# Patient Record
Sex: MALE | Race: WHITE | NOT HISPANIC OR LATINO
[De-identification: names, ages, dates, MRNs, and addresses within clinical notes are randomized per-mention and may not be internally consistent; named-entity substitution may affect disease eponyms.]

---

## 2017-02-02 ENCOUNTER — NON-APPOINTMENT (OUTPATIENT)
Age: 72
End: 2017-02-02

## 2017-02-02 ENCOUNTER — RECORD ABSTRACTING (OUTPATIENT)
Age: 72
End: 2017-02-02

## 2017-02-02 ENCOUNTER — APPOINTMENT (OUTPATIENT)
Dept: ELECTROPHYSIOLOGY | Facility: CLINIC | Age: 72
End: 2017-02-02

## 2017-02-02 VITALS
BODY MASS INDEX: 25.18 KG/M2 | DIASTOLIC BLOOD PRESSURE: 85 MMHG | WEIGHT: 170 LBS | SYSTOLIC BLOOD PRESSURE: 183 MMHG | HEIGHT: 69 IN

## 2017-02-02 DIAGNOSIS — I47.2 VENTRICULAR TACHYCARDIA: ICD-10-CM

## 2017-02-02 RX ORDER — CARVEDILOL 12.5 MG/1
12.5 TABLET, FILM COATED ORAL TWICE DAILY
Refills: 0 | Status: ACTIVE | COMMUNITY

## 2017-02-02 RX ORDER — CALCITRIOL 0.25 UG/1
0.25 CAPSULE, LIQUID FILLED ORAL
Qty: 90 | Refills: 0 | Status: ACTIVE | COMMUNITY
Start: 2016-04-25

## 2017-02-16 ENCOUNTER — OUTPATIENT (OUTPATIENT)
Dept: INPATIENT UNIT | Facility: HOSPITAL | Age: 72
LOS: 1 days | End: 2017-02-16
Payer: MEDICARE

## 2017-02-16 VITALS
WEIGHT: 169.98 LBS | HEART RATE: 60 BPM | DIASTOLIC BLOOD PRESSURE: 79 MMHG | SYSTOLIC BLOOD PRESSURE: 170 MMHG | HEIGHT: 69 IN | RESPIRATION RATE: 18 BRPM | OXYGEN SATURATION: 96 % | TEMPERATURE: 98 F

## 2017-02-16 DIAGNOSIS — Z45.018 ENCOUNTER FOR ADJUSTMENT AND MANAGEMENT OF OTHER PART OF CARDIAC PACEMAKER: ICD-10-CM

## 2017-02-16 DIAGNOSIS — Z98.890 OTHER SPECIFIED POSTPROCEDURAL STATES: Chronic | ICD-10-CM

## 2017-02-16 LAB
ALBUMIN SERPL ELPH-MCNC: 4.1 G/DL — SIGNIFICANT CHANGE UP (ref 3.3–5)
ALP SERPL-CCNC: 88 U/L — SIGNIFICANT CHANGE UP (ref 40–120)
ALT FLD-CCNC: 25 U/L RC — SIGNIFICANT CHANGE UP (ref 10–45)
ANION GAP SERPL CALC-SCNC: 15 MMOL/L — SIGNIFICANT CHANGE UP (ref 5–17)
AST SERPL-CCNC: 25 U/L — SIGNIFICANT CHANGE UP (ref 10–40)
BILIRUB SERPL-MCNC: 0.7 MG/DL — SIGNIFICANT CHANGE UP (ref 0.2–1.2)
BUN SERPL-MCNC: 39 MG/DL — HIGH (ref 7–23)
CALCIUM SERPL-MCNC: 10.1 MG/DL — SIGNIFICANT CHANGE UP (ref 8.4–10.5)
CHLORIDE SERPL-SCNC: 104 MMOL/L — SIGNIFICANT CHANGE UP (ref 96–108)
CO2 SERPL-SCNC: 20 MMOL/L — LOW (ref 22–31)
CREAT SERPL-MCNC: 2.22 MG/DL — HIGH (ref 0.5–1.3)
GLUCOSE SERPL-MCNC: 114 MG/DL — HIGH (ref 70–99)
HCT VFR BLD CALC: 38.3 % — LOW (ref 39–50)
HGB BLD-MCNC: 13 G/DL — SIGNIFICANT CHANGE UP (ref 13–17)
MCHC RBC-ENTMCNC: 27.3 PG — SIGNIFICANT CHANGE UP (ref 27–34)
MCHC RBC-ENTMCNC: 33.9 GM/DL — SIGNIFICANT CHANGE UP (ref 32–36)
MCV RBC AUTO: 80.4 FL — SIGNIFICANT CHANGE UP (ref 80–100)
PLATELET # BLD AUTO: 141 K/UL — LOW (ref 150–400)
POTASSIUM SERPL-MCNC: 5 MMOL/L — SIGNIFICANT CHANGE UP (ref 3.5–5.3)
POTASSIUM SERPL-SCNC: 5 MMOL/L — SIGNIFICANT CHANGE UP (ref 3.5–5.3)
PROT SERPL-MCNC: 7.2 G/DL — SIGNIFICANT CHANGE UP (ref 6–8.3)
RBC # BLD: 4.76 M/UL — SIGNIFICANT CHANGE UP (ref 4.2–5.8)
RBC # FLD: 12.5 % — SIGNIFICANT CHANGE UP (ref 10.3–14.5)
SODIUM SERPL-SCNC: 139 MMOL/L — SIGNIFICANT CHANGE UP (ref 135–145)
WBC # BLD: 7.7 K/UL — SIGNIFICANT CHANGE UP (ref 3.8–10.5)
WBC # FLD AUTO: 7.7 K/UL — SIGNIFICANT CHANGE UP (ref 3.8–10.5)

## 2017-02-16 PROCEDURE — 33263 RMVL & RPLCMT DFB GEN 2 LEAD: CPT

## 2017-02-16 PROCEDURE — 93010 ELECTROCARDIOGRAM REPORT: CPT

## 2017-02-16 PROCEDURE — 99152 MOD SED SAME PHYS/QHP 5/>YRS: CPT

## 2017-02-16 RX ORDER — SODIUM CHLORIDE 9 MG/ML
3 INJECTION INTRAMUSCULAR; INTRAVENOUS; SUBCUTANEOUS EVERY 8 HOURS
Qty: 0 | Refills: 0 | Status: DISCONTINUED | OUTPATIENT
Start: 2017-02-16 | End: 2017-02-17

## 2017-02-16 RX ORDER — CARVEDILOL PHOSPHATE 80 MG/1
12.5 CAPSULE, EXTENDED RELEASE ORAL EVERY 12 HOURS
Qty: 0 | Refills: 0 | Status: DISCONTINUED | OUTPATIENT
Start: 2017-02-16 | End: 2017-02-17

## 2017-02-16 RX ORDER — DEXTROSE 50 % IN WATER 50 %
1 SYRINGE (ML) INTRAVENOUS ONCE
Qty: 0 | Refills: 0 | Status: DISCONTINUED | OUTPATIENT
Start: 2017-02-16 | End: 2017-02-17

## 2017-02-16 RX ORDER — INSULIN LISPRO 100/ML
VIAL (ML) SUBCUTANEOUS
Qty: 0 | Refills: 0 | Status: DISCONTINUED | OUTPATIENT
Start: 2017-02-16 | End: 2017-02-17

## 2017-02-16 RX ORDER — GLUCAGON INJECTION, SOLUTION 0.5 MG/.1ML
1 INJECTION, SOLUTION SUBCUTANEOUS ONCE
Qty: 0 | Refills: 0 | Status: DISCONTINUED | OUTPATIENT
Start: 2017-02-16 | End: 2017-02-17

## 2017-02-16 RX ORDER — LISINOPRIL 2.5 MG/1
20 TABLET ORAL DAILY
Qty: 0 | Refills: 0 | Status: DISCONTINUED | OUTPATIENT
Start: 2017-02-16 | End: 2017-02-17

## 2017-02-16 RX ORDER — DEXTROSE 50 % IN WATER 50 %
25 SYRINGE (ML) INTRAVENOUS ONCE
Qty: 0 | Refills: 0 | Status: DISCONTINUED | OUTPATIENT
Start: 2017-02-16 | End: 2017-02-17

## 2017-02-16 RX ORDER — CALCITRIOL 0.5 UG/1
0.25 CAPSULE ORAL DAILY
Qty: 0 | Refills: 0 | Status: DISCONTINUED | OUTPATIENT
Start: 2017-02-16 | End: 2017-02-17

## 2017-02-16 RX ORDER — SIMVASTATIN 20 MG/1
10 TABLET, FILM COATED ORAL AT BEDTIME
Qty: 0 | Refills: 0 | Status: DISCONTINUED | OUTPATIENT
Start: 2017-02-16 | End: 2017-02-17

## 2017-02-16 RX ORDER — SODIUM CHLORIDE 9 MG/ML
1000 INJECTION, SOLUTION INTRAVENOUS
Qty: 0 | Refills: 0 | Status: DISCONTINUED | OUTPATIENT
Start: 2017-02-16 | End: 2017-02-17

## 2017-02-16 RX ORDER — CEFAZOLIN SODIUM 1 G
1000 VIAL (EA) INJECTION EVERY 8 HOURS
Qty: 0 | Refills: 0 | Status: COMPLETED | OUTPATIENT
Start: 2017-02-17 | End: 2017-02-17

## 2017-02-16 RX ORDER — ASPIRIN/CALCIUM CARB/MAGNESIUM 324 MG
81 TABLET ORAL DAILY
Qty: 0 | Refills: 0 | Status: DISCONTINUED | OUTPATIENT
Start: 2017-02-16 | End: 2017-02-16

## 2017-02-16 RX ORDER — INSULIN LISPRO 100/ML
VIAL (ML) SUBCUTANEOUS AT BEDTIME
Qty: 0 | Refills: 0 | Status: DISCONTINUED | OUTPATIENT
Start: 2017-02-16 | End: 2017-02-17

## 2017-02-16 RX ORDER — ASPIRIN/CALCIUM CARB/MAGNESIUM 324 MG
81 TABLET ORAL DAILY
Qty: 0 | Refills: 0 | Status: DISCONTINUED | OUTPATIENT
Start: 2017-02-17 | End: 2017-02-17

## 2017-02-16 RX ORDER — LISINOPRIL 2.5 MG/1
20 TABLET ORAL DAILY
Qty: 0 | Refills: 0 | Status: DISCONTINUED | OUTPATIENT
Start: 2017-02-16 | End: 2017-02-16

## 2017-02-16 RX ORDER — DEXTROSE 50 % IN WATER 50 %
12.5 SYRINGE (ML) INTRAVENOUS ONCE
Qty: 0 | Refills: 0 | Status: DISCONTINUED | OUTPATIENT
Start: 2017-02-16 | End: 2017-02-17

## 2017-02-16 RX ADMIN — SODIUM CHLORIDE 3 MILLILITER(S): 9 INJECTION INTRAMUSCULAR; INTRAVENOUS; SUBCUTANEOUS at 21:38

## 2017-02-16 RX ADMIN — Medication 100 MILLIGRAM(S): at 23:21

## 2017-02-16 RX ADMIN — SIMVASTATIN 10 MILLIGRAM(S): 20 TABLET, FILM COATED ORAL at 21:43

## 2017-02-16 RX ADMIN — Medication 81 MILLIGRAM(S): at 21:43

## 2017-02-16 NOTE — H&P CARDIOLOGY - PMH
AICD at end of battery life    HLD (hyperlipidemia)    HTN (hypertension)    MI (myocardial infarction)    Throat cancer

## 2017-02-16 NOTE — H&P CARDIOLOGY - FAMILY HISTORY
Mother  Still living? Unknown  Family history of cancer, Age at diagnosis: Age Unknown     Sibling  Still living? No  Family history of cancer, Age at diagnosis: Age Unknown

## 2017-02-16 NOTE — H&P CARDIOLOGY - HISTORY OF PRESENT ILLNESS
71year old female pt with PMHx of MI, HTN, HLD, throat cancer in 2011, AICD after ventricular arrhythmia, s/p ablation presents for AICD generator change. Currently denies dizziness or light headed.

## 2017-02-17 ENCOUNTER — TRANSCRIPTION ENCOUNTER (OUTPATIENT)
Age: 72
End: 2017-02-17

## 2017-02-17 VITALS
TEMPERATURE: 98 F | SYSTOLIC BLOOD PRESSURE: 166 MMHG | RESPIRATION RATE: 17 BRPM | HEART RATE: 62 BPM | OXYGEN SATURATION: 98 % | DIASTOLIC BLOOD PRESSURE: 70 MMHG

## 2017-02-17 LAB
ANION GAP SERPL CALC-SCNC: 13 MMOL/L — SIGNIFICANT CHANGE UP (ref 5–17)
BUN SERPL-MCNC: 34 MG/DL — HIGH (ref 7–23)
CALCIUM SERPL-MCNC: 9.4 MG/DL — SIGNIFICANT CHANGE UP (ref 8.4–10.5)
CHLORIDE SERPL-SCNC: 105 MMOL/L — SIGNIFICANT CHANGE UP (ref 96–108)
CO2 SERPL-SCNC: 21 MMOL/L — LOW (ref 22–31)
CREAT SERPL-MCNC: 1.91 MG/DL — HIGH (ref 0.5–1.3)
GLUCOSE SERPL-MCNC: 152 MG/DL — HIGH (ref 70–99)
HBA1C BLD-MCNC: 7.2 % — HIGH (ref 4–5.6)
HCT VFR BLD CALC: 35.2 % — LOW (ref 39–50)
HGB BLD-MCNC: 12.1 G/DL — LOW (ref 13–17)
MCHC RBC-ENTMCNC: 27.6 PG — SIGNIFICANT CHANGE UP (ref 27–34)
MCHC RBC-ENTMCNC: 34.3 GM/DL — SIGNIFICANT CHANGE UP (ref 32–36)
MCV RBC AUTO: 80.6 FL — SIGNIFICANT CHANGE UP (ref 80–100)
PLATELET # BLD AUTO: 138 K/UL — LOW (ref 150–400)
POTASSIUM SERPL-MCNC: 4.5 MMOL/L — SIGNIFICANT CHANGE UP (ref 3.5–5.3)
POTASSIUM SERPL-SCNC: 4.5 MMOL/L — SIGNIFICANT CHANGE UP (ref 3.5–5.3)
RBC # BLD: 4.37 M/UL — SIGNIFICANT CHANGE UP (ref 4.2–5.8)
RBC # FLD: 11.7 % — SIGNIFICANT CHANGE UP (ref 10.3–14.5)
SODIUM SERPL-SCNC: 139 MMOL/L — SIGNIFICANT CHANGE UP (ref 135–145)
WBC # BLD: 8.4 K/UL — SIGNIFICANT CHANGE UP (ref 3.8–10.5)
WBC # FLD AUTO: 8.4 K/UL — SIGNIFICANT CHANGE UP (ref 3.8–10.5)

## 2017-02-17 PROCEDURE — 80048 BASIC METABOLIC PNL TOTAL CA: CPT

## 2017-02-17 PROCEDURE — 93005 ELECTROCARDIOGRAM TRACING: CPT

## 2017-02-17 PROCEDURE — 99152 MOD SED SAME PHYS/QHP 5/>YRS: CPT

## 2017-02-17 PROCEDURE — 93010 ELECTROCARDIOGRAM REPORT: CPT

## 2017-02-17 PROCEDURE — C1721: CPT

## 2017-02-17 PROCEDURE — 80053 COMPREHEN METABOLIC PANEL: CPT

## 2017-02-17 PROCEDURE — 85027 COMPLETE CBC AUTOMATED: CPT

## 2017-02-17 PROCEDURE — 83036 HEMOGLOBIN GLYCOSYLATED A1C: CPT

## 2017-02-17 PROCEDURE — 33263 RMVL & RPLCMT DFB GEN 2 LEAD: CPT

## 2017-02-17 PROCEDURE — 99153 MOD SED SAME PHYS/QHP EA: CPT

## 2017-02-17 RX ADMIN — Medication 100 MILLIGRAM(S): at 07:30

## 2017-02-17 RX ADMIN — CARVEDILOL PHOSPHATE 12.5 MILLIGRAM(S): 80 CAPSULE, EXTENDED RELEASE ORAL at 05:11

## 2017-02-17 RX ADMIN — Medication 100 MILLIGRAM(S): at 13:42

## 2017-02-17 RX ADMIN — SODIUM CHLORIDE 3 MILLILITER(S): 9 INJECTION INTRAMUSCULAR; INTRAVENOUS; SUBCUTANEOUS at 05:06

## 2017-02-17 RX ADMIN — LISINOPRIL 20 MILLIGRAM(S): 2.5 TABLET ORAL at 05:11

## 2017-02-17 NOTE — DISCHARGE NOTE ADULT - CARE PROVIDERS DIRECT ADDRESSES
[email or web address]

## 2017-02-17 NOTE — DISCHARGE NOTE ADULT - CARE PROVIDER_API CALL
Dao Lange), Cardiac Electrophysiology; Cardiology  [address] [place] [address]  Phone: [phone]  Fax: [phone]

## 2017-02-17 NOTE — DISCHARGE NOTE ADULT - PATIENT PORTAL LINK FT
“You can access the [employer] Patient Portal, offered by [facility], by registering with the following website: [email or web address]”

## 2017-02-17 NOTE — DISCHARGE NOTE ADULT - HOSPITAL COURSE
71year old female pt with PMHx of MI, HTN, HLD, throat cancer in 2011, AICD after ventricular arrhythmia, s/p ablation presents for AICD generator change. Currently denies dizziness or light headed.   s/p  AICD generator change 71year old female pt with PMHx of MI, HTN, HLD, throat cancer in 2011, AICD after ventricular arrhythmia, s/p ablation presents for AICD generator change. Currently denies dizziness or light headed. Patient's A1C: is 7.1. Discussed with the patient & patient refused to see house endocrinologist and will be follwoing up with his primary physician for DM management.  s/p  AICD generator change

## 2017-02-17 NOTE — DISCHARGE NOTE ADULT - MEDICATION SUMMARY - MEDICATIONS TO TAKE
I will START or STAY ON the medications listed below when I get home from the hospital:    Aspirin Enteric Coated 81 mg oral delayed release tablet  -- 1 tab(s) by mouth once a day  -- Indication: For CAD    ramipril 5 mg oral capsule  -- 1 cap(s) by mouth once a day  -- Indication: For HTN (hypertension)    pravastatin 20 mg oral tablet  -- 1 tab(s) by mouth once a day  -- Indication: For HLD (hyperlipidemia)    carvedilol 12.5 mg oral tablet  -- 1 tab(s) by mouth 2 times a day  -- Indication: For CHF    calcitriol 0.25 mcg oral capsule  -- 1 cap(s) by mouth once a day  -- Indication: For Supplements

## 2017-02-17 NOTE — DISCHARGE NOTE ADULT - CARE PLAN
Principal Discharge DX:	AICD at end of battery life  Goal:	To prevent arrhythmias  Instructions for follow-up, activity and diet:	Please followup  with the instruction given by Electro physiology team.  Secondary Diagnosis:	HLD (hyperlipidemia)  Goal:	LDL<70  Instructions for follow-up, activity and diet:	Goal is to keep LDL<70. Continue with your cholesterol medications as prescribed. Eat a heart healthy diet that is low in saturated fats and salt, and includes whole grains, fruits, vegetables and lean protein; exercise regularly (consult with your physician or cardiologist first); maintain a heart healthy weight; if you smoke - quit (A resource to help you stop smoking is the [facility] – phone number [phone].). Continue to follow with your primary physician or cardiologist.  Secondary Diagnosis:	HTN (hypertension)  Goal:	Your blood pressure will be controlled.  Instructions for follow-up, activity and diet:	Continue with your blood pressure medications; eat a heart healthy diet with low salt diet; exercise regularly (consult with your physician or cardiologist first); maintain a heart healthy weight; if you smoke - quit (A resource to help you stop smoking is the [facility] – phone number [phone].); include healthy ways to manage stress. Continue to follow with your primary care physician or cardiologist.  Secondary Diagnosis:	MI (myocardial infarction)  Goal:	To prevent from future MI  Instructions for follow-up, activity and diet:	Low salt, low fat diet.   Weight management.   Take medications as prescribed.    No smoking.  Follow up appointments with your doctor(s)  as instructed. Principal Discharge DX:	AICD at end of battery life  Goal:	To prevent arrhythmias  Instructions for follow-up, activity and diet:	Please followup  with the instruction given by Electro physiology team.  Secondary Diagnosis:	HLD (hyperlipidemia)  Goal:	LDL<70  Instructions for follow-up, activity and diet:	Goal is to keep LDL<70. Continue with your cholesterol medications as prescribed. Eat a heart healthy diet that is low in saturated fats and salt, and includes whole grains, fruits, vegetables and lean protein; exercise regularly (consult with your physician or cardiologist first); maintain a heart healthy weight; if you smoke - quit (A resource to help you stop smoking is the [facility] [employer] Control – phone number [phone].). Continue to follow with your primary physician or cardiologist.  Secondary Diagnosis:	HTN (hypertension)  Goal:	Your blood pressure will be controlled.  Instructions for follow-up, activity and diet:	Continue with your blood pressure medications; eat a heart healthy diet with low salt diet; exercise regularly (consult with your physician or cardiologist first); maintain a heart healthy weight; if you smoke - quit (A resource to help you stop smoking is the [facility] [employer] Control – phone number [phone].); include healthy ways to manage stress. Continue to follow with your primary care physician or cardiologist.  Secondary Diagnosis:	MI (myocardial infarction)  Goal:	To prevent from future MI  Instructions for follow-up, activity and diet:	Low salt, low fat diet.   Weight management.   Take medications as prescribed.    No smoking.  Follow up appointments with your doctor(s)  as instructed.  Secondary Diagnosis:	Diabetes  Goal:	The blood glucose will be under control  Instructions for follow-up, activity and diet:	Continue to follow with your primary care MD or your endocrinologist.  Follow a heart healthy diabetic diet. If you check your fingerstick glucose at home, call your MD if it is greater than 250mg/dL on 2 occasions or less than 100mg/dL on 2 occasions. Know signs of low blood sugar, such as: dizziness, shakiness, sweating, confusion, hunger, nervousness-drink 4 ounces apple juice if occurs and call your doctor. Know early signs of high blood sugar, such as: frequent urination, increased thirst, blurry vision, fatigue, headache - call your doctor if this occurs. Follow with other practitioners to care for your diabetes, such as ophthamologist and podiatrist. Principal Discharge DX:	AICD at end of battery life  Goal:	To prevent arrhythmias  Instructions for follow-up, activity and diet:	Please followup  with the instruction given by Electro physiology team.  Secondary Diagnosis:	HLD (hyperlipidemia)  Goal:	LDL<70  Instructions for follow-up, activity and diet:	Goal is to keep LDL<70. Continue with your cholesterol medications as prescribed. Eat a heart healthy diet that is low in saturated fats and salt, and includes whole grains, fruits, vegetables and lean protein; exercise regularly (consult with your physician or cardiologist first); maintain a heart healthy weight; if you smoke - quit (A resource to help you stop smoking is the [facility] Control – phone number [phone].). Continue to follow with your primary physician or cardiologist.  Secondary Diagnosis:	HTN (hypertension)  Goal:	Your blood pressure will be controlled.  Instructions for follow-up, activity and diet:	Continue with your blood pressure medications; eat a heart healthy diet with low salt diet; exercise regularly (consult with your physician or cardiologist first); maintain a heart healthy weight; if you smoke - quit (A resource to help you stop smoking is the [facility] Control – phone number [phone].); include healthy ways to manage stress. Continue to follow with your primary care physician or cardiologist.  Secondary Diagnosis:	MI (myocardial infarction)  Goal:	To prevent from future MI  Instructions for follow-up, activity and diet:	Low salt, low fat diet.   Weight management.   Take medications as prescribed.    No smoking.  Follow up appointments with your doctor(s)  as instructed.  Secondary Diagnosis:	Diabetes  Goal:	The blood glucose will be under control  Instructions for follow-up, activity and diet:	Continue to follow with your primary care MD or your endocrinologist.  Follow a heart healthy diabetic diet. If you check your fingerstick glucose at home, call your MD if it is greater than 250mg/dL on 2 occasions or less than 100mg/dL on 2 occasions. Know signs of low blood sugar, such as: dizziness, shakiness, sweating, confusion, hunger, nervousness-drink 4 ounces apple juice if occurs and call your doctor. Know early signs of high blood sugar, such as: frequent urination, increased thirst, blurry vision, fatigue, headache - call your doctor if this occurs. Follow with other practitioners to care for your diabetes, such as ophthamologist and podiatrist.

## 2017-02-17 NOTE — DISCHARGE NOTE ADULT - PLAN OF CARE
To prevent arrhythmias Please followup  with the instruction given by Electro physiology team. LDL<70 Goal is to keep LDL<70. Continue with your cholesterol medications as prescribed. Eat a heart healthy diet that is low in saturated fats and salt, and includes whole grains, fruits, vegetables and lean protein; exercise regularly (consult with your physician or cardiologist first); maintain a heart healthy weight; if you smoke - quit (A resource to help you stop smoking is the [facility] – phone number [phone].). Continue to follow with your primary physician or cardiologist. Your blood pressure will be controlled. Continue with your blood pressure medications; eat a heart healthy diet with low salt diet; exercise regularly (consult with your physician or cardiologist first); maintain a heart healthy weight; if you smoke - quit (A resource to help you stop smoking is the [facility] – phone number [phone].); include healthy ways to manage stress. Continue to follow with your primary care physician or cardiologist. To prevent from future MI Low salt, low fat diet.   Weight management.   Take medications as prescribed.    No smoking.  Follow up appointments with your doctor(s)  as instructed. The blood glucose will be under control Continue to follow with your primary care MD or your endocrinologist.  Follow a heart healthy diabetic diet. If you check your fingerstick glucose at home, call your MD if it is greater than 250mg/dL on 2 occasions or less than 100mg/dL on 2 occasions. Know signs of low blood sugar, such as: dizziness, shakiness, sweating, confusion, hunger, nervousness-drink 4 ounces apple juice if occurs and call your doctor. Know early signs of high blood sugar, such as: frequent urination, increased thirst, blurry vision, fatigue, headache - call your doctor if this occurs. Follow with other practitioners to care for your diabetes, such as ophthamologist and podiatrist.

## 2017-03-01 ENCOUNTER — APPOINTMENT (OUTPATIENT)
Dept: ELECTROPHYSIOLOGY | Facility: CLINIC | Age: 72
End: 2017-03-01

## 2017-03-01 ENCOUNTER — NON-APPOINTMENT (OUTPATIENT)
Age: 72
End: 2017-03-01

## 2017-03-01 VITALS
BODY MASS INDEX: 25.18 KG/M2 | OXYGEN SATURATION: 95 % | DIASTOLIC BLOOD PRESSURE: 76 MMHG | HEIGHT: 69 IN | SYSTOLIC BLOOD PRESSURE: 167 MMHG | WEIGHT: 170 LBS | HEART RATE: 67 BPM

## 2018-07-27 PROBLEM — I10 ESSENTIAL (PRIMARY) HYPERTENSION: Chronic | Status: ACTIVE | Noted: 2017-02-16

## 2018-07-27 PROBLEM — E78.5 HYPERLIPIDEMIA, UNSPECIFIED: Chronic | Status: ACTIVE | Noted: 2017-02-16

## 2018-08-01 ENCOUNTER — OUTPATIENT (OUTPATIENT)
Dept: OUTPATIENT SERVICES | Facility: HOSPITAL | Age: 73
LOS: 1 days | End: 2018-08-01
Payer: MEDICARE

## 2018-08-01 VITALS
SYSTOLIC BLOOD PRESSURE: 149 MMHG | HEIGHT: 69 IN | WEIGHT: 167.99 LBS | HEART RATE: 60 BPM | RESPIRATION RATE: 16 BRPM | DIASTOLIC BLOOD PRESSURE: 86 MMHG | TEMPERATURE: 98 F

## 2018-08-01 DIAGNOSIS — Z98.890 OTHER SPECIFIED POSTPROCEDURAL STATES: Chronic | ICD-10-CM

## 2018-08-01 DIAGNOSIS — Z95.810 PRESENCE OF AUTOMATIC (IMPLANTABLE) CARDIAC DEFIBRILLATOR: Chronic | ICD-10-CM

## 2018-08-01 DIAGNOSIS — Z01.818 ENCOUNTER FOR OTHER PREPROCEDURAL EXAMINATION: ICD-10-CM

## 2018-08-01 DIAGNOSIS — K40.90 UNILATERAL INGUINAL HERNIA, WITHOUT OBSTRUCTION OR GANGRENE, NOT SPECIFIED AS RECURRENT: ICD-10-CM

## 2018-08-01 DIAGNOSIS — C14.0 MALIGNANT NEOPLASM OF PHARYNX, UNSPECIFIED: ICD-10-CM

## 2018-08-01 DIAGNOSIS — K40.30 UNILATERAL INGUINAL HERNIA, WITH OBSTRUCTION, WITHOUT GANGRENE, NOT SPECIFIED AS RECURRENT: ICD-10-CM

## 2018-08-01 DIAGNOSIS — Z95.810 PRESENCE OF AUTOMATIC (IMPLANTABLE) CARDIAC DEFIBRILLATOR: ICD-10-CM

## 2018-08-01 LAB
ANION GAP SERPL CALC-SCNC: 6 MMOL/L — SIGNIFICANT CHANGE UP (ref 5–17)
BUN SERPL-MCNC: 54 MG/DL — HIGH (ref 7–23)
CALCIUM SERPL-MCNC: 9.5 MG/DL — SIGNIFICANT CHANGE UP (ref 8.5–10.1)
CHLORIDE SERPL-SCNC: 109 MMOL/L — HIGH (ref 96–108)
CO2 SERPL-SCNC: 25 MMOL/L — SIGNIFICANT CHANGE UP (ref 22–31)
CREAT SERPL-MCNC: 2.4 MG/DL — HIGH (ref 0.5–1.3)
GLUCOSE SERPL-MCNC: 141 MG/DL — HIGH (ref 70–99)
HBA1C BLD-MCNC: 7 % — HIGH (ref 4–5.6)
HCT VFR BLD CALC: 38.7 % — LOW (ref 39–50)
HGB BLD-MCNC: 13.2 G/DL — SIGNIFICANT CHANGE UP (ref 13–17)
MCHC RBC-ENTMCNC: 28.9 PG — SIGNIFICANT CHANGE UP (ref 27–34)
MCHC RBC-ENTMCNC: 34.1 GM/DL — SIGNIFICANT CHANGE UP (ref 32–36)
MCV RBC AUTO: 84.7 FL — SIGNIFICANT CHANGE UP (ref 80–100)
NRBC # BLD: 0 /100 WBCS — SIGNIFICANT CHANGE UP (ref 0–0)
PLATELET # BLD AUTO: 174 K/UL — SIGNIFICANT CHANGE UP (ref 150–400)
POTASSIUM SERPL-MCNC: 5.4 MMOL/L — HIGH (ref 3.5–5.3)
POTASSIUM SERPL-SCNC: 5.4 MMOL/L — HIGH (ref 3.5–5.3)
RBC # BLD: 4.57 M/UL — SIGNIFICANT CHANGE UP (ref 4.2–5.8)
RBC # FLD: 13.3 % — SIGNIFICANT CHANGE UP (ref 10.3–14.5)
SODIUM SERPL-SCNC: 140 MMOL/L — SIGNIFICANT CHANGE UP (ref 135–145)
WBC # BLD: 7.8 K/UL — SIGNIFICANT CHANGE UP (ref 3.8–10.5)
WBC # FLD AUTO: 7.8 K/UL — SIGNIFICANT CHANGE UP (ref 3.8–10.5)

## 2018-08-01 PROCEDURE — 83036 HEMOGLOBIN GLYCOSYLATED A1C: CPT

## 2018-08-01 PROCEDURE — 80048 BASIC METABOLIC PNL TOTAL CA: CPT

## 2018-08-01 PROCEDURE — 93010 ELECTROCARDIOGRAM REPORT: CPT | Mod: NC

## 2018-08-01 PROCEDURE — 93005 ELECTROCARDIOGRAM TRACING: CPT

## 2018-08-01 PROCEDURE — 85027 COMPLETE CBC AUTOMATED: CPT

## 2018-08-01 RX ORDER — CARVEDILOL PHOSPHATE 80 MG/1
1 CAPSULE, EXTENDED RELEASE ORAL
Qty: 0 | Refills: 0 | COMMUNITY

## 2018-08-01 RX ORDER — RAMIPRIL 5 MG
1 CAPSULE ORAL
Qty: 0 | Refills: 0 | COMMUNITY

## 2018-08-01 NOTE — H&P PST ADULT - MALLAMPATI CLASS
Class II - visualization of the soft palate, fauces, and uvula can't open mouth wide, neck - full ROM. Anesthesiology consulted, no major issues anticipated, but patient needs ENT note/Class IV (difficult) - the soft palate is not visible at all

## 2018-08-01 NOTE — H&P PST ADULT - PROBLEM SELECTOR PLAN 2
Cardiac clearance with Dr. Garcia on 08/03/18. Last AICD battery check over 3 months ago. Needs new interrogation. Patient aware. Last dose of ASA 08/02/18, will confirm with cardiology.

## 2018-08-01 NOTE — H&P PST ADULT - HISTORY OF PRESENT ILLNESS
73 year old male pt with PMHx of MI, HTN, HLD, throat cancer in 2011 s/p chemo and RT, DM2 (not on meds since weight loss with Ca), AICD after ventricular arrhythmia (2009, generator change 2017) c/o Left groin pain and bulge for over one year. Pain is worse with lifting. 73 year old male with PMHx of MI, HTN, HLD, throat cancer in 2011 s/p chemo and RT, DM2 (not on meds since weight loss with Ca), AICD after ventricular arrhythmia (2009, generator change 2017) c/o Left groin pain and bulge for over one year. Pain is worse with lifting. He is scheduled for Repair Left Inguinal Hernia with Mesh.

## 2018-08-01 NOTE — H&P PST ADULT - PROBLEM SELECTOR PLAN 3
Seen by Dr. Lindsey (anesthesia). No issues with intubation anticipated, but patient needs note from Dr. NEREIDA iSu (ENT) with clarification re past h/o throat Ca and related procedures. (Patient states that at some point tonsillectomy was attempted but no tonsils were found).

## 2018-08-01 NOTE — H&P PST ADULT - PMH
CAD (coronary artery disease)    Diabetes  Type2, not on any meds since weight loss after throat Ca  HLD (hyperlipidemia)    HTN (hypertension)    Inguinal hernia, left    MI (myocardial infarction)  1992  Renal insufficiency  s/p chemo  Throat cancer  2011, treated with chemo, RT  Ventricular arrhythmia  s/p AICD

## 2018-08-01 NOTE — H&P PST ADULT - PROBLEM SELECTOR PLAN 1
Repair Left Inguinal Hernia with Mesh.    Labs, MC with Dr. Robertson. Pre-op and Hibiclens instructions reviewed and given. Take routine am meds DOS with sip of water. Avoid NSAIDs and OTC supplements. Verbalized understanding.

## 2018-08-01 NOTE — H&P PST ADULT - NSANTHOSAYNRD_GEN_A_CORE
No. NADYA screening performed.  STOP BANG Legend: 0-2 = LOW Risk; 3-4 = INTERMEDIATE Risk; 5-8 = HIGH Risk

## 2018-08-02 PROBLEM — I21.3 ST ELEVATION (STEMI) MYOCARDIAL INFARCTION OF UNSPECIFIED SITE: Chronic | Status: ACTIVE | Noted: 2017-02-16

## 2018-08-02 PROBLEM — Z45.02 ENCOUNTER FOR ADJUSTMENT AND MANAGEMENT OF AUTOMATIC IMPLANTABLE CARDIAC DEFIBRILLATOR: Chronic | Status: INACTIVE | Noted: 2017-02-16 | Resolved: 2018-08-01

## 2018-08-02 PROBLEM — I49.9 CARDIAC ARRHYTHMIA, UNSPECIFIED: Chronic | Status: ACTIVE | Noted: 2018-08-01

## 2018-08-02 PROBLEM — C14.0 MALIGNANT NEOPLASM OF PHARYNX, UNSPECIFIED: Chronic | Status: ACTIVE | Noted: 2017-02-16

## 2018-08-07 PROBLEM — N28.9 DISORDER OF KIDNEY AND URETER, UNSPECIFIED: Chronic | Status: ACTIVE | Noted: 2018-08-01

## 2018-08-07 PROBLEM — E11.9 TYPE 2 DIABETES MELLITUS WITHOUT COMPLICATIONS: Chronic | Status: ACTIVE | Noted: 2018-08-01

## 2018-08-07 PROBLEM — I25.10 ATHEROSCLEROTIC HEART DISEASE OF NATIVE CORONARY ARTERY WITHOUT ANGINA PECTORIS: Chronic | Status: ACTIVE | Noted: 2018-08-01

## 2018-08-07 PROBLEM — K40.90 UNILATERAL INGUINAL HERNIA, WITHOUT OBSTRUCTION OR GANGRENE, NOT SPECIFIED AS RECURRENT: Chronic | Status: ACTIVE | Noted: 2018-08-01

## 2018-08-09 ENCOUNTER — TRANSCRIPTION ENCOUNTER (OUTPATIENT)
Age: 73
End: 2018-08-09

## 2018-08-10 ENCOUNTER — INPATIENT (INPATIENT)
Facility: HOSPITAL | Age: 73
LOS: 0 days | Discharge: HOME | DRG: 988 | End: 2018-08-11
Attending: FAMILY MEDICINE | Admitting: FAMILY MEDICINE
Payer: COMMERCIAL

## 2018-08-10 ENCOUNTER — RESULT REVIEW (OUTPATIENT)
Age: 73
End: 2018-08-10

## 2018-08-10 VITALS
SYSTOLIC BLOOD PRESSURE: 145 MMHG | HEIGHT: 69 IN | HEART RATE: 60 BPM | DIASTOLIC BLOOD PRESSURE: 71 MMHG | WEIGHT: 169.98 LBS | TEMPERATURE: 98 F | RESPIRATION RATE: 16 BRPM | OXYGEN SATURATION: 98 %

## 2018-08-10 DIAGNOSIS — Z95.810 PRESENCE OF AUTOMATIC (IMPLANTABLE) CARDIAC DEFIBRILLATOR: Chronic | ICD-10-CM

## 2018-08-10 DIAGNOSIS — N28.9 DISORDER OF KIDNEY AND URETER, UNSPECIFIED: ICD-10-CM

## 2018-08-10 DIAGNOSIS — Z98.890 OTHER SPECIFIED POSTPROCEDURAL STATES: Chronic | ICD-10-CM

## 2018-08-10 DIAGNOSIS — R29.898 OTHER SYMPTOMS AND SIGNS INVOLVING THE MUSCULOSKELETAL SYSTEM: ICD-10-CM

## 2018-08-10 DIAGNOSIS — E78.1 PURE HYPERGLYCERIDEMIA: ICD-10-CM

## 2018-08-10 DIAGNOSIS — E11.29 TYPE 2 DIABETES MELLITUS WITH OTHER DIABETIC KIDNEY COMPLICATION: ICD-10-CM

## 2018-08-10 DIAGNOSIS — I21.A9 OTHER MYOCARDIAL INFARCTION TYPE: ICD-10-CM

## 2018-08-10 DIAGNOSIS — Z01.818 ENCOUNTER FOR OTHER PREPROCEDURAL EXAMINATION: ICD-10-CM

## 2018-08-10 DIAGNOSIS — K40.30 UNILATERAL INGUINAL HERNIA, WITH OBSTRUCTION, WITHOUT GANGRENE, NOT SPECIFIED AS RECURRENT: ICD-10-CM

## 2018-08-10 DIAGNOSIS — I10 ESSENTIAL (PRIMARY) HYPERTENSION: ICD-10-CM

## 2018-08-10 PROCEDURE — 99223 1ST HOSP IP/OBS HIGH 75: CPT | Mod: AI

## 2018-08-10 PROCEDURE — 88304 TISSUE EXAM BY PATHOLOGIST: CPT | Mod: 26

## 2018-08-10 RX ORDER — ONDANSETRON 8 MG/1
4 TABLET, FILM COATED ORAL ONCE
Qty: 0 | Refills: 0 | Status: DISCONTINUED | OUTPATIENT
Start: 2018-08-10 | End: 2018-08-10

## 2018-08-10 RX ORDER — CEFAZOLIN SODIUM 1 G
1000 VIAL (EA) INJECTION ONCE
Qty: 0 | Refills: 0 | Status: COMPLETED | OUTPATIENT
Start: 2018-08-10 | End: 2018-08-10

## 2018-08-10 RX ORDER — HYDROMORPHONE HYDROCHLORIDE 2 MG/ML
0.5 INJECTION INTRAMUSCULAR; INTRAVENOUS; SUBCUTANEOUS
Qty: 0 | Refills: 0 | Status: DISCONTINUED | OUTPATIENT
Start: 2018-08-10 | End: 2018-08-10

## 2018-08-10 RX ORDER — SODIUM CHLORIDE 9 MG/ML
1000 INJECTION, SOLUTION INTRAVENOUS
Qty: 0 | Refills: 0 | Status: DISCONTINUED | OUTPATIENT
Start: 2018-08-10 | End: 2018-08-11

## 2018-08-10 RX ORDER — DEXTROSE 50 % IN WATER 50 %
12.5 SYRINGE (ML) INTRAVENOUS ONCE
Qty: 0 | Refills: 0 | Status: DISCONTINUED | OUTPATIENT
Start: 2018-08-10 | End: 2018-08-11

## 2018-08-10 RX ORDER — CARVEDILOL PHOSPHATE 80 MG/1
25 CAPSULE, EXTENDED RELEASE ORAL
Qty: 0 | Refills: 0 | Status: DISCONTINUED | OUTPATIENT
Start: 2018-08-10 | End: 2018-08-10

## 2018-08-10 RX ORDER — CALCITRIOL 0.5 UG/1
0.25 CAPSULE ORAL DAILY
Qty: 0 | Refills: 0 | Status: DISCONTINUED | OUTPATIENT
Start: 2018-08-10 | End: 2018-08-11

## 2018-08-10 RX ORDER — DEXTROSE 50 % IN WATER 50 %
25 SYRINGE (ML) INTRAVENOUS ONCE
Qty: 0 | Refills: 0 | Status: DISCONTINUED | OUTPATIENT
Start: 2018-08-10 | End: 2018-08-11

## 2018-08-10 RX ORDER — INSULIN LISPRO 100/ML
VIAL (ML) SUBCUTANEOUS
Qty: 0 | Refills: 0 | Status: DISCONTINUED | OUTPATIENT
Start: 2018-08-10 | End: 2018-08-11

## 2018-08-10 RX ORDER — OXYCODONE HYDROCHLORIDE 5 MG/1
5 TABLET ORAL ONCE
Qty: 0 | Refills: 0 | Status: DISCONTINUED | OUTPATIENT
Start: 2018-08-10 | End: 2018-08-10

## 2018-08-10 RX ORDER — CARVEDILOL PHOSPHATE 80 MG/1
25 CAPSULE, EXTENDED RELEASE ORAL EVERY 12 HOURS
Qty: 0 | Refills: 0 | Status: DISCONTINUED | OUTPATIENT
Start: 2018-08-10 | End: 2018-08-11

## 2018-08-10 RX ORDER — DEXTROSE 50 % IN WATER 50 %
15 SYRINGE (ML) INTRAVENOUS ONCE
Qty: 0 | Refills: 0 | Status: DISCONTINUED | OUTPATIENT
Start: 2018-08-10 | End: 2018-08-11

## 2018-08-10 RX ORDER — LISINOPRIL 2.5 MG/1
10 TABLET ORAL DAILY
Qty: 0 | Refills: 0 | Status: DISCONTINUED | OUTPATIENT
Start: 2018-08-10 | End: 2018-08-11

## 2018-08-10 RX ORDER — ACETAMINOPHEN 500 MG
1000 TABLET ORAL ONCE
Qty: 0 | Refills: 0 | Status: COMPLETED | OUTPATIENT
Start: 2018-08-10 | End: 2018-08-10

## 2018-08-10 RX ORDER — SODIUM CHLORIDE 9 MG/ML
1000 INJECTION, SOLUTION INTRAVENOUS
Qty: 0 | Refills: 0 | Status: DISCONTINUED | OUTPATIENT
Start: 2018-08-10 | End: 2018-08-10

## 2018-08-10 RX ORDER — ATORVASTATIN CALCIUM 80 MG/1
10 TABLET, FILM COATED ORAL AT BEDTIME
Qty: 0 | Refills: 0 | Status: DISCONTINUED | OUTPATIENT
Start: 2018-08-10 | End: 2018-08-11

## 2018-08-10 RX ORDER — GLUCAGON INJECTION, SOLUTION 0.5 MG/.1ML
1 INJECTION, SOLUTION SUBCUTANEOUS ONCE
Qty: 0 | Refills: 0 | Status: DISCONTINUED | OUTPATIENT
Start: 2018-08-10 | End: 2018-08-11

## 2018-08-10 RX ADMIN — Medication 1000 MILLIGRAM(S): at 14:54

## 2018-08-10 RX ADMIN — SODIUM CHLORIDE 100 MILLILITER(S): 9 INJECTION, SOLUTION INTRAVENOUS at 08:31

## 2018-08-10 RX ADMIN — Medication 400 MILLIGRAM(S): at 14:24

## 2018-08-10 RX ADMIN — OXYCODONE HYDROCHLORIDE 5 MILLIGRAM(S): 5 TABLET ORAL at 20:53

## 2018-08-10 RX ADMIN — OXYCODONE HYDROCHLORIDE 5 MILLIGRAM(S): 5 TABLET ORAL at 20:23

## 2018-08-10 NOTE — H&P ADULT - HISTORY OF PRESENT ILLNESS
Pt had L inguinal hernia repair done today under local anesthesia. Post-operatively he was unable to ambulate due to decreased sensation and weakness of his L leg. Pt has no other complaints.

## 2018-08-10 NOTE — BRIEF OPERATIVE NOTE - POST-OP DX
Inguinal hernia  08/10/2018  Incarcerated Left Inguinal Hernia with Lipoma of the Spermatic Cord  Active  Everardo James

## 2018-08-10 NOTE — H&P ADULT - PROBLEM SELECTOR PLAN 1
Likely secondary to prolonged anesthestethic effect from marcaine/lidocaine. Will observe. As soon as pt is able to ambulate can d/c home.

## 2018-08-10 NOTE — BRIEF OPERATIVE NOTE - COMMENTS
Repair Incarcerated Left Inguinal Hernia with Mesh, Excision Lipoma Spermatic Cord, Ilioinguinal Nerve Block.

## 2018-08-10 NOTE — ASU DISCHARGE PLAN (ADULT/PEDIATRIC). - MEDICATION SUMMARY - MEDICATIONS TO TAKE
I will START or STAY ON the medications listed below when I get home from the hospital:    Aspirin Enteric Coated 81 mg oral delayed release tablet  -- 1 tab(s) by mouth once a day  -- Indication: For prior    Norco 5 mg-325 mg oral tablet  -- 2 tab(s) by mouth every 4 to 6 hours, As Needed -for severe pain MDD:10   -- Caution federal law prohibits the transfer of this drug to any person other  than the person for whom it was prescribed.  May cause drowsiness.  Alcohol may intensify this effect.  Use care when operating dangerous machinery.  This product contains acetaminophen.  Do not use  with any other product containing acetaminophen to prevent possible liver damage.  Using more of this medication than prescribed may cause serious breathing problems.    -- Indication: For pain    benazepril 10 mg oral tablet  -- 1 tab(s) by mouth once a day  -- Indication: For prior    pravastatin 40 mg oral tablet  -- 1 tab(s) by mouth once a day  -- Indication: For prior    carvedilol 25 mg oral tablet  -- 1 tab(s) by mouth 2 times a day  -- Indication: For prior    calcitriol 0.25 mcg oral capsule  -- 1 cap(s) by mouth once a day  -- Indication: For prior

## 2018-08-10 NOTE — H&P ADULT - COMMENTS
Constitutional: denies fever, chills, diaphoresis   HEENT: denies blurry vision, difficulty hearing  Respiratory: denies SOB, REZA, cough, sputum production, wheezing, hemoptysis  Cardiovascular: denies CP, palpitations, edema  Gastrointestinal: denies nausea, vomiting, diarrhea, constipation, abdominal pain, melena, hematochezia   Genitourinary: denies dysuria, frequency, urgency, hematuria   Skin/Breast: denies rash, itching  Musculoskeletal: denies myalgias, joint swelling,   Neurologic: denies headache, weakness, dizziness, paresthesias, numbness/tingling  Psychiatric: denies feeling anxious, depressed, suicidal, homicidal thoughts  Hematology/Oncology: denies bruising, tender or enlarged lymph nodes   ROS negative except as noted above

## 2018-08-10 NOTE — H&P ADULT - NSHPSOCIALHISTORY_GEN_ALL_CORE
Social History/Preventive Medicine:    Substance Use: denies  Tobacco Usage:  denies  Alcohol Usage: denies  Illicit Drug Usage: denies    Health Management

## 2018-08-10 NOTE — H&P ADULT - NSHPPHYSICALEXAM_GEN_ALL_CORE
Physical Exam:  General: Well developed, well nourished, NAD  HEENT: NCAT, PERRLA, EOMI bl, moist mucous membranes   Neck: Supple, nontender, no mass  Neurology: A&Ox3, nonfocal, CN II-XII grossly intact, decreased sensorium LLE, 1/5 muscle strength LLE,    Respiratory: CTA B/L, No W/R/R  CV: RRR, +S1/S2, no murmurs, rubs or gallops, defibrillator in place  Abdominal: Soft, NT, ND +BSx4  Extremities: No C/C/E, + peripheral pulses  MSK: Normal ROM, no joint erythema or warmth, no joint swelling   Skin: warm, dry, normal color, no rash or abnormal lesions

## 2018-08-11 ENCOUNTER — TRANSCRIPTION ENCOUNTER (OUTPATIENT)
Age: 73
End: 2018-08-11

## 2018-08-11 VITALS — DIASTOLIC BLOOD PRESSURE: 60 MMHG | SYSTOLIC BLOOD PRESSURE: 100 MMHG

## 2018-08-11 PROCEDURE — 88304 TISSUE EXAM BY PATHOLOGIST: CPT

## 2018-08-11 PROCEDURE — 49507 PRP I/HERN INIT BLOCK >5 YR: CPT

## 2018-08-11 PROCEDURE — 99239 HOSP IP/OBS DSCHRG MGMT >30: CPT

## 2018-08-11 PROCEDURE — 82962 GLUCOSE BLOOD TEST: CPT

## 2018-08-11 PROCEDURE — C1781: CPT

## 2018-08-11 RX ORDER — MORPHINE SULFATE 50 MG/1
1 CAPSULE, EXTENDED RELEASE ORAL EVERY 4 HOURS
Qty: 0 | Refills: 0 | Status: DISCONTINUED | OUTPATIENT
Start: 2018-08-11 | End: 2018-08-11

## 2018-08-11 RX ORDER — MORPHINE SULFATE 50 MG/1
0.5 CAPSULE, EXTENDED RELEASE ORAL EVERY 4 HOURS
Qty: 0 | Refills: 0 | Status: DISCONTINUED | OUTPATIENT
Start: 2018-08-11 | End: 2018-08-11

## 2018-08-11 RX ORDER — MORPHINE SULFATE 50 MG/1
2 CAPSULE, EXTENDED RELEASE ORAL EVERY 4 HOURS
Qty: 0 | Refills: 0 | Status: DISCONTINUED | OUTPATIENT
Start: 2018-08-11 | End: 2018-08-11

## 2018-08-11 RX ADMIN — MORPHINE SULFATE 1 MILLIGRAM(S): 50 CAPSULE, EXTENDED RELEASE ORAL at 04:16

## 2018-08-11 RX ADMIN — LISINOPRIL 10 MILLIGRAM(S): 2.5 TABLET ORAL at 05:17

## 2018-08-11 RX ADMIN — CARVEDILOL PHOSPHATE 25 MILLIGRAM(S): 80 CAPSULE, EXTENDED RELEASE ORAL at 05:17

## 2018-08-11 RX ADMIN — MORPHINE SULFATE 1 MILLIGRAM(S): 50 CAPSULE, EXTENDED RELEASE ORAL at 03:31

## 2018-08-11 NOTE — DISCHARGE NOTE ADULT - PLAN OF CARE
Resolved Likely secondary to meds, anesthetics for the surgery.  Resolved, able to ambulate Post Op#1  F/U with Dr. James as advised by him Continue home meds  f/u with PCP You have decided to f/u with your PCP and not to start any Meds  Carbohydrate control diet  HbA1C is 7. You need to f/u with PCP and monitor your blood sugar closely. Continue home meds  F/u with PCP Continue home meds and f/u with your cardiologist Stable Please f/u with doctor. You are on benzapril ( BP med that can also affect kidneys).  Kidney function needs to be monitored closely.

## 2018-08-11 NOTE — DISCHARGE NOTE ADULT - PATIENT PORTAL LINK FT
You can access the [facility] Patient Portal, offered by [facility], by registering with the following website: [email or web address]

## 2018-08-11 NOTE — DISCHARGE NOTE ADULT - SECONDARY DIAGNOSIS.
Inguinal hernia, left Essential hypertension Type 2 diabetes mellitus without complication, without long-term current use of insulin HLD (hyperlipidemia) CAD (coronary artery disease) CKD (chronic kidney disease), stage IV

## 2018-08-11 NOTE — DISCHARGE NOTE ADULT - CARE PROVIDER_API CALL
Everardo James (), Surgery  Everardo [place] [address]  Phone: [phone]  Fax: [phone]

## 2018-08-11 NOTE — DISCHARGE NOTE ADULT - ADDITIONAL INSTRUCTIONS
Please f/u with PCP within 3 to 5 days to discuss about diabetes and need to start meds  F/u with all your doctors.   F/u with Dr. James as advised by him.

## 2018-08-11 NOTE — DISCHARGE NOTE ADULT - HOSPITAL COURSE
Patient was admitted for observation overnight and symptoms improved. Patient now wants to go home. Able to ambulate without any difficulty. Informed patient about elevated blood sugar and HbA1C of 7, patient states that he will f/u with his PCP for that and does not want any prescription for diabetic meds and will watch his diet. Patient mentating good and has capacity to make medical decision for him, AAOx3. Patient hemodynamically stable for discharge with instructions to f/u with PCP within 3 days and surgery as advise by surgeon.

## 2018-08-11 NOTE — DISCHARGE NOTE ADULT - MEDICATION SUMMARY - MEDICATIONS TO TAKE
I will START or STAY ON the medications listed below when I get home from the hospital:    Aspirin Enteric Coated 81 mg oral delayed release tablet  -- 1 tab(s) by mouth once a day  -- Indication: For Home Med    Norco 5 mg-325 mg oral tablet  -- 2 tab(s) by mouth every 4 to 6 hours, As Needed -for severe pain MDD:10   -- Caution federal law prohibits the transfer of this drug to any person other  than the person for whom it was prescribed.  May cause drowsiness.  Alcohol may intensify this effect.  Use care when operating dangerous machinery.  This product contains acetaminophen.  Do not use  with any other product containing acetaminophen to prevent possible liver damage.  Using more of this medication than prescribed may cause serious breathing problems.    -- Indication: For Home Med    benazepril 10 mg oral tablet  -- 1 tab(s) by mouth once a day  -- Indication: For Home Med    pravastatin 40 mg oral tablet  -- 1 tab(s) by mouth once a day  -- Indication: For Home Med    carvedilol 25 mg oral tablet  -- 1 tab(s) by mouth 2 times a day  -- Indication: For Home Med    calcitriol 0.25 mcg oral capsule  -- 1 cap(s) by mouth once a day  -- Indication: For Home Med

## 2018-08-11 NOTE — DISCHARGE NOTE ADULT - CARE PLAN
Principal Discharge DX:	Weakness of left lower extremity  Goal:	Resolved  Assessment and plan of treatment:	Likely secondary to meds, anesthetics for the surgery.  Resolved, able to ambulate  Secondary Diagnosis:	Inguinal hernia, left  Assessment and plan of treatment:	Post Op#1  F/U with Dr. James as advised by him  Secondary Diagnosis:	Essential hypertension  Assessment and plan of treatment:	Continue home meds  f/u with PCP  Secondary Diagnosis:	Type 2 diabetes mellitus without complication, without long-term current use of insulin  Assessment and plan of treatment:	You have decided to f/u with your PCP and not to start any Meds  Carbohydrate control diet  HbA1C is 7. You need to f/u with PCP and monitor your blood sugar closely.  Secondary Diagnosis:	HLD (hyperlipidemia)  Assessment and plan of treatment:	Continue home meds  F/u with PCP  Secondary Diagnosis:	CAD (coronary artery disease)  Assessment and plan of treatment:	Continue home meds and f/u with your cardiologist Principal Discharge DX:	Weakness of left lower extremity  Goal:	Resolved  Assessment and plan of treatment:	Likely secondary to meds, anesthetics for the surgery.  Resolved, able to ambulate  Secondary Diagnosis:	Inguinal hernia, left  Assessment and plan of treatment:	Post Op#1  F/U with Dr. James as advised by him  Secondary Diagnosis:	Essential hypertension  Assessment and plan of treatment:	Continue home meds  f/u with PCP  Secondary Diagnosis:	Type 2 diabetes mellitus without complication, without long-term current use of insulin  Assessment and plan of treatment:	You have decided to f/u with your PCP and not to start any Meds  Carbohydrate control diet  HbA1C is 7. You need to f/u with PCP and monitor your blood sugar closely.  Secondary Diagnosis:	HLD (hyperlipidemia)  Assessment and plan of treatment:	Continue home meds  F/u with PCP  Secondary Diagnosis:	CAD (coronary artery disease)  Assessment and plan of treatment:	Continue home meds and f/u with your cardiologist  Secondary Diagnosis:	CKD (chronic kidney disease), stage IV  Goal:	Stable  Assessment and plan of treatment:	Please f/u with doctor. You are on benzapril ( BP med that can also affect kidneys).  Kidney function needs to be monitored closely.

## 2018-08-13 LAB — SURGICAL PATHOLOGY FINAL REPORT - CH: SIGNIFICANT CHANGE UP

## 2018-08-22 ENCOUNTER — APPOINTMENT (OUTPATIENT)
Dept: MRI IMAGING | Facility: HOSPITAL | Age: 73
End: 2018-08-22

## 2018-08-22 ENCOUNTER — OUTPATIENT (OUTPATIENT)
Dept: OUTPATIENT SERVICES | Facility: HOSPITAL | Age: 73
LOS: 1 days | End: 2018-08-22
Payer: MEDICARE

## 2018-08-22 ENCOUNTER — APPOINTMENT (OUTPATIENT)
Dept: ELECTROPHYSIOLOGY | Facility: CLINIC | Age: 73
End: 2018-08-22
Payer: MEDICARE

## 2018-08-22 DIAGNOSIS — Z95.810 PRESENCE OF AUTOMATIC (IMPLANTABLE) CARDIAC DEFIBRILLATOR: Chronic | ICD-10-CM

## 2018-08-22 DIAGNOSIS — L02.12 FURUNCLE OF NECK: ICD-10-CM

## 2018-08-22 DIAGNOSIS — D00.07 CARCINOMA IN SITU OF TONGUE: ICD-10-CM

## 2018-08-22 DIAGNOSIS — Z98.890 OTHER SPECIFIED POSTPROCEDURAL STATES: Chronic | ICD-10-CM

## 2018-08-22 PROCEDURE — 93280 PM DEVICE PROGR EVAL DUAL: CPT | Mod: 76

## 2018-08-22 PROCEDURE — 71046 X-RAY EXAM CHEST 2 VIEWS: CPT

## 2018-08-22 PROCEDURE — 71046 X-RAY EXAM CHEST 2 VIEWS: CPT | Mod: 26

## 2018-08-22 PROCEDURE — 70540 MRI ORBIT/FACE/NECK W/O DYE: CPT

## 2018-08-22 PROCEDURE — 93283 PRGRMG EVAL IMPLANTABLE DFB: CPT

## 2018-08-22 PROCEDURE — 70540 MRI ORBIT/FACE/NECK W/O DYE: CPT | Mod: 26

## 2019-01-17 NOTE — H&P PST ADULT - TOBACCO USE
Chief Complaint:          Kidney stone    HPI:   55 y.o. male.  Pt has 5 day history of right flank pain.  It radiated to the groin.  Pt has never had a stone before to his knowledge.  The pain was 9 on scale of 10.  It lasted 3 to 4 hours..  He went to ER Monday night.  Pt's ct scan showed a 6 mm distal ureteral ston on the right.  He has not had much pain since Monday.  Pt has hx of gout.  Pt is on allopurinol.  HPI      Past Medical History:        Past Medical History:   Diagnosis Date   • Gout    • Gout    • Kidney stone          Current Meds:     Current Outpatient Medications   Medication Sig Dispense Refill   • allopurinol (ZYLOPRIM) 300 MG tablet Take 300 mg by mouth Daily.     • ciprofloxacin (CIPRO) 500 MG tablet Take 1 tablet by mouth 2 (Two) Times a Day for 7 days. 14 tablet 0   • HYDROcodone-acetaminophen (NORCO) 5-325 MG per tablet Take 1 tablet by mouth Every 6 (Six) Hours As Needed for Mild Pain . 12 tablet 0   • ketorolac (TORADOL) 10 MG tablet Take 1 tablet by mouth Every 6 (Six) Hours As Needed for Moderate Pain . 10 tablet 0   • ondansetron ODT (ZOFRAN-ODT) 4 MG disintegrating tablet Take 1 tablet by mouth Every 6 (Six) Hours As Needed for Nausea or Vomiting. 10 tablet 0   • oxyCODONE-acetaminophen (PERCOCET) 5-325 MG per tablet Take 1 tablet by mouth Every 4 (Four) Hours As Needed (pain). 12 tablet 0   • promethazine (PHENERGAN) 25 MG tablet Take 1 tablet by mouth Every 6 (Six) Hours As Needed for Nausea or Vomiting. 15 tablet 0   • tamsulosin (FLOMAX) 0.4 MG capsule 24 hr capsule Take 1 capsule by mouth Daily. 10 capsule 0   • tamsulosin (FLOMAX) 0.4 MG capsule 24 hr capsule Take 1 capsule by mouth Every Night. 30 capsule 11     No current facility-administered medications for this visit.         Allergies:      Allergies   Allergen Reactions   • Hydrocodone Nausea And Vomiting        Past Surgical History:     History reviewed. No pertinent surgical history.      Social History:     Social  History     Socioeconomic History   • Marital status:      Spouse name: Not on file   • Number of children: Not on file   • Years of education: Not on file   • Highest education level: Not on file   Social Needs   • Financial resource strain: Not on file   • Food insecurity - worry: Not on file   • Food insecurity - inability: Not on file   • Transportation needs - medical: Not on file   • Transportation needs - non-medical: Not on file   Occupational History   • Not on file   Tobacco Use   • Smoking status: Never Smoker   • Smokeless tobacco: Never Used   Substance and Sexual Activity   • Alcohol use: No   • Drug use: No   • Sexual activity: Defer   Other Topics Concern   • Not on file   Social History Narrative   • Not on file       Family History:     Family History   Problem Relation Age of Onset   • Prostate cancer Father    • Cancer Mother        Review of Systems:     Review of Systems   Constitutional: Negative for fatigue.   HENT: Negative for sinus pressure and sinus pain.    Eyes: Negative for pain and redness.   Respiratory: Negative for chest tightness.    Cardiovascular: Negative for chest pain.   Gastrointestinal: Positive for abdominal pain, constipation and diarrhea.   Endocrine: Negative for heat intolerance.   Genitourinary: Positive for flank pain and hematuria.   Musculoskeletal: Positive for back pain.   Allergic/Immunologic: Negative for food allergies.   Neurological: Negative for headaches.   Hematological: Does not bruise/bleed easily.   Psychiatric/Behavioral: The patient is not nervous/anxious.              I have reviewed the follow portions of the patient's history and confirmed they are accurate today:  allergies, current medications, past family history, past medical history, past social history, past surgical history, problem list and ROS  Physical Exam:     Physical Exam   Constitutional: He is oriented to person, place, and time.   HENT:   Head: Normocephalic and atraumatic.  "  Right Ear: External ear normal.   Left Ear: External ear normal.   Nose: Nose normal.   Mouth/Throat: Oropharynx is clear and moist.   Eyes: Conjunctivae and EOM are normal. Pupils are equal, round, and reactive to light.   Neck: Normal range of motion. Neck supple. No thyromegaly present.   Cardiovascular: Normal rate, regular rhythm, normal heart sounds and intact distal pulses.   No murmur heard.  Pulmonary/Chest: Effort normal and breath sounds normal. No respiratory distress. He has no wheezes. He has no rales. He exhibits no tenderness.   Abdominal: Soft. Bowel sounds are normal. He exhibits no distension and no mass. There is no tenderness. No hernia.   Genitourinary: Rectum normal, prostate normal and penis normal.   Genitourinary Comments: BXO and phimosis   Musculoskeletal: Normal range of motion. He exhibits no edema or tenderness.   Lymphadenopathy:     He has no cervical adenopathy.   Neurological: He is alert and oriented to person, place, and time. No cranial nerve deficit. He exhibits normal muscle tone. Coordination normal.   Skin: Skin is warm. No rash noted.   Psychiatric: He has a normal mood and affect. His behavior is normal. Judgment and thought content normal.   Nursing note and vitals reviewed.      Ht 170.2 cm (67\")   Wt 109 kg (240 lb)   BMI 37.59 kg/m²    Procedure:         Assessment:     Encounter Diagnoses   Name Primary?   • Ureteral stone Yes   • BXO (balanitis xerotica obliterans)    • Phimosis      Orders Placed This Encounter   Procedures   • Follow Anesthesia Guidelines / Standing Orders     Standing Status:   Future   • Obtain Informed Consent     Standing Status:   Future     Order Specific Question:   Informed Consent Given For     Answer:   cystoscopy ureteroscopy laser lithotripsy possible right stent placement   • Provide NPO Instructions to Patient     Standing Status:   Future       Plan:   Pt has a 40% chance of passing the stone without surgical intervention.  Pt is " interested in having right ureteroscopy holmium laser lithotripsy and possible stent placement.  Because of his phimosis and BXO he has requested a circumcision.  I think it is advisable given his findings.    Patient's Body mass index is 37.59 kg/m². BMI is above normal parameters. Recommendations include: educational material.      Counseling was given to patient for the following topics impressions as follows: ureteral stone. The interim medical history and current results were reviewed.  A treatment plan with follow-up was made for Ureteral stone [N20.1].  This document has been electronically signed by Archie De Souza MD January 17, 2019 1:37 PM   Former smoker

## 2019-02-25 ENCOUNTER — RESULT REVIEW (OUTPATIENT)
Age: 74
End: 2019-02-25

## 2019-06-12 NOTE — DISCHARGE NOTE ADULT - ABILITY TO HEAR (WITH HEARING AID OR HEARING APPLIANCE IF NORMALLY USED):
Mildly to Moderately Impaired: difficulty hearing in some environments or speaker may need to increase volume or speak distinctly
12-Jun-2019 16:18

## 2019-10-15 NOTE — H&P PST ADULT - MINUTES
If you have any questions regarding your visit, Please contact your care team.    [facility] CLINIC HOURS TELEPHONE NUMBER   Cephas Agbeh, M.D.    Misbah Persaud -         Monday-[facility]  8:00 am - 5 pm  Tuesday- [facility]  8:00am- 5 pm  Wednesday- Off  Thursday- [facility]  8:00 am- 5 pm  Friday-[place]  8:00 am 5 pm [facility]  [address] [place] [address]  [phone] ask for [facility]    Imaging [phone]    [facility]  [address]  [place] [id number] [phone]  Imaging [phone]     Urgent Care locations:    [facility] Saturday and Sunday   9 am - 5 pm    Monday-Friday   12 pm - 8 pm  Saturday and Sunday   9 am - 5 pm   [phone]       If you need a medication refill, please contact your pharmacy. Please allow 3 business days for your refill to be completed.  As always, Thank you for trusting us with your healthcare needs!         60

## 2019-11-29 ENCOUNTER — RESULT REVIEW (OUTPATIENT)
Age: 74
End: 2019-11-29

## 2020-07-30 ENCOUNTER — APPOINTMENT (OUTPATIENT)
Dept: VASCULAR SURGERY | Facility: CLINIC | Age: 75
End: 2020-07-30
Payer: MEDICARE

## 2020-07-30 VITALS
SYSTOLIC BLOOD PRESSURE: 152 MMHG | WEIGHT: 170 LBS | HEART RATE: 59 BPM | DIASTOLIC BLOOD PRESSURE: 84 MMHG | HEIGHT: 67 IN | BODY MASS INDEX: 26.68 KG/M2

## 2020-07-30 VITALS — TEMPERATURE: 97.7 F

## 2020-07-30 DIAGNOSIS — I65.29 OCCLUSION AND STENOSIS OF UNSPECIFIED CAROTID ARTERY: ICD-10-CM

## 2020-07-30 PROCEDURE — 93880 EXTRACRANIAL BILAT STUDY: CPT

## 2020-07-30 PROCEDURE — 99203 OFFICE O/P NEW LOW 30 MIN: CPT

## 2020-07-30 NOTE — CONSULT LETTER
[Dear  ___] : Dear ~BE, [Consult Letter:] : I had the pleasure of evaluating your patient, [unfilled]. [Please see my note below.] : Please see my note below. [Consult Closing:] : Thank you very much for allowing me to participate in the care of this patient.  If you have any questions, please do not hesitate to contact me. [Sincerely,] : Sincerely, [FreeTextEntry3] : Osvaldo Negro M.D., KRISTOPHER., R.P.V.I.\par \par  [facility]\par  of  Surgery\par Assistant Professor of Radiology\par Vascular Surgery at [place]

## 2020-07-30 NOTE — PHYSICAL EXAM
[JVD] : no jugular venous distention  [Normal Breath Sounds] : Normal breath sounds [Normal Rate and Rhythm] : normal rate and rhythm [2+] : left 2+ [Ankle Swelling (On Exam)] : not present [Varicose Veins Of Lower Extremities] : not present [] : not present [Abdomen Tenderness] : ~T ~M No abdominal tenderness [No Rash or Lesion] : No rash or lesion [Skin Ulcer] : no ulcer [Alert] : alert [Calm] : calm [de-identified] : Appears well

## 2020-07-30 NOTE — HISTORY OF PRESENT ILLNESS
[FreeTextEntry1] : 74-year-old gentleman, former smoker, history of defibrillator, presents to the office with a report of a carotid stenosis.  Patient states he has had several episodes of numbness and dysfunction of his left arm and left leg.  They have all resolved spontaneously.  In addition, the patient claims he has been having some issues with his vision.  He previously undergone a carotid duplex which shows a high-grade distal CCA stenosis on the right side with a proximal CCA stenosis on the left side.  1 of the pertinent issue in his history is that he has had oral cancer which was treated with chemo and radiation approximately 10 years ago.

## 2020-07-30 NOTE — ASSESSMENT
[FreeTextEntry1] : Patient with a repeat carotid duplex which shows a ulcerated high-grade lesion at the area of the CCA at the level of the bulb.  In addition there appears to be left-sided CCA stenosis.  Patient states that he may have some renal insufficiency so I will get a blood draw prior to sending patient for a CTA of the carotid arteries.  Patient has a history of defibrillator therefore, unable to perform MRI.

## 2020-08-19 ENCOUNTER — OUTPATIENT (OUTPATIENT)
Dept: OUTPATIENT SERVICES | Facility: HOSPITAL | Age: 75
LOS: 1 days | End: 2020-08-19
Payer: MEDICARE

## 2020-08-19 VITALS
OXYGEN SATURATION: 96 % | SYSTOLIC BLOOD PRESSURE: 100 MMHG | TEMPERATURE: 99 F | DIASTOLIC BLOOD PRESSURE: 58 MMHG | RESPIRATION RATE: 20 BRPM | WEIGHT: 171.08 LBS | HEART RATE: 75 BPM | HEIGHT: 69 IN

## 2020-08-19 DIAGNOSIS — T88.4XXA FAILED OR DIFFICULT INTUBATION, INITIAL ENCOUNTER: ICD-10-CM

## 2020-08-19 DIAGNOSIS — I65.21 OCCLUSION AND STENOSIS OF RIGHT CAROTID ARTERY: ICD-10-CM

## 2020-08-19 DIAGNOSIS — Z95.810 PRESENCE OF AUTOMATIC (IMPLANTABLE) CARDIAC DEFIBRILLATOR: Chronic | ICD-10-CM

## 2020-08-19 DIAGNOSIS — Z98.890 OTHER SPECIFIED POSTPROCEDURAL STATES: Chronic | ICD-10-CM

## 2020-08-19 DIAGNOSIS — I65.29 OCCLUSION AND STENOSIS OF UNSPECIFIED CAROTID ARTERY: ICD-10-CM

## 2020-08-19 DIAGNOSIS — Z01.818 ENCOUNTER FOR OTHER PREPROCEDURAL EXAMINATION: ICD-10-CM

## 2020-08-19 DIAGNOSIS — Z29.9 ENCOUNTER FOR PROPHYLACTIC MEASURES, UNSPECIFIED: ICD-10-CM

## 2020-08-19 LAB
A1C WITH ESTIMATED AVERAGE GLUCOSE RESULT: 7.1 % — HIGH (ref 4–5.6)
ANION GAP SERPL CALC-SCNC: 12 MMOL/L — SIGNIFICANT CHANGE UP (ref 5–17)
BLD GP AB SCN SERPL QL: NEGATIVE — SIGNIFICANT CHANGE UP
BUN SERPL-MCNC: 47 MG/DL — HIGH (ref 7–23)
CALCIUM SERPL-MCNC: 10.6 MG/DL — HIGH (ref 8.4–10.5)
CHLORIDE SERPL-SCNC: 104 MMOL/L — SIGNIFICANT CHANGE UP (ref 96–108)
CO2 SERPL-SCNC: 21 MMOL/L — LOW (ref 22–31)
CREAT SERPL-MCNC: 2.49 MG/DL — HIGH (ref 0.5–1.3)
ESTIMATED AVERAGE GLUCOSE: 157 MG/DL — HIGH (ref 68–114)
GLUCOSE SERPL-MCNC: 153 MG/DL — HIGH (ref 70–99)
HCT VFR BLD CALC: 41.6 % — SIGNIFICANT CHANGE UP (ref 39–50)
HGB BLD-MCNC: 13.6 G/DL — SIGNIFICANT CHANGE UP (ref 13–17)
MCHC RBC-ENTMCNC: 27.8 PG — SIGNIFICANT CHANGE UP (ref 27–34)
MCHC RBC-ENTMCNC: 32.7 GM/DL — SIGNIFICANT CHANGE UP (ref 32–36)
MCV RBC AUTO: 85.1 FL — SIGNIFICANT CHANGE UP (ref 80–100)
NRBC # BLD: 0 /100 WBCS — SIGNIFICANT CHANGE UP (ref 0–0)
PLATELET # BLD AUTO: 174 K/UL — SIGNIFICANT CHANGE UP (ref 150–400)
POTASSIUM SERPL-MCNC: 5.4 MMOL/L — HIGH (ref 3.5–5.3)
POTASSIUM SERPL-SCNC: 5.4 MMOL/L — HIGH (ref 3.5–5.3)
RBC # BLD: 4.89 M/UL — SIGNIFICANT CHANGE UP (ref 4.2–5.8)
RBC # FLD: 13.6 % — SIGNIFICANT CHANGE UP (ref 10.3–14.5)
RH IG SCN BLD-IMP: POSITIVE — SIGNIFICANT CHANGE UP
SODIUM SERPL-SCNC: 137 MMOL/L — SIGNIFICANT CHANGE UP (ref 135–145)
WBC # BLD: 13.98 K/UL — HIGH (ref 3.8–10.5)
WBC # FLD AUTO: 13.98 K/UL — HIGH (ref 3.8–10.5)

## 2020-08-19 PROCEDURE — 85027 COMPLETE CBC AUTOMATED: CPT

## 2020-08-19 PROCEDURE — 86901 BLOOD TYPING SEROLOGIC RH(D): CPT

## 2020-08-19 PROCEDURE — G0463: CPT

## 2020-08-19 PROCEDURE — 80048 BASIC METABOLIC PNL TOTAL CA: CPT

## 2020-08-19 PROCEDURE — 86850 RBC ANTIBODY SCREEN: CPT

## 2020-08-19 PROCEDURE — 86900 BLOOD TYPING SEROLOGIC ABO: CPT

## 2020-08-19 PROCEDURE — 83036 HEMOGLOBIN GLYCOSYLATED A1C: CPT

## 2020-08-19 RX ORDER — CEFAZOLIN SODIUM 1 G
2000 VIAL (EA) INJECTION ONCE
Refills: 0 | Status: DISCONTINUED | OUTPATIENT
Start: 2020-08-26 | End: 2020-08-27

## 2020-08-19 NOTE — H&P PST ADULT - NSICDXPASTMEDICALHX_GEN_ALL_CORE_FT
PAST MEDICAL HISTORY:  CAD (coronary artery disease)     Diabetes Type2, not on any meds since weight loss after throat Ca    HLD (hyperlipidemia)     HTN (hypertension)     Inguinal hernia, left     MI (myocardial infarction) 1992    Renal insufficiency s/p chemo    Throat cancer 2011, treated with chemo, RT    Ventricular arrhythmia s/p AICD

## 2020-08-19 NOTE — H&P PST ADULT - NSICDXPROBLEM_GEN_ALL_CORE_FT
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 Surgical patients in this group will benefit from both pharmacologic prophylaxis and intermittent compression devices.  The surgical team will determine the balance between VTE risk and bleeding risk, and other clinical considerations PROBLEM DIAGNOSES  Problem: Occlusion and stenosis of right carotid artery  Assessment and Plan: Right Carotis Endarterectomy with EEG monitoring   Pre- Op Instructions discussed   labs sent   covid test scheduled   pt will continue aspirin and plavix   cardiac eval - going for 8/20/20 - Deffbrilator reports     Problem: Difficult airway  Assessment and Plan: throat cancer s/p chemo and radiation   case discussed with anestheology will request last visit note from ENT     Problem: Need for prophylactic measure  Assessment and Plan: The Caprini score indicates that this patient is at high risk for a VTE event (score 6 or greater). Surgical patients in this group will benefit from both pharmacologic prophylaxis and intermittent compression devices.  The surgical team will determine the balance between VTE risk and bleeding risk, and other clinical considerations

## 2020-08-19 NOTE — H&P PST ADULT - MALLAMPATI CLASS
Class IV (difficult) - the soft palate is not visible at all/can't open mouth wide, neck - full ROM. Anesthesiology consulted, no major issues anticipated, but patient needs ENT note

## 2020-08-19 NOTE — H&P PST ADULT - NSICDXPASTSURGICALHX_GEN_ALL_CORE_FT
PAST SURGICAL HISTORY:  Cardiac defibrillator in place - 2009, battery change 2017    H/O prior ablation treatment VT, 2009    S/P left inguinal hernia repair 2018 at [place]

## 2020-08-19 NOTE — H&P PST ADULT - ASSESSMENT
CAPRINI SCORE [CLOT updated 18]    AGE RELATED RISK FACTORS                                                       MOBILITY RELATED FACTORS  [ ] Age 41-60 years                                            (1 Point)                    [ ] Bed rest                                                        (1 Point)  [ x] Age: 61-74 years                                           (2 Points)                  [ ] Plaster cast                                                   (2 Points)  [ ] Age= 75 years                                              (3 Points)                    [ ] Bed bound for more than 72 hours                 (2 Points)    DISEASE RELATED RISK FACTORS                                               GENDER SPECIFIC FACTORS  [ ] Edema in the lower extremities                       (1 Point)              [ ] Pregnancy                                                     (1 Point)  [ ] Varicose veins                                               (1 Point)                     [ ] Post-partum < 6 weeks                                   (1 Point)             [x ] BMI > 25 Kg/m2                                            (1 Point)                     [ ] Hormonal therapy  or oral contraception          (1 Point)                 [ ] Sepsis (in the previous month)                        (1 Point)               [ ] History of pregnancy complications                 (1 point)  [ ] Pneumonia or serious lung disease                                               [ ] Unexplained or recurrent                     (1 Point)           (in the previous month)                               (1 Point)  [ ] Abnormal pulmonary function test                     (1 Point)                 SURGERY RELATED RISK FACTORS  [ ] Acute myocardial infarction                              (1 Point)               [ ]  Section                                             (1 Point)  [ ] Congestive heart failure (in the previous month)  (1 Point)      [ ] Minor surgery                                                  (1 Point)   [ ] Inflammatory bowel disease                             (1 Point)               [ ] Arthroscopic surgery                                        (2 Points)  [ ] Central venous access                                      (2 Points)                [x ] General surgery lasting more than 45 minutes (2 points)  [x ] Present or previous malignancy                     (2 Points)                [ ] Elective arthroplasty                                         (5 points)    [ ] Stroke (in the previous month)                          (5 Points)                                                                                                                                                           HEMATOLOGY RELATED FACTORS                                                 TRAUMA RELATED RISK FACTORS  [ ] Prior episodes of VTE                                     (3 Points)                [ ] Fracture of the hip, pelvis, or leg                       (5 Points)  [ ] Positive family history for VTE                         (3 Points)             [ ] Acute spinal cord injury (in the previous month)  (5 Points)  [ ] Prothrombin 24698 A                                     (3 Points)               [ ] Paralysis  (less than 1 month)                             (5 Points)  [ ] Factor V Leiden                                             (3 Points)                  [ ] Multiple Trauma within 1 month                        (5 Points)  [ ] Lupus anticoagulants                                     (3 Points)                                                           [ ] Anticardiolipin antibodies                               (3 Points)                                                       [ ] High homocysteine in the blood                      (3 Points)                                             [ ] Other congenital or acquired thrombophilia      (3 Points)                                                [ ] Heparin induced thrombocytopenia                  (3 Points)                                     Total Score [     7     ]

## 2020-08-19 NOTE — H&P PST ADULT - HISTORY OF PRESENT ILLNESS
73 year old male with PMHx of MI, HTN, HLD, throat cancer in 2011 s/p chemo and RT, DM2 (not on meds since weight loss with Ca), AICD after ventricular arrhythmia (2009, generator change 2017), h/o hernia repair in 2018  at [facility] ) . Patient states he has had several episodes of numbness and dysfunction of his left arm and left leg. They have all resolved spontaneously. He previously undergone a carotid duplex which shows a high-grade distal CCA stenosis on the right side with a proximal CCA stenosis on the left side followed by vascular s/p carotid doppler. Presents to PST for scheduled Right Carotid Endarterectomy  with EEG monitoring on 8/26/20. 75 year old male with PMHx of MI, HTN, HLD, throat cancer in 2011 s/p chemo and RT, DM2 (not on meds since weight loss with Ca), AICD after ventricular arrhythmia (2009, generator change 2017 [employer] - [id number] ), h/o hernia repair in 2018  at [facility] ) . Patient states he has had several episodes of numbness and dysfunction of his left arm and left leg. They have all resolved spontaneously. He previously undergone a carotid duplex which shows a high-grade distal CCA stenosis on the right side with a proximal CCA stenosis on the left side followed by vascular started on aspirin and Plavix s/p . Presents to [facility] for scheduled Right Carotid Endarterectomy  with EEG monitoring on 8/26/20.    -Pt had an episode of vasovagal episode after the blood drawn while at [facility]  -pt is awake ,alert and oriented  v/s  90/57 (  Pt took BP meds this am without breakfast  ) , HR 71 , sat 100 % -pt denies any CP, SOB, Palpitations, dizziness headache or weakness - advised to go to ER for evaluation which patient refused - spoke to anesthesiologist and agree with plan     cardiology appointment on 8/20/20 -will obtain cardiac eval, defibrillator  interrogation report      *** Covid test  has to be scheduled 72 hrs prior to surgery - information given 75 year old male with PMHx of MI, HTN, HLD, throat cancer in 2011 s/p chemo and RT, DM2 (not on meds since weight loss with Ca), AICD after ventricular arrhythmia (2009, generator change 2017 [employer] - [id number] ), h/o hernia repair in 2018  at [facility] ) . Patient states he has had several episodes of numbness and dysfunction of his left arm and left leg. They have all resolved spontaneously. He previously undergone a carotid duplex which shows a high-grade distal CCA stenosis on the right side with a proximal CCA stenosis on the left side followed by vascular started on aspirin and Plavix s/p . Presents to [facility] for scheduled Right Carotid Endarterectomy  with EEG monitoring on 8/26/20.    -Pt had an episode of vasovagal episode after the blood drawn while at [facility]  -pt is awake ,alert and oriented  v/s  99/57 (  Pt took BP meds this am without breakfast  ) , HR 71 , sat 100 % -pt denies any CP, SOB, Palpitations, dizziness headache or weakness - advised to go to ER for evaluation which patient refused - spoke to anesthesiologist and agree with plan     cardiology appointment on 8/20/20 -will obtain cardiac eval, defibrillator  interrogation report      *** Covid test  has to be scheduled 72 hrs prior to surgery - information given

## 2020-08-22 DIAGNOSIS — Z01.818 ENCOUNTER FOR OTHER PREPROCEDURAL EXAMINATION: ICD-10-CM

## 2020-08-23 ENCOUNTER — APPOINTMENT (OUTPATIENT)
Dept: DISASTER EMERGENCY | Facility: CLINIC | Age: 75
End: 2020-08-23

## 2020-08-24 LAB — SARS-COV-2 N GENE NPH QL NAA+PROBE: NOT DETECTED

## 2020-08-25 ENCOUNTER — TRANSCRIPTION ENCOUNTER (OUTPATIENT)
Age: 75
End: 2020-08-25

## 2020-08-26 ENCOUNTER — RESULT REVIEW (OUTPATIENT)
Age: 75
End: 2020-08-26

## 2020-08-26 ENCOUNTER — INPATIENT (INPATIENT)
Facility: HOSPITAL | Age: 75
LOS: 0 days | Discharge: HOME | DRG: 39 | End: 2020-08-27
Attending: SURGERY | Admitting: SURGERY
Payer: MEDICARE

## 2020-08-26 ENCOUNTER — APPOINTMENT (OUTPATIENT)
Dept: VASCULAR SURGERY | Facility: HOSPITAL | Age: 75
End: 2020-08-26

## 2020-08-26 VITALS
DIASTOLIC BLOOD PRESSURE: 91 MMHG | HEIGHT: 69 IN | TEMPERATURE: 98 F | SYSTOLIC BLOOD PRESSURE: 176 MMHG | WEIGHT: 171.08 LBS | HEART RATE: 66 BPM | RESPIRATION RATE: 18 BRPM

## 2020-08-26 DIAGNOSIS — Z98.890 OTHER SPECIFIED POSTPROCEDURAL STATES: Chronic | ICD-10-CM

## 2020-08-26 DIAGNOSIS — Z95.810 PRESENCE OF AUTOMATIC (IMPLANTABLE) CARDIAC DEFIBRILLATOR: Chronic | ICD-10-CM

## 2020-08-26 DIAGNOSIS — Z01.818 ENCOUNTER FOR OTHER PREPROCEDURAL EXAMINATION: ICD-10-CM

## 2020-08-26 DIAGNOSIS — I65.29 OCCLUSION AND STENOSIS OF UNSPECIFIED CAROTID ARTERY: ICD-10-CM

## 2020-08-26 LAB — GLUCOSE BLDC GLUCOMTR-MCNC: 137 MG/DL — HIGH (ref 70–99)

## 2020-08-26 PROCEDURE — 88304 TISSUE EXAM BY PATHOLOGIST: CPT | Mod: 26

## 2020-08-26 PROCEDURE — 35301 RECHANNELING OF ARTERY: CPT | Mod: RT

## 2020-08-26 RX ORDER — CARVEDILOL PHOSPHATE 80 MG/1
25 CAPSULE, EXTENDED RELEASE ORAL EVERY 12 HOURS
Refills: 0 | Status: DISCONTINUED | OUTPATIENT
Start: 2020-08-26 | End: 2020-08-27

## 2020-08-26 RX ORDER — CALCITRIOL 0.5 UG/1
0.25 CAPSULE ORAL DAILY
Refills: 0 | Status: DISCONTINUED | OUTPATIENT
Start: 2020-08-26 | End: 2020-08-27

## 2020-08-26 RX ORDER — HEPARIN SODIUM 5000 [USP'U]/ML
5000 INJECTION INTRAVENOUS; SUBCUTANEOUS EVERY 8 HOURS
Refills: 0 | Status: DISCONTINUED | OUTPATIENT
Start: 2020-08-26 | End: 2020-08-27

## 2020-08-26 RX ORDER — ASPIRIN/CALCIUM CARB/MAGNESIUM 324 MG
81 TABLET ORAL DAILY
Refills: 0 | Status: DISCONTINUED | OUTPATIENT
Start: 2020-08-26 | End: 2020-08-27

## 2020-08-26 RX ORDER — SODIUM CHLORIDE 9 MG/ML
500 INJECTION, SOLUTION INTRAVENOUS ONCE
Refills: 0 | Status: COMPLETED | OUTPATIENT
Start: 2020-08-26 | End: 2020-08-26

## 2020-08-26 RX ORDER — LIDOCAINE HCL 20 MG/ML
0.2 VIAL (ML) INJECTION ONCE
Refills: 0 | Status: DISCONTINUED | OUTPATIENT
Start: 2020-08-26 | End: 2020-08-26

## 2020-08-26 RX ORDER — SODIUM CHLORIDE 9 MG/ML
3 INJECTION INTRAMUSCULAR; INTRAVENOUS; SUBCUTANEOUS EVERY 8 HOURS
Refills: 0 | Status: DISCONTINUED | OUTPATIENT
Start: 2020-08-26 | End: 2020-08-26

## 2020-08-26 RX ORDER — LEVOTHYROXINE SODIUM 125 MCG
75 TABLET ORAL DAILY
Refills: 0 | Status: DISCONTINUED | OUTPATIENT
Start: 2020-08-26 | End: 2020-08-27

## 2020-08-26 RX ORDER — CHLORHEXIDINE GLUCONATE 213 G/1000ML
1 SOLUTION TOPICAL ONCE
Refills: 0 | Status: DISCONTINUED | OUTPATIENT
Start: 2020-08-26 | End: 2020-08-26

## 2020-08-26 RX ORDER — HYDROMORPHONE HYDROCHLORIDE 2 MG/ML
0.25 INJECTION INTRAMUSCULAR; INTRAVENOUS; SUBCUTANEOUS
Refills: 0 | Status: DISCONTINUED | OUTPATIENT
Start: 2020-08-26 | End: 2020-08-26

## 2020-08-26 RX ORDER — ATORVASTATIN CALCIUM 80 MG/1
10 TABLET, FILM COATED ORAL AT BEDTIME
Refills: 0 | Status: DISCONTINUED | OUTPATIENT
Start: 2020-08-26 | End: 2020-08-27

## 2020-08-26 RX ORDER — CLOPIDOGREL BISULFATE 75 MG/1
75 TABLET, FILM COATED ORAL DAILY
Refills: 0 | Status: DISCONTINUED | OUTPATIENT
Start: 2020-08-27 | End: 2020-08-27

## 2020-08-26 RX ORDER — HYDRALAZINE HCL 50 MG
10 TABLET ORAL ONCE
Refills: 0 | Status: COMPLETED | OUTPATIENT
Start: 2020-08-26 | End: 2020-08-26

## 2020-08-26 RX ORDER — ONDANSETRON 8 MG/1
4 TABLET, FILM COATED ORAL ONCE
Refills: 0 | Status: DISCONTINUED | OUTPATIENT
Start: 2020-08-26 | End: 2020-08-26

## 2020-08-26 RX ORDER — DEXTROSE MONOHYDRATE, SODIUM CHLORIDE, AND POTASSIUM CHLORIDE 50; .745; 4.5 G/1000ML; G/1000ML; G/1000ML
1000 INJECTION, SOLUTION INTRAVENOUS
Refills: 0 | Status: DISCONTINUED | OUTPATIENT
Start: 2020-08-26 | End: 2020-08-27

## 2020-08-26 RX ORDER — FENTANYL CITRATE 50 UG/ML
25 INJECTION INTRAVENOUS
Refills: 0 | Status: DISCONTINUED | OUTPATIENT
Start: 2020-08-26 | End: 2020-08-26

## 2020-08-26 RX ORDER — LISINOPRIL 2.5 MG/1
10 TABLET ORAL DAILY
Refills: 0 | Status: DISCONTINUED | OUTPATIENT
Start: 2020-08-27 | End: 2020-08-27

## 2020-08-26 RX ADMIN — HEPARIN SODIUM 5000 UNIT(S): 5000 INJECTION INTRAVENOUS; SUBCUTANEOUS at 14:45

## 2020-08-26 RX ADMIN — ATORVASTATIN CALCIUM 10 MILLIGRAM(S): 80 TABLET, FILM COATED ORAL at 21:31

## 2020-08-26 RX ADMIN — DEXTROSE MONOHYDRATE, SODIUM CHLORIDE, AND POTASSIUM CHLORIDE 125 MILLILITER(S): 50; .745; 4.5 INJECTION, SOLUTION INTRAVENOUS at 13:16

## 2020-08-26 RX ADMIN — SODIUM CHLORIDE 1000 MILLILITER(S): 9 INJECTION, SOLUTION INTRAVENOUS at 12:35

## 2020-08-26 RX ADMIN — CARVEDILOL PHOSPHATE 25 MILLIGRAM(S): 80 CAPSULE, EXTENDED RELEASE ORAL at 17:49

## 2020-08-26 RX ADMIN — Medication 10 MILLIGRAM(S): at 15:17

## 2020-08-26 RX ADMIN — DEXTROSE MONOHYDRATE, SODIUM CHLORIDE, AND POTASSIUM CHLORIDE 75 MILLILITER(S): 50; .745; 4.5 INJECTION, SOLUTION INTRAVENOUS at 15:18

## 2020-08-26 RX ADMIN — HEPARIN SODIUM 5000 UNIT(S): 5000 INJECTION INTRAVENOUS; SUBCUTANEOUS at 21:31

## 2020-08-26 RX ADMIN — Medication 81 MILLIGRAM(S): at 17:49

## 2020-08-26 NOTE — PRE-ANESTHESIA EVALUATION ADULT - NSANTHAIRWAYFT_ENT_ALL_CORE
<6CM THYROMENTAL DISTANCE  LIMITED MOUTH OPENING  UNABLE TO VISUALIZE PILLARS, UVULA, SOFT PALATE <6CM THYROMENTAL DISTANCE  LIMITED MOUTH OPENING  UNABLE TO VISUALIZE PILLARS, UVULA, SOFT PALATE  Neck rigid

## 2020-08-26 NOTE — PRE-ANESTHESIA EVALUATION ADULT - NSANTHADDINFOFT_GEN_ALL_CORE
Discussed the need for awake fiberoptic intubation given his history of throat cancer and radiation therapy.  Patient understands and agrees.

## 2020-08-26 NOTE — PRE-ANESTHESIA EVALUATION ADULT - NSANTHPMHFT_GEN_ALL_CORE
74 y/o M with PMHx significant for MI, AICD (PLACED IN 2009 DUE TO VENTRICULAR ARRHTHYMIA), H/O VT ABLATION (2009), CAD ( NO STENTS), THROAT CANCER S/P CHEMO/RT AND TONGUE RESECTION, RENAL INSUFFICIENCY. DM, HTN PRESENTS FOR RIGHT CAROTID ENDARTECTOMY.    NO NEUROLOGICAL DEFICITS - NO PRIOR HX OF STROKES OR TIAS.  CARDIOLOGY CLEARANCE NOTE REVIEWED - OPTIMIZED FOR PROCEDURE.  AICD INTERROGATED WITH ADEQUATE BATTERY LIFE. AICD (placed in 2009 for ventricular arrhythmia)  VT Ablation (2009)  CAD (No stents)  Throat CA s/p chemoRT and tongue resection  Renal Insufficiency  DM  HTN   Several episodes of numbness and dysfuntion of his left arm and left leg that have resolved spontaneously    Denies prior history of strokes or TIAs  Vasovagal episode during blood draw at [facility]    Cardiology clearance note in chart and reviewed: "Optimized for procedure"  AICD Interrogation in chart, 8/2020 with adequate battery life

## 2020-08-26 NOTE — BRIEF OPERATIVE NOTE - OPERATION/FINDINGS
Procedure:Right Carotid Endarterectomy with patch repair; Findings: post RT changes; hemorrhagic plaque for CCA to ICA; on extubation patient intact neuro function 5/5 motor in all extremities

## 2020-08-27 ENCOUNTER — TRANSCRIPTION ENCOUNTER (OUTPATIENT)
Age: 75
End: 2020-08-27

## 2020-08-27 VITALS
HEART RATE: 71 BPM | TEMPERATURE: 98 F | DIASTOLIC BLOOD PRESSURE: 75 MMHG | RESPIRATION RATE: 18 BRPM | SYSTOLIC BLOOD PRESSURE: 137 MMHG | OXYGEN SATURATION: 96 %

## 2020-08-27 DIAGNOSIS — E87.5 HYPERKALEMIA: ICD-10-CM

## 2020-08-27 LAB
A1C WITH ESTIMATED AVERAGE GLUCOSE RESULT: 7 % — HIGH (ref 4–5.6)
ANION GAP SERPL CALC-SCNC: 10 MMOL/L — SIGNIFICANT CHANGE UP (ref 5–17)
BUN SERPL-MCNC: 35 MG/DL — HIGH (ref 7–23)
CALCIUM SERPL-MCNC: 9.1 MG/DL — SIGNIFICANT CHANGE UP (ref 8.4–10.5)
CHLORIDE SERPL-SCNC: 101 MMOL/L — SIGNIFICANT CHANGE UP (ref 96–108)
CHOLEST SERPL-MCNC: 133 MG/DL — SIGNIFICANT CHANGE UP (ref 10–199)
CO2 SERPL-SCNC: 22 MMOL/L — SIGNIFICANT CHANGE UP (ref 22–31)
CREAT SERPL-MCNC: 2.1 MG/DL — HIGH (ref 0.5–1.3)
ESTIMATED AVERAGE GLUCOSE: 154 MG/DL — HIGH (ref 68–114)
GLUCOSE SERPL-MCNC: 262 MG/DL — HIGH (ref 70–99)
HCT VFR BLD CALC: 39.3 % — SIGNIFICANT CHANGE UP (ref 39–50)
HDLC SERPL-MCNC: 33 MG/DL — LOW
HGB BLD-MCNC: 12.6 G/DL — LOW (ref 13–17)
LIPID PNL WITH DIRECT LDL SERPL: 74 MG/DL — SIGNIFICANT CHANGE UP
MCHC RBC-ENTMCNC: 27.2 PG — SIGNIFICANT CHANGE UP (ref 27–34)
MCHC RBC-ENTMCNC: 32.1 GM/DL — SIGNIFICANT CHANGE UP (ref 32–36)
MCV RBC AUTO: 84.9 FL — SIGNIFICANT CHANGE UP (ref 80–100)
NRBC # BLD: 0 /100 WBCS — SIGNIFICANT CHANGE UP (ref 0–0)
PLATELET # BLD AUTO: 205 K/UL — SIGNIFICANT CHANGE UP (ref 150–400)
POTASSIUM SERPL-MCNC: 4.6 MMOL/L — SIGNIFICANT CHANGE UP (ref 3.5–5.3)
POTASSIUM SERPL-MCNC: 5.7 MMOL/L — HIGH (ref 3.5–5.3)
POTASSIUM SERPL-SCNC: 4.6 MMOL/L — SIGNIFICANT CHANGE UP (ref 3.5–5.3)
POTASSIUM SERPL-SCNC: 5.7 MMOL/L — HIGH (ref 3.5–5.3)
RBC # BLD: 4.63 M/UL — SIGNIFICANT CHANGE UP (ref 4.2–5.8)
RBC # FLD: 13.9 % — SIGNIFICANT CHANGE UP (ref 10.3–14.5)
SODIUM SERPL-SCNC: 133 MMOL/L — LOW (ref 135–145)
TOTAL CHOLESTEROL/HDL RATIO MEASUREMENT: 4 RATIO — SIGNIFICANT CHANGE UP (ref 3.4–9.6)
TRIGL SERPL-MCNC: 126 MG/DL — SIGNIFICANT CHANGE UP (ref 10–149)
WBC # BLD: 11.5 K/UL — HIGH (ref 3.8–10.5)
WBC # FLD AUTO: 11.5 K/UL — HIGH (ref 3.8–10.5)

## 2020-08-27 PROCEDURE — 83036 HEMOGLOBIN GLYCOSYLATED A1C: CPT

## 2020-08-27 PROCEDURE — C1889: CPT

## 2020-08-27 PROCEDURE — 88304 TISSUE EXAM BY PATHOLOGIST: CPT

## 2020-08-27 PROCEDURE — 93010 ELECTROCARDIOGRAM REPORT: CPT

## 2020-08-27 PROCEDURE — 80048 BASIC METABOLIC PNL TOTAL CA: CPT

## 2020-08-27 PROCEDURE — 93005 ELECTROCARDIOGRAM TRACING: CPT

## 2020-08-27 PROCEDURE — 80061 LIPID PANEL: CPT

## 2020-08-27 PROCEDURE — C1768: CPT

## 2020-08-27 PROCEDURE — 85027 COMPLETE CBC AUTOMATED: CPT

## 2020-08-27 PROCEDURE — 84132 ASSAY OF SERUM POTASSIUM: CPT

## 2020-08-27 PROCEDURE — 82962 GLUCOSE BLOOD TEST: CPT

## 2020-08-27 RX ORDER — OXYCODONE HYDROCHLORIDE 5 MG/1
1 TABLET ORAL
Qty: 5 | Refills: 0
Start: 2020-08-27

## 2020-08-27 RX ORDER — SODIUM CHLORIDE 9 MG/ML
1000 INJECTION INTRAMUSCULAR; INTRAVENOUS; SUBCUTANEOUS
Refills: 0 | Status: DISCONTINUED | OUTPATIENT
Start: 2020-08-27 | End: 2020-08-27

## 2020-08-27 RX ORDER — SODIUM POLYSTYRENE SULFONATE 4.1 MEQ/G
30 POWDER, FOR SUSPENSION ORAL ONCE
Refills: 0 | Status: COMPLETED | OUTPATIENT
Start: 2020-08-27 | End: 2020-08-27

## 2020-08-27 RX ORDER — CALCIUM GLUCONATE 100 MG/ML
1 VIAL (ML) INTRAVENOUS ONCE
Refills: 0 | Status: COMPLETED | OUTPATIENT
Start: 2020-08-27 | End: 2020-08-27

## 2020-08-27 RX ADMIN — CARVEDILOL PHOSPHATE 25 MILLIGRAM(S): 80 CAPSULE, EXTENDED RELEASE ORAL at 05:42

## 2020-08-27 RX ADMIN — LISINOPRIL 10 MILLIGRAM(S): 2.5 TABLET ORAL at 05:42

## 2020-08-27 RX ADMIN — Medication 100 GRAM(S): at 13:01

## 2020-08-27 RX ADMIN — CALCITRIOL 0.25 MICROGRAM(S): 0.5 CAPSULE ORAL at 13:07

## 2020-08-27 RX ADMIN — HEPARIN SODIUM 5000 UNIT(S): 5000 INJECTION INTRAVENOUS; SUBCUTANEOUS at 05:42

## 2020-08-27 RX ADMIN — SODIUM CHLORIDE 100 MILLILITER(S): 9 INJECTION INTRAMUSCULAR; INTRAVENOUS; SUBCUTANEOUS at 13:07

## 2020-08-27 RX ADMIN — CLOPIDOGREL BISULFATE 75 MILLIGRAM(S): 75 TABLET, FILM COATED ORAL at 13:07

## 2020-08-27 RX ADMIN — CALCITRIOL 0.25 MICROGRAM(S): 0.5 CAPSULE ORAL at 05:42

## 2020-08-27 RX ADMIN — HEPARIN SODIUM 5000 UNIT(S): 5000 INJECTION INTRAVENOUS; SUBCUTANEOUS at 13:07

## 2020-08-27 RX ADMIN — Medication 81 MILLIGRAM(S): at 13:07

## 2020-08-27 RX ADMIN — SODIUM POLYSTYRENE SULFONATE 30 GRAM(S): 4.1 POWDER, FOR SUSPENSION ORAL at 13:02

## 2020-08-27 RX ADMIN — Medication 75 MICROGRAM(S): at 05:42

## 2020-08-27 NOTE — PROGRESS NOTE ADULT - SUBJECTIVE AND OBJECTIVE BOX
Vascular Surgery Post op Note    Procedure: R CEA     SUBJECTIVE: Pt seen and examined at bedside in pacu. After transfer to pacu, SBP 140s, pt received 500cc bolus and on started on IVF at 125/hr.  At 15:00 SBP high 160s-low 170s.  10 Hydralazine IV ordered and IVF lowered to 75cc/hr, with improvement to SBP 150s.  Patient seen ans examined at 1700, patient feels well, denies chest pain, sob, palpitations, HA, fever, chills, N/V, difficulty swallowing/breathing.  Voiding, has not ambulated yet.        OBJECTIVE:  Vital Signs Last 24 Hrs  T(C): 36 (26 Aug 2020 11:25), Max: 36.8 (26 Aug 2020 06:02)  T(F): 96.8 (26 Aug 2020 11:25), Max: 98.2 (26 Aug 2020 06:02)  HR: 82 (26 Aug 2020 16:00) (66 - 82)  BP: 150/73 (26 Aug 2020 16:00) (117/67 - 176/91)  BP(mean): 104 (26 Aug 2020 16:00) (86 - 118)  RR: 14 (26 Aug 2020 16:00) (12 - 18)  SpO2: 99% (26 Aug 2020 16:00) (96% - 100%)    I&O's Summary    26 Aug 2020 07:01  -  26 Aug 2020 17:19  --------------------------------------------------------  IN: 875 mL / OUT: 0 mL / NET: 875 mL      I&O's Detail    26 Aug 2020 07:01  -  26 Aug 2020 17:19  --------------------------------------------------------  IN:    dextrose 5% + sodium chloride 0.45% with potassium chloride 20 mEq/L: 375 mL    IV PiggyBack: 500 mL  Total IN: 875 mL    OUT:  Total OUT: 0 mL    Total NET: 875 mL          MEDICATIONS  (STANDING):  aspirin enteric coated 81 milliGRAM(s) Oral daily  atorvastatin 10 milliGRAM(s) Oral at bedtime  calcitriol   Capsule 0.25 MICROGram(s) Oral daily  carvedilol 25 milliGRAM(s) Oral every 12 hours  ceFAZolin   IVPB 2000 milliGRAM(s) IV Intermittent once  dextrose 5% + sodium chloride 0.45% with potassium chloride 20 mEq/L 1000 milliLiter(s) (75 mL/Hr) IV Continuous <Continuous>  heparin   Injectable 5000 Unit(s) SubCutaneous every 8 hours  levothyroxine 75 MICROGram(s) Oral daily    MEDICATIONS  (PRN):  fentaNYL    Injectable 25 MICROGram(s) IV Push every 10 minutes PRN Severe Pain (7 - 10)  HYDROmorphone  Injectable 0.25 milliGRAM(s) IV Push every 15 minutes PRN Moderate Pain (4 - 6)  ondansetron Injectable 4 milliGRAM(s) IV Push once PRN Nausea and/or Vomiting            Physical exam  General: NAD, resting comfortably  Neuro: CN intact, A&O x3  HEENT: symmetrical facies, no tongue deviation, no hematoma or active bleeding, dressing c/d/i, ROSENDO -sanguinous  Extremities: symmetrical motor, sensation intact b/l, SCDs in place.
SUBJECTIVE: Pt seen and examined at bedside. Yesterday SBP low 170s, given 10 hydralazine and improved to 150s. BP stable throughout the night. Patient feels well, denies chest pain, sob, palpitations, HA, fever, chills, N/V, difficulty swallowing/breathing, asking to go home today.    OBJECTIVE:  Vital Signs Last 24 Hrs  T(C): 36.7 (27 Aug 2020 05:32), Max: 37.1 (26 Aug 2020 22:41)  T(F): 98.1 (27 Aug 2020 05:32), Max: 98.7 (26 Aug 2020 22:41)  HR: 71 (27 Aug 2020 05:32) (60 - 82)  BP: 118/73 (27 Aug 2020 05:32) (114/65 - 173/83)  BP(mean): 106 (26 Aug 2020 19:00) (86 - 118)  RR: 18 (27 Aug 2020 05:32) (12 - 18)  SpO2: 97% (27 Aug 2020 05:32) (96% - 100%)      I&O's Summary    26 Aug 2020 07:01  -  27 Aug 2020 07:00  --------------------------------------------------------  IN: 1955 mL / OUT: 1225 mL / NET: 730 mL      Physical exam  General: NAD, resting comfortably  Neuro: CN intact, A&O x3  HEENT: symmetrical facies, no tongue deviation, no hematoma or active bleeding, dressing c/d/i, ROSENDO -sanguinous  Extremities: symmetrical motor, sensation intact b/l, SCDs in place.    LABS:    08-27    133<L>  |  101  |  35<H>  ----------------------------<  262<H>  5.7<H>   |  22  |  2.10<H>    Ca    9.1      27 Aug 2020 06:42            RADIOLOGY & ADDITIONAL STUDIES:

## 2020-08-27 NOTE — DISCHARGE NOTE PROVIDER - NSDCMRMEDTOKEN_GEN_ALL_CORE_FT
Aspirin Enteric Coated 81 mg oral delayed release tablet: 1 tab(s) orally once a day  benazepril 10 mg oral tablet: 1 tab(s) orally once a day  calcitriol 0.25 mcg oral capsule: 1 cap(s) orally once a day  carvedilol 25 mg oral tablet: 1 tab(s) orally 2 times a day  levothyroxine 75 mcg (0.075 mg) oral tablet: 1 tab(s) orally once a day  Plavix 75 mg oral tablet: 1 tab(s) orally once a day  pravastatin 40 mg oral tablet: 1 tab(s) orally once a day Aspirin Enteric Coated 81 mg oral delayed release tablet: 1 tab(s) orally once a day  benazepril 10 mg oral tablet: 1 tab(s) orally once a day  calcitriol 0.25 mcg oral capsule: 1 cap(s) orally once a day  carvedilol 25 mg oral tablet: 1 tab(s) orally 2 times a day  levothyroxine 75 mcg (0.075 mg) oral tablet: 1 tab(s) orally once a day  oxyCODONE 5 mg oral tablet: 1 tab(s) orally every 6 hours, As Needed -for severe pain MDD:4  Plavix 75 mg oral tablet: 1 tab(s) orally once a day  pravastatin 40 mg oral tablet: 1 tab(s) orally once a day Aspirin Enteric Coated 81 mg oral delayed release tablet: 1 tab(s) orally once a day  calcitriol 0.25 mcg oral capsule: 1 cap(s) orally once a day  carvedilol 25 mg oral tablet: 1 tab(s) orally 2 times a day  levothyroxine 75 mcg (0.075 mg) oral tablet: 1 tab(s) orally once a day  oxyCODONE 5 mg oral tablet: 1 tab(s) orally every 6 hours, As Needed -for severe pain MDD:4  Plavix 75 mg oral tablet: 1 tab(s) orally once a day  pravastatin 40 mg oral tablet: 1 tab(s) orally once a day

## 2020-08-27 NOTE — DISCHARGE NOTE NURSING/CASE MANAGEMENT/SOCIAL WORK - NSDCPEPTSTRK_GEN_ALL_CORE
Signs and symptoms of stroke/Need for follow up after discharge/Risk factors for stroke/Stroke warning signs and symptoms/Prescribed medications/Call 911 for stroke/Stroke support groups for patients, families, and friends/Stroke education booklet

## 2020-08-27 NOTE — DISCHARGE NOTE NURSING/CASE MANAGEMENT/SOCIAL WORK - PATIENT PORTAL LINK FT
You can access the FollowMyHealth Patient Portal offered by [facility] by registering at the following website: [email or web address]. By joining [facility] FollowMyHealth portal, you will also be able to view your health information using other applications (apps) compatible with our system.

## 2020-08-27 NOTE — DISCHARGE NOTE PROVIDER - NSDCACTIVITY_GEN_ALL_CORE
Do not make important decisions/Stairs allowed/Walking - Indoors allowed/Do not drive or operate machinery/Showering allowed/No heavy lifting/straining/Walking - Outdoors allowed

## 2020-08-27 NOTE — DISCHARGE NOTE PROVIDER - CARE PROVIDERS DIRECT ADDRESSES
,[email or web address]

## 2020-08-27 NOTE — DISCHARGE NOTE PROVIDER - CARE PROVIDER_API CALL
Osvaldo Negro  VASCULAR SURGERY  [address], [address]  [place] [address]  Phone: [phone]  Fax: [phone]  Follow Up Time: 2 weeks Osvaldo Negro  VASCULAR SURGERY  [address], [address]  [place] [address]  Phone: [phone]  Fax: [phone]  Follow Up Time: 2 weeks    lAberto Tomas  [address], [place] [address]  Phone: [phone]  Fax: [phone]  Follow Up Time: 1 week

## 2020-08-27 NOTE — DISCHARGE NOTE PROVIDER - PROVIDER TOKENS
PROVIDER:[TOKEN:[[id number]],FOLLOWUP:[2 weeks]] PROVIDER:[TOKEN:[[id number]],FOLLOWUP:[2 weeks]],PROVIDER:[TOKEN:[[id number]],FOLLOWUP:[1 week]]

## 2020-08-27 NOTE — DISCHARGE NOTE PROVIDER - HOSPITAL COURSE
75 year old male with PMHx of MI, HTN, HLD, throat cancer in 2011 s/p chemo and RT, DM2 (not on meds since weight loss with Ca), AICD after ventricular arrhythmia (2009, generator change 2017 [employer] - [id number] ), h/o hernia repair in 2018  at [facility] ) . Patient states he has had several episodes of numbness and dysfunction of his left arm and left leg. They have all resolved spontaneously. He previously undergone a carotid duplex which shows a high-grade distal CCA stenosis on the right side with a proximal CCA stenosis on the left side followed by vascular started on aspirin and Plavix s/p . Presents to [facility] for scheduled Right Carotid Endarterectomy  with EEG monitoring on 8/26/20.        -Pt had an episode of vasovagal episode after the blood drawn while at [facility]  -pt is awake ,alert and oriented  v/s  99/57 (  Pt took BP meds this am without breakfast  ) , HR 71 , sat 100 % -pt denies any CP, SOB, Palpitations, dizziness headache or weakness - advised to go to ER for evaluation which patient refused - spoke to anesthesiologist and agree with plan         cardiology appointment on 8/20/20 -will obtain cardiac eval, defibrillator  interrogation report     *** Covid test has to be scheduled 72 hrs prior to surgery - information given             On 8/26, pt underwent R CEA.  Pt. tolerated procedure well and was transferred to the recovery room where pt was closely managed for postoperative pain and blood pressure control, and then transferred to the floor without incidence.  Pt. was mainly managed for postoperative pain, wound care, as well as close monitoring of fluid resuscitation, neurological status and electrolyte repletion.  Pt has been tolerating a diet, voiding, ambulating, and the pain is now well controlled.   Pt. is ready for discharge home in stable condition, and will follow up in two weeks as an outpatient with Dr. Negro. 75 year old male with PMHx of MI, HTN, HLD, throat cancer in 2011 s/p chemo and RT, DM2 (not on meds since weight loss with Ca), AICD after ventricular arrhythmia (2009, generator change 2017 [employer] - [id number] ), h/o hernia repair in 2018  at [facility] ) . Patient states he has had several episodes of numbness and dysfunction of his left arm and left leg. They have all resolved spontaneously. He previously undergone a carotid duplex which shows a high-grade distal CCA stenosis on the right side with a proximal CCA stenosis on the left side followed by vascular started on aspirin and Plavix s/p . Presents to [facility] for scheduled Right Carotid Endarterectomy  with EEG monitoring on 8/26/20.        -Pt had an episode of vasovagal episode after the blood drawn while at [facility]  -pt is awake ,alert and oriented  v/s  99/57 (  Pt took BP meds this am without breakfast  ) , HR 71 , sat 100 % -pt denies any CP, SOB, Palpitations, dizziness headache or weakness - advised to go to ER for evaluation which patient refused - spoke to anesthesiologist and agree with plan         cardiology appointment on 8/20/20 -will obtain cardiac eval, defibrillator  interrogation report     *** Covid test has to be scheduled 72 hrs prior to surgery - information given             On 8/26, pt underwent R CEA.  Pt. tolerated procedure well and was transferred to the recovery room where pt was closely managed for postoperative pain and blood pressure control, and then transferred to the floor without incidence.  Pt. was mainly managed for postoperative pain, wound care, as well as close monitoring of fluid resuscitation, neurological status and electrolyte repletion. Pt was hyperkalemic to 5.7, EKG WNL, given kayexalate and improved.  Pt has been tolerating a diet, voiding, ambulating, and the pain is now well controlled.   Pt. is ready for discharge home in stable condition, and will follow up in two weeks as an outpatient with Dr. Negro. 75 year old male with PMHx of MI, HTN, HLD, throat cancer in 2011 s/p chemo and RT, DM2 (not on meds since weight loss with Ca), AICD after ventricular arrhythmia (2009, generator change 2017 [employer] - [id number] ), h/o hernia repair in 2018  at [facility] ) . Patient states he has had several episodes of numbness and dysfunction of his left arm and left leg. They have all resolved spontaneously. He previously undergone a carotid duplex which shows a high-grade distal CCA stenosis on the right side with a proximal CCA stenosis on the left side followed by vascular started on aspirin and Plavix s/p . Presents to [facility] for scheduled Right Carotid Endarterectomy  with EEG monitoring on 8/26/20.        -Pt had an episode of vasovagal episode after the blood drawn while at [facility]  -pt is awake ,alert and oriented  v/s  99/57 (  Pt took BP meds this am without breakfast  ) , HR 71 , sat 100 % -pt denies any CP, SOB, Palpitations, dizziness headache or weakness - advised to go to ER for evaluation which patient refused - spoke to anesthesiologist and agree with plan         cardiology appointment on 8/20/20 -will obtain cardiac eval, defibrillator  interrogation report     *** Covid test has to be scheduled 72 hrs prior to surgery - information given             On 8/26, pt underwent R CEA.  Pt. tolerated procedure well and was transferred to the recovery room where pt was closely managed for postoperative pain and blood pressure control, and then transferred to the floor without incidence.  Pt. was mainly managed for postoperative pain, wound care, as well as close monitoring of fluid resuscitation, neurological status and electrolyte repletion. Pt was hyperkalemic to 5.7, EKG WNL, given ca gluconate and kayexalate and improved.  Pt has been tolerating a diet, voiding, ambulating, and the pain is now well controlled.   Pt. is ready for discharge home in stable condition, and will follow up in two weeks as an outpatient with Dr. Negro and Dr. Tomas nephrologist.

## 2020-08-27 NOTE — DISCHARGE NOTE PROVIDER - NSDCCPTREATMENT_GEN_ALL_CORE_FT
PRINCIPAL PROCEDURE  Procedure: Right carotid endarterectomy  Findings and Treatment: · Operative Findings	Procedure:Right Carotid Endarterectomy with patch repair; Findings: post RT changes; hemorrhagic plaque for CCA to ICA; on extubation patient intact neuro function 5/5 motor in all extremities

## 2020-08-27 NOTE — DISCHARGE NOTE PROVIDER - NSDCCPCAREPLAN_GEN_ALL_CORE_FT
PRINCIPAL DISCHARGE DIAGNOSIS  Diagnosis: Occlusion and stenosis of right carotid artery  Assessment and Plan of Treatment: Return to ER for temperatures greater than 101, chills sweats, pain not controlled with pain medications, persistent headaches, nausea and/or vomiting, asymmetrical weakness, neurological or mental status changes.  Please keep incision sites clean and dry, shower only.  Do not bathe or immerse incision sites in water for a prolonged amount of time.  May remove gauze dressing in 24hrs.  Steri-strips may fall of on their own in the shower.   Follow up with Dr. Negro within 2 weeks upon discharge.  Follow up with your PMD within 1 week regarding your hospitalization.      SECONDARY DISCHARGE DIAGNOSES  Diagnosis: Need for prophylactic measure  Assessment and Plan of Treatment: Continue asa, plavix, and statin PRINCIPAL DISCHARGE DIAGNOSIS  Diagnosis: Occlusion and stenosis of right carotid artery  Assessment and Plan of Treatment: Take tylenol every 6 hours as needed for mild pain, you may take oxycodone 5 mg every 6 hours as needed for severe pain  Return to ER for temperatures greater than 101, chills sweats, pain not controlled with pain medications, persistent headaches, nausea and/or vomiting, asymmetrical weakness, neurological or mental status changes.  Please keep incision sites clean and dry, shower only.  Do not bathe or immerse incision sites in water for a prolonged amount of time.  May remove gauze dressing in 24hrs.  Steri-strips may fall of on their own in the shower.   Follow up with Dr. Negro within 2 weeks upon discharge.  Follow up with your PMD within 1 week regarding your hospitalization.      SECONDARY DISCHARGE DIAGNOSES  Diagnosis: Need for prophylactic measure  Assessment and Plan of Treatment: Continue asa, plavix, and statin    Diagnosis: Hyperkalemia  Assessment and Plan of Treatment: EKG performed- no changes, given kayexalate and improved.  Follow up with nephrologist, Dr. Tomas upon discharge. PRINCIPAL DISCHARGE DIAGNOSIS  Diagnosis: Occlusion and stenosis of right carotid artery  Assessment and Plan of Treatment: Take tylenol every 6 hours as needed for mild pain, you may take oxycodone 5 mg every 6 hours as needed for severe pain  Return to ER for temperatures greater than 101, chills sweats, pain not controlled with pain medications, persistent headaches, nausea and/or vomiting, asymmetrical weakness, neurological or mental status changes.  Please keep incision sites clean and dry, shower only.  Do not bathe or immerse incision sites in water for a prolonged amount of time.  May remove gauze dressing in 24hrs.  Steri-strips may fall of on their own in the shower.   Follow up with Dr. Negro within 2 weeks upon discharge.  Follow up with your PMD within 1 week regarding your hospitalization.      SECONDARY DISCHARGE DIAGNOSES  Diagnosis: Need for prophylactic measure  Assessment and Plan of Treatment: Continue asa, plavix, and statin    Diagnosis: Hyperkalemia  Assessment and Plan of Treatment: EKG performed- no changes, given kayexalate and improved.  - Hold benazepril as can elevate potassium levels.  Follow up with nephrologist, Dr. Tomas within 1 week upon discharge, will coordinate to recheck potassium and further management.

## 2020-08-27 NOTE — PROGRESS NOTE ADULT - ASSESSMENT
A/P: 75y Male POD0 s/p R CEA (8/26)   - Adv diet as tolerated  - Activity- OOB ambulate   - AM labs   - Monitor BP, goal -160  - Monitor ROSENDO output  - Pain medication  - DVT ppx  - incentive spirometry   - Ok to transfer to [facility]  - Dispo: likely dc home tomorrow       Vascular Surgery  [id number]
A/P: 75y Male POD1 s/p R CEA (8/26)   - Adv to reg diet  - Activity- OOB ambulate   - AM labs   - Monitor BP, goal -160  - Monitor ROSENDO output  - Pain medication  - DVT ppx  - incentive spirometry   - Dispo:  mandy home today, mandy ROBBINS      Vascular Surgery  [id number]

## 2020-09-01 LAB — SURGICAL PATHOLOGY STUDY: SIGNIFICANT CHANGE UP

## 2020-09-08 ENCOUNTER — APPOINTMENT (OUTPATIENT)
Dept: VASCULAR SURGERY | Facility: CLINIC | Age: 75
End: 2020-09-08
Payer: MEDICARE

## 2020-09-08 VITALS
WEIGHT: 170 LBS | HEART RATE: 76 BPM | HEIGHT: 67 IN | BODY MASS INDEX: 26.68 KG/M2 | SYSTOLIC BLOOD PRESSURE: 181 MMHG | DIASTOLIC BLOOD PRESSURE: 88 MMHG

## 2020-09-08 VITALS — TEMPERATURE: 97.3 F

## 2020-09-08 PROCEDURE — 99024 POSTOP FOLLOW-UP VISIT: CPT

## 2020-09-08 NOTE — PHYSICAL EXAM
[de-identified] : Appears well [de-identified] : Neck incision healing well [de-identified] : Intact

## 2020-09-08 NOTE — REASON FOR VISIT
[de-identified] : Right carotid endarterectomy [de-identified] : Status post right carotid endarterectomy.  Doing well.  Neurologically intact.

## 2020-10-06 ENCOUNTER — APPOINTMENT (OUTPATIENT)
Dept: VASCULAR SURGERY | Facility: CLINIC | Age: 75
End: 2020-10-06
Payer: MEDICARE

## 2020-10-06 PROCEDURE — 99024 POSTOP FOLLOW-UP VISIT: CPT

## 2020-10-06 PROCEDURE — 93880 EXTRACRANIAL BILAT STUDY: CPT

## 2020-10-06 NOTE — ADDENDUM
[FreeTextEntry1] : Doing well.\par carotid duplex with CCA disease which is unchanged \par Cea patent

## 2020-10-06 NOTE — PHYSICAL EXAM
[de-identified] : Appears well [de-identified] : Neck incision healing well [de-identified] : Intact

## 2020-10-06 NOTE — REASON FOR VISIT
[de-identified] : Right carotid endarterectomy [de-identified] : Status post right carotid endarterectomy.  Doing well.  Neurologically intact.

## 2020-11-17 ENCOUNTER — APPOINTMENT (OUTPATIENT)
Dept: VASCULAR SURGERY | Facility: CLINIC | Age: 75
End: 2020-11-17

## 2021-04-29 ENCOUNTER — APPOINTMENT (OUTPATIENT)
Dept: VASCULAR SURGERY | Facility: CLINIC | Age: 76
End: 2021-04-29
Payer: MEDICARE

## 2021-04-29 VITALS
DIASTOLIC BLOOD PRESSURE: 63 MMHG | HEIGHT: 67 IN | SYSTOLIC BLOOD PRESSURE: 102 MMHG | HEART RATE: 65 BPM | WEIGHT: 170 LBS | BODY MASS INDEX: 26.68 KG/M2

## 2021-04-29 PROCEDURE — 99213 OFFICE O/P EST LOW 20 MIN: CPT

## 2021-04-29 PROCEDURE — 99072 ADDL SUPL MATRL&STAF TM PHE: CPT

## 2021-04-29 PROCEDURE — 93880 EXTRACRANIAL BILAT STUDY: CPT

## 2021-04-29 NOTE — ASSESSMENT
[FreeTextEntry1] : 74 yo male former smoker with history of dm, cad s/p aicd, carotid stenosis s/p right cea presents for follow up \par \par carotid duplex shows patent right cea with severe stenosis of b/l cca, left ica noted to be 16-49% stenosis \par \par pt to follow up in 6 months with repeat carotid duplex and screening aaa study given history of smoking

## 2021-04-29 NOTE — PHYSICAL EXAM
[No Rash or Lesion] : No rash or lesion [Alert] : alert [Calm] : calm [JVD] : no jugular venous distention  [Normal Breath Sounds] : Normal breath sounds [Normal Heart Sounds] : normal heart sounds [Normal Rate and Rhythm] : normal rate and rhythm [2+] : left 2+ [Ankle Swelling (On Exam)] : not present [Varicose Veins Of Lower Extremities] : not present [] : not present [Abdomen Tenderness] : ~T ~M No abdominal tenderness [Skin Ulcer] : no ulcer [de-identified] : appears well  [de-identified] : eomi, no facial asymmetry  [de-identified] : incision healed

## 2021-04-29 NOTE — HISTORY OF PRESENT ILLNESS
[FreeTextEntry1] : 76 yo male with history of dm, cad s/p aicd, carotid stenosis s/p right cea presents for follow up \par pt without any complaints \par pt denies any history of stroke like symptoms \par pt denies any difficulty walking or open wounds / ulcers

## 2021-07-19 ENCOUNTER — APPOINTMENT (OUTPATIENT)
Dept: OTOLARYNGOLOGY | Facility: CLINIC | Age: 76
End: 2021-07-19
Payer: MEDICARE

## 2021-07-19 VITALS
BODY MASS INDEX: 25.9 KG/M2 | SYSTOLIC BLOOD PRESSURE: 141 MMHG | DIASTOLIC BLOOD PRESSURE: 75 MMHG | WEIGHT: 165 LBS | HEART RATE: 68 BPM | HEIGHT: 67 IN

## 2021-07-19 PROCEDURE — 99213 OFFICE O/P EST LOW 20 MIN: CPT | Mod: 25

## 2021-07-19 PROCEDURE — 31575 DIAGNOSTIC LARYNGOSCOPY: CPT

## 2021-07-19 PROCEDURE — 99072 ADDL SUPL MATRL&STAF TM PHE: CPT

## 2021-07-19 PROCEDURE — 69210 REMOVE IMPACTED EAR WAX UNI: CPT

## 2021-07-19 NOTE — PROCEDURE
[Complicated Symptoms] : complicated symptoms requiring more thorough examination than provided by mirror [de-identified] : Procedure:  Flexible Fiberoptic Laryngoscopy: Risks, benefits, and alternatives of flexible endoscopy were explained to the patient.  The patient gave oral consent to proceed.  The flexible scope was inserted into the right nasal cavity and advanced towards the nasopharynx.  Visualized mucosa over the turbinates and septum were as described above.  The nasopharynx was clear.  Oropharyngeal walls were symmetric and mobile without lesion, mass, or edema.  Hypopharynx was also without  lesion or edema. Pharynx has circumferential peeling. Larynx was mobile without lesions. Supraglottic structures were free of edema, mass, and asymmetry. Vallecula filled with food and debris. True vocal folds were white without mass or lesion.  Base of tongue was within normal limits.   [de-identified] : post op patency

## 2021-07-19 NOTE — ADDENDUM
[FreeTextEntry1] : Documented by Pina Orellana acting as scribe for Dr. Lynn on 07/19/2021.\par \par All Medical record entries made by the Scribe were at my, Dr. Lynn, direction and personally dictated by me on 07/19/2021 . I have reviewed the chart and agree that the record accurately reflects my personal performance of the history, physical exam, assessment and plan. I have also personally directed, reviewed, and agreed with the discharge instructions.

## 2021-07-19 NOTE — CONSULT LETTER
[Dear  ___] : Dear  [unfilled], [Courtesy Letter:] : I had the pleasure of seeing your patient, [unfilled], in my office today. [Please see my note below.] : Please see my note below. [Consult Closing:] : Thank you very much for allowing me to participate in the care of this patient.  If you have any questions, please do not hesitate to contact me. [Sincerely,] : Sincerely, [FreeTextEntry3] : José Miguel Lynn MD FACS

## 2021-07-19 NOTE — PHYSICAL EXAM
[Normal] : the left middle ear was normal [FreeTextEntry9] : cerumen impaction removed via curettage and alligator forceps [FreeTextEntry1] : food sitting at [employer]. wears full dentures. no signs of any tumors or growths

## 2021-07-19 NOTE — HISTORY OF PRESENT ILLNESS
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 Pt states that when he drinks, the fluid comes right out of his nose. Pt admitted to losing some weight. Pt added that he has had carotid surgery since the last visit. Pt further added that he is seeing a neurologist because his wife tells him that he zones out. Pt adds that he has had a CT head done already for the neurologist.

## 2021-07-19 NOTE — ASSESSMENT
[FreeTextEntry1] : Reviewed and reconciled medications, allergies, PMHx, PSHx, SocHx, FMHx. \par \par h/o of right tonsil cancer in 2012- was radiated, s/p partial tongue resection on left 2/25/19, and s/p excision biopsy left base of tongue lesion followed by coblation of the entire lesion on 9/5/19- no signs of any tumors or growths\par left cerumen impaction\par Pharynx has circumferential peeling\par Vallecula filled with food and debris\par \par pathology report 11/2019: all benign\par \par Plan:\par Reviewed prior pathology report. Cerumen removed. Flexible laryngoscopy. FEEST. Have neurologist send copy of CT head. FU after test.

## 2021-07-29 ENCOUNTER — APPOINTMENT (OUTPATIENT)
Dept: OTOLARYNGOLOGY | Facility: CLINIC | Age: 76
End: 2021-07-29
Payer: MEDICARE

## 2021-07-29 PROCEDURE — 92612 ENDOSCOPY SWALLOW (FEES) VID: CPT | Mod: GN

## 2021-08-02 NOTE — ASSESSMENT
[FreeTextEntry1] : REPORT OF EVALUATION OF SWALLOW FUNCTION VIA FLEXIBLE ENDOSCOPIC EXAMINATION OF SWALLOWING (FEES) \par \par Patient Name: Karlos Gonzalez\par Patient : 1945\par Medical Diagnosis: Oropharyngeal Dysphagia (R13.12)\par Treatment Diagnosis: Oropharyngeal Dysphagia (R13.12)\par Referring Physician: Dr. José Miguel Lynn\par Date of onset: ongoing\par \par History:  Information on this patient has been provided by the patient and via chart review. Karlos Gonzalez is a 75 year-old male who was seen for a Flexible Endoscopic Examination of Swallowing to:  _x__rule out aspiration; _x__assess for diet texture change as appropriate; and/or  __x_ explore positional strategies and/or compensatory techniques to eliminate aspiration.\par \par Reason for Referral: To determine patient's ability to safely tolerate an oral diet.\par The physician ordered this procedure because he wants the patient to meet his nutrition/hydration needs by mouth without compromising respiratory status. Mr. Gonzalez arrived to today's evaluation with complaints of progressive difficulty swallowing which started approx. 2 years ago. He described frequent nasal regurgitation of thin liquids, as well as need consume softer foods items (i.e. pasta, oatmeal) due to edentulous status. The patient further reported progressive loss of taste and recent weight loss. He denied recent history of pneumonia. As per patient report and ENT note, medical history is significant for right tonsil cancer s/p radiation (), s/p partial tongue resection (19), s/p excision biopsy of left base of tongue lesion followed by coblation of entire lesion (19), and s/p carotid surgery (?2020). Recent laryngoscopy performed by Dr. 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)\par Pre-op testing (V72.84) (Z01.818)\par Ventricular tachycardia (427.1) (I47.2)\par \par Past Medical History\par Ventricular tachycardia (427.1) (I47.2)\par \par Current Meds\par Aspirin 81 MG TABS\par Calcitriol 0.25 MCG Oral Capsule\par Carvedilol 12.5 MG Oral Tablet; TAKE 1 TABLET TWICE DAILY\par Pravastatin Sodium 20 MG Oral Tablet\par Ramipril 5 MG Oral Capsule\par \par Allergies\par No Known Allergies\par \par Current Respiratory Status:  _X_  Normal     ___  Tracheostomy Tube  \par \par Voice: The patient presented with a hypernasal quality with audible air escape, suggesting velopharyngeal insufficiency.\par .\par ASSESSMENT\par \par Consistencies Administered:  \par Solids:  ___ Regular   _x_Mechanical Soft   __x_Puree   \par Liquids:  _x_ Thin   _x_ Nectar Thick   _x_Honey Thick   \par                 __ teaspoon _x_ single cup sip\par \par FEES PROCEDURE\par \par The patient was explained the FEES procedure, and consent was obtained.  A nasendoscope was passed via the right nasal cavity and positioned above the supraglottic structures. The epiglottis, aryepiglottic folds, arytenoids, and true vocal folds which were clearly visible with generalized mild edema of supraglottic structures. Assessment of TVF abduction and adduction revealed bilateral motion with full closure during phonation.  For the purposes of today’s assessment, the patient was provided PO trials of puree, mechanical soft, honey-thick, nectar-thick and thin liquid textures via cup sips. In regard to swallow function, the patient demonstrated premature spillage, further exacerbated by delayed pharyngeal triggering, with the bolus head intermittently migrating to the pyriform sinus for honey-thick, nectar-thick and thin liquids, and to the valleculae / over the laryngeal surface of the epiglottis for puree. There was subsequent, intermittent laryngeal penetration before the swallow with full retrieval across all textures. There was no evidence of aspiration before or after the swallow across testing. There was reduced tongue base propulsion/retraction, reduced hyolaryngeal excursion and reduced pharyngeal contractility with moderate stasis located in/along the valleculae and lateral pharyngeal walls post primary swallow for puree and mechanical soft; spontaneous multiple re-swallows and liquid wash were both shown to facilitate partial clearance. Clinician instruction in compensatory chin tuck posture was NOT shown to reduce stasis amounts. At this time, the scope was removed with the patient tolerating the procedure well. \par \par Of note, there was a single episode of nasal regurgitation after the swallow for thin liquid consumed via larger cup sip, further suggestive of velopharyngeal insufficiency and consistent with patient primary complaints. This was not reduplicated after clinician cueing to take smaller, single cup sips.  \par                                        \par Aspiration - Penetration Scale: 1 - puree; mechanical soft; nectar-thick liquid; thin liquid\par \par Aspiration - Penetration Scale    (Shahab et al Dysphagia 11:93-98 (1996), Aspiration-Penetration Scale)\par 1.    Material does not enter the airway\par 2.    Material enters the airway, remains above the vocal folds, and is\par ejected from the airway\par 3.    Material enters the airway, remains above the vocal folds, and is not\par ejected\par 4.    Material enters the airway, contacts the vocal folds, and is ejected\par from the airway\par 5.    Material enters the airway, contacts the vocal folds, and is not ejected\par from the airway\par 6.    Material enters the airway, passes below the vocal folds and is ejected\par into the larynx or out of the airway\par 7.    Material enters the airway, passes below the vocal folds, and is not\par ejected from the trachea despite effort\par 8.    Material enters the airway, passes below the vocal folds, and no effort\par is made to eject\par \par EDUCATION: Evaluation results and recommendations were discussed with the patient. Full understanding was demonstrated.\par \par PROGNOSIS: Fair to Good with therapeutic intervention\par \par IMPRESSIONS: Pharyngeal Dysphagia\par \par RECOMMENDATIONS:\par 1)  Mechanical Soft Solids with Thin Liquids via Small, Single Cup Sips\par 2)  Dysphagia Precautions: Upright position (90 degrees) during/after oral intake for 30 minutes; Slow rate; Small bites; Small single cup sips; Masticate foods thoroughly; Alternate bites/sips; NO straws.\par 3)  Swallow Therapy is recommended to maximize the overall swallow mechanism\par 4)  Follow up with referring MD as directed\par \par EDUCATION: The patient was educated at length regarding the aforementioned findings and recommendations. Good understanding was verbalized.\par \par Should you have additional questions/concerns, please contact this department at [phone].\par \par Madeleine Herbert M.S., CCC-SLP\par Speech-Language Pathologist

## 2021-08-04 ENCOUNTER — APPOINTMENT (OUTPATIENT)
Dept: OTOLARYNGOLOGY | Facility: CLINIC | Age: 76
End: 2021-08-04
Payer: MEDICARE

## 2021-08-04 VITALS
DIASTOLIC BLOOD PRESSURE: 92 MMHG | WEIGHT: 160 LBS | BODY MASS INDEX: 23.7 KG/M2 | HEART RATE: 80 BPM | HEIGHT: 69 IN | SYSTOLIC BLOOD PRESSURE: 169 MMHG

## 2021-08-04 DIAGNOSIS — J39.2 OTHER DISEASES OF PHARYNX: ICD-10-CM

## 2021-08-04 DIAGNOSIS — K13.29 OTHER DISTURBANCES OF ORAL EPITHELIUM, INCLUDING TONGUE: ICD-10-CM

## 2021-08-04 PROCEDURE — 99214 OFFICE O/P EST MOD 30 MIN: CPT

## 2021-08-04 NOTE — PHYSICAL EXAM
[Normal] : no abnormal secretions [FreeTextEntry1] : limited opening of the mouth. No recurrence of lesions. 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

## 2021-08-04 NOTE — ASSESSMENT
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 VC normal. Stasis along side wall of throat, between BOT and epiglottis. Require swallowing and drinking while eating. Small sips and bites. Keep throat as moist as possible. \par \par CT head no contrast 7/14/21 compared to 2017 CT: chronic ischemic changes. minimal sinus disease. \par \par Plan:\par FEEST and CT head reviewed in detail with the patient. Eat in small bites, and drink while eating. FU 6 months.

## 2021-08-04 NOTE — ADDENDUM
[FreeTextEntry1] : Documented by Edgar Moore acting as scribe for Dr. Lynn on 08/04/2021.\par \par All Medical record entries made by the Scribe were at my, Dr. Lynn, direction and personally dictated by me on 08/04/2021 . I have reviewed the chart and agree that the record accurately reflects my personal performance of the history, physical exam, assessment and plan. I have also personally directed, reviewed, and agreed with the discharge instructions.

## 2021-08-04 NOTE — HISTORY OF PRESENT ILLNESS
[de-identified] : The patient presents with h/o of right tonsil cancer in 2012- was radiated, s/p left tonsillectomy 5/2011, s/p partial tongue resection on left 2/25/19, and s/p excision biopsy left base of tongue lesion followed by Coblation of the entire lesion on 9/5/19. The patient come in today to review FEEST and CT results. Pt admits to choking and coughing when eating.

## 2021-08-09 ENCOUNTER — NON-APPOINTMENT (OUTPATIENT)
Age: 76
End: 2021-08-09

## 2021-08-13 ENCOUNTER — NON-APPOINTMENT (OUTPATIENT)
Age: 76
End: 2021-08-13

## 2021-08-23 ENCOUNTER — NON-APPOINTMENT (OUTPATIENT)
Age: 76
End: 2021-08-23

## 2021-09-16 NOTE — H&P PST ADULT - RESPIRATORY
Patient decided to proceed with the Shingles vaccine & stated she did check with insurance for coverage and said \"it is 100% covered.\"    Patient waited 10 minutes after the immunization and left the clinic ambulatory and tolerated the immunization without any incident.    detailed exam

## 2021-10-11 NOTE — PATIENT PROFILE ADULT - NSPROSPIRITUALVALUESFT_GEN_A_NUR
[facility] PHYSICIAN SERVICES  [facility]  [address]  [address] [address]  Dept: [phone]  Dept Fax: [phone]  Loc: [phone]    Cydney Rose is a 27 y.o. female who presents today for her medical conditions/complaints as noted below. Cydney Rose is c/o of 1 Month Follow-Up (Pt is here for 1 month follow up on medication. Pt statesit is going well. Pt denies any side effects or adverse effects. Pt cites no new concerns. )        HPI:     HPI     27-year-old female presents today for month follow-up. She states that she is doing well. She has had no adverse reactions or rash to the Lamictal or the Vraylar. She states that she does like there is it is doing some improvement. With her mood  Chief Complaint   Patient presents with    1 Month Follow-Up     Pt is here for 1 month follow up on medication. Pt statesit is going well. Pt denies any side effects or adverse effects. Pt cites no new concerns. Past Medical History:   Diagnosis Date    Anxiety     Depression     PTSD (post-traumatic stress disorder)       History reviewed. No pertinent surgical history. Vitals 10/11/2021 9/10/2021 8/13/2021 7/30/2021 9/22/2020 8/85/3445   SYSTOLIC 938 862 [phone]   DIASTOLIC 78 80 76 80 74 78   Site Left Upper Arm - Left Upper Arm - - Left Upper Arm   Position Sitting - Sitting - - Sitting   Cuff Size Large Adult - Medium Adult - - Medium Adult   Pulse 87 86 94 105 98 95   Temp 97.9 97.1 97.6 97.8 97.9 97.2   Resp 18 - - 18 18 16   SpO2 98 98 98 98 98 98   Weight 179 lb 8 oz 183 lb 185 lb 189 lb 9.6 oz 169 lb 12.8 oz 174 lb 9.6 oz   Height 5' 3\" 5' 3\" 5' 3\" 5' 3\" 5' 3\" 5' 3\"   Body mass index 31.79 kg/m2 32.41 kg/m2 32.77 kg/m2 33.58 kg/m2 30.08 kg/m2 30.93 kg/m2       History reviewed. No pertinent family history.     Social History     Tobacco Use    Smoking status: Current Every Day Smoker     Packs/day: 0.50     Years: 3.00     Pack years: 1.50     Types: Cigarettes     Start date: 2017    Smokeless tobacco: Never Used   Substance Use Topics    Alcohol use: Yes     Alcohol/week: 2.0 standard drinks     Types: 2 Glasses of wine per week     Comment: occ      Current Outpatient Medications on File Prior to Visit   Medication Sig Dispense Refill    ZAFEMY 150-35 MCG/24HR APPLY 1 PATCH TOPICALLY ONCE A WEEK AS DIRECTED      lamoTRIgine (LAMICTAL) 25 MG tablet Take 1 tablet by mouth 2 times daily 30 tablet 3    cariprazine hcl (VRAYLAR) 3 MG CAPS capsule Take 2 capsules by mouth daily 60 capsule 0     No current facility-administered medications on file prior to visit. Allergies   Allergen Reactions    Penicillins Hives       Health Maintenance   Topic Date Due    Hepatitis C screen  Never done    Pneumococcal 0-64 years Vaccine (1 of 2 - PPSV23) Never done    COVID-19 Vaccine (1) Never done    HIV screen  Never done    Chlamydia screen  Never done    Pap smear  Never done    Varicella vaccine (1 of 2 - 2-dose childhood series) 11/01/2021 (Originally 1/18/2000)    HPV vaccine (1 - 2-dose series) 12/05/2021 (Originally 1/18/2010)    DTaP/Tdap/Td vaccine (1 - Tdap) 10/11/2022 (Originally 1/18/2018)    Flu vaccine (1) 10/11/2022 (Originally 9/1/2021)    Hepatitis A vaccine  Aged Out    Hepatitis B vaccine  Aged Out    Hib vaccine  Aged Out    Meningococcal (ACWY) vaccine  Aged Out       Subjective:      Review of Systems   Constitutional: Negative. HENT: Negative. Eyes: Negative. Respiratory: Negative. Cardiovascular: Negative. Gastrointestinal: Negative. Endocrine: Negative. Genitourinary: Negative. Musculoskeletal: Negative. Skin: Negative. Allergic/Immunologic: Negative. Neurological: Negative. Hematological: Negative. Psychiatric/Behavioral: Negative. Negative for dysphoric mood, self-injury and suicidal ideas. The patient is not nervous/anxious.         Objective:     Physical Exam  Vitals and nursing note reviewed. Constitutional:       Appearance: Normal appearance. HENT:      Head: Normocephalic and atraumatic. Nose: Nose normal.      Mouth/Throat:      Mouth: Mucous membranes are moist.   Eyes:      Pupils: Pupils are equal, round, and reactive to light. Cardiovascular:      Rate and Rhythm: Normal rate and regular rhythm. Pulses: Normal pulses. Heart sounds: Normal heart sounds. Pulmonary:      Effort: Pulmonary effort is normal.      Breath sounds: Normal breath sounds. Abdominal:      General: Bowel sounds are normal.      Palpations: Abdomen is soft. Musculoskeletal:         General: Normal range of motion. Cervical back: Normal range of motion. Skin:     General: Skin is warm and dry. Neurological:      Mental Status: She is alert and oriented to person, place, and time. Mental status is at baseline. Psychiatric:         Attention and Perception: Attention and perception normal.         Mood and Affect: Mood and affect normal.         Speech: Speech normal.         Behavior: Behavior normal. Behavior is cooperative. Thought Content: Thought content normal. Thought content does not include homicidal or suicidal ideation. Cognition and Memory: Cognition normal.         Judgment: Judgment normal.       /78 (Site: Left Upper Arm, Position: Sitting, Cuff Size: Large Adult)   Pulse 87   Temp 97.9 °F (36.6 °C) (Temporal)   Resp 18   Ht 5' 3\" (1.6 m)   Wt 179 lb 8 oz (81.4 kg)   LMP 09/22/2021 (Approximate)   SpO2 98%   BMI 31.80 kg/m²     Assessment:       Diagnosis Orders   1. Anxiety and depression     2. PTSD (post-traumatic stress disorder)           Plan:   1.-2. Improved mood and affect today. Continue Vraylar 6 mg daily. Increase Lamictal to 75 mg nightly for 2 weeks then increase to 100 mg nightly for 2 weeks. Follow-up in 3 weeks. Patient given educational materials -see patient instructions.   Discussed use, benefit, and side effects of prescribed medications. All patient questions answered. Pt voiced understanding. Reviewed health maintenance. Instructed to continue currentmedications, diet and exercise. Patient agreed with treatment plan. Follow up as directed. MEDICATIONS:  No orders of the defined types were placed in this encounter. ORDERS:  No orders of the defined types were placed in this encounter. Follow-up:  Return in about 4 weeks (around 11/8/2021), or if symptoms worsen or fail to improve. PATIENT INSTRUCTIONS:  There are no Patient Instructions on file for this visit. Electronically signed by CASSI Sanchez NP on 10/11/2021 at 9:08 AM    EMR Dragon/transcription disclaimer:  Much of thisencounter note is electronic transcription/translation of spoken language to printed texts. The electronic translation of spoken language may be erroneous, or at times, nonsensical words or phrases may be inadvertentlytranscribed.   Although I have reviewed the note for such errors, some may still exist. n/a

## 2021-11-04 ENCOUNTER — APPOINTMENT (OUTPATIENT)
Dept: VASCULAR SURGERY | Facility: CLINIC | Age: 76
End: 2021-11-04

## 2022-02-14 ENCOUNTER — APPOINTMENT (OUTPATIENT)
Dept: OTOLARYNGOLOGY | Facility: CLINIC | Age: 77
End: 2022-02-14
Payer: MEDICARE

## 2022-02-14 VITALS
SYSTOLIC BLOOD PRESSURE: 162 MMHG | HEART RATE: 77 BPM | DIASTOLIC BLOOD PRESSURE: 91 MMHG | HEIGHT: 69 IN | BODY MASS INDEX: 23.7 KG/M2 | WEIGHT: 160 LBS

## 2022-02-14 DIAGNOSIS — R13.12 DYSPHAGIA, OROPHARYNGEAL PHASE: ICD-10-CM

## 2022-02-14 DIAGNOSIS — J31.0 CHRONIC RHINITIS: ICD-10-CM

## 2022-02-14 DIAGNOSIS — J30.0 VASOMOTOR RHINITIS: ICD-10-CM

## 2022-02-14 PROCEDURE — 69210 REMOVE IMPACTED EAR WAX UNI: CPT

## 2022-02-14 PROCEDURE — 31575 DIAGNOSTIC LARYNGOSCOPY: CPT

## 2022-02-14 PROCEDURE — 99213 OFFICE O/P EST LOW 20 MIN: CPT | Mod: 25

## 2022-02-14 RX ORDER — PRAVASTATIN SODIUM 20 MG/1
20 TABLET ORAL
Qty: 90 | Refills: 0 | Status: COMPLETED | COMMUNITY
Start: 2016-11-16 | End: 2022-02-14

## 2022-02-14 RX ORDER — RAMIPRIL 5 MG/1
5 CAPSULE ORAL
Qty: 60 | Refills: 0 | Status: COMPLETED | COMMUNITY
Start: 2016-09-01 | End: 2022-02-14

## 2022-02-14 RX ORDER — PRAVASTATIN SODIUM 40 MG/1
40 TABLET ORAL
Qty: 90 | Refills: 0 | Status: ACTIVE | COMMUNITY
Start: 2021-09-29

## 2022-02-14 RX ORDER — ISOSORBIDE MONONITRATE 30 MG/1
30 TABLET, EXTENDED RELEASE ORAL
Qty: 90 | Refills: 0 | Status: ACTIVE | COMMUNITY
Start: 2021-07-11

## 2022-02-14 RX ORDER — MIDODRINE HYDROCHLORIDE 10 MG/1
10 TABLET ORAL
Qty: 270 | Refills: 0 | Status: ACTIVE | COMMUNITY
Start: 2021-07-07

## 2022-02-14 RX ORDER — LEVOTHYROXINE SODIUM 0.07 MG/1
75 TABLET ORAL
Qty: 90 | Refills: 0 | Status: ACTIVE | COMMUNITY
Start: 2021-11-21

## 2022-02-14 RX ORDER — IPRATROPIUM BROMIDE 42 UG/1
0.06 SPRAY NASAL
Qty: 1 | Refills: 5 | Status: ACTIVE | COMMUNITY
Start: 2022-02-14 | End: 1900-01-01

## 2022-02-14 RX ORDER — HYDRALAZINE HYDROCHLORIDE 10 MG/1
10 TABLET ORAL
Qty: 135 | Refills: 0 | Status: ACTIVE | COMMUNITY
Start: 2022-02-11

## 2022-02-14 RX ORDER — CLOPIDOGREL BISULFATE 75 MG/1
75 TABLET, FILM COATED ORAL
Refills: 0 | Status: ACTIVE | COMMUNITY
Start: 2022-02-14

## 2022-02-14 NOTE — PROCEDURE
[de-identified] : Procedure: Flexible Fiberoptic Laryngoscopy: Risk, benefits and alternatives of flexible endoscopy were explained to the patient. The patient gave oral consent to proceed. The flexible scope was inserted into the left nasal cavity and advanced towards the nasopharynx. Visualized mucosa over the turbinates and septum were as described above. The nasopharynx was dry. Oropharyngeal walls were with  circumferential scarring from prior radiation. Hypopharynx was also without lesion or edema. Larynx was mobile without lesions. Supraglottic structures were free of edema, mass and asymmetry. True vocal folds were white without mass or lesion. Base of tongue was with no lymphoid tissue. no recurrence of disease.\par \par

## 2022-02-14 NOTE — PHYSICAL EXAM
[Hearing Johnson Test (Tuning Fork On Forehead)] : no lateralization of tone [Normal] : no masses and lesions seen, face is symmetric [FreeTextEntry6] : Cerumen removed via curettage  [FreeTextEntry7] : Cerumen impaction removed via curettage  [FreeTextEntry1] : Full upper and lower dentures. Left lateral tongue is scarring and adhesion. erythema of left cheek.  [FreeTextEntry2] : Sinuses nontender to percussion. sensations intact.

## 2022-02-14 NOTE — HISTORY OF PRESENT ILLNESS
[de-identified] : The patient presents with h/o of right tonsil cancer in 2012- was radiated, s/p left tonsillectomy 5/2011, s/p partial tongue resection on left 2/25/19, and s/p excision biopsy left base of tongue lesion followed by Coblation of the entire lesion on 9/5/19. There is no change on his tongue since his previous visit. 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

## 2022-02-14 NOTE — ASSESSMENT
[FreeTextEntry1] : Reviewed and reconciled medications, allergies, PMHx, PSHx, SocHx, FMHx.\par \par h/o of right tonsil cancer in 2012- was radiated, s/p left tonsillectomy 5/2011, s/p partial tongue resection on left 2/25/19, and s/p excision biopsy left base of tongue lesion followed by Coblation of the entire lesion on 9/5/19.\par Left Cerumen impaction \par severely deviated septum to the right.\par Full upper and lower dentures. \par Left lateral tongue is scarring and adhesion. \par erythema of left cheek. \par Neck firm from surgery and radiation. \par \par Plan:\par Cerumen removed. Flexible Fiberoptic Laryngoscopy. Atrovent - 1 or 2 sprays bilaterally up to QID, spray laterally. FU 6 months.

## 2022-02-14 NOTE — ADDENDUM
[FreeTextEntry1] : Documented by Edgar Moore acting as a scribe for Dr. José Miguel Lynn on (02/14/2022).\par \par All medical record entries made by the Scribe were at my, Dr. José Miguel Lynn's, direction and personally dictated by me on (02/14/2022). I have reviewed the chart and agree that the record accurately reflects my personal performance of the history, physical exam, assessment and plan. I have also personally directed, reviewed, and agree with the discharge instructions.\par \par

## 2022-02-14 NOTE — CONSULT LETTER
[Dear  ___] : Dear  [unfilled], [Courtesy Letter:] : I had the pleasure of seeing your patient, [unfilled], in my office today. [Please see my note below.] : Please see my note below. [Consult Closing:] : Thank you very much for allowing me to participate in the care of this patient.  If you have any questions, please do not hesitate to contact me. [Sincerely,] : Sincerely, [FreeTextEntry3] : Dr. José Miguel Lynn MD FACS

## 2022-03-30 ENCOUNTER — INPATIENT (INPATIENT)
Facility: HOSPITAL | Age: 77
LOS: 5 days | Discharge: NURSING, REHAB OR LONG-TERM CARE | DRG: 64 | End: 2022-04-05
Attending: INTERNAL MEDICINE | Admitting: INTERNAL MEDICINE
Payer: MEDICARE

## 2022-03-30 VITALS
HEIGHT: 69 IN | HEART RATE: 109 BPM | TEMPERATURE: 98 F | OXYGEN SATURATION: 100 % | WEIGHT: 160.06 LBS | DIASTOLIC BLOOD PRESSURE: 105 MMHG | RESPIRATION RATE: 29 BRPM | SYSTOLIC BLOOD PRESSURE: 172 MMHG

## 2022-03-30 DIAGNOSIS — R74.8 ABNORMAL LEVELS OF OTHER SERUM ENZYMES: ICD-10-CM

## 2022-03-30 DIAGNOSIS — R77.8 OTHER SPECIFIED ABNORMALITIES OF PLASMA PROTEINS: ICD-10-CM

## 2022-03-30 DIAGNOSIS — J81.0 ACUTE PULMONARY EDEMA: ICD-10-CM

## 2022-03-30 DIAGNOSIS — Z98.890 OTHER SPECIFIED POSTPROCEDURAL STATES: Chronic | ICD-10-CM

## 2022-03-30 DIAGNOSIS — D64.9 ANEMIA, UNSPECIFIED: ICD-10-CM

## 2022-03-30 DIAGNOSIS — E03.9 HYPOTHYROIDISM, UNSPECIFIED: ICD-10-CM

## 2022-03-30 DIAGNOSIS — Z95.810 PRESENCE OF AUTOMATIC (IMPLANTABLE) CARDIAC DEFIBRILLATOR: Chronic | ICD-10-CM

## 2022-03-30 DIAGNOSIS — I25.10 ATHEROSCLEROTIC HEART DISEASE OF NATIVE CORONARY ARTERY WITHOUT ANGINA PECTORIS: ICD-10-CM

## 2022-03-30 DIAGNOSIS — N18.9 CHRONIC KIDNEY DISEASE, UNSPECIFIED: ICD-10-CM

## 2022-03-30 DIAGNOSIS — I50.22 CHRONIC SYSTOLIC (CONGESTIVE) HEART FAILURE: ICD-10-CM

## 2022-03-30 DIAGNOSIS — I10 ESSENTIAL (PRIMARY) HYPERTENSION: ICD-10-CM

## 2022-03-30 DIAGNOSIS — R29.898 OTHER SYMPTOMS AND SIGNS INVOLVING THE MUSCULOSKELETAL SYSTEM: ICD-10-CM

## 2022-03-30 DIAGNOSIS — E11.9 TYPE 2 DIABETES MELLITUS WITHOUT COMPLICATIONS: ICD-10-CM

## 2022-03-30 DIAGNOSIS — D72.829 ELEVATED WHITE BLOOD CELL COUNT, UNSPECIFIED: ICD-10-CM

## 2022-03-30 DIAGNOSIS — Z29.9 ENCOUNTER FOR PROPHYLACTIC MEASURES, UNSPECIFIED: ICD-10-CM

## 2022-03-30 DIAGNOSIS — R56.9 UNSPECIFIED CONVULSIONS: ICD-10-CM

## 2022-03-30 DIAGNOSIS — R09.89 OTHER SPECIFIED SYMPTOMS AND SIGNS INVOLVING THE CIRCULATORY AND RESPIRATORY SYSTEMS: ICD-10-CM

## 2022-03-30 LAB
ALBUMIN SERPL ELPH-MCNC: 3.2 G/DL — LOW (ref 3.3–5)
ALP SERPL-CCNC: 104 U/L — SIGNIFICANT CHANGE UP (ref 40–120)
ALT FLD-CCNC: 48 U/L — SIGNIFICANT CHANGE UP (ref 12–78)
ANION GAP SERPL CALC-SCNC: 7 MMOL/L — SIGNIFICANT CHANGE UP (ref 5–17)
ANISOCYTOSIS BLD QL: SLIGHT — SIGNIFICANT CHANGE UP
APPEARANCE UR: ABNORMAL
APTT BLD: 25.8 SEC — LOW (ref 27.5–35.5)
AST SERPL-CCNC: 53 U/L — HIGH (ref 15–37)
BASE EXCESS BLDA CALC-SCNC: -1.1 MMOL/L — SIGNIFICANT CHANGE UP (ref -2–3)
BASOPHILS # BLD AUTO: 0 K/UL — SIGNIFICANT CHANGE UP (ref 0–0.2)
BASOPHILS NFR BLD AUTO: 0 % — SIGNIFICANT CHANGE UP (ref 0–2)
BILIRUB SERPL-MCNC: 1.1 MG/DL — SIGNIFICANT CHANGE UP (ref 0.2–1.2)
BILIRUB UR-MCNC: NEGATIVE — SIGNIFICANT CHANGE UP
BLOOD GAS COMMENTS ARTERIAL: SIGNIFICANT CHANGE UP
BUN SERPL-MCNC: 33 MG/DL — HIGH (ref 7–23)
CALCIUM SERPL-MCNC: 8.5 MG/DL — SIGNIFICANT CHANGE UP (ref 8.5–10.1)
CHLORIDE SERPL-SCNC: 108 MMOL/L — SIGNIFICANT CHANGE UP (ref 96–108)
CK MB BLD-MCNC: 3.7 % — HIGH (ref 0–3.5)
CK MB BLD-MCNC: 4 % — HIGH (ref 0–3.5)
CK MB BLD-MCNC: 4.6 % — HIGH (ref 0–3.5)
CK MB CFR SERPL CALC: 12.8 NG/ML — HIGH (ref 0–3.6)
CK MB CFR SERPL CALC: 13.1 NG/ML — HIGH (ref 0–3.6)
CK MB CFR SERPL CALC: 18.4 NG/ML — HIGH (ref 0–3.6)
CK SERPL-CCNC: 328 U/L — HIGH (ref 26–308)
CK SERPL-CCNC: 342 U/L — HIGH (ref 26–308)
CK SERPL-CCNC: 398 U/L — HIGH (ref 26–308)
CO2 SERPL-SCNC: 24 MMOL/L — SIGNIFICANT CHANGE UP (ref 22–31)
COLOR SPEC: ABNORMAL
CREAT SERPL-MCNC: 2.3 MG/DL — HIGH (ref 0.5–1.3)
D DIMER BLD IA.RAPID-MCNC: 2924 NG/ML DDU — HIGH
DIFF PNL FLD: ABNORMAL
EGFR: 29 ML/MIN/1.73M2 — LOW
EOSINOPHIL # BLD AUTO: 0 K/UL — SIGNIFICANT CHANGE UP (ref 0–0.5)
EOSINOPHIL NFR BLD AUTO: 0 % — SIGNIFICANT CHANGE UP (ref 0–6)
FLUAV AG NPH QL: SIGNIFICANT CHANGE UP
FLUBV AG NPH QL: SIGNIFICANT CHANGE UP
GAS PNL BLDA: SIGNIFICANT CHANGE UP
GLUCOSE SERPL-MCNC: 375 MG/DL — HIGH (ref 70–99)
GLUCOSE UR QL: NEGATIVE — SIGNIFICANT CHANGE UP
HCO3 BLDA-SCNC: 23 MMOL/L — SIGNIFICANT CHANGE UP (ref 21–28)
HCT VFR BLD CALC: 31.5 % — LOW (ref 39–50)
HCT VFR BLD CALC: 34.1 % — LOW (ref 39–50)
HGB BLD-MCNC: 10.1 G/DL — LOW (ref 13–17)
HGB BLD-MCNC: 10.8 G/DL — LOW (ref 13–17)
INR BLD: 1.3 RATIO — HIGH (ref 0.88–1.16)
KETONES UR-MCNC: ABNORMAL
LACTATE SERPL-SCNC: 1.2 MMOL/L — SIGNIFICANT CHANGE UP (ref 0.7–2)
LEUKOCYTE ESTERASE UR-ACNC: ABNORMAL
LYMPHOCYTES # BLD AUTO: 0.2 K/UL — LOW (ref 1–3.3)
LYMPHOCYTES # BLD AUTO: 1 % — LOW (ref 13–44)
MANUAL SMEAR VERIFICATION: SIGNIFICANT CHANGE UP
MCHC RBC-ENTMCNC: 24.1 PG — LOW (ref 27–34)
MCHC RBC-ENTMCNC: 24.3 PG — LOW (ref 27–34)
MCHC RBC-ENTMCNC: 31.7 GM/DL — LOW (ref 32–36)
MCHC RBC-ENTMCNC: 32.1 GM/DL — SIGNIFICANT CHANGE UP (ref 32–36)
MCV RBC AUTO: 75.2 FL — LOW (ref 80–100)
MCV RBC AUTO: 76.8 FL — LOW (ref 80–100)
MICROCYTES BLD QL: SLIGHT — SIGNIFICANT CHANGE UP
MONOCYTES # BLD AUTO: 1.21 K/UL — HIGH (ref 0–0.9)
MONOCYTES NFR BLD AUTO: 6 % — SIGNIFICANT CHANGE UP (ref 2–14)
NEUTROPHILS # BLD AUTO: 18.82 K/UL — HIGH (ref 1.8–7.4)
NEUTROPHILS NFR BLD AUTO: 92 % — HIGH (ref 43–77)
NEUTS BAND # BLD: 1 % — SIGNIFICANT CHANGE UP (ref 0–8)
NITRITE UR-MCNC: POSITIVE
NRBC # BLD: 0 /100 WBCS — SIGNIFICANT CHANGE UP (ref 0–0)
NRBC # BLD: 0 — SIGNIFICANT CHANGE UP
NRBC # BLD: SIGNIFICANT CHANGE UP /100 WBCS (ref 0–0)
NT-PROBNP SERPL-SCNC: 4963 PG/ML — HIGH (ref 0–125)
OVALOCYTES BLD QL SMEAR: SLIGHT — SIGNIFICANT CHANGE UP
PCO2 BLDA: 36 MMHG — SIGNIFICANT CHANGE UP (ref 35–48)
PH BLDA: 7.42 — SIGNIFICANT CHANGE UP (ref 7.35–7.45)
PH UR: 5 — SIGNIFICANT CHANGE UP (ref 5–8)
PLAT MORPH BLD: NORMAL — SIGNIFICANT CHANGE UP
PLATELET # BLD AUTO: 185 K/UL — SIGNIFICANT CHANGE UP (ref 150–400)
PLATELET # BLD AUTO: 210 K/UL — SIGNIFICANT CHANGE UP (ref 150–400)
PO2 BLDA: 94 MMHG — SIGNIFICANT CHANGE UP (ref 83–108)
POIKILOCYTOSIS BLD QL AUTO: SLIGHT — SIGNIFICANT CHANGE UP
POLYCHROMASIA BLD QL SMEAR: SLIGHT — SIGNIFICANT CHANGE UP
POTASSIUM SERPL-MCNC: 4.8 MMOL/L — SIGNIFICANT CHANGE UP (ref 3.5–5.3)
POTASSIUM SERPL-SCNC: 4.8 MMOL/L — SIGNIFICANT CHANGE UP (ref 3.5–5.3)
PROCALCITONIN SERPL-MCNC: 3.84 NG/ML — HIGH (ref 0–0.04)
PROLACTIN SERPL-MCNC: 18.2 NG/ML — SIGNIFICANT CHANGE UP (ref 4.1–18.4)
PROT SERPL-MCNC: 7 G/DL — SIGNIFICANT CHANGE UP (ref 6–8.3)
PROT UR-MCNC: 100
PROTHROM AB SERPL-ACNC: 15.2 SEC — HIGH (ref 10.5–13.4)
RBC # BLD: 4.19 M/UL — LOW (ref 4.2–5.8)
RBC # BLD: 4.44 M/UL — SIGNIFICANT CHANGE UP (ref 4.2–5.8)
RBC # FLD: 13.9 % — SIGNIFICANT CHANGE UP (ref 10.3–14.5)
RBC # FLD: 14.1 % — SIGNIFICANT CHANGE UP (ref 10.3–14.5)
RBC BLD AUTO: ABNORMAL
RSV RNA NPH QL NAA+NON-PROBE: SIGNIFICANT CHANGE UP
SAO2 % BLDA: 98.4 % — HIGH (ref 94–98)
SARS-COV-2 RNA SPEC QL NAA+PROBE: SIGNIFICANT CHANGE UP
SODIUM SERPL-SCNC: 139 MMOL/L — SIGNIFICANT CHANGE UP (ref 135–145)
SP GR SPEC: 1.01 — SIGNIFICANT CHANGE UP (ref 1.01–1.02)
TROPONIN I, HIGH SENSITIVITY RESULT: 1011.9 NG/L — HIGH
TROPONIN I, HIGH SENSITIVITY RESULT: 5252 NG/L — HIGH
TROPONIN I, HIGH SENSITIVITY RESULT: HIGH NG/L
TROPONIN I, HIGH SENSITIVITY RESULT: HIGH NG/L
TSH SERPL-MCNC: 2.81 UIU/ML — SIGNIFICANT CHANGE UP (ref 0.36–3.74)
UROBILINOGEN FLD QL: NEGATIVE — SIGNIFICANT CHANGE UP
WBC # BLD: 16.12 K/UL — HIGH (ref 3.8–10.5)
WBC # BLD: 20.24 K/UL — HIGH (ref 3.8–10.5)
WBC # FLD AUTO: 16.12 K/UL — HIGH (ref 3.8–10.5)
WBC # FLD AUTO: 20.24 K/UL — HIGH (ref 3.8–10.5)

## 2022-03-30 PROCEDURE — 71045 X-RAY EXAM CHEST 1 VIEW: CPT | Mod: 26

## 2022-03-30 PROCEDURE — 99291 CRITICAL CARE FIRST HOUR: CPT

## 2022-03-30 PROCEDURE — 70450 CT HEAD/BRAIN W/O DYE: CPT | Mod: 26,MA

## 2022-03-30 PROCEDURE — 78582 LUNG VENTILAT&PERFUS IMAGING: CPT | Mod: 26

## 2022-03-30 PROCEDURE — 93010 ELECTROCARDIOGRAM REPORT: CPT

## 2022-03-30 RX ORDER — ATORVASTATIN CALCIUM 80 MG/1
10 TABLET, FILM COATED ORAL AT BEDTIME
Refills: 0 | Status: DISCONTINUED | OUTPATIENT
Start: 2022-03-30 | End: 2022-04-01

## 2022-03-30 RX ORDER — GLUCAGON INJECTION, SOLUTION 0.5 MG/.1ML
1 INJECTION, SOLUTION SUBCUTANEOUS ONCE
Refills: 0 | Status: DISCONTINUED | OUTPATIENT
Start: 2022-03-30 | End: 2022-04-05

## 2022-03-30 RX ORDER — CARVEDILOL PHOSPHATE 80 MG/1
1 CAPSULE, EXTENDED RELEASE ORAL
Qty: 0 | Refills: 0 | DISCHARGE

## 2022-03-30 RX ORDER — INSULIN LISPRO 100/ML
VIAL (ML) SUBCUTANEOUS
Refills: 0 | Status: DISCONTINUED | OUTPATIENT
Start: 2022-03-30 | End: 2022-04-05

## 2022-03-30 RX ORDER — ISOSORBIDE MONONITRATE 60 MG/1
30 TABLET, EXTENDED RELEASE ORAL DAILY
Refills: 0 | Status: DISCONTINUED | OUTPATIENT
Start: 2022-03-30 | End: 2022-03-31

## 2022-03-30 RX ORDER — INSULIN LISPRO 100/ML
6 VIAL (ML) SUBCUTANEOUS ONCE
Refills: 0 | Status: COMPLETED | OUTPATIENT
Start: 2022-03-30 | End: 2022-03-30

## 2022-03-30 RX ORDER — MORPHINE SULFATE 50 MG/1
2 CAPSULE, EXTENDED RELEASE ORAL
Refills: 0 | Status: DISCONTINUED | OUTPATIENT
Start: 2022-03-30 | End: 2022-03-30

## 2022-03-30 RX ORDER — SODIUM CHLORIDE 9 MG/ML
1000 INJECTION, SOLUTION INTRAVENOUS
Refills: 0 | Status: DISCONTINUED | OUTPATIENT
Start: 2022-03-30 | End: 2022-04-05

## 2022-03-30 RX ORDER — HEPARIN SODIUM 5000 [USP'U]/ML
4100 INJECTION INTRAVENOUS; SUBCUTANEOUS EVERY 6 HOURS
Refills: 0 | Status: DISCONTINUED | OUTPATIENT
Start: 2022-03-30 | End: 2022-04-01

## 2022-03-30 RX ORDER — CALCITRIOL 0.5 UG/1
0.25 CAPSULE ORAL DAILY
Refills: 0 | Status: DISCONTINUED | OUTPATIENT
Start: 2022-03-30 | End: 2022-04-05

## 2022-03-30 RX ORDER — DEXTROSE 50 % IN WATER 50 %
25 SYRINGE (ML) INTRAVENOUS ONCE
Refills: 0 | Status: DISCONTINUED | OUTPATIENT
Start: 2022-03-30 | End: 2022-04-05

## 2022-03-30 RX ORDER — FUROSEMIDE 40 MG
40 TABLET ORAL ONCE
Refills: 0 | Status: COMPLETED | OUTPATIENT
Start: 2022-03-30 | End: 2022-03-30

## 2022-03-30 RX ORDER — ASPIRIN/CALCIUM CARB/MAGNESIUM 324 MG
1 TABLET ORAL
Qty: 0 | Refills: 0 | DISCHARGE

## 2022-03-30 RX ORDER — LEVOTHYROXINE SODIUM 125 MCG
75 TABLET ORAL DAILY
Refills: 0 | Status: DISCONTINUED | OUTPATIENT
Start: 2022-03-30 | End: 2022-04-05

## 2022-03-30 RX ORDER — ALBUTEROL 90 UG/1
2 AEROSOL, METERED ORAL EVERY 6 HOURS
Refills: 0 | Status: DISCONTINUED | OUTPATIENT
Start: 2022-03-30 | End: 2022-04-05

## 2022-03-30 RX ORDER — ISOSORBIDE MONONITRATE 60 MG/1
1 TABLET, EXTENDED RELEASE ORAL
Qty: 0 | Refills: 0 | DISCHARGE

## 2022-03-30 RX ORDER — DEXTROSE 50 % IN WATER 50 %
15 SYRINGE (ML) INTRAVENOUS ONCE
Refills: 0 | Status: DISCONTINUED | OUTPATIENT
Start: 2022-03-30 | End: 2022-04-05

## 2022-03-30 RX ORDER — HEPARIN SODIUM 5000 [USP'U]/ML
INJECTION INTRAVENOUS; SUBCUTANEOUS
Qty: 25000 | Refills: 0 | Status: DISCONTINUED | OUTPATIENT
Start: 2022-03-30 | End: 2022-03-30

## 2022-03-30 RX ORDER — ASPIRIN/CALCIUM CARB/MAGNESIUM 324 MG
600 TABLET ORAL ONCE
Refills: 0 | Status: COMPLETED | OUTPATIENT
Start: 2022-03-30 | End: 2022-03-30

## 2022-03-30 RX ORDER — DEXTROSE 50 % IN WATER 50 %
12.5 SYRINGE (ML) INTRAVENOUS ONCE
Refills: 0 | Status: DISCONTINUED | OUTPATIENT
Start: 2022-03-30 | End: 2022-04-05

## 2022-03-30 RX ORDER — NITROGLYCERIN 6.5 MG
2 CAPSULE, EXTENDED RELEASE ORAL ONCE
Refills: 0 | Status: COMPLETED | OUTPATIENT
Start: 2022-03-30 | End: 2022-03-30

## 2022-03-30 RX ORDER — CARVEDILOL PHOSPHATE 80 MG/1
12.5 CAPSULE, EXTENDED RELEASE ORAL EVERY 12 HOURS
Refills: 0 | Status: DISCONTINUED | OUTPATIENT
Start: 2022-03-30 | End: 2022-03-31

## 2022-03-30 RX ORDER — HEPARIN SODIUM 5000 [USP'U]/ML
INJECTION INTRAVENOUS; SUBCUTANEOUS
Qty: 25000 | Refills: 0 | Status: DISCONTINUED | OUTPATIENT
Start: 2022-03-30 | End: 2022-04-01

## 2022-03-30 RX ORDER — INSULIN LISPRO 100/ML
VIAL (ML) SUBCUTANEOUS EVERY 6 HOURS
Refills: 0 | Status: DISCONTINUED | OUTPATIENT
Start: 2022-03-30 | End: 2022-03-30

## 2022-03-30 RX ORDER — HEPARIN SODIUM 5000 [USP'U]/ML
5500 INJECTION INTRAVENOUS; SUBCUTANEOUS EVERY 6 HOURS
Refills: 0 | Status: DISCONTINUED | OUTPATIENT
Start: 2022-03-30 | End: 2022-03-30

## 2022-03-30 RX ORDER — HEPARIN SODIUM 5000 [USP'U]/ML
2500 INJECTION INTRAVENOUS; SUBCUTANEOUS EVERY 6 HOURS
Refills: 0 | Status: DISCONTINUED | OUTPATIENT
Start: 2022-03-30 | End: 2022-03-30

## 2022-03-30 RX ORDER — VALPROIC ACID (AS SODIUM SALT) 250 MG/5ML
500 SOLUTION, ORAL ORAL EVERY 12 HOURS
Refills: 0 | Status: DISCONTINUED | OUTPATIENT
Start: 2022-03-30 | End: 2022-04-04

## 2022-03-30 RX ORDER — INFLUENZA VIRUS VACCINE 15; 15; 15; 15 UG/.5ML; UG/.5ML; UG/.5ML; UG/.5ML
0.7 SUSPENSION INTRAMUSCULAR ONCE
Refills: 0 | Status: DISCONTINUED | OUTPATIENT
Start: 2022-03-30 | End: 2022-04-05

## 2022-03-30 RX ORDER — HYDRALAZINE HCL 50 MG
10 TABLET ORAL ONCE
Refills: 0 | Status: COMPLETED | OUTPATIENT
Start: 2022-03-30 | End: 2022-03-30

## 2022-03-30 RX ORDER — HYDRALAZINE HCL 50 MG
5 TABLET ORAL THREE TIMES A DAY
Refills: 0 | Status: DISCONTINUED | OUTPATIENT
Start: 2022-03-30 | End: 2022-03-31

## 2022-03-30 RX ORDER — MIDODRINE HYDROCHLORIDE 2.5 MG/1
1 TABLET ORAL
Qty: 0 | Refills: 0 | DISCHARGE

## 2022-03-30 RX ORDER — HEPARIN SODIUM 5000 [USP'U]/ML
5000 INJECTION INTRAVENOUS; SUBCUTANEOUS EVERY 8 HOURS
Refills: 0 | Status: DISCONTINUED | OUTPATIENT
Start: 2022-03-30 | End: 2022-03-30

## 2022-03-30 RX ORDER — ASPIRIN AND DIPYRIDAMOLE 25; 200 MG/1; MG/1
1 CAPSULE, EXTENDED RELEASE ORAL
Refills: 0 | Status: DISCONTINUED | OUTPATIENT
Start: 2022-03-30 | End: 2022-04-05

## 2022-03-30 RX ORDER — HEPARIN SODIUM 5000 [USP'U]/ML
4100 INJECTION INTRAVENOUS; SUBCUTANEOUS ONCE
Refills: 0 | Status: COMPLETED | OUTPATIENT
Start: 2022-03-30 | End: 2022-03-30

## 2022-03-30 RX ORDER — HYDRALAZINE HCL 50 MG
1 TABLET ORAL
Qty: 0 | Refills: 0 | DISCHARGE

## 2022-03-30 RX ORDER — HEPARIN SODIUM 5000 [USP'U]/ML
5500 INJECTION INTRAVENOUS; SUBCUTANEOUS ONCE
Refills: 0 | Status: DISCONTINUED | OUTPATIENT
Start: 2022-03-30 | End: 2022-03-30

## 2022-03-30 RX ORDER — ASPIRIN/CALCIUM CARB/MAGNESIUM 324 MG
324 TABLET ORAL ONCE
Refills: 0 | Status: DISCONTINUED | OUTPATIENT
Start: 2022-03-30 | End: 2022-03-30

## 2022-03-30 RX ORDER — BENAZEPRIL HYDROCHLORIDE 40 MG/1
1 TABLET ORAL
Qty: 0 | Refills: 0 | DISCHARGE

## 2022-03-30 RX ORDER — CLOPIDOGREL BISULFATE 75 MG/1
75 TABLET, FILM COATED ORAL DAILY
Refills: 0 | Status: DISCONTINUED | OUTPATIENT
Start: 2022-03-30 | End: 2022-03-30

## 2022-03-30 RX ORDER — ONDANSETRON 8 MG/1
4 TABLET, FILM COATED ORAL EVERY 8 HOURS
Refills: 0 | Status: DISCONTINUED | OUTPATIENT
Start: 2022-03-30 | End: 2022-04-05

## 2022-03-30 RX ORDER — CARVEDILOL PHOSPHATE 80 MG/1
12.5 CAPSULE, EXTENDED RELEASE ORAL EVERY 12 HOURS
Refills: 0 | Status: DISCONTINUED | OUTPATIENT
Start: 2022-03-30 | End: 2022-03-30

## 2022-03-30 RX ORDER — INSULIN LISPRO 100/ML
VIAL (ML) SUBCUTANEOUS AT BEDTIME
Refills: 0 | Status: DISCONTINUED | OUTPATIENT
Start: 2022-03-30 | End: 2022-04-05

## 2022-03-30 RX ADMIN — Medication 40 MILLIGRAM(S): at 06:36

## 2022-03-30 RX ADMIN — ASPIRIN AND DIPYRIDAMOLE 1 CAPSULE(S): 25; 200 CAPSULE, EXTENDED RELEASE ORAL at 18:18

## 2022-03-30 RX ADMIN — Medication 55 MILLIGRAM(S): at 11:02

## 2022-03-30 RX ADMIN — HEPARIN SODIUM 4100 UNIT(S): 5000 INJECTION INTRAVENOUS; SUBCUTANEOUS at 18:18

## 2022-03-30 RX ADMIN — Medication 2 INCH(S): at 06:36

## 2022-03-30 RX ADMIN — HEPARIN SODIUM 800 UNIT(S)/HR: 5000 INJECTION INTRAVENOUS; SUBCUTANEOUS at 18:18

## 2022-03-30 RX ADMIN — HEPARIN SODIUM 800 UNIT(S)/HR: 5000 INJECTION INTRAVENOUS; SUBCUTANEOUS at 19:03

## 2022-03-30 RX ADMIN — Medication 55 MILLIGRAM(S): at 17:13

## 2022-03-30 RX ADMIN — Medication 600 MILLIGRAM(S): at 09:23

## 2022-03-30 RX ADMIN — Medication 6 UNIT(S): at 07:30

## 2022-03-30 RX ADMIN — ATORVASTATIN CALCIUM 10 MILLIGRAM(S): 80 TABLET, FILM COATED ORAL at 21:04

## 2022-03-30 RX ADMIN — Medication 5 MILLIGRAM(S): at 21:04

## 2022-03-30 RX ADMIN — Medication 2 INCH(S): at 17:14

## 2022-03-30 RX ADMIN — Medication 2 MILLIGRAM(S): at 07:33

## 2022-03-30 RX ADMIN — Medication 10 MILLIGRAM(S): at 06:36

## 2022-03-30 RX ADMIN — Medication 1: at 17:13

## 2022-03-30 RX ADMIN — CARVEDILOL PHOSPHATE 12.5 MILLIGRAM(S): 80 CAPSULE, EXTENDED RELEASE ORAL at 17:13

## 2022-03-30 RX ADMIN — Medication 2: at 14:05

## 2022-03-30 RX ADMIN — Medication 40 MILLIGRAM(S): at 17:13

## 2022-03-30 NOTE — SWALLOW BEDSIDE ASSESSMENT ADULT - NS SPL SWALLOW CLINIC TRIAL FT
multiple swallows noted with cup sip of moderately thick liquids, not reduplicated with tsp presentations

## 2022-03-30 NOTE — H&P ADULT - PROBLEM SELECTOR PLAN 1
Pt presents with SOB Pt presents with SOB 2/2 flash pulmonary edema likely due to acute CHF exacerbation vs. PE w/ heart strain vs. ACS  - ProBNP 4963  - s/p lasix 40mg IVP in ED   - currently on bipap, wean off supplementary O2 as tolerated   - elevated D-dimer on admission, f/u V/Q scan   - last TTE in 2009 showed LVEF 30% w/ severe systolic dysfunction, f/u TTE   - Patient does not appear volume overloaded on exam, monitor volume status   - caution with IVF Pt presents with SOB 2/2 flash pulmonary edema likely due to acute CHF exacerbation vs. PE w/ heart strain vs. ACS  - ProBNP 4963  - CXR: diffuse b/l scattered infiltrates  - s/p lasix 40mg IVP in ED   - currently on bipap, wean off supplementary O2 as tolerated   - elevated D-dimer on admission, f/u V/Q scan   - last TTE in 2009 showed LVEF 30% w/ severe systolic dysfunction, f/u TTE   - Patient does not appear volume overloaded on exam, monitor volume status   - caution with IVF Pt presents with SOB 2/2 flash pulmonary edema likely due to CVA vs. CHF vs. PE w/ heart strain   - ProBNP 4963  - CXR: diffuse b/l scattered infiltrates  - s/p lasix 40mg IVP in ED   - currently on bipap, wean off supplementary O2 as tolerated   - elevated D-dimer on admission, f/u V/Q scan   - last TTE in 2009 showed LVEF 30% w/ severe systolic dysfunction, f/u TTE   - Patient does not appear volume overloaded on exam, monitor volume status   - caution with IVF Pt presents with SOB 2/2 flash pulmonary edema likely due to uncontrolled BP. CHF w/ heart strain ? NSTEMI   - ProBNP 4963  - CXR: diffuse b/l scattered infiltrates  - s/p lasix 40mg IVP in ED   -1 more dose of IV lasix this PM   - currently on bipap, wean off supplementary O2 as tolerated   - elevated D-dimer on admission, f/u V/Q scan   - last TTE in 2009 showed LVEF 30% w/ severe systolic dysfunction, f/u TTE   - Patient does not appear volume overloaded on exam, monitor volume status   - caution with IVF

## 2022-03-30 NOTE — ED ADULT TRIAGE NOTE - CHIEF COMPLAINT QUOTE
Pt to ED via EMS, c/c found on floor by wife in respiratory distress. EMS states pt O2 sat >80%  scene, on cpap on arrival.

## 2022-03-30 NOTE — H&P ADULT - PROBLEM SELECTOR PLAN 11
Chronic, stable   - continue home synthroid 75mcg qD  - f/u AM TSH Chronic stable   - Continue plavix 75mg qD Chronic stable   - Continue Plavix 75mg qD  D/W Neurology DR Brownlee  & DR Hahn -Cardio -OK to change to Aggrenox

## 2022-03-30 NOTE — SWALLOW BEDSIDE ASSESSMENT ADULT - SWALLOW EVAL: PROGNOSIS
DIAGNOSIS CONTINUED: via teaspoon presentations only!, aspiration precautions, as tolerated. Facilitate upright position, slow pacing, single/small bites/sips, and alternate solids with liquids. Recommend formal swallow study of MBSS to assess safest diet level given hx of tonsillar CA and dysphagia. Continue to monitor and notify SLP if changes occur. PROGNOSIS: Pending MBSS

## 2022-03-30 NOTE — H&P ADULT - ASSESSMENT
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 Admitted for pulmonary edema, leukocytosis, elevated troponin, JARRED on CKD and seizure.       Vitals: HR 80, 176/82 -> 115/60, RR 20, 96% on BiPAP/CPAP  Labs significant for: WBC 20.24, H/H 10.8/24.1, D-Dimer 2924, Troponin 1011.9 > 5252.0, CKMB: 13.1, CPK%: 4%, BUN 33, Creatinine 2.3 (baseline unknown) eGFR 29, serum pro BNP 4963, creatine kinase 328, POCT glucose: 353  Imaging:   CXR: BL opacification as per personal read, official read pending   CT brain stroke: No acute hemorrhage, infarct, or mass effect   EKG:   Received Lasix 40mg IVP x1, Aspirin 600mg x1, Hydralazine 10mg x1, Ativan 2mg IVP x1, Lispro 6u in the ED      75 yo male w/ PMHx of HTN, HLD, HFrEF (EF 30% 2009), 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 Admitted for pulmonary edema, leukocytosis, elevated troponin, JARRED on CKD and seizure.          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 Admitted for pulmonary edema, leukocytosis, elevated troponin, JARRED on CKD-3  and seizure.

## 2022-03-30 NOTE — CONSULT NOTE ADULT - SUBJECTIVE AND OBJECTIVE BOX
clinical impression CVA with possible sz event   rec asa per rectum  for now   statin   Depakote   EEG   repeat head ct in 24 hours   spoke to cardiology  in regards to meds and sbp

## 2022-03-30 NOTE — H&P ADULT - PROBLEM SELECTOR PLAN 12
Heparin 5000 u subq q8H for DVT prophylaxis Chronic, stable   - continue home synthroid 75mcg qD  - f/u AM TSH

## 2022-03-30 NOTE — H&P ADULT - HISTORY OF PRESENT ILLNESS
77 yo male w/ PMHx of HTN, HLD, HFrEF (EF 30% 2009), tonsil cancer 2011 s/p chemo and RT, partial tonsillectomy and s/p partial tongue resection 2019, carotid stenosis s/p right CEA, DM2, CAD s/p AICD for ventricular arrhythmia (2009 w/ generator change in 2017) presents to ED after a being found on the floor by wife at around 4:30 AM last night. Patient had episode of severe SOB, was on the floor for ~2 hours. Patient was unable to rise from the floor by himself due to his left arm weakness. When first brought to the ED, patient was hypoxic and hypertensive. While in the ED, patient had episode of siezure-like activity in his b/l upper extremities which was controlled w/ ativan.     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 Patient had episode of severe SOB, was on the floor for ~2 hours. Patient was unable to rise from the floor by himself due to his left arm weakness. When first brought to the ED, patient was hypoxic and hypertensive. While in the ED, patient had episode of siezure-like activity in his b/l upper extremities which was controlled w/ ativan.     In ED:   Vitals: HR 80, 176/82 -> 115/60, RR 20, 96% on BiPAP/CPAP  Labs significant for: WBC 20.24, H/H 10.8/24.1, D-Dimer 2924, Troponin 1011.9 > 5252.0, CKMB: 13.1, CPK%: 4%, BUN 33, Creatinine 2.3 (baseline unknown) eGFR 29, serum pro BNP 4963, creatine kinase 328, POCT glucose: 353  Imaging:   CXR: BL opacification as per personal read, official read pending   CT brain stroke: No acute hemorrhage, infarct, or mass effect   EKG:   Received Lasix 40mg IVP x1, Aspirin 600mg x1, Hydralazine 10mg x1, Ativan 2mg IVP x1, Lispro 6u in the ED    75 yo male w/ PMHx of HTN, HLD, HFrEF (EF 30% 2009), 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 Patient had episode of severe SOB, was on the floor for ~2 hours. Patient was unable to rise from the floor by himself due to his left arm weakness. When first brought to the ED, patient was hypoxic and hypertensive o2 saturation in EMS was >80%. While in the ED, patient had episode of siezure-like activity in his b/l upper extremities which was controlled w/ ativan. Patient was seen and examined at bedside, BiPAP still in place, patient was still mildly lethargic from ativan and could not speak well with bipap mask on. Patient able to open eyes and nod/shake head to questions. Patient denied any headache, vision changes, cp, abd pain, msk pain. Patient still short of breath.      In ED:   Vitals: HR 80, 176/82 -> 115/60, RR 20, 96% on BiPAP/CPAP  Labs significant for: WBC 20.24, H/H 10.8/24.1, D-Dimer 2924, Troponin 1011.9 > 5252.0, CKMB: 13.1, CPK%: 4%, BUN 33, Creatinine 2.3 (baseline unknown) eGFR 29, serum pro BNP 4963, creatine kinase 328, POCT glucose: 353  Imaging:   CXR: BL opacification as per personal read, official read pending   CT brain stroke: No acute hemorrhage, infarct, or mass effect   EKG:   Received Lasix 40mg IVP x1, Aspirin 600mg x1, Hydralazine 10mg x1, Ativan 2mg IVP x1, Lispro 6u in the ED    75 yo male w/ PMHx of HTN, HLD, HFrEF (EF 30% 2009), 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 Patient had episode of severe SOB, was on the floor for ~2 hours. Patient was unable to rise from the floor by himself due to his left arm weakness. When first brought to the ED, patient was hypoxic and hypertensive o2 saturation in EMS was >80%. While in the ED, patient had episode of siezure-like activity in his b/l upper extremities which was controlled w/ ativan. Patient was seen and examined at bedside, BiPAP still in place, patient was still mildly lethargic from ativan and could not speak well with bipap mask on. Patient able to open eyes and nod/shake head to questions. Patient denied any headache, vision changes, cp, abd pain, msk pain. Patient still short of breath.      In ED:   Vitals: HR 80, 176/82 -> 115/60, RR 20, 96% on BiPAP/CPAP  Labs significant for: WBC 20.24, H/H 10.8/24.1, D-Dimer 2924, Troponin 1011.9 > 5252.0, CKMB: 13.1, CPK%: 4%, BUN 33, Creatinine 2.3 (baseline unknown) eGFR 29, serum pro BNP 4963, creatine kinase 328, POCT glucose: 353  Imaging:   CXR: diffuse b/l scattered infiltrates  CT brain stroke: No acute hemorrhage, infarct, or mass effect   EKG:   Received Lasix 40mg IVP x1, Aspirin 600mg x1, Hydralazine 10mg x1, Ativan 2mg IVP x1, Lispro 6u in the ED    77 yo male w/ PMHx of HTN, HLD, HFrEF (EF 30% 2009), 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 As per patient, he was watching a movie when he felt very short of breath suddenly and dropped to the floor, denies LOC and denies any trauma to his head, was on the floor for ~2 hours. Patient was unable to rise from the floor by himself due to his left arm weakness. When first brought to the ED, patient was hypoxic and hypertensive o2 saturation in EMS was >80%. While in the ED, patient had episode of siezure-like activity in his b/l upper extremities which was controlled w/ ativan. Patient was seen and examined at bedside, BiPAP still in place, patient was still mildly lethargic from ativan and could not speak well with bipap mask on. Patient able to open eyes and nod/shake head to questions. Patient denied any headache, vision changes, cp, abd pain, msk pain. Patient still short of breath.      In ED:   Vitals: HR 80, 176/82 -> 115/60, RR 20, 96% on BiPAP/CPAP  Labs significant for: WBC 20.24, H/H 10.8/24.1, D-Dimer 2924, Troponin 1011.9 > 5252.0, CKMB: 13.1, CPK%: 4%, BUN 33, Creatinine 2.3 (baseline unknown) eGFR 29, serum pro BNP 4963, creatine kinase 328, POCT glucose: 353  Imaging:   CXR: diffuse b/l scattered infiltrates  CT brain stroke: No acute hemorrhage, infarct, or mass effect   EKG: sinus tachy at ST- depressions in V5 V6   Received Lasix 40mg IVP x1, Aspirin 600mg x1, Hydralazine 10mg x1, Ativan 2mg IVP x1, Lispro 6u in the ED

## 2022-03-30 NOTE — ED ADULT NURSE REASSESSMENT NOTE - NS ED NURSE REASSESS COMMENT FT1
Patient received from [facility] in stable condition. Patient experiencing uncontrollable jerking movements of b/l arms and upper body. Ativan 2MG adminstered per MD Arzate orders. Pendiong ICU, Cards, and neuro. Will cont. to monitor. VSS.

## 2022-03-30 NOTE — CONSULT NOTE ADULT - SUBJECTIVE AND OBJECTIVE BOX
[facility]         Rianna Horn, Nidhi, Selma, Syd, Vlad, Rebeca        [phone] (office)    Reason for Consult:    Interval HPI: Patient seen and examined at bedside. No acute events overnight.     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 Patient had episode of severe SOB, was on the floor for ~2 hours. Patient was unable to rise from the floor by himself due to his left arm weakness. When first brought to the ED, patient was hypoxic and hypertensive o2 saturation in EMS was >80%. While in the ED, patient had episode of siezure-like activity in his b/l upper extremities which was controlled w/ ativan. Patient was seen and examined at bedside, BiPAP still in place, patient was still mildly lethargic from ativan and could not speak well with bipap mask on. Patient able to open eyes and nod/shake head to questions. Patient denied any headache, vision changes, cp, abd pain, msk pain. Patient still short of breath.      In ED:   Vitals: HR 80, 176/82 -> 115/60, RR 20, 96% on BiPAP/CPAP  Labs significant for: WBC 20.24, H/H 10.8/24.1, D-Dimer 2924, Troponin 1011.9 > 5252.0, CKMB: 13.1, CPK%: 4%, BUN 33, Creatinine 2.3 (baseline unknown) eGFR 29, serum pro BNP 4963, creatine kinase 328, POCT glucose: 353  Imaging:   CXR: BL opacification as per personal read, official read pending   CT brain stroke: No acute hemorrhage, infarct, or mass effect   EKG:   Received Lasix 40mg IVP x1, Aspirin 600mg x1, Hydralazine 10mg x1, Ativan 2mg IVP x1, Lispro 6u in the ED    (30 Mar 2022 08:48)      PAST MEDICAL & SURGICAL HISTORY:  MI (myocardial infarction)  1992    HTN (hypertension)    HLD (hyperlipidemia)    Throat cancer  2011, treated with chemo, RT    Ventricular arrhythmia  s/p AICD    Diabetes  Type2, not on any meds since weight loss after throat Ca    Renal insufficiency  s/p chemo    CAD (coronary artery disease)    Inguinal hernia, left    H/O prior ablation treatment  VT, 2009    Cardiac defibrillator in place  - 2009, battery change 2017    S/P left inguinal hernia repair  2018 at [place]        SOCIAL HISTORY: No active tobacco, alcohol or illicit drug use    FAMILY HISTORY:  Family history of cancer (Sibling)        Home Medications:  benazepril 10 mg oral tablet: 1 tab(s) orally once a day (30 Mar 2022 10:22)  calcitriol 0.25 mcg oral capsule: 1 cap(s) orally once a day (30 Mar 2022 10:22)  carvedilol 12.5 mg oral tablet: 1 tab(s) orally 2 times a day      *Last filled 5/21, spoke to MATTHEW Nguyen at Dr. Garcia&#x27;s office, she states patient should still be taking med as per MD notes from 2/22*  (30 Mar 2022 10:22)  hydrALAZINE 10 mg oral tablet: 0.5 tab(s) orally 3 times a day (30 Mar 2022 10:22)  isosorbide mononitrate 30 mg oral tablet, extended release: 1 tab(s) orally once a day (in the morning) (30 Mar 2022 10:22)  levothyroxine 75 mcg (0.075 mg) oral tablet: 1 tab(s) orally once a day (30 Mar 2022 10:22)  midodrine 10 mg oral tablet: 1 tab(s) orally 3 times a day (30 Mar 2022 10:22)  Plavix 75 mg oral tablet: 1 tab(s) orally once a day (30 Mar 2022 10:22)  pravastatin 40 mg oral tablet: 1 tab(s) orally once a day (30 Mar 2022 10:22)      MEDICATIONS  (STANDING):  atorvastatin 10 milliGRAM(s) Oral at bedtime  calcitriol   Capsule 0.25 MICROGram(s) Oral daily  carvedilol 12.5 milliGRAM(s) Oral every 12 hours  clopidogrel Tablet 75 milliGRAM(s) Oral daily  dextrose 5%. 1000 milliLiter(s) (50 mL/Hr) IV Continuous <Continuous>  dextrose 5%. 1000 milliLiter(s) (100 mL/Hr) IV Continuous <Continuous>  dextrose 50% Injectable 25 Gram(s) IV Push once  dextrose 50% Injectable 12.5 Gram(s) IV Push once  dextrose 50% Injectable 25 Gram(s) IV Push once  glucagon  Injectable 1 milliGRAM(s) IntraMuscular once  hydrALAZINE 5 milliGRAM(s) Oral three times a day  insulin lispro (ADMELOG) corrective regimen sliding scale   SubCutaneous every 6 hours  isosorbide   mononitrate ER Tablet (IMDUR) 30 milliGRAM(s) Oral daily  levothyroxine 75 MICROGram(s) Oral daily  valproate sodium IVPB 500 milliGRAM(s) IV Intermittent every 12 hours    MEDICATIONS  (PRN):  ALBUTerol    90 MICROgram(s) HFA Inhaler 2 Puff(s) Inhalation every 6 hours PRN Shortness of Breath and/or Wheezing  dextrose Oral Gel 15 Gram(s) Oral once PRN Blood Glucose LESS THAN 70 milliGRAM(s)/deciliter  ondansetron Injectable 4 milliGRAM(s) IV Push every 8 hours PRN Nausea and/or Vomiting      Allergies    No Known Allergies    Intolerances        REVIEW OF SYSTEMS: Negative except as per HPI.    VITAL SIGNS:   Vital Signs Last 24 Hrs  T(C): 37.8 (30 Mar 2022 09:23), Max: 37.8 (30 Mar 2022 09:23)  T(F): 100 (30 Mar 2022 09:23), Max: 100 (30 Mar 2022 09:23)  HR: 78 (30 Mar 2022 08:19) (78 - 109)  BP: 115/60 (30 Mar 2022 07:36) (115/60 - 176/82)  BP(mean): --  RR: 20 (30 Mar 2022 07:36) (20 - 29)  SpO2: 100% (30 Mar 2022 08:19) (94% - 100%)    I&O's Summary      PHYSICAL EXAM:  Constitutional: NAD, well-developed  HEENT NC/AT, moist mucous membranes  Pulmonary: Non-labored, breath sounds are clear bilaterally, no wheezing, rales or rhonchi  Cardiovascular: +S1, S2, RRR, no murmur  Gastrointestinal: Soft, nontender, nondistended, normoactive bowel sounds  Extremities: No peripheral edema   Neurological: Alert, strength and sensitivity are grossly intact  Skin: No obvious lesions/rashes  Psych: Mood & affect appropriate    LABS: All Labs Reviewed:                        10.8   20.24 )-----------( 210      ( 30 Mar 2022 06:12 )             34.1     30 Mar 2022 06:12    139    |  108    |  33     ----------------------------<  375    4.8     |  24     |  2.30     Ca    8.5        30 Mar 2022 06:12    TPro  7.0    /  Alb  3.2    /  TBili  1.1    /  DBili  x      /  AST  53     /  ALT  48     /  AlkPhos  104    30 Mar 2022 06:12    PT/INR - ( 30 Mar 2022 06:43 )   PT: 15.2 sec;   INR: 1.30 ratio         PTT - ( 30 Mar 2022 06:43 )  PTT:25.8 sec  CARDIAC MARKERS ( 30 Mar 2022 08:13 )  x     / x     / x     / x     / 13.1 ng/mL  CARDIAC MARKERS ( 30 Mar 2022 07:53 )  x     / x     / 328 U/L / x     / x          Blood Culture:   03-30 @ 06:12  Pro Bnp [id number]        EKG:    RADIOLOGY:    CXR:   [facility]         Rianna Horn, Nidhi, Selma, Syd, Vlad, Rebeca        [phone] (office)    Reason for Consult: shortness of breath ,HTN emergency      HPI:  75 yo male w/ PMHx of HTN, HLD, HFrEF (EF 30% 2009), 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 Patient had episode of severe SOB, was on the floor for ~2 hours. Patient was unable to rise from the floor by himself due to his left arm weakness. When first brought to the ED, patient was hypoxic and hypertensive o2 saturation in EMS was >80%. While in the ED, patient had episode of siezure-like activity in his b/l upper extremities which was controlled w/ ativan. Patient was seen and examined at bedside, BiPAP still in place, patient was still mildly lethargic from ativan and could not speak well with bipap mask on. Patient able to open eyes and nod/shake head to questions. Patient denied any headache, vision changes, cp, abd pain, msk pain. Patient still short of breath.        In ED:   Vitals: HR 80, 176/82 -> 115/60, RR 20, 96% on BiPAP/CPAP  Labs significant for: WBC 20.24, H/H 10.8/24.1, D-Dimer 2924, Troponin 1011.9 > 5252.0, CKMB: 13.1, CPK%: 4%, BUN 33, Creatinine 2.3 (baseline unknown) eGFR 29, serum pro BNP 4963, creatine kinase 328, POCT glucose: 353  Imaging:   CXR: BL opacification as per personal read, official read pending   CT brain stroke: No acute hemorrhage, infarct, or mass effect   EKG:   Received Lasix 40mg IVP x1, Aspirin 600mg x1, Hydralazine 10mg x1, Ativan 2mg IVP x1, Lispro 6u in the ED    (30 Mar 2022 08:48)      PAST MEDICAL & SURGICAL HISTORY:  MI (myocardial infarction)  1992    HTN (hypertension)    HLD (hyperlipidemia)    Throat cancer  2011, treated with chemo, RT    Ventricular arrhythmia  s/p AICD    Diabetes  Type2, not on any meds since weight loss after throat Ca    Renal insufficiency  s/p chemo    CAD (coronary artery disease)    Inguinal hernia, left    H/O prior ablation treatment  VT, 2009    Cardiac defibrillator in place  - 2009, battery change 2017    S/P left inguinal hernia repair  2018 at [place]        SOCIAL HISTORY: No active tobacco, alcohol or illicit drug use    FAMILY HISTORY:  Family history of cancer (Sibling)        Home Medications:  benazepril 10 mg oral tablet: 1 tab(s) orally once a day (30 Mar 2022 10:22)  calcitriol 0.25 mcg oral capsule: 1 cap(s) orally once a day (30 Mar 2022 10:22)  carvedilol 12.5 mg oral tablet: 1 tab(s) orally 2 times a day      *Last filled 5/21, spoke to MATTHEW Nguyen at Dr. Garcia&#x27;s office, she states patient should still be taking med as per MD notes from 2/22*  (30 Mar 2022 10:22)  hydrALAZINE 10 mg oral tablet: 0.5 tab(s) orally 3 times a day (30 Mar 2022 10:22)  isosorbide mononitrate 30 mg oral tablet, extended release: 1 tab(s) orally once a day (in the morning) (30 Mar 2022 10:22)  levothyroxine 75 mcg (0.075 mg) oral tablet: 1 tab(s) orally once a day (30 Mar 2022 10:22)  midodrine 10 mg oral tablet: 1 tab(s) orally 3 times a day (30 Mar 2022 10:22)  Plavix 75 mg oral tablet: 1 tab(s) orally once a day (30 Mar 2022 10:22)  pravastatin 40 mg oral tablet: 1 tab(s) orally once a day (30 Mar 2022 10:22)      MEDICATIONS  (STANDING):  atorvastatin 10 milliGRAM(s) Oral at bedtime  calcitriol   Capsule 0.25 MICROGram(s) Oral daily  carvedilol 12.5 milliGRAM(s) Oral every 12 hours  clopidogrel Tablet 75 milliGRAM(s) Oral daily  dextrose 5%. 1000 milliLiter(s) (50 mL/Hr) IV Continuous <Continuous>  dextrose 5%. 1000 milliLiter(s) (100 mL/Hr) IV Continuous <Continuous>  dextrose 50% Injectable 25 Gram(s) IV Push once  dextrose 50% Injectable 12.5 Gram(s) IV Push once  dextrose 50% Injectable 25 Gram(s) IV Push once  glucagon  Injectable 1 milliGRAM(s) IntraMuscular once  hydrALAZINE 5 milliGRAM(s) Oral three times a day  insulin lispro (ADMELOG) corrective regimen sliding scale   SubCutaneous every 6 hours  isosorbide   mononitrate ER Tablet (IMDUR) 30 milliGRAM(s) Oral daily  levothyroxine 75 MICROGram(s) Oral daily  valproate sodium IVPB 500 milliGRAM(s) IV Intermittent every 12 hours    MEDICATIONS  (PRN):  ALBUTerol    90 MICROgram(s) HFA Inhaler 2 Puff(s) Inhalation every 6 hours PRN Shortness of Breath and/or Wheezing  dextrose Oral Gel 15 Gram(s) Oral once PRN Blood Glucose LESS THAN 70 milliGRAM(s)/deciliter  ondansetron Injectable 4 milliGRAM(s) IV Push every 8 hours PRN Nausea and/or Vomiting      Allergies    No Known Allergies    Intolerances        REVIEW OF SYSTEMS: Negative except as per HPI.    VITAL SIGNS:   Vital Signs Last 24 Hrs  T(C): 37.8 (30 Mar 2022 09:23), Max: 37.8 (30 Mar 2022 09:23)  T(F): 100 (30 Mar 2022 09:23), Max: 100 (30 Mar 2022 09:23)  HR: 78 (30 Mar 2022 08:19) (78 - 109)  BP: 115/60 (30 Mar 2022 07:36) (115/60 - 176/82)  BP(mean): --  RR: 20 (30 Mar 2022 07:36) (20 - 29)  SpO2: 100% (30 Mar 2022 08:19) (94% - 100%)    I&O's Summary      PHYSICAL EXAM:  Constitutional: no distress, on BIPAP now   HEENT NC/AT, moist mucous membranes  Pulmonary: Breath sounds bilaterally with few scattered rhonchi, diminished in lower lung fields, few fine bibasilar crackles, without wheezing, or rubs.  Cardiovascular: +S1, S2, +systolic murmur  Gastrointestinal: Soft, nontender, nondistended, normoactive bowel sounds  Extremities: No peripheral edema   Neurological: lethargic (s/p ativan) Responsive to verbal  stimuli.  Skin: No obvious lesions/rashes      LABS: All Labs Reviewed:                        10.8   20.24 )-----------( 210      ( 30 Mar 2022 06:12 )             34.1     30 Mar 2022 06:12    139    |  108    |  33     ----------------------------<  375    4.8     |  24     |  2.30     Ca    8.5        30 Mar 2022 06:12    TPro  7.0    /  Alb  3.2    /  TBili  1.1    /  DBili  x      /  AST  53     /  ALT  48     /  AlkPhos  104    30 Mar 2022 06:12    PT/INR - ( 30 Mar 2022 06:43 )   PT: 15.2 sec;   INR: 1.30 ratio         PTT - ( 30 Mar 2022 06:43 )  PTT:25.8 sec  CARDIAC MARKERS ( 30 Mar 2022 08:13 )  x     / x     / x     / x     / 13.1 ng/mL  CARDIAC MARKERS ( 30 Mar 2022 07:53 )  x     / x     / 328 U/L / x     / x          Blood Culture:   03-30 @ 06:12  Pro Bnp [id number]        EKG: sinus tachy 103pbm QT/QTc 382/500    RADIOLOGY:  < from: CT Brain Stroke Protocol (03.30.22 @ 07:07) >  IMPRESSION: No intracranial bleed, mass effect or acute territorial   infarct demonstrated.    Dr. Francis notified Dr. Pittman on 3/30/2022 at 7:14 AM Eastern   standard time with read back.    --- End of Report ---      MARZENA FRANCIS MD; Attending Radiologist  This document has been electronically signed. Mar 30 2022  7:15AM    < end of copied text >  < from: Xray Chest 1 View- PORTABLE-Urgent (03.30.22 @ 06:03) >    IMPRESSION: Fairly advanced diffuse bilateral scattered infiltrates.    --- End of Report ---      HAYLEY ALBERTO MD; Attending Radiologist  This document has been electronically signed. Mar 30 2022 10:08AM    < end of copied text >     [facility]         Rianna Horn, Nidhi, Selma, Syd, Vlad, Rebeca        [phone] (office)    Reason for Consult: shortness of breath ,HTN emergency    PAST Cardiac HISTORY:   Patient was seen by Dr Garcia on 02/10/22. Multiple caths, last in 2008, showing non obstructive disease and LVEF 43%. Patient had AICD placed in 2005 after having syncope and inducible on EPS, which was replaced in 2017 for GRISEL. Patient also had B/L carotid artery disease with  stenosis bilaterally. This progressed and he received R carotid endarterectomy in 2020. He developed orthostatic hypotension and was started on midodrine. In jan 2022, he had a rectal bleed and received 2 units of blood.   EKG at the office: NSR with LBBB. Slater LAFB. ST segment changes secondary to bundle branch block , no evidence of arrhythmias. 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 Patient had episode of severe SOB, was on the floor for ~2 hours. Patient was unable to rise from the floor by himself due to his left arm weakness. When first brought to the ED, patient was hypoxic and hypertensive o2 saturation in EMS was >80%. While in the ED, patient had episode of siezure-like activity in his b/l upper extremities which was controlled w/ ativan. Patient was seen and examined at bedside, BiPAP still in place, patient was still mildly lethargic from ativan and could not speak well with bipap mask on. Patient able to open eyes and nod/shake head to questions. Patient denied any headache, vision changes, cp, abd pain, msk pain. Patient still short of breath.        In ED:   Vitals: HR 80, 176/82 -> 115/60, RR 20, 96% on BiPAP/CPAP  Labs significant for: WBC 20.24, H/H 10.8/24.1, D-Dimer 2924, Troponin 1011.9 > 5252.0, CKMB: 13.1, CPK%: 4%, BUN 33, Creatinine 2.3 (baseline unknown) eGFR 29, serum pro BNP 4963, creatine kinase 328, POCT glucose: 353  Imaging:   CXR: BL opacification as per personal read, official read pending   CT brain stroke: No acute hemorrhage, infarct, or mass effect   EKG:   Received Lasix 40mg IVP x1, Aspirin 600mg x1, Hydralazine 10mg x1, Ativan 2mg IVP x1, Lispro 6u in the ED    (30 Mar 2022 08:48)      PAST MEDICAL & SURGICAL HISTORY:  MI (myocardial infarction)  1992    HTN (hypertension)    HLD (hyperlipidemia)    Throat cancer  2011, treated with chemo, RT    Ventricular arrhythmia  s/p AICD    Diabetes  Type2, not on any meds since weight loss after throat Ca    Renal insufficiency  s/p chemo    CAD (coronary artery disease)    Inguinal hernia, left    H/O prior ablation treatment  VT, 2009    Cardiac defibrillator in place  - 2009, battery change 2017    S/P left inguinal hernia repair  2018 at [place]        SOCIAL HISTORY: No active tobacco, alcohol or illicit drug use    FAMILY HISTORY:  Family history of cancer (Sibling)        Home Medications:  benazepril 10 mg oral tablet: 1 tab(s) orally once a day (30 Mar 2022 10:22)  calcitriol 0.25 mcg oral capsule: 1 cap(s) orally once a day (30 Mar 2022 10:22)  carvedilol 12.5 mg oral tablet: 1 tab(s) orally 2 times a day      *Last filled 5/21, spoke to MATTHEW Nguyen at Dr. Garcia&#x27;s office, she states patient should still be taking med as per MD notes from 2/22*  (30 Mar 2022 10:22)  hydrALAZINE 10 mg oral tablet: 0.5 tab(s) orally 3 times a day (30 Mar 2022 10:22)  isosorbide mononitrate 30 mg oral tablet, extended release: 1 tab(s) orally once a day (in the morning) (30 Mar 2022 10:22)  levothyroxine 75 mcg (0.075 mg) oral tablet: 1 tab(s) orally once a day (30 Mar 2022 10:22)  midodrine 10 mg oral tablet: 1 tab(s) orally 3 times a day (30 Mar 2022 10:22)  Plavix 75 mg oral tablet: 1 tab(s) orally once a day (30 Mar 2022 10:22)  pravastatin 40 mg oral tablet: 1 tab(s) orally once a day (30 Mar 2022 10:22)      MEDICATIONS  (STANDING):  atorvastatin 10 milliGRAM(s) Oral at bedtime  calcitriol   Capsule 0.25 MICROGram(s) Oral daily  carvedilol 12.5 milliGRAM(s) Oral every 12 hours  clopidogrel Tablet 75 milliGRAM(s) Oral daily  dextrose 5%. 1000 milliLiter(s) (50 mL/Hr) IV Continuous <Continuous>  dextrose 5%. 1000 milliLiter(s) (100 mL/Hr) IV Continuous <Continuous>  dextrose 50% Injectable 25 Gram(s) IV Push once  dextrose 50% Injectable 12.5 Gram(s) IV Push once  dextrose 50% Injectable 25 Gram(s) IV Push once  glucagon  Injectable 1 milliGRAM(s) IntraMuscular once  hydrALAZINE 5 milliGRAM(s) Oral three times a day  insulin lispro (ADMELOG) corrective regimen sliding scale   SubCutaneous every 6 hours  isosorbide   mononitrate ER Tablet (IMDUR) 30 milliGRAM(s) Oral daily  levothyroxine 75 MICROGram(s) Oral daily  valproate sodium IVPB 500 milliGRAM(s) IV Intermittent every 12 hours    MEDICATIONS  (PRN):  ALBUTerol    90 MICROgram(s) HFA Inhaler 2 Puff(s) Inhalation every 6 hours PRN Shortness of Breath and/or Wheezing  dextrose Oral Gel 15 Gram(s) Oral once PRN Blood Glucose LESS THAN 70 milliGRAM(s)/deciliter  ondansetron Injectable 4 milliGRAM(s) IV Push every 8 hours PRN Nausea and/or Vomiting      Allergies    No Known Allergies    Intolerances        REVIEW OF SYSTEMS: Negative except as per HPI.    VITAL SIGNS:   Vital Signs Last 24 Hrs  T(C): 37.8 (30 Mar 2022 09:23), Max: 37.8 (30 Mar 2022 09:23)  T(F): 100 (30 Mar 2022 09:23), Max: 100 (30 Mar 2022 09:23)  HR: 78 (30 Mar 2022 08:19) (78 - 109)  BP: 115/60 (30 Mar 2022 07:36) (115/60 - 176/82)  BP(mean): --  RR: 20 (30 Mar 2022 07:36) (20 - 29)  SpO2: 100% (30 Mar 2022 08:19) (94% - 100%)    I&O's Summary      PHYSICAL EXAM:  Constitutional: no distress, on BIPAP now   HEENT NC/AT, moist mucous membranes  Pulmonary: Breath sounds bilaterally with few scattered rhonchi, diminished in lower lung fields, few fine bibasilar crackles, without wheezing, or rubs.  Cardiovascular: +S1, S2, +systolic murmur  Gastrointestinal: Soft, nontender, nondistended, normoactive bowel sounds  Extremities: No peripheral edema   Neurological: lethargic (s/p ativan) Responsive to verbal  stimuli.  Skin: No obvious lesions/rashes      LABS: All Labs Reviewed:                        10.8   20.24 )-----------( 210      ( 30 Mar 2022 06:12 )             34.1     30 Mar 2022 06:12    139    |  108    |  33     ----------------------------<  375    4.8     |  24     |  2.30     Ca    8.5        30 Mar 2022 06:12    TPro  7.0    /  Alb  3.2    /  TBili  1.1    /  DBili  x      /  AST  53     /  ALT  48     /  AlkPhos  104    30 Mar 2022 06:12    PT/INR - ( 30 Mar 2022 06:43 )   PT: 15.2 sec;   INR: 1.30 ratio         PTT - ( 30 Mar 2022 06:43 )  PTT:25.8 sec  CARDIAC MARKERS ( 30 Mar 2022 08:13 )  x     / x     / x     / x     / 13.1 ng/mL  CARDIAC MARKERS ( 30 Mar 2022 07:53 )  x     / x     / 328 U/L / x     / x          Blood Culture:   03-30 @ 06:12  Pro Bnp [id number]        EKG: sinus tachy 103pbm QT/QTc 382/500    RADIOLOGY:  < from: CT Brain Stroke Protocol (03.30.22 @ 07:07) >  IMPRESSION: No intracranial bleed, mass effect or acute territorial   infarct demonstrated.    Dr. Francis notified Dr. Pittman on 3/30/2022 at 7:14 AM Eastern   standard time with read back.    --- End of Report ---      MARZENA FRANCIS MD; Attending Radiologist  This document has been electronically signed. Mar 30 2022  7:15AM    < end of copied text >  < from: Xray Chest 1 View- PORTABLE-Urgent (03.30.22 @ 06:03) >    IMPRESSION: Fairly advanced diffuse bilateral scattered infiltrates.    --- End of Report ---      HAYLEY ALBERTO MD; Attending Radiologist  This document has been electronically signed. Mar 30 2022 10:08AM    < end of copied text >

## 2022-03-30 NOTE — SWALLOW BEDSIDE ASSESSMENT ADULT - ASR SWALLOW RECOMMEND DIAG
to assess safest diet level given hx of tonsillar CA/VFSS/MBS to assess safest diet level given hx of tonsillar CA and dysphagia/VFSS/MBS

## 2022-03-30 NOTE — SWALLOW BEDSIDE ASSESSMENT ADULT - ADDITIONAL RECOMMENDATIONS
Post-Op Assessment Note    CV Status:  Stable  Pain Score: 0    Pain management: adequate     Mental Status:  Alert and awake   Hydration Status:  Euvolemic   PONV Controlled:  Controlled   Airway Patency:  Patent      Post Op Vitals Reviewed: Yes      Staff: CRNA         No complications documented      BP   150/71   Temp   96 9   Pulse  85   Resp   20   SpO2   96%
This department will continue to follow the patient for diet tolerance/advancement during this admission, as schedule permits.

## 2022-03-30 NOTE — H&P ADULT - CRANIAL NERVE
Unable to fully assess 2/2 to patients clinical condition   CN II, III. IV, V, VI, IX, X, XI in tact CN II, III. IV, V, VI, VII, VIII, IX, X, XI in tact, patient w/ left sided tongue deviation

## 2022-03-30 NOTE — H&P ADULT - PROBLEM SELECTOR PLAN 6
Patient w/ left upper extremity weakness of unknown duration  - patient w/ siezure like activity in ED s/p ativan  - CT brain stroke negative for acute hemorrhage or infarct    - f/u AM TSH   - Neuro Bhachawat consulted, f/u recs Patient w/ left upper extremity weakness of unknown duration  - patient w/ siezure like activity in ED s/p ativan  - started on valproate by neuro  - CT brain stroke negative for acute hemorrhage or infarct, repeat CT in the AM  - f/u AM TSH   - Neuro Axelt consulted, f/u recs Patient w/ left upper extremity weakness of unknown duration, 2/2 to possible CVA   - patient w/ siezure like activity in ED s/p ativan  - started on valproate by neuro  - CT brain stroke negative for acute hemorrhage or infarct, repeat CT in the AM  - f/u AM TSH   - Neuro Axelt consulted, f/u recs Chronic, stable -CKD 3   - patient w/ admission Cr 2.3, baseline based off of outpatient records 2.0-2.1   - GFR 29   - avoid nephrotoxic medications, hold patients home benazepril   - f/u urine lytes ,BMP in AM , continue IV lasix   - Nephro consulted, f/u recs Dr Porter

## 2022-03-30 NOTE — CONSULT NOTE ADULT - ASSESSMENT
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    Acute hypoxic resp failure, acute decompensated heart failure, leukocytosis  likely 2/2 flash pulmonary edema, acute decompensated heart failure  s/p CXR- diffuse opacifications b/l  s/p VQ scan- very low probability of PE  patient w/o fever, chills, productive cough, pleurisy  leukocytosis may be reactive  procal elevated however, in the setting of real insufficiency  trend wbc  would obtain CXR or CT chest following diuresis to assess for improvement (should improved quickly if pulmonary edema)  monitor off antibiotics at this time    Rich Patten M.D.  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    Acute hypoxic resp failure, acute decompensated heart failure, leukocytosis  likely 2/2 flash pulmonary edema, acute decompensated heart failure  s/p CXR- diffuse opacifications b/l  s/p VQ scan- very low probability of PE  patient w/o fever, chills, productive cough, pleurisy  leukocytosis may be reactive; ?cardiac  procal elevated however, in the setting of real insufficiency  trend wbc  f/u cardiology, neurology recs  would obtain CXR or CT chest following diuresis to assess for improvement (should improved quickly if pulmonary edema)  monitor off antibiotics at this time    Rich Patten M.D.  [facility], Division of Infectious Diseases  [phone]  After 5pm on weekdays and all day on weekends - please call [phone]

## 2022-03-30 NOTE — CONSULT NOTE ADULT - SUBJECTIVE AND OBJECTIVE BOX
CHIEF COMPLAINT:    HPI:  76 yr old male with PMHx HFrEF (30% 1/2009), hx of Vtach s/p ablation, AICD placement (2009), HTN, HLD, throat Ca 2011 s/p RTx and chemo, DM2, s/p Rt CEA (2020)            PAST MEDICAL & SURGICAL HISTORY:  MI (myocardial infarction)  1992    HTN (hypertension)    HLD (hyperlipidemia)    Throat cancer  2011, treated with chemo, RT    Ventricular arrhythmia  s/p AICD    Diabetes  Type2, not on any meds since weight loss after throat Ca    Renal insufficiency  s/p chemo    CAD (coronary artery disease)    Inguinal hernia, left    H/O prior ablation treatment  VT, 2009    Cardiac defibrillator in place  - 2009, battery change 2017    S/P left inguinal hernia repair  2018 at [place]        FAMILY HISTORY:  Family history of cancer (Sibling)        SOCIAL HISTORY:  Smoking: [ ] Never Smoked [ ] Former Smoker (__ packs x ___ years) [ ] Current Smoker  (__ packs x ___ years)  Substance Use: [ ] Never Used [ ] Used ____  EtOH Use:  Marital Status: [ ] Single [ ]  [ ]  [ ]   Sexual History:   Occupation:  Recent Travel:  Country of Birth:  Advance Directives:    Allergies    No Known Allergies    Intolerances        HOME MEDICATIONS:    REVIEW OF SYSTEMS:            OBJECTIVE:  ICU Vital Signs Last 24 Hrs  T(C): 36.7 (30 Mar 2022 05:44), Max: 36.7 (30 Mar 2022 05:44)  T(F): 98.1 (30 Mar 2022 05:44), Max: 98.1 (30 Mar 2022 05:44)  HR: 92 (30 Mar 2022 07:08) (91 - 109)  BP: 176/82 (30 Mar 2022 06:20) (172/105 - 176/82)  BP(mean): --  ABP: --  ABP(mean): --  RR: 24 (30 Mar 2022 06:20) (24 - 29)  SpO2: 99% (30 Mar 2022 07:08) (94% - 100%)        CAPILLARY BLOOD GLUCOSE          PHYSICAL EXAM:        HOSPITAL MEDICATIONS:  MEDICATIONS  (STANDING):  aspirin  chewable 324 milliGRAM(s) Oral once    MEDICATIONS  (PRN):  morphine  - Injectable 2 milliGRAM(s) IV Push every 5 minutes PRN Chest Pain      LABS:                        10.8   20.24 )-----------( 210      ( 30 Mar 2022 06:12 )             34.1     Hgb Trend: 10.8<--  03-30    139  |  108  |  33<H>  ----------------------------<  375<H>  4.8   |  24  |  2.30<H>    Ca    8.5      30 Mar 2022 06:12    TPro  7.0  /  Alb  3.2<L>  /  TBili  1.1  /  DBili  x   /  AST  53<H>  /  ALT  48  /  AlkPhos  104  03-30    Creatinine Trend: 2.30<--  PT/INR - ( 30 Mar 2022 06:43 )   PT: 15.2 sec;   INR: 1.30 ratio         PTT - ( 30 Mar 2022 06:43 )  PTT:25.8 sec          MICROBIOLOGY:     RADIOLOGY:  [ ] Reviewed and interpreted by me    EKG:        [facility] ANP-BC (ext. [id number])           CHIEF COMPLAINT:    HPI:  76 yr old male with PMHx HFrEF (30% 1/2009), hx of Vtach s/p ablation, AICD placement (2009), HTN, HLD, Rt tonsillar Ca s/p RTx/chemo  s/p Left tonsillectomy 2011, s/p left partial tongue resection 2/2019 DM2, s/p Rt CEA (2020), Hypothyroidism who presented to E.D. via EMS this a.m. (3/30/22) after being found on floor in Den at 0430 a.m. by wife who last saw pt resting on couch at 2100 evening of 3/29/22 in usual state of health. EMS found pt hypertensive SBP of 170's, hypoxic with SPO2 80's, with weakness of left upper extremity.      In E.D. pt found to be hypertensive with EFA605/105, tachypneic with SPO2 100% on bipap therapy. Pt also found to have leukocytosis of20.2, bands of 1%, hyperglycemic with POC glucose of 375, hsTrop 1012, D-dimer 2924, BNP 4963. CXR demonstrating bilat opacification, Pt received CT head that did not demonstrate small chronic infarction of Rt cerebellum however without acute changes. Pt received lasix 40mg, hydralazine 10 mg, NTP 1" to chest wall,  mg, lispro 10 units subq with improvement fo /82. This short E.D. course c/b development of left then Rt arm clonic type motions without LOC, pt received ativan 2 mg IVP for suspected sz activity.        Consult called for hypoxic resp failure, hypertensive emergency secondary to r/o cva            PAST MEDICAL & SURGICAL HISTORY:  MI (myocardial infarction)  1992    HTN (hypertension)    HLD (hyperlipidemia)    Throat cancer  2011, treated with chemo, RT    Ventricular arrhythmia  s/p AICD    Diabetes  Type2, not on any meds since weight loss after throat Ca    Renal insufficiency  s/p chemo    CAD (coronary artery disease)    Inguinal hernia, left    H/O prior ablation treatment  VT, 2009    Cardiac defibrillator in place  - 2009, battery change 2017    S/P left inguinal hernia repair  2018 at [place]        FAMILY HISTORY:  Family history of cancer (Sibling)        SOCIAL HISTORY:  Smoking: [ ] Never Smoked [ ] Former Smoker (__ packs x ___ years) [ ] Current Smoker  (__ packs x ___ years)  Substance Use: [ ] Never Used [ ] Used ____  EtOH Use:  Marital Status: [ ] Single [ ]  [ ]  [ ]   Sexual History:   Occupation:  Recent Travel:  Country of Birth:  Advance Directives:    Allergies    No Known Allergies    Intolerances        HOME MEDICATIONS:    REVIEW OF SYSTEMS:            OBJECTIVE:  ICU Vital Signs Last 24 Hrs  T(C): 36.7 (30 Mar 2022 05:44), Max: 36.7 (30 Mar 2022 05:44)  T(F): 98.1 (30 Mar 2022 05:44), Max: 98.1 (30 Mar 2022 05:44)  HR: 92 (30 Mar 2022 07:08) (91 - 109)  BP: 176/82 (30 Mar 2022 06:20) (172/105 - 176/82)  BP(mean): --  ABP: --  ABP(mean): --  RR: 24 (30 Mar 2022 06:20) (24 - 29)  SpO2: 99% (30 Mar 2022 07:08) (94% - 100%)        CAPILLARY BLOOD GLUCOSE          PHYSICAL EXAM:        HOSPITAL MEDICATIONS:  MEDICATIONS  (STANDING):  aspirin  chewable 324 milliGRAM(s) Oral once    MEDICATIONS  (PRN):  morphine  - Injectable 2 milliGRAM(s) IV Push every 5 minutes PRN Chest Pain      LABS:                        10.8   20.24 )-----------( 210      ( 30 Mar 2022 06:12 )             34.1     Hgb Trend: 10.8<--  03-30    139  |  108  |  33<H>  ----------------------------<  375<H>  4.8   |  24  |  2.30<H>    Ca    8.5      30 Mar 2022 06:12    TPro  7.0  /  Alb  3.2<L>  /  TBili  1.1  /  DBili  x   /  AST  53<H>  /  ALT  48  /  AlkPhos  104  03-30    Creatinine Trend: 2.30<--  PT/INR - ( 30 Mar 2022 06:43 )   PT: 15.2 sec;   INR: 1.30 ratio         PTT - ( 30 Mar 2022 06:43 )  PTT:25.8 sec          MICROBIOLOGY:     RADIOLOGY:  [ ] Reviewed and interpreted by me    EKG:        [facility] ANP-BC (ext. [id number])           CHIEF COMPLAINT:    HPI:  76 yr old male with PMHx HFrEF (30% 1/2009), hx of Vtach s/p ablation, AICD placement (2009), HTN, HLD, Rt tonsillar Ca s/p RTx/chemo  s/p Left tonsillectomy 2011, s/p left partial tongue resection 2/2019 DM2, s/p Rt CEA (2020), Hypothyroidism who presented to E.D. via EMS this a.m. (3/30/22) after being found on floor in Den at 0430 a.m. by wife who last saw pt resting on couch at 2100 evening of 3/29/22 in usual state of health. EMS found pt hypertensive SBP of 170's, hypoxic with SPO2 80's, with weakness of left upper extremity.      In E.D. pt found to be hypertensive with MNC982/105, tachypneic with SPO2 100% on bipap therapy. Pt also found to have leukocytosis of20.2, bands of 1%, hyperglycemic with POC glucose of 375, hsTrop 1012, D-dimer 2924, BNP 4963. CXR demonstrating bilat opacification, Pt received CT head that did not demonstrate small chronic infarction of Rt cerebellum however without acute changes. Pt received lasix 40mg, hydralazine 10 mg, NTP 1" to chest wall,  mg, lispro 10 units subq with improvement fo /82. This short E.D. course c/b development of left then Rt arm clonic type motions without LOC, pt received ativan 2 mg IVP for suspected sz activity.        Consult called for hypoxic resp failure, hypertensive emergency     As pt with improved vital signs, oxygenation on Bipap therapy, Ox3 currently without need for ICU admission. Please re consult if we can be of further assistance with this pt        PAST MEDICAL & SURGICAL HISTORY:  MI (myocardial infarction)  1992    HTN (hypertension)    HLD (hyperlipidemia)    Throat cancer  2011, treated with chemo, RT    Ventricular arrhythmia  s/p AICD    Diabetes  Type2, not on any meds since weight loss after throat Ca    Renal insufficiency  s/p chemo    CAD (coronary artery disease)    Inguinal hernia, left    H/O prior ablation treatment  VT, 2009    Cardiac defibrillator in place  - 2009, battery change 2017    S/P left inguinal hernia repair  2018 at [place]        FAMILY HISTORY:  Family history of cancer (Sibling)        SOCIAL HISTORY:  Smoking: [ ] Never Smoked [ ] Former Smoker (__ packs x ___ years) [ ] Current Smoker  (__ packs x ___ years)  Substance Use: [ ] Never Used [ ] Used ____  EtOH Use:  Marital Status: [ ] Single [ ]  [ ]  [ ]   Sexual History:   Occupation:  Recent Travel:  Country of Birth:  Advance Directives:    Allergies    No Known Allergies    Intolerances        HOME MEDICATIONS:    REVIEW OF SYSTEMS:            OBJECTIVE:  ICU Vital Signs Last 24 Hrs  T(C): 36.7 (30 Mar 2022 05:44), Max: 36.7 (30 Mar 2022 05:44)  T(F): 98.1 (30 Mar 2022 05:44), Max: 98.1 (30 Mar 2022 05:44)  HR: 92 (30 Mar 2022 07:08) (91 - 109)  BP: 176/82 (30 Mar 2022 06:20) (172/105 - 176/82)  BP(mean): --  ABP: --  ABP(mean): --  RR: 24 (30 Mar 2022 06:20) (24 - 29)  SpO2: 99% (30 Mar 2022 07:08) (94% - 100%)        CAPILLARY BLOOD GLUCOSE          PHYSICAL EXAM:        HOSPITAL MEDICATIONS:  MEDICATIONS  (STANDING):  aspirin  chewable 324 milliGRAM(s) Oral once    MEDICATIONS  (PRN):  morphine  - Injectable 2 milliGRAM(s) IV Push every 5 minutes PRN Chest Pain      LABS:                        10.8   20.24 )-----------( 210      ( 30 Mar 2022 06:12 )             34.1     Hgb Trend: 10.8<--  03-30    139  |  108  |  33<H>  ----------------------------<  375<H>  4.8   |  24  |  2.30<H>    Ca    8.5      30 Mar 2022 06:12    TPro  7.0  /  Alb  3.2<L>  /  TBili  1.1  /  DBili  x   /  AST  53<H>  /  ALT  48  /  AlkPhos  104  03-30    Creatinine Trend: 2.30<--  PT/INR - ( 30 Mar 2022 06:43 )   PT: 15.2 sec;   INR: 1.30 ratio         PTT - ( 30 Mar 2022 06:43 )  PTT:25.8 sec          MICROBIOLOGY:     RADIOLOGY:  [ ] Reviewed and interpreted by me    EKG:        [facility] ANP-BC (ext. [id number])           CHIEF COMPLAINT:    HPI:  76 yr old male with PMHx HFrEF (30% 1/2009), hx of Vtach s/p ablation, AICD placement (2009), HTN, HLD, Rt tonsillar Ca s/p RTx/chemo  s/p Left tonsillectomy 2011, s/p left partial tongue resection 2/2019 DM2, s/p Rt CEA (2020), Hypothyroidism who presented to E.D. via EMS this a.m. (3/30/22) after being found on floor in Den at 0430 a.m. by wife who last saw pt resting on couch at 2100 evening of 3/29/22 in usual state of health. EMS found pt hypertensive SBP of 170's, hypoxic with SPO2 80's, with weakness of left upper extremity.      In E.D. pt found to be hypertensive with DOM942/105, tachypneic with SPO2 100% on bipap therapy. Pt also found to have leukocytosis of20.2, bands of 1%, hyperglycemic with POC glucose of 375, hsTrop 1012, D-dimer 2924, BNP 4963. CXR demonstrating bilat opacification, Pt received CT head that did not demonstrate small chronic infarction of Rt cerebellum however without acute changes. Pt received lasix 40mg, hydralazine 10 mg, NTP 1" to chest wall,  mg, lispro 10 units subq with improvement fo /82. This short E.D. course c/b development of left then Rt arm clonic type motions without LOC, pt received ativan 2 mg IVP for suspected sz activity.        Consult called for hypoxic resp failure, hypertensive emergency     As pt with improved vital signs, oxygenation on Bipap therapy, Ox3 currently without need for ICU admission. Please re consult if we can be of further assistance with this pt        PAST MEDICAL & SURGICAL HISTORY:  MI (myocardial infarction)  1992    HTN (hypertension)    HLD (hyperlipidemia)    Throat cancer  2011, treated with chemo, RT    Ventricular arrhythmia  s/p AICD    Diabetes  Type2, not on any meds since weight loss after throat Ca    Renal insufficiency  s/p chemo    CAD (coronary artery disease)    Inguinal hernia, left    H/O prior ablation treatment  VT, 2009    Cardiac defibrillator in place  - 2009, battery change 2017    S/P left inguinal hernia repair  2018 at [place]        FAMILY HISTORY:  Family history of cancer (Sibling)        SOCIAL HISTORY:  Smoking: [ ] Never Smoked [ ] Former Smoker (__ packs x ___ years) [ ] Current Smoker  (__ packs x ___ years)  Substance Use: [ ] Never Used [ ] Used ____  EtOH Use:  Marital Status: [ ] Single [ ]  [ ]  [ ]   Sexual History:   Occupation:  Recent Travel:  Country of Birth:  Advance Directives:    Allergies    No Known Allergies    Intolerances        HOME MEDICATIONS:    REVIEW OF SYSTEMS:  CONSTITUTIONAL:           + LUE weakness, without fevers or chills  EYES/ENT:           No visual changes;  No vertigo or throat pain   NECK:            No pain or stiffness  RESPIRATORY:            No cough, wheezing, hemoptysis; + shortness of breath  CARDIOVASCULAR:            No chest pain or palpitations  GASTROINTESTINAL:           No abdominal or epigastric pain. No nausea, vomiting, or hematemesis; No diarrhea or constipation. No melena or hematochezia.  GENITOURINARY:            No dysuria, frequency or hematuria  NEUROLOGICAL:            No numbness or weakness  SKIN:            No pruritis , rashes            OBJECTIVE:  ICU Vital Signs Last 24 Hrs  T(C): 36.7 (30 Mar 2022 05:44), Max: 36.7 (30 Mar 2022 05:44)  T(F): 98.1 (30 Mar 2022 05:44), Max: 98.1 (30 Mar 2022 05:44)  HR: 92 (30 Mar 2022 07:08) (91 - 109)  BP: 176/82 (30 Mar 2022 06:20) (172/105 - 176/82)  BP(mean): --  ABP: --  ABP(mean): --  RR: 24 (30 Mar 2022 06:20) (24 - 29)  SpO2: 99% (30 Mar 2022 07:08) (94% - 100%)        CAPILLARY BLOOD GLUCOSE    VITAL SIGNS AT TIME OF CONSULT  BP: 117/67   ; HR: 80 (NSR)  ; RR: 24  ; SPO2: 98% (Bipap 12/6 FIO2 40% - SpVt 450-470cc's)   ; Temp: 36.7         PHYSICAL EXAM:  GENERAL:   NAD, well-groomed, well-developed    HEAD:    Atraumatic, Normocephalic    EYES:   EOMI, PERRLA 3 mm, conjunctiva and sclera clear    ENMT:   No oropharyngeal exudates, erythema or lesions,  Moist mucous membranes    NECK:   Supple, no cervical lymphadenopathy, no JVD    NERVOUS SYSTEM:  lethargic (s/p ativan) Responsive to verbal  stimuli, focuses upon examiner, answers simple questions yes and no  ,follows simple commands to demonstrate extremities and digits, Oriented X3, CN II-XII intact, has spontaneous purposeful movement of all extremities; Rt Upper extremity  4/5, LUE 2-3/5, RLE 4/5, LLE 4/5, + facial symmetry full sensation to light touch     CHEST/LUNG:   Utilizing Bipap 12/6 40%, SpVt 450-470, RR 24  .Breath sounds bilaterally with few scattered rhonchi, diminished in lower lung fields, few fine bibasilar crackles, without wheezing, or rubs. POCUS A line anterior with focal B lines Lt >Rt, bilateral mod pleural effusiions Rt > Lt    HEART:   cardiac monitor NSR without ectopy   ; S1/S2 I/VI sys murmurs, without rubs, or gallops, POCUS diminished LVFx, RV < LV, VTI 13, VTI 1.9    ABDOMEN:   Soft, Nontender, Nondistended; Bowel sounds present, Bladder non distended, non palpable    EXTREMITIES:   Pulses palpable radial pulses 2+ bilat, DP/PT 2+/1+ bilat, without clubbing, cyanosis. Digits warm to touch with good cap refill < 3 secs    SKIN:   warm, dry, intact, normal color, no rash or abnormal lesions, without palpable nodes      HOSPITAL MEDICATIONS:  MEDICATIONS  (STANDING):  aspirin  chewable 324 milliGRAM(s) Oral once    MEDICATIONS  (PRN):  morphine  - Injectable 2 milliGRAM(s) IV Push every 5 minutes PRN Chest Pain      LABS:                        10.8   20.24 )-----------( 210      ( 30 Mar 2022 06:12 )             34.1     Hgb Trend: 10.8<--  03-30    139  |  108  |  33<H>  ----------------------------<  375<H>  4.8   |  24  |  2.30<H>    Ca    8.5      30 Mar 2022 06:12    TPro  7.0  /  Alb  3.2<L>  /  TBili  1.1  /  DBili  x   /  AST  53<H>  /  ALT  48  /  AlkPhos  104  03-30    Creatinine Trend: 2.30<--  PT/INR - ( 30 Mar 2022 06:43 )   PT: 15.2 sec;   INR: 1.30 ratio         PTT - ( 30 Mar 2022 06:43 )  PTT:25.8 sec          MICROBIOLOGY:     RADIOLOGY:  [ ] Reviewed and interpreted by me  ACC: [id number] EXAM:  CT BRAIN STROKE PROTOCOL                          PROCEDURE DATE:  03/30/2022          INTERPRETATION:  CLINICAL INFORMATION:  Stroke Code    TECHNIQUE:  Axial CT images were acquired through the head.  Intravenous contrast: None  Two-dimensional reformats were generated.  Rapid AI was utilized for preliminary assessment of intracranial   hemorrhage.    COMPARISON STUDY: None    FINDINGS:    The ventricles and cortical sulci are prominent, consistent with mild   diffuse cerebral volume loss. Very mild periventricular white matter   small vessel ischemic disease seen. 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 No intracranial bleed, extra-axial fluid   collection, mass effect, midline shift, hydrocephalus, acute territorial   infarct or depressed skull fracture evident. Imaged orbits demonstrate   thin bilateral ocular lenses. Imaged paranasal sinuses demonstrate mild   mucosal thickening of the right inferior maxillary sinus. Imaged mastoid   air cells are clear. There is calcific atherosclerosis of bilateral   cavernous ICAs.    IMPRESSION: No intracranial bleed, mass effect or acute territorial   infarct demonstrated.    Dr. Haddad notified Dr. Pittman on 3/30/2022 at 7:14 AM Eastern   standard time with read back.    --- End of Report ---  EKG:        [facility] (ext. 1152)           CHIEF COMPLAINT:    HPI:  76 yr old male with PMHx HFrEF (30% 1/2009), hx of Vtach s/p ablation, AICD placement (2009), HTN, HLD, Rt tonsillar Ca s/p RTx/chemo  s/p Left tonsillectomy 2011, s/p left partial tongue resection 2/2019 DM2, s/p Rt CEA (2020), Hypothyroidism, recent hx of rectal bleeding, no source found 1/2022 who presented to E.D. via EMS this a.m. (3/30/22) after being found on floor in Den at 0430 a.m. by wife who last saw pt resting on couch at 2100 evening of 3/29/22 in usual state of health. EMS found pt hypertensive SBP of 170's, hypoxic with SPO2 80's, with weakness of left upper extremity.      In E.D. pt found to be hypertensive with FVD003/105, tachypneic with SPO2 100% on bipap therapy. Pt also found to have leukocytosis of20.2, bands of 1%, hyperglycemic with POC glucose of 375, hsTrop 1012, D-dimer 2924, BNP 4963. CXR demonstrating bilat opacification, Pt received CT head that did not demonstrate small chronic infarction of Rt cerebellum however without acute changes. Pt received lasix 40mg, hydralazine 10 mg, NTP 1" to chest wall,  mg, lispro 10 units subq with improvement fo /82. This short E.D. course c/b development of left then Rt arm clonic type motions without LOC, pt received ativan 2 mg IVP for suspected sz activity.        Consult called for hypoxic resp failure, hypertensive emergency     As pt with improved vital signs, oxygenation on Bipap therapy, Ox3 currently without need for ICU admission. Please re consult if we can be of further assistance with this pt        PAST MEDICAL & SURGICAL HISTORY:  MI (myocardial infarction)  1992    HTN (hypertension)    HLD (hyperlipidemia)    Throat cancer  2011, treated with chemo, RT    Ventricular arrhythmia  s/p AICD    Diabetes  Type2, not on any meds since weight loss after throat Ca    Renal insufficiency  s/p chemo    CAD (coronary artery disease)    Inguinal hernia, left    H/O prior ablation treatment  VT, 2009    Cardiac defibrillator in place  - 2009, battery change 2017    S/P left inguinal hernia repair  2018 at [place]        FAMILY HISTORY:  Family history of cancer (Sibling)        SOCIAL HISTORY:  Smoking: [ ] Never Smoked [ ] Former Smoker (__ packs x ___ years) [ ] Current Smoker  (__ packs x ___ years)  Substance Use: [ ] Never Used [ ] Used ____  EtOH Use:  Marital Status: [ ] Single [ ]  [ ]  [ ]   Sexual History:   Occupation:  Recent Travel:  Country of Birth:  Advance Directives:    Allergies    No Known Allergies    Intolerances        HOME MEDICATIONS:    REVIEW OF SYSTEMS:  CONSTITUTIONAL:           + LUE weakness, without fevers or chills  EYES/ENT:           No visual changes;  No vertigo or throat pain   NECK:            No pain or stiffness  RESPIRATORY:            No cough, wheezing, hemoptysis; + shortness of breath  CARDIOVASCULAR:            No chest pain or palpitations  GASTROINTESTINAL:           No abdominal or epigastric pain. No nausea, vomiting, or hematemesis; No diarrhea or constipation. No melena or hematochezia.  GENITOURINARY:            No dysuria, frequency or hematuria  NEUROLOGICAL:            No numbness or weakness  SKIN:            No pruritis , rashes            OBJECTIVE:  ICU Vital Signs Last 24 Hrs  T(C): 36.7 (30 Mar 2022 05:44), Max: 36.7 (30 Mar 2022 05:44)  T(F): 98.1 (30 Mar 2022 05:44), Max: 98.1 (30 Mar 2022 05:44)  HR: 92 (30 Mar 2022 07:08) (91 - 109)  BP: 176/82 (30 Mar 2022 06:20) (172/105 - 176/82)  BP(mean): --  ABP: --  ABP(mean): --  RR: 24 (30 Mar 2022 06:20) (24 - 29)  SpO2: 99% (30 Mar 2022 07:08) (94% - 100%)        CAPILLARY BLOOD GLUCOSE    VITAL SIGNS AT TIME OF CONSULT  BP: 117/67   ; HR: 80 (NSR)  ; RR: 24  ; SPO2: 98% (Bipap 12/6 FIO2 40% - SpVt 450-470cc's)   ; Temp: 36.7         PHYSICAL EXAM:  GENERAL:   NAD, well-groomed, well-developed    HEAD:    Atraumatic, Normocephalic    EYES:   EOMI, PERRLA 3 mm, conjunctiva and sclera clear    ENMT:   No oropharyngeal exudates, erythema or lesions,  Moist mucous membranes    NECK:   Supple, no cervical lymphadenopathy, no JVD    NERVOUS SYSTEM:  lethargic (s/p ativan) Responsive to verbal  stimuli, focuses upon examiner, answers simple questions yes and no  ,follows simple commands to demonstrate extremities and digits, Oriented X3, CN II-XII intact, has spontaneous purposeful movement of all extremities; Rt Upper extremity  4/5, LUE 2-3/5, RLE 4/5, LLE 4/5, + facial symmetry full sensation to light touch     CHEST/LUNG:   Utilizing Bipap 12/6 40%, SpVt 450-470, RR 24  .Breath sounds bilaterally with few scattered rhonchi, diminished in lower lung fields, few fine bibasilar crackles, without wheezing, or rubs. POCUS A line anterior with focal B lines Lt >Rt, bilateral mod pleural effusiions Rt > Lt    HEART:   cardiac monitor NSR without ectopy   ; S1/S2 I/VI sys murmurs, without rubs, or gallops, POCUS diminished LVFx, RV < LV, VTI 13, VTI 1.9    ABDOMEN:   Soft, Nontender, Nondistended; Bowel sounds present, Bladder non distended, non palpable    EXTREMITIES:   Pulses palpable radial pulses 2+ bilat, DP/PT 2+/1+ bilat, without clubbing, cyanosis. Digits warm to touch with good cap refill < 3 secs    SKIN:   warm, dry, intact, normal color, no rash or abnormal lesions, without palpable nodes      HOSPITAL MEDICATIONS:  MEDICATIONS  (STANDING):  aspirin  chewable 324 milliGRAM(s) Oral once    MEDICATIONS  (PRN):  morphine  - Injectable 2 milliGRAM(s) IV Push every 5 minutes PRN Chest Pain      LABS:                        10.8   20.24 )-----------( 210      ( 30 Mar 2022 06:12 )             34.1     Hgb Trend: 10.8<--  03-30    139  |  108  |  33<H>  ----------------------------<  375<H>  4.8   |  24  |  2.30<H>    Ca    8.5      30 Mar 2022 06:12    TPro  7.0  /  Alb  3.2<L>  /  TBili  1.1  /  DBili  x   /  AST  53<H>  /  ALT  48  /  AlkPhos  104  03-30    Creatinine Trend: 2.30<--  PT/INR - ( 30 Mar 2022 06:43 )   PT: 15.2 sec;   INR: 1.30 ratio         PTT - ( 30 Mar 2022 06:43 )  PTT:25.8 sec          MICROBIOLOGY:     RADIOLOGY:  [ ] Reviewed and interpreted by me  ACC: [id number] EXAM:  CT BRAIN STROKE PROTOCOL                          PROCEDURE DATE:  03/30/2022          INTERPRETATION:  CLINICAL INFORMATION:  Stroke Code    TECHNIQUE:  Axial CT images were acquired through the head.  Intravenous contrast: None  Two-dimensional reformats were generated.  Rapid AI was utilized for preliminary assessment of intracranial   hemorrhage.    COMPARISON STUDY: None    FINDINGS:    The ventricles and cortical sulci are prominent, consistent with mild   diffuse cerebral volume loss. Very mild periventricular white matter   small vessel ischemic disease seen. 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 No intracranial bleed, extra-axial fluid   collection, mass effect, midline shift, hydrocephalus, acute territorial   infarct or depressed skull fracture evident. Imaged orbits demonstrate   thin bilateral ocular lenses. Imaged paranasal sinuses demonstrate mild   mucosal thickening of the right inferior maxillary sinus. Imaged mastoid   air cells are clear. There is calcific atherosclerosis of bilateral   cavernous ICAs.    IMPRESSION: No intracranial bleed, mass effect or acute territorial   infarct demonstrated.    Dr. Haddad notified Dr. Pittman on 3/30/2022 at 7:14 AM Eastern   standard time with read back.    --- End of Report ---  EKG:        [facility] (ext. [id number])           CHIEF COMPLAINT:    HPI:  76 yr old male with PMHx HFrEF (30% 1/2009), hx of Vtach s/p ablation, AICD placement (2009), HTN, HLD, Rt tonsillar Ca s/p RTx/chemo  s/p Left tonsillectomy 2011, s/p left partial tongue resection 2/2019 DM2, s/p Rt CEA (2020), Hypothyroidism, recent hx of rectal bleeding, no source found 1/2022 who presented to E.D. via EMS this a.m. (3/30/22) after being found on floor in Den at 0430 a.m. by wife who last saw pt resting on couch at 2100 evening of 3/29/22 in usual state of health. EMS found pt hypertensive SBP of 170's, hypoxic with SPO2 80's, with weakness of left upper extremity.      In E.D. pt found to be hypertensive with NSG293/105, tachypneic with SPO2 100% on bipap therapy. Pt also found to have leukocytosis of20.2, bands of 1%, hyperglycemic with POC glucose of 375, hsTrop 1012, D-dimer 2924, BNP 4963. CXR demonstrating bilat opacification, Pt received CT head that did not demonstrate small chronic infarction of Rt cerebellum however without acute changes. Pt received lasix 40mg, hydralazine 10 mg, NTP 1" to chest wall,  mg, lispro 10 units subq with improvement fo /82. This short E.D. course c/b development of left then Rt arm clonic type motions without LOC, pt received ativan 2 mg IVP for suspected sz activity.        Consult called for hypoxic resp failure, hypertensive emergency, r/o CVA     As pt with improved vital signs, oxygenation on Bipap therapy, Ox3 currently without need for ICU admission. Please re consult if we can be of further assistance with this pt        PAST MEDICAL & SURGICAL HISTORY:  MI (myocardial infarction)  1992    HTN (hypertension)    HLD (hyperlipidemia)    Throat cancer  2011, treated with chemo, RT    Ventricular arrhythmia  s/p AICD    Diabetes  Type2, not on any meds since weight loss after throat Ca    Renal insufficiency  s/p chemo    CAD (coronary artery disease)    Inguinal hernia, left    H/O prior ablation treatment  VT, 2009    Cardiac defibrillator in place  - 2009, battery change 2017    S/P left inguinal hernia repair  2018 at [place]        FAMILY HISTORY:  Family history of cancer (Sibling)        SOCIAL HISTORY:  Smoking: [ ] Never Smoked [ ] Former Smoker (__ packs x ___ years) [ ] Current Smoker  (__ packs x ___ years)  Substance Use: [ ] Never Used [ ] Used ____  EtOH Use:  Marital Status: [ ] Single [ ]  [ ]  [ ]   Sexual History:   Occupation:  Recent Travel:  Country of Birth:  Advance Directives:    Allergies    No Known Allergies    Intolerances        HOME MEDICATIONS:    REVIEW OF SYSTEMS:  CONSTITUTIONAL:           + LUE weakness, without fevers or chills  EYES/ENT:           No visual changes;  No vertigo or throat pain   NECK:            No pain or stiffness  RESPIRATORY:            No cough, wheezing, hemoptysis; + shortness of breath  CARDIOVASCULAR:            No chest pain or palpitations  GASTROINTESTINAL:           No abdominal or epigastric pain. No nausea, vomiting, or hematemesis; No diarrhea or constipation. No melena or hematochezia.  GENITOURINARY:            No dysuria, frequency or hematuria  NEUROLOGICAL:            No numbness or weakness  SKIN:            No pruritis , rashes            OBJECTIVE:  ICU Vital Signs Last 24 Hrs  T(C): 36.7 (30 Mar 2022 05:44), Max: 36.7 (30 Mar 2022 05:44)  T(F): 98.1 (30 Mar 2022 05:44), Max: 98.1 (30 Mar 2022 05:44)  HR: 92 (30 Mar 2022 07:08) (91 - 109)  BP: 176/82 (30 Mar 2022 06:20) (172/105 - 176/82)  BP(mean): --  ABP: --  ABP(mean): --  RR: 24 (30 Mar 2022 06:20) (24 - 29)  SpO2: 99% (30 Mar 2022 07:08) (94% - 100%)        CAPILLARY BLOOD GLUCOSE    VITAL SIGNS AT TIME OF CONSULT  BP: 117/67   ; HR: 80 (NSR)  ; RR: 24  ; SPO2: 98% (Bipap 12/6 FIO2 40% - SpVt 450-470cc's)   ; Temp: 36.7         PHYSICAL EXAM:  GENERAL:   NAD, well-groomed, well-developed    HEAD:    Atraumatic, Normocephalic    EYES:   EOMI, PERRLA 3 mm, conjunctiva and sclera clear    ENMT:   No oropharyngeal exudates, erythema or lesions,  Moist mucous membranes    NECK:   Supple, no cervical lymphadenopathy, no JVD    NERVOUS SYSTEM:  lethargic (s/p ativan) Responsive to verbal  stimuli, focuses upon examiner, answers simple questions yes and no  ,follows simple commands to demonstrate extremities and digits, Oriented X3, CN II-XII intact, has spontaneous purposeful movement of all extremities; Rt Upper extremity  4/5, LUE 2-3/5, RLE 4/5, LLE 4/5, + facial symmetry full sensation to light touch     CHEST/LUNG:   Utilizing Bipap 12/6 40%, SpVt 450-470, RR 24  .Breath sounds bilaterally with few scattered rhonchi, diminished in lower lung fields, few fine bibasilar crackles, without wheezing, or rubs. POCUS A line anterior with focal B lines Lt >Rt, bilateral mod pleural effusiions Rt > Lt    HEART:   cardiac monitor NSR without ectopy   ; S1/S2 I/VI sys murmurs, without rubs, or gallops, POCUS diminished LVFx, RV < LV, VTI 13, VTI 1.9    ABDOMEN:   Soft, Nontender, Nondistended; Bowel sounds present, Bladder non distended, non palpable    EXTREMITIES:   Pulses palpable radial pulses 2+ bilat, DP/PT 2+/1+ bilat, without clubbing, cyanosis. Digits warm to touch with good cap refill < 3 secs    SKIN:   warm, dry, intact, normal color, no rash or abnormal lesions, without palpable nodes      HOSPITAL MEDICATIONS:  MEDICATIONS  (STANDING):  aspirin  chewable 324 milliGRAM(s) Oral once    MEDICATIONS  (PRN):  morphine  - Injectable 2 milliGRAM(s) IV Push every 5 minutes PRN Chest Pain      LABS:                        10.8   20.24 )-----------( 210      ( 30 Mar 2022 06:12 )             34.1     Hgb Trend: 10.8<--  03-30    139  |  108  |  33<H>  ----------------------------<  375<H>  4.8   |  24  |  2.30<H>    Ca    8.5      30 Mar 2022 06:12    TPro  7.0  /  Alb  3.2<L>  /  TBili  1.1  /  DBili  x   /  AST  53<H>  /  ALT  48  /  AlkPhos  104  03-30    Creatinine Trend: 2.30<--  PT/INR - ( 30 Mar 2022 06:43 )   PT: 15.2 sec;   INR: 1.30 ratio         PTT - ( 30 Mar 2022 06:43 )  PTT:25.8 sec          MICROBIOLOGY:     RADIOLOGY:  [ ] Reviewed and interpreted by me  ACC: [id number] EXAM:  CT BRAIN STROKE PROTOCOL                          PROCEDURE DATE:  03/30/2022          INTERPRETATION:  CLINICAL INFORMATION:  Stroke Code    TECHNIQUE:  Axial CT images were acquired through the head.  Intravenous contrast: None  Two-dimensional reformats were generated.  Rapid AI was utilized for preliminary assessment of intracranial   hemorrhage.    COMPARISON STUDY: None    FINDINGS:    The ventricles and cortical sulci are prominent, consistent with mild   diffuse cerebral volume loss. Very mild periventricular white matter   small vessel ischemic disease seen. 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 No intracranial bleed, extra-axial fluid   collection, mass effect, midline shift, hydrocephalus, acute territorial   infarct or depressed skull fracture evident. Imaged orbits demonstrate   thin bilateral ocular lenses. Imaged paranasal sinuses demonstrate mild   mucosal thickening of the right inferior maxillary sinus. Imaged mastoid   air cells are clear. There is calcific atherosclerosis of bilateral   cavernous ICAs.    IMPRESSION: No intracranial bleed, mass effect or acute territorial   infarct demonstrated.    Dr. Haddad notified Dr. Pittman on 3/30/2022 at 7:14 AM Eastern   standard time with read back.    --- End of Report ---  EKG:        [facility] (ext. [id number])           CHIEF COMPLAINT:    HPI:  76 yr old male with PMHx HFrEF (30% 1/2009), hx of Vtach s/p ablation, AICD placement (2009), HTN, HLD, Rt tonsillar Ca s/p RTx/chemo  s/p Left tonsillectomy 2011, s/p left partial tongue resection 2/2019 DM2, CKD3 (baseline sCr 2.10-2.49), s/p Rt CEA (2020), Hypothyroidism, recent hx of rectal bleeding, no source found 1/2022 who presented to E.D. via EMS this a.m. (3/30/22) after being found on floor in Den at 0430 a.m. by wife who last saw pt resting on couch at 2100 evening of 3/29/22 in usual state of health. EMS found pt hypertensive SBP of 170's, hypoxic with SPO2 80's, with weakness of left upper extremity.      In E.D. pt found to be hypertensive with KNH574/105, tachypneic with SPO2 100% on bipap therapy. Pt also found to have leukocytosis of20.2, bands of 1%, hyperglycemic with POC glucose of 375, hsTrop 1012, D-dimer 2924, BNP 4963. CXR demonstrating bilat opacification, Pt received CT head that did not demonstrate small chronic infarction of Rt cerebellum however without acute changes. Pt received lasix 40mg, hydralazine 10 mg, NTP 1" to chest wall,  mg, lispro 10 units subq with improvement fo /82. This short E.D. course c/b development of left then Rt arm clonic type motions without LOC, pt received ativan 2 mg IVP for suspected sz activity.        Consult called for hypoxic resp failure, hypertensive emergency, r/o CVA     As pt with improved vital signs, oxygenation on Bipap therapy, Ox3 currently without need for ICU admission. Please re consult if we can be of further assistance with this pt        PAST MEDICAL & SURGICAL HISTORY:  MI (myocardial infarction)  1992    HTN (hypertension)    HLD (hyperlipidemia)    Throat cancer  2011, treated with chemo, RT    Ventricular arrhythmia  s/p AICD    Diabetes  Type2, not on any meds since weight loss after throat Ca    Renal insufficiency  s/p chemo    CAD (coronary artery disease)    Inguinal hernia, left    H/O prior ablation treatment  VT, 2009    Cardiac defibrillator in place  - 2009, battery change 2017    S/P left inguinal hernia repair  2018 at [place]        FAMILY HISTORY:  Family history of cancer (Sibling)        SOCIAL HISTORY:  Smoking: [ ] Never Smoked [ ] Former Smoker (__ packs x ___ years) [ ] Current Smoker  (__ packs x ___ years)  Substance Use: [ ] Never Used [ ] Used ____  EtOH Use:  Marital Status: [ ] Single [ ]  [ ]  [ ]   Sexual History:   Occupation:  Recent Travel:  Country of Birth:  Advance Directives:    Allergies    No Known Allergies    Intolerances        HOME MEDICATIONS:    REVIEW OF SYSTEMS:  CONSTITUTIONAL:           + LUE weakness, without fevers or chills  EYES/ENT:           No visual changes;  No vertigo or throat pain   NECK:            No pain or stiffness  RESPIRATORY:            No cough, wheezing, hemoptysis; + shortness of breath  CARDIOVASCULAR:            No chest pain or palpitations  GASTROINTESTINAL:           No abdominal or epigastric pain. No nausea, vomiting, or hematemesis; No diarrhea or constipation. No melena or hematochezia.  GENITOURINARY:            No dysuria, frequency or hematuria  NEUROLOGICAL:            No numbness or weakness  SKIN:            No pruritis , rashes            OBJECTIVE:  ICU Vital Signs Last 24 Hrs  T(C): 36.7 (30 Mar 2022 05:44), Max: 36.7 (30 Mar 2022 05:44)  T(F): 98.1 (30 Mar 2022 05:44), Max: 98.1 (30 Mar 2022 05:44)  HR: 92 (30 Mar 2022 07:08) (91 - 109)  BP: 176/82 (30 Mar 2022 06:20) (172/105 - 176/82)  BP(mean): --  ABP: --  ABP(mean): --  RR: 24 (30 Mar 2022 06:20) (24 - 29)  SpO2: 99% (30 Mar 2022 07:08) (94% - 100%)        CAPILLARY BLOOD GLUCOSE    VITAL SIGNS AT TIME OF CONSULT  BP: 117/67   ; HR: 80 (NSR)  ; RR: 24  ; SPO2: 98% (Bipap 12/6 FIO2 40% - SpVt 450-470cc's)   ; Temp: 36.7         PHYSICAL EXAM:  GENERAL:   NAD, well-groomed, well-developed    HEAD:    Atraumatic, Normocephalic    EYES:   EOMI, PERRLA 3 mm, conjunctiva and sclera clear    ENMT:   No oropharyngeal exudates, erythema or lesions,  Moist mucous membranes    NECK:   Supple, no cervical lymphadenopathy, no JVD    NERVOUS SYSTEM:  lethargic (s/p ativan) Responsive to verbal  stimuli, focuses upon examiner, answers simple questions yes and no  ,follows simple commands to demonstrate extremities and digits, Oriented X3, CN II-XII intact, has spontaneous purposeful movement of all extremities; Rt Upper extremity  4/5, LUE 2-3/5, RLE 4/5, LLE 4/5, + facial symmetry full sensation to light touch     CHEST/LUNG:   Utilizing Bipap 12/6 40%, SpVt 450-470, RR 24  .Breath sounds bilaterally with few scattered rhonchi, diminished in lower lung fields, few fine bibasilar crackles, without wheezing, or rubs. POCUS A line anterior with focal B lines Lt >Rt, bilateral mod pleural effusiions Rt > Lt    HEART:   cardiac monitor NSR without ectopy   ; S1/S2 I/VI sys murmurs, without rubs, or gallops, POCUS diminished LVFx, RV < LV, VTI 13, VTI 1.9    ABDOMEN:   Soft, Nontender, Nondistended; Bowel sounds present, Bladder non distended, non palpable    EXTREMITIES:   Pulses palpable radial pulses 2+ bilat, DP/PT 2+/1+ bilat, without clubbing, cyanosis. Digits warm to touch with good cap refill < 3 secs    SKIN:   warm, dry, intact, normal color, no rash or abnormal lesions, without palpable nodes      HOSPITAL MEDICATIONS:  MEDICATIONS  (STANDING):  aspirin  chewable 324 milliGRAM(s) Oral once    MEDICATIONS  (PRN):  morphine  - Injectable 2 milliGRAM(s) IV Push every 5 minutes PRN Chest Pain      LABS:                        10.8   20.24 )-----------( 210      ( 30 Mar 2022 06:12 )             34.1     Hgb Trend: 10.8<--  03-30    139  |  108  |  33<H>  ----------------------------<  375<H>  4.8   |  24  |  2.30<H>    Ca    8.5      30 Mar 2022 06:12    TPro  7.0  /  Alb  3.2<L>  /  TBili  1.1  /  DBili  x   /  AST  53<H>  /  ALT  48  /  AlkPhos  104  03-30    Creatinine Trend: 2.30<--  PT/INR - ( 30 Mar 2022 06:43 )   PT: 15.2 sec;   INR: 1.30 ratio         PTT - ( 30 Mar 2022 06:43 )  PTT:25.8 sec          MICROBIOLOGY:     RADIOLOGY:  [ ] Reviewed and interpreted by me  ACC: [id number] EXAM:  CT BRAIN STROKE PROTOCOL                          PROCEDURE DATE:  03/30/2022          INTERPRETATION:  CLINICAL INFORMATION:  Stroke Code    TECHNIQUE:  Axial CT images were acquired through the head.  Intravenous contrast: None  Two-dimensional reformats were generated.  Rapid AI was utilized for preliminary assessment of intracranial   hemorrhage.    COMPARISON STUDY: None    FINDINGS:    The ventricles and cortical sulci are prominent, consistent with mild   diffuse cerebral volume loss. Very mild periventricular white matter   small vessel ischemic disease seen. 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 No intracranial bleed, extra-axial fluid   collection, mass effect, midline shift, hydrocephalus, acute territorial   infarct or depressed skull fracture evident. Imaged orbits demonstrate   thin bilateral ocular lenses. Imaged paranasal sinuses demonstrate mild   mucosal thickening of the right inferior maxillary sinus. Imaged mastoid   air cells are clear. There is calcific atherosclerosis of bilateral   cavernous ICAs.    IMPRESSION: No intracranial bleed, mass effect or acute territorial   infarct demonstrated.    Dr. Haddad notified Dr. Pittman on 3/30/2022 at 7:14 AM Eastern   standard time with read back.    --- End of Report ---  EKG:        [facility] (ext. [id number])           CHIEF COMPLAINT:    HPI:  76 yr old male with PMHx HFrEF (30% 1/2009), hx of Vtach s/p ablation, AICD placement (2009), HTN, HLD, Rt tonsillar Ca s/p RTx/chemo  s/p Left tonsillectomy 2011, s/p left partial tongue resection 2/2019 DM2, CKD3 (baseline sCr 2.10-2.49), s/p Rt CEA (2020), Hypothyroidism, recent hx of rectal bleeding, no source found 1/2022 who presented to E.D. via EMS this a.m. (3/30/22) after being found on floor in Den at 0430 a.m. by wife who last saw pt resting on couch at 2100 evening of 3/29/22 in usual state of health. EMS found pt hypertensive SBP of 170's, hypoxic with SPO2 80's, with weakness of left upper extremity.      In E.D. pt found to be hypertensive with ZRT493/105, tachypneic with SPO2 100% on bipap therapy. Pt also found to have leukocytosis of20.2, bands of 1%, hyperglycemic with POC glucose of 375, hsTrop 1012, D-dimer 2924, BNP 4963. CXR demonstrating bilat opacification, Pt received CT head that demonstrated small chronic infarction of Rt cerebellum however without acute changes. Pt received lasix 40mg, hydralazine 10 mg, NTP 1" to chest wall,  mg, lispro 10 units subq with improvement fo /82. This short E.D. course c/b development of left then Rt arm clonic type motions without LOC, pt received ativan 2 mg IVP for suspected sz activity.        Consult called for hypoxic resp failure, hypertensive emergency, r/o CVA     As pt with improved vital signs, oxygenation on Bipap therapy, Ox3 currently without need for ICU admission. Please re consult if we can be of further assistance with this pt        PAST MEDICAL & SURGICAL HISTORY:  MI (myocardial infarction)  1992    HTN (hypertension)    HLD (hyperlipidemia)    Throat cancer  2011, treated with chemo, RT    Ventricular arrhythmia  s/p AICD    Diabetes  Type2, not on any meds since weight loss after throat Ca    Renal insufficiency  s/p chemo    CAD (coronary artery disease)    Inguinal hernia, left    H/O prior ablation treatment  VT, 2009    Cardiac defibrillator in place  - 2009, battery change 2017    S/P left inguinal hernia repair  2018 at [place]        FAMILY HISTORY:  Family history of cancer (Sibling)        SOCIAL HISTORY:  Smoking: [ ] Never Smoked [ ] Former Smoker (__ packs x ___ years) [ ] Current Smoker  (__ packs x ___ years)  Substance Use: [ ] Never Used [ ] Used ____  EtOH Use:  Marital Status: [ ] Single [ ]  [ ]  [ ]   Sexual History:   Occupation:  Recent Travel:  Country of Birth:  Advance Directives:    Allergies    No Known Allergies    Intolerances        HOME MEDICATIONS:    REVIEW OF SYSTEMS:  CONSTITUTIONAL:           + LUE weakness, without fevers or chills  EYES/ENT:           No visual changes;  No vertigo or throat pain   NECK:            No pain or stiffness  RESPIRATORY:            No cough, wheezing, hemoptysis; + shortness of breath  CARDIOVASCULAR:            No chest pain or palpitations  GASTROINTESTINAL:           No abdominal or epigastric pain. No nausea, vomiting, or hematemesis; No diarrhea or constipation. No melena or hematochezia.  GENITOURINARY:            No dysuria, frequency or hematuria  NEUROLOGICAL:            No numbness or weakness  SKIN:            No pruritis , rashes            OBJECTIVE:  ICU Vital Signs Last 24 Hrs  T(C): 36.7 (30 Mar 2022 05:44), Max: 36.7 (30 Mar 2022 05:44)  T(F): 98.1 (30 Mar 2022 05:44), Max: 98.1 (30 Mar 2022 05:44)  HR: 92 (30 Mar 2022 07:08) (91 - 109)  BP: 176/82 (30 Mar 2022 06:20) (172/105 - 176/82)  BP(mean): --  ABP: --  ABP(mean): --  RR: 24 (30 Mar 2022 06:20) (24 - 29)  SpO2: 99% (30 Mar 2022 07:08) (94% - 100%)        CAPILLARY BLOOD GLUCOSE    VITAL SIGNS AT TIME OF CONSULT  BP: 117/67   ; HR: 80 (NSR)  ; RR: 24  ; SPO2: 98% (Bipap 12/6 FIO2 40% - SpVt 450-470cc's)   ; Temp: 36.7         PHYSICAL EXAM:  GENERAL:   NAD, well-groomed, well-developed    HEAD:    Atraumatic, Normocephalic    EYES:   EOMI, PERRLA 3 mm, conjunctiva and sclera clear    ENMT:   No oropharyngeal exudates, erythema or lesions,  Moist mucous membranes    NECK:   Supple, no cervical lymphadenopathy, no JVD    NERVOUS SYSTEM:  lethargic (s/p ativan) Responsive to verbal  stimuli, focuses upon examiner, answers simple questions yes and no  ,follows simple commands to demonstrate extremities and digits, Oriented X3, CN II-XII intact, has spontaneous purposeful movement of all extremities; Rt Upper extremity  4/5, LUE 2-3/5, RLE 4/5, LLE 4/5, + facial symmetry full sensation to light touch     CHEST/LUNG:   Utilizing Bipap 12/6 40%, SpVt 450-470, RR 24  .Breath sounds bilaterally with few scattered rhonchi, diminished in lower lung fields, few fine bibasilar crackles, without wheezing, or rubs. POCUS A line anterior with focal B lines Lt >Rt, bilateral mod pleural effusiions Rt > Lt    HEART:   cardiac monitor NSR without ectopy   ; S1/S2 I/VI sys murmurs, without rubs, or gallops, POCUS diminished LVFx, RV < LV, VTI 13, VTI 1.9    ABDOMEN:   Soft, Nontender, Nondistended; Bowel sounds present, Bladder non distended, non palpable    EXTREMITIES:   Pulses palpable radial pulses 2+ bilat, DP/PT 2+/1+ bilat, without clubbing, cyanosis. Digits warm to touch with good cap refill < 3 secs    SKIN:   warm, dry, intact, normal color, no rash or abnormal lesions, without palpable nodes      HOSPITAL MEDICATIONS:  MEDICATIONS  (STANDING):  aspirin  chewable 324 milliGRAM(s) Oral once    MEDICATIONS  (PRN):  morphine  - Injectable 2 milliGRAM(s) IV Push every 5 minutes PRN Chest Pain      LABS:                        10.8   20.24 )-----------( 210      ( 30 Mar 2022 06:12 )             34.1     Hgb Trend: 10.8<--  03-30    139  |  108  |  33<H>  ----------------------------<  375<H>  4.8   |  24  |  2.30<H>    Ca    8.5      30 Mar 2022 06:12    TPro  7.0  /  Alb  3.2<L>  /  TBili  1.1  /  DBili  x   /  AST  53<H>  /  ALT  48  /  AlkPhos  104  03-30    Creatinine Trend: 2.30<--  PT/INR - ( 30 Mar 2022 06:43 )   PT: 15.2 sec;   INR: 1.30 ratio         PTT - ( 30 Mar 2022 06:43 )  PTT:25.8 sec          MICROBIOLOGY:     RADIOLOGY:  [ ] Reviewed and interpreted by me  ACC: [id number] EXAM:  CT BRAIN STROKE PROTOCOL                          PROCEDURE DATE:  03/30/2022          INTERPRETATION:  CLINICAL INFORMATION:  Stroke Code    TECHNIQUE:  Axial CT images were acquired through the head.  Intravenous contrast: None  Two-dimensional reformats were generated.  Rapid AI was utilized for preliminary assessment of intracranial   hemorrhage.    COMPARISON STUDY: None    FINDINGS:    The ventricles and cortical sulci are prominent, consistent with mild   diffuse cerebral volume loss. Very mild periventricular white matter   small vessel ischemic disease seen. 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 No intracranial bleed, extra-axial fluid   collection, mass effect, midline shift, hydrocephalus, acute territorial   infarct or depressed skull fracture evident. Imaged orbits demonstrate   thin bilateral ocular lenses. Imaged paranasal sinuses demonstrate mild   mucosal thickening of the right inferior maxillary sinus. Imaged mastoid   air cells are clear. There is calcific atherosclerosis of bilateral   cavernous ICAs.    IMPRESSION: No intracranial bleed, mass effect or acute territorial   infarct demonstrated.    Dr. Haddad notified Dr. Pittman on 3/30/2022 at 7:14 AM Eastern   standard time with read back.    --- End of Report ---  EKG:        [facility] (ext. [id number])           CHIEF COMPLAINT:    HPI:  76 yr old male with PMHx HFrEF (30% 1/2009), hx of Vtach s/p ablation, AICD placement (2009), HTN, HLD, Rt tonsillar Ca s/p RTx/chemo  s/p Left tonsillectomy 2011, s/p left partial tongue resection 2/2019 DM2, CKD3 (baseline sCr 2.10-2.49), s/p Rt CEA (2020), Hypothyroidism, recent hx of rectal bleeding, no source found 1/2022 who presented to E.D. via EMS this a.m. (3/30/22) after being found on floor in [place] at 0430 a.m. by wife who last saw pt resting on couch at 2100 evening of 3/29/22 in usual state of health. EMS found pt hypertensive SBP of 170's, hypoxic with SPO2 80's, with weakness of left upper extremity.      In E.D. pt found to be hypertensive with YGR796/105, tachypneic with SPO2 100% on bipap therapy. Pt also found to have leukocytosis of20.2, bands of 1%, hyperglycemic with POC glucose of 375, hsTrop 1012, D-dimer 2924, BNP 4963. CXR demonstrating bilat opacification, Pt received CT head that demonstrated small chronic infarction of Rt cerebellum however without acute changes. Pt received lasix 40mg, hydralazine 10 mg, NTP 1" to chest wall,  mg, lispro 10 units subq with improvement fo /82. This short E.D. course c/b development of left then Rt arm clonic type motions without LOC, pt received ativan 2 mg IVP for suspected sz activity.        Consult called for hypoxic resp failure due to possible ADHF, hypertensive emergency, r/o CVA     As pt with improved vital signs, oxygenation on Bipap therapy, Ox3 currently without need for ICU admission. Please re consult if we can be of further assistance with this pt        PAST MEDICAL & SURGICAL HISTORY:  MI (myocardial infarction)  1992    HTN (hypertension)    HLD (hyperlipidemia)    Throat cancer  2011, treated with chemo, RT    Ventricular arrhythmia  s/p AICD    Diabetes  Type2, not on any meds since weight loss after throat Ca    Renal insufficiency  s/p chemo    CAD (coronary artery disease)    Inguinal hernia, left    H/O prior ablation treatment  VT, 2009    Cardiac defibrillator in place  - 2009, battery change 2017    S/P left inguinal hernia repair  2018 at [facility]        FAMILY HISTORY:  Family history of cancer (Sibling)        SOCIAL HISTORY:  Smoking: [ ] Never Smoked [ ] Former Smoker (__ packs x ___ years) [ ] Current Smoker  (__ packs x ___ years)  Substance Use: [ ] Never Used [ ] Used ____  EtOH Use:  Marital Status: [ ] Single [ ]  [ ]  [ ]   Sexual History:   Occupation:  Recent Travel:  Country of Birth:  Advance Directives:    Allergies    No Known Allergies    Intolerances        HOME MEDICATIONS:    REVIEW OF SYSTEMS:  CONSTITUTIONAL:           + LUE weakness, without fevers or chills  EYES/ENT:           No visual changes;  No vertigo or throat pain   NECK:            No pain or stiffness  RESPIRATORY:            No cough, wheezing, hemoptysis; + shortness of breath  CARDIOVASCULAR:            No chest pain or palpitations  GASTROINTESTINAL:           No abdominal or epigastric pain. No nausea, vomiting, or hematemesis; No diarrhea or constipation. No melena or hematochezia.  GENITOURINARY:            No dysuria, frequency or hematuria  NEUROLOGICAL:            No numbness or weakness  SKIN:            No pruritis , rashes            OBJECTIVE:  ICU Vital Signs Last 24 Hrs  T(C): 36.7 (30 Mar 2022 05:44), Max: 36.7 (30 Mar 2022 05:44)  T(F): 98.1 (30 Mar 2022 05:44), Max: 98.1 (30 Mar 2022 05:44)  HR: 92 (30 Mar 2022 07:08) (91 - 109)  BP: 176/82 (30 Mar 2022 06:20) (172/105 - 176/82)  BP(mean): --  ABP: --  ABP(mean): --  RR: 24 (30 Mar 2022 06:20) (24 - 29)  SpO2: 99% (30 Mar 2022 07:08) (94% - 100%)        CAPILLARY BLOOD GLUCOSE    VITAL SIGNS AT TIME OF CONSULT  BP: 117/67   ; HR: 80 (NSR)  ; RR: 24  ; SPO2: 98% (Bipap 12/6 FIO2 40% - SpVt 450-470cc's)   ; Temp: 36.7         PHYSICAL EXAM:  GENERAL:   NAD, well-groomed, well-developed    HEAD:    Atraumatic, Normocephalic    EYES:   EOMI, PERRLA 3 mm, conjunctiva and sclera clear    ENMT:   No oropharyngeal exudates, erythema or lesions,  Moist mucous membranes    NECK:   Supple, no cervical lymphadenopathy, no JVD    NERVOUS SYSTEM:  lethargic (s/p ativan) Responsive to verbal  stimuli, focuses upon examiner, answers simple questions yes and no  ,follows simple commands to demonstrate extremities and digits, Oriented X3, CN II-XII intact, has spontaneous purposeful movement of all extremities; Rt Upper extremity  4/5, LUE 2-3/5, RLE 4/5, LLE 4/5, + facial symmetry full sensation to light touch     CHEST/LUNG:   Utilizing Bipap 12/6 40%, SpVt 450-470, RR 24  .Breath sounds bilaterally with few scattered rhonchi, diminished in lower lung fields, few fine bibasilar crackles, without wheezing, or rubs. POCUS A line anterior with focal B lines Lt >Rt, bilateral mod pleural effusiions Rt > Lt    HEART:   cardiac monitor NSR without ectopy   ; S1/S2 I/VI sys murmurs, without rubs, or gallops, POCUS diminished LVFx, RV < LV, VTI 13, VTI 1.9    ABDOMEN:   Soft, Nontender, Nondistended; Bowel sounds present, Bladder non distended, non palpable    EXTREMITIES:   Pulses palpable radial pulses 2+ bilat, DP/PT 2+/1+ bilat, without clubbing, cyanosis. Digits warm to touch with good cap refill < 3 secs    SKIN:   warm, dry, intact, normal color, no rash or abnormal lesions, without palpable nodes      HOSPITAL MEDICATIONS:  MEDICATIONS  (STANDING):  aspirin  chewable 324 milliGRAM(s) Oral once    MEDICATIONS  (PRN):  morphine  - Injectable 2 milliGRAM(s) IV Push every 5 minutes PRN Chest Pain      LABS:                        10.8   20.24 )-----------( 210      ( 30 Mar 2022 06:12 )             34.1     Hgb Trend: 10.8<--  03-30    139  |  108  |  33<H>  ----------------------------<  375<H>  4.8   |  24  |  2.30<H>    Ca    8.5      30 Mar 2022 06:12    TPro  7.0  /  Alb  3.2<L>  /  TBili  1.1  /  DBili  x   /  AST  53<H>  /  ALT  48  /  AlkPhos  104  03-30    Creatinine Trend: 2.30<--  PT/INR - ( 30 Mar 2022 06:43 )   PT: 15.2 sec;   INR: 1.30 ratio         PTT - ( 30 Mar 2022 06:43 )  PTT:25.8 sec          MICROBIOLOGY:     RADIOLOGY:  [ ] Reviewed and interpreted by me  ACC: [id number] EXAM:  CT BRAIN STROKE PROTOCOL                          PROCEDURE DATE:  03/30/2022          INTERPRETATION:  CLINICAL INFORMATION:  Stroke Code    TECHNIQUE:  Axial CT images were acquired through the head.  Intravenous contrast: None  Two-dimensional reformats were generated.  Rapid AI was utilized for preliminary assessment of intracranial   hemorrhage.    COMPARISON STUDY: None    FINDINGS:    The ventricles and cortical sulci are prominent, consistent with mild   diffuse cerebral volume loss. Very mild periventricular white matter   small vessel ischemic disease seen. 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 No intracranial bleed, extra-axial fluid   collection, mass effect, midline shift, hydrocephalus, acute territorial   infarct or depressed skull fracture evident. Imaged orbits demonstrate   thin bilateral ocular lenses. Imaged paranasal sinuses demonstrate mild   mucosal thickening of the right inferior maxillary sinus. Imaged mastoid   air cells are clear. There is calcific atherosclerosis of bilateral   cavernous ICAs.    IMPRESSION: No intracranial bleed, mass effect or acute territorial   infarct demonstrated.    Dr. Haddad notified Dr. Pittman on 3/30/2022 at 7:14 AM Eastern   standard time with read back.    --- End of Report ---  EKG:        [facility] (ext. [id number])           CHIEF COMPLAINT:    HPI:  76 yr old male with PMHx HFrEF (30% 1/2009), hx of Vtach s/p ablation, AICD placement (2009), HTN, HLD, Rt tonsillar Ca s/p RTx/chemo  s/p Left tonsillectomy 2011, s/p left partial tongue resection 2/2019 DM2, CKD3 (baseline sCr 2.10-2.49), s/p Rt CEA (2020), Hypothyroidism, recent hx of rectal bleeding, no source found 1/2022 who presented to E.D. via EMS this a.m. (3/30/22) after being found on floor in [place] at 0430 a.m. by wife who last saw pt resting on couch at 2100 evening of 3/29/22 in usual state of health. EMS found pt hypertensive SBP of 170's, hypoxic with SPO2 80's, with weakness of left upper extremity.     In E.D. pt found to be hypertensive with MSO444/105, tachypneic with SPO2 100% on bipap therapy. Pt also found to have leukocytosis of20.2, bands of 1%, hyperglycemic with POC glucose of 375, hsTrop 1012, D-dimer 2924, BNP 4963. CXR demonstrating bilat opacification, Pt received CT head that demonstrated small chronic infarction of Rt cerebellum however without acute changes. Pt received lasix 40mg, hydralazine 10 mg, NTP 1" to chest wall,  mg, lispro 10 units subq with improvement fo /82. This short E.D. course c/b development of left then Rt arm clonic type motions without LOC, pt received ativan 2 mg IVP for suspected sz activity.        Consult called for hypoxic resp failure due to possible ADHF, hypertensive emergency, r/o CVA     As pt with improved vital signs, oxygenation on Bipap therapy, Ox3 currently without need for ICU admission. Please re consult if we can be of further assistance with this pt        PAST MEDICAL & SURGICAL HISTORY:  MI (myocardial infarction)  1992    HTN (hypertension)    HLD (hyperlipidemia)    Throat cancer  2011, treated with chemo, RT    Ventricular arrhythmia  s/p AICD    Diabetes  Type2, not on any meds since weight loss after throat Ca    Renal insufficiency  s/p chemo    CAD (coronary artery disease)    Inguinal hernia, left    H/O prior ablation treatment  VT, 2009    Cardiac defibrillator in place  - 2009, battery change 2017    S/P left inguinal hernia repair  2018 at [place]        FAMILY HISTORY:  Family history of cancer (Sibling)        SOCIAL HISTORY:  Smoking: [ ] Never Smoked [ ] Former Smoker (__ packs x ___ years) [ ] Current Smoker  (__ packs x ___ years)  Substance Use: [ ] Never Used [ ] Used ____  EtOH Use:  Marital Status: [ ] Single [ ]  [ ]  [ ]   Sexual History:   Occupation:  Recent Travel:  Country of Birth:  Advance Directives:    Allergies    No Known Allergies    Intolerances        HOME MEDICATIONS:    REVIEW OF SYSTEMS:  CONSTITUTIONAL:           + LUE weakness, without fevers or chills  EYES/ENT:           No visual changes;  No vertigo or throat pain   NECK:            No pain or stiffness  RESPIRATORY:            No cough, wheezing, hemoptysis; + shortness of breath  CARDIOVASCULAR:            No chest pain or palpitations  GASTROINTESTINAL:           No abdominal or epigastric pain. No nausea, vomiting, or hematemesis; No diarrhea or constipation. No melena or hematochezia.  GENITOURINARY:            No dysuria, frequency or hematuria  NEUROLOGICAL:            No numbness or weakness  SKIN:            No pruritis , rashes            OBJECTIVE:  ICU Vital Signs Last 24 Hrs  T(C): 36.7 (30 Mar 2022 05:44), Max: 36.7 (30 Mar 2022 05:44)  T(F): 98.1 (30 Mar 2022 05:44), Max: 98.1 (30 Mar 2022 05:44)  HR: 92 (30 Mar 2022 07:08) (91 - 109)  BP: 176/82 (30 Mar 2022 06:20) (172/105 - 176/82)  BP(mean): --  ABP: --  ABP(mean): --  RR: 24 (30 Mar 2022 06:20) (24 - 29)  SpO2: 99% (30 Mar 2022 07:08) (94% - 100%)        CAPILLARY BLOOD GLUCOSE    VITAL SIGNS AT TIME OF CONSULT  BP: 117/67   ; HR: 80 (NSR)  ; RR: 24  ; SPO2: 98% (Bipap 12/6 FIO2 40% - SpVt 450-470cc's)   ; Temp: 36.7         PHYSICAL EXAM:  GENERAL:   NAD, well-groomed, well-developed    HEAD:    Atraumatic, Normocephalic    EYES:   EOMI, PERRLA 3 mm, conjunctiva and sclera clear    ENMT:   No oropharyngeal exudates, erythema or lesions,  Moist mucous membranes    NECK:   Supple, no cervical lymphadenopathy, no JVD    NERVOUS SYSTEM:  lethargic (s/p ativan) Responsive to verbal  stimuli, focuses upon examiner, answers simple questions yes and no  ,follows simple commands to demonstrate extremities and digits, Oriented X3, CN II-XII intact, has spontaneous purposeful movement of all extremities; Rt Upper extremity  4/5, LUE 2-3/5, RLE 4/5, LLE 4/5, + facial symmetry full sensation to light touch     CHEST/LUNG:   Utilizing Bipap 12/6 40%, SpVt 450-470, RR 24  .Breath sounds bilaterally with few scattered rhonchi, diminished in lower lung fields, few fine bibasilar crackles, without wheezing, or rubs. POCUS A line anterior with focal B lines Lt >Rt, bilateral mod pleural effusiions Rt > Lt    HEART:   cardiac monitor NSR without ectopy   ; S1/S2 I/VI sys murmurs, without rubs, or gallops, POCUS diminished LVFx, RV < LV, VTI 13, VTI 1.9    ABDOMEN:   Soft, Nontender, Nondistended; Bowel sounds present, Bladder non distended, non palpable    EXTREMITIES:   Pulses palpable radial pulses 2+ bilat, DP/PT 2+/1+ bilat, without clubbing, cyanosis. Digits warm to touch with good cap refill < 3 secs    SKIN:   warm, dry, intact, normal color, no rash or abnormal lesions, without palpable nodes      HOSPITAL MEDICATIONS:  MEDICATIONS  (STANDING):  aspirin  chewable 324 milliGRAM(s) Oral once    MEDICATIONS  (PRN):  morphine  - Injectable 2 milliGRAM(s) IV Push every 5 minutes PRN Chest Pain      LABS:                        10.8   20.24 )-----------( 210      ( 30 Mar 2022 06:12 )             34.1     Hgb Trend: 10.8<--  03-30    139  |  108  |  33<H>  ----------------------------<  375<H>  4.8   |  24  |  2.30<H>    Ca    8.5      30 Mar 2022 06:12    TPro  7.0  /  Alb  3.2<L>  /  TBili  1.1  /  DBili  x   /  AST  53<H>  /  ALT  48  /  AlkPhos  104  03-30    Creatinine Trend: 2.30<--  PT/INR - ( 30 Mar 2022 06:43 )   PT: 15.2 sec;   INR: 1.30 ratio         PTT - ( 30 Mar 2022 06:43 )  PTT:25.8 sec          MICROBIOLOGY:     RADIOLOGY:  [ ] Reviewed and interpreted by me  ACC: [id number] EXAM:  CT BRAIN STROKE PROTOCOL                          PROCEDURE DATE:  03/30/2022          INTERPRETATION:  CLINICAL INFORMATION:  Stroke Code    TECHNIQUE:  Axial CT images were acquired through the head.  Intravenous contrast: None  Two-dimensional reformats were generated.  Rapid AI was utilized for preliminary assessment of intracranial   hemorrhage.    COMPARISON STUDY: None    FINDINGS:    The ventricles and cortical sulci are prominent, consistent with mild   diffuse cerebral volume loss. Very mild periventricular white matter   small vessel ischemic disease seen. 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 No intracranial bleed, extra-axial fluid   collection, mass effect, midline shift, hydrocephalus, acute territorial   infarct or depressed skull fracture evident. Imaged orbits demonstrate   thin bilateral ocular lenses. Imaged paranasal sinuses demonstrate mild   mucosal thickening of the right inferior maxillary sinus. Imaged mastoid   air cells are clear. There is calcific atherosclerosis of bilateral   cavernous ICAs.    IMPRESSION: No intracranial bleed, mass effect or acute territorial   infarct demonstrated.    Dr. Haddad notified Dr. Pittman on 3/30/2022 at 7:14 AM Eastern   standard time with read back.    --- End of Report ---  EKG:        [facility] (ext. [id number])

## 2022-03-30 NOTE — ED PROVIDER NOTE - NEUROLOGICAL, MLM
Alert and oriented, no focal deficits, no motor or sensory deficits. left arm weakness, left facial weakness

## 2022-03-30 NOTE — H&P ADULT - PROBLEM SELECTOR PLAN 3
2/2 to demand ischemia vs. ACS vs. PE w/ heart strain   - 1011.9 > 5252.0, f/u repeat set    - EKG: ST-depressions in V5 V6, new from prior EKG   - patient received aspirin 324mg in ED  - Cardiology [facility] consulted, f/u reccs Patient w/ left upper extremity weakness of unknown duration, 2/2 to possible CVA   - patient w/ seizure like activity in ED s/p ativan, EEG   - started on valproate by neuro  - CT brain stroke negative for acute hemorrhage or infarct, repeat CT in the AM  - f/u AM TSH   - Neuro Bhachawat consulted, f/u recs D/W at detail, plan to change to Aggrenox daily.

## 2022-03-30 NOTE — H&P ADULT - PROBLEM SELECTOR PLAN 7
TTE 2009 showed LVEF 30% w/ severely reduced ejection fraction   - f/u repeat TTE   - continue home Imdur and carvidelol w/ holding parameters   - hold patients home benazepril in setting of CKD   - patient s/p lasix in ED, does not appear volume overloaded on physical exam  - Cardio consult [facility], f/u reccs Chronic, stable   - LDSS   - Carb consistent soft and bite sized diet   - hypoglycemia protocol   - A1c 8.3 Patient w/ admission Hgb of 10.8, baseline unknown   - likely anemia of chronic disease   - patient w/ no signs or symptoms of bleeding   - transfusion goal >8   - f/u iron panel, b12, folate   - trend daily CBCs

## 2022-03-30 NOTE — PROGRESS NOTE ADULT - SUBJECTIVE AND OBJECTIVE BOX
Consulted for Acute on CKD    Chart reviewed    Recommend permissive HTN with suspected ischemic stroke  IV Lasix as needed; monitor renal tolerance  With leukocytosis and infiltrates on CXR, PNA more than fluid overload possibly  Abx, renal dosing  Cardio eval noted  Neuro eval noted  Monitor chemistries for now    Full consult note to follow, thank you

## 2022-03-30 NOTE — H&P ADULT - PROBLEM SELECTOR PROBLEM 9
Need for prophylactic measure HTN (hypertension) Acute kidney injury superimposed on CKD Diabetes mellitus

## 2022-03-30 NOTE — H&P ADULT - PROBLEM SELECTOR PROBLEM 7
CAD (coronary artery disease) Chronic HFrEF (heart failure with reduced ejection fraction) Diabetes mellitus Anemia

## 2022-03-30 NOTE — SWALLOW BEDSIDE ASSESSMENT ADULT - PHARYNGEAL PHASE
Delayed pharyngeal swallow/Decreased laryngeal elevation Delayed pharyngeal swallow/Decreased laryngeal elevation/Wet vocal quality post oral intake/Delayed cough post oral intake/Multiple swallows

## 2022-03-30 NOTE — H&P ADULT - PROBLEM SELECTOR PLAN 9
Chronic, stable on admission  - Continue home medications hydralazine 5mg TID w/ holding parameters   - Monitor routine hemodynamics Chronic, stable   - patient w/ admission Cr 2.3, baseline based off of outpatient records 2.0-2.1   - GFR 29   - avoid nephrotoxic medications, hold patients home benazepril   - f/u urine lytes   - Nephro consulted, f/u recs Chronic, stable   - LDSS   - Carb consistent soft and bite sized diet   - hypoglycemia protocol   - A1c 8.3

## 2022-03-30 NOTE — ED PROVIDER NOTE - CARE PLAN
1 Principal Discharge DX:	Acute pulmonary edema  Secondary Diagnosis:	HTN (hypertension), malignant  Secondary Diagnosis:	ACS (acute coronary syndrome)   Principal Discharge DX:	Acute pulmonary edema  Secondary Diagnosis:	HTN (hypertension), malignant  Secondary Diagnosis:	ACS (acute coronary syndrome)  Secondary Diagnosis:	Left arm weakness  Secondary Diagnosis:	Seizures

## 2022-03-30 NOTE — H&P ADULT - PROBLEM SELECTOR PLAN 8
Chronic, stable   - patient w/ admission Cr 2.3, baseline based off of outpatient records 2.0-2.1   - GFR 29   - avoid nephrotoxic medications, hold patients home benazepril   - f/u urine lytes   - Nephro consulted, f/u recs TTE 2009 showed LVEF 30% w/ severely reduced ejection fraction   - f/u repeat TTE   - continue home Imdur and carvidelol w/ holding parameters   - hold patients home benazepril in setting of CKD   - patient s/p lasix in ED, does not appear volume overloaded on physical exam  - Cardio consult [facility], f/u reccs Patient w/ admission CK of 328 ?NSTEMI  - less likely rhabdo, likely elevated in the setting of chronic kidney disease   - hold off fluids given patient's CHF hx for now   - f/u repeat CK serial Exam, ECHO  - nephro consulted, f/u recs

## 2022-03-30 NOTE — SWALLOW BEDSIDE ASSESSMENT ADULT - SWALLOW EVAL: RECOMMENDED DIET
pureed and moderately thick liquids, ALL SOLIDS AND LIQUIDS VIA TSP ONLY,  aspiration precautions, as tolerated

## 2022-03-30 NOTE — H&P ADULT - PROBLEM SELECTOR PLAN 2
Acute, 2/2 reactive to physical stress   - patient currently w/o fever or signs of infection   - f/u lactate, blood cultures   - will monitor off abx   - trend daily CBCs 2/2 to demand ischemia vs. ACS vs. CVA,  w/ heart strain   - 1011.9 > 5252.0, f/u repeat set  , follow serial sets of CK/MB/Troponin  - EKG: ST-depressions in V5 V6, new from prior EKG   - patient received aspirin 324mg in ED  - Cardiology [facility] consulted, f/u reccs  -As d/w Dr Hahn -pt does NOT have any cardiac stents/Intervention in past  -ECHO

## 2022-03-30 NOTE — ED PROVIDER NOTE - CLINICAL SUMMARY MEDICAL DECISION MAKING FREE TEXT BOX
acute pulmonary edema due to accelerated HTN and ACS with left arm weakness and seizure ....  treated with nitrates,  Morphine sulfate, IV hydralazine, and IV Lasix, improving saturation with  BiPap,  CT negative for acute bleed or infarct,  rx ASA,  rx ativan for the acute seizure MRI pending, ICU, cardiology and neurology  consults and admission acute pulmonary edema due to accelerated HTN and ACS with left arm weakness and seizure ....  treated with nitrates,  Morphine sulfate, IV hydralazine, and IV Lasix, improving saturation with  BiPap,  CT negative for acute bleed or infarct,  rx ASA,  rx ativan for the acute seizure, elevated d-dimer, VQ scan pending, ICU, cardiology and neurology  consults and admission

## 2022-03-30 NOTE — CONSULT NOTE ADULT - ASSESSMENT
76 yr old male with stated hx significant for HFrEF, s/p AICD, tonsillar Ca s/p Lt tonsillectomy, Lt partial glossectomy, s/p Rt CEA, DM2, hypothyroidism, CKD3 who  presented from home via EMS after being found of floor with LUE weakness, sob. Consult called for hypoxic resp failure secondary to stroke,  hypertensive emergency, r/o CVA.      # ischemia  - h/o CAD with MI in early 1990s, s/p remote thrombolytics   - left heart catheterization (2008)  - Home meds: clopidogrel 75 mg QD, aspirin 81 QD  - Previously on DAPT, held aspirin d/t recent rectal bleed   - No DAPT required for revascularization, modify appropriately as per neuro recs.  - Continue atorvastatin 10mg PO QD  - Continue carvedilol 12.5 mg Q12h with following hold parameters: hold for SBP <180 d/t permissive requirements for stroke  - Enzyme elevation likely d/t demand ischemia 2/2 stroke - trend trops and CKMB until peak  - TTE ordered, follow up    # arrhythmia   - NSR with sinus tachycardia ()  - EKG unchanged from previous EKG, no new signs of ischemia   - No h/o afib; Interrogate AICD to check for contribution to stroke   - h/o Vtach with ablation   - Continue carvedilol 12.5 mg Q12h with following hold parameters: hold for SBP <180 d/t permissive requirements for stroke    # volume   - SOB improved with BIPAP  - No evidence of volume overload on physical examination  - CHF unclear   - CXR suspicious for pulmonary edema   - POCUS by ICU team showed moderate pulmonary effusion vs CHF  - S/p lasix 40mg x1, will give lasix 40mg x1 at 4pm today, reassess in the AM for need for additional doses    # BP  - Home medications: carvedilol 12.5 mg BID, hydralazine 10mg TID, isosorbide 10mg QD, midodrine 10 mg QD   - Arrived with /105 with tachypnea   - S/p lasix 40 mg x1, hydralazine 10mg x1   - BP now stable 115/60  - Continue carvedilol 12.5 mg Q12h with following hold parameters: hold for SBP <180 d/t permissive requirements for stroke   76 yr old male with stated hx significant for HFrEF, s/p AICD, tonsillar Ca s/p Lt tonsillectomy, Lt partial glossectomy, s/p Rt CEA, DM2, hypothyroidism, CKD3 who  presented from home via EMS after being found of floor with LUE weakness, sob. Consult called for hypoxic resp failure secondary to stroke,  hypertensive emergency, r/o CVA.      # ischemia  - h/o CAD with MI in early 1990s, s/p remote thrombolytics   - left heart catheterization (2008)  - Home meds: clopidogrel 75 mg QD, aspirin 81 QD  - Previously on DAPT, held aspirin d/t recent rectal bleed   - No DAPT required for revascularization, modify appropriately as per neuro recs.  - Continue atorvastatin 10mg PO QD  - Continue carvedilol 12.5 mg Q12h with following hold parameters: hold for SBP <180 d/t permissive requirements for stroke  - Enzyme elevation likely d/t demand ischemia 2/2 stroke - trend trops and CKMB until peak  - TTE ordered, follow up    # arrhythmia   - NSR with sinus tachycardia ()  - EKG unchanged from previous EKG, no new signs of ischemia   - No h/o afib; Interrogate AICD to check for contribution to stroke   - h/o Vtach with ablation   - Continue carvedilol 12.5 mg Q12h with following hold parameters: hold for SBP <180 d/t permissive requirements for stroke    # volume   - SOB improved with BIPAP  - No evidence of volume overload on physical examination  - CHF unclear   - CXR suspicious for pulmonary edema   - POCUS by ICU team showed moderate pulmonary effusion vs CHF  - S/p lasix 40mg x1, will give lasix 40mg x1 at 4pm today, reassess in the AM for need for additional doses    # BP  - Home medications: carvedilol 12.5 mg BID, hydralazine 10mg TID, isosorbide 30 mg QD, midodrine 10 mg QD   - Arrived with /105 with tachypnea   - S/p lasix 40 mg x1, hydralazine 10mg x1   - BP now stable 115/60  - Continue carvedilol 12.5 mg Q12h with following hold parameters: hold for SBP <180 d/t permissive requirements for stroke   76 yr old male with stated hx significant for HFrEF, s/p AICD, tonsillar Ca s/p Lt tonsillectomy, Lt partial glossectomy, s/p Rt CEA, DM2, hypothyroidism, CKD3 who  presented from home via EMS after being found of floor with LUE weakness, sob. Consult called for hypoxic resp failure secondary to stroke,  hypertensive emergency, r/o CVA.      # ischemia  - h/o CAD with MI in early 1990s, s/p remote thrombolytics   - left heart catheterization (2008)  - Home meds: clopidogrel 75 mg QD, aspirin 81 QD  - Previously on DAPT, held aspirin d/t recent rectal bleed   - No DAPT required for revascularization, modify appropriately as per neuro recs.  - Continue atorvastatin 10mg PO QD  - Continue carvedilol 12.5 mg Q12h with following hold parameters: hold for SBP <180 d/t permissive requirements for stroke  - Enzyme elevation likely d/t demand ischemia 2/2 stroke - trend trops and CKMB until peak  - TTE ordered, follow up    # arrhythmia   - NSR with sinus tachycardia ()  - EKG unchanged from previous EKG, no new signs of ischemia   - No h/o afib; Interrogate AICD to check for contribution to stroke (mdt)  - h/o Vtach with ablation   - Continue carvedilol 12.5 mg Q12h with following hold parameters: hold for SBP <180 d/t permissive requirements for stroke    # volume   - SOB improved with BIPAP  - No evidence of volume overload on physical examination  - CHF unclear   - CXR suspicious for pulmonary edema   - POCUS by ICU team showed moderate pulmonary effusion and few b lines, less consistent w/ CHF  - S/p lasix 40mg x1, will give lasix 40mg x1 at 4pm today, reassess in the AM for need for additional doses    # BP  - Home medications: carvedilol 12.5 mg BID, hydralazine 10mg TID, isosorbide 30 mg QD, midodrine 10 mg QD   - Arrived with /105 with tachypnea   - S/p lasix 40 mg x1, hydralazine 10mg x1   - BP now stable 115/60  - Continue carvedilol 12.5 mg Q12h with following hold parameters: hold for SBP <180 d/t permissive requirements for stroke

## 2022-03-30 NOTE — ED ADULT NURSE NOTE - NSIMPLEMENTINTERV_GEN_ALL_ED
Implemented All Fall with Harm Risk Interventions:  [place] to call system. Call bell, personal items and telephone within reach. Instruct patient to call for assistance. Room bathroom lighting operational. Non-slip footwear when patient is off stretcher. Physically safe environment: no spills, clutter or unnecessary equipment. Stretcher in lowest position, wheels locked, appropriate side rails in place. Provide visual cue, wrist band, yellow gown, etc. Monitor gait and stability. Monitor for mental status changes and reorient to person, place, and time. Review medications for side effects contributing to fall risk. Reinforce activity limits and safety measures with patient and family. Provide visual clues: red socks.

## 2022-03-30 NOTE — SWALLOW BEDSIDE ASSESSMENT ADULT - COMMENTS
Consult received and chart reviewed. Patient seen at bedside this afternoon for initial assessment of swallow function, at which time he was alert, cooperative, and denied pain. Patient on supplemental O2 via NC, 3L. Patient demonstrates ability to follow simple commands independently. Vocal quality judged as hoarse/breathy. Patient and patient's daughter at bedside reported the patient tolerates "softer"  "foods like macaroni" and thin liquids at baseline. Patient with upper dentures donned; lower dentures doffed and are not here.     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 As per patient, he was watching a movie when he felt very short of breath suddenly and dropped to the floor, denies LOC and denies any trauma to his head, was on the floor for ~2 hours. Patient was unable to rise from the floor by himself due to his left arm weakness. When first brought to the ED, patient was hypoxic and hypertensive o2 saturation in EMS was >80%. While in the ED, patient had episode of siezure-like activity in his b/l upper extremities which was controlled w/ ativan."    WBC elevated. CXR 3/30/22 revealed "Fairly advanced diffuse bilateral scattered infiltrates." CT Head revealed "No intracranial bleed, mass effect or acute territorial infarct demonstrated."    Upon clinician questioning, the patient/patient's daughter reported the patient has previously had a swallow study done within the last year and believe no specific swallowing recommendations were made; however, were unsure of exact results.     Discussed results and recommendations with the patient/family, RN, and Dr. Larson.

## 2022-03-30 NOTE — H&P ADULT - PROBLEM SELECTOR PLAN 5
Patient w/ admission CK of 328   - less likely rhabdo, likely elevated in the setting of chronic kidney disease   - hold off fluids given patient's CHF hx for now   - f/u repeat CK   - nephro consulted, f/u recs TTE 2009 showed LVEF 30% w/ severely reduced ejection fraction   - f/u repeat TTE   - continue home Imdur and carvedilol w/ holding parameters   - hold patients home benazepril in setting of CKD   - patient s/p Lasix in ED, Lasix 40 mg x 1 dose this PM. reassess in AM need for IV Lasix as d/w cardio  - Cardio consult [facility], f/u reccs  - does not appear volume overloaded on physical exam

## 2022-03-30 NOTE — CONSULT NOTE ADULT - ATTENDING COMMENTS
prob cva  may have had hf causing resp failure which is now largely cleared, having been on bipap  permissive htn  lasix again later and then wait and reassess later  check icd for af episodes  remote mi s/p lytics, no ischemic issues since  echo prob cva  may have had hf causing resp failure which is now largely cleared, having been on bipap  permissive htn  lasix again later and then wait and reassess later  check icd for af episodes  remote mi s/p lytics, no ischemic issues since  echo    Upon my evaluation, this patient is at high risk for imminent or life threatening deterioration due to resp failure, cva, vol ol and other active medical issues which require my direct attention, intervention, and personal management.  I have personally spent >35 minutes  of critical care time exclusive of time spent on separate billing procedures. This includes review of laboratory data, radiology results, discussion with primary team\patient, and monitoring for potential decompensation Interventions were performed as documented above.

## 2022-03-30 NOTE — ED PROVIDER NOTE - OBJECTIVE STATEMENT
77 y/o male acute SOB found on the floor by wife, labored breathing BIBEMS found hypoxic and hypertensive, no CP, rx NTG sl and oxygen BiPap 100 percent and sat at 98 percent  BP in ed 170/105 and the patient improving   HTN PPM  throat CA 75 y/o male acute SOB found on the floor by wife, labored breathing BIBEMS found hypoxic and hypertensive, no CP, rx NTG sl and oxygen BiPap 100 percent and sat at 98 percent  BP in ed 170/105 and the patient improving   HTN PPM/ AICD  throat CA  matt strauss 75 y/o male acute SOB found on the floor by wife, labored breathing BIBEMS found hypoxic and hypertensive, no CP, rx NTG sl and oxygen BiPap 100 percent and sat at 98 percent  BP in ed 170/105 and the patient improving   HTN PPM/ AICD  throat CA  matt Garcia 77 y/o male acute SOB found on the floor by wife 4:30, down for a couple of hours  labored breathing BIBEMS found hypoxic and hypertensive, no CP, rx NTG sl and oxygen BiPap 100 percent and sat at 98 percent  BP in ed 170/105 and the patient improving   HTN PPM/ AICD  throat CA  matt Garcia  He couldn't get up from the floor  due to arm weakness, around 2:30 also labored breathing. arm strength is back to normal in the ED 75 y/o male acute SOB found on the floor by wife 4:30, down for a couple of hours  labored breathing BIBEMS found hypoxic and hypertensive, no CP, rx NTG sl and oxygen BiPap 100 percent and sat at 98 percent  BP in ed 170/105 and the patient improving   JARRED, HTN,  PPM/ AICD  throat CA, chemo Rx 7 years ago   PPO Westley Garcia  Cardiology   He couldn't get up from the floor due to arm weakness, left arm weakness unknown onset, wife estimated that he may have been  on the floor since  2:30am.   around 7:15am while the patient was in the ED it was noted that the patient had seizure like activity of the upper extremities, the patient was conscious at the time, the seizure activity was controled with IV ativan 2mg 75 y/o male acute SOB found on the floor by wife 4:30, down for a couple of hours  labored breathing BIBEMS found hypoxic and hypertensive, no CP, rx NTG sl and oxygen BiPap 100 percent and sat at 98 percent  BP in ed 170/105 and the patient improving   JARRED, HTN,  PPM/ AICD  throat CA, chemo Rx 7 years ago   PPO Westley Garcia  Cardiology   He couldn't get up from the floor due to arm weakness, left arm weakness unknown onset, wife estimated that he may have been  on the floor since  2:30am.   around 7:15am while the patient was in the ED it was noted that the patient had seizure like activity of the upper extremities, the patient was conscious at the time, the seizure activity was controlled with IV ativan 2mg

## 2022-03-30 NOTE — ED PROVIDER NOTE - CRITICAL CARE ATTENDING CONTRIBUTION TO CARE
acute pulmonary edema due to accelerated HTN and ACS with left arm weakness and seizure ....  treated with nitrates,  Morphine sulfate, IV hydralazine, and IV Lasix, improving saturation with  BiPap,  CT negative for acute bleed or infarct,  rx ASA,  rx ativan for the acute seizure, elevated d-dimer, VQ scan pending, ICU, cardiology and neurology  consults and admission

## 2022-03-30 NOTE — CONSULT NOTE ADULT - ASSESSMENT
Assessment:  76 yr old male with stated hx significant for HFrEF, s/p AICD, tonsillar Ca s/p Lt tonsillectomy, Lt partial glossectomy    Plan:    #Neuro:    #Pulm:    #CV:    #GI/:    #ID:    #FEN/ENDO/HEME:          Critical Care Time: 40minutes   Reviewing data, imaging, discussing with multidisciplinary team, not inclusive of procedures, discussing goals of care with family Assessment:  76 yr old male with stated hx significant for HFrEF, s/p AICD, tonsillar Ca s/p Lt tonsillectomy, Lt partial glossectomy, s/p Rt CEA, DM2, hypothyroidism who  presented from home via EMS after being found of floor with LUE weakness, sob. EMS found pt with hypertension, hypoxia, placed on Bipap. In E.D. found to be hypertensive, leukocytosis, hyperglycemic, elevated hsTrops, d-dimer,BNP. course c/b upper extremity clonic motions without LOC tx with ativan. Pt with eventual improvement with SBP, Oriented x3          consult called for hypoxic resp failure, hypertensive emergency, r/o CVA   Plan:    #Neuro:    #Pulm:    #CV:    #GI/:    #ID:    #FEN/ENDO/HEME:          Critical Care Time: 40minutes   Reviewing data, imaging, discussing with multidisciplinary team, not inclusive of procedures, discussing goals of care with family Assessment:  76 yr old male with stated hx significant for HFrEF, s/p AICD, tonsillar Ca s/p Lt tonsillectomy, Lt partial glossectomy, s/p Rt CEA, DM2, hypothyroidism who  presented from home via EMS after being found of floor with LUE weakness, sob. EMS found pt with hypertension, hypoxia, placed on Bipap. In E.D. found to be hypertensive, leukocytosis, hyperglycemic, elevated hsTrops, d-dimer,BNP. course c/b upper extremity clonic motions without LOC tx with ativan. Pt with eventual improvement with SBP, Oriented x3          consult called for hypoxic resp failure, hypertensive emergency, r/o CVA   Plan:    #Neuro:  *presents with LUE weakness, HTN emergency, r/o cva, possible sz activity in E.D.  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 if able due to AICD)  - mg po q d    #Pulm:  *hypoxic resp failure (improved)  -Bipap Therapy - titrate to maintain ph 7.35-7.45; PCO2 35-45; SPO2 > 92%  -Bronchodilator therapy q 6 hrs prn sob/wheezes  -Lung protective therapy  -POCUS A line anterior with focal B lines Lt >Rt, bilateral mod pleural effusiions Rt > Lt    #CV:  *HFrEF, s/p AICD s/p ventricular arrhythmias, elevated hsTrop  -ECG now and q am x3  -Cardiac Enzymes now and q am x 3  -obtain TTE to eval RVFx/LVFx and to eval disease progression  -home med of plavix - continue  -re add ASA therapy (hx of rectal bleed)  -cards consult    #GI/:  *recent hx of rectal bleed (without source found), CKD3  -strict I & O's - keep even to neg  -diet as tolerated    #ID:    #FEN/ENDO/HEME:          Critical Care Time: 40minutes   Reviewing data, imaging, discussing with multidisciplinary team, not inclusive of procedures, discussing goals of care with family Assessment:  76 yr old male with stated hx significant for HFrEF, s/p AICD, tonsillar Ca s/p Lt tonsillectomy, Lt partial glossectomy, s/p Rt CEA, DM2, hypothyroidism, CKD3 who  presented from home via EMS after being found of floor with LUE weakness, sob. EMS found pt with hypertension, hypoxia, placed on Bipap. In E.D. found to be hypertensive, leukocytosis, hyperglycemic, elevated hsTrops, d-dimer,BNP. course c/b upper extremity clonic motions without LOC tx with ativan. Pt with eventual improvement with SBP, Oriented x3          consult called for hypoxic resp failure, hypertensive emergency, r/o CVA   Plan:    #Neuro:  *presents with LUE weakness, HTN emergency, r/o cva, possible sz activity in E.D.  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 if able due to AICD)  - mg po q d  -obtain prolactin level  -consider EEG    #Pulm:  *hypoxic resp failure (improved)  -Bipap Therapy - titrate to maintain ph 7.35-7.45; PCO2 35-45; SPO2 > 92%  -Bronchodilator therapy q 6 hrs prn sob/wheezes  -Lung protective therapy  -POCUS A line anterior with focal B lines Lt >Rt, bilateral mod pleural effusiions Rt > Lt    #CV:  *HFrEF, s/p AICD s/p ventricular arrhythmias, elevated hsTrop  -ECG now and q am x3  -Cardiac Enzymes now and q am x 3  -obtain TTE to eval RVFx/LVFx and to eval disease progression  -home med of plavix - continue  -re add ASA therapy (hx of rectal bleed)  -home med coreg 12.5 mg q 12 hr - continue  -home med hydralazine - hold at present  -cards consult  -POCUS diminished LVFx, RV < LV, VTI 13, VTI 1.9      #GI/:  *recent hx of rectal bleed (without source found), CKD3  -strict I & O's - keep even to neg  -diet as tolerated    #ID:  *leukocytosis, bands 1%  -Pan culture  -obtain COVID-19 PCR  -obtain RVP panel  -obtain UA  -obtain urine for legionella  -obtain procalcitonin level (in setting of CKD)    #FEN/ENDO/HEME:  *Hx hypothyroidism, DM2   -obtain CMP/Mg++/PO--4/CBC w diff/PT/PTT/INR now and q. a.m.  -obtain TSH/Thyroxine/T3  -obtain HgA1c  -POC glucose q 6 hrs with ISS - maintain glucose 140-180  -home med levothyroxine 75 mcg qhs - continue        Critical Care Time: 40minutes   Reviewing data, imaging, discussing with multidisciplinary team, not inclusive of procedures, discussing goals of care with family Assessment:  76 yr old male with stated hx significant for HFrEF, s/p AICD, tonsillar Ca s/p Lt tonsillectomy, Lt partial glossectomy, s/p Rt CEA, DM2, hypothyroidism, CKD3 who  presented from home via EMS after being found of floor with LUE weakness, sob. EMS found pt with hypertension, hypoxia, placed on Bipap. In E.D. found to be hypertensive, leukocytosis, hyperglycemic, elevated hsTrops, d-dimer,BNP. course c/b upper extremity clonic motions without LOC tx with ativan. Pt with eventual improvement with SBP, Oriented x3          consult called for hypoxic resp failure, hypertensive emergency, r/o CVA   Plan:    #Neuro:  *presents with LUE weakness, HTN emergency, r/o cva, possible sz activity in E.D.  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 if able due to AICD)  - mg po q d  -obtain prolactin level  -consider EEG    #Pulm:  *hypoxic resp failure (improved), Pulm edema  -Bipap Therapy - titrate to maintain ph 7.35-7.45; PCO2 35-45; SPO2 > 92%  -Bronchodilator therapy q 6 hrs prn sob/wheezes  -Lung protective therapy  -lasix therapy - to keep even to negative  -POCUS A line anterior with focal B lines Lt >Rt, bilateral mod pleural effusiions Rt > Lt    #CV:  *HFrEF, s/p AICD s/p ventricular arrhythmias, elevated hsTrop  -ECG now and q am x3  -Cardiac Enzymes now and q am x 3  -obtain TTE to eval RVFx/LVFx and to eval disease progression  -home med of plavix - continue  -re add ASA therapy (hx of rectal bleed)  -home med coreg 12.5 mg q 12 hr - continue  -home med hydralazine - hold at present  -cards consult  -POCUS diminished LVFx, RV < LV, VTI 13, VTI 1.9      #GI/:  *recent hx of rectal bleed (without source found), CKD3  -lasix therapy   -strict I & O's - keep even to neg  -diet as tolerated    #ID:  *leukocytosis, bands 1%  -Pan culture  -obtain COVID-19 PCR  -obtain RVP panel  -obtain UA  -obtain urine for legionella  -obtain procalcitonin level (in setting of CKD)    #FEN/ENDO/HEME:  *Hx hypothyroidism, DM2   -obtain CMP/Mg++/PO--4/CBC w diff/PT/PTT/INR now and q. a.m.  -obtain TSH/Thyroxine/T3  -obtain HgA1c  -POC glucose q 6 hrs with ISS - maintain glucose 140-180  -home med levothyroxine 75 mcg qhs - continue        Critical Care Time: 40minutes   Reviewing data, imaging, discussing with multidisciplinary team, not inclusive of procedures, discussing goals of care with family Assessment:  76 yr old male with stated hx significant for HFrEF, s/p AICD, tonsillar Ca s/p Lt tonsillectomy, Lt partial glossectomy, s/p Rt CEA, DM2, hypothyroidism, CKD3 who  presented from home via EMS after being found of floor with LUE weakness, sob. EMS found pt with hypertension, hypoxia, placed on Bipap. In E.D. found to be hypertensive, leukocytosis, hyperglycemic, elevated hsTrops, d-dimer,BNP. course c/b upper extremity clonic motions without LOC tx with ativan. Pt with eventual improvement with SBP, Oriented x3          consult called for hypoxic resp failure secondary to possible ADHF, hypertensive emergency, r/o CVA   Plan:    #Neuro:  *presents with LUE weakness, HTN emergency, r/o cva, possible sz activity in E.D.  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 if able due to AICD)  - mg po q d  -obtain prolactin level  -consider EEG    #Pulm:  *hypoxic resp failure (improved), Pulm edema  -Bipap Therapy - titrate to maintain ph 7.35-7.45; PCO2 35-45; SPO2 > 92%  -Bronchodilator therapy q 6 hrs prn sob/wheezes  -Lung protective therapy  -lasix therapy - to keep even to negative  -POCUS A line anterior with focal B lines Lt >Rt, bilateral mod pleural effusiions Rt > Lt    #CV:  *HFrEF, s/p AICD s/p ventricular arrhythmias, elevated hsTrop  -ECG now and q am x3  -Cardiac Enzymes now and q am x 3  -obtain TTE to eval RVFx/LVFx and to eval disease progression  -home med of plavix - continue  -re add ASA therapy (hx of rectal bleed)  -home med coreg 12.5 mg q 12 hr - continue  -home med hydralazine - hold at present  -cards consult  -POCUS diminished LVFx, RV < LV, VTI 13, VTI 1.9      #GI/:  *recent hx of rectal bleed (without source found), CKD3  -lasix therapy   -strict I & O's - keep even to neg  -diet as tolerated    #ID:  *leukocytosis, bands 1%  -Pan culture  -obtain COVID-19 PCR  -obtain RVP panel  -obtain UA  -obtain urine for legionella  -obtain procalcitonin level (in setting of CKD)    #FEN/ENDO/HEME:  *Hx hypothyroidism, DM2   -obtain CMP/Mg++/PO--4/CBC w diff/PT/PTT/INR now and q. a.m.  -obtain TSH/Thyroxine/T3  -obtain HgA1c  -POC glucose q 6 hrs with ISS - maintain glucose 140-180  -home med levothyroxine 75 mcg qhs - continue        Critical Care Time: 40minutes   Reviewing data, imaging, discussing with multidisciplinary team, not inclusive of procedures, discussing goals of care with family Assessment:  76 yr old male with stated hx significant for HFrEF, s/p AICD, tonsillar Ca s/p Lt tonsillectomy, Lt partial glossectomy, s/p Rt CEA, DM2, hypothyroidism, CKD3 who  presented from home via EMS after being found of floor with LUE weakness, sob. EMS found pt with hypertension, hypoxia, placed on Bipap. In E.D. found to be hypertensive, leukocytosis, hyperglycemic, elevated hsTrops, d-dimer,BNP. course c/b upper extremity clonic motions without LOC tx with ativan. Pt with eventual improvement with SBP, Oriented x3          consult called for hypoxic resp failure secondary to possible ADHF, hypertensive emergency, r/o CVA   Plan:    #Neuro:  *presents with LUE weakness, HTN emergency, r/o cva, possible sz activity in E.D.  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 if able due to AICD)  - mg po q d  -obtain prolactin level  -consider EEG    #Pulm:  *hypoxic resp failure (improved), Pulm edema due to possible ADHF  -Bipap Therapy - titrate to maintain ph 7.35-7.45; PCO2 35-45; SPO2 > 92%  -Bronchodilator therapy q 6 hrs prn sob/wheezes  -Lung protective therapy  -lasix therapy - to keep even to negative  -POCUS A line anterior with focal B lines Lt >Rt, bilateral mod pleural effusiions Rt > Lt    #CV:  *HFrEF, s/p AICD s/p ventricular arrhythmias, elevated hsTrop, ADHF  -ECG now and q am x3  -Cardiac Enzymes now and q am x 3  -obtain TTE to eval RVFx/LVFx and to eval disease progression  -home med of plavix - continue  -re add ASA therapy (hx of rectal bleed)  -home med coreg 12.5 mg q 12 hr - continue  -home med hydralazine - hold at present  -cards consult  -POCUS diminished LVFx, RV < LV, VTI 13, VTI 1.9      #GI/:  *recent hx of rectal bleed (without source found), CKD3  -lasix therapy   -strict I & O's - keep even to neg  -diet as tolerated    #ID:  *leukocytosis, bands 1%  -Pan culture  -obtain COVID-19 PCR  -obtain RVP panel  -obtain UA  -obtain urine for legionella  -obtain procalcitonin level (in setting of CKD)    #FEN/ENDO/HEME:  *Hx hypothyroidism, DM2   -obtain CMP/Mg++/PO--4/CBC w diff/PT/PTT/INR now and q. a.m.  -obtain TSH/Thyroxine/T3  -obtain HgA1c  -POC glucose q 6 hrs with ISS - maintain glucose 140-180  -home med levothyroxine 75 mcg qhs - continue        Critical Care Time: 40minutes   Reviewing data, imaging, discussing with multidisciplinary team, not inclusive of procedures, discussing goals of care with family

## 2022-03-30 NOTE — CONSULT NOTE ADULT - SUBJECTIVE AND OBJECTIVE BOX
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 As per patient, he was watching a movie when he felt very short of breath suddenly and dropped to the floor, denies LOC and denies any trauma to his head, was on the floor for ~2 hours. Patient was unable to rise from the floor by himself due to his left arm weakness. When first brought to the ED, patient was hypoxic and hypertensive o2 saturation in EMS was >80%. While in the ED, patient had episode of siezure-like activity in his b/l upper extremities which was controlled w/ ativan. Patient was seen and examined at bedside, BiPAP still in place, patient was still mildly lethargic from ativan and could not speak well with bipap mask on. Patient able to open eyes and nod/shake head to questions. Patient denied any headache, vision changes, cp, abd pain, msk pain. Patient still short of breath.      In ED:   Vitals: HR 80, 176/82 -> 115/60, RR 20, 96% on BiPAP/CPAP  Labs significant for: WBC 20.24, H/H 10.8/24.1, D-Dimer 2924, Troponin 1011.9 > 5252.0, CKMB: 13.1, CPK%: 4%, BUN 33, Creatinine 2.3 (baseline unknown) eGFR 29, serum pro BNP 4963, creatine kinase 328, POCT glucose: 353  Imaging:   CXR: diffuse b/l scattered infiltrates  CT brain stroke: No acute hemorrhage, infarct, or mass effect   EKG: sinus tachy at ST- depressions in V5 V6   Received Lasix 40mg IVP x1, Aspirin 600mg x1, Hydralazine 10mg x1, Ativan 2mg IVP x1, Lispro 6u in the ED    (30 Mar 2022 08:48)    Renal consulted for elevated Cr. Patient follows with Dr. Tomas whom we cover here at [facility]. Patient with baseline creatinine around 2-2.5. No urinary complaints.        PAST MEDICAL & SURGICAL HISTORY:  MI (myocardial infarction)  1992    HTN (hypertension)    HLD (hyperlipidemia)    Throat cancer  2011, treated with chemo, RT    Ventricular arrhythmia  s/p AICD    Diabetes  Type2, not on any meds since weight loss after throat Ca    Renal insufficiency  s/p chemo    CAD (coronary artery disease)    Inguinal hernia, left    H/O prior ablation treatment  VT, 2009    Cardiac defibrillator in place  - 2009, battery change 2017    S/P left inguinal hernia repair  2018 at [place]         FAMILY HISTORY:  Family history of cancer (Sibling)    NC    Social History:Non smoker    MEDICATIONS  (STANDING):  atorvastatin 10 milliGRAM(s) Oral at bedtime  calcitriol   Capsule 0.25 MICROGram(s) Oral daily  carvedilol 12.5 milliGRAM(s) Oral every 12 hours  clopidogrel Tablet 75 milliGRAM(s) Oral daily  dextrose 5%. 1000 milliLiter(s) (50 mL/Hr) IV Continuous <Continuous>  dextrose 5%. 1000 milliLiter(s) (100 mL/Hr) IV Continuous <Continuous>  dextrose 50% Injectable 25 Gram(s) IV Push once  dextrose 50% Injectable 12.5 Gram(s) IV Push once  dextrose 50% Injectable 25 Gram(s) IV Push once  glucagon  Injectable 1 milliGRAM(s) IntraMuscular once  heparin   Injectable 5000 Unit(s) SubCutaneous every 8 hours  hydrALAZINE 5 milliGRAM(s) Oral three times a day  insulin lispro (ADMELOG) corrective regimen sliding scale   SubCutaneous three times a day before meals  insulin lispro (ADMELOG) corrective regimen sliding scale   SubCutaneous at bedtime  isosorbide   mononitrate ER Tablet (IMDUR) 30 milliGRAM(s) Oral daily  levothyroxine 75 MICROGram(s) Oral daily  valproate sodium IVPB 500 milliGRAM(s) IV Intermittent every 12 hours    MEDICATIONS  (PRN):  ALBUTerol    90 MICROgram(s) HFA Inhaler 2 Puff(s) Inhalation every 6 hours PRN Shortness of Breath and/or Wheezing  dextrose Oral Gel 15 Gram(s) Oral once PRN Blood Glucose LESS THAN 70 milliGRAM(s)/deciliter  ondansetron Injectable 4 milliGRAM(s) IV Push every 8 hours PRN Nausea and/or Vomiting   Meds reviewed    Allergies    No Known Allergies    Intolerances         REVIEW OF SYSTEMS:    Review of Systems:   Constitutional: weakness  HEENT: Denies headaches and dizziness  Respiratory: denies SOB, cough, or wheezing  Cardiovascular: denies CP, palpitations  Gastrointestinal: Denies nausea, denies vomiting, diarrhea, constipation, abdominal pain, or bloody stools  Genitourinary: denies painful urination, increased frequency, urgency, or bloody urine  Skin: denies rashes or itching  Musculoskeletal: denies muscle aches, joint swelling  Neurologic: denies loss of sensation, numbness, or tingling      Vital Signs Last 24 Hrs  T(C): 36.7 (30 Mar 2022 15:28), Max: 37.8 (30 Mar 2022 09:23)  T(F): 98 (30 Mar 2022 15:28), Max: 100 (30 Mar 2022 09:23)  HR: 70 (30 Mar 2022 15:28) (70 - 109)  BP: 140/76 (30 Mar 2022 15:28) (115/60 - 176/82)  BP(mean): --  RR: 18 (30 Mar 2022 15:28) (18 - 29)  SpO2: 98% (30 Mar 2022 15:28) (94% - 100%)  Daily Height in cm: 175.26 (30 Mar 2022 05:44)    Daily     PHYSICAL EXAM:    GENERAL: No distress  HEAD:  Atraumatic, Normocephalic  EYES: Conjunctiva and sclera clear  ENMT: No Drainage from nares, No drainage from ears  NECK: Supple  NERVOUS SYSTEM:  Awake and Alert  CHEST/LUNG: Few rhonchi noted  HEART: Regular rate and rhythm; No murmurs, rubs, or gallops  ABDOMEN: Soft, Nontender, Nondistended; Bowel sounds present  EXTREMITIES:  No Edema  SKIN: No rashes No obvious ecchymosis      LABS:                        10.1   16.12 )-----------( 185      ( 30 Mar 2022 14:34 )             31.5     03-30    139  |  108  |  33<H>  ----------------------------<  375<H>  4.8   |  24  |  2.30<H>    Ca    8.5      30 Mar 2022 06:12    TPro  7.0  /  Alb  3.2<L>  /  TBili  1.1  /  DBili  x   /  AST  53<H>  /  ALT  48  /  AlkPhos  104  03-30    PT/INR - ( 30 Mar 2022 06:43 )   PT: 15.2 sec;   INR: 1.30 ratio         PTT - ( 30 Mar 2022 06:43 )  PTT:25.8 sec      ABG - ( 30 Mar 2022 08:15 )  pH, Arterial: 7.42  pH, Blood: x     /  pCO2: 36    /  pO2: 94    / HCO3: 23    / Base Excess: -1.1  /  SaO2: 98.4                  RADIOLOGY & ADDITIONAL TESTS:

## 2022-03-30 NOTE — H&P ADULT - NSICDXPASTSURGICALHX_GEN_ALL_CORE_FT
PAST SURGICAL HISTORY:  Cardiac defibrillator in place - 2009, battery change 2017    H/O prior ablation treatment VT, 2009    S/P left inguinal hernia repair 2018 at [place]

## 2022-03-30 NOTE — CONSULT NOTE ADULT - ASSESSMENT
CKD Stage 3  Possible CVA  HTN with CKD  Anemia  Possible PNA  CHF  NSTEMI      -Renal indices are stable at baseline; stable lytes; baseline creatinine 2-2.5; Had chemo in the past which led to nephrotoxicty which caused advancing CKD  -No additional renal work up for now unless JARRED develops  -Lasix PRN; Cardio eval noted  -Stroke work up per Neuro  -Permissive HTN  -Abx? Leukocytosis and infiltrates noted  -Check iron studies; can not give CATHERINE if truly has a stroke  -Monitor chemistries  -Avoid Nephrotoxins    Thank you    D/W Spouse at bedside and Dr. Tomas

## 2022-03-30 NOTE — H&P ADULT - MOTOR
decreased muscle strength and tone in b/l UE and b/l LE, 3/5 strength testing in RUE, 2/5 in LUE, 2/5 in b/l RLE and LLE decreased muscle strength and tone in b/l UE and b/l LE, 4/5 strength testing in RUE, 2/5 in LUE, 4/5 in b/l RLE and LLE

## 2022-03-30 NOTE — ED PROVIDER NOTE - NSICDXPASTSURGICALHX_GEN_ALL_CORE_FT
PAST SURGICAL HISTORY:  Cardiac defibrillator in place - 2009, battery change 2017    H/O prior ablation treatment VT, 2009    S/P left inguinal hernia repair 2018 at [place]

## 2022-03-30 NOTE — ED PROVIDER NOTE - SIGNIFICANT NEGATIVE FINDINGS
no headache, no chest pain, no Syncope, no palpitations, no n/v/d, no urinary symptoms, no bleeding.

## 2022-03-30 NOTE — CHART NOTE - NSCHARTNOTEFT_GEN_A_CORE
Called by RN as patient having "clots" in mcclellan and patient ordered to receive heparin gtt soon. Patient seen and examined at bedside. Pt admits to hx of hematochezia but no hematuria. Pt unable to further quantify hematochezia/last BM upon questioning. Pt without any other acute concerns at this time. Per RN, patient had mcclellan placed in ED and was transferred to floor not too long ago.    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 Admitted for pulmonary edema, leukocytosis, elevated troponin, JARRED on CKD and seizure.  Called by RN as patient with "clots" in mcclellan and heparin gtt ordered.  - pink tinged urine likely 2/2 traumatic mcclellan, without clots or bright red blood at this time  - patient with elevated trops 1011 -> 5252 -> 30409 and requiring heparin gtt, would continue  - will continue to monitor patient mcclellan for blood/clots  - will obtain H&H at midnight   - RN to call with any changes Called by RN as patient having "clots" in mcclellan and patient ordered to receive heparin gtt soon. Patient seen and examined at bedside. Pt admits to hx of hematochezia but no hematuria. Pt unable to further quantify hematochezia/last BM upon questioning. Pt without any other acute concerns at this time. Per RN, patient had mcclellan placed in ED and was transferred to floor not too long ago.    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 Admitted for pulmonary edema, leukocytosis, elevated troponin, JARRED on CKD and seizure.  Called by RN as patient with "clots" in mcclellan and heparin gtt ordered.  - pink tinged urine likely 2/2 traumatic mcclellan, without clots or gross blood at this time  - patient with elevated trops 1011 -> 5252 -> 98816 and requiring heparin gtt, would continue  - will continue to monitor patient mcclellan for gross blood/clots  - RN to call with any changes Called by RN as patient having "clots" in mcclellan and patient ordered to receive heparin gtt soon. Patient seen and examined at bedside. Pt admits to hx of hematochezia but no hematuria. Pt unable to further quantify hematochezia/last BM upon questioning. Pt without any other acute concerns at this time. Per RN, patient had mcclellan placed in ED and was transferred to floor not too long ago.    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 Admitted for pulmonary edema, leukocytosis, elevated troponin, JARRED on CKD and seizure.  Called by RN as patient with "clots" in mcclellan and heparin gtt ordered.  - pink tinged urine likely 2/2 traumatic mcclellan, without clots or gross blood at this time  - patient with elevated trops 1011 -> 5252 -> 07334 and requiring heparin gtt, would continue  - will continue to monitor patient mcclellan for gross blood/clots  - RN to call with any changes    ADDENDUM 3/30 @ 21:33:  Troponin downtrended from 85294.5 to 75734.6, CKMB, CK also downtrended. Per cardio recommendations, enzymes trended to peak. Will hold off on sending further cardiac enzymes at this time.

## 2022-03-30 NOTE — CONSULT NOTE ADULT - SUBJECTIVE AND OBJECTIVE BOX
[facility], Division of Infectious Diseases  MEGAN Marinelli S. Shah, Y. Patel, G. Casimir  [phone]  ([phone] - weekdays after 5pm and weekends)    ANTONIO PONCE  76y, Male  [id number]    HPI--  HPI:  75 yo male w/ PMHx of HTN, HLD, HFrEF (EF 30% 2009), right tonsillar cancer 2011 s/p chemo and radiation, partial left tonsillectomy and s/p left partial tongue resection 2019, carotid stenosis s/p right CEA 2020, DM2, CAD s/p AICD for ventricular arrhythmia (2009 w/ generator change in 2017) and hypothyroidism presents to ED after a being found on the floor by wife yesterday in the early morning. As per patient, he was watching a movie when he felt very short of breath and dropped to the floor, denies LOC and denies any trauma to his head. Patient was unable to rise from the floor by himself due to his left arm weakness. Patient and wife state he had been short of breath for about 2 days which progressively worsened.   In ED, patient was hypoxic and hypertensive. While in the ED, patient had episode of siezure-like activity in his b/l upper extremities which was controlled w/ ativan. S/p IV lasix in ED patient and wife note significant improvement in SOB.  Labs significant for: WBC 20.24, D-Dimer 2924, Troponin 1011.9 > 5252.0, CKMB: 13.1, serum pro BNP 4963  CXR: diffuse b/l scattered infiltrates  Received Lasix 40mg IVP x1, Aspirin 600mg x1, Hydralazine 10mg x1, Ativan 2mg IVP x1    ROS: 10 point review of systems completed, pertinent positives and negatives as per HPI.    Allergies: No Known Allergies    PMH -- AICD at end of battery life    MI (myocardial infarction)    HTN (hypertension)    HLD (hyperlipidemia)    Throat cancer    Ventricular arrhythmia    Diabetes    Renal insufficiency    CAD (coronary artery disease)    Inguinal hernia, left      PSH -- H/O prior ablation treatment    Cardiac defibrillator in place    S/P left inguinal hernia repair      FH -- Family history of cancer (Sibling)      Social History -- denies tobacco, alcohol or illicit drug use  Travel/Environmental/Occupational History: ***    Physical Exam--  Vital Signs Last 24 Hrs  T(F): 97.5 (30 Mar 2022 15:59), Max: 100 (30 Mar 2022 09:23)  HR: 76 (30 Mar 2022 15:59) (70 - 109)  BP: 167/83 (30 Mar 2022 15:59) (115/60 - 176/82)  RR: 17 (30 Mar 2022 15:59) (17 - 29)  SpO2: 98% (30 Mar 2022 15:59) (94% - 100%)  General: frail appearing, no acute distress  HEENT: anicteric, oropharynx clear, upper dentures, missing lower teeth  Neck: Not rigid. No LAD  Lungs: Clear bilaterally without rales, wheezing or rhonchi  Heart: S1, S2. No murmur  Abdomen: Soft. Nondistended. Nontender.   : mcclellan  Back: No costovertebral angle tenderness.  Extremities: No LE edema.   Skin: Warm. Dry. No rash  Psychiatric: flat affect  Lines:    Laboratory & Imaging Data--  CBC:                       10.1   16.12 )-----------( 185      ( 30 Mar 2022 14:34 )             31.5     WBC Count: 16.12 K/uL (03-30-22 @ 14:34)  WBC Count: 20.24 K/uL (03-30-22 @ 06:12)    CMP: 03-30    139  |  108  |  33<H>  ----------------------------<  375<H>  4.8   |  24  |  2.30<H>    Ca    8.5      30 Mar 2022 06:12    TPro  7.0  /  Alb  3.2<L>  /  TBili  1.1  /  DBili  x   /  AST  53<H>  /  ALT  48  /  AlkPhos  104  03-30    LIVER FUNCTIONS - ( 30 Mar 2022 06:12 )  Alb: 3.2 g/dL / Pro: 7.0 g/dL / ALK PHOS: 104 U/L / ALT: 48 U/L / AST: 53 U/L / GGT: x             Microbiology:     Radiology--  ***  Active Medications--  ALBUTerol    90 MICROgram(s) HFA Inhaler 2 Puff(s) Inhalation every 6 hours PRN  atorvastatin 10 milliGRAM(s) Oral at bedtime  calcitriol   Capsule 0.25 MICROGram(s) Oral daily  carvedilol 12.5 milliGRAM(s) Oral every 12 hours  clopidogrel Tablet 75 milliGRAM(s) Oral daily  dextrose 5%. 1000 milliLiter(s) IV Continuous <Continuous>  dextrose 5%. 1000 milliLiter(s) IV Continuous <Continuous>  dextrose 50% Injectable 25 Gram(s) IV Push once  dextrose 50% Injectable 12.5 Gram(s) IV Push once  dextrose 50% Injectable 25 Gram(s) IV Push once  dextrose Oral Gel 15 Gram(s) Oral once PRN  dipyridamole 200 mG/aspirin 25 mG 1 Capsule(s) Oral two times a day  glucagon  Injectable 1 milliGRAM(s) IntraMuscular once  heparin   Injectable 5500 Unit(s) IV Push once  heparin   Injectable 5500 Unit(s) IV Push every 6 hours PRN  heparin   Injectable 2500 Unit(s) IV Push every 6 hours PRN  heparin   Injectable 5000 Unit(s) SubCutaneous every 8 hours  heparin  Infusion.  Unit(s)/Hr IV Continuous <Continuous>  hydrALAZINE 5 milliGRAM(s) Oral three times a day  influenza  Vaccine (HIGH DOSE) 0.7 milliLiter(s) IntraMuscular once  insulin lispro (ADMELOG) corrective regimen sliding scale   SubCutaneous three times a day before meals  insulin lispro (ADMELOG) corrective regimen sliding scale   SubCutaneous at bedtime  isosorbide   mononitrate ER Tablet (IMDUR) 30 milliGRAM(s) Oral daily  levothyroxine 75 MICROGram(s) Oral daily  ondansetron Injectable 4 milliGRAM(s) IV Push every 8 hours PRN  valproate sodium IVPB 500 milliGRAM(s) IV Intermittent every 12 hours    Antimicrobials:     Immunologic: influenza  Vaccine (HIGH DOSE) 0.7 milliLiter(s) IntraMuscular once     [facility], Division of Infectious Diseases  MEGAN Marinelli S. Shah, Y. Patel, G. Casimir  [phone]  ([phone] - weekdays after 5pm and weekends)    ANTONIO PONCE  76y, Male  [id number]    HPI--  HPI:  75 yo male w/ PMHx of HTN, HLD, HFrEF (EF 30% 2009), right tonsillar cancer 2011 s/p chemo and radiation, partial left tonsillectomy and s/p left partial tongue resection 2019, carotid stenosis s/p right CEA 2020, DM2, CAD s/p AICD for ventricular arrhythmia (2009 w/ generator change in 2017) and hypothyroidism presents to ED after a being found on the floor by wife yesterday in the early morning. As per patient, he was watching a movie when he felt very short of breath and dropped to the floor, denies LOC and denies any trauma to his head. Patient was unable to rise from the floor by himself due to his left arm weakness. Patient and wife state he had been short of breath for about 2 days which progressively worsened.   In ED, patient was hypoxic and hypertensive. While in the ED, patient had episode of siezure-like activity in his b/l upper extremities which was controlled w/ ativan. S/p IV lasix in ED patient and wife note significant improvement in SOB.  Labs significant for: WBC 20.24, D-Dimer 2924, Troponin 1011.9 > 5252.0, CKMB: 13.1, serum pro BNP 4963  CXR: diffuse b/l scattered infiltrates  Received Lasix in ED  s/p CT head- No intracranial bleed, mass effect or acute territorial infarct demonstrated.    ROS: 10 point review of systems completed, pertinent positives and negatives as per HPI.    Allergies: No Known Allergies    PMH -- AICD at end of battery life    MI (myocardial infarction)    HTN (hypertension)    HLD (hyperlipidemia)    Throat cancer    Ventricular arrhythmia    Diabetes    Renal insufficiency    CAD (coronary artery disease)    Inguinal hernia, left      PSH -- H/O prior ablation treatment    Cardiac defibrillator in place    S/P left inguinal hernia repair      FH -- Family history of cancer (Sibling)      Social History -- denies tobacco, alcohol or illicit drug use  Travel/Environmental/Occupational History: ***    Physical Exam--  Vital Signs Last 24 Hrs  T(F): 97.5 (30 Mar 2022 15:59), Max: 100 (30 Mar 2022 09:23)  HR: 76 (30 Mar 2022 15:59) (70 - 109)  BP: 167/83 (30 Mar 2022 15:59) (115/60 - 176/82)  RR: 17 (30 Mar 2022 15:59) (17 - 29)  SpO2: 98% (30 Mar 2022 15:59) (94% - 100%)  General: frail appearing, no acute distress  HEENT: anicteric, oropharynx clear, upper dentures, missing lower teeth  Neck: Not rigid. No LAD  Lungs: Clear bilaterally without rales, wheezing or rhonchi  Heart: S1, S2. No murmur  Abdomen: Soft. Nondistended. Nontender.   : mcclellan  Back: No costovertebral angle tenderness.  Extremities: No LE edema.   Skin: Warm. Dry. No rash  Psychiatric: flat affect  Lines:    Laboratory & Imaging Data--  CBC:                       10.1   16.12 )-----------( 185      ( 30 Mar 2022 14:34 )             31.5     WBC Count: 16.12 K/uL (03-30-22 @ 14:34)  WBC Count: 20.24 K/uL (03-30-22 @ 06:12)    CMP: 03-30    139  |  108  |  33<H>  ----------------------------<  375<H>  4.8   |  24  |  2.30<H>    Ca    8.5      30 Mar 2022 06:12    TPro  7.0  /  Alb  3.2<L>  /  TBili  1.1  /  DBili  x   /  AST  53<H>  /  ALT  48  /  AlkPhos  104  03-30    LIVER FUNCTIONS - ( 30 Mar 2022 06:12 )  Alb: 3.2 g/dL / Pro: 7.0 g/dL / ALK PHOS: 104 U/L / ALT: 48 U/L / AST: 53 U/L / GGT: x             Microbiology:     Radiology--  ***  Active Medications--  ALBUTerol    90 MICROgram(s) HFA Inhaler 2 Puff(s) Inhalation every 6 hours PRN  atorvastatin 10 milliGRAM(s) Oral at bedtime  calcitriol   Capsule 0.25 MICROGram(s) Oral daily  carvedilol 12.5 milliGRAM(s) Oral every 12 hours  clopidogrel Tablet 75 milliGRAM(s) Oral daily  dextrose 5%. 1000 milliLiter(s) IV Continuous <Continuous>  dextrose 5%. 1000 milliLiter(s) IV Continuous <Continuous>  dextrose 50% Injectable 25 Gram(s) IV Push once  dextrose 50% Injectable 12.5 Gram(s) IV Push once  dextrose 50% Injectable 25 Gram(s) IV Push once  dextrose Oral Gel 15 Gram(s) Oral once PRN  dipyridamole 200 mG/aspirin 25 mG 1 Capsule(s) Oral two times a day  glucagon  Injectable 1 milliGRAM(s) IntraMuscular once  heparin   Injectable 5500 Unit(s) IV Push once  heparin   Injectable 5500 Unit(s) IV Push every 6 hours PRN  heparin   Injectable 2500 Unit(s) IV Push every 6 hours PRN  heparin   Injectable 5000 Unit(s) SubCutaneous every 8 hours  heparin  Infusion.  Unit(s)/Hr IV Continuous <Continuous>  hydrALAZINE 5 milliGRAM(s) Oral three times a day  influenza  Vaccine (HIGH DOSE) 0.7 milliLiter(s) IntraMuscular once  insulin lispro (ADMELOG) corrective regimen sliding scale   SubCutaneous three times a day before meals  insulin lispro (ADMELOG) corrective regimen sliding scale   SubCutaneous at bedtime  isosorbide   mononitrate ER Tablet (IMDUR) 30 milliGRAM(s) Oral daily  levothyroxine 75 MICROGram(s) Oral daily  ondansetron Injectable 4 milliGRAM(s) IV Push every 8 hours PRN  valproate sodium IVPB 500 milliGRAM(s) IV Intermittent every 12 hours    Antimicrobials:     Immunologic: influenza  Vaccine (HIGH DOSE) 0.7 milliLiter(s) IntraMuscular once

## 2022-03-30 NOTE — ED ADULT NURSE NOTE - OBJECTIVE STATEMENT
oral
76 yr old male BIBA from home. A+O x 4. Unable to speak due to PMH of throat Ca. Wife reports that she found him on the floor. Time on the floor unknown. Pt reports that he was unable to move his left arm and it was very weak. Hypoxic at the scene when EMS arrived. On CPAP upon arrival to ED. 95% O2 on 50% when transferred to BIPAP in ED. Afib noted on the monitor with PVC's. Course Crackles noted bilaterally. Pt denies abdominal pain, CP, neck pain. Left arm weakness noted. Skin warm, dry, and intact. will continue to monitor.

## 2022-03-30 NOTE — H&P ADULT - NSHPSOCIALHISTORY_GEN_ALL_CORE
Lives at home w/   ADL   Tobacco   Alcohol   Drugs   COVID [employer] x3   Occupation Lives at home w/ wife   ADL   Tobacco   Alcohol   Drugs   COVID Pf[employer] x3   Occupation Lives at home w/ wife   ADL independent   Tobacco former smoker, quit 40 years ago, 20 pack year history  Alcohol denies  Drugs denies  COVID Pf[employer] x3   Occupation retired- former

## 2022-03-30 NOTE — H&P ADULT - PROBLEM SELECTOR PLAN 10
Chronic stable   - Continue plavix 75mg qD Chronic, stable on admission  - Continue home medications hydralazine 5mg TID w/ holding parameters   - Monitor routine hemodynamics

## 2022-03-30 NOTE — SWALLOW BEDSIDE ASSESSMENT ADULT - SWALLOW EVAL: RECOMMENDED FEEDING/EATING TECHNIQUES
* ALL SOLIDS AND LIQUIDS VIA TSP PRESENTATIONS/allow for swallow between intakes/alternate food with liquid/check mouth frequently for oral residue/pocketing/crush medication (when feasible)/maintain upright posture during/after eating for 30 mins/no straws/oral hygiene/position upright (90 degrees)/small sips/bites

## 2022-03-30 NOTE — SWALLOW BEDSIDE ASSESSMENT ADULT - ASR SWALLOW REFERRAL
Consider ENT consult to further assess laryngeal function if concern for abovementioned dysphonia/ENT

## 2022-03-30 NOTE — PATIENT PROFILE ADULT - FALL HARM RISK - HARM RISK INTERVENTIONS
Assistance with ambulation/Assistance OOB with selected safe patient handling equipment/Communicate Risk of Fall with Harm to all staff/Discuss with provider need for PT consult/Monitor gait and stability/Reinforce activity limits and safety measures with patient and family/Tailored Fall Risk Interventions/Visual Cue: Yellow wristband and red socks/Bed in lowest position, wheels locked, appropriate side rails in place/Call bell, personal items and telephone in reach/Instruct patient to call for assistance before getting out of bed or chair/Non-slip footwear when patient is out of bed/[place]phi[place] to call system/Physically safe environment - no spills, clutter or unnecessary equipment/Purposeful Proactive Rounding/Room/bathroom lighting operational, light cord in reach

## 2022-03-30 NOTE — H&P ADULT - PROBLEM SELECTOR PROBLEM 8
Hypothyroidism Acute kidney injury superimposed on CKD Chronic HFrEF (heart failure with reduced ejection fraction) Elevated creatine kinase

## 2022-03-30 NOTE — SWALLOW BEDSIDE ASSESSMENT ADULT - SWALLOW EVAL: DIAGNOSIS
1. Patient demonstrates a mild oral dysphagia for pureed, moderately thick, and mildly thick liquids marked by prolonged manipulation resulting in delayed collection, transfer, and transport. Deferred trials of solids d/t patient not agreeable, despite encouragement. 2. Patient demonstrates a mild oral dysphagia for pureed and moderately thick liquids marked by suspected delayed pharyngeal swallow trigger and hyolaryngeal elevation noted by digital palpation without evidence of airway penetration/aspiration. Multiple swallows noted with cup sip of moderately thick liquids, not reduplicated with tsp presentations. 3. Patient demonstrates a moderate-severe pharyngeal dysphagia for mildly thick liquids marked by suspected delayed pharyngeal swallow trigger, multiple swallows, a change on vocal quality to a "wet" tone and delayed cough x1, suggestive of airway penetration/aspiration, despite modification to tsp presentations. 4. Recommend pureed and moderately thick liquids,

## 2022-03-30 NOTE — H&P ADULT - PROBLEM SELECTOR PLAN 4
Patient w/ admission Hgb of 10.8, baseline unknown   - likely anemia of chronic disease   - patient w/ no signs or symptoms of bleeding   - transfusion goal >8   - f/u iron panel, b12, folate   - trend daily CBCs Acute, 2/2 reactive to physical stress   - patient currently w/o fever or signs of infection   - f/u lactate, blood cultures x2  -ID DR Sandor perez.  - will monitor off abx   - trend daily CBCs

## 2022-03-30 NOTE — H&P ADULT - ATTENDING COMMENTS
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 Admitted for pulmonary edema, leukocytosis, elevated troponin, JARRED on CKD-3  and seizure.   Pt seen, examined, case & care plan d/w pt, residents at detail.  -D/W Neuro-Dr Brownlee - Concern for CVA, Seizure,EEG, RX with Depakote,  plan for Aggrenox if ok with cardio,    -Cardiology-Dr Hahn d/w at detail. ECHO, OK with Aggrenox,  Elevated Troponin-start IV Heparin drip   -Renal eval DR Porter   AM labs , PT eval  -PO diet  Palliative care Eval, Prognosis is Guarded.  Total care time is 75 minutes.

## 2022-03-30 NOTE — ED PROVIDER NOTE - PROGRESS NOTE DETAILS
patient arrived from getting ct head, noted by wife to have left arm weakness, and now patient having seizure like activity of bilateral arms, to get ativan 2mg ivp, called ICU, will come to see patient

## 2022-03-30 NOTE — CONSULT NOTE ADULT - NS PANP COMMENT GEN_ALL_CORE FT
75 yo man with Hx HFrEF, AICD, PVD, DM2, CKD3 presenting with htn emergency, acute respiratory failure from pulmonary edema and ADHF.  He received lasix and BiPAP with marked improvement in respiratory status and is now comfortable on NC, mildly hypertensive.  No need for ICU at this time.  Continue current management of ADHF and demand ischemia.  Call if condition changes.

## 2022-03-30 NOTE — ED ADULT NURSE NOTE - NSICDXPASTSURGICALHX_GEN_ALL_CORE_FT
PAST SURGICAL HISTORY:  Cardiac defibrillator in place - 2009, battery change 2017    H/O prior ablation treatment [place], 2009    S/P left inguinal hernia repair 2018 at [place]

## 2022-03-31 ENCOUNTER — TRANSCRIPTION ENCOUNTER (OUTPATIENT)
Age: 77
End: 2022-03-31

## 2022-03-31 DIAGNOSIS — C09.9 MALIGNANT NEOPLASM OF TONSIL, UNSPECIFIED: ICD-10-CM

## 2022-03-31 DIAGNOSIS — I63.9 CEREBRAL INFARCTION, UNSPECIFIED: ICD-10-CM

## 2022-03-31 LAB
A1C WITH ESTIMATED AVERAGE GLUCOSE RESULT: 8.3 % — HIGH (ref 4–5.6)
ALBUMIN SERPL ELPH-MCNC: 2.9 G/DL — LOW (ref 3.3–5)
ALP SERPL-CCNC: 83 U/L — SIGNIFICANT CHANGE UP (ref 40–120)
ALT FLD-CCNC: 38 U/L — SIGNIFICANT CHANGE UP (ref 12–78)
ANION GAP SERPL CALC-SCNC: 10 MMOL/L — SIGNIFICANT CHANGE UP (ref 5–17)
APTT BLD: 41 SEC — HIGH (ref 27.5–35.5)
APTT BLD: 42.3 SEC — HIGH (ref 27.5–35.5)
APTT BLD: 46.3 SEC — HIGH (ref 27.5–35.5)
APTT BLD: 51 SEC — HIGH (ref 27.5–35.5)
AST SERPL-CCNC: 51 U/L — HIGH (ref 15–37)
BASOPHILS # BLD AUTO: 0.06 K/UL — SIGNIFICANT CHANGE UP (ref 0–0.2)
BASOPHILS NFR BLD AUTO: 0.5 % — SIGNIFICANT CHANGE UP (ref 0–2)
BILIRUB SERPL-MCNC: 1.3 MG/DL — HIGH (ref 0.2–1.2)
BUN SERPL-MCNC: 35 MG/DL — HIGH (ref 7–23)
CALCIUM SERPL-MCNC: 8.9 MG/DL — SIGNIFICANT CHANGE UP (ref 8.5–10.1)
CHLORIDE SERPL-SCNC: 107 MMOL/L — SIGNIFICANT CHANGE UP (ref 96–108)
CHOLEST SERPL-MCNC: 103 MG/DL — SIGNIFICANT CHANGE UP
CK MB BLD-MCNC: 1.5 % — SIGNIFICANT CHANGE UP (ref 0–3.5)
CK MB CFR SERPL CALC: 3.1 NG/ML — SIGNIFICANT CHANGE UP (ref 0–3.6)
CK SERPL-CCNC: 202 U/L — SIGNIFICANT CHANGE UP (ref 26–308)
CO2 SERPL-SCNC: 25 MMOL/L — SIGNIFICANT CHANGE UP (ref 22–31)
CREAT SERPL-MCNC: 2.3 MG/DL — HIGH (ref 0.5–1.3)
CULTURE RESULTS: NO GROWTH — SIGNIFICANT CHANGE UP
EGFR: 29 ML/MIN/1.73M2 — LOW
EOSINOPHIL # BLD AUTO: 0.04 K/UL — SIGNIFICANT CHANGE UP (ref 0–0.5)
EOSINOPHIL NFR BLD AUTO: 0.3 % — SIGNIFICANT CHANGE UP (ref 0–6)
ESTIMATED AVERAGE GLUCOSE: 192 MG/DL — HIGH (ref 68–114)
FERRITIN SERPL-MCNC: 53 NG/ML — SIGNIFICANT CHANGE UP (ref 30–400)
FOLATE SERPL-MCNC: 10.7 NG/ML — SIGNIFICANT CHANGE UP
GLUCOSE SERPL-MCNC: 150 MG/DL — HIGH (ref 70–99)
HCT VFR BLD CALC: 28.3 % — LOW (ref 39–50)
HCT VFR BLD CALC: 30.4 % — LOW (ref 39–50)
HCV AB S/CO SERPL IA: 0.09 S/CO — SIGNIFICANT CHANGE UP (ref 0–0.99)
HCV AB SERPL-IMP: SIGNIFICANT CHANGE UP
HDLC SERPL-MCNC: 30 MG/DL — LOW
HGB BLD-MCNC: 9.2 G/DL — LOW (ref 13–17)
HGB BLD-MCNC: 9.6 G/DL — LOW (ref 13–17)
IMM GRANULOCYTES NFR BLD AUTO: 0.6 % — SIGNIFICANT CHANGE UP (ref 0–1.5)
IRON SATN MFR SERPL: 15 UG/DL — LOW (ref 45–165)
IRON SATN MFR SERPL: 5 % — LOW (ref 16–55)
LACTATE SERPL-SCNC: 1.8 MMOL/L — SIGNIFICANT CHANGE UP (ref 0.7–2)
LEGIONELLA AG UR QL: NEGATIVE — SIGNIFICANT CHANGE UP
LIPID PNL WITH DIRECT LDL SERPL: 58 MG/DL — SIGNIFICANT CHANGE UP
LYMPHOCYTES # BLD AUTO: 0.93 K/UL — LOW (ref 1–3.3)
LYMPHOCYTES # BLD AUTO: 8 % — LOW (ref 13–44)
MAGNESIUM SERPL-MCNC: 2.2 MG/DL — SIGNIFICANT CHANGE UP (ref 1.6–2.6)
MCHC RBC-ENTMCNC: 23.9 PG — LOW (ref 27–34)
MCHC RBC-ENTMCNC: 24.4 PG — LOW (ref 27–34)
MCHC RBC-ENTMCNC: 31.6 GM/DL — LOW (ref 32–36)
MCHC RBC-ENTMCNC: 32.5 GM/DL — SIGNIFICANT CHANGE UP (ref 32–36)
MCV RBC AUTO: 75.1 FL — LOW (ref 80–100)
MCV RBC AUTO: 75.6 FL — LOW (ref 80–100)
MONOCYTES # BLD AUTO: 0.8 K/UL — SIGNIFICANT CHANGE UP (ref 0–0.9)
MONOCYTES NFR BLD AUTO: 6.8 % — SIGNIFICANT CHANGE UP (ref 2–14)
NEUTROPHILS # BLD AUTO: 9.78 K/UL — HIGH (ref 1.8–7.4)
NEUTROPHILS NFR BLD AUTO: 83.8 % — HIGH (ref 43–77)
NON HDL CHOLESTEROL: 73 MG/DL — SIGNIFICANT CHANGE UP
NRBC # BLD: 0 /100 WBCS — SIGNIFICANT CHANGE UP (ref 0–0)
NRBC # BLD: 0 /100 WBCS — SIGNIFICANT CHANGE UP (ref 0–0)
NT-PROBNP SERPL-SCNC: 9256 PG/ML — HIGH (ref 0–125)
PHOSPHATE SERPL-MCNC: 2.8 MG/DL — SIGNIFICANT CHANGE UP (ref 2.5–4.5)
PLATELET # BLD AUTO: 175 K/UL — SIGNIFICANT CHANGE UP (ref 150–400)
PLATELET # BLD AUTO: 188 K/UL — SIGNIFICANT CHANGE UP (ref 150–400)
POTASSIUM SERPL-MCNC: 3.5 MMOL/L — SIGNIFICANT CHANGE UP (ref 3.5–5.3)
POTASSIUM SERPL-SCNC: 3.5 MMOL/L — SIGNIFICANT CHANGE UP (ref 3.5–5.3)
PROCALCITONIN SERPL-MCNC: 18.46 NG/ML — HIGH (ref 0–0.04)
PROT SERPL-MCNC: 6.4 G/DL — SIGNIFICANT CHANGE UP (ref 6–8.3)
RBC # BLD: 3.77 M/UL — LOW (ref 4.2–5.8)
RBC # BLD: 4.02 M/UL — LOW (ref 4.2–5.8)
RBC # FLD: 14.1 % — SIGNIFICANT CHANGE UP (ref 10.3–14.5)
RBC # FLD: 14.2 % — SIGNIFICANT CHANGE UP (ref 10.3–14.5)
S PNEUM AG UR QL: NEGATIVE — SIGNIFICANT CHANGE UP
SODIUM SERPL-SCNC: 142 MMOL/L — SIGNIFICANT CHANGE UP (ref 135–145)
SODIUM UR-SCNC: 125 MMOL/L — SIGNIFICANT CHANGE UP
SPECIMEN SOURCE: SIGNIFICANT CHANGE UP
T3 SERPL-MCNC: 54 NG/DL — LOW (ref 80–200)
T4 AB SER-ACNC: 5.9 UG/DL — SIGNIFICANT CHANGE UP (ref 4.6–12)
T4 AB SER-ACNC: 6.2 UG/DL — SIGNIFICANT CHANGE UP (ref 4.6–12)
T4/T3 UPTAKE INDEX SERPL: 0.9 TBI — SIGNIFICANT CHANGE UP (ref 0.8–1.3)
TIBC SERPL-MCNC: 318 UG/DL — SIGNIFICANT CHANGE UP (ref 220–430)
TRANSFERRIN SERPL-MCNC: 250 MG/DL — SIGNIFICANT CHANGE UP (ref 200–360)
TRIGL SERPL-MCNC: 76 MG/DL — SIGNIFICANT CHANGE UP
TROPONIN I, HIGH SENSITIVITY RESULT: 9246 NG/L — HIGH
TSH SERPL-MCNC: 2.99 UIU/ML — SIGNIFICANT CHANGE UP (ref 0.36–3.74)
UIBC SERPL-MCNC: 303 UG/DL — SIGNIFICANT CHANGE UP (ref 110–370)
UUN UR-MCNC: 181 MG/DL — SIGNIFICANT CHANGE UP
VIT B12 SERPL-MCNC: 476 PG/ML — SIGNIFICANT CHANGE UP (ref 232–1245)
WBC # BLD: 11.68 K/UL — HIGH (ref 3.8–10.5)
WBC # BLD: 12.49 K/UL — HIGH (ref 3.8–10.5)
WBC # FLD AUTO: 11.68 K/UL — HIGH (ref 3.8–10.5)
WBC # FLD AUTO: 12.49 K/UL — HIGH (ref 3.8–10.5)

## 2022-03-31 PROCEDURE — 99232 SBSQ HOSP IP/OBS MODERATE 35: CPT

## 2022-03-31 PROCEDURE — 71250 CT THORAX DX C-: CPT | Mod: 26

## 2022-03-31 PROCEDURE — 99497 ADVNCD CARE PLAN 30 MIN: CPT

## 2022-03-31 PROCEDURE — 74230 X-RAY XM SWLNG FUNCJ C+: CPT | Mod: 26

## 2022-03-31 PROCEDURE — 70450 CT HEAD/BRAIN W/O DYE: CPT | Mod: 26

## 2022-03-31 PROCEDURE — 93306 TTE W/DOPPLER COMPLETE: CPT | Mod: 26

## 2022-03-31 RX ORDER — IRON SUCROSE 20 MG/ML
100 INJECTION, SOLUTION INTRAVENOUS EVERY 24 HOURS
Refills: 0 | Status: COMPLETED | OUTPATIENT
Start: 2022-03-31 | End: 2022-04-03

## 2022-03-31 RX ORDER — SENNA PLUS 8.6 MG/1
2 TABLET ORAL AT BEDTIME
Refills: 0 | Status: DISCONTINUED | OUTPATIENT
Start: 2022-03-31 | End: 2022-04-05

## 2022-03-31 RX ORDER — POTASSIUM CHLORIDE 20 MEQ
40 PACKET (EA) ORAL ONCE
Refills: 0 | Status: COMPLETED | OUTPATIENT
Start: 2022-03-31 | End: 2022-03-31

## 2022-03-31 RX ORDER — FERROUS SULFATE 325(65) MG
325 TABLET ORAL DAILY
Refills: 0 | Status: DISCONTINUED | OUTPATIENT
Start: 2022-03-31 | End: 2022-04-05

## 2022-03-31 RX ORDER — POLYETHYLENE GLYCOL 3350 17 G/17G
17 POWDER, FOR SOLUTION ORAL DAILY
Refills: 0 | Status: DISCONTINUED | OUTPATIENT
Start: 2022-03-31 | End: 2022-04-01

## 2022-03-31 RX ADMIN — HEPARIN SODIUM 1250 UNIT(S)/HR: 5000 INJECTION INTRAVENOUS; SUBCUTANEOUS at 17:52

## 2022-03-31 RX ADMIN — HEPARIN SODIUM 950 UNIT(S)/HR: 5000 INJECTION INTRAVENOUS; SUBCUTANEOUS at 00:25

## 2022-03-31 RX ADMIN — CALCITRIOL 0.25 MICROGRAM(S): 0.5 CAPSULE ORAL at 12:41

## 2022-03-31 RX ADMIN — Medication 5 MILLIGRAM(S): at 05:23

## 2022-03-31 RX ADMIN — ISOSORBIDE MONONITRATE 30 MILLIGRAM(S): 60 TABLET, EXTENDED RELEASE ORAL at 12:41

## 2022-03-31 RX ADMIN — Medication 325 MILLIGRAM(S): at 12:41

## 2022-03-31 RX ADMIN — Medication 75 MICROGRAM(S): at 05:23

## 2022-03-31 RX ADMIN — Medication 1: at 17:54

## 2022-03-31 RX ADMIN — Medication 1: at 12:42

## 2022-03-31 RX ADMIN — ATORVASTATIN CALCIUM 10 MILLIGRAM(S): 80 TABLET, FILM COATED ORAL at 21:25

## 2022-03-31 RX ADMIN — Medication 55 MILLIGRAM(S): at 05:23

## 2022-03-31 RX ADMIN — ASPIRIN AND DIPYRIDAMOLE 1 CAPSULE(S): 25; 200 CAPSULE, EXTENDED RELEASE ORAL at 05:23

## 2022-03-31 RX ADMIN — Medication 2: at 08:14

## 2022-03-31 RX ADMIN — ASPIRIN AND DIPYRIDAMOLE 1 CAPSULE(S): 25; 200 CAPSULE, EXTENDED RELEASE ORAL at 17:54

## 2022-03-31 RX ADMIN — HEPARIN SODIUM 1100 UNIT(S)/HR: 5000 INJECTION INTRAVENOUS; SUBCUTANEOUS at 07:40

## 2022-03-31 RX ADMIN — Medication 55 MILLIGRAM(S): at 17:54

## 2022-03-31 RX ADMIN — HEPARIN SODIUM 1250 UNIT(S)/HR: 5000 INJECTION INTRAVENOUS; SUBCUTANEOUS at 19:24

## 2022-03-31 RX ADMIN — HEPARIN SODIUM 1250 UNIT(S)/HR: 5000 INJECTION INTRAVENOUS; SUBCUTANEOUS at 14:15

## 2022-03-31 RX ADMIN — Medication 40 MILLIEQUIVALENT(S): at 11:17

## 2022-03-31 RX ADMIN — HEPARIN SODIUM 950 UNIT(S)/HR: 5000 INJECTION INTRAVENOUS; SUBCUTANEOUS at 07:25

## 2022-03-31 NOTE — DISCHARGE NOTE PROVIDER - NSDCFUADDAPPT_GEN_ALL_CORE_FT
- Please make an appointment for follow up with your primary doctor in 1 week of discharge  - Please make an appointment for follow up with Neuro, Urology, and Cardio(information above)  - Patient or caretaker is responsible for making all appointments  - Please return to the ED if you develop worsening symptoms or fever

## 2022-03-31 NOTE — SWALLOW VFSS/MBS ASSESSMENT ADULT - RESIDUE IN VALLECULAE
Minimally reduced with subsequent swallow with head neutral; Reduced to mild with use of chin tuck/Moderate Subsequent swallow with head neutral and Chin tuck during swallow minimally reduced stasis/Moderate Mild-moderate, minimally reduced with head neutral; Reduced to mild with chin tuck Mild-moderate Trace-mild

## 2022-03-31 NOTE — PROGRESS NOTE ADULT - ASSESSMENT
76 yr old male with stated hx significant for HFrEF, s/p AICD, tonsillar Ca s/p Lt tonsillectomy, Lt partial glossectomy, s/p Rt CEA, DM2, hypothyroidism, CKD3 who  presented from home via EMS after being found of floor with LUE weakness, sob. Consult called for hypoxic resp failure secondary to stroke,  hypertensive emergency, r/o CVA.    HTN emergency/ r/o CVA/ SOB   - p/w  /105 and tachypnea  - s/p lasix 40 mg x1, hydralazine 10mg x1   - now hypotensive SBP: 100's, hold all anti HTN meds to allow for permissive HTN per neuro, keep 's   - continue aggrenox, statin and hep gtt   - EKG unchanged from previous EKG, no new signs of ischemia   - + troponemia now downtrending likely in setting of demand ischemia 2/2 to neuro event, continue to trend   - f/u TTE ordered  - telemetry: SR 70's with 4 bts NSVT, no other events noted, continue tele for now  - +AICD medtronic, will interrogate  - respiratory status improved, s/p lasix iv, will hold off further diuresing for now, no clinical signs of volume overload on exam, continue to monitor volume status  - CXR suspicious for pulmonary edema   - POCUS by ICU team showed moderate pulmonary effusion and few b lines, less consistent w/ CHF   - Monitor and replete Lytes. Keep K > 4 and Mg > 2    - All other medical needs as per primary team.  - Other cardiovascular workup will depend on clinical course.  - Will continue to follow.    Kelsey Kelly, [facility]  Nurse Practitioner - Cardiology   Spectra #[phone]

## 2022-03-31 NOTE — PROGRESS NOTE ADULT - SUBJECTIVE AND OBJECTIVE BOX
[facility] -- Rianna Horn Grossman, [place], Vlad Velarde, Nazanin Jose: Office # [phone]    Follow Up:  SOB, HTN emergency,     Subjective/Observations: Patient seen and examined, awake, alert, resting comfortably in bed, denies chest pain, dyspnea, palpitations or dizziness, orthopnea and PND. Tolerating room air.     REVIEW OF SYSTEMS: All review of systems is negative for eye, ENT, GI, , allergic, dermatologic, musculoskeletal and neurologic except as described above    PAST MEDICAL & SURGICAL HISTORY:  MI (myocardial infarction)      HTN (hypertension)    HLD (hyperlipidemia)    Throat cancer  , treated with chemo, RT    Ventricular arrhythmia  s/p AICD    Diabetes  Type2, not on any meds since weight loss after throat Ca    Renal insufficiency  s/p chemo    CAD (coronary artery disease)    Inguinal hernia, left    H/O prior ablation treatment  VT,     Cardiac defibrillator in place  - , battery change     S/P left inguinal hernia repair  2018 at [place]        MEDICATIONS  (STANDING):  atorvastatin 10 milliGRAM(s) Oral at bedtime  calcitriol   Capsule 0.25 MICROGram(s) Oral daily  carvedilol 12.5 milliGRAM(s) Oral every 12 hours  dextrose 5%. 1000 milliLiter(s) (50 mL/Hr) IV Continuous <Continuous>  dextrose 5%. 1000 milliLiter(s) (100 mL/Hr) IV Continuous <Continuous>  dextrose 50% Injectable 25 Gram(s) IV Push once  dextrose 50% Injectable 12.5 Gram(s) IV Push once  dextrose 50% Injectable 25 Gram(s) IV Push once  dipyridamole 200 mG/aspirin 25 mG 1 Capsule(s) Oral two times a day  ferrous    sulfate 325 milliGRAM(s) Oral daily  glucagon  Injectable 1 milliGRAM(s) IntraMuscular once  heparin  Infusion.  Unit(s)/Hr (8 mL/Hr) IV Continuous <Continuous>  hydrALAZINE 5 milliGRAM(s) Oral three times a day  influenza  Vaccine (HIGH DOSE) 0.7 milliLiter(s) IntraMuscular once  insulin lispro (ADMELOG) corrective regimen sliding scale   SubCutaneous three times a day before meals  insulin lispro (ADMELOG) corrective regimen sliding scale   SubCutaneous at bedtime  isosorbide   mononitrate ER Tablet (IMDUR) 30 milliGRAM(s) Oral daily  levothyroxine 75 MICROGram(s) Oral daily  polyethylene glycol 3350 17 Gram(s) Oral daily  senna 2 Tablet(s) Oral at bedtime  valproate sodium IVPB 500 milliGRAM(s) IV Intermittent every 12 hours    MEDICATIONS  (PRN):  ALBUTerol    90 MICROgram(s) HFA Inhaler 2 Puff(s) Inhalation every 6 hours PRN Shortness of Breath and/or Wheezing  dextrose Oral Gel 15 Gram(s) Oral once PRN Blood Glucose LESS THAN 70 milliGRAM(s)/deciliter  heparin   Injectable 4100 Unit(s) IV Push every 6 hours PRN For aPTT less than 40  ondansetron Injectable 4 milliGRAM(s) IV Push every 8 hours PRN Nausea and/or Vomiting    Allergies    No Known Allergies    Intolerances      Vital Signs Last 24 Hrs  T(C): 36.6 (31 Mar 2022 11:31), Max: 36.8 (30 Mar 2022 21:00)  T(F): 97.8 (31 Mar 2022 11:31), Max: 98.2 (30 Mar 2022 21:00)  HR: 86 (31 Mar 2022 14:07) (68 - 86)  BP: 106/- (31 Mar 2022 14:07) (106/- - 167/83)  BP(mean): --  RR: 18 (31 Mar 2022 11:31) (16 - 18)  SpO2: 91% (31 Mar 2022 11:31) (91% - 98%)  I&O's Summary    30 Mar 2022 07:  -  31 Mar 2022 07:00  --------------------------------------------------------  IN: 148.5 mL / OUT: 1400 mL / NET: -1251.5 mL    31 Mar 2022 07:01  -  31 Mar 2022 14:14  --------------------------------------------------------  IN: 330 mL / OUT: 0 mL / NET: 330 mL      Weight (kg): 67.7 (03-30 @ 15:59)    TELE: SR 70's   PHYSICAL EXAM:  Appearance: NAD, no distress, alert,  HEENT: Moist Mucous Membranes, Anicteric  Cardiovascular: Regular rate and rhythm, Normal S1 S2, No JVD, + sys murmurs, No rubs, gallops or clicks  Respiratory: Non-labored, Clear, dim at bases on auscultation, No rales, No rhonchi, No wheezing.   Gastrointestinal:  Soft, Non-tender, + BS  Neurologic: Non-focal  Skin: Warm and dry, No visible rashes or ulcers, No ecchymosis, No cyanosis  Musculoskeletal: No clubbing, No cyanosis, No joint swelling/tenderness  Psychiatry: Mood & affect appropriate  Lymph: No peripheral edema.     LABS: All Labs Reviewed:                        9.6    11.68 )-----------( 188      ( 31 Mar 2022 07:24 )             30.4                         9.2    12.49 )-----------( 175      ( 31 Mar 2022 00:01 )             28.3                         10.1   16.12 )-----------( 185      ( 30 Mar 2022 14:34 )             31.5     31 Mar 2022 07:24    142    |  107    |  35     ----------------------------<  150    3.5     |  25     |  2.30   30 Mar 2022 06:12    139    |  108    |  33     ----------------------------<  375    4.8     |  24     |  2.30     Ca    8.9        31 Mar 2022 07:24  Ca    8.5        30 Mar 2022 06:12  Phos  2.8       31 Mar 2022 07:24  Mg     2.2       31 Mar 2022 07:24    TPro  6.4    /  Alb  2.9    /  TBili  1.3    /  DBili  x      /  AST  51     /  ALT  38     /  AlkPhos  83     31 Mar 2022 07:24  TPro  7.0    /  Alb  3.2    /  TBili  1.1    /  DBili  x      /  AST  53     /  ALT  48     /  AlkPhos  104    30 Mar 2022 06:12    PT/INR - ( 30 Mar 2022 06:43 )   PT: 15.2 sec;   INR: 1.30 ratio         PTT - ( 31 Mar 2022 13:36 )  PTT:42.3 sec  Creatine Kinase, Serum: 342 U/L (22 @ 21:07)  Troponin I, High Sensitivity Result: 37976.6 ng/L (22 @ 21:07)  Creatine Kinase, Serum: 398 U/L (22 @ 14:34)  Troponin I, High Sensitivity Result: 93138.5 ng/L (22 @ 14:34)  Troponin I, High Sensitivity Result: 5252.0 ng/L (22 @ 07:53)      D-Dimer Assay, Quantitative: 2924 ng/mL DDU (22 @ 06:12)      Triiodothyronine, Total (T3 Total): 54 ng/dL (22 @ 05:21)    Cholesterol, Serum: 103 mg/dL (22 @ 10:51)  HDL Cholesterol, Serum: 30 mg/dL (22 @ 10:51)  Triglycerides, Serum: 76 mg/dL (22 @ 10:51)    Lactate, Blood: 1.8 mmol/L (22 @ 07:24)  Lactate, Blood: 1.2 mmol/L (22 @ 14:34)    12 Lead ECG:   Ventricular Rate 103 BPM    Atrial Rate 103 BPM    P-R Interval 154 ms    QRS Duration 124 ms    Q-T Interval 382 ms    QTC Calculation(Bazett) 500 ms    P Axis 56 degrees    R Axis -22 degrees    T Axis 105 degrees    Diagnosis Line Sinus tachycardia with premature atrial complexes with aberrant conduction  Possible Left atrial enlargement  Left ventricular hypertrophy with QRS widening ( [place] product )  Anteroseptal infarct (cited on or before 01-AUG-2018)  ST & T wave abnormality, consider lateral ischemia  Abnormal ECG  When compared with ECG of 01-AUG-2018 11:46,  rhythm no longer ap, hr faster, stt abns prob unchanged  Confirmed by Isaac Hahn MD ([id number]) on 3/30/2022 1:17:19 PM (22 @ 05:46)    < from: Xray Chest 1 View- PORTABLE-Urgent (22 @ 06:03) >    ACC: [id number] EXAM:  XR CHEST PORTABLE URGENT 1V                          PROCEDURE DATE:  2022          INTERPRETATION:  AP chest on 2022 at 5:50 AM. Patient has chest   pain.    Heart magnified by technique. Left-sided defibrillator again noted.    There are diffuse scattered bilateral infiltrates which are new since   2018. Covid not excluded.    IMPRESSION: Fairly advanced diffuse bilateral scattered infiltrates.    --- End of Report ---            HAYLEY ALBERTO MD; Attending Radiologist  This document has been electronically signed. Mar 30 2022 10:08AM    < end of copied text >  < from: Transthoracic Echocardiogram w/ Doppler (09 @ 01:42) >    PATIENT: ANTONIO PONCE  : 1945   Age: 63 (M)   MR#: [id number]  STUDY DATE: 2009  LOCATION: CATH-CATH     Tape: Digital  SONOGRAPHER: Anai Smith  STUDY QUALITY: Fair  REF. PHYSICIAN(S): JOSEPH Lange MD  ------------------------------------------------------------------------  Procedure: Transthoracic echocardiogram with complete 2-D,  M-Mode and Doppler examination. Verbal consent was obtained  for injection of echo contrast following a discussion of  risks and benefits.  Following intravenous injection of  contrast, harmonic imaging was performed.  Diagnosis: Coronary Artery Disease  ------------------------------------------------------------------------  Dimensions:    Normal Values:  LA:     4.8    2.0 - 4.0 cm  Ao:     3.2    2.0 - 3.8 cm  SEPTUM: 1.1    0.6 - 1.2 cm  PWT:    0.9    0.6 - 1.1 cm  LVIDd:  5.4    3.0 - 5.6 cm  LVIDs:  4.6    1.8 - 4.0 cm  Derived variables:  LVMI: 94 g/m2  RWT: 0.33  Fractional short: 15 %  Ejection Fraction: 30 %  ------------------------------------------------------------------------  Observations:  Mitral Valve: Mitral annular calcification. Tethered mitral  valve leaflets with normal opening.  Aortic Valve/Aorta: Calcified trileaflet aortic valve with  normal opening.  Normal aortic root.  Left Atrium: Moderate left atrial enlargement.  Left Ventricle: Normal left ventricular internal dimensions  and wall thicknesses.  Probably severe left ventricular systolic dysfunction.  Apical septal, apical and anteroseptal akinesis.  Endocardium not well visualized.  Repeat study with echo  contrast could  be obtained for better visualization of  segmental wall motion, if clinically indicated.  Right Heart: Mild right atrial enlargement.  Normal right ventricular size and systolic function.  Normal tricuspid and pulmonic valves.  Pericardium/Pleura: Normal pericardium with no pericardial  effusion.  Doppler:Mild-moderate mitral regurgitation.  Reversal of  the EA waves of the mitral inflow pattern consistent with  reduced compliance of the left ventricle.  Minimal aortic regurgitation.  Minimal tricuspid regurgitation. Estimated pulmonary artery  systolic pressure equals 39 mm Hg, assuming right atrial  pressure equals 10  mm Hg, consistent with borderline  pulmonary hypertension.  ------------------------------------------------------------------------  Conclusions:  1. Mitral annular calcification. Tethered mitral valve  leaflets with normal opening. Mild-moderate mitral  regurgitation.  Reversal of the EA waves of the mitral  inflow pattern consistent with  reduced compliance of the  left ventricle.  2. Calcified trileaflet aortic valve with normal opening.  Minimal aortic regurgitation.  3. Moderate left atrial enlargement.  4. Normal left ventricular internal dimensions and wall  thicknesses.  5. Probably severe left ventricular systolic dysfunction.  Apical septal, apical and anteroseptal akinesis.  Endocardium not well visualized.  Repeat study with echo  contrast could  be obtained for better visualization of  segmental wall motion, if clinically indicated.  6. Normal right ventricular size and systolic function.  7. Minimal tricuspid regurgitation. Estimated pulmonary  artery systolic pressure equals 39 mm Hg, assuming right  atrial pressure equals 10  mm Hg, consistent with  borderline pulmonary hypertension.  *** Compared with echocardiogram of 2008, no  significant changes noted.  ------------------------------------------------------------------------  Confirmed on  2009 -08:40:15 by Zachary Odonnell M.D.  ------------------------------------------------------------------------    < end of copied text >

## 2022-03-31 NOTE — SWALLOW VFSS/MBS ASSESSMENT ADULT - RECOMMENDED CONSISTENCY
Puree with Moderately thick liquids, all food and liquids via teaspoon presentation, utilizing chin tuck and 2 swallows after each bolus

## 2022-03-31 NOTE — PROGRESS NOTE ADULT - PROBLEM SELECTOR PLAN 12
Chronic, stable   - continue home synthroid 75mcg qD  - f/u AM TSH Chronic, stable   - continue home synthroid 75mcg qD  - TSH - 2.99     12) DVT prophylaxis - heparin drip Chronic stable   -D/W Neurology DR Brownlee  & DR Hahn -Cardio -OK to change plavix to Aggrenox

## 2022-03-31 NOTE — PHYSICAL THERAPY INITIAL EVALUATION ADULT - PERTINENT HX OF CURRENT PROBLEM, REHAB EVAL
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

## 2022-03-31 NOTE — DISCHARGE NOTE PROVIDER - HOSPITAL COURSE
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 As per patient, he was watching a movie when he felt very short of breath suddenly and dropped to the floor, denies LOC and denies any trauma to his head, was on the floor for ~2 hours. Patient was unable to rise from the floor by himself due to his left arm weakness. When first brought to the ED, patient was hypoxic and hypertensive o2 saturation in EMS was >80%. While in the ED, patient had episode of siezure-like activity in his b/l upper extremities which was controlled w/ ativan. Patient was seen and examined at bedside, BiPAP still in place, patient was still mildly lethargic from ativan and could not speak well with bipap mask on. Patient able to open eyes and nod/shake head to questions. Patient denied any headache, vision changes, cp, abd pain, msk pain. Patient still short of breath.      In ED:   Vitals: HR 80, 176/82 -> 115/60, RR 20, 96% on BiPAP/CPAP  Labs significant for: WBC 20.24, H/H 10.8/24.1, D-Dimer 2924, Troponin 1011.9 > 5252.0, CKMB: 13.1, CPK%: 4%, BUN 33, Creatinine 2.3 (baseline unknown) eGFR 29, serum pro BNP 4963, creatine kinase 328, POCT glucose: 353  Imaging:   CXR: diffuse b/l scattered infiltrates  CT brain stroke: No acute hemorrhage, infarct, or mass effect   EKG: sinus tachy at ST- depressions in V5 V6   Received Lasix 40mg IVP x1, Aspirin 600mg x1, Hydralazine 10mg x1, Ativan 2mg IVP x1, Lispro 6u in the ED    (30 Mar 2022 08:48)      ---  HOSPITAL COURSE:   Patient admitted for LUE weakness and hypoxia 2/2 to acute pulmonary edema. Neuro Kem was consulted to siezure like activity and LUE, started patient on depakote and ordered EEG/repeat CT head. Repeat CT head positive for infart in right sulcus. [facility] consulted for elevated troponins. Patient's troponins also elevated as high as ~03570, was started on heparin drip and aggrenox for CVA and NSTEMI. ID Dr. Patten was consulted for patient leukocytosis, although leukocytosis would resolve on its own, patient was found to have RUL PNA, recommended _______ for abx. Nephro Dr. Porter consulted for patients JARRED on CKD, recommended _____. Patient clinically improved over the course of his hospitalization, is medically optimized for discharge.   ---  CONSULTANTS:   Cardio - [facility]   Nephro - Dr. Porter   ID - Dr. Patten   Neuro- Dr. 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 As per patient, he was watching a movie when he felt very short of breath suddenly and dropped to the floor, denies LOC and denies any trauma to his head, was on the floor for ~2 hours. Patient was unable to rise from the floor by himself due to his left arm weakness. When first brought to the ED, patient was hypoxic and hypertensive o2 saturation in EMS was >80%. While in the ED, patient had episode of siezure-like activity in his b/l upper extremities which was controlled w/ ativan. Patient was seen and examined at bedside, BiPAP still in place, patient was still mildly lethargic from ativan and could not speak well with bipap mask on. Patient able to open eyes and nod/shake head to questions. Patient denied any headache, vision changes, cp, abd pain, msk pain. Patient still short of breath.      In ED:   Vitals: HR 80, 176/82 -> 115/60, RR 20, 96% on BiPAP/CPAP  Labs significant for: WBC 20.24, H/H 10.8/24.1, D-Dimer 2924, Troponin 1011.9 > 5252.0, CKMB: 13.1, CPK%: 4%, BUN 33, Creatinine 2.3 (baseline unknown) eGFR 29, serum pro BNP 4963, creatine kinase 328, POCT glucose: 353  Imaging:   CXR: diffuse b/l scattered infiltrates  CT brain stroke: No acute hemorrhage, infarct, or mass effect   EKG: sinus tachy at ST- depressions in V5 V6   Received Lasix 40mg IVP x1, Aspirin 600mg x1, Hydralazine 10mg x1, Ativan 2mg IVP x1, Lispro 6u in the ED    (30 Mar 2022 08:48)      ---  HOSPITAL COURSE:   Patient admitted for LUE weakness and hypoxia 2/2 to acute pulmonary edema. Neuro Kem was consulted to siezure like activity and LUE, started patient on depakote and ordered EEG/repeat CT head. Repeat CT head positive for infart in right sulcus. [facility] consulted for elevated troponins. Patient's troponins also elevated as high as ~64670, was started on heparin drip and aggrenox for CVA and NSTEMI. ID Dr. Patten was consulted for patient leukocytosis, although leukocytosis would resolve on its own, patient was found to have RUL PNA on CT chest, however Dr. Patten believed it to not be a true PNA and thus recommended continue monitoring off antibiotics. Nephro Dr. Porter consulted for patients CKD, recommended further avoidance of nephrotoxic agents but no other workup as patients renal indices were at baseline. Hematology Dr. Pandya consulted for patients anemia, recommended  starting venofer injections as patient's anemia likely a combination of REANNA based off iron studies and anemia of chronic disease. Patient clinically improved over the course of his hospitalization, is medically optimized for discharge.   ---  CONSULTANTS:   Cardio - [facility]   Nephro - Dr. German [facility] - Dr. Patten   Neuro- Dr. Brownlee   Heme-onc - Dr. 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 As per patient, he was watching a movie when he felt very short of breath suddenly and dropped to the floor, denies LOC and denies any trauma to his head, was on the floor for ~2 hours. Patient was unable to rise from the floor by himself due to his left arm weakness. When first brought to the ED, patient was hypoxic and hypertensive o2 saturation in EMS was >80%. While in the ED, patient had episode of siezure-like activity in his b/l upper extremities which was controlled w/ ativan. Patient was seen and examined at bedside, BiPAP still in place, patient was still mildly lethargic from ativan and could not speak well with bipap mask on. Patient able to open eyes and nod/shake head to questions. Patient denied any headache, vision changes, cp, abd pain, msk pain. Patient still short of breath.      In ED:   Vitals: HR 80, 176/82 -> 115/60, RR 20, 96% on BiPAP/CPAP  Labs significant for: WBC 20.24, H/H 10.8/24.1, D-Dimer 2924, Troponin 1011.9 > 5252.0, CKMB: 13.1, CPK%: 4%, BUN 33, Creatinine 2.3 (baseline unknown) eGFR 29, serum pro BNP 4963, creatine kinase 328, POCT glucose: 353  Imaging:   CXR: diffuse b/l scattered infiltrates  CT brain stroke: No acute hemorrhage, infarct, or mass effect   EKG: sinus tachy at ST- depressions in V5 V6   Received Lasix 40mg IVP x1, Aspirin 600mg x1, Hydralazine 10mg x1, Ativan 2mg IVP x1, Lispro 6u in the ED    (30 Mar 2022 08:48)      ---  HOSPITAL COURSE:   Patient admitted for LUE weakness and hypoxia 2/2 to acute pulmonary edema. Neuro Kem was consulted to siezure like activity and LUE, started patient on depakote and ordered EEG/repeat CT head. Repeat CT head positive for infart in right sulcus. [facility] consulted for elevated troponins. Patient's troponins also elevated as high as ~95087, was started on heparin drip and aggrenox for CVA and NSTEMI. ID Dr. Patten was consulted for patient leukocytosis, although leukocytosis would resolve on its own, patient was found to have RUL PNA on CT chest, however Dr. Patten believed it to not be a true PNA and thus recommended continue monitoring off antibiotics. Nephro Dr. Porter consulted for patients CKD, recommended further avoidance of nephrotoxic agents but no other workup as patients renal indices were at baseline. Hematology Dr. Pandya consulted for patients anemia, recommended  starting venofer injections as patient's anemia likely a combination of REANNA based off iron studies and anemia of chronic disease. Patient initially had mcclellan placed in ED as a part of stroke workup, was removed but patient would fail a trial of void and had mcclellan replaced by urology Dr. Harrison. Patient clinically improved over the course of his hospitalization, is medically optimized for discharge.   ---  CONSULTANTS:   Cardio - [facility]   Nephro - Dr. German [facility] - Dr. Patten   Neuro- Dr. Brownlee   Heme-onc - Dr. Pandya   Urology - Dr. 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 As per patient, he was watching a movie when he felt very short of breath suddenly and dropped to the floor, denies LOC and denies any trauma to his head, was on the floor for ~2 hours. Patient was unable to rise from the floor by himself due to his left arm weakness. When first brought to the ED, patient was hypoxic and hypertensive o2 saturation in EMS was >80%. While in the ED, patient had episode of siezure-like activity in his b/l upper extremities which was controlled w/ ativan. Patient was seen and examined at bedside, BiPAP still in place, patient was still mildly lethargic from ativan and could not speak well with bipap mask on. Patient able to open eyes and nod/shake head to questions. Patient denied any headache, vision changes, cp, abd pain, msk pain. Patient still short of breath.      In ED:   Vitals: HR 80, 176/82 -> 115/60, RR 20, 96% on BiPAP/CPAP  Labs significant for: WBC 20.24, H/H 10.8/24.1, D-Dimer 2924, Troponin 1011.9 > 5252.0, CKMB: 13.1, CPK%: 4%, BUN 33, Creatinine 2.3 (baseline unknown) eGFR 29, serum pro BNP 4963, creatine kinase 328, POCT glucose: 353  Imaging:   CXR: diffuse b/l scattered infiltrates  CT brain stroke: No acute hemorrhage, infarct, or mass effect   EKG: sinus tachy at ST- depressions in V5 V6   Received Lasix 40mg IVP x1, Aspirin 600mg x1, Hydralazine 10mg x1, Ativan 2mg IVP x1, Lispro 6u in the ED    (30 Mar 2022 08:48)      ---  HOSPITAL COURSE:   Patient admitted for LUE weakness and hypoxia 2/2 to acute pulmonary edema. Neuro Kem was consulted to siezure like activity and LUE, started patient on depakote and ordered EEG/repeat CT head. Repeat CT head positive for infart in right sulcus. Pt was started on Depakote, BB and high dose statin. [facility] consulted for elevated troponins. Patient's troponins also elevated as high as ~12198, was started on heparin drip and aggrenox for CVA and NSTEMI. Heparin drip was discontinued and started on PPX Heparin. ID Dr. Patten was consulted for patient leukocytosis, although leukocytosis would resolve on its own, patient was found to have RUL PNA on CT chest, however Dr. Patten believed it to not be a true PNA and thus recommended continue monitoring off antibiotics. Nephro Dr. Porter consulted for patients CKD, recommended further avoidance of nephrotoxic agents but no other workup as patients renal indices were at baseline. Hematology Dr. Pandya consulted for patients anemia, recommended  starting venofer injections as patient's anemia likely a combination of REANNA based off iron studies and anemia of chronic disease. Patient initially had mcclellan placed in ED as a part of stroke workup, was removed but patient would fail a trial of void and had mcclellan replaced by urology Dr. Harrison. Patient clinically improved over the course of his hospitalization, is medically optimized for discharge.   PT was consulted and recommended [facility]  Pt is stable for discharge to [facility].    ---  CONSULTANTS:   Cardio - [facility]   Nephro - Dr. Porter   ID - Dr. Patten   Neuro- Dr. Brownlee   Heme-onc - Dr. Pandya   Urology - Dr. Harrison     ---  TIME SPENT:  The total amount of time spent reviewing the hospital notes, laboratory values, imaging findings, assessing/counseling the patient, discussing with consultant physicians, social work, nursing staff was -- minutes    ---  Primary care provider was made aware of plan for discharge:      [  ] NO     [  ] YES   FROM ADMISSION H+P:   HPI:  75 yo male w/ PMHx of HTN, HLD, HFrEF (EF 30% 2009), 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 As per patient, he was watching a movie when he felt very short of breath suddenly and dropped to the floor, denies LOC and denies any trauma to his head, was on the floor for ~2 hours. Patient was unable to rise from the floor by himself due to his left arm weakness. When first brought to the ED, patient was hypoxic and hypertensive o2 saturation in EMS was >80%. While in the ED, patient had episode of siezure-like activity in his b/l upper extremities which was controlled w/ ativan. Patient was seen and examined at bedside, BiPAP still in place, patient was still mildly lethargic from ativan and could not speak well with bipap mask on. Patient able to open eyes and nod/shake head to questions. Patient denied any headache, vision changes, cp, abd pain, msk pain. Patient still short of breath.      In ED:   Vitals: HR 80, 176/82 -> 115/60, RR 20, 96% on BiPAP/CPAP  Labs significant for: WBC 20.24, H/H 10.8/24.1, D-Dimer 2924, Troponin 1011.9 > 5252.0, CKMB: 13.1, CPK%: 4%, BUN 33, Creatinine 2.3 (baseline unknown) eGFR 29, serum pro BNP 4963, creatine kinase 328, POCT glucose: 353  Imaging:   CXR: diffuse b/l scattered infiltrates  CT brain stroke: No acute hemorrhage, infarct, or mass effect   EKG: sinus tachy at ST- depressions in V5 V6   Received Lasix 40mg IVP x1, Aspirin 600mg x1, Hydralazine 10mg x1, Ativan 2mg IVP x1, Lispro 6u in the ED    (30 Mar 2022 08:48)      ---  HOSPITAL COURSE:   Patient admitted for LUE weakness and hypoxia 2/2 to acute pulmonary edema. Neuro Kem was consulted to siezure like activity and LUE, started patient on depakote and ordered EEG/repeat CT head. Repeat CT head positive for infart in right sulcus. Pt was started on Depakote, BB and high dose statin. [facility] consulted for elevated troponins. Patient's troponins also elevated as high as ~64783, was started on heparin drip and aggrenox for CVA and NSTEMI. Heparin drip was discontinued and started on PPX Heparin. ID Dr. Patten was consulted for patient leukocytosis, although leukocytosis would resolve on its own, patient was found to have RUL PNA on CT chest, however Dr. Patten believed it to not be a true PNA and thus recommended continue monitoring off antibiotics. Nephro Dr. Porter consulted for patients CKD, recommended further avoidance of nephrotoxic agents but no other workup as patients renal indices were at baseline. Hematology Dr. Pandya consulted for patients anemia, recommended  starting venofer injections as patient's anemia likely a combination of REANNA based off iron studies and anemia of chronic disease. Patient initially had mcclellan placed in ED as a part of stroke workup, was removed but patient would fail a trial of void and had mcclellan replaced by urology Dr. Harrison. Patient clinically improved over the course of his hospitalization, is medically optimized for discharge.   PT was consulted and recommended [facility]  Pt is stable for discharge to [facility].    UPDATED 4/4    ---  CONSULTANTS:   Cardio - [facility]   Nephro - Dr. Porter   ID - Dr. Patten   Neuro- Dr. Brownlee   Heme-onc - Dr. Pandya   Urology - Dr. Harrison     ---  TIME SPENT:  The total amount of time spent reviewing the hospital notes, laboratory values, imaging findings, assessing/counseling the patient, discussing with consultant physicians, social work, nursing staff was -- minutes    ---  Primary care provider was made aware of plan for discharge:      [  ] NO     [  ] YES   FROM ADMISSION H+P:   HPI:  75 yo male w/ PMHx of HTN, HLD, HFrEF (EF 30% 2009), 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 As per patient, he was watching a movie when he felt very short of breath suddenly and dropped to the floor, denies LOC and denies any trauma to his head, was on the floor for ~2 hours. Patient was unable to rise from the floor by himself due to his left arm weakness. When first brought to the ED, patient was hypoxic and hypertensive o2 saturation in EMS was >80%. While in the ED, patient had episode of siezure-like activity in his b/l upper extremities which was controlled w/ ativan. Patient was seen and examined at bedside, BiPAP still in place, patient was still mildly lethargic from ativan and could not speak well with bipap mask on. Patient able to open eyes and nod/shake head to questions. Patient denied any headache, vision changes, cp, abd pain, msk pain. Patient still short of breath.      In ED:   Vitals: HR 80, 176/82 -> 115/60, RR 20, 96% on BiPAP/CPAP  Labs significant for: WBC 20.24, H/H 10.8/24.1, D-Dimer 2924, Troponin 1011.9 > 5252.0, CKMB: 13.1, CPK%: 4%, BUN 33, Creatinine 2.3 (baseline unknown) eGFR 29, serum pro BNP 4963, creatine kinase 328, POCT glucose: 353  Imaging:   CXR: diffuse b/l scattered infiltrates  CT brain stroke: No acute hemorrhage, infarct, or mass effect   EKG: sinus tachy at ST- depressions in V5 V6   Received Lasix 40mg IVP x1, Aspirin 600mg x1, Hydralazine 10mg x1, Ativan 2mg IVP x1, Lispro 6u in the ED    (30 Mar 2022 08:48)      ---  HOSPITAL COURSE:   Patient admitted for LUE weakness and hypoxia 2/2 to acute pulmonary edema. Neuro Kem was consulted to siezure like activity and LUE, started patient on depakote and ordered EEG/repeat CT head. Repeat CT head positive for infart in right sulcus. Pt was started on Depakote, BB and high dose statin. [facility] consulted for elevated troponins. Patient's troponins also elevated as high as ~12020, was started on heparin drip and aggrenox for CVA and NSTEMI. Heparin drip was discontinued after 48 hours and pt was started on PPX Heparin. ID Dr. Patten was consulted for patient leukocytosis, although leukocytosis resolved on its own, patient was found to have RUL PNA on CT chest, however Dr. Patten believed it to not be a true PNA and thus recommended continue monitoring off antibiotics. Nephro Dr. Porter consulted for patients CKD, recommended further avoidance of nephrotoxic agents but no other workup as patients renal indices were at baseline. Hematology Dr. Pandya consulted for patients anemia, recommended  starting venofer injections as patient's anemia likely a combination of REANNA based off iron studies and anemia of chronic disease. Patient initially had mcclellan placed in ED as a part of stroke workup, was removed but patient failed trial of void and had mcclellan replaced and was started on Flomax. Urology Dr. Harrison was consulted who recommended continuing flomax and mcclellan. Pt was evaluated by Palliative for GOC discussion and pt was made DNR with trial of intubation. Patient clinically improved over the course of his hospitalization, is medically optimized for discharge.   PT was consulted and recommended [facility]  Pt is stable for discharge to [facility].    UPDATED 4/5    ---  CONSULTANTS:   Cardio - [facility]   Nephro - Dr. Porter   ID - Dr. Patten   Neuro- Dr. Brownlee   Heme-onc - Dr. Pandya   Urology - Dr. 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 As per patient, he was watching a movie when he felt very short of breath suddenly and dropped to the floor, denies LOC and denies any trauma to his head, was on the floor for ~2 hours. Patient was unable to rise from the floor by himself due to his left arm weakness. When first brought to the ED, patient was hypoxic and hypertensive o2 saturation in EMS was >80%. While in the ED, patient had episode of siezure-like activity in his b/l upper extremities which was controlled w/ ativan. Patient was seen and examined at bedside, BiPAP still in place, patient was still mildly lethargic from ativan and could not speak well with bipap mask on. Patient able to open eyes and nod/shake head to questions. Patient denied any headache, vision changes, cp, abd pain, msk pain. Patient still short of breath.      In ED:   Vitals: HR 80, 176/82 -> 115/60, RR 20, 96% on BiPAP/CPAP  Labs significant for: WBC 20.24, H/H 10.8/24.1, D-Dimer 2924, Troponin 1011.9 > 5252.0, CKMB: 13.1, CPK%: 4%, BUN 33, Creatinine 2.3 (baseline unknown) eGFR 29, serum pro BNP 4963, creatine kinase 328, POCT glucose: 353  Imaging:   CXR: diffuse b/l scattered infiltrates  CT brain stroke: No acute hemorrhage, infarct, or mass effect   EKG: sinus tachy at ST- depressions in V5 V6   Received Lasix 40mg IVP x1, Aspirin 600mg x1, Hydralazine 10mg x1, Ativan 2mg IVP x1, Lispro 6u in the ED    (30 Mar 2022 08:48)      ---  HOSPITAL COURSE:   Patient admitted for LUE weakness and hypoxia 2/2 to acute pulmonary edema. Neuro Kem was consulted to siezure like activity and LUE, started patient on depakote and ordered EEG/repeat CT head. Repeat CT head positive for infart in right sulcus. Pt was started on Depakote, BB and high dose statin. Cardiology [facility] consulted for elevated troponins. Patient's troponins also elevated as high as ~38450, was started on heparin drip and aggrenox for CVA and NSTEMI. Heparin drip was discontinued after 48 hours and pt was started on PPX Heparin. ID Dr. Patten was consulted for patient leukocytosis, although leukocytosis resolved on its own, patient was found to have RUL PNA on CT chest, however Dr. Patten believed it to not be a true PNA and thus recommended continue monitoring off antibiotics. Nephro Dr. Porter consulted for patients CKD, recommended further avoidance of nephrotoxic agents but no other workup as patients renal indices were at baseline. Hematology Dr. Pandya consulted for patients anemia, recommended  starting venofer injections as patient's anemia likely a combination of REANNA based off iron studies and anemia of chronic disease. Patient initially had mcclellan placed in ED as a part of stroke workup, was removed but patient failed trial of void and had mcclellan replaced and was started on Flomax. Urology Dr. Harrison was consulted who recommended continuing flomax and mcclellan. Pt was evaluated by Palliative for GOC discussion and pt was made DNR with trial of intubation. Patient clinically improved over the course of his hospitalization, is medically optimized for discharge.   PT was consulted and recommended [facility]  Pt is stable for discharge to [facility].    ---  CONSULTANTS:   Cardio - [facility]   Nephro - Dr. Porter   ID - Dr. Patten   Neuro- Dr. Brownlee   Heme-onc - Dr. Pandya   Urology - Dr. 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 As per patient, he was watching a movie when he felt very short of breath suddenly and dropped to the floor, denies LOC and denies any trauma to his head, was on the floor for ~2 hours. Patient was unable to rise from the floor by himself due to his left arm weakness. When first brought to the ED, patient was hypoxic and hypertensive o2 saturation in EMS was >80%. While in the ED, patient had episode of siezure-like activity in his b/l upper extremities which was controlled w/ ativan. Patient was seen and examined at bedside, BiPAP still in place, patient was still mildly lethargic from ativan and could not speak well with bipap mask on. Patient able to open eyes and nod/shake head to questions. Patient denied any headache, vision changes, cp, abd pain, msk pain. Patient still short of breath.      In ED:   Vitals: HR 80, 176/82 -> 115/60, RR 20, 96% on BiPAP/CPAP  Labs significant for: WBC 20.24, H/H 10.8/24.1, D-Dimer 2924, Troponin 1011.9 > 5252.0, CKMB: 13.1, CPK%: 4%, BUN 33, Creatinine 2.3 (baseline unknown) eGFR 29, serum pro BNP 4963, creatine kinase 328, POCT glucose: 353  Imaging:   CXR: diffuse b/l scattered infiltrates  CT brain stroke: No acute hemorrhage, infarct, or mass effect   EKG: sinus tachy at ST- depressions in V5 V6   Received Lasix 40mg IVP x1, Aspirin 600mg x1, Hydralazine 10mg x1, Ativan 2mg IVP x1, Lispro 6u in the ED    (30 Mar 2022 08:48)      ---  HOSPITAL COURSE:   Patient admitted for LUE weakness and hypoxia, ac hypoxic respiratory failure  2/2 to acute pulmonary edema. Neuro Kem was consulted to siezure like activity and LUE, started patient on depakote and ordered EEG/repeat CT head. Repeat CT head positive for  AC infart in right sulcus. Pt was started on Depakote, BB and high dose statin. [facility] consulted for elevated troponins. Patient's troponins also elevated as high as ~54970, NSTEMI was started on heparin drip and aggrenox for CVA and NSTEMI. Heparin drip was discontinued after 48 hours and pt was started on PPX Heparin. ID Dr. Patten was consulted for patient leukocytosis, although leukocytosis resolved on its own, patient was found to have RUL PNA on CT chest, however Dr. Patten believed it to not be a true PNA and thus recommended continue monitoring off antibiotics.  WBC resolved, Neg blood cultures, Nephro Dr. Porter consulted for patients CKD, recommended further avoidance of nephrotoxic agents but no other workup as patients renal indices were at baseline. Hematology Dr. Pandya consulted for patients anemia, recommended  starting venofer injections as patient's anemia likely a combination of REANNA based off iron studies and anemia of chronic disease. Patient initially had mcclellan placed in ED as a part of stroke workup, was removed but patient failed trial of void and had mcclellan replaced and was started on Flomax. Urology Dr. Harrison was consulted who recommended continuing flomax and mcclellan. Pt was evaluated by Palliative for GOC discussion and pt was made DNR with trial of intubation. Patient clinically improved over the course of his hospitalization, is medically optimized for discharge.   PT was consulted and recommended Acute Rehab  Pt is stable for discharge to acute Rehab   ---  CONSULTANTS:   Cardio - [facility]   Nephro - Dr. Porter   ID - Dr. Patten   Neuro- Dr. Brownlee   Heme-onc - Dr. Pandya   Urology - Dr. Harrison   Palliative- DNR/DNI    ADVANCED DIRECTIVES  - DNR with intubation trial  - MOST Form filled out   ---  TIME SPENT:  The total amount of time spent reviewing the hospital notes, laboratory values, imaging findings, assessing/counseling the patient, discussing with consultant physicians, social work, nursing staff was 60 minutes

## 2022-03-31 NOTE — PROGRESS NOTE ADULT - ASSESSMENT
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    Acute hypoxic resp failure, acute decompensated heart failure, leukocytosis  likely 2/2 flash pulmonary edema, acute decompensated heart failure, ?NSTEMI  s/p CXR- diffuse opacifications b/l  s/p VQ scan- very low probability of PE  patient w/o fever, chills, productive cough, pleurisy  leukocytosis likely reactive  procal elevated however, in the setting of real insufficiency  trend wbc  on heparin gtt  f/u cardiology, neurology recs  monitor off antibiotics    Rich Patten M.D.  [facility], Division of Infectious Diseases  [phone]  After 5pm on weekdays and all day on weekends - please call [phone]  75 yo male w/ PMHx of HTN, HLD, HFrEF (EF 30% 2009), right tonsillar cancer 2011 s/p chemo and radiation, partial left tonsillectomy and s/p left partial tongue resection 2019, carotid stenosis s/p right CEA 2020, DM2, CAD s/p AICD for ventricular arrhythmia (2009 w/ generator change in 2017) and hypothyroidism presents to ED w/ acute hypoxic resp failure and L arm weakness.    Acute hypoxic resp failure, acute decompensated heart failure  likely 2/2 flash pulmonary edema, acute decompensated heart failure  s/p CXR- diffuse opacifications b/l  s/p VQ scan- very low probability of PE  patient w/o fever, chills, productive cough, pleurisy  procal elevated however, in the setting of real insufficiency  trend wbc  on heparin gtt, f/u cardiology recs  pt on RA today and reports SOB resolved; endorses chronic cough  s/p CT chest read as Right upper lobe pneumonia superimposed on mild pulmonary edema. Bilateral pleural effusions.  however, low clinical suspicion for PNA  have reached out to radiology  continue to monitor off antibiotics    leukocytosis  likely reactive  possibly 2/2 NSTEMI, CVA  has continued to improve without antibiotics    L arm weakness  s/p CT head- Interval demarcation of the right perirolandic cortex infarct.  f/u neurology recs    Rich Patten M.D.  [facility], Division of Infectious Diseases  [phone]  After 5pm on weekdays and all day on weekends - please call [phone]  77 yo male w/ PMHx of HTN, HLD, HFrEF (EF 30% 2009), right tonsillar cancer 2011 s/p chemo and radiation, partial left tonsillectomy and s/p left partial tongue resection 2019, carotid stenosis s/p right CEA 2020, DM2, CAD s/p AICD for ventricular arrhythmia (2009 w/ generator change in 2017) and hypothyroidism presents to ED w/ acute hypoxic resp failure and L arm weakness.    Acute hypoxic resp failure, acute decompensated heart failure  likely 2/2 flash pulmonary edema, acute decompensated heart failure  s/p CXR- diffuse opacifications b/l  s/p VQ scan- very low probability of PE  patient w/o fever, chills, productive cough, pleurisy  procal elevated however, in the setting of real insufficiency  trend wbc  on heparin gtt, f/u cardiology recs  s/p CT chest read as Right upper lobe pneumonia superimposed on mild pulmonary edema. Bilateral pleural effusions.  however, low clinical suspicion for PNA  pt on RA today and reports SOB resolved; leukocytosis also resolving without antibiotics; pt endorses cough but chronic non-productive  have reached out to radiology  continue to monitor off antibiotics    leukocytosis  likely reactive  possibly 2/2 NSTEMI, CVA  has continued to improve without antibiotics    L arm weakness  s/p CT head- Interval demarcation of the right perirolandic cortex infarct.  f/u neurology recs    Rich Patten M.D.  [facility], Division of Infectious Diseases  [phone]  After 5pm on weekdays and all day on weekends - please call [phone]  77 yo male w/ PMHx of HTN, HLD, HFrEF (EF 30% 2009), right tonsillar cancer 2011 s/p chemo and radiation, partial left tonsillectomy and s/p left partial tongue resection 2019, carotid stenosis s/p right CEA 2020, DM2, CAD s/p AICD for ventricular arrhythmia (2009 w/ generator change in 2017) and hypothyroidism presents to ED w/ acute hypoxic resp failure and L arm weakness.    Acute hypoxic resp failure, acute decompensated heart failure  likely 2/2 flash pulmonary edema, acute decompensated heart failure  s/p CXR- diffuse opacifications b/l  s/p VQ scan- very low probability of PE  patient w/o fever, chills, productive cough, pleurisy  procal elevated however, in the setting of real insufficiency  trend wbc  on heparin gtt, f/u cardiology recs  s/p CT chest read as Right upper lobe pneumonia superimposed on mild pulmonary edema. Bilateral pleural effusions.  however, low clinical suspicion for PNA  pt on RA today and reports SOB resolved; leukocytosis also resolving without antibiotics; pt endorses cough but chronic non-productive  have reached out to radiology  he is at risk for aspiration PNA and pneumonitis; aspiration precautions  continue to monitor off antibiotics    leukocytosis  likely reactive  possibly 2/2 NSTEMI, CVA  has continued to improve without antibiotics    L arm weakness  s/p CT head- Interval demarcation of the right perirolandic cortex infarct.  f/u neurology recs    Rich Patten M.D.  [facility], Division of Infectious Diseases  [phone]  After 5pm on weekdays and all day on weekends - please call [phone]  75 yo male w/ PMHx of HTN, HLD, HFrEF (EF 30% 2009), right tonsillar cancer 2011 s/p chemo and radiation, partial left tonsillectomy and s/p left partial tongue resection 2019, carotid stenosis s/p right CEA 2020, DM2, CAD s/p AICD for ventricular arrhythmia (2009 w/ generator change in 2017) and hypothyroidism presents to ED w/ acute hypoxic resp failure and L arm weakness.    Acute hypoxic resp failure, acute decompensated heart failure  likely 2/2 flash pulmonary edema, acute decompensated heart failure  s/p CXR- diffuse opacifications b/l  s/p VQ scan- very low probability of PE  patient w/o fever, chills, productive cough, pleurisy  procal elevated however, in the setting of real insufficiency  trend wbc  on heparin gtt, f/u cardiology & neuro recs  s/p CT chest read as Right upper lobe pneumonia superimposed on mild pulmonary edema. Bilateral pleural effusions.  however, low clinical suspicion for PNA  pt on RA today and reports SOB resolved; leukocytosis also resolving without antibiotics; pt endorses cough but chronic non-productive  have reached out to radiology  he is at risk for aspiration PNA and pneumonitis; aspiration precautions  continue to monitor off antibiotics    leukocytosis  likely reactive  possibly 2/2 CVA, ?NSTEMI  has continued to improve without antibiotics    CVA  L arm weakness  s/p CT head- Interval demarcation of the right perirolandic cortex infarct.  f/u neurology recs    Rich Patten M.D.  [facility], Division of Infectious Diseases  [phone]  After 5pm on weekdays and all day on weekends - please call [phone]

## 2022-03-31 NOTE — DISCHARGE NOTE PROVIDER - NSDCFUADDINST_GEN_ALL_CORE_FT
D/C [facility] cath -TOV on 4/8/22-Friday AM   Follow up with Cardiologist in 1 week after d/c from [facility]  Follow up with Neurologist in 1 week after d/c from [facility]  you must follow up with Urologists after d/c from [facility]

## 2022-03-31 NOTE — PROGRESS NOTE ADULT - PROBLEM SELECTOR PLAN 3
Patient w/ left upper extremity weakness of unknown duration, 2/2 to possible CVA   - patient w/ seizure like activity in ED s/p ativan, EEG   - started on valproate by neuro  - CT brain stroke negative for acute hemorrhage or infarct, repeat CT in the AM  - f/u AM TSH   - Neuro Bhachawat consulted, f/u recs D/W at detail, plan to change to Aggrenox daily. Patient w/ left upper extremity weakness of unknown duration, 2/2 to possible CVA   - patient w/ seizure like activity in ED s/p ativan, EEG   - started on valproate by neuro  - CT brain stroke negative for acute hemorrhage or infarct, repeat CT: acute stroke in right central sulcus   - on aggrenox, statin, and heparin drip   - f/u AM TSH   - Neuro Bhachawat consulted, f/u recs D/W at detail, plan to change to Aggrenox daily. 2/2 to demand ischemia vs. ACS vs. CVA,  w/ heart strain   - Troponin 1011.9 > 5252.0 > 37428 > 58701  - CK and CKMB also downtrending   - EKG: ST-depressions in V5 V6, new from prior EKG   - patient currently on heparin drip   - Cardiology [facility] consulted, f/u reccs  -As d/w Dr Hahn -pt does NOT have any cardiac stents/Intervention in past  -ECHO 2/2 to NSTEMI -On IV Heparin drip for ~48 hrs   - Troponin 1011.9 > 5252.0 > 15181 > 61047  - CK and CKMB also downtrending   - EKG: ST-depressions in V5 V6, new from prior EKG   - patient currently on IV  heparin drip   - Cardiology [facility] consulted, f/u reccs  -As d/w Dr Terrell /NP -pt does NOT have any cardiac stents/Intervention in past  -ECHO to follow, statin

## 2022-03-31 NOTE — PROGRESS NOTE ADULT - PROBLEM SELECTOR PLAN 10
Chronic, stable on admission  - Continue home medications hydralazine 5mg TID w/ holding parameters   - Monitor routine hemodynamics

## 2022-03-31 NOTE — PROGRESS NOTE ADULT - ASSESSMENT
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 Admitted for pulmonary edema, leukocytosis, elevated troponin, JARRED on CKD-3, suspect possible CVA and NSTEMI

## 2022-03-31 NOTE — PROGRESS NOTE ADULT - PROBLEM SELECTOR PLAN 11
Chronic stable   - Continue Plavix 75mg qD  D/W Neurology DR Brownlee  & DR Hahn -Cardio -OK to change to Aggrenox Chronic stable   -D/W Neurology DR Brownlee  & DR Hahn -Cardio -OK to change plavix to Aggrenox S/P SX & RT in past  Pt seen by DR Lynn -ENT as out pt- saw him 3 weeks ago  S & S Jennyfer dubon -MBS done  D/W pt & wife both about diet change.

## 2022-03-31 NOTE — SWALLOW VFSS/MBS ASSESSMENT ADULT - RECOMMENDED FEEDING/EATING TECHNIQUES
ALL PUREE AND MODERATELY THICK LIQUIDS VIA TEASPOON UTILIZING CHIN TUCK AND 2 SWALLOWS AFTER EACH BOLUS/allow for swallow between intakes/check mouth frequently for oral residue/pocketing/crush medication (when feasible)/maintain upright posture during/after eating for 30 mins/no straws/oral hygiene/position upright (90 degrees)/small sips/bites/tuck chin

## 2022-03-31 NOTE — PHYSICAL THERAPY INITIAL EVALUATION ADULT - GAIT TRAINING, PT EVAL
Patient will ambulate x 100 feet with appropriate device independently in 2 weeks in order to increase mobility at home

## 2022-03-31 NOTE — PROGRESS NOTE ADULT - PROBLEM SELECTOR PLAN 7
Patient w/ admission Hgb of 10.8, baseline unknown   - likely anemia of chronic disease   - patient w/ no signs or symptoms of bleeding   - transfusion goal >8   - f/u iron panel, b12, folate   - trend daily CBCs Patient w/ admission Hgb of 10.8, baseline 12   - likely anemia of chronic disease   - patient w/ hematuria last night, no hematuria today, Hgb dropped to 9.2 but recovered to 9.6 by AM  - transfusion goal >8   - f/u iron panel, b12, folate   - trend daily CBCs Patient w/ admission Hgb of 10.8, baseline 12   - patient w/ hematuria last night, no hematuria today, Hgb dropped to 9.2 but recovered to 9.6 by AM  - transfusion goal >8   - iron panel consistent w/ iron deficiency anemia, will start ferrous sulfate 325mg qD   - trend daily CBCs Patient w/ admission Hgb of 10.8, baseline 12   - patient w/ hematuria last night, no hematuria today, Hgb dropped to 9.2 but recovered to 9.6 by AM  - transfusion goal >8   - iron panel consistent w/ iron deficiency anemia, will start ferrous sulfate 325mg qD   - trend daily CBCs  Hematology-Dr Ya perez

## 2022-03-31 NOTE — DISCHARGE NOTE PROVIDER - NSDCMRMEDTOKEN_GEN_ALL_CORE_FT
benazepril 10 mg oral tablet: 1 tab(s) orally once a day  calcitriol 0.25 mcg oral capsule: 1 cap(s) orally once a day  carvedilol 12.5 mg oral tablet: 1 tab(s) orally 2 times a day      *Last filled 5/21, spoke to MATTHEW Nguyen at Dr. Garcia&#x27;s office, she states patient should still be taking med as per MD notes from 2/22*   hydrALAZINE 10 mg oral tablet: 0.5 tab(s) orally 3 times a day  isosorbide mononitrate 30 mg oral tablet, extended release: 1 tab(s) orally once a day (in the morning)  levothyroxine 75 mcg (0.075 mg) oral tablet: 1 tab(s) orally once a day  midodrine 10 mg oral tablet: 1 tab(s) orally 3 times a day  Plavix 75 mg oral tablet: 1 tab(s) orally once a day  pravastatin 40 mg oral tablet: 1 tab(s) orally once a day   Aggrenox 25 mg-200 mg oral capsule, extended release: 1 cap(s) orally 2 times a day  atorvastatin 80 mg oral tablet: 1 tab(s) orally once a day (at bedtime)  benazepril 10 mg oral tablet: 1 tab(s) orally once a day  calcitriol 0.25 mcg oral capsule: 1 cap(s) orally once a day  carvedilol 12.5 mg oral tablet: 1 tab(s) orally 2 times a day      *Last filled 5/21, spoke to MATTHEW Nguyen at Dr. Garcia&#x27;s office, she states patient should still be taking med as per MD notes from 2/22*   hydrALAZINE 10 mg oral tablet: 0.5 tab(s) orally 3 times a day  isosorbide mononitrate 30 mg oral tablet, extended release: 1 tab(s) orally once a day (in the morning)  levothyroxine 75 mcg (0.075 mg) oral tablet: 1 tab(s) orally once a day  Metoprolol Succinate ER 50 mg oral tablet, extended release: 1 tab(s) orally once a day  midodrine 10 mg oral tablet: 1 tab(s) orally 3 times a day  Plavix 75 mg oral tablet: 1 tab(s) orally once a day  pravastatin 40 mg oral tablet: 1 tab(s) orally once a day  tamsulosin 0.4 mg oral capsule: 1 cap(s) orally once a day (at bedtime)   Aggrenox 25 mg-200 mg oral capsule, extended release: 1 cap(s) orally 2 times a day  atorvastatin 80 mg oral tablet: 1 tab(s) orally once a day (at bedtime)  benazepril 10 mg oral tablet: 1 tab(s) orally once a day  bisacodyl 10 mg rectal suppository: 1 suppository(ies) rectal once a day  calcitriol 0.25 mcg oral capsule: 1 cap(s) orally once a day  carvedilol 12.5 mg oral tablet: 1 tab(s) orally 2 times a day      *Last filled 5/21, spoke to MATTHEW Nguyen at Dr. Garcia&#x27;s office, she states patient should still be taking med as per MD notes from 2/22*   cyanocobalamin 1000 mcg oral tablet: 1 tab(s) orally once a day  divalproex sodium 500 mg oral delayed release tablet: 1 tab(s) orally 2 times a day  hydrALAZINE 10 mg oral tablet: 0.5 tab(s) orally 3 times a day  isosorbide mononitrate 30 mg oral tablet, extended release: 1 tab(s) orally once a day (in the morning)  levothyroxine 75 mcg (0.075 mg) oral tablet: 1 tab(s) orally once a day  Metoprolol Succinate ER 50 mg oral tablet, extended release: 1 tab(s) orally once a day  midodrine 10 mg oral tablet: 1 tab(s) orally 3 times a day  Plavix 75 mg oral tablet: 1 tab(s) orally once a day  pravastatin 40 mg oral tablet: 1 tab(s) orally once a day  senna oral tablet: 2 tab(s) orally once a day (at bedtime)  tamsulosin 0.4 mg oral capsule: 1 cap(s) orally once a day (at bedtime)   Aggrenox 25 mg-200 mg oral capsule, extended release: 1 cap(s) orally 2 times a day  atorvastatin 80 mg oral tablet: 1 tab(s) orally once a day (at bedtime)  benazepril 10 mg oral tablet: 1 tab(s) orally once a day  bisacodyl 10 mg rectal suppository: 1 suppository(ies) rectal once a day  calcitriol 0.25 mcg oral capsule: 1 cap(s) orally once a day  cyanocobalamin 1000 mcg oral tablet: 1 tab(s) orally once a day  divalproex sodium 500 mg oral delayed release tablet: 1 tab(s) orally 2 times a day  ferrous sulfate 325 mg (65 mg elemental iron) oral tablet: 1 tab(s) orally once a day  levothyroxine 75 mcg (0.075 mg) oral tablet: 1 tab(s) orally once a day  Metoprolol Succinate ER 50 mg oral tablet, extended release: 1 tab(s) orally once a day  senna oral tablet: 2 tab(s) orally once a day (at bedtime)  tamsulosin 0.4 mg oral capsule: 1 cap(s) orally once a day (at bedtime)   Aggrenox 25 mg-200 mg oral capsule, extended release: 1 cap(s) orally 2 times a day  atorvastatin 80 mg oral tablet: 1 tab(s) orally once a day (at bedtime)  bisacodyl 10 mg rectal suppository: 1 suppository(ies) rectal once a day  calcitriol 0.25 mcg oral capsule: 1 cap(s) orally once a day  cyanocobalamin 1000 mcg oral tablet: 1 tab(s) orally once a day  divalproex sodium 500 mg oral delayed release tablet: 1 tab(s) orally 2 times a day  ferrous sulfate 325 mg (65 mg elemental iron) oral tablet: 1 tab(s) orally once a day  hydrALAZINE 10 mg oral tablet: 0.5 tab(s) orally 3 times a day  levothyroxine 75 mcg (0.075 mg) oral tablet: 1 tab(s) orally once a day  Metoprolol Succinate ER 50 mg oral tablet, extended release: 1 tab(s) orally once a day  senna oral tablet: 2 tab(s) orally once a day (at bedtime)  tamsulosin 0.4 mg oral capsule: 1 cap(s) orally once a day (at bedtime)

## 2022-03-31 NOTE — CHART NOTE - NSCHARTNOTEFT_GEN_A_CORE
Called by RN as patient had 6 beats of vtach. Pt asymptomatic. Sleeping comfortably in bed. AM Mag/phos ordered. RN to call with any changes.

## 2022-03-31 NOTE — PROGRESS NOTE ADULT - SUBJECTIVE AND OBJECTIVE BOX
[facility], Division of Infectious Diseases  MEGAN Marinelli Y. Patel, S. Shah, G. Casimir  [phone]  ([phone] - weekdays after 5pm and weekends)    Name: ANTONIO PONCE  Age/Gender: 76y Male  MRN: [id number]    Interval History:  Patient seen this morning.   Notes reviewed.   No concerning overnight events.  Afebrile.   states no SOB this AM    Allergies: No Known Allergies      Objective:  Vitals:   T(F): 97.7 (22 @ 05:15), Max: 98.2 (22 @ 21:00)  HR: 69 (22 @ 05:15) (68 - 76)  BP: 145/78 (22 @ 05:15) (140/76 - 167/83)  RR: 17 (22 @ 05:15) (16 - 18)  SpO2: 96% (22 @ 05:15) (94% - 98%)  Physical Examination:  General: no acute distress  HEENT: anicteric  Cardio: S1, S2 present, normal rate  Resp: frail appearing, breathing comfortably  Abd: soft, ND, NT  Ext: no LE edema  Skin: warm, dry, no visible rash   Lines:    Laboratory Studies:  CBC:                       9.6    11.68 )-----------( 188      ( 31 Mar 2022 07:24 )             30.4     WBC Trend:  11.68 22 @ 07:24  12.49 22 @ 00:01  16.12 22 @ 14:34  20.24 22 @ 06:12    CMP:     142  |  107  |  35<H>  ----------------------------<  150<H>  3.5   |  25  |  2.30<H>    Ca    8.9      31 Mar 2022 07:24  Phos  2.8       Mg     2.2         TPro  6.4  /  Alb  2.9<L>  /  TBili  1.3<H>  /  DBili  x   /  AST  51<H>  /  ALT  38  /  AlkPhos  83  03-31      LIVER FUNCTIONS - ( 31 Mar 2022 07:24 )  Alb: 2.9 g/dL / Pro: 6.4 g/dL / ALK PHOS: 83 U/L / ALT: 38 U/L / AST: 51 U/L / GGT: x             Urinalysis Basic - ( 30 Mar 2022 20:41 )    Color: Brown / Appearance: Slightly Turbid / S.010 / pH: x  Gluc: x / Ketone: Trace  / Bili: Negative / Urobili: Negative   Blood: x / Protein: 100 / Nitrite: Positive   Leuk Esterase: Moderate / RBC: >50 /HPF / WBC 3-5   Sq Epi: x / Non Sq Epi: Occasional / Bacteria: Few      Microbiology: reviewed     Radiology: reviewed     Medications:  ALBUTerol    90 MICROgram(s) HFA Inhaler 2 Puff(s) Inhalation every 6 hours PRN  atorvastatin 10 milliGRAM(s) Oral at bedtime  calcitriol   Capsule 0.25 MICROGram(s) Oral daily  carvedilol 12.5 milliGRAM(s) Oral every 12 hours  dextrose 5%. 1000 milliLiter(s) IV Continuous <Continuous>  dextrose 5%. 1000 milliLiter(s) IV Continuous <Continuous>  dextrose 50% Injectable 25 Gram(s) IV Push once  dextrose 50% Injectable 12.5 Gram(s) IV Push once  dextrose 50% Injectable 25 Gram(s) IV Push once  dextrose Oral Gel 15 Gram(s) Oral once PRN  dipyridamole 200 mG/aspirin 25 mG 1 Capsule(s) Oral two times a day  glucagon  Injectable 1 milliGRAM(s) IntraMuscular once  heparin   Injectable 4100 Unit(s) IV Push every 6 hours PRN  heparin  Infusion.  Unit(s)/Hr IV Continuous <Continuous>  hydrALAZINE 5 milliGRAM(s) Oral three times a day  influenza  Vaccine (HIGH DOSE) 0.7 milliLiter(s) IntraMuscular once  insulin lispro (ADMELOG) corrective regimen sliding scale   SubCutaneous three times a day before meals  insulin lispro (ADMELOG) corrective regimen sliding scale   SubCutaneous at bedtime  isosorbide   mononitrate ER Tablet (IMDUR) 30 milliGRAM(s) Oral daily  levothyroxine 75 MICROGram(s) Oral daily  ondansetron Injectable 4 milliGRAM(s) IV Push every 8 hours PRN  potassium chloride    Tablet ER 40 milliEquivalent(s) Oral once  valproate sodium IVPB 500 milliGRAM(s) IV Intermittent every 12 hours    Antimicrobials:

## 2022-03-31 NOTE — DISCHARGE NOTE PROVIDER - NSDCACTIVITY_GEN_ALL_CORE
No heavy lifting/straining Bathing allowed/Do not drive or operate machinery/Stairs allowed/Walking - Indoors allowed/No heavy lifting/straining

## 2022-03-31 NOTE — PROGRESS NOTE ADULT - PROBLEM SELECTOR PLAN 5
TTE 2009 showed LVEF 30% w/ severely reduced ejection fraction   - f/u repeat TTE   - continue home Imdur and carvedilol w/ holding parameters   - hold patients home benazepril in setting of CKD   - patient s/p Lasix in ED, Lasix 40 mg x 1 dose this PM. reassess in AM need for IV Lasix as d/w cardio  - Cardio consult [facility], f/u reccs  - does not appear volume overloaded on physical exam TTE 2009 showed LVEF 30% w/ severely reduced ejection fraction   - f/u repeat TTE   - continue home Imdur and carvedilol w/ holding parameters   - hold patients home benazepril in setting of  JARRED/CKD 3  - patient s/p Lasix x 2 doses on 3/30   - Cardio consult [facility], f/u reccs  - does not appear volume overloaded on physical exam

## 2022-03-31 NOTE — SWALLOW VFSS/MBS ASSESSMENT ADULT - ORAL PHASE
+Nasal regurgitation during and after the swallow, +trace nasopharyngeal stasis, +independent regurgitation to oropharynx with subsequent swallow intermittently/Incomplete tongue to palate contact/Uncontrolled bolus / spillover in karyn-pharynx/Uncontrolled bolus / spillover in hypopharynx +Nasal regurgitation during and after the swallow, +mild nasopharyngeal stasis, +independent regurgitation to oropharynx with subsequent swallow intermittently/Incomplete tongue to palate contact/Uncontrolled bolus / spillover in karyn-pharynx +Nasal regurgitation during and after the swallow, +trace nasopharyngeal stasis, +independent regurgitation to oropharynx with subsequent swallow intermittently/Delayed oral transit time/Reduced anterior - posterior transport/Incomplete tongue to palate contact/Uncontrolled bolus / spillover in karyn-pharynx +Nasal regurgitation during and after the swallow, +mild nasopharyngeal stasis, +independent regurgitation to oropharynx with subsequent swallow intermittently/Incomplete tongue to palate contact/Uncontrolled bolus / spillover in karyn-pharynx/Uncontrolled bolus / spillover in hypopharynx

## 2022-03-31 NOTE — DISCHARGE NOTE PROVIDER - CARE PROVIDER_API CALL
CHANCE FRANCO  Internal Medicine  [address] [place] [address], [place] [place] [address]  Phone: [phone]  Fax: [phone]  Follow Up Time: 1-3 days    Chet Garcia)  Cardiovascular Disease; Internal Medicine  [facility], [address] [place] [address], [address] [place] [address]  Phone: [phone]  Fax: [phone]  Follow Up Time: 1-3 days   CHANCE FRANCO  Internal Medicine  [address] [place] [address], [place] [address]  Phone: ([phone]  Fax: [phone]  Follow Up Time: 1-3 days    Chet Garcia)  Cardiovascular Disease; Internal Medicine  [facility], [address], [address]  [place] [address]  Phone: [phone]  Fax: [phone]  Follow Up Time: 1-3 days    Carlos A Brownlee)  Neurology  [address]  [place] [address]  Phone: [phone]  Fax: [phone]  Follow Up Time: 1-3 days    Brandon Harrison)  Urology  [address] [place] [address], [address]  [place] [address]  Phone: [phone]  Fax: [phone]  Follow Up Time: 1-3 days   CHANCE FRANCO  Internal Medicine  [address] [place] [address], [address]  [place] [address]  Phone: ([phone]  Fax: [phone]  Follow Up Time: 1-3 days    Chet Garcia)  Cardiovascular Disease; Internal Medicine  [facility], [address], [address]  [place] [address]  Phone: [phone]  Fax: [phone]  Follow Up Time: 1-3 days    Brandon Harrison)  Urology  [address] [place] [address], [address]  [place] [address]  Phone: [phone]  Fax: [phone]  Follow Up Time: 1-3 days    Yenifer Faulkner)  Neurology  [address] [place] [address]  Phone: [phone]  Fax: [phone]  Follow Up Time:

## 2022-03-31 NOTE — PROGRESS NOTE ADULT - PROBLEM SELECTOR PLAN 8
Patient w/ admission CK of 328 ?NSTEMI  - less likely rhabdo, likely elevated in the setting of chronic kidney disease   - hold off fluids given patient's CHF hx for now   - f/u repeat CK serial Exam, ECHO  - nephro consulted, f/u recs Patient w/ admission CK of 328 ?NSTEMI  - less likely rhabdo, likely elevated in the setting of chronic kidney disease   - hold off fluids given patient's CHF hx for now   - f/u repeat CK, trending down, ECHO  - nephro consulted, f/u recs Patient w/ admission CK of 328  2/2 likely NSTEMI  - less likely rhabdo, likely elevated in the setting of chronic kidney disease   - hold off fluids given patient's CHF hx for now   - f/u repeat CK, trending down, ECHO

## 2022-03-31 NOTE — CONSULT NOTE ADULT - ASSESSMENT
Multifactorial anemia related to acute illness. renal insufficiency and functional fe deficiency  course complicated by stroke, CHF and accelerated hypertension    Recommendations"  1.  follow CBC and transfuse as indicated.   2.  cardiac, neuro and renal treatment  3.  to treat fe deficiency with venofer 100mg IV x 3 days

## 2022-03-31 NOTE — PROGRESS NOTE ADULT - PROBLEM SELECTOR PLAN 2
2/2 to demand ischemia vs. ACS vs. CVA,  w/ heart strain   - Troponin 1011.9 > 5252.0 > 17833 > 92525     - EKG: ST-depressions in V5 V6, new from prior EKG   - patient received aspirin 324mg in ED  - Cardiology [facility] consulted, f/u reccs  -As d/w Dr Hahn -pt does NOT have any cardiac stents/Intervention in past  -ECHO 2/2 to demand ischemia vs. ACS vs. CVA,  w/ heart strain   - Troponin 1011.9 > 5252.0 > 02526 > 33441  - CK and CKMB also downtrending   - EKG: ST-depressions in V5 V6, new from prior EKG   - patient currently on heparin drip   - Cardiology [facility] consulted, f/u reccs  -As d/w Dr Hahn -pt does NOT have any cardiac stents/Intervention in past  -ECHO Patient w/ left upper extremity weakness of unknown duration, 2/2 to CVA   - patient w/ seizure like activity in ED s/p ativan, EEG   - started on valproate by neuro  - CT brain stroke negative for acute hemorrhage or infarct, repeat CT: acute stroke in right central sulcus   - on aggrenox, statin, and heparin drip   - f/u AM TSH   - Neuro Bhachawat consulted, f/u recs   - PT/OT   - f/u MBS results from speech and swallow Patient w/ left upper extremity weakness of unknown duration, 2/2 to CVA   - patient w/ seizure like activity in ED s/p ativan, EEG   - started on valproate by neuro  - CT brain stroke negative for acute hemorrhage or infarct,   -repeat CT: acute stroke in right central sulcus   - on Aggrenox, statin, and heparin drip IV   - Neuro Bhachawat -f/u recs   - PT/OT ---> [facility]   - f/u MBS done

## 2022-03-31 NOTE — DISCHARGE NOTE PROVIDER - NSDCCPCAREPLAN_GEN_ALL_CORE_FT
PRINCIPAL DISCHARGE DIAGNOSIS  Diagnosis: Acute pulmonary edema  Assessment and Plan of Treatment: You presented with difficulty breathing. This was most likely due to fluid building up in your lungs, but could have had some contribution from an infection you were also found to have. You recieved medication that caused you to excrete more urine which helped shift the fluid balance in your body to essentially drain the fluid from your lungs. You were seen by our infectious disease doctor who recommended starting you on __________ for your pneumonia.      SECONDARY DISCHARGE DIAGNOSES  Diagnosis: CVA (cerebrovascular accident)  Assessment and Plan of Treatment: You were noted to have a stroke, also known as a cerebrovascular accident (CVA). This was found based on your symptom profile, and findings noted on computed tomographical imaging (CT imaging) of your brain. Please continue to take aspirin and clopidogrel ___ as prescribed. Please follow-up with your primary care physician, and with your neurologist,  ____. Please continue to work with physical therapy if needed. Symptom improvement varies greatly, varying from minimal improvement to even possibly returning back to baseline with rehabilitation therapy. Should you start to experience symptoms such as but not limited to: changes in vision, changes in hearing, severe weakness/dizziness, fainting spells, or difficulties moving your eyelids or mouth, please return to the emergency department immediately for interval evaluation.    Diagnosis: Non-ST elevation MI (NSTEMI)  Assessment and Plan of Treatment: You were found to have very elevated cardiac enzymes but without any changes on your EKG that hinted at a possible ischemic process occuring to your heart. You were seen by our cardiologists and were given the appropriate treatments (anticoagulation) to prevent further damage to your heart muscle tissue.     PRINCIPAL DISCHARGE DIAGNOSIS  Diagnosis: Acute pulmonary edema  Assessment and Plan of Treatment: You presented with difficulty breathing. This was most likely due to fluid building up in your lungs, but could have had some contribution from an infection you were also found to have. You recieved medication that caused you to excrete more urine which helped shift the fluid balance in your body to essentially drain the fluid from your lungs. Please follow up with your PCP.      SECONDARY DISCHARGE DIAGNOSES  Diagnosis: CVA (cerebrovascular accident)  Assessment and Plan of Treatment: You were noted to have a stroke, also known as a cerebrovascular accident (CVA). This was found based on your symptom profile, and findings noted on computed tomographical imaging (CT imaging) of your brain. Please continue to take your . Please follow-up with your primary care physician, and with your neurologist,  ____. Please continue to work with physical therapy if needed. Symptom improvement varies greatly, varying from minimal improvement to even possibly returning back to baseline with rehabilitation therapy. Should you start to experience symptoms such as but not limited to: changes in vision, changes in hearing, severe weakness/dizziness, fainting spells, or difficulties moving your eyelids or mouth, please return to the emergency department immediately for interval evaluation.    Diagnosis: Non-ST elevation MI (NSTEMI)  Assessment and Plan of Treatment: You were found to have very elevated cardiac enzymes but without any changes on your EKG that hinted at a possible ischemic process occuring to your heart. You were seen by our cardiologists and were given the appropriate treatments (anticoagulation) to prevent further damage to your heart muscle tissue.    Diagnosis: Chronic HFrEF (heart failure with reduced ejection fraction)  Assessment and Plan of Treatment:     Diagnosis: Hypothyroidism  Assessment and Plan of Treatment:     Diagnosis: HTN (hypertension)  Assessment and Plan of Treatment:      PRINCIPAL DISCHARGE DIAGNOSIS  Diagnosis: Acute pulmonary edema  Assessment and Plan of Treatment: You presented with difficulty breathing. This was most likely due to fluid building up in your lungs, but could have had some contribution from an infection you were also found to have. You recieved medication that caused you to excrete more urine which helped shift the fluid balance in your body to essentially drain the fluid from your lungs. Please follow up with your PCP.      SECONDARY DISCHARGE DIAGNOSES  Diagnosis: CVA (cerebrovascular accident)  Assessment and Plan of Treatment: You were noted to have a stroke, also known as a cerebrovascular accident (CVA). This was found based on your symptom profile, and findings noted on computed tomographical imaging (CT imaging) of your brain. Please continue to take your aggrenox and your atorvastatin 80mg once a day . Please follow-up with your primary care physician, and with your neurologist, Dr. Brownlee. Please continue to work with physical therapy if needed. Symptom improvement varies greatly, varying from minimal improvement to even possibly returning back to baseline with rehabilitation therapy. Should you start to experience symptoms such as but not limited to: changes in vision, changes in hearing, severe weakness/dizziness, fainting spells, or difficulties moving your eyelids or mouth, please return to the emergency department immediately for interval evaluation.    Diagnosis: Non-ST elevation MI (NSTEMI)  Assessment and Plan of Treatment: You were found to have very elevated cardiac enzymes but without any changes on your EKG that hinted at a possible ischemic process occuring to your heart. You were seen by our cardiologists and were given the appropriate treatments (anticoagulation) to prevent further damage to your heart muscle tissue. Please continue to take your aggrenox and follow up with your cardiologist Dr. Garcia for further management.    Diagnosis: CAD (coronary artery disease)  Assessment and Plan of Treatment: You were previously diagnosed with coronary artery disease. Please STOP taking your plavix 75mg once a day and CONTINUE to take your aggrenox twice a day and follow up with your PCP for further management.    Diagnosis: Chronic HFrEF (heart failure with reduced ejection fraction)  Assessment and Plan of Treatment: You were previously diagnosed with heart failure. Please continue to take your metoprolol 50mg once a day. DO NOT take your Imdur or your benazepril until you follow up with your PCP and your cardiologist Dr. Garcia for further management.    Diagnosis: Hypothyroidism  Assessment and Plan of Treatment: You were previously diagnosed with hypothyroidism, please continue to take your synthroid 75mcg once a day and follow up with your PCP for further management.    Diagnosis: HTN (hypertension)  Assessment and Plan of Treatment: You were previously diagnosed with high blood pressure, please CONTINUE to take your metoprolol 50mg once a day. DO NOT restart your benazpril 10mg, Imdur, and hydralazine 10mg. Please follow up with your PCP for further management.    Diagnosis: Urinary retention  Assessment and Plan of Treatment: You had difficulty passing urine during your hospitalization. You had a mcclellan placed when you first came to the ED as apart of the management of your stroke, but after that mcclellan was removed you were found to be retaining significant amounts of urine. You were seen by a urologist and had a mcclellan catheter replaced. Please follow up with your urologist Dr. Harrison to have your mcclellan removed/managed.     PRINCIPAL DISCHARGE DIAGNOSIS  Diagnosis: Acute pulmonary edema  Assessment and Plan of Treatment: You presented with difficulty breathing. This was most likely due to fluid building up in your lungs, but could have had some contribution from an infection you were also found to have. You recieved medication that caused you to excrete more urine which helped shift the fluid balance in your body to essentially drain the fluid from your lungs. Please follow up with your PCP.      SECONDARY DISCHARGE DIAGNOSES  Diagnosis: CVA (cerebrovascular accident)  Assessment and Plan of Treatment: You were noted to have a stroke, also known as a cerebrovascular accident (CVA). This was found based on your symptom profile, and findings noted on computed tomographical imaging (CT imaging) of your brain. Please continue to take your aggrenox and your atorvastatin 80mg once a day . Please follow-up with your primary care physician, and with your neurologist, Dr. Brownlee. Please continue to work with physical therapy if needed. Symptom improvement varies greatly, varying from minimal improvement to even possibly returning back to baseline with rehabilitation therapy. Should you start to experience symptoms such as but not limited to: changes in vision, changes in hearing, severe weakness/dizziness, fainting spells, or difficulties moving your eyelids or mouth, please return to the emergency department immediately for interval evaluation.    Diagnosis: Seizure-like activity  Assessment and Plan of Treatment: You presented with siezure like activity while in the ED. You were seen by a neurologist and started on a medication to help control siezure symptoms. Please CONTINUE your depakote 500mg twice a day and follow up with your PCP and neurologist Dr. Brownlee for further management.    Diagnosis: Non-ST elevation MI (NSTEMI)  Assessment and Plan of Treatment: You were found to have very elevated cardiac enzymes but without any changes on your EKG that hinted at a possible ischemic process occuring to your heart. You were seen by our cardiologists and were given the appropriate treatments (anticoagulation) to prevent further damage to your heart muscle tissue. Please continue to take your aggrenox and follow up with your cardiologist Dr. Garcia for further management.    Diagnosis: CAD (coronary artery disease)  Assessment and Plan of Treatment: You were previously diagnosed with coronary artery disease. Please STOP taking your plavix 75mg once a day and CONTINUE to take your aggrenox twice a day and follow up with your PCP for further management.    Diagnosis: Chronic HFrEF (heart failure with reduced ejection fraction)  Assessment and Plan of Treatment: You were previously diagnosed with heart failure. Please continue to take your metoprolol 50mg once a day. DO NOT take your Imdur or your benazepril until you follow up with your PCP and your cardiologist Dr. Garcia for further management.    Diagnosis: Hypothyroidism  Assessment and Plan of Treatment: You were previously diagnosed with hypothyroidism, please continue to take your synthroid 75mcg once a day and follow up with your PCP for further management.    Diagnosis: HTN (hypertension)  Assessment and Plan of Treatment: You were previously diagnosed with high blood pressure, please CONTINUE to take your metoprolol 50mg once a day. DO NOT restart your benazpril 10mg, Imdur, and hydralazine 10mg. Please follow up with your PCP for further management.    Diagnosis: Urinary retention  Assessment and Plan of Treatment: You had difficulty passing urine during your hospitalization. You had a mcclellan placed when you first came to the ED as apart of the management of your stroke, but after that mcclellan was removed you were found to be retaining significant amounts of urine. You were seen by a urologist and had a mcclellan catheter replaced. Please follow up with your urologist Dr. Harrison to have your mcclellan removed/managed.     PRINCIPAL DISCHARGE DIAGNOSIS  Diagnosis: Acute pulmonary edema  Assessment and Plan of Treatment: You presented with difficulty breathing. This was most likely due to fluid building up in your lungs due to acute on chronic heart failuyre. There was also contribution from an infection that you were found to have. You recieved medication that caused you to excrete more urine which helped shift the fluid balance in your body to essentially drain the fluid from your lungs. You did not need any more water pils.   - Please follow up with your PCP within one week of discharge      SECONDARY DISCHARGE DIAGNOSES  Diagnosis: CVA (cerebrovascular accident)  Assessment and Plan of Treatment: You were noted to have a stroke, also known as a cerebrovascular accident (CVA). This was found based on your symptom profile, and findings noted on computed tomographical imaging (CT imaging) of your brain.   - Please continue to take your aggrenox and your atorvastatin 80mg once a day.  - Please follow-up with your primary care physician and your neurologist, Dr. Brownlee within one week of discharge  - Please continue to work with physical therapy to get stronger  - Should you start to experience symptoms such as but not limited to: changes in vision, changes in hearing, severe weakness/dizziness, fainting spells, or difficulties moving your eyelids or mouth, please return to the emergency department immediately for interval evaluation.    Diagnosis: Non-ST elevation MI (NSTEMI)  Assessment and Plan of Treatment: You were found to have very elevated cardiac enzymes but without any changes on your EKG that hinted at a possible ischemic process occuring to your heart. You were seen by our cardiologists and were given the appropriate treatments to prevent further damage to your heart muscle tissue.   - Please continue to take your aggrenox and atorvastatin 80 mg daily and follow up with your cardiologist Dr. Garcia for further management.    Diagnosis: HTN (hypertension)  Assessment and Plan of Treatment: You were previously diagnosed with high blood pressure  - Please CONTINUE to take your metoprolol 50mg once a day.   - DO NOT restart your benazpril 10mg, Imdur, and hydralazine 10mg.   - Please follow up with your PCP for further management.    Diagnosis: Hypothyroidism  Assessment and Plan of Treatment: You were previously diagnosed with hypothyroidism  - Please continue to take your synthroid 75mcg once a day    Diagnosis: Chronic HFrEF (heart failure with reduced ejection fraction)  Assessment and Plan of Treatment: You were previously diagnosed with heart failure.   - Please continue to take your metoprolol 50mg once a day.   - DO NOT take your Imdur or your benazepril until you follow up with your PCP and your cardiologist Dr. Garcia for further management.    Diagnosis: Urinary retention  Assessment and Plan of Treatment: You had difficulty passing urine during your hospitalization. You had a mcclellan placed when you first came to the ED as apart of the management of your stroke, but after that mcclellan was removed you were found to be retaining significant amounts of urine. You were seen by a urologist and had a mcclellan catheter replaced.   - Continue your Flomax .4 mg daily   - Please follow up with your urologist Dr. Harrison after discahrge to have your mcclellan removed/managed.  - Please continue to take senna daily and docusate three times a day for possible constipation    Diagnosis: CAD (coronary artery disease)  Assessment and Plan of Treatment: You were previously diagnosed with coronary artery disease.   - Please STOP taking your plavix 75mg once a day and aspirin and CONTINUE to take your aggrenox twice a day and atorvastatin 80 mg daily    Diagnosis: Seizure-like activity  Assessment and Plan of Treatment: You presented with siezure like activity while in the ED. You were seen by a neurologist and started on a medication to help control siezure symptoms.   - Please CONTINUE your depakote 500mg twice a day and follow up with your PCP and neurologist Dr. Brownlee within one week of discharge for further management.     PRINCIPAL DISCHARGE DIAGNOSIS  Diagnosis: Acute pulmonary edema  Assessment and Plan of Treatment: You presented with difficulty breathing. This was most likely due to fluid building up in your lungs due to your history of congestive heart failure.  Cardiology evaluated you during your hospital stay and you recieved medication (water pill) that caused you to excrete more urine which helped shift the fluid balance in your body to essentially drain the fluid from your lungs. You did not need any more water pills and remained stable. You also had a V/Q scan that revealed that you had a low probability of a clot in your lungs.   - You had an echocardiogram which revealed that you had severe systolic dysfunction with an ejection fraction of 30-35%.   - Please follow up with your PCP and Cardiologist Dr. Garcia within one week of discharge      SECONDARY DISCHARGE DIAGNOSES  Diagnosis: CVA (cerebrovascular accident)  Assessment and Plan of Treatment: You were noted to have a stroke, also known as a cerebrovascular accident (CVA). This was found based on your symptom profile, and findings noted on computed tomographical imaging (CT imaging) of your brain. Cardiology and Neurology evaluated you during your hospital stay. Your home aspirin and plavix was discontinued and you were started on Aggrenox which is also an antiplatelet.   - Please continue to take your Aggrenox twice a day and your Atorvastatin 80 mg once a day (your home atorvastatin was increased)Please do not take your home atorvastatin at a lower dose.   - You had a swallowing study and you were placed on pureed diet with thick liquids with a chin tuck after each bolus of food. PLEASE continue this diet because if you eat normal food you are at a HIGH RISK of aspirating on your food which will result in aspiration pneumonia.   - In rehab, please continue swallowing and speech therapy due to your stroke.   - Please follow-up with your primary care physician and your neurologist, Dr. Brownlee within one week of discharge  - Please continue to work with physical therapy to get stronger  - Should you start to experience symptoms such as but not limited to: changes in vision, changes in hearing, severe weakness/dizziness, fainting spells, or difficulties moving your eyelids or mouth, please return to the emergency department immediately for interval evaluation.    Diagnosis: Non-ST elevation MI (NSTEMI)  Assessment and Plan of Treatment: You were found to have very elevated cardiac enzymes but without any changes on your EKG that hinted at a possible ischemic process occuring to your heart. You were seen by our cardiologists and were given the appropriate treatments to prevent further damage to your heart muscle tissue. Your home aspirin and plavix was discontinued and you were started on Aggrenox which is also an antiplatelet.   - Please continue to take your Aggrenox twice a day and your Atorvastatin 80 mg once a day (your home atorvastatin was increased)Please do not take your home atorvastatin at a lower dose.   - Please follow up with your cardiologist Dr. Garcia within a week for further management.    Diagnosis: Seizure-like activity  Assessment and Plan of Treatment: You presented with siezure like activity while in the ED. You were seen by a neurologist and started on a medication to help control siezure symptoms.   - Please CONTINUE your Depakote 500mg twice a day and follow up with your PCP and neurologist Dr. Brownlee within one week of discharge for further management.    Diagnosis: Urinary retention  Assessment and Plan of Treatment: You had difficulty passing urine during your hospitalization. You had a mcclellan placed when you first came to the ED as apart of the management of your stroke, but after that mcclellan was removed you were found to be retaining significant amounts of urine. You were seen by a urologist and had a mcclellan catheter replaced.   - You were started on Flomax, please CONTINUE your Flomax .4 mg daily   - Please follow up with your Urologist Dr. Harrison after discharge to have your mcclellan removed/managed.  - Please continue to take senna daily and docusate three times a day for possible constipation that may be worsening your urinary retention    Diagnosis: Leukocytosis  Assessment and Plan of Treatment: You were noted to have elevaterd white blood cell count on admission. Infectious disease specialist evaluated you during this hospital stay. You had no fevers and did not require any antibiotics because there was a low suspicion for an infection. Your white blood cell count was monitored and resolved.   - Please follow up with your PCP within one week for repeat blood work in order to monitor your wbc.    Diagnosis: Chronic HFrEF (heart failure with reduced ejection fraction)  Assessment and Plan of Treatment: You were previously diagnosed with heart failure. You did not require many doses of a water pill.   - You had an echocardiogram which revealed that you had severe systolic dysfunction with an ejection fraction of 30-35%.   - Please continue to take your Metoprolol 50mg once a day.   - DO NOT take your Imdur or your benazepril until you follow up with your PCP and your cardiologist Dr. Garcia for further management.    Diagnosis: Stage 3 chronic kidney disease  Assessment and Plan of Treatment: You have a history of chronic kidney disease. Our nephrologist evaluated you and your  kidney function was monitored and remained stable.   - Please follow up with your PCP within one week of discharge to get repeat blood work in order to monitor your kidney function.    Diagnosis: CAD (coronary artery disease)  Assessment and Plan of Treatment: You were previously diagnosed with coronary artery disease.   - Please STOP taking your home Plavix 75mg once a day and Aspirin  - Please CONTINUE to take your Aggrenox twice a day and your Atorvastatin 80 mg once a day (your home atorvastatin was increased)Please do not take your home atorvastatin at a lower dose.    Diagnosis: Anemia  Assessment and Plan of Treatment: You were found to have anemia during this hospital stay likely due to iron deficiency anemia. Hematologist evaluated you and you were given iron injections. Your hemoglobin was monitored and remained stable.  - Continue Vitamin B12 daily    Diagnosis: HTN (hypertension)  Assessment and Plan of Treatment: You were previously diagnosed with high blood pressure  - Please CONTINUE to take your home Metoprolol 50mg once a day.   - Please DO NOT restart your benazapril 10mg, Imdur, and hydralazine 10mg until you see your Cardiologist Dr. Garcia.    Diagnosis: Hypothyroidism  Assessment and Plan of Treatment: You were previously diagnosed with hypothyroidism  - Please continue to take your synthroid 75mcg once a day    Diagnosis: Tonsillar cancer  Assessment and Plan of Treatment: You have a history of tonsillar cancer.   - Please follow up with your ENT Dr. Lynn   - You had a swallowing study and you were placed on pureed diet with thick liquids with a chin tuck after each bolus of food. PLEASE continue this diet because if you eat normal food you are at a HIGH RISK of aspirating on your food which will result in aspiration pneumonia.   - In rehab, please continue swallowing and speech therapy due to your stroke.     PRINCIPAL DISCHARGE DIAGNOSIS  Diagnosis: Acute pulmonary edema  Assessment and Plan of Treatment: You presented with difficulty breathing. This was most likely due to fluid building up in your lungs due to your history of congestive heart failure.  Cardiology evaluated you during your hospital stay and you recieved medication (water pill) that caused you to excrete more urine which helped shift the fluid balance in your body to essentially drain the fluid from your lungs. You did not need any more water pills and remained stable. You also had a V/Q scan that revealed that you had a low probability of a clot in your lungs.   - You had an echocardiogram which revealed that you had severe systolic dysfunction with an ejection fraction of 30-35%.   - Please follow up with your PCP and Cardiologist Dr. Garcia within one week of discharge      SECONDARY DISCHARGE DIAGNOSES  Diagnosis: CVA (cerebrovascular accident)  Assessment and Plan of Treatment: You were noted to have a stroke, also known as a cerebrovascular accident (CVA). This was found based on your symptom profile, and findings noted on computed tomographical imaging (CT imaging) of your brain. Cardiology and Neurology evaluated you during your hospital stay. Your home aspirin and plavix was discontinued and you were started on Aggrenox which is also an antiplatelet.   - Please continue to take your Aggrenox twice a day and your Atorvastatin 80 mg once a day (your home atorvastatin was increased)Please do not take your home atorvastatin at a lower dose.   - You had a swallowing study and you were placed on pureed diet with thick liquids with a chin tuck after each bolus of food. PLEASE continue this diet because if you eat normal food you are at a HIGH RISK of aspirating on your food which will result in aspiration pneumonia.   - In rehab, please continue swallowing and speech therapy due to your stroke.   - Please follow-up with your primary care physician and your neurologist, Dr. Brownlee within one week of discharge  - Please continue to work with physical therapy to get stronger  - Should you start to experience symptoms such as but not limited to: changes in vision, changes in hearing, severe weakness/dizziness, fainting spells, or difficulties moving your eyelids or mouth, please return to the emergency department immediately for interval evaluation.    Diagnosis: Seizure-like activity  Assessment and Plan of Treatment: You presented with siezure like activity while in the ED. You were seen by a neurologist and started on a medication to help control siezure symptoms.   - Please CONTINUE your Depakote 500mg twice a day and follow up with your PCP and neurologist Dr. Brownlee within one week of discharge for further management.    Diagnosis: Non-ST elevation MI (NSTEMI)  Assessment and Plan of Treatment: You were found to have very elevated cardiac enzymes but without any changes on your EKG that hinted at a possible ischemic process occuring to your heart. You were seen by our cardiologists and were given the appropriate treatments to prevent further damage to your heart muscle tissue. Your home aspirin and plavix was discontinued and you were started on Aggrenox which is also an antiplatelet.   - Please continue to take your Aggrenox twice a day and your Atorvastatin 80 mg once a day (your home atorvastatin was increased)Please do not take your home atorvastatin at a lower dose.   - Please follow up with your cardiologist Dr. Garcia within a week for further management.    Diagnosis: Dysphagia  Assessment and Plan of Treatment: Given your prior history of tonsillar cancer and your recent stroke, you were deemed to be a high risk for aspiration when feeding and had difficulty swallowing your meals  - You had a swallowing study and you were placed on pureed diet with thick liquids with a chin tuck after each bolus of food. PLEASE continue this diet because if you eat normal food you are at a HIGH RISK of aspirating on your food which will result in aspiration pneumonia.   - Please have another speech and swallow evaluation at your Rehab facility    Diagnosis: CAD (coronary artery disease)  Assessment and Plan of Treatment: You were previously diagnosed with coronary artery disease.   - Please STOP taking your home Plavix 75mg once a day and Aspirin  - Please CONTINUE to take your Aggrenox twice a day and your Atorvastatin 80 mg once a day (your home atorvastatin was increased)Please do not take your home atorvastatin at a lower dose.    Diagnosis: Chronic HFrEF (heart failure with reduced ejection fraction)  Assessment and Plan of Treatment: You were previously diagnosed with heart failure. You did not require many doses of a water pill.   - You had an echocardiogram which revealed that you had severe systolic dysfunction with an ejection fraction of 30-35%.   - Please continue to take your Metoprolol 50mg once a day.   - DO NOT take your Imdur or your benazepril until you follow up with your PCP and your cardiologist Dr. Garcia for further management.    Diagnosis: Hypothyroidism  Assessment and Plan of Treatment: You were previously diagnosed with hypothyroidism  - Please continue to take your synthroid 75mcg once a day    Diagnosis: HTN (hypertension)  Assessment and Plan of Treatment: You were previously diagnosed with high blood pressure  - Please CONTINUE to take your home Metoprolol 50mg once a day.   - Please DO NOT restart your benazapril 10mg, Imdur, and hydralazine 10mg until you see your Cardiologist Dr. Garcia.    Diagnosis: Urinary retention  Assessment and Plan of Treatment: You had difficulty passing urine during your hospitalization. You had a mcclellan placed when you first came to the ED as apart of the management of your stroke, but after that mcclellan was removed you were found to be retaining significant amounts of urine. You were seen by a urologist and had a mcclellan catheter replaced.   - You were started on Flomax, please CONTINUE your Flomax .4 mg daily   - Please have another trial of void at rehab on FRIDAY 4/8/2022  - Please follow up with your Urologist Dr. Harrison after discharge to have your mcclellan removed/managed.  - Please continue to take senna daily and docusate three times a day for possible constipation that may be worsening your urinary retention    Diagnosis: Leukocytosis  Assessment and Plan of Treatment: You were noted to have elevaterd white blood cell count on admission. Infectious disease specialist evaluated you during this hospital stay. You had no fevers and did not require any antibiotics because there was a low suspicion for an infection. Your white blood cell count was monitored and resolved.   - Please follow up with your PCP within one week for repeat blood work in order to monitor your wbc.    Diagnosis: Stage 3 chronic kidney disease  Assessment and Plan of Treatment: You have a history of chronic kidney disease. Our nephrologist evaluated you and your  kidney function was monitored and remained stable.   - Please follow up with your PCP within one week of discharge to get repeat blood work in order to monitor your kidney function.    Diagnosis: Anemia  Assessment and Plan of Treatment: You were found to have anemia during this hospital stay likely due to iron deficiency anemia. Hematologist evaluated you and you were given iron injections. Your hemoglobin was monitored and remained stable.  - Continue Vitamin B12 daily    Diagnosis: Tonsillar cancer  Assessment and Plan of Treatment: You have a history of tonsillar cancer.   - Please follow up with your ENT Dr. Lynn   - You had a swallowing study and you were placed on pureed diet with thick liquids with a chin tuck after each bolus of food. PLEASE continue this diet because if you eat normal food you are at a HIGH RISK of aspirating on your food which will result in aspiration pneumonia.   - In rehab, please continue swallowing and speech therapy due to your stroke.     PRINCIPAL DISCHARGE DIAGNOSIS  Diagnosis: Acute pulmonary edema  Assessment and Plan of Treatment: You presented with difficulty breathing. This was most likely due to fluid building up in your lungs due to your history of congestive heart failure.  Cardiology evaluated you during your hospital stay and you recieved medication (water pill) that caused you to excrete more urine which helped shift the fluid balance in your body to essentially drain the fluid from your lungs. You did not need any more water pills and remained stable. You also had a V/Q scan that revealed that you had a low probability of a clot in your lungs.   - You had an echocardiogram which revealed that you had severe systolic dysfunction with an ejection fraction of 30-35%.   - Please follow up with your PCP and Cardiologist Dr. Garcia within one week of discharge      SECONDARY DISCHARGE DIAGNOSES  Diagnosis: CVA (cerebrovascular accident)  Assessment and Plan of Treatment: You were noted to have a stroke, also known as a cerebrovascular accident (CVA). This was found based on your symptom profile, and findings noted on computed tomographical imaging (CT imaging) of your brain. Cardiology and Neurology evaluated you during your hospital stay. Your home aspirin and plavix was discontinued and you were started on Aggrenox which is also an antiplatelet.   - Please continue to take your Aggrenox twice a day and your Atorvastatin 80 mg once a day (your home atorvastatin was increased)Please do not take your home atorvastatin at a lower dose.   - You had a swallowing study and you were placed on pureed diet with thick liquids with a chin tuck after each bolus of food. PLEASE continue this diet because if you eat normal food you are at a HIGH RISK of aspirating on your food which will result in aspiration pneumonia.   - In rehab, please continue swallowing and speech therapy due to your stroke.   - Please follow-up with your primary care physician and your neurologist, Dr. Brownlee within one week of discharge  - Please continue to work with physical therapy to get stronger  - Should you start to experience symptoms such as but not limited to: changes in vision, changes in hearing, severe weakness/dizziness, fainting spells, or difficulties moving your eyelids or mouth, please return to the emergency department immediately for interval evaluation.    Diagnosis: Non-ST elevation MI (NSTEMI)  Assessment and Plan of Treatment: You were found to have very elevated cardiac enzymes but without any changes on your EKG that hinted at a possible ischemic process occuring to your heart. You were seen by our cardiologists and were given the appropriate treatments to prevent further damage to your heart muscle tissue. Your home aspirin and plavix was discontinued and you were started on Aggrenox which is also an antiplatelet.   - Please continue to take your Aggrenox twice a day and your Atorvastatin 80 mg once a day (your home atorvastatin was increased)Please do not take your home atorvastatin at a lower dose.   - Please follow up with your cardiologist Dr. Garcia within a week for further management.    Diagnosis: Seizure-like activity  Assessment and Plan of Treatment: You presented with siezure like activity while in the ED. You were seen by a neurologist and started on a medication to help control siezure symptoms.   - Please CONTINUE your Depakote 500mg twice a day and follow up with your PCP and neurologist Dr. Brownlee within one week of discharge for further management.    Diagnosis: Urinary retention  Assessment and Plan of Treatment: You had difficulty passing urine during your hospitalization. You had a mcclellan placed when you first came to the ED as apart of the management of your stroke, but after that mcclellan was removed you were found to be retaining significant amounts of urine. You were seen by a urologist and had a mcclellan catheter replaced.   - You were started on Flomax, please CONTINUE your Flomax .4 mg daily   - Please have another trial of void at rehab on FRIDAY 4/8/2022  - Please follow up with your Urologist Dr. Harrison after discharge to have your mcclellan removed/managed.  - Please continue to take senna daily and docusate three times a day for possible constipation that may be worsening your urinary retention    Diagnosis: Leukocytosis  Assessment and Plan of Treatment: You were noted to have elevaterd white blood cell count on admission. Infectious disease specialist evaluated you during this hospital stay. You had no fevers and did not require any antibiotics because there was a low suspicion for an infection. Your white blood cell count was monitored and resolved.   - Please follow up with your PCP within one week for repeat blood work in order to monitor your wbc.    Diagnosis: Chronic HFrEF (heart failure with reduced ejection fraction)  Assessment and Plan of Treatment: You were previously diagnosed with heart failure. You did not require many doses of a water pill.   - You had an echocardiogram which revealed that you had severe systolic dysfunction with an ejection fraction of 30-35%.   - Please continue to take your Metoprolol 50mg once a day and Benazapril 10 mg once a day .   - DO NOT take your Imdur, Midodrine or your Hydralazine until you follow up with your PCP and your cardiologist Dr. Garcia for further management.    Diagnosis: Stage 3 chronic kidney disease  Assessment and Plan of Treatment: You have a history of chronic kidney disease. Our nephrologist evaluated you and your  kidney function was monitored and remained stable.   - Please follow up with your PCP within one week of discharge to get repeat blood work in order to monitor your kidney function.    Diagnosis: CAD (coronary artery disease)  Assessment and Plan of Treatment: You were previously diagnosed with coronary artery disease.   - Please STOP taking your home Plavix 75mg once a day and Aspirin  - Please CONTINUE to take your Aggrenox twice a day and your Atorvastatin 80 mg once a day (your home atorvastatin was increased)Please do not take your home atorvastatin at a lower dose.    Diagnosis: Anemia  Assessment and Plan of Treatment: You were found to have anemia during this hospital stay likely due to iron deficiency anemia. Hematologist evaluated you and you were given iron injections. Your hemoglobin was monitored and remained stable.  - Continue Vitamin B12 daily and Ferrous sulfate 325 mg daily    Diagnosis: HTN (hypertension)  Assessment and Plan of Treatment: You were previously diagnosed with high blood pressure  - Please CONTINUE to take your home Metoprolol 50mg once a day.   - Please DO NOT restart your benazapril 10mg, Imdur, and hydralazine 10mg until you see your Cardiologist Dr. Garcia.    Diagnosis: Hypothyroidism  Assessment and Plan of Treatment: You were previously diagnosed with hypothyroidism  - Please continue to take your synthroid 75mcg once a day    Diagnosis: Tonsillar cancer  Assessment and Plan of Treatment: You have a history of tonsillar cancer.   - Please follow up with your ENT Dr. Lynn   - You had a swallowing study and you were placed on pureed diet with thick liquids with a chin tuck after each bolus of food. PLEASE continue this diet because if you eat normal food you are at a HIGH RISK of aspirating on your food which will result in aspiration pneumonia.   - In rehab, please continue swallowing and speech therapy due to your stroke.    Diagnosis: Dysphagia  Assessment and Plan of Treatment: Given your prior history of tonsillar cancer and your recent stroke, you were deemed to be a high risk for aspiration when feeding and had difficulty swallowing your meals  - You had a swallowing study and you were placed on pureed diet with thick liquids with a chin tuck after each bolus of food. PLEASE continue this diet because if you eat normal food you are at a HIGH RISK of aspirating on your food which will result in aspiration pneumonia.   - Please have another speech and swallow evaluation at your Rehab facility     PRINCIPAL DISCHARGE DIAGNOSIS  Diagnosis: Acute pulmonary edema  Assessment and Plan of Treatment: You presented with difficulty breathing. This was most likely due to fluid building up in your lungs due to your history of congestive heart failure.  Cardiology evaluated you during your hospital stay and you recieved medication (water pill) that caused you to excrete more urine which helped shift the fluid balance in your body to essentially drain the fluid from your lungs. You did not need any more water pills and remained stable. You also had a V/Q scan that revealed that you had a low probability of a clot in your lungs.   - You had an echocardiogram which revealed that you had severe systolic dysfunction with an ejection fraction of 30-35%.   - Please follow up with your PCP and Cardiologist Dr. Garcia within one week of discharge      SECONDARY DISCHARGE DIAGNOSES  Diagnosis: CVA (cerebrovascular accident)  Assessment and Plan of Treatment: You were noted to have a stroke, also known as a cerebrovascular accident (CVA). This was found based on your symptom profile, and findings noted on computed tomographical imaging (CT imaging) of your brain. Cardiology and Neurology evaluated you during your hospital stay. Your home aspirin and plavix was discontinued and you were started on Aggrenox which is also an antiplatelet.   - Please continue to take your Aggrenox twice a day and your Atorvastatin 80 mg once a day (your home atorvastatin was increased)Please do not take your home atorvastatin at a lower dose.   - You had a swallowing study and you were placed on pureed diet with thick liquids with a chin tuck after each bolus of food. PLEASE continue this diet because if you eat normal food you are at a HIGH RISK of aspirating on your food which will result in aspiration pneumonia.   - In rehab, please continue swallowing and speech therapy due to your stroke.   - Please follow-up with your primary care physician and your neurologist, Dr. Brownlee within one week of discharge  - Please continue to work with physical therapy to get stronger  - Should you start to experience symptoms such as but not limited to: changes in vision, changes in hearing, severe weakness/dizziness, fainting spells, or difficulties moving your eyelids or mouth, please return to the emergency department immediately for interval evaluation.    Diagnosis: Non-ST elevation MI (NSTEMI)  Assessment and Plan of Treatment: You were found to have very elevated cardiac enzymes but without any changes on your EKG that hinted at a possible ischemic process occuring to your heart. You were seen by our cardiologists and were given the appropriate treatments to prevent further damage to your heart muscle tissue. Your home aspirin and plavix was discontinued and you were started on Aggrenox which is also an antiplatelet.   - Please continue to take your Aggrenox twice a day and your Atorvastatin 80 mg once a day (your home atorvastatin was increased)Please do not take your home atorvastatin at a lower dose.   - Please follow up with your cardiologist Dr. Garcia within a week for further management.    Diagnosis: Seizure-like activity  Assessment and Plan of Treatment: You presented with siezure like activity while in the ED. You were seen by a neurologist and started on a medication to help control siezure symptoms.   - Please CONTINUE your Depakote 500mg twice a day and follow up with your PCP and neurologist Dr. Brownlee within one week of discharge for further management.    Diagnosis: Urinary retention  Assessment and Plan of Treatment: You had difficulty passing urine during your hospitalization. You had a mcclellan placed when you first came to the ED as apart of the management of your stroke, but after that mcclellan was removed you were found to be retaining significant amounts of urine. You were seen by a urologist and had a mcclellan catheter replaced.   - You were started on Flomax, please CONTINUE your Flomax .4 mg daily   - Please have another trial of void at rehab on FRIDAY 4/8/2022  - Please follow up with your Urologist Dr. Harrison after discharge to have your mcclellan removed/managed.  - Please continue to take senna daily and docusate three times a day for possible constipation that may be worsening your urinary retention    Diagnosis: Leukocytosis  Assessment and Plan of Treatment: You were noted to have elevaterd white blood cell count on admission. Infectious disease specialist evaluated you during this hospital stay. You had no fevers and did not require any antibiotics because there was a low suspicion for an infection. Your white blood cell count was monitored and resolved.   - Please follow up with your PCP within one week for repeat blood work in order to monitor your wbc.    Diagnosis: Chronic HFrEF (heart failure with reduced ejection fraction)  Assessment and Plan of Treatment: You were previously diagnosed with heart failure. You did not require many doses of a water pill.   - You had an echocardiogram which revealed that you had severe systolic dysfunction with an ejection fraction of 30-35%.   - Please continue to take your Metoprolol 50mg once a day.   - DO NOT take your Imdur, Midodrineor or Benazapril until you follow up with your PCP and your cardiologist Dr. Garcia for further management.    Diagnosis: Stage 3 chronic kidney disease  Assessment and Plan of Treatment: You have a history of chronic kidney disease. Our nephrologist evaluated you and your  kidney function was monitored and remained stable.   - DO NOT restart Benazapril due to your kidney function.   - Please follow up with your PCP within one week of discharge to get repeat blood work in order to monitor your kidney function.    Diagnosis: CAD (coronary artery disease)  Assessment and Plan of Treatment: You were previously diagnosed with coronary artery disease.   - Please STOP taking your home Plavix 75mg once a day and Aspirin  - Please CONTINUE to take your Aggrenox twice a day and your Atorvastatin 80 mg once a day (your home atorvastatin was increased)Please do not take your home atorvastatin at a lower dose.    Diagnosis: Anemia  Assessment and Plan of Treatment: You were found to have anemia during this hospital stay likely due to iron deficiency anemia. Hematologist evaluated you and you were given iron injections. Your hemoglobin was monitored and remained stable.  - Continue Vitamin B12 daily and Ferrous sulfate 325 mg daily    Diagnosis: HTN (hypertension)  Assessment and Plan of Treatment: You were previously diagnosed with high blood pressure  - Please CONTINUE to take your home Metoprolol 50mg once a day and Hydralazine daily  - Please DO NOT restart your Benazapril 10mg, Imdur, and Midodrine until you see your Cardiologist Dr. Garcia.    Diagnosis: Hypothyroidism  Assessment and Plan of Treatment: You were previously diagnosed with hypothyroidism  - Please continue to take your synthroid 75mcg once a day    Diagnosis: Tonsillar cancer  Assessment and Plan of Treatment: You have a history of tonsillar cancer.   - Please follow up with your ENT Dr. Lynn   - You had a swallowing study and you were placed on pureed diet with thick liquids with a chin tuck after each bolus of food. PLEASE continue this diet because if you eat normal food you are at a HIGH RISK of aspirating on your food which will result in aspiration pneumonia.   - In rehab, please continue swallowing and speech therapy due to your stroke.    Diagnosis: Dysphagia  Assessment and Plan of Treatment: Given your prior history of tonsillar cancer and your recent stroke, you were deemed to be a high risk for aspiration when feeding and had difficulty swallowing your meals  - You had a swallowing study and you were placed on pureed diet with thick liquids with a chin tuck after each bolus of food. PLEASE continue this diet because if you eat normal food you are at a HIGH RISK of aspirating on your food which will result in aspiration pneumonia.   - Please have another speech and swallow evaluation at your Rehab facility     PRINCIPAL DISCHARGE DIAGNOSIS  Diagnosis: Acute pulmonary edema  Assessment and Plan of Treatment: You presented with difficulty breathing. This was most likely due to fluid building up in your lungs due to your history of congestive heart failure.  Cardiology evaluated you during your hospital stay and you recieved medication (water pill) that caused you to excrete more urine which helped shift the fluid balance in your body to essentially drain the fluid from your lungs. You did not need any more water pills and remained stable. You also had a V/Q scan that revealed that you had a low probability of a clot in your lungs.   - You had an echocardiogram which revealed that you had severe systolic dysfunction with an ejection fraction of 30-35%.   - Please follow up with your PCP and Cardiologist Dr. Garcia within one week of discharge      SECONDARY DISCHARGE DIAGNOSES  Diagnosis: CVA (cerebrovascular accident)  Assessment and Plan of Treatment: You were noted to have a stroke, also known as a cerebrovascular accident (CVA). This was found based on your symptom profile, and findings noted on computed tomographical imaging (CT imaging) of your brain. Cardiology and Neurology evaluated you during your hospital stay. Your home aspirin and plavix was discontinued and you were started on Aggrenox which is also an antiplatelet.   - Please continue to take your Aggrenox twice a day and your Atorvastatin 80 mg once a day (your home atorvastatin was increased)Please do not take your home atorvastatin at a lower dose.   - You had a swallowing study and you were placed on pureed diet with thick liquids with a chin tuck after each bolus of food. PLEASE continue this diet because if you eat normal food you are at a HIGH RISK of aspirating on your food which will result in aspiration pneumonia.   - In rehab, please continue swallowing and speech therapy due to your stroke.   - Please follow-up with your primary care physician and your neurologist, Dr. Brownlee within one week of discharge  - Please continue to work with physical therapy to get stronger  - Should you start to experience symptoms such as but not limited to: changes in vision, changes in hearing, severe weakness/dizziness, fainting spells, or difficulties moving your eyelids or mouth, please return to the emergency department immediately for interval evaluation.    Diagnosis: Seizure-like activity  Assessment and Plan of Treatment: You presented with siezure like activity while in the ED. You were seen by a neurologist and started on a medication to help control siezure symptoms.   - Please CONTINUE your Depakote 500mg twice a day and follow up with your PCP and neurologist Dr. Brownlee within one week of discharge for further management.    Diagnosis: Non-ST elevation MI (NSTEMI)  Assessment and Plan of Treatment: You were found to have very elevated cardiac enzymes but without any changes on your EKG that hinted at a possible ischemic process occuring to your heart. You were seen by our cardiologists and were given the appropriate treatments to prevent further damage to your heart muscle tissue. Your home aspirin and plavix was discontinued and you were started on Aggrenox which is also an antiplatelet.   - Please continue to take your Aggrenox twice a day and your Atorvastatin 80 mg once a day (your home atorvastatin was increased)Please do not take your home atorvastatin at a lower dose.   - Please follow up with your cardiologist Dr. Garcia within a week for further management.    Diagnosis: Dysphagia  Assessment and Plan of Treatment: Given your prior history of tonsillar cancer and your recent stroke, you were deemed to be a high risk for aspiration when feeding and had difficulty swallowing your meals  - You had a swallowing study and you were placed on pureed diet with thick liquids with a chin tuck after each bolus of food. PLEASE continue this diet because if you eat normal food you are at a HIGH RISK of aspirating on your food which will result in aspiration pneumonia.   - Please have another speech and swallow evaluation at your Rehab facility    Diagnosis: CAD (coronary artery disease)  Assessment and Plan of Treatment: You were previously diagnosed with coronary artery disease.   - Please STOP taking your home Plavix 75mg once a day and Aspirin  - Please CONTINUE to take your Aggrenox twice a day and your Atorvastatin 80 mg once a day (your home atorvastatin was increased)Please do not take your home atorvastatin at a lower dose.    Diagnosis: Chronic HFrEF (heart failure with reduced ejection fraction)  Assessment and Plan of Treatment: You were previously diagnosed with heart failure. You did not require many doses of a water pill.   - You had an echocardiogram which revealed that you had severe systolic dysfunction with an ejection fraction of 30-35%.   - Please continue to take your Metoprolol 50mg once a day.   - DO NOT take your Imdur, Midodrineor or Benazapril until you follow up with your PCP and your cardiologist Dr. Garcia for further management.    Diagnosis: Hypothyroidism  Assessment and Plan of Treatment: You were previously diagnosed with hypothyroidism  - Please continue to take your synthroid 75mcg once a day    Diagnosis: HTN (hypertension)  Assessment and Plan of Treatment: You were previously diagnosed with high blood pressure  - Please CONTINUE to take your home Metoprolol 50mg once a day and Hydralazine 10mg 1/2 tab three times a day  - Please DO NOT restart your Benazapril 10mg, Imdur, and Midodrine until you see your Cardiologist Dr. Garcia.    Diagnosis: Urinary retention  Assessment and Plan of Treatment: You had difficulty passing urine during your hospitalization. You had a mcclellan placed when you first came to the ED as apart of the management of your stroke, but after that mcclellan was removed you were found to be retaining significant amounts of urine. You were seen by a urologist and had a mcclellan catheter replaced.   - You were started on Flomax, please CONTINUE your Flomax .4 mg daily   - Please have another trial of void at rehab on FRIDAY 4/8/2022  - Please follow up with your Urologist Dr. Harrison after discharge to have your mcclellan removed/managed.  - Please continue to take senna daily and docusate three times a day for possible constipation that may be worsening your urinary retention    Diagnosis: Leukocytosis  Assessment and Plan of Treatment: You were noted to have elevaterd white blood cell count on admission. Infectious disease specialist evaluated you during this hospital stay. You had no fevers and did not require any antibiotics because there was a low suspicion for an infection. Your white blood cell count was monitored and resolved.   - Please follow up with your PCP within one week for repeat blood work in order to monitor your wbc.    Diagnosis: Stage 3 chronic kidney disease  Assessment and Plan of Treatment: You have a history of chronic kidney disease. Our nephrologist evaluated you and your  kidney function was monitored and remained stable.   - DO NOT restart Benazapril due to your kidney function.   - Please follow up with your PCP within one week of discharge to get repeat blood work in order to monitor your kidney function.    Diagnosis: Anemia  Assessment and Plan of Treatment: You were found to have anemia during this hospital stay likely due to iron deficiency anemia. Hematologist evaluated you and you were given iron injections. Your hemoglobin was monitored and remained stable.  - Continue Vitamin B12 daily and Ferrous sulfate 325 mg daily    Diagnosis: Tonsillar cancer  Assessment and Plan of Treatment: You have a history of tonsillar cancer.   - Please follow up with your ENT Dr. Lynn   - You had a swallowing study and you were placed on pureed diet with thick liquids with a chin tuck after each bolus of food. PLEASE continue this diet because if you eat normal food you are at a HIGH RISK of aspirating on your food which will result in aspiration pneumonia.   - In rehab, please continue swallowing and speech therapy due to your stroke.     PRINCIPAL DISCHARGE DIAGNOSIS  Diagnosis: Acute pulmonary edema  Assessment and Plan of Treatment: You presented with difficulty breathing. AC hypoxic Respiratory failure -RESOLVED   -This was  likely due to fluid building up in your lungs due to NSTEMI with H/o  of chronic systolic  congestive heart failure.   - Cardiology evaluated you during your hospital stay and you recieved IV Lasix .  - You did not need any more water pills and remained stable.  - You also had a V/Q scan that revealed that you had a low probability of a clot in your lungs.   - You had an echocardiogram which revealed that you had severe systolic dysfunction with an ejection fraction of 30-35%.   - Please follow up with your PCP and Cardiologist Dr. Garcia within one week of discharge      SECONDARY DISCHARGE DIAGNOSES  Diagnosis: CVA (cerebrovascular accident)  Assessment and Plan of Treatment: You were noted to have a stroke, also known as a cerebrovascular accident (CVA). JOHN Ext weakness   -This was found based on your symptom profile, and findings noted on computed tomographical imaging (CT imaging) of your brain.   -Cardiology and Neurology evaluated you during your hospital stay. Your home aspirin and plavix was discontinued and you were started on Aggrenox 1 tab 2x day  which is also an antiplatelet.   - Please continue to take your Aggrenox twice a day     -Atorvastatin 80 mg once a day (your home atorvastatin was increased)  - You had a swallowing study and you were placed on pureed diet with thick liquids with a chin tuck after each bolus of food. PLEASE continue this diet because if you eat normal food you are at a HIGH RISK of aspirating on your food which will result in aspiration pneumonia.   - In rehab, please continue swallowing and speech therapy due to your stroke.   - Please follow-up with your primary care physician and your neurologist, Dr. Faulkner  within one week of discharge from Rehab   - Please continue to work with physical therapy  at [facility] to get stronger  .    Diagnosis: Non-ST elevation MI (NSTEMI)  Assessment and Plan of Treatment: You were found to have very elevated cardiac enzymes   -. You were seen by our cardiologists and were given IV Heparin drin x 48 hrs along with Anti platelet medication .  - Your home aspirin and plavix was discontinued and you were started on Aggrenox which is also an antiplatelet.   - Please continue to take your Aggrenox twice a day   -   Atorvastatin 80 mg once a day (your home atorvastatin was increased)  - Please follow up with your cardiologist Dr. Garcia within a week for further management.    Diagnosis: Chronic HFrEF (heart failure with reduced ejection fraction)  Assessment and Plan of Treatment: You were previously diagnosed with Chronic systolic CHF - heart failure. You got IV Lasix for few doses in hospital.  - You had an echocardiogram which revealed that you had severe systolic dysfunction with an ejection fraction of 30-35%.   - Please continue to take your Metoprolol  XL 50mg once a day.   - DO NOT take your Imdur, Midodrineor or Benazapril until you follow up with your PCP and your cardiologist Dr. Garcia for further management.  Consider restarting ACEi after a week & check BMP to check Cr level d/w Cardiologist    Diagnosis: HTN (hypertension)  Assessment and Plan of Treatment: You were previously diagnosed with high blood pressure  - Please CONTINUE to take your home Metoprolol  XL 50mg once a day and Hydralazine 10mg 1/2 tab three times a day  - Please DO NOT restart your Benazapril 10mg, Imdur, and Midodrine until you see your Cardiologist Dr. Garcia.    Diagnosis: Urinary retention  Assessment and Plan of Treatment: You had difficulty passing urine in ER    You had a mcclellan placed when you first came to the ED as apart of the management of your stroke, but after that mcclellan was removed you were found to be retaining significant amounts of urine. You were seen by a urologist and had a mcclellan catheter replaced.   - You were started on Flomax, please CONTINUE your Flomax .4 mg daily , Treat Constipation   - Please have another trial of void at rehab on FRIDAY 4/8/2022  - Please follow up with your Urologist Dr. Harrison after discharge to have your mcclellan removed/managed.  - Please continue to take senna daily and docusate three times a day for possible constipation that may be worsening your urinary retention    Diagnosis: Hypothyroidism  Assessment and Plan of Treatment: You were previously diagnosed with hypothyroidism  - Please continue to take your synthroid 75mcg once a day    Diagnosis: CAD (coronary artery disease)  Assessment and Plan of Treatment: You were previously diagnosed with coronary artery disease.   - Please STOP taking your home Plavix 75mg once a day and Aspirin  - Please CONTINUE to take your Aggrenox twice a day and your Atorvastatin 80 mg once a day (your home atorvastatin was increased)-On Toprol XL 50 mg daily   Follow up with Cardiologist    Diagnosis: Seizure-like activity  Assessment and Plan of Treatment: You presented with siezure like activity while in the ED. You were seen by a neurologist and started on a medication to help control siezure symptoms. EEG- Neg   - Please CONTINUE your Depakote 500mg twice a day and follow up with your PCP and neurologist Dr. Faulkner  within one week of discharge for further management.    Diagnosis: Leukocytosis  Assessment and Plan of Treatment: -Likely reactive   -You were noted to have elevaterd white blood cell count on admission. Infectious disease specialist evaluated you during this hospital stay. You had no fevers and did not require any antibiotics because there was a low suspicion for an infection. Your white blood cell count was monitored and resolved with out antibiotics as per ID.   - Please follow up with your PCP within one week for repeat blood work in order to monitor your wbc.    Diagnosis: Stage 3 chronic kidney disease  Assessment and Plan of Treatment: You have a history of chronic kidney disease. Our nephrologist evaluated you and your  kidney function was monitored and remained stable.   - DO NOT restart Benazapril due to your kidney function. BMP after 1 week   - Please follow up with your PCP within one week of discharge to get repeat blood work in order to monitor your kidney function.    Diagnosis: Anemia  Assessment and Plan of Treatment: You were found to have anemia during this hospital stay likely due to iron deficiency anemia. Acute on chronic Anemia with CKD/JARRED   Hematologist evaluated you and you were given iron injections. Your hemoglobin was monitored and remained stable.  - Continue Vitamin B12 daily and Ferrous sulfate 325 mg daily  -CBC after 1 week at rehab    Diagnosis: Tonsillar cancer  Assessment and Plan of Treatment: You have a history of tonsillar cancer. S/P Sx & RT   - Please follow up with your ENT Dr. Lynn   - You had a swallowing study and you were placed on pureed diet with thick liquids with a chin tuck after each bolus of food. PLEASE continue this diet because if you eat normal food you are at a HIGH RISK of aspirating on your food which will result in aspiration pneumonia.   - In rehab, please continue swallowing and speech therapy due to your stroke.    Diagnosis: Dysphagia  Assessment and Plan of Treatment: Given your prior history of tonsillar cancer and your recent stroke, you were deemed to be a high risk for aspiration when feeding and had difficulty swallowing your meals  - You had a swallowing study and you were placed on pureed diet with thick liquids with a chin tuck after each bolus of food. PLEASE continue this diet because if you eat normal food you are at a HIGH RISK of aspirating on your food which will result in aspiration pneumonia.   - Please have another speech and swallow evaluation at your Rehab facility

## 2022-03-31 NOTE — SWALLOW VFSS/MBS ASSESSMENT ADULT - COMMENTS
Chart reviewed order received for MBS.  Case discussed with Resident, Dr. Steinberg, code stroke note from 0839 this morning written from yesterday's code stroke, per MD.  MD reported pt cleared to proceed with MBS.  Pt received as above.  MBS completed see below for details.  Pt educated regarding results & rx's, verbalized understanding and agreement, pt left with Radiology staff awaiting transport to unit [facility] & Resident [phone] notified.  Will follow, as schedule permits.     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 Admitted for pulmonary edema, leukocytosis, elevated troponin, JARRED on CKD-3, suspect possible CVA and NSTEMI"    CT Chest 3/31/22: "Right upper lobe pneumonia superimposed on mild pulmonary edema.   Bilateral pleural effusions.    Pacemaker/defibrillator.    Other chronic findings as above."    CT Head 3/31/22: "Interval demarcation of the right perirolandic cortex infarct.  No CT evidence for hemorrhagic transformation."

## 2022-03-31 NOTE — PROGRESS NOTE ADULT - SUBJECTIVE AND OBJECTIVE BOX
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 As per patient, he was watching a movie when he felt very short of breath suddenly and dropped to the floor, denies LOC and denies any trauma to his head, was on the floor for ~2 hours. Patient was unable to rise from the floor by himself due to his left arm weakness. When first brought to the ED, patient was hypoxic and hypertensive o2 saturation in EMS was >80%. While in the ED, patient had episode of siezure-like activity in his b/l upper extremities which was controlled w/ ativan. Patient was seen and examined at bedside, BiPAP still in place, patient was still mildly lethargic from ativan and could not speak well with bipap mask on. Patient able to open eyes and nod/shake head to questions. Patient denied any headache, vision changes, cp, abd pain, msk pain. Patient still short of breath.      In ED:   Vitals: HR 80, 176/82 -> 115/60, RR 20, 96% on BiPAP/CPAP  Labs significant for: WBC 20.24, H/H 10.8/24.1, D-Dimer 2924, Troponin 1011.9 > 5252.0, CKMB: 13.1, CPK%: 4%, BUN 33, Creatinine 2.3 (baseline unknown) eGFR 29, serum pro BNP 4963, creatine kinase 328, POCT glucose: 353  Imaging:   CXR: diffuse b/l scattered infiltrates  CT brain stroke: No acute hemorrhage, infarct, or mass effect   EKG: sinus tachy at ST- depressions in V5 V6   Received Lasix 40mg IVP x1, Aspirin 600mg x1, Hydralazine 10mg x1, Ativan 2mg IVP x1, Lispro 6u in the ED    (30 Mar 2022 08:48)      INTERVAL HPI:  3/31/2022: Patient overnight had clots in urine with slight pinkish appearance of urine, stat h/h showed that patients hgb dropped to 9.2 from 10.1. Heparin drip was continued as benefits outweighed risks. AM hgb showed recovery to 9.6. Patient also had 6 beats of vtach overnight, was asymptomatic. Patient seen and examined at bedside, denied any lightheadedness/dizziness, CP/palpitations, abd pain, dysuria, MSK pain, any sensory deficits. Patient continues to have LUE weakness.     Home Medications:  benazepril 10 mg oral tablet: 1 tab(s) orally once a day (30 Mar 2022 10:22)  calcitriol 0.25 mcg oral capsule: 1 cap(s) orally once a day (30 Mar 2022 10:22)  carvedilol 12.5 mg oral tablet: 1 tab(s) orally 2 times a day      *Last filled , spoke to MATTHEW Nguyen at Dr. Garcia&#x27;s office, she states patient should still be taking med as per MD notes from *  (30 Mar 2022 10:22)  hydrALAZINE 10 mg oral tablet: 0.5 tab(s) orally 3 times a day (30 Mar 2022 10:22)  isosorbide mononitrate 30 mg oral tablet, extended release: 1 tab(s) orally once a day (in the morning) (30 Mar 2022 10:22)  levothyroxine 75 mcg (0.075 mg) oral tablet: 1 tab(s) orally once a day (30 Mar 2022 10:22)  midodrine 10 mg oral tablet: 1 tab(s) orally 3 times a day (30 Mar 2022 10:22)  Plavix 75 mg oral tablet: 1 tab(s) orally once a day (30 Mar 2022 10:22)  pravastatin 40 mg oral tablet: 1 tab(s) orally once a day (30 Mar 2022 10:22)      MEDICATIONS  (STANDING):  atorvastatin 10 milliGRAM(s) Oral at bedtime  calcitriol   Capsule 0.25 MICROGram(s) Oral daily  carvedilol 12.5 milliGRAM(s) Oral every 12 hours  dextrose 5%. 1000 milliLiter(s) (50 mL/Hr) IV Continuous <Continuous>  dextrose 5%. 1000 milliLiter(s) (100 mL/Hr) IV Continuous <Continuous>  dextrose 50% Injectable 25 Gram(s) IV Push once  dextrose 50% Injectable 12.5 Gram(s) IV Push once  dextrose 50% Injectable 25 Gram(s) IV Push once  dipyridamole 200 mG/aspirin 25 mG 1 Capsule(s) Oral two times a day  glucagon  Injectable 1 milliGRAM(s) IntraMuscular once  heparin  Infusion.  Unit(s)/Hr (8 mL/Hr) IV Continuous <Continuous>  hydrALAZINE 5 milliGRAM(s) Oral three times a day  influenza  Vaccine (HIGH DOSE) 0.7 milliLiter(s) IntraMuscular once  insulin lispro (ADMELOG) corrective regimen sliding scale   SubCutaneous three times a day before meals  insulin lispro (ADMELOG) corrective regimen sliding scale   SubCutaneous at bedtime  isosorbide   mononitrate ER Tablet (IMDUR) 30 milliGRAM(s) Oral daily  levothyroxine 75 MICROGram(s) Oral daily  valproate sodium IVPB 500 milliGRAM(s) IV Intermittent every 12 hours    MEDICATIONS  (PRN):  ALBUTerol    90 MICROgram(s) HFA Inhaler 2 Puff(s) Inhalation every 6 hours PRN Shortness of Breath and/or Wheezing  dextrose Oral Gel 15 Gram(s) Oral once PRN Blood Glucose LESS THAN 70 milliGRAM(s)/deciliter  heparin   Injectable 4100 Unit(s) IV Push every 6 hours PRN For aPTT less than 40  ondansetron Injectable 4 milliGRAM(s) IV Push every 8 hours PRN Nausea and/or Vomiting      No Known Allergies      Social History:  Lives at home w/ wife   ADL independent   Tobacco former smoker, quit 40 years ago, 20 pack year history  Alcohol denies  Drugs denies  COVID [employer] x3   Occupation retired- former  (30 Mar 2022 08:48)      REVIEW OF SYSTEMS:  CONSTITUTIONAL: No fever, No chills, No fatigue, No myalgia, No Body ache, No Weakness  EYES: No eye pain,  No visual disturbances, No discharge, No Redness  ENMT: No ear pain, No nose bleed, No vertigo; No sinus pain, No throat pain, No Congestion  NECK: No pain, No stiffness  RESPIRATORY: No cough, No wheezing, No hemoptysis, No shortness of breath  CARDIOVASCULAR: No chest pain, No palpitations  GASTROINTESTINAL: No abdominal pain, No epigastric pain. No nausea, No vomiting, No diarrhea, No constipation; [  ] BM  GENITOURINARY: No dysuria, No frequency, No urgency, No incontinence  NEUROLOGICAL: No headaches, No dizziness, No numbness, No tingling, No tremors, [ x ] LUE weakness   EXTREMITIES: No Swelling, No Pain, No Edema  SKIN: [  ] No itching, burning, rashes, or lesions   MUSCULOSKELETAL: No joint pain, No joint swelling; No muscle pain, No back pain, No extremity pain  PSYCHIATRIC: No depression, No anxiety, No mood swings, No difficulty sleeping at night  PAIN SCALE: [ x ] None  [  ] Other-  ROS Unable to obtain due to: [  ] Dementia  [  ] Lethargy  [  ] Sedated  [  ] Non verbal  REST OF REVIEW OF SYSTEMS: [ x ] Normal     Vital Signs Last 24 Hrs  T(C): 36.5 (31 Mar 2022 05:15), Max: 37.8 (30 Mar 2022 09:23)  T(F): 97.7 (31 Mar 2022 05:15), Max: 100 (30 Mar 2022 09:23)  HR: 69 (31 Mar 2022 05:15) (68 - 78)  BP: 145/78 (31 Mar 2022 05:15) (140/76 - 167/83)  BP(mean): --  RR: 17 (31 Mar 2022 05:15) (16 - 18)  SpO2: 96% (31 Mar 2022 05:15) (94% - 100%)    CAPILLARY BLOOD GLUCOSE      POCT Blood Glucose.: 149 mg/dL (30 Mar 2022 21:42)  POCT Blood Glucose.: 181 mg/dL (30 Mar 2022 16:35)  POCT Blood Glucose.: 229 mg/dL (30 Mar 2022 13:56)      I&O's Summary    30 Mar 2022 07:01  -  31 Mar 2022 07:00  --------------------------------------------------------  IN: 148.5 mL / OUT: 1400 mL / NET: -1251.5 mL      PHYSICAL EXAM:  GENERAL:  [ x ] NAD, [ x ] Well appearing, [  ] Agitated, [  ] Drowsy, [  ] Lethargy, [  ] Confused   HEAD:  [ x ] Normal, [  ] Other  EYES:  [ x ] EOMI, [ x ] PERRLA, [ x ] Conjunctiva and sclera clear normal, [  ] Other, [  ] Pallor, [  ] Discharge  ENMT:  [ x ] Normal, [ x ] Moist mucous membranes, [  ] Good dentition, [  ] No thrush  NECK:  [ x ] Supple, [ x ] No JVD, [  ] Normal thyroid, [  ] Lymphadenopathy, [  ] Other  CHEST/LUNG:  [ x ] Clear to auscultation bilaterally, [ x ] Breath Sounds equal B/L, [  ] Poor effort, [ x ] No rales, [ x ] No rhonchi, [ x ] No wheezing  HEART:  [ x ] Regular rate and rhythm, [  ] Tachycardia, [  ] Bradycardia, [  ] Irregular, [ x ] No murmurs, No rubs, No gallops, [  ] PPM in place (Mfr:  )  ABDOMEN:  [ x ] Soft, [ x ] Nontender, [ x ] Nondistended, [ x ] No mass, [  ] Bowel sounds present, [  ] Obese  NERVOUS SYSTEM:  [ x ] Alert & Oriented x3, [  ] Nonfocal, [  ] Confusion, [  ] Encephalopathic, [  ] Sedated, [  ] Unable to assess, [  ] Dementia, [  ] Other-  EXTREMITIES:  [ x ] 2+ Peripheral Pulses, No clubbing, No cyanosis,  [  ] Edema B/L lower EXT, [  ] PVD stasis skin changes B/L lower EXT, [  ] Wound  LYMPH:  No lymphadenopathy noted  SKIN:  [ x ] No rashes or lesions, [  ] Pressure ulcers, [  ] Ecchymosis, [  ] Skin tears, [  ] Other    DIET:     LABS:                        9.6    11.68 )-----------( 188      ( 31 Mar 2022 07:24 )             30.4     31 Mar 2022 07:24    142    |  107    |  35     ----------------------------<  150    3.5     |  25     |  2.30     Ca    8.9        31 Mar 2022 07:24  Phos  2.8       31 Mar 2022 07:24  Mg     2.2       31 Mar 2022 07:24    TPro  6.4    /  Alb  2.9    /  TBili  1.3    /  DBili  x      /  AST  51     /  ALT  38     /  AlkPhos  83     31 Mar 2022 07:24    PT/INR - ( 30 Mar 2022 06:43 )   PT: 15.2 sec;   INR: 1.30 ratio         PTT - ( 31 Mar 2022 07:24 )  PTT:41.0 sec  Urinalysis Basic - ( 30 Mar 2022 20:41 )    Color: Brown / Appearance: Slightly Turbid / S.010 / pH: x  Gluc: x / Ketone: Trace  / Bili: Negative / Urobili: Negative   Blood: x / Protein: 100 / Nitrite: Positive   Leuk Esterase: Moderate / RBC: >50 /HPF / WBC 3-5   Sq Epi: x / Non Sq Epi: Occasional / Bacteria: Few          CARDIAC MARKERS ( 30 Mar 2022 21:07 )  x     / x     / 342 U/L / x     / 12.8 ng/mL  CARDIAC MARKERS ( 30 Mar 2022 14:34 )  x     / x     / 398 U/L / x     / 18.4 ng/mL  CARDIAC MARKERS ( 30 Mar 2022 08:13 )  x     / x     / x     / x     / 13.1 ng/mL  CARDIAC MARKERS ( 30 Mar 2022 07:53 )  x     / x     / 328 U/L / x     / x             Anemia Panel:  Ferritin, Serum: 44 ng/mL (22 @ 05:21)  Iron - Total Binding Capacity.: 329 ug/dL (22 @ 05:21)  Iron Total, Serum: 11 ug/dL (22 @ 05:21)      Thyroid Panel:  Thyroid Stimulating Hormone, Serum: 2.99 uIU/mL (22 @ 07:24)  T4, Serum: 5.9 ug/dL (22 @ 05:21)  Thyroid Stimulating Hormone, Serum: 2.81 uIU/mL (22 @ 14:34)            Serum Pro-Brain Natriuretic Peptide: 9256 pg/mL (22 @ 07:24)  Serum Pro-Brain Natriuretic Peptide: 4963 pg/mL (22 @ 06:12)      RADIOLOGY & ADDITIONAL TESTS:  < from: NM Pulmonary Ventilation/Perfusion Scan (22 @ 13:35) >    ACC: [id number] EXAM:  NM PULM VENTILATION PERFUS IMG                          PROCEDURE DATE:  2022          INTERPRETATION:  RADIOPHARMACEUTICAL: 1.0 mCi Tc-99m-DTPA via inhalation;   6.2 mCi Tc-99m-MAA, I.V.    CLINICAL INFORMATION: 76 year old male with elevated d-dimer and   shortness of breath; referred to evaluate for pulmonary embolism.    TECHNIQUE:  Ventilation and perfusion images of the lungs were obtained   following administration of Tc-99m-DTPA and Tc-99m-MAA. Images were   obtained in the anterior, posterior, both lateral, and all 4 oblique   projections. The study was interpreted in conjunction with a chest   radiograph of 3/20/2022.    COMPARISON: No previous lung scans were available for comparison.    FINDINGS: Thereis heterogeneous distribution of radiopharmaceutical in   both lungs on the ventilation and perfusion images. There are no   segmental perfusion defects in either lung.    IMPRESSION: Very low probability of pulmonary embolus.    --- End of Report ---    ZENA HARDING MD; Attending Nuclear Medicine  This document has been electronically signed. Mar 30 2022  1:59PM        HEALTH ISSUES - PROBLEM Dx:  Acute pulmonary edema    Leukocytosis    Elevated troponin    Anemia    Elevated creatine kinase    Weakness of left upper extremity    Diabetes mellitus    Chronic HFrEF (heart failure with reduced ejection fraction)    Acute kidney injury superimposed on CKD    HTN (hypertension)    CAD (coronary artery disease)    Hypothyroidism    Need for prophylactic measure          Consultant(s) Notes Reviewed:  [ x ] YES     Care Discussed with [ x ] Consultants, [ x ] Patient, [  ] Family, [  ] HCP, [  ] , [  ] Social Service, [ x ] RN, [  ] Physical Therapy, [  ] Palliative Care Team  DVT PPX: [  ] Lovenox, [  ] SC Heparin, [  ] Coumadin, [  ] Xarelto, [  ] Eliquis, [  ] Pradaxa, [ x ] IV Heparin drip, [  ] SCD, [  ] Ambulation, [  ] Contraindicated 2/2 GI Bleed, [  ] Contraindicated 2/2  Bleed, [  ] Contraindicated 2/2 Brain Bleed  Advanced Directive: [ x ] None, [  ] DNR/DNI Patient is a 76y old  Male who presents with a chief complaint of SOB (30 Mar 2022 16:15)      HPI:  77 yo male w/ PMHx of HTN, HLD, HFrEF (EF 30% ), 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 As per patient, he was watching a movie when he felt very short of breath suddenly and dropped to the floor, denies LOC and denies any trauma to his head, was on the floor for ~2 hours. Patient was unable to rise from the floor by himself due to his left arm weakness. When first brought to the ED, patient was hypoxic and hypertensive o2 saturation in EMS was >80%. While in the ED, patient had episode of siezure-like activity in his b/l upper extremities which was controlled w/ ativan. Patient was seen and examined at bedside, BiPAP still in place, patient was still mildly lethargic from ativan and could not speak well with bipap mask on. Patient able to open eyes and nod/shake head to questions. Patient denied any headache, vision changes, cp, abd pain, msk pain. Patient still short of breath.      In ED:   Vitals: HR 80, 176/82 -> 115/60, RR 20, 96% on BiPAP/CPAP  Labs significant for: WBC 20.24, H/H 10.8/24.1, D-Dimer 2924, Troponin 1011.9 > 5252.0, CKMB: 13.1, CPK%: 4%, BUN 33, Creatinine 2.3 (baseline unknown) eGFR 29, serum pro BNP 4963, creatine kinase 328, POCT glucose: 353  Imaging:   CXR: diffuse b/l scattered infiltrates  CT brain stroke: No acute hemorrhage, infarct, or mass effect   EKG: sinus tachy at ST- depressions in V5 V6   Received Lasix 40mg IVP x1, Aspirin 600mg x1, Hydralazine 10mg x1, Ativan 2mg IVP x1, Lispro 6u in the ED    (30 Mar 2022 08:48)      INTERVAL HPI:  3/31/2022: Patient overnight had clots in urine with slight pinkish appearance of urine, stat h/h showed that patients hgb dropped to 9.2 from 10.1. Heparin drip was continued as benefits outweighed risks. AM hgb showed recovery to 9.6. Patient also had 6 beats of vtach overnight, was asymptomatic. Patient seen and examined at bedside, denied any lightheadedness/dizziness, CP/palpitations, abd pain, dysuria, MSK pain, any sensory deficits. Patient continues to have LUE weakness.     Home Medications:  benazepril 10 mg oral tablet: 1 tab(s) orally once a day (30 Mar 2022 10:22)  calcitriol 0.25 mcg oral capsule: 1 cap(s) orally once a day (30 Mar 2022 10:22)  carvedilol 12.5 mg oral tablet: 1 tab(s) orally 2 times a day      *Last filled , spoke to MATTHEW Nguyen at Dr. Garcia&#x27;s office, she states patient should still be taking med as per MD notes from *  (30 Mar 2022 10:22)  hydrALAZINE 10 mg oral tablet: 0.5 tab(s) orally 3 times a day (30 Mar 2022 10:22)  isosorbide mononitrate 30 mg oral tablet, extended release: 1 tab(s) orally once a day (in the morning) (30 Mar 2022 10:22)  levothyroxine 75 mcg (0.075 mg) oral tablet: 1 tab(s) orally once a day (30 Mar 2022 10:22)  midodrine 10 mg oral tablet: 1 tab(s) orally 3 times a day (30 Mar 2022 10:22)  Plavix 75 mg oral tablet: 1 tab(s) orally once a day (30 Mar 2022 10:22)  pravastatin 40 mg oral tablet: 1 tab(s) orally once a day (30 Mar 2022 10:22)      MEDICATIONS  (STANDING):  atorvastatin 10 milliGRAM(s) Oral at bedtime  calcitriol   Capsule 0.25 MICROGram(s) Oral daily  carvedilol 12.5 milliGRAM(s) Oral every 12 hours  dextrose 5%. 1000 milliLiter(s) (50 mL/Hr) IV Continuous <Continuous>  dextrose 5%. 1000 milliLiter(s) (100 mL/Hr) IV Continuous <Continuous>  dextrose 50% Injectable 25 Gram(s) IV Push once  dextrose 50% Injectable 12.5 Gram(s) IV Push once  dextrose 50% Injectable 25 Gram(s) IV Push once  dipyridamole 200 mG/aspirin 25 mG 1 Capsule(s) Oral two times a day  glucagon  Injectable 1 milliGRAM(s) IntraMuscular once  heparin  Infusion.  Unit(s)/Hr (8 mL/Hr) IV Continuous <Continuous>  hydrALAZINE 5 milliGRAM(s) Oral three times a day  influenza  Vaccine (HIGH DOSE) 0.7 milliLiter(s) IntraMuscular once  insulin lispro (ADMELOG) corrective regimen sliding scale   SubCutaneous three times a day before meals  insulin lispro (ADMELOG) corrective regimen sliding scale   SubCutaneous at bedtime  isosorbide   mononitrate ER Tablet (IMDUR) 30 milliGRAM(s) Oral daily  levothyroxine 75 MICROGram(s) Oral daily  valproate sodium IVPB 500 milliGRAM(s) IV Intermittent every 12 hours    MEDICATIONS  (PRN):  ALBUTerol    90 MICROgram(s) HFA Inhaler 2 Puff(s) Inhalation every 6 hours PRN Shortness of Breath and/or Wheezing  dextrose Oral Gel 15 Gram(s) Oral once PRN Blood Glucose LESS THAN 70 milliGRAM(s)/deciliter  heparin   Injectable 4100 Unit(s) IV Push every 6 hours PRN For aPTT less than 40  ondansetron Injectable 4 milliGRAM(s) IV Push every 8 hours PRN Nausea and/or Vomiting      No Known Allergies      Social History:  Lives at home w/ wife   ADL independent   Tobacco former smoker, quit 40 years ago, 20 pack year history  Alcohol denies  Drugs denies  COVID Pf[employer] x3   Occupation retired- former  (30 Mar 2022 08:48)      REVIEW OF SYSTEMS:  CONSTITUTIONAL: No fever, No chills, No fatigue, No myalgia, No Body ache, No Weakness  EYES: No eye pain,  No visual disturbances, No discharge, No Redness  ENMT: No ear pain, No nose bleed, No vertigo; No sinus pain, No throat pain, No Congestion  NECK: No pain, No stiffness  RESPIRATORY: No cough, No wheezing, No hemoptysis, No shortness of breath  CARDIOVASCULAR: No chest pain, No palpitations  GASTROINTESTINAL: No abdominal pain, No epigastric pain. No nausea, No vomiting, No diarrhea, No constipation; [  ] BM  GENITOURINARY: No dysuria, No frequency, No urgency, No incontinence  NEUROLOGICAL: No headaches, No dizziness, No numbness, No tingling, No tremors, [ x ] LUE weakness   EXTREMITIES: No Swelling, No Pain, No Edema  SKIN: [  ] No itching, burning, rashes, or lesions   MUSCULOSKELETAL: No joint pain, No joint swelling; No muscle pain, No back pain, No extremity pain  PSYCHIATRIC: No depression, No anxiety, No mood swings, No difficulty sleeping at night  PAIN SCALE: [ x ] None  [  ] Other-  ROS Unable to obtain due to: [  ] Dementia  [  ] Lethargy  [  ] Sedated  [  ] Non verbal  REST OF REVIEW OF SYSTEMS: [ x ] Normal     Vital Signs Last 24 Hrs  T(C): 36.5 (31 Mar 2022 05:15), Max: 37.8 (30 Mar 2022 09:23)  T(F): 97.7 (31 Mar 2022 05:15), Max: 100 (30 Mar 2022 09:23)  HR: 69 (31 Mar 2022 05:15) (68 - 78)  BP: 145/78 (31 Mar 2022 05:15) (140/76 - 167/83)  BP(mean): --  RR: 17 (31 Mar 2022 05:15) (16 - 18)  SpO2: 96% (31 Mar 2022 05:15) (94% - 100%)    CAPILLARY BLOOD GLUCOSE      POCT Blood Glucose.: 149 mg/dL (30 Mar 2022 21:42)  POCT Blood Glucose.: 181 mg/dL (30 Mar 2022 16:35)  POCT Blood Glucose.: 229 mg/dL (30 Mar 2022 13:56)      I&O's Summary    30 Mar 2022 07:01  -  31 Mar 2022 07:00  --------------------------------------------------------  IN: 148.5 mL / OUT: 1400 mL / NET: -1251.5 mL      PHYSICAL EXAM:  GENERAL:  [ x ] NAD, [ x ] Well appearing, [  ] Agitated, [  ] Drowsy, [  ] Lethargy, [  ] Confused   HEAD:  [ x ] Normal, [  ] Other  EYES:  [ x ] EOMI, [ x ] PERRLA, [ x ] Conjunctiva and sclera clear normal, [  ] Other, [  ] Pallor, [  ] Discharge  ENMT:  [ x ] Normal, [ x ] Moist mucous membranes, [  ] Good dentition, [  ] No thrush  NECK:  [ x ] Supple, [ x ] No JVD, [  ] Normal thyroid, [  ] Lymphadenopathy, [  ] Other  CHEST/LUNG:  [ x ] Clear to auscultation bilaterally, [ x ] Breath Sounds equal B/L, [  ] Poor effort, [ x ] No rales, [ x ] No rhonchi, [ x ] No wheezing  HEART:  [ x ] Regular rate and rhythm, [  ] Tachycardia, [  ] Bradycardia, [  ] Irregular, [ x ] No murmurs, No rubs, No gallops, [  ] PPM in place (Mfr:  )  ABDOMEN:  [ x ] Soft, [ x ] Nontender, [ x ] Nondistended, [ x ] No mass, [  ] Bowel sounds present, [  ] Obese  NERVOUS SYSTEM:  [ x ] Alert & Oriented x3, [  ] Nonfocal, [  ] Confusion, [  ] Encephalopathic, [  ] Sedated, [  ] Unable to assess, [  ] Dementia, [ x ] Other- LUE weakness compared to RUE, left tongue deviation   EXTREMITIES:  [ x ] 2+ Peripheral Pulses, No clubbing, No cyanosis,  [  ] Edema B/L lower EXT, [  ] PVD stasis skin changes B/L lower EXT, [  ] Wound  LYMPH:  No lymphadenopathy noted  SKIN:  [ x ] No rashes or lesions, [  ] Pressure ulcers, [  ] Ecchymosis, [  ] Skin tears, [  ] Other    DIET:     LABS:                        9.6    11.68 )-----------( 188      ( 31 Mar 2022 07:24 )             30.4     31 Mar 2022 07:24    142    |  107    |  35     ----------------------------<  150    3.5     |  25     |  2.30     Ca    8.9        31 Mar 2022 07:24  Phos  2.8       31 Mar 2022 07:24  Mg     2.2       31 Mar 2022 07:24    TPro  6.4    /  Alb  2.9    /  TBili  1.3    /  DBili  x      /  AST  51     /  ALT  38     /  AlkPhos  83     31 Mar 2022 07:24    PT/INR - ( 30 Mar 2022 06:43 )   PT: 15.2 sec;   INR: 1.30 ratio         PTT - ( 31 Mar 2022 07:24 )  PTT:41.0 sec  Urinalysis Basic - ( 30 Mar 2022 20:41 )    Color: Brown / Appearance: Slightly Turbid / S.010 / pH: x  Gluc: x / Ketone: Trace  / Bili: Negative / Urobili: Negative   Blood: x / Protein: 100 / Nitrite: Positive   Leuk Esterase: Moderate / RBC: >50 /HPF / WBC 3-5   Sq Epi: x / Non Sq Epi: Occasional / Bacteria: Few          CARDIAC MARKERS ( 30 Mar 2022 21:07 )  x     / x     / 342 U/L / x     / 12.8 ng/mL  CARDIAC MARKERS ( 30 Mar 2022 14:34 )  x     / x     / 398 U/L / x     / 18.4 ng/mL  CARDIAC MARKERS ( 30 Mar 2022 08:13 )  x     / x     / x     / x     / 13.1 ng/mL  CARDIAC MARKERS ( 30 Mar 2022 07:53 )  x     / x     / 328 U/L / x     / x             Anemia Panel:  Ferritin, Serum: 44 ng/mL (22 @ 05:21)  Iron - Total Binding Capacity.: 329 ug/dL (22 @ 05:21)  Iron Total, Serum: 11 ug/dL (22 @ 05:21)      Thyroid Panel:  Thyroid Stimulating Hormone, Serum: 2.99 uIU/mL (22 @ 07:24)  T4, Serum: 5.9 ug/dL (22 @ 05:21)  Thyroid Stimulating Hormone, Serum: 2.81 uIU/mL (22 @ 14:34)            Serum Pro-Brain Natriuretic Peptide: 9256 pg/mL (22 @ 07:24)  Serum Pro-Brain Natriuretic Peptide: 4963 pg/mL (22 @ 06:12)      RADIOLOGY & ADDITIONAL TESTS:  < from: NM Pulmonary Ventilation/Perfusion Scan (22 @ 13:35) >    ACC: [id number] EXAM:  NM PULM VENTILATION PERFUS IMG                          PROCEDURE DATE:  2022          INTERPRETATION:  RADIOPHARMACEUTICAL: 1.0 mCi Tc-99m-DTPA via inhalation;   6.2 mCi Tc-99m-MAA, I.V.    CLINICAL INFORMATION: 76 year old male with elevated d-dimer and   shortness of breath; referred to evaluate for pulmonary embolism.    TECHNIQUE:  Ventilation and perfusion images of the lungs were obtained   following administration of Tc-99m-DTPA and Tc-99m-MAA. Images were   obtained in the anterior, posterior, both lateral, and all 4 oblique   projections. The study was interpreted in conjunction with a chest   radiograph of 3/20/2022.    COMPARISON: No previous lung scans were available for comparison.    FINDINGS: Thereis heterogeneous distribution of radiopharmaceutical in   both lungs on the ventilation and perfusion images. There are no   segmental perfusion defects in either lung.    IMPRESSION: Very low probability of pulmonary embolus.    --- End of Report ---    ZENA HARDING MD; Attending Nuclear Medicine  This document has been electronically signed. Mar 30 2022  1:59PM        HEALTH ISSUES - PROBLEM Dx:  Acute pulmonary edema    Leukocytosis    Elevated troponin    Anemia    Elevated creatine kinase    Weakness of left upper extremity    Diabetes mellitus    Chronic HFrEF (heart failure with reduced ejection fraction)    Acute kidney injury superimposed on CKD    HTN (hypertension)    CAD (coronary artery disease)    Hypothyroidism    Need for prophylactic measure          Consultant(s) Notes Reviewed:  [ x ] YES     Care Discussed with [ x ] Consultants, [ x ] Patient, [  ] Family, [  ] HCP, [  ] , [  ] Social Service, [ x ] RN, [  ] Physical Therapy, [  ] Palliative Care Team  DVT PPX: [  ] Lovenox, [  ] SC Heparin, [  ] Coumadin, [  ] Xarelto, [  ] Eliquis, [  ] Pradaxa, [ x ] IV Heparin drip, [  ] SCD, [  ] Ambulation, [  ] Contraindicated 2/2 GI Bleed, [  ] Contraindicated 2/2  Bleed, [  ] Contraindicated 2/2 Brain Bleed  Advanced Directive: [ x ] None, [  ] DNR/DNI Patient is a 76y old  Male who presents with a chief complaint of SOB (30 Mar 2022 16:15)      HPI:  75 yo male w/ PMHx of HTN, HLD, HFrEF (EF 30% ), 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 As per patient, he was watching a movie when he felt very short of breath suddenly and dropped to the floor, denies LOC and denies any trauma to his head, was on the floor for ~2 hours. Patient was unable to rise from the floor by himself due to his left arm weakness. When first brought to the ED, patient was hypoxic and hypertensive o2 saturation in EMS was >80%. While in the ED, patient had episode of siezure-like activity in his b/l upper extremities which was controlled w/ ativan. Patient was seen and examined at bedside, BiPAP still in place, patient was still mildly lethargic from ativan and could not speak well with bipap mask on. Patient able to open eyes and nod/shake head to questions. Patient denied any headache, vision changes, cp, abd pain, msk pain. Patient still short of breath.      In ED:   Vitals: HR 80, 176/82 -> 115/60, RR 20, 96% on BiPAP/CPAP  Labs significant for: WBC 20.24, H/H 10.8/24.1, D-Dimer 2924, Troponin 1011.9 > 5252.0, CKMB: 13.1, CPK%: 4%, BUN 33, Creatinine 2.3 (baseline unknown) eGFR 29, serum pro BNP 4963, creatine kinase 328, POCT glucose: 353  Imaging:   CXR: diffuse b/l scattered infiltrates  CT brain stroke: No acute hemorrhage, infarct, or mass effect   EKG: sinus tachy at ST- depressions in V5 V6   Received Lasix 40mg IVP x1, Aspirin 600mg x1, Hydralazine 10mg x1, Ativan 2mg IVP x1, Lispro 6u in the ED    (30 Mar 2022 08:48)      INTERVAL HPI:  3/31/2022: Patient overnight had clots in urine with slight pinkish appearance of urine, stat h/h showed that patients hgb dropped to 9.2 from 10.1. Heparin drip was continued as benefits outweighed risks. AM hgb showed recovery to 9.6. Patient also had 6 beats of vtach overnight, was asymptomatic. Patient seen and examined at bedside, denied any lightheadedness/dizziness, CP/palpitations, abd pain, dysuria, MSK pain, any sensory deficits. Patient continues to have LUE weakness. OBN IV Heparin drip, + CVA on CT Head    OVERNIGHT EVENTS:-NONE    Home Medications:  benazepril 10 mg oral tablet: 1 tab(s) orally once a day (30 Mar 2022 10:22)  calcitriol 0.25 mcg oral capsule: 1 cap(s) orally once a day (30 Mar 2022 10:22)  carvedilol 12.5 mg oral tablet: 1 tab(s) orally 2 times a day      *Last filled , spoke to MATTHEW Nguyen at Dr. Garcia&#x27;s office, she states patient should still be taking med as per MD notes from *  (30 Mar 2022 10:22)  hydrALAZINE 10 mg oral tablet: 0.5 tab(s) orally 3 times a day (30 Mar 2022 10:22)  isosorbide mononitrate 30 mg oral tablet, extended release: 1 tab(s) orally once a day (in the morning) (30 Mar 2022 10:22)  levothyroxine 75 mcg (0.075 mg) oral tablet: 1 tab(s) orally once a day (30 Mar 2022 10:22)  midodrine 10 mg oral tablet: 1 tab(s) orally 3 times a day (30 Mar 2022 10:22)  Plavix 75 mg oral tablet: 1 tab(s) orally once a day (30 Mar 2022 10:22)  pravastatin 40 mg oral tablet: 1 tab(s) orally once a day (30 Mar 2022 10:22)      MEDICATIONS  (STANDING):  atorvastatin 10 milliGRAM(s) Oral at bedtime  calcitriol   Capsule 0.25 MICROGram(s) Oral daily  carvedilol 12.5 milliGRAM(s) Oral every 12 hours  dextrose 5%. 1000 milliLiter(s) (50 mL/Hr) IV Continuous <Continuous>  dextrose 5%. 1000 milliLiter(s) (100 mL/Hr) IV Continuous <Continuous>  dextrose 50% Injectable 25 Gram(s) IV Push once  dextrose 50% Injectable 12.5 Gram(s) IV Push once  dextrose 50% Injectable 25 Gram(s) IV Push once  dipyridamole 200 mG/aspirin 25 mG 1 Capsule(s) Oral two times a day  glucagon  Injectable 1 milliGRAM(s) IntraMuscular once  heparin  Infusion.  Unit(s)/Hr (8 mL/Hr) IV Continuous <Continuous>  hydrALAZINE 5 milliGRAM(s) Oral three times a day  influenza  Vaccine (HIGH DOSE) 0.7 milliLiter(s) IntraMuscular once  insulin lispro (ADMELOG) corrective regimen sliding scale   SubCutaneous three times a day before meals  insulin lispro (ADMELOG) corrective regimen sliding scale   SubCutaneous at bedtime  isosorbide   mononitrate ER Tablet (IMDUR) 30 milliGRAM(s) Oral daily  levothyroxine 75 MICROGram(s) Oral daily  valproate sodium IVPB 500 milliGRAM(s) IV Intermittent every 12 hours    MEDICATIONS  (PRN):  ALBUTerol    90 MICROgram(s) HFA Inhaler 2 Puff(s) Inhalation every 6 hours PRN Shortness of Breath and/or Wheezing  dextrose Oral Gel 15 Gram(s) Oral once PRN Blood Glucose LESS THAN 70 milliGRAM(s)/deciliter  heparin   Injectable 4100 Unit(s) IV Push every 6 hours PRN For aPTT less than 40  ondansetron Injectable 4 milliGRAM(s) IV Push every 8 hours PRN Nausea and/or Vomiting      No Known Allergies      Social History:  Lives at home w/ wife   ADL independent   Tobacco former smoker, quit 40 years ago, 20 pack year history  Alcohol denies  Drugs denies  COVID Pfizer x3   Occupation retired- former  (30 Mar 2022 08:48)      REVIEW OF SYSTEMS:i am OK , NO Complaints  CONSTITUTIONAL: No fever, No chills, No fatigue, No myalgia, No Body ache, No Weakness  EYES: No eye pain,  No visual disturbances, No discharge, No Redness  ENMT: No ear pain, No nose bleed, No vertigo; No sinus pain, No throat pain, No Congestion  NECK: No pain, No stiffness  RESPIRATORY: No cough, No wheezing, No hemoptysis, No shortness of breath  CARDIOVASCULAR: No chest pain, No palpitations  GASTROINTESTINAL: No abdominal pain, No epigastric pain. No nausea, No vomiting, No diarrhea, No constipation; [  ] BM  GENITOURINARY: No dysuria, No frequency, No urgency, No incontinence  NEUROLOGICAL: No headaches, No dizziness, No numbness, No tingling, No tremors, [ x ] LUE weakness   EXTREMITIES: No Swelling, No Pain, No Edema  SKIN: [ x ] No itching, burning, rashes, or lesions   MUSCULOSKELETAL: No joint pain, No joint swelling; No muscle pain, No back pain, No extremity pain  PSYCHIATRIC: No depression, No anxiety, No mood swings, No difficulty sleeping at night  PAIN SCALE: [ x ] None  [  ] Other-  ROS Unable to obtain due to: [  ] Dementia  [  ] Lethargy  [  ] Sedated  [  ] Non verbal  REST OF REVIEW OF SYSTEMS: [ x ] Normal     Vital Signs Last 24 Hrs  T(C): 36.5 (31 Mar 2022 05:15), Max: 37.8 (30 Mar 2022 09:23)  T(F): 97.7 (31 Mar 2022 05:15), Max: 100 (30 Mar 2022 09:23)  HR: 69 (31 Mar 2022 05:15) (68 - 78)  BP: 145/78 (31 Mar 2022 05:15) (140/76 - 167/83)  BP(mean): --  RR: 17 (31 Mar 2022 05:15) (16 - 18)  SpO2: 96% (31 Mar 2022 05:15) (94% - 100%)    CAPILLARY BLOOD GLUCOSE      POCT Blood Glucose.: 149 mg/dL (30 Mar 2022 21:42)  POCT Blood Glucose.: 181 mg/dL (30 Mar 2022 16:35)  POCT Blood Glucose.: 229 mg/dL (30 Mar 2022 13:56)      I&O's Summary    30 Mar 2022 07:01  -  31 Mar 2022 07:00  --------------------------------------------------------  IN: 148.5 mL / OUT: 1400 mL / NET: -1251.5 mL      PHYSICAL EXAM:  GENERAL:  [ x ] NAD, [ x ] Well appearing, [  ] Agitated, [  ] Drowsy, [  ] Lethargy, [  ] Confused   HEAD:  [ x ] Normal, [  ] Other  EYES:  [ x ] EOMI, [ x ] PERRLA, [ x ] Conjunctiva and sclera clear normal, [  ] Other, [ x ] Pallor, [  ] Discharge  ENMT:  [ x ] Normal, [ x ] Moist mucous membranes, [  ] Good dentition, [ x ] No thrush  NECK:  [ x ] Supple, [ x ] No JVD, [x  ] Normal thyroid, [  ] Lymphadenopathy, [  ] Other  CHEST/LUNG:  [ x ] Clear to auscultation bilaterally, [ x ] Breath Sounds equal B/L, [ x ] Poor effort, [ x ] No rales, [ x ] No rhonchi, [ x ] No wheezing  HEART:  [ x ] Regular rate and rhythm, [  ] Tachycardia, [  ] Bradycardia, [  ] Irregular, [ x ] No murmurs, No rubs, No gallops, [  ] PPM in place (Mfr:  )  ABDOMEN:  [ x ] Soft, [ x ] Nontender, [ x ] Nondistended, [ x ] No mass, [ x ] Bowel sounds present, [  ] Obese  NERVOUS SYSTEM:  [ x ] Alert & Oriented x3, [  ] Nonfocal, [  ] Confusion, [  ] Encephalopathic, [  ] Sedated, [  ] Unable to assess, [  ] Dementia, [ x ] Other- LUE weakness compared to RUE, left tongue deviation   EXTREMITIES:  [ x ] 2+ Peripheral Pulses, No clubbing, No cyanosis,  [  ] Edema B/L lower EXT, [  ] PVD stasis skin changes B/L lower EXT, [  ] Wound  LYMPH:  No lymphadenopathy noted  SKIN:  [ x ] No rashes or lesions, [  ] Pressure ulcers, [  ] Ecchymosis, [  ] Skin tears, [  ] Other    DIET: Pureed with Moderately Thickened     LABS:                        9.6    11.68 )-----------( 188      ( 31 Mar 2022 07:24 )             30.4     31 Mar 2022 07:24    142    |  107    |  35     ----------------------------<  150    3.5     |  25     |  2.30     Ca    8.9        31 Mar 2022 07:24  Phos  2.8       31 Mar 2022 07:24  Mg     2.2       31 Mar 2022 07:24    TPro  6.4    /  Alb  2.9    /  TBili  1.3    /  DBili  x      /  AST  51     /  ALT  38     /  AlkPhos  83     31 Mar 2022 07:24    PT/INR - ( 30 Mar 2022 06:43 )   PT: 15.2 sec;   INR: 1.30 ratio         PTT - ( 31 Mar 2022 07:24 )  PTT:41.0 sec  Urinalysis Basic - ( 30 Mar 2022 20:41 )    Color: Brown / Appearance: Slightly Turbid / S.010 / pH: x  Gluc: x / Ketone: Trace  / Bili: Negative / Urobili: Negative   Blood: x / Protein: 100 / Nitrite: Positive   Leuk Esterase: Moderate / RBC: >50 /HPF / WBC 3-5   Sq Epi: x / Non Sq Epi: Occasional / Bacteria: Few          CARDIAC MARKERS ( 30 Mar 2022 21:07 )  x     / x     / 342 U/L / x     / 12.8 ng/mL  CARDIAC MARKERS ( 30 Mar 2022 14:34 )  x     / x     / 398 U/L / x     / 18.4 ng/mL  CARDIAC MARKERS ( 30 Mar 2022 08:13 )  x     / x     / x     / x     / 13.1 ng/mL  CARDIAC MARKERS ( 30 Mar 2022 07:53 )  x     / x     / 328 U/L / x     / x             Anemia Panel:  Ferritin, Serum: 44 ng/mL (22 @ 05:21)  Iron - Total Binding Capacity.: 329 ug/dL (22 @ 05:21)  Iron Total, Serum: 11 ug/dL (22 @ 05:21)      Thyroid Panel:  Thyroid Stimulating Hormone, Serum: 2.99 uIU/mL (22 @ 07:24)  T4, Serum: 5.9 ug/dL (22 @ 05:21)  Thyroid Stimulating Hormone, Serum: 2.81 uIU/mL (22 @ 14:34)            Serum Pro-Brain Natriuretic Peptide: 9256 pg/mL (22 @ 07:24)  Serum Pro-Brain Natriuretic Peptide: 4963 pg/mL (22 @ 06:12)      RADIOLOGY & ADDITIONAL TESTS:  < from: CT Chest No Cont (22 @ 08:53) >    CONTRAST/COMPLICATIONS:  IV Contrast: IV contrast documented in associated exam  Oral Contrast: NONE  Complications: None reported attime of study completion    PROCEDURE:  CT of the Chest was performed.  Sagittal and coronal reformats were performed.    FINDINGS:    LUNGS AND AIRWAYS: Patent central airways.  Mild centrilobular emphysema.   Nodular posterior right upper lobe infiltrate. Smooth intralobular septal   thickening.  PLEURA: Small to moderate bilateral pleural effusions. Bilateral   calcified pleural plaques.  MEDIASTINUM AND PETE: No lymphadenopathy.  VESSELS: Aortic atherosclerosis without aneurysm. Coronary   atherosclerosis.  HEART: Heart size is normal. Pacemaker/defibrillator leads in the heart.   No pericardial effusion.  CHEST WALL AND LOWER NECK: Pacemaker pulse generator in the left chest   wall.  VISUALIZED UPPER ABDOMEN: Within normal limits.  BONES: Degenerative changes.    IMPRESSION:  Right upper lobe pneumonia superimposed on mild pulmonary edema.   Bilateral pleural effusions.    Pacemaker/defibrillator.    Other chronic findings as above.        < end of copied text >  < from: CT Head No Cont (22 @ 08:53) >    CLINICAL INDICATION:  Left-sided weakness    TECHNIQUE : Axial CT scanning of the brain was obtained from the skull   base to the vertex without the administration of intravenous contrast.   Sagittal and coronal reformats were provided.    COMPARISON: CT brain 3/30/2022    FINDINGS:    Interval demarcation of cytotoxic edema in the region of the right   perirolandic cortex. No CT evidence for hemorrhagic transformation.    No hydrocephalus, midline shift, or effacement of basal cisterns.    Mild right maxillary sinus shows thickening. Remaining visualized   paranasal sinuses and mastoid air cells are clear.    IMPRESSION:    Interval demarcation of the right perirolandic cortex infarct.  No CT evidence for hemorrhagic transformation.      < end of copied text >          < from: NM Pulmonary Ventilation/Perfusion Scan (22 @ 13:35) >    ACC: [id number] EXAM:  NM PULM VENTILATION PERFUS IMG                          PROCEDURE DATE:  2022          INTERPRETATION:  RADIOPHARMACEUTICAL: 1.0 mCi Tc-99m-DTPA via inhalation;   6.2 mCi Tc-99m-MAA, I.V.    CLINICAL INFORMATION: 76 year old male with elevated d-dimer and   shortness of breath; referred to evaluate for pulmonary embolism.    TECHNIQUE:  Ventilation and perfusion images of the lungs were obtained   following administration of Tc-99m-DTPA and Tc-99m-MAA. Images were   obtained in the anterior, posterior, both lateral, and all 4 oblique   projections. The study was interpreted in conjunction with a chest   radiograph of 3/20/2022.    COMPARISON: No previous lung scans were available for comparison.    FINDINGS: Thereis heterogeneous distribution of radiopharmaceutical in   both lungs on the ventilation and perfusion images. There are no   segmental perfusion defects in either lung.    IMPRESSION: Very low probability of pulmonary embolus.    --- End of Report ---    ZENA HARDING MD; Attending Nuclear Medicine  This document has been electronically signed. Mar 30 2022  1:59PM        HEALTH ISSUES - PROBLEM Dx:  Acute pulmonary edema    Leukocytosis    Elevated troponin    Anemia    Elevated creatine kinase    Weakness of left upper extremity    Diabetes mellitus    Chronic HFrEF (heart failure with reduced ejection fraction)    Acute kidney injury superimposed on CKD    HTN (hypertension)    CAD (coronary artery disease)    Hypothyroidism    Need for prophylactic measure          Consultant(s) Notes Reviewed:  [ x ] YES     Care Discussed with [ x ] Consultants, [ x ] Patient, [  ] Family, [  ] HCP, [  ] , [  ] Social Service, [ x ] RN, [  ] Physical Therapy, [  ] Palliative Care Team  DVT PPX: [  ] Lovenox, [  ] SC Heparin, [  ] Coumadin, [  ] Xarelto, [  ] Eliquis, [  ] Pradaxa, [ x ] IV Heparin drip, [  ] SCD, [  ] Ambulation, [  ] Contraindicated 2/2 GI Bleed, [  ] Contraindicated 2/2  Bleed, [  ] Contraindicated 2/2 Brain Bleed  Advanced Directive: [ x ] None, [  ] DNR/DNI

## 2022-03-31 NOTE — SWALLOW VFSS/MBS ASSESSMENT ADULT - SLP GENERAL OBSERVATIONS
Pt received in Radiology upright on Cine chair A&A Ox4, +O2NC, SpO2 97-98%, +hypernasal vocal quality, reduced speech intelligibility, pain scale 0/10 pre & post MBS

## 2022-03-31 NOTE — CONSULT NOTE ADULT - ASSESSMENT
CKD 4  HTN  CHF EF 30%  Anemia      -BLCR 2.3  - protein  -Urine lytes reviewed  -No renal objection to lasix  -V/Q scan low prob  -Chest CT mild pulm Edema + PNA  -Check Iron studies  -BP controlled now    d/w family at bedside

## 2022-03-31 NOTE — DISCHARGE NOTE PROVIDER - NPI NUMBER (FOR SYSADMIN USE ONLY) :
[[id number] [[id number]] [[id number]]

## 2022-03-31 NOTE — CONSULT NOTE ADULT - SUBJECTIVE AND OBJECTIVE BOX
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 As per patient, he was watching a movie when he felt very short of breath suddenly and dropped to the floor, denies LOC and denies any trauma to his head, was on the floor for ~2 hours. Patient was unable to rise from the floor by himself due to his left arm weakness. When first brought to the ED, patient was hypoxic and hypertensive o2 saturation in EMS was >80%. While in the ED, patient had episode of siezure-like activity in his b/l upper extremities which was controlled w/ ativan. Patient was seen and examined at bedside, BiPAP still in place, patient was still mildly lethargic from ativan and could not speak well with bipap mask on. Patient able to open eyes and nod/shake head to questions. Patient denied any headache, vision changes, cp, abd pain, msk pain. Patient still short of breath.    Patient states see he sees Dr. Tomas for outpatient nephrologist. States he is urinating well.  Denies any N/V/dizziness.  No SOB presently. States he is urinating.         PAST MEDICAL & SURGICAL HISTORY:  MI (myocardial infarction)      HTN (hypertension)    HLD (hyperlipidemia)    Throat cancer  , treated with chemo, RT    Ventricular arrhythmia  s/p AICD    Diabetes  Type2, not on any meds since weight loss after throat Ca    Renal insufficiency  s/p chemo    CAD (coronary artery disease)    Inguinal hernia, left    H/O prior ablation treatment  VT,     Cardiac defibrillator in place  - , battery change     S/P left inguinal hernia repair  2018 at [place]         FAMILY HISTORY:  Family history of cancer (Sibling)    NC    Social History:Non smoker    MEDICATIONS  (STANDING):  atorvastatin 10 milliGRAM(s) Oral at bedtime  calcitriol   Capsule 0.25 MICROGram(s) Oral daily  carvedilol 12.5 milliGRAM(s) Oral every 12 hours  dextrose 5%. 1000 milliLiter(s) (50 mL/Hr) IV Continuous <Continuous>  dextrose 5%. 1000 milliLiter(s) (100 mL/Hr) IV Continuous <Continuous>  dextrose 50% Injectable 25 Gram(s) IV Push once  dextrose 50% Injectable 12.5 Gram(s) IV Push once  dextrose 50% Injectable 25 Gram(s) IV Push once  dipyridamole 200 mG/aspirin 25 mG 1 Capsule(s) Oral two times a day  ferrous    sulfate 325 milliGRAM(s) Oral daily  glucagon  Injectable 1 milliGRAM(s) IntraMuscular once  heparin  Infusion.  Unit(s)/Hr (8 mL/Hr) IV Continuous <Continuous>  hydrALAZINE 5 milliGRAM(s) Oral three times a day  influenza  Vaccine (HIGH DOSE) 0.7 milliLiter(s) IntraMuscular once  insulin lispro (ADMELOG) corrective regimen sliding scale   SubCutaneous three times a day before meals  insulin lispro (ADMELOG) corrective regimen sliding scale   SubCutaneous at bedtime  isosorbide   mononitrate ER Tablet (IMDUR) 30 milliGRAM(s) Oral daily  levothyroxine 75 MICROGram(s) Oral daily  polyethylene glycol 3350 17 Gram(s) Oral daily  senna 2 Tablet(s) Oral at bedtime  valproate sodium IVPB 500 milliGRAM(s) IV Intermittent every 12 hours    MEDICATIONS  (PRN):  ALBUTerol    90 MICROgram(s) HFA Inhaler 2 Puff(s) Inhalation every 6 hours PRN Shortness of Breath and/or Wheezing  dextrose Oral Gel 15 Gram(s) Oral once PRN Blood Glucose LESS THAN 70 milliGRAM(s)/deciliter  heparin   Injectable 4100 Unit(s) IV Push every 6 hours PRN For aPTT less than 40  ondansetron Injectable 4 milliGRAM(s) IV Push every 8 hours PRN Nausea and/or Vomiting   Meds reviewed    Allergies    No Known Allergies    Intolerances         REVIEW OF SYSTEMS:    Review of Systems:   Constitutional: Denies fatigue  HEENT: Denies headaches and dizziness  Respiratory: denies SOB, cough, or wheezing  Cardiovascular: denies CP, palpitations  Gastrointestinal: Denies nausea, denies vomiting, diarrhea, constipation, abdominal pain, or bloody stools  Genitourinary: denies painful urination, increased frequency, urgency, or bloody urine  Skin: denies rashes or itching  Musculoskeletal: denies muscle aches, joint swelling  Neurologic: Denies generalized weakness, denies loss of sensation, numbness, or tingling      Vital Signs Last 24 Hrs  T(C): 36.6 (31 Mar 2022 11:31), Max: 36.8 (30 Mar 2022 21:00)  T(F): 97.8 (31 Mar 2022 11:31), Max: 98.2 (30 Mar 2022 21:00)  HR: 86 (31 Mar 2022 14:07) (68 - 86)  BP: 106/- (31 Mar 2022 14:07) (106/- - 167/83)  BP(mean): --  RR: 18 (31 Mar 2022 11:31) (16 - 18)  SpO2: 91% (31 Mar 2022 11:31) (91% - 98%)  Daily     Daily Weight in k.9 (31 Mar 2022 05:15)    PHYSICAL EXAM:    GENERAL: NAD  HEAD:  Atraumatic, Normocephalic  EYES: EOMI, conjunctiva and sclera clear  ENMT: No Drainage from nares, No drainage from ears  NECK: Supple, neck  veins full  NERVOUS SYSTEM:  Awake and Alert  CHEST/LUNG: Clear to percussion bilaterally; No rales, rhonchi, wheezing, or rubs  HEART: Regular rate and rhythm; No murmurs, rubs, or gallops  ABDOMEN: Soft, Nontender, Nondistended; Bowel sounds present  EXTREMITIES:  No Edema  SKIN: No rashes No obvious ecchymosis      LABS:                        9.6    11.68 )-----------( 188      ( 31 Mar 2022 07:24 )             30.4     03    142  |  107  |  35<H>  ----------------------------<  150<H>  3.5   |  25  |  2.30<H>    Ca    8.9      31 Mar 2022 07:24  Phos  2.8       Mg     2.2         TPro  6.4  /  Alb  2.9<L>  /  TBili  1.3<H>  /  DBili  x   /  AST  51<H>  /  ALT  38  /  AlkPhos  83      PT/INR - ( 30 Mar 2022 06:43 )   PT: 15.2 sec;   INR: 1.30 ratio         PTT - ( 31 Mar 2022 13:36 )  PTT:42.3 sec  Urinalysis Basic - ( 30 Mar 2022 20:41 )    Color: Brown / Appearance: Slightly Turbid / S.010 / pH: x  Gluc: x / Ketone: Trace  / Bili: Negative / Urobili: Negative   Blood: x / Protein: 100 / Nitrite: Positive   Leuk Esterase: Moderate / RBC: >50 /HPF / WBC 3-5   Sq Epi: x / Non Sq Epi: Occasional / Bacteria: Few      Magnesium, Serum: 2.2 mg/dL ( @ 07:24)  Phosphorus Level, Serum: 2.8 mg/dL ( @ 07:24)    ABG - ( 30 Mar 2022 08:15 )  pH, Arterial: 7.42  pH, Blood: x     /  pCO2: 36    /  pO2: 94    / HCO3: 23    / Base Excess: -1.1  /  SaO2: 98.4                  RADIOLOGY & ADDITIONAL TESTS:

## 2022-03-31 NOTE — DISCHARGE NOTE PROVIDER - PROVIDER TOKENS
PROVIDER:[TOKEN:[[id number]],FOLLOWUP:[1-3 days]],PROVIDER:[TOKEN:[[id number]],FOLLOWUP:[1-3 days]] PROVIDER:[TOKEN:[[id number]],FOLLOWUP:[1-3 days]],PROVIDER:[TOKEN:[[id number]],FOLLOWUP:[1-3 days]],PROVIDER:[TOKEN:[[id number]],FOLLOWUP:[1-3 days]],PROVIDER:[TOKEN:[[id number]],FOLLOWUP:[1-3 days]] PROVIDER:[TOKEN:[[id number]],FOLLOWUP:[1-3 days]],PROVIDER:[TOKEN:[[id number]],FOLLOWUP:[1-3 days]],PROVIDER:[TOKEN:[[id number]],FOLLOWUP:[1-3 days]],PROVIDER:[TOKEN:[[id number]]]

## 2022-03-31 NOTE — PHYSICAL THERAPY INITIAL EVALUATION ADULT - ADDITIONAL COMMENTS
Patient lives in private "raised" house.  Once inside patient on main level.  Patient was independent in ADLs and ambulated independently without device.

## 2022-03-31 NOTE — PROGRESS NOTE ADULT - PROBLEM SELECTOR PLAN 4
Acute, 2/2 reactive to physical stress   - patient currently w/o fever or signs of infection   - f/u lactate, blood cultures x2  -ID DR Sandor perez.  - will monitor off abx   - trend daily CBCs Acute, 2/2 reactive to physical stress   - patient currently w/o fever or signs of infection   - f/u lactate, blood cultures x2  -ID DR Sandor perez.  - will monitor off abx   - trend daily CBCs, WBC trending down Acute, 2/2 reactive to physical stress vs. PNA in RUL   - lactate 1.8, procal 18.46 up from 3.48   - CT chest: Right Upper Lobe PNA superimposed pulmonary edema, b/l pleural effusions   - ID DR Sandor perez.  - will start abx?   - trend daily CBCs, WBC trending down Acute, 2/2 reactive to physical stress vs. PNA in RUL   - lactate 1.8, procal 18.46 up from 3.48  - no white count and no fevers  - CT chest: Right Upper Lobe PNA superimposed pulmonary edema, b/l pleural effusions   - ID DR Sandor perez.  - low suspicion for true infection, will monitor off abx  - trend daily CBCs, WBC trending down Acute, 2/2 reactive Likely , Doubt  PNA in RUL   - lactate 1.8, procal 18.46 up from 3.48  - no white count and no fevers  - CT chest: Right Upper Lobe PNA superimposed pulmonary edema, b/l pleural effusions   - ID DR Sandor perez.-Observe off ABx  - low suspicion for true infection, will monitor off abx  - trend daily CBCs, WBC trending down

## 2022-03-31 NOTE — DISCHARGE NOTE PROVIDER - CARE PROVIDERS DIRECT ADDRESSES
,DirectAddress_Unknown,DirectAddress_Unknown ,DirectAddress_Unknown,DirectAddress_Unknown,DirectAddress_Unknown,[email or web address] ,DirectAddress_Unknown,DirectAddress_Unknown,[email or web address],DirectAddress_Unknown

## 2022-03-31 NOTE — SWALLOW VFSS/MBS ASSESSMENT ADULT - DIAGNOSTIC IMPRESSIONS
Moderate oral dysphagia marked by reduced mastication with minced and moist, overall reduced lingual ROM/strength coordination resulting in +posterior loss to oropharynx with puree and minced and moist and to hypopharynx with moderately thick, mildly thick and thin liquids.  +Independent regurgitation to oropharynx with subsequent swallow intermittently, +nasal regurgitation during and after the swallow, +trace-mild nasopharyngeal stasis across consistencies. Suspect trismus, as pt with reduced jaw excursion upon PO presentation with all trials.  Mild-moderate pharyngeal dysphagia with puree, minced & moist and moderately thick liquids marked by reduced tongue base retraction, reduced hyolaryngeal excursion, reduced pharyngeal contractility, all stasis observed to reduce use of chin tuck with puree and moderately thick liquids, minimally reduced with minced and moist.  Mild-moderate pharyngeal dysphagia with mildly thick liquids marked by reduced tongue base retraction, reduced hyolaryngeal excursion, reduced laryngeal elevation, reduced airway closure, +silent penetration above the vocal folds without clearance with head neutral.  Chin tuck assisted with reduction in stasis, however, unsuccessful in elimination of penetration.  Head turn to left did not reduce stasis or eliminate penetration.  Moderate-severe pharyngeal dysphagia with thin liquids marked by reduced tongue base retraction, reduced hyolaryngeal excursion, reduced laryngeal elevation, reduced airway closure, +silent penetration above the vocal folds during the swallow with head neutral and use of chin tuck.  +Silent aspiration after the swallow observed, pt cued to cough, however, unable to clear.  Suspect osteophytes C4-C7.  +Trace retrograde progression of bolus observed across consistencies; +trace-mild stasis in upper 1/3 of esophagus. Moderate oral dysphagia marked by reduced mastication with minced and moist, overall reduced lingual ROM/strength coordination resulting in +posterior loss to oropharynx with puree and minced and moist and to hypopharynx with moderately thick, mildly thick and thin liquids.  +Independent regurgitation to oropharynx with subsequent swallow intermittently, +nasal regurgitation during and after the swallow, suspect due to velopharyngeal insufficiency, +trace-mild nasopharyngeal stasis across consistencies. Suspect trismus, as pt with reduced jaw excursion upon PO presentation with all trials.  Mild-moderate pharyngeal dysphagia with puree, minced & moist and moderately thick liquids marked by reduced tongue base retraction, reduced hyolaryngeal excursion, reduced pharyngeal contractility, all stasis observed to reduce use of chin tuck with puree and moderately thick liquids, minimally reduced with minced and moist.  Mild-moderate pharyngeal dysphagia with mildly thick liquids marked by reduced tongue base retraction, reduced pharyngeal contractility, reduced hyolaryngeal excursion, reduced laryngeal elevation, reduced airway closure, +silent penetration above the vocal folds without clearance with head neutral.  Chin tuck assisted with reduction in stasis, however, unsuccessful in elimination of penetration.  Head turn to left did not reduce stasis or eliminate penetration.  Moderate-severe pharyngeal dysphagia with thin liquids marked by reduced tongue base retraction, reduced pharyngeal contractility, reduced hyolaryngeal excursion, reduced laryngeal elevation, reduced airway closure, +silent penetration above the vocal folds during the swallow with head neutral and use of chin tuck.  +Silent aspiration after the swallow observed, pt cued to cough, however, unable to clear.  Suspect osteophytes C4-C7.  +Trace retrograde progression of bolus observed across consistencies; +trace-mild stasis in upper 1/3 of esophagus.

## 2022-03-31 NOTE — PROGRESS NOTE ADULT - SUBJECTIVE AND OBJECTIVE BOX
Neurology follow up note    ANTONIO PONCE76yMale      Interval History:    Patient feels ok no new complaints.    Allergies    No Known Allergies    Intolerances        MEDICATIONS    ALBUTerol    90 MICROgram(s) HFA Inhaler 2 Puff(s) Inhalation every 6 hours PRN  atorvastatin 10 milliGRAM(s) Oral at bedtime  calcitriol   Capsule 0.25 MICROGram(s) Oral daily  carvedilol 12.5 milliGRAM(s) Oral every 12 hours  dextrose 5%. 1000 milliLiter(s) IV Continuous <Continuous>  dextrose 5%. 1000 milliLiter(s) IV Continuous <Continuous>  dextrose 50% Injectable 25 Gram(s) IV Push once  dextrose 50% Injectable 12.5 Gram(s) IV Push once  dextrose 50% Injectable 25 Gram(s) IV Push once  dextrose Oral Gel 15 Gram(s) Oral once PRN  dipyridamole 200 mG/aspirin 25 mG 1 Capsule(s) Oral two times a day  ferrous    sulfate 325 milliGRAM(s) Oral daily  glucagon  Injectable 1 milliGRAM(s) IntraMuscular once  heparin   Injectable 4100 Unit(s) IV Push every 6 hours PRN  heparin  Infusion.  Unit(s)/Hr IV Continuous <Continuous>  hydrALAZINE 5 milliGRAM(s) Oral three times a day  influenza  Vaccine (HIGH DOSE) 0.7 milliLiter(s) IntraMuscular once  insulin lispro (ADMELOG) corrective regimen sliding scale   SubCutaneous three times a day before meals  insulin lispro (ADMELOG) corrective regimen sliding scale   SubCutaneous at bedtime  isosorbide   mononitrate ER Tablet (IMDUR) 30 milliGRAM(s) Oral daily  levothyroxine 75 MICROGram(s) Oral daily  ondansetron Injectable 4 milliGRAM(s) IV Push every 8 hours PRN  polyethylene glycol 3350 17 Gram(s) Oral daily  potassium chloride    Tablet ER 40 milliEquivalent(s) Oral once  senna 2 Tablet(s) Oral at bedtime  valproate sodium IVPB 500 milliGRAM(s) IV Intermittent every 12 hours            Weight (kg): 67.7 ( @ 15:59)    Vital Signs Last 24 Hrs  T(C): 36.5 (31 Mar 2022 05:15), Max: 36.8 (30 Mar 2022 21:00)  T(F): 97.7 (31 Mar 2022 05:15), Max: 98.2 (30 Mar 2022 21:00)  HR: 69 (31 Mar 2022 05:15) (68 - 76)  BP: 145/78 (31 Mar 2022 05:15) (140/76 - 167/83)  BP(mean): --  RR: 17 (31 Mar 2022 10:26) (16 - 18)  SpO2: 95% (31 Mar 2022 10:26) (94% - 98%)    REVIEW OF SYSTEMS:  Review of systems are limited secondary to the patient is status post Ativan, on BiPAP, but as per my conversation with spouse, had been in his normal state of health.    PHYSICAL EXAMINATION:   HEENT:  Head:  Normocephalic, atraumatic.  Eyes:  No scleral icterus.  Ears:  Hard of hearing as per my conversation with spouse.  NECK:  Supple.  CARDIOVASCULAR:  S1 and S2 are heard.  RESPIRATORY:  Decreased breath sounds bilaterally.  ABDOMEN:  Soft, nontender.  EXTREMITIES:  No clubbing or cyanosis was noted.      NEUROLOGIC:  The patient was awake alert Extraocular movements appeared to be intact.  full visual fields   The patient appears to have intact bilateral nasolabial folds, but I could ascertain from looking through the BiPAP.  Speech was fluent Motor:  Right upper appeared to be 4/5, left upper was 3+/5.   Right lower extremity was 5/5, left lower extremities  4/5.  The patient did appear to Sensory appeared to be intact.              LABS:  CBC Full  -  ( 31 Mar 2022 07:24 )  WBC Count : 11.68 K/uL  RBC Count : 4.02 M/uL  Hemoglobin : 9.6 g/dL  Hematocrit : 30.4 %  Platelet Count - Automated : 188 K/uL  Mean Cell Volume : 75.6 fl  Mean Cell Hemoglobin : 23.9 pg  Mean Cell Hemoglobin Concentration : 31.6 gm/dL  Auto Neutrophil # : 9.78 K/uL  Auto Lymphocyte # : 0.93 K/uL  Auto Monocyte # : 0.80 K/uL  Auto Eosinophil # : 0.04 K/uL  Auto Basophil # : 0.06 K/uL  Auto Neutrophil % : 83.8 %  Auto Lymphocyte % : 8.0 %  Auto Monocyte % : 6.8 %  Auto Eosinophil % : 0.3 %  Auto Basophil % : 0.5 %    Urinalysis Basic - ( 30 Mar 2022 20:41 )    Color: Brown / Appearance: Slightly Turbid / S.010 / pH: x  Gluc: x / Ketone: Trace  / Bili: Negative / Urobili: Negative   Blood: x / Protein: 100 / Nitrite: Positive   Leuk Esterase: Moderate / RBC: >50 /HPF / WBC 3-5   Sq Epi: x / Non Sq Epi: Occasional / Bacteria: Few          142  |  107  |  35<H>  ----------------------------<  150<H>  3.5   |  25  |  2.30<H>    Ca    8.9      31 Mar 2022 07:24  Phos  2.8       Mg     2.2         TPro  6.4  /  Alb  2.9<L>  /  TBili  1.3<H>  /  DBili  x   /  AST  51<H>  /  ALT  38  /  AlkPhos  83      Hemoglobin A1C:     LIVER FUNCTIONS - ( 31 Mar 2022 07:24 )  Alb: 2.9 g/dL / Pro: 6.4 g/dL / ALK PHOS: 83 U/L / ALT: 38 U/L / AST: 51 U/L / GGT: x           Vitamin B12   PT/INR - ( 30 Mar 2022 06:43 )   PT: 15.2 sec;   INR: 1.30 ratio         PTT - ( 31 Mar 2022 07:24 )  PTT:41.0 sec      RADIOLOGY  ANALYSIS AND PLAN:  This is a 76-year-old with episode of altered mental status, left-sided weakness, and possibly seizure event.  Clinical impression could be cerebrovascular accident  right MCA territory which possibly could have lead to seizure event, questionable this was precipitated by any type of cardiac event with the patient having elevated troponin and the patient feeling short of breath.  CT imaging of the brain positive for CVA unable to do MRI secondary to the patient having defibrillator.  Aggrenox 1 tablet twice a day spoke to cardiology ok for that   For possible seizure event which could have been induced secondary to cerebrovascular accident secondary to elevated renal function, I will start the patient on Depakote 500 mg twice a day.  For history of hypertension, for now allow permissive hypertension.  Do not treat a systolic blood pressure unless it goes above 180 secondary the patient's underlying cardiac disease with a poor ejection fraction.  Spoke with Cardiology, we will try to maintain it below 180 if possible and not to treat unless it goes above that for today unless SBP needs to be at a lower parameter for cardiac pulmonary issues   For history of hyperlipidemia, continue on statin.  For hypothyroidism, continue the patient on Synthroid.  AC risks vs benefits were addressed with patient and spouse they understand   spoke to patient in detail any new complaints he is to let the nurse know immediately     Spoke with spouse 3/31  Her name is Radha, telephone number is [phone].    Greater than 40 minutes of time was spent with the patient, plan of care, reviewing data, with greater than 50% of the visit was spent counseling and/or coordinating care with multidisciplinary healthcare team

## 2022-03-31 NOTE — PROGRESS NOTE ADULT - ATTENDING COMMENTS
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 Admitted for pulmonary edema, leukocytosis, elevated troponin, JARRED on CKD-3  and seizure.   Pt seen, examined, case & care plan d/w pt, residents at detail.  -D/W Neuro-Dr Brownlee - AC CVA,   -Cardiology-Dr Terrell  d/w at detail. ECHO,  -Renal eval DR Porter follow up  AM labs , PT eval---> [facility] Rehab   -PO diet  -S & S eval-Pureed with Moderately thickened liquid  Palliative care Eval, Prognosis is Guarded.  Total care time is 45 minutes. 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 Admitted for pulmonary edema, leukocytosis, elevated troponin, JARRED on CKD-3  and seizure.   Pt seen, examined, case & care plan d/w pt, residents at detail.  -D/W Neuro-Dr Brownlee - AC CVA,   -Cardiology-Dr Terrell  d/w at detail. ECHO,  -Renal eval DR Porter follow up  AM labs , PT eval---> AC Rehab   -PO diet  -S & S eval-Pureed with Moderately thickened liquidD/W Wife Wendy at detail at bed side.  Palliative care Eval, Prognosis is Guarded.  Total care time is 45 minutes.

## 2022-03-31 NOTE — SWALLOW VFSS/MBS ASSESSMENT ADULT - ROSENBEK'S PENETRATION ASPIRATION SCALE
(1) no aspiration, contrast does not enter airway (3) contrast remains above the vocal cords, visible residue remains (penetration) with head neutral and during chin tuck; 8 = silent aspiration with head neutral after the swallow/(3) contrast remains above the vocal cords, visible residue remains (penetration)

## 2022-03-31 NOTE — CONSULT NOTE ADULT - SUBJECTIVE AND OBJECTIVE BOX
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 As per patient, he was watching a movie when he felt very short of breath suddenly and dropped to the floor, denies LOC and denies any trauma to his head, was on the floor for ~2 hours. Patient was unable to rise from the floor by himself due to his left arm weakness. When first brought to the ED, patient was hypoxic and hypertensive o2 saturation in EMS was >80%. While in the ED, patient had episode of siezure-like activity in his b/l upper extremities which was controlled w/ ativan. Patient was seen and examined at bedside, BiPAP still in place, patient was still mildly lethargic from ativan and could not speak well with bipap mask on. Patient able to open eyes and nod/shake head to questions. Patient denied any headache, vision changes, cp, abd pain, msk pain. Patient still short of breath.      In ED:   Vitals: HR 80, 176/82 -> 115/60, RR 20, 96% on BiPAP/CPAP  Labs significant for: WBC 20.24, H/H 10.8/24.1, D-Dimer 2924, Troponin 1011.9 > 5252.0, CKMB: 13.1, CPK%: 4%, BUN 33, Creatinine 2.3 (baseline unknown) eGFR 29, serum pro BNP 4963, creatine kinase 328, POCT glucose: 353  Imaging:   CXR: diffuse b/l scattered infiltrates  CT brain stroke: No acute hemorrhage, infarct, or mass effect   EKG: sinus tachy at ST- depressions in V5 V6   Received Lasix 40mg IVP x1, Aspirin 600mg x1, Hydralazine 10mg x1, Ativan 2mg IVP x1, Lispro 6u in the ED    (30 Mar 2022 08:48)       ROS:  Negative except for:    PAST MEDICAL & SURGICAL HISTORY:  MI (myocardial infarction)  1992    HTN (hypertension)    HLD (hyperlipidemia)    Throat cancer  2011, treated with chemo, RT    Ventricular arrhythmia  s/p AICD    Diabetes  Type2, not on any meds since weight loss after throat Ca    Renal insufficiency  s/p chemo    CAD (coronary artery disease)    Inguinal hernia, left    H/O prior ablation treatment  VT, 2009    Cardiac defibrillator in place  - 2009, battery change 2017    S/P left inguinal hernia repair  2018 at [facility]        SOCIAL HISTORY:    FAMILY HISTORY:  Family history of cancer (Sibling)        MEDICATIONS  (STANDING):  atorvastatin 10 milliGRAM(s) Oral at bedtime  calcitriol   Capsule 0.25 MICROGram(s) Oral daily  dextrose 5%. 1000 milliLiter(s) (50 mL/Hr) IV Continuous <Continuous>  dextrose 5%. 1000 milliLiter(s) (100 mL/Hr) IV Continuous <Continuous>  dextrose 50% Injectable 25 Gram(s) IV Push once  dextrose 50% Injectable 12.5 Gram(s) IV Push once  dextrose 50% Injectable 25 Gram(s) IV Push once  dipyridamole 200 mG/aspirin 25 mG 1 Capsule(s) Oral two times a day  ferrous    sulfate 325 milliGRAM(s) Oral daily  glucagon  Injectable 1 milliGRAM(s) IntraMuscular once  heparin  Infusion.  Unit(s)/Hr (8 mL/Hr) IV Continuous <Continuous>  influenza  Vaccine (HIGH DOSE) 0.7 milliLiter(s) IntraMuscular once  insulin lispro (ADMELOG) corrective regimen sliding scale   SubCutaneous three times a day before meals  insulin lispro (ADMELOG) corrective regimen sliding scale   SubCutaneous at bedtime  iron sucrose Injectable 100 milliGRAM(s) IV Push every 24 hours  levothyroxine 75 MICROGram(s) Oral daily  polyethylene glycol 3350 17 Gram(s) Oral daily  senna 2 Tablet(s) Oral at bedtime  valproate sodium IVPB 500 milliGRAM(s) IV Intermittent every 12 hours    MEDICATIONS  (PRN):  ALBUTerol    90 MICROgram(s) HFA Inhaler 2 Puff(s) Inhalation every 6 hours PRN Shortness of Breath and/or Wheezing  dextrose Oral Gel 15 Gram(s) Oral once PRN Blood Glucose LESS THAN 70 milliGRAM(s)/deciliter  heparin   Injectable 4100 Unit(s) IV Push every 6 hours PRN For aPTT less than 40  ondansetron Injectable 4 milliGRAM(s) IV Push every 8 hours PRN Nausea and/or Vomiting      Allergies    No Known Allergies    Intolerances        Vital Signs Last 24 Hrs  T(C): 37.1 (31 Mar 2022 19:54), Max: 37.1 (31 Mar 2022 19:54)  T(F): 98.7 (31 Mar 2022 19:54), Max: 98.7 (31 Mar 2022 19:54)  HR: 81 (31 Mar 2022 19:54) (69 - 86)  BP: 129/76 (31 Mar 2022 19:54) (106/- - 145/78)  BP(mean): --  RR: 20 (31 Mar 2022 19:54) (17 - 20)  SpO2: 95% (31 Mar 2022 19:54) (91% - 96%)    PHYSICAL EXAM  General: adult in NAD  HEENT: clear oropharynx, anicteric sclera, pink conjunctivae  Neck: supple  CV: normal S1S2 with no murmur rubs or gallops  Lungs: clear to auscultation, no wheezes, no rhales  Abdomen: soft non-tender non-distended, no hepato/splenomegaly  Ext: no clubbing cyanosis or edema  Skin: no rashes and no petichiae  Neuro: alert and oriented X3 no focal deficits      LABS:    CBC Full  -  ( 31 Mar 2022 07:24 )  WBC Count : 11.68 K/uL  RBC Count : 4.02 M/uL  Hemoglobin : 9.6 g/dL  Hematocrit : 30.4 %  Platelet Count - Automated : 188 K/uL  Mean Cell Volume : 75.6 fl  Mean Cell Hemoglobin : 23.9 pg  Mean Cell Hemoglobin Concentration : 31.6 gm/dL  Auto Neutrophil # : 9.78 K/uL  Auto Lymphocyte # : 0.93 K/uL  Auto Monocyte # : 0.80 K/uL  Auto Eosinophil # : 0.04 K/uL  Auto Basophil # : 0.06 K/uL  Auto Neutrophil % : 83.8 %  Auto Lymphocyte % : 8.0 %  Auto Monocyte % : 6.8 %  Auto Eosinophil % : 0.3 %  Auto Basophil % : 0.5 %    03-31    142  |  107  |  35<H>  ----------------------------<  150<H>  3.5   |  25  |  2.30<H>    Ca    8.9      31 Mar 2022 07:24  Phos  2.8     03-31  Mg     2.2     03-31    TPro  6.4  /  Alb  2.9<L>  /  TBili  1.3<H>  /  DBili  x   /  AST  51<H>  /  ALT  38  /  AlkPhos  83  03-31    PT/INR - ( 30 Mar 2022 06:43 )   PT: 15.2 sec;   INR: 1.30 ratio         PTT - ( 31 Mar 2022 20:29 )  PTT:51.0 sec  Iron - Total Binding Capacity.: 318 ug/dL (03-31 @ 10:51)  Ferritin, Serum: 53 ng/mL (03-31 @ 10:42)  Ferritin, Serum: 44 ng/mL (03-31 @ 05:21)  Iron - Total Binding Capacity.: 329 ug/dL (03-31 @ 05:21)          BLOOD SMEAR INTERPRETATION:    RADIOLOGY & ADDITIONAL STUDIES:    < from: CT Chest No Cont (03.31.22 @ 08:53) >    *** ADDENDUM***    Findings discussed with Dr. Rich Patten at 3/31/2022 2:35 PM with   readback.    --- End of Report ---    *** END OF ADDENDUM***      PROCEDURE DATE:  03/31/2022          INTERPRETATION:  CLINICAL INFORMATION: 76 years  Male with opacifications   on cxr r/o PNA.    COMPARISON: None.    CONTRAST/COMPLICATIONS:  IV Contrast: IV contrast documented in associated exam  Oral Contrast: NONE  Complications: None reported attime of study completion    PROCEDURE:  CT of the Chest was performed.  Sagittal and coronal reformats were performed.    FINDINGS:    LUNGS AND AIRWAYS: Patent central airways.  Mild centrilobular emphysema.   Nodular posterior right upper lobe infiltrate. Smooth intralobular septal   thickening.  PLEURA: Small to moderate bilateral pleural effusions. Bilateral   calcified pleural plaques.  MEDIASTINUM AND PETE: No lymphadenopathy.  VESSELS: Aortic atherosclerosis without aneurysm. Coronary   atherosclerosis.  HEART: Heart size is normal. Pacemaker/defibrillator leads in the heart.   No pericardial effusion.  CHEST WALL AND LOWER NECK: Pacemaker pulse generator in the left chest   wall.  VISUALIZED UPPER ABDOMEN: Within normal limits.  BONES: Degenerative changes.    IMPRESSION:  Right upper lobe pneumonia superimposed on mild pulmonary edema.   Bilateral pleural effusions.    Pacemaker/defibrillator.    Other chronic findings as above.        --- End of Report ---    ***Please see the addendum at the top of this report. It may contain   additional important information or changes.****        LASHAE HAYES MD; Attending Radiologist  This document has been electronically signed. Mar 31 2022  9:21AM  Addend:LASHAE HAYES MD; Attending Radiologist  This addendum was electronically signed on: Mar 31 2022  2:35PM.    < end of copied text >

## 2022-03-31 NOTE — PROGRESS NOTE ADULT - PROBLEM SELECTOR PLAN 6
Chronic, stable -CKD 3   - patient w/ admission Cr 2.3, baseline based off of outpatient records 2.0-2.1   - GFR 29   - avoid nephrotoxic medications, hold patients home benazepril   - f/u urine lytes ,BMP in AM , continue IV lasix   - Nephro consulted, f/u recs Dr Porter Chronic, stable -CKD 3   - patient w/ admission Cr 2.3, baseline based off of outpatient records 2.0-2.1   - GFR 29   - avoid nephrotoxic medications, hold patients home benazepril   - f/u urine lyANNABEL cruz in AM   - Nephro consulted, f/u recs Dr Porter

## 2022-03-31 NOTE — SWALLOW VFSS/MBS ASSESSMENT ADULT - SLP PERTINENT HISTORY OF CURRENT PROBLEM
Pt seen for bedside swallow evaluation 3/30/22 Rx Puree with Moderately thick liquids all PO via teaspoon and MBS, see report for details.

## 2022-03-31 NOTE — PROGRESS NOTE ADULT - PROBLEM SELECTOR PLAN 1
Pt presents with SOB 2/2 flash pulmonary edema likely due to uncontrolled BP. CHF w/ heart strain ? NSTEMI   - ProBNP 4963  - CXR: diffuse b/l scattered infiltrates  - s/p lasix 40mg IVP in ED   -1 more dose of IV lasix this PM   - currently on bipap, wean off supplementary O2 as tolerated   - elevated D-dimer on admission, f/u V/Q scan   - last TTE in 2009 showed LVEF 30% w/ severe systolic dysfunction, f/u TTE   - Patient does not appear volume overloaded on exam, monitor volume status   - caution with IVF Pt presents with SOB 2/2 flash pulmonary edema likely due to uncontrolled BP. CHF w/ heart strain ? NSTEMI   - ProBNP 4963  - CXR: diffuse b/l scattered infiltrates  - s/p lasix 40mg IVP in ED, and s/p another dose yesterday   - currently on NC, wean off supplemental O2 as tolerated  - elevated D-dimer on admission, V/Q scan low probability for PE   - last TTE in 2009 showed LVEF 30% w/ severe systolic dysfunction, f/u TTE   - Patient does not appear volume overloaded on exam, monitor volume status   - caution with IVF Pt presents with SOB 2/2 flash pulmonary edema likely due to uncontrolled BP. CHF w/ heart strain ? NSTEMI vs PNA  - ProBNP 4963  - CXR: diffuse b/l scattered infiltrates  - CT chest: RUL PNA   - procal 18.46, normal lactate, f/u blood cultures   - s/p lasix 40mg IVP in ED, and s/p another dose yesterday   - currently on NC, wean off supplemental O2 as tolerated  - elevated D-dimer on admission, V/Q scan low probability for PE   - last TTE in 2009 showed LVEF 30% w/ severe systolic dysfunction, f/u TTE   - Patient does not appear volume overloaded on exam, monitor volume status   - caution with IVF Pt presents with SOB 2/2 flash pulmonary edema likely due to uncontrolled BP. CHF w/ heart strain Likely  NSTEMI   - ProBNP 4963  - CXR: diffuse b/l scattered infiltrates  - CT chest: RUL PNA   - procal 18.46, normal lactate, f/u blood cultures   - s/p lasix 40mg IVPx 2 doses on 3/30  - currently on NC, wean off supplemental O2 as tolerated  - elevated D-dimer on admission, V/Q scan low probability for PE   - last TTE in 2009 showed LVEF 30% w/ severe systolic dysfunction, f/u TTE   - Patient does not appear volume overloaded on exam, monitor volume status

## 2022-03-31 NOTE — SWALLOW VFSS/MBS ASSESSMENT ADULT - ESOPHAGEAL STAGE
Suspect osteophytes C4-C7.  +Trace retrograde progression of bolus observed; +trace stasis in upper 1/3 of esophagus. Suspect osteophytes C4-C7.  +Trace-mild stasis in upper 1/3 of esophagus.

## 2022-04-01 LAB
ALBUMIN SERPL ELPH-MCNC: 2.7 G/DL — LOW (ref 3.3–5)
ALP SERPL-CCNC: 77 U/L — SIGNIFICANT CHANGE UP (ref 40–120)
ALT FLD-CCNC: 42 U/L — SIGNIFICANT CHANGE UP (ref 12–78)
ANION GAP SERPL CALC-SCNC: 12 MMOL/L — SIGNIFICANT CHANGE UP (ref 5–17)
APTT BLD: 53.1 SEC — HIGH (ref 27.5–35.5)
APTT BLD: 62.2 SEC — HIGH (ref 27.5–35.5)
AST SERPL-CCNC: 41 U/L — HIGH (ref 15–37)
BASOPHILS # BLD AUTO: 0.1 K/UL — SIGNIFICANT CHANGE UP (ref 0–0.2)
BASOPHILS NFR BLD AUTO: 1 % — SIGNIFICANT CHANGE UP (ref 0–2)
BILIRUB SERPL-MCNC: 0.9 MG/DL — SIGNIFICANT CHANGE UP (ref 0.2–1.2)
BUN SERPL-MCNC: 36 MG/DL — HIGH (ref 7–23)
CALCIUM SERPL-MCNC: 8.8 MG/DL — SIGNIFICANT CHANGE UP (ref 8.5–10.1)
CHLORIDE SERPL-SCNC: 106 MMOL/L — SIGNIFICANT CHANGE UP (ref 96–108)
CO2 SERPL-SCNC: 24 MMOL/L — SIGNIFICANT CHANGE UP (ref 22–31)
CREAT SERPL-MCNC: 2.1 MG/DL — HIGH (ref 0.5–1.3)
EGFR: 32 ML/MIN/1.73M2 — LOW
EOSINOPHIL # BLD AUTO: 0.1 K/UL — SIGNIFICANT CHANGE UP (ref 0–0.5)
EOSINOPHIL NFR BLD AUTO: 1 % — SIGNIFICANT CHANGE UP (ref 0–6)
GLUCOSE SERPL-MCNC: 147 MG/DL — HIGH (ref 70–99)
HCT VFR BLD CALC: 29.1 % — LOW (ref 39–50)
HGB BLD-MCNC: 9.4 G/DL — LOW (ref 13–17)
LYMPHOCYTES # BLD AUTO: 0.95 K/UL — LOW (ref 1–3.3)
LYMPHOCYTES # BLD AUTO: 10 % — LOW (ref 13–44)
MAGNESIUM SERPL-MCNC: 2.3 MG/DL — SIGNIFICANT CHANGE UP (ref 1.6–2.6)
MCHC RBC-ENTMCNC: 24 PG — LOW (ref 27–34)
MCHC RBC-ENTMCNC: 32.3 GM/DL — SIGNIFICANT CHANGE UP (ref 32–36)
MCV RBC AUTO: 74.4 FL — LOW (ref 80–100)
MONOCYTES # BLD AUTO: 0.76 K/UL — SIGNIFICANT CHANGE UP (ref 0–0.9)
MONOCYTES NFR BLD AUTO: 8 % — SIGNIFICANT CHANGE UP (ref 2–14)
MRSA PCR RESULT.: SIGNIFICANT CHANGE UP
NEUTROPHILS # BLD AUTO: 7.63 K/UL — HIGH (ref 1.8–7.4)
NEUTROPHILS NFR BLD AUTO: 80 % — HIGH (ref 43–77)
NRBC # BLD: SIGNIFICANT CHANGE UP /100 WBCS (ref 0–0)
PHOSPHATE SERPL-MCNC: 3.3 MG/DL — SIGNIFICANT CHANGE UP (ref 2.5–4.5)
PLATELET # BLD AUTO: 190 K/UL — SIGNIFICANT CHANGE UP (ref 150–400)
POTASSIUM SERPL-MCNC: 3.6 MMOL/L — SIGNIFICANT CHANGE UP (ref 3.5–5.3)
POTASSIUM SERPL-SCNC: 3.6 MMOL/L — SIGNIFICANT CHANGE UP (ref 3.5–5.3)
PROT SERPL-MCNC: 6 G/DL — SIGNIFICANT CHANGE UP (ref 6–8.3)
RBC # BLD: 3.91 M/UL — LOW (ref 4.2–5.8)
RBC # FLD: 14.3 % — SIGNIFICANT CHANGE UP (ref 10.3–14.5)
S AUREUS DNA NOSE QL NAA+PROBE: DETECTED
SODIUM SERPL-SCNC: 142 MMOL/L — SIGNIFICANT CHANGE UP (ref 135–145)
WBC # BLD: 9.54 K/UL — SIGNIFICANT CHANGE UP (ref 3.8–10.5)
WBC # FLD AUTO: 9.54 K/UL — SIGNIFICANT CHANGE UP (ref 3.8–10.5)

## 2022-04-01 PROCEDURE — 99233 SBSQ HOSP IP/OBS HIGH 50: CPT

## 2022-04-01 PROCEDURE — 93010 ELECTROCARDIOGRAM REPORT: CPT

## 2022-04-01 PROCEDURE — 99497 ADVNCD CARE PLAN 30 MIN: CPT

## 2022-04-01 RX ORDER — HEPARIN SODIUM 5000 [USP'U]/ML
5000 INJECTION INTRAVENOUS; SUBCUTANEOUS EVERY 8 HOURS
Refills: 0 | Status: DISCONTINUED | OUTPATIENT
Start: 2022-04-01 | End: 2022-04-05

## 2022-04-01 RX ORDER — POTASSIUM CHLORIDE 20 MEQ
40 PACKET (EA) ORAL ONCE
Refills: 0 | Status: COMPLETED | OUTPATIENT
Start: 2022-04-01 | End: 2022-04-01

## 2022-04-01 RX ORDER — PREGABALIN 225 MG/1
1000 CAPSULE ORAL DAILY
Refills: 0 | Status: DISCONTINUED | OUTPATIENT
Start: 2022-04-01 | End: 2022-04-05

## 2022-04-01 RX ORDER — ATORVASTATIN CALCIUM 80 MG/1
80 TABLET, FILM COATED ORAL AT BEDTIME
Refills: 0 | Status: DISCONTINUED | OUTPATIENT
Start: 2022-04-01 | End: 2022-04-05

## 2022-04-01 RX ADMIN — HEPARIN SODIUM 1250 UNIT(S)/HR: 5000 INJECTION INTRAVENOUS; SUBCUTANEOUS at 15:13

## 2022-04-01 RX ADMIN — Medication 1: at 09:02

## 2022-04-01 RX ADMIN — HEPARIN SODIUM 5000 UNIT(S): 5000 INJECTION INTRAVENOUS; SUBCUTANEOUS at 22:06

## 2022-04-01 RX ADMIN — Medication 325 MILLIGRAM(S): at 12:14

## 2022-04-01 RX ADMIN — PREGABALIN 1000 MICROGRAM(S): 225 CAPSULE ORAL at 12:14

## 2022-04-01 RX ADMIN — ASPIRIN AND DIPYRIDAMOLE 1 CAPSULE(S): 25; 200 CAPSULE, EXTENDED RELEASE ORAL at 06:32

## 2022-04-01 RX ADMIN — Medication 40 MILLIEQUIVALENT(S): at 12:14

## 2022-04-01 RX ADMIN — Medication 55 MILLIGRAM(S): at 18:24

## 2022-04-01 RX ADMIN — ATORVASTATIN CALCIUM 80 MILLIGRAM(S): 80 TABLET, FILM COATED ORAL at 22:06

## 2022-04-01 RX ADMIN — HEPARIN SODIUM 1250 UNIT(S)/HR: 5000 INJECTION INTRAVENOUS; SUBCUTANEOUS at 06:59

## 2022-04-01 RX ADMIN — Medication 1: at 12:14

## 2022-04-01 RX ADMIN — Medication 75 MICROGRAM(S): at 06:32

## 2022-04-01 RX ADMIN — HEPARIN SODIUM 1250 UNIT(S)/HR: 5000 INJECTION INTRAVENOUS; SUBCUTANEOUS at 07:15

## 2022-04-01 RX ADMIN — CALCITRIOL 0.25 MICROGRAM(S): 0.5 CAPSULE ORAL at 12:14

## 2022-04-01 RX ADMIN — Medication 55 MILLIGRAM(S): at 06:32

## 2022-04-01 RX ADMIN — ASPIRIN AND DIPYRIDAMOLE 1 CAPSULE(S): 25; 200 CAPSULE, EXTENDED RELEASE ORAL at 18:24

## 2022-04-01 RX ADMIN — IRON SUCROSE 100 MILLIGRAM(S): 20 INJECTION, SOLUTION INTRAVENOUS at 06:32

## 2022-04-01 NOTE — OCCUPATIONAL THERAPY INITIAL EVALUATION ADULT - ADL RETRAINING, OT EVAL
Pt will feed self with LUE with supervision post setup within 1 week. Pt will complete LB dressing with Kisha and cues for sequencing compensatory techniques within 1 week. Pt will complete UB dressing with supervision and setup, +cues for compensatory technique within 1 week.

## 2022-04-01 NOTE — DIETITIAN INITIAL EVALUATION ADULT. - PROBLEM SELECTOR PLAN 9
Chronic, stable   - LDSS   - Carb consistent soft and bite sized diet   - hypoglycemia protocol   - A1c 8.3

## 2022-04-01 NOTE — PROGRESS NOTE ADULT - ASSESSMENT
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    Acute hypoxic resp failure, acute decompensated heart failure  likely 2/2 flash pulmonary edema, acute decompensated heart failure  s/p CXR- diffuse opacifications b/l  s/p VQ scan- very low probability of PE  patient w/o fever, chills, productive cough, pleurisy  procal elevated however, in the setting of real insufficiency  s/p CT chest read as Right upper lobe pneumonia superimposed on mild pulmonary edema. Bilateral pleural effusions.  however, clinically no evidence of PNA; perhaps reflects pneumonitis  pt on RA, SOB resolved; leukocytosis resolved without antibiotics  he is at risk for aspiration PNA and pneumonitis; aspiration precautions  continue to monitor off antibiotics    leukocytosis  likely reactive 2/2 CVA  has resolved without antibiotics    CVA  L arm weakness  s/p CT head- Interval demarcation of the right perirolandic cortex infarct.  on heparin gtt, f/u cardiology & neuro recs  f/u neurology recs    Rich Patten M.D.  [facility], Division of Infectious Diseases  [phone]  After 5pm on weekdays and all day on weekends - please call [phone]

## 2022-04-01 NOTE — PROGRESS NOTE ADULT - ASSESSMENT
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 Admitted for pulmonary edema, leukocytosis, elevated troponin, JARRED on CKD-3, suspect possible CVA and NSTEMI

## 2022-04-01 NOTE — PROGRESS NOTE ADULT - PROBLEM SELECTOR PLAN 7
Patient w/ admission Hgb of 10.8, baseline 12   - Hb stable   - 03/30 patient w/ hematuria; No gross hematuria today   - transfusion goal >8   - iron panel consistent w/ iron deficiency anemia, will start ferrous sulfate 325mg qD   - trend daily CBCs  Hematology-Dr Ya perez

## 2022-04-01 NOTE — PROGRESS NOTE ADULT - PROBLEM SELECTOR PLAN 1
Pt presents with SOB 2/2 flash pulmonary edema likely due to uncontrolled BP. CHF w/ heart strain Likely  NSTEMI   - ProBNP 4963  - CXR: diffuse b/l scattered infiltrates  - CT chest: RUL PNA   - procal 18.46, normal lactate, f/u blood cultures   - s/p lasix 40mg IVPx 2 doses on 3/30  - currently on NC, wean off supplemental O2 as tolerated  - elevated D-dimer on admission, V/Q scan low probability for PE   - last TTE in 2009 showed LVEF 30% w/ severe systolic dysfunction, f/u TTE   - Patient does not appear volume overloaded on exam, monitor volume status

## 2022-04-01 NOTE — PROGRESS NOTE ADULT - PROBLEM SELECTOR PLAN 2
Patient w/ left upper extremity weakness of unknown duration, 2/2 to CVA   - patient w/ seizure like activity in ED s/p ativan, EEG   - started on valproate by neuro  - CT brain stroke negative for acute hemorrhage or infarct,   -repeat CT: acute stroke in right central sulcus   - on Aggrenox, statin, and heparin drip IV   - Neuro Bhachawat -f/u recs   - PT/OT ---> [facility]   - MBS: Puree with Moderately thick liquids, all food and liquids via teaspoon presentation, utilizing chin tuck and 2 swallows after each bolus

## 2022-04-01 NOTE — PROGRESS NOTE ADULT - PROBLEM SELECTOR PLAN 11
S/P SX & RT in past  Pt seen by DR Lynn -ENT as out pt- saw him 3 weeks ago  S & S Jennyfer dubon -MBS done  D/W pt & wife both about diet change. S/P SX & RT in past  Pt seen by DR Lynn -ENT as out pt- saw him 3 weeks ago  S & S Jennyfer dubon -MBS done- pureed diet with Moderately thickened liquid.   D/W pt & wife both about diet change.

## 2022-04-01 NOTE — PROGRESS NOTE ADULT - PROBLEM SELECTOR PLAN 8
Patient w/ admission CK of 328  2/2 likely NSTEMI  - less likely rhabdo, likely elevated in the setting of chronic kidney disease   - hold off fluids given patient's CHF hx for now   - f/u repeat CK, trending down, ECHO

## 2022-04-01 NOTE — PROGRESS NOTE ADULT - ASSESSMENT
Assessment/Plan: 76 yr old male with stated hx significant for HFrEF, s/p AICD, tonsillar Ca s/p Lt tonsillectomy, Lt partial glossectomy, s/p Rt CEA, DM2, hypothyroidism, CKD3 who  presented from home via EMS after being found of floor with LUE weakness, sob. Consult called for hypoxic resp failure secondary to stroke,  hypertensive emergency, r/o CVA.    HTN emergency/CVA/Pulmonary Edema  - BP now stable at systolic 110-120.  No further neuro symptoms  - Avoid hypotension.  Hold all BP meds for now  - CT head and EEG noted.  Follow Neuro recs  - Continue Aggrenox and statin   - Now euvolemic, s/p Lasix.  Hold daily Lasix.  Diurese prn only in the setting of JARRED on CKD    NSTEMI  - hsT peaked at 55602 and trended down to 9200.  EKG showed ST depression in lateral leads.  - Continue Heparin gtt (ACS nomogram).  D/C in 48 hr (6pm 4/1)  - On Aggrenox.  Would switch to ASA if okay with Neuro  - Would resume home BB once BP is optimized.  Increase statin to high intensity   - No ischemic symptoms.  Repeat EKG  - f/u TTE  - NSR on tele with no events  - +AICD Medtronic interrogation on chart>  [employer] life: 4.4yrs.  No event noted since 1/28/22.  Device set at: AAIR-DDDR. rate    - Monitor and replete Lytes. Keep K > 4 and Mg > 2  - Will eventually do ischemic eval  - Continue to monitor on tele    - All other medical needs as per primary team.  - Other cardiovascular workup will depend on clinical course.  - Will continue to follow.    Yoselyn Schwartz DNP, NP-C  Cardiology   Spectra #[phone]

## 2022-04-01 NOTE — DIETITIAN INITIAL EVALUATION ADULT. - PROBLEM SELECTOR PLAN 2
[facility]  [phone]          Assessment/Recommendations   Patient with known coronary artery disease, with percutaneous intervention which was staged in 2019.  He had stents in his LAD and right coronary artery.  He has been feeling well with no recurrence of the chest pressure discomfort that he had prior to his stent placement.  He is not exercising on a regular basis and I have encouraged him to briskly walk or do other types of aerobic exercise for a minimum of 30 minutes and at least 5 times each week for a total of 150 minutes.  He will take that under advisement.  I commended him for taking the stairs to being active by walking to work.    His LDL cholesterol is recently measured at 80 and I would prefer it be under 70.  To that end we have increase his our atorvastatin to 80 mg a day we will send in a lab to be checked in 3 months but also can be checked at his primary care office as well.    He is on an antiplatelet agent, and his blood pressure is at goal.    We will see him back in 1 year, but of course would be happy to see him sooner if questions or problems arise.       History of Present Illness/Subjective    Mr. Tunde Orozco is a 62 year old male with known coronary artery disease, status post staged percutaneous interventions in 2019.  He had what sounded like unstable angina and had percutaneous intervention of the LAD followed a few weeks later by intervention in the right coronary artery.  He has never had return of the pressure sensation in his chest.  Once in a while when he is stressed out at work he will feel slight discomfort in his chest but it is different than the discomfort he had prior to his stents and is not brought on with physical activity.  He does walk to work which is 2 blocks and climb stairs at work often.  He goes up 4 flights of stairs and is pumping at the top but does not get any chest discomfort.  He denies orthopnea, paroxysmal nocturnal  dyspnea, peripheral edema, syncope or near syncopal episodes.  He has no history of rheumatic fever, cerebrovascular accident or TIA.    He has never been treated for hypertension, has never smoked cigarettes, and is not diabetic.  His brother  of a myocardial infarction at age 56 and his father had an abdominal aneurysm.  The patient's recent LDL cholesterol on 40 mg of our atorvastatin was measured at 80.         Physical Examination Review of Systems   /72 (BP Location: Left arm, Patient Position: Sitting, Cuff Size: Adult Large)   Pulse 62   Resp 18   Wt 106.6 kg (235 lb)   SpO2 99%   There is no height or weight on file to calculate BMI.  Wt Readings from Last 3 Encounters:   22 106.6 kg (235 lb)     General Appearance:   Alert, cooperative and in no acute distress.   ENT/Mouth: Patient wearing a mask.      EYES:  no scleral icterus, normal conjunctivae   Neck: JVP normal. No Hepatojugular reflux. Thyroid not visualized.   Chest/Lungs:   Lungs are clear to auscultation, equal chest wall expansion.   Cardiovascular:   S1, S2 without murmur ,clicks or rubs. Brachial, radial and posterior tibial pulses are intact and symetric. No carotid bruits noted   Abdomen:  Nontender. BS+.    Extremities: No cyanosis, clubbing or edema   Skin: no xanthelasma, warm.    Neurologic: normal arm movement bilateral, no tremors     Psychiatric: Appropriate affect.      Enc Vitals  BP: 134/72  Pulse: 62  Resp: 18  SpO2: 99 %  Weight: 106.6 kg (235 lb)                                           Medical History  Surgical History Family History Social History   No past medical history on file. No past surgical history on file. No family history on file. Social History     Socioeconomic History     Marital status:      Spouse name: Not on file     Number of children: Not on file     Years of education: Not on file     Highest education level: Not on file   Occupational History     Not on file   Tobacco Use      Smoking status: Not on file     Smokeless tobacco: Not on file   Substance and Sexual Activity     Alcohol use: Not on file     Drug use: Not on file     Sexual activity: Not on file   Other Topics Concern     Not on file   Social History Narrative     Not on file     Social Determinants of Health     Financial Resource Strain: Not on file   Food Insecurity: Not on file   Transportation Needs: Not on file   Physical Activity: Not on file   Stress: Not on file   Social Connections: Not on file   Intimate Partner Violence: Not on file   Housing Stability: Not on file          Medications  Allergies   Current Outpatient Medications   Medication Sig Dispense Refill     aspirin (ASA) 81 MG EC tablet Take 81 mg by mouth daily       atorvastatin (LIPITOR) 80 MG tablet Take 1 tablet (80 mg) by mouth daily 90 tablet 3     nitroGLYcerin (NITROSTAT) 0.4 MG sublingual tablet Place 1 tablet (0.4 mg) under the tongue every 5 minutes as needed for chest pain 20 tablet 4    Allergies   Allergen Reactions     Penicillins Anaphylaxis and Hives     Other reaction(s): hives         Lab Results    Chemistry/lipid CBC Cardiac Enzymes/BNP/TSH/INR   Lab Results   Component Value Date    CHOL 136 12/09/2021    HDL 37 (L) 12/09/2021    TRIG 97 12/09/2021    No results found for: WBC, HGB, HCT, MCV, PLT No results found for: CKTOTAL, CKMB, TROPONINI, BNP, TSH, INR                                         2/2 to demand ischemia vs. ACS vs. CVA,  w/ heart strain   - 1011.9 > 5252.0, f/u repeat set  , follow serial sets of CK/MB/Troponin  - EKG: ST-depressions in V5 V6, new from prior EKG   - patient received aspirin 324mg in ED  - Cardiology [facility] consulted, f/u reccs  -As d/w Dr Hahn -pt does NOT have any cardiac stents/Intervention in past  -ECHO

## 2022-04-01 NOTE — DIETITIAN INITIAL EVALUATION ADULT. - PROBLEM SELECTOR PLAN 8
Patient w/ admission CK of 328 ?NSTEMI  - less likely rhabdo, likely elevated in the setting of chronic kidney disease   - hold off fluids given patient's CHF hx for now   - f/u repeat CK serial Exam, ECHO  - nephro consulted, f/u recs

## 2022-04-01 NOTE — PROGRESS NOTE ADULT - PROBLEM SELECTOR PLAN 5
TTE 2009 showed LVEF 30% w/ severely reduced ejection fraction   - f/u repeat TTE   - continue home Imdur and carvedilol w/ holding parameters   - hold patients home benazepril in setting of  JARRED/CKD 3  - patient s/p Lasix x 2 doses on 3/30   - Cardio consult [facility], f/u reccs  - does not appear volume overloaded on physical exam

## 2022-04-01 NOTE — DIETITIAN INITIAL EVALUATION ADULT. - ORAL INTAKE PTA/DIET HISTORY
soft foods ie pasta Pt reports he eats soft solids at home- macaroni and cheese, oatmeal, yogurt, bananas, canned fruit, tea with milk.  Will not drink thickened liquids. c/o dry mouth all the time.  States UBW ~160# PTA.  Hasn't had diabetes in years since wt loss. Does not take any vitamins or supplements PTA.  Pt states he refuses to eat puree foods or thickened liquids. Explained the rationale for the consistency.

## 2022-04-01 NOTE — OCCUPATIONAL THERAPY INITIAL EVALUATION ADULT - RANGE OF MOTION EXAMINATION, UPPER EXTREMITY
Pt is left hand dominant. Able to achieve mass grasp with LUE but weak pincer grasp and unable to oppose digits LUE. Sensation grossly intact to light touch. Proprioception and kinesthesia grossly intact. Gross motor control limited due to weakness./bilateral UE Active ROM was WFL  (within functional limits)

## 2022-04-01 NOTE — DIETITIAN INITIAL EVALUATION ADULT. - PERTINENT LABORATORY DATA
Department of Internal Medicine  Nephrology Attending Consult Note    Events reviewed. SUBJECTIVE: We are following Mr. Soto for GRUPO and metabolic acidosis. Reports feeling better. Has no complaints.     PHYSICAL EXAM:      Vitals:    VITALS:  /73   Pulse 128   Temp 98.6 °F (37 °C) (Temporal)   Resp 28   Ht 5' 8\" (1.727 m)   Wt 174 lb 11.2 oz (79.2 kg)   SpO2 91%   BMI 26.56 kg/m²   24HR INTAKE/OUTPUT:      Intake/Output Summary (Last 24 hours) at 10/30/2020 1256  Last data filed at 10/30/2020 1200  Gross per 24 hour   Intake 3650 ml   Output 1668 ml   Net 1982 ml       Constitutional: Awake alert in no distress  HEENT: Pupils are equal reactive, mucous membranes dry  Respiratory: Decreased breath sounds at the bases  Cardiovascular/Edema: Heart sounds are regular  Gastrointestinal: Abdomen is distended, tender on palpation  Neurologic: Nonfocal  Skin: No lesions  Other: No edema    Scheduled Meds:   metoprolol succinate  12.5 mg Oral BID    [START ON 10/31/2020] pantoprazole  40 mg Oral QAM AC    furosemide  40 mg Oral Daily    spironolactone  25 mg Oral Daily    insulin lispro  0-12 Units Subcutaneous 4x Daily WC    polyethylene glycol  17 g Oral Daily    albumin human  25 g Intravenous Q12H    cefepime  2 g Intravenous Q24H    sodium chloride  25 mL Intravenous Q24H    linezolid  600 mg Intravenous Q12H    amitriptyline  25 mg Oral Nightly    atorvastatin  40 mg Oral Daily    sodium chloride flush  10 mL Intravenous 2 times per day    heparin (porcine)  5,000 Units Subcutaneous 3 times per day    metroNIDAZOLE  500 mg Intravenous Q8H    budesonide  0.5 mg Nebulization BID    And    Arformoterol Tartrate  15 mcg Nebulization BID     Continuous Infusions:   dextrose       PRN Meds:.acetaminophen, oxyCODONE, glucose, dextrose, glucagon (rDNA), dextrose, fluticasone, melatonin ER, sodium chloride flush, [DISCONTINUED] promethazine **OR** ondansetron    DATA:    CBC:   Lab Results   Component Value Date    WBC 15.7 10/30/2020    RBC 2.95 10/30/2020    HGB 7.9 10/30/2020    HCT 24.7 10/30/2020    MCV 83.7 10/30/2020    MCH 26.8 10/30/2020    MCHC 32.0 10/30/2020    RDW 15.3 10/30/2020     10/30/2020    MPV 8.9 10/30/2020     CMP:    Lab Results   Component Value Date     10/30/2020    K 4.2 10/30/2020     10/30/2020    CO2 24 10/30/2020    BUN 27 10/30/2020    CREATININE 0.8 10/30/2020    GFRAA >60 10/30/2020    LABGLOM >60 10/30/2020    GLUCOSE 189 10/30/2020    PROT 6.1 10/30/2020    LABALBU 2.5 10/30/2020    CALCIUM 8.9 10/30/2020    BILITOT 0.6 10/30/2020    ALKPHOS 218 10/30/2020    AST 23 10/30/2020    ALT 24 10/30/2020     Magnesium:    Lab Results   Component Value Date    MG 2.1 08/27/2020     Phosphorus:    Lab Results   Component Value Date    PHOS 1.8 04/04/2019     Radiology Review:      Chest x-ray October 28, 2020   No pneumothorax is identified.  Bibasilar atelectasis.           Chest x-ray October 30, 2020   Improved aeration of the right lung when compared with the patient's prior    study of 1 day earlier.         Minimal bibasilar atelectasis.               BRIEF SUMMARY  OF INITIAL CONSULT:    Briefly Mr. Soto is a 79year old gentleman with past medical history of HFrEF (32%), hypertension, non-ischemic cardiomyopathy s/p AICD placement, Hepatitis C, moderate mitral regurgitation, who had a recent cholecystectomy on October 10 and who was readmitted on October 28, 2020 after he presented to the ER with a chief complain of abdominal pain which started one day prior to day of admission. He underwent IR drainage on October 27. Overnight he become hypotensive and he was transferred to ICU. On admission his creatinine level was 2.5 mg/dL (baseline creatinine 0.8 mg/dL), his bicarbonate level was 18 mEq/L, reasons for this consultation.   Prior to admission his medications included furosemide 40 mg daily, spironolactone 25 mg daily, and lisinopril 2.5 mg daily. Problems resolved:    · Lactic acidosis, now resolved. · GRUPO stage III, multifactorial, with main component of volume responsive prerenal GRUPO, FeNA 0.1%, FeUrea 6.1%, Urine specific gravity >1.030 (diuretics, poor intake) in the setting of ACE inhibition, and sepsis. Resolved, renal function can improve with decreasing creatinine level and excellent urine output. · Hemodynamic shock, combination of hypovolemic and septic shock. · Acidemia (pH: 7.306) with high anion gap Metabolic acidosis (lactic acidosis, uremia). Bicarbonate levels improved with bicarbonate drip. IMPRESSION/RECOMMENDATIONS:      1. Heart failure with reduced ejection fraction (EF 32%). Positive balance after fluid resuscitation >8.9 L. We will restart diuretics. 2. Intra-abdominal abscess s/p IR guided drainage, on IV Cefepime 2 gram QD and IV Metronidazole 500 mg TID. 3. S/p Recent cholecystectomy   4. Nutrition, clear liquids diet    Plan:    · Discontinue IV fluids  · Lasix 40 mg IV daily  · Continue Albumin 25 g twice daily x4 doses  · Continue to monitor kidney function. glu 147, bun 36/cre 2.1, a1c 8.3%

## 2022-04-01 NOTE — DIETITIAN INITIAL EVALUATION ADULT. - PROBLEM SELECTOR PLAN 7
Patient w/ admission Hgb of 10.8, baseline unknown   - likely anemia of chronic disease   - patient w/ no signs or symptoms of bleeding   - transfusion goal >8   - f/u iron panel, b12, folate   - trend daily CBCs

## 2022-04-01 NOTE — PROGRESS NOTE ADULT - ASSESSMENT
76 yr old male with stated hx significant for HFrEF, s/p AICD, tonsillar Ca s/p Lt tonsillectomy, Lt partial glossectomy, s/p Rt CEA, DM2, hypothyroidism, CKD3 who  presented from home via EMS after being found of floor with LUE weakness, sob. Consult called for hypoxic resp failure secondary to stroke,  hypertensive emergency, r/o CVA.    HTN emergency/CVA  - CT head noted.  Neuro following.  EEG noted  - No new neuro symptoms  - BP now stable and controlled at systolic 100-130's  - now hypotensive SBP: 100's, hold all anti HTN meds to allow for permissive HTN per neuro, keep 's   - continue aggrenox, statin and hep gtt   - EKG unchanged from previous EKG, no new signs of ischemia   - + troponemia now downtrending likely in setting of demand ischemia 2/2 to neuro event, continue to trend   - f/u TTE ordered  - telemetry: SR 70's with 4 bts NSVT, no other events noted, continue tele for now  - +AICD medtronic, will interrogate  - respiratory status improved, s/p lasix iv, will hold off further diuresing for now, no clinical signs of volume overload on exam, continue to monitor volume status  - CXR suspicious for pulmonary edema   - POCUS by ICU team showed moderate pulmonary effusion and few b lines, less consistent w/ CHF   - Monitor and replete Lytes. Keep K > 4 and Mg > 2    - All other medical needs as per primary team.  - Other cardiovascular workup will depend on clinical course.  - Will continue to follow.    Yoselyn Schwartz DNP, NP-C  Cardiology   Spectra #[phone]

## 2022-04-01 NOTE — DIETITIAN INITIAL EVALUATION ADULT. - PROBLEM SELECTOR PLAN 4
Acute, 2/2 reactive to physical stress   - patient currently w/o fever or signs of infection   - f/u lactate, blood cultures x2  -ID DR Sandor perez.  - will monitor off abx   - trend daily CBCs

## 2022-04-01 NOTE — PROGRESS NOTE ADULT - SUBJECTIVE AND OBJECTIVE BOX
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 As per patient, he was watching a movie when he felt very short of breath suddenly and dropped to the floor, denies LOC and denies any trauma to his head, was on the floor for ~2 hours. Patient was unable to rise from the floor by himself due to his left arm weakness. When first brought to the ED, patient was hypoxic and hypertensive o2 saturation in EMS was >80%. While in the ED, patient had episode of siezure-like activity in his b/l upper extremities which was controlled w/ ativan. Patient was seen and examined at bedside, BiPAP still in place, patient was still mildly lethargic from ativan and could not speak well with bipap mask on. Patient able to open eyes and nod/shake head to questions. Patient denied any headache, vision changes, cp, abd pain, msk pain. Patient still short of breath.    Patient states see he sees Dr. Tomas for outpatient nephrologist. States he is urinating well.  Denies any N/V/dizziness.  No SOB presently. States he is urinating.         No acute events noted    PAST MEDICAL & SURGICAL HISTORY:  MI (myocardial infarction)      HTN (hypertension)    HLD (hyperlipidemia)    Throat cancer  , treated with chemo, RT    Ventricular arrhythmia  s/p AICD    Diabetes  Type2, not on any meds since weight loss after throat Ca    Renal insufficiency  s/p chemo    CAD (coronary artery disease)    Inguinal hernia, left    H/O prior ablation treatment  VT,     Cardiac defibrillator in place  - , battery change     S/P left inguinal hernia repair  2018 at [place]         FAMILY HISTORY:  Family history of cancer (Sibling)    NC    Social History:Non smoker    MEDICATIONS  (STANDING):  atorvastatin 10 milliGRAM(s) Oral at bedtime  calcitriol   Capsule 0.25 MICROGram(s) Oral daily  dextrose 5%. 1000 milliLiter(s) (50 mL/Hr) IV Continuous <Continuous>  dextrose 5%. 1000 milliLiter(s) (100 mL/Hr) IV Continuous <Continuous>  dextrose 50% Injectable 25 Gram(s) IV Push once  dextrose 50% Injectable 12.5 Gram(s) IV Push once  dextrose 50% Injectable 25 Gram(s) IV Push once  dipyridamole 200 mG/aspirin 25 mG 1 Capsule(s) Oral two times a day  ferrous    sulfate 325 milliGRAM(s) Oral daily  glucagon  Injectable 1 milliGRAM(s) IntraMuscular once  heparin  Infusion.  Unit(s)/Hr (8 mL/Hr) IV Continuous <Continuous>  influenza  Vaccine (HIGH DOSE) 0.7 milliLiter(s) IntraMuscular once  insulin lispro (ADMELOG) corrective regimen sliding scale   SubCutaneous three times a day before meals  insulin lispro (ADMELOG) corrective regimen sliding scale   SubCutaneous at bedtime  iron sucrose Injectable 100 milliGRAM(s) IV Push every 24 hours  levothyroxine 75 MICROGram(s) Oral daily  polyethylene glycol 3350 17 Gram(s) Oral daily  senna 2 Tablet(s) Oral at bedtime  valproate sodium IVPB 500 milliGRAM(s) IV Intermittent every 12 hours    MEDICATIONS  (PRN):  ALBUTerol    90 MICROgram(s) HFA Inhaler 2 Puff(s) Inhalation every 6 hours PRN Shortness of Breath and/or Wheezing  dextrose Oral Gel 15 Gram(s) Oral once PRN Blood Glucose LESS THAN 70 milliGRAM(s)/deciliter  heparin   Injectable 4100 Unit(s) IV Push every 6 hours PRN For aPTT less than 40  ondansetron Injectable 4 milliGRAM(s) IV Push every 8 hours PRN Nausea and/or Vomiting      Allergies    No Known Allergies    Intolerances         REVIEW OF SYSTEMS:    Review of Systems:   Constitutional: Denies fatigue  HEENT: Denies headaches and dizziness  Respiratory: denies SOB, cough, or wheezing  Cardiovascular: denies CP, palpitations  Gastrointestinal: Denies nausea, denies vomiting, diarrhea, constipation, abdominal pain, or bloody stools  Genitourinary: denies painful urination, increased frequency, urgency, or bloody urine  Skin: denies rashes or itching  Musculoskeletal: denies muscle aches, joint swelling  Neurologic: Denies generalized weakness, denies loss of sensation, numbness, or tingling      Vital Signs Last 24 Hrs  T(C): 36.7 (2022 05:18), Max: 37.1 (31 Mar 2022 19:54)  T(F): 98 (2022 05:18), Max: 98.7 (31 Mar 2022 19:54)  HR: 78 (2022 05:18) (71 - 86)  BP: 127/71 (2022 05:18) (106/- - 129/76)  BP(mean): --  RR: 19 (2022 05:18) (17 - 20)  SpO2: 96% (2022 05:18) (91% - 96%)    PHYSICAL EXAM:    GENERAL: NAD  HEAD:  Atraumatic, Normocephalic  EYES: EOMI, conjunctiva and sclera clear  ENMT: No Drainage from nares, No drainage from ears  NECK: Supple, neck  veins full  NERVOUS SYSTEM:  Awake and Alert  CHEST/LUNG: Clear to percussion bilaterally; No rales, rhonchi, wheezing, or rubs  HEART: Regular rate and rhythm; No murmurs, rubs, or gallops  ABDOMEN: Soft, Nontender, Nondistended; Bowel sounds present  EXTREMITIES:  No Edema  SKIN: No rashes No obvious ecchymosis      LABS:                                           9.4    9.54  )-----------( 190      ( 2022 06:36 )             29.1     04-    142  |  106  |  36<H>  ----------------------------<  147<H>  3.6   |  24  |  2.10<H>    Ca    8.8      2022 06:37  Phos  2.8     03-31  Mg     2.3     04-    TPro  6.0  /  Alb  2.7<L>  /  TBili  0.9  /  DBili  x   /  AST  41<H>  /  ALT  42  /  AlkPhos  77  04-    PTT - ( 2022 06:36 )  PTT:53.1 sec  Urinalysis Basic - ( 30 Mar 2022 20:41 )    Color: Brown / Appearance: Slightly Turbid / S.010 / pH: x  Gluc: x / Ketone: Trace  / Bili: Negative / Urobili: Negative   Blood: x / Protein: 100 / Nitrite: Positive   Leuk Esterase: Moderate / RBC: >50 /HPF / WBC 3-5   Sq Epi: x / Non Sq Epi: Occasional / Bacteria: Few        ABG - ( 30 Mar 2022 08:15 )  pH, Arterial: 7.42  pH, Blood: x     /  pCO2: 36    /  pO2: 94    / HCO3: 23    / Base Excess: -1.1  /  SaO2: 98.4

## 2022-04-01 NOTE — PROGRESS NOTE ADULT - SUBJECTIVE AND OBJECTIVE BOX
===[INTERVAL HX: ]===  Patient seen and examined;  Chart reviewed and events noted;     no CP, no SOB  able to ambulate  denies overt bleeding      ===[HEALTH ISSUES]===      HEALTH ISSUES - PROBLEM Dx:  Acute pulmonary edema    Leukocytosis    Elevated troponin    Acute kidney injury superimposed on CKD    HTN (hypertension)    CAD (coronary artery disease)    Hypothyroidism    Need for prophylactic measure    Chronic HFrEF (heart failure with reduced ejection fraction)    Anemia    Elevated creatine kinase    Diabetes mellitus    CVA (cerebrovascular accident)    Tonsillar cancer                ===[MEDICATIONS]===  MEDICATIONS  (STANDING):  atorvastatin 10 milliGRAM(s) Oral at bedtime  calcitriol   Capsule 0.25 MICROGram(s) Oral daily  dextrose 5%. 1000 milliLiter(s) (50 mL/Hr) IV Continuous <Continuous>  dextrose 5%. 1000 milliLiter(s) (100 mL/Hr) IV Continuous <Continuous>  dextrose 50% Injectable 25 Gram(s) IV Push once  dextrose 50% Injectable 12.5 Gram(s) IV Push once  dextrose 50% Injectable 25 Gram(s) IV Push once  dipyridamole 200 mG/aspirin 25 mG 1 Capsule(s) Oral two times a day  ferrous    sulfate 325 milliGRAM(s) Oral daily  glucagon  Injectable 1 milliGRAM(s) IntraMuscular once  heparin  Infusion.  Unit(s)/Hr (8 mL/Hr) IV Continuous <Continuous>  influenza  Vaccine (HIGH DOSE) 0.7 milliLiter(s) IntraMuscular once  insulin lispro (ADMELOG) corrective regimen sliding scale   SubCutaneous three times a day before meals  insulin lispro (ADMELOG) corrective regimen sliding scale   SubCutaneous at bedtime  iron sucrose Injectable 100 milliGRAM(s) IV Push every 24 hours  levothyroxine 75 MICROGram(s) Oral daily  polyethylene glycol 3350 17 Gram(s) Oral daily  potassium chloride   Powder 40 milliEquivalent(s) Oral once  senna 2 Tablet(s) Oral at bedtime  valproate sodium IVPB 500 milliGRAM(s) IV Intermittent every 12 hours    MEDICATIONS  (PRN):  ALBUTerol    90 MICROgram(s) HFA Inhaler 2 Puff(s) Inhalation every 6 hours PRN Shortness of Breath and/or Wheezing  dextrose Oral Gel 15 Gram(s) Oral once PRN Blood Glucose LESS THAN 70 milliGRAM(s)/deciliter  heparin   Injectable 4100 Unit(s) IV Push every 6 hours PRN For aPTT less than 40  ondansetron Injectable 4 milliGRAM(s) IV Push every 8 hours PRN Nausea and/or Vomiting      ===[VITALS]===  Vital Signs Last 24 Hrs  T(C): 36.7 (2022 05:18), Max: 37.1 (31 Mar 2022 19:54)  T(F): 98 (2022 05:18), Max: 98.7 (31 Mar 2022 19:54)  HR: 78 (2022 05:18) (71 - 86)  BP: 127/71 (2022 05:18) (106/- - 129/76)  BP(mean): --  RR: 19 (2022 05:18) (17 - 20)  SpO2: 96% (2022 05:18) (91% - 96%)  [WT/HT]  Daily     Daily Weight in k.7 (2022 05:18)  [VENT]    ===[PHYSICAL EXAM]===  General: WN,  WD adult in NAD.  HEENT:  anicteric sclera, pink conjunctivae, xxxxx clear oropharynx  Neck: supple, no masses.  CV: normal S1S2, no murmur, no rubs, no gallops.  Lungs: clear to auscultation, no wheezes, no rales, no rhonchi.  Abdomen: soft, non-tender, non-distended, no hepatosplenomegaly, normal BS, no guarding.  Ext: no clubbing, no cyanosis, no edema.  Skin: no rashes,  no petechiae, no venous stasis changes.    Neuro: alert and oriented X xxxx, no focal motor deficits.  LN: no SC JOEY.        ===[LABS:]===                        9.4    9.54  )-----------( 190      ( 2022 06:36 )             29.1     04-    142  |  106  |  36<H>  ----------------------------<  147<H>  3.6   |  24  |  2.10<H>    Ca    8.8      2022 06:37  Phos  3.3     04-  Mg     2.3     04-01    TPro  6.0  /  Alb  2.7<L>  /  TBili  0.9  /  DBili  x   /  AST  41<H>  /  ALT  42  /  AlkPhos  77  04-    PTT - ( 2022 06:36 )  PTT:53.1 sec      Iron - Total Binding Capacity.: 318 ug/dL (22 @ 10:51)  Ferritin, Serum: 53 ng/mL (22 @ 10:42)  Vitamin B12, Serum: 476 pg/mL (22 @ 10:42)  Folate, Serum: 10.7 ng/mL (22 @ 10:42)  Ferritin, Serum: 44 ng/mL (22 @ 05:21)  Iron - Total Binding Capacity.: 329 ug/dL (22 @ 05:21)    Urinalysis Basic - ( 30 Mar 2022 20:41 )    Color: Brown / Appearance: Slightly Turbid / S.010 / pH: x  Gluc: x / Ketone: Trace  / Bili: Negative / Urobili: Negative   Blood: x / Protein: 100 / Nitrite: Positive   Leuk Esterase: Moderate / RBC: >50 /HPF / WBC 3-5   Sq Epi: x / Non Sq Epi: Occasional / Bacteria: Few        Culture - Urine (collected 31 Mar 2022 01:17)  Source: Clean Catch Clean Catch (Midstream)  Final Report (31 Mar 2022 21:27):    No growth    Culture - Blood (collected 30 Mar 2022 15:01)  Source: .Blood Blood-Peripheral  Preliminary Report (31 Mar 2022 16:01):    No growth to date.    Culture - Blood (collected 30 Mar 2022 15:01)  Source: .Blood Blood-Peripheral  Preliminary Report (31 Mar 2022 16:01):    No growth to date.            Culture - Urine (collected 31 Mar 2022 01:17)  Source: Clean Catch Clean Catch (Midstream)  Final Report (31 Mar 2022 21:27):    No growth    Culture - Blood (collected 30 Mar 2022 15:01)  Source: .Blood Blood-Peripheral  Preliminary Report (31 Mar 2022 16:01):    No growth to date.    Culture - Blood (collected 30 Mar 2022 15:01)  Source: .Blood Blood-Peripheral  Preliminary Report (31 Mar 2022 16:01):    No growth to date.            ===[RADIOLOGY STUDIES:]===

## 2022-04-01 NOTE — DIETITIAN INITIAL EVALUATION ADULT. - PROBLEM SELECTOR PLAN 6
Chronic, stable -CKD 3   - patient w/ admission Cr 2.3, baseline based off of outpatient records 2.0-2.1   - GFR 29   - avoid nephrotoxic medications, hold patients home benazepril   - f/u urine lytes ,BMP in AM , continue IV lasix   - Nephro consulted, f/u recs Dr Porter

## 2022-04-01 NOTE — PROGRESS NOTE ADULT - SUBJECTIVE AND OBJECTIVE BOX
[facility] -- Rianna Horn, Selma Terrell, Vlad Velarde Savella, [place]  Office # [phone]    Follow Up:  Hypertensive Emergency, CVA, Hypoxic respiratory Failure    Subjective/Observations: Awake and alert, no neuro symptoms noted.  Comfortable on RA.  Denies chest apin or palpitations.  Afib on tele, rate-contorlled    REVIEW OF SYSTEMS: All other review of systems is negative unless indicated above  PAST MEDICAL & SURGICAL HISTORY:  MI (myocardial infarction)  1992    HTN (hypertension)    HLD (hyperlipidemia)    Throat cancer  2011, treated with chemo, RT    Ventricular arrhythmia  s/p AICD    Diabetes  Type2, not on any meds since weight loss after throat Ca    Renal insufficiency  s/p chemo    CAD (coronary artery disease)    Inguinal hernia, left    H/O prior ablation treatment  VT, 2009    Cardiac defibrillator in place  - 2009, battery change 2017    S/P left inguinal hernia repair  2018 at [place]    MEDICATIONS  (STANDING):  atorvastatin 10 milliGRAM(s) Oral at bedtime  calcitriol   Capsule 0.25 MICROGram(s) Oral daily  cyanocobalamin 1000 MICROGram(s) Oral daily  dextrose 5%. 1000 milliLiter(s) (50 mL/Hr) IV Continuous <Continuous>  dextrose 5%. 1000 milliLiter(s) (100 mL/Hr) IV Continuous <Continuous>  dextrose 50% Injectable 25 Gram(s) IV Push once  dextrose 50% Injectable 12.5 Gram(s) IV Push once  dextrose 50% Injectable 25 Gram(s) IV Push once  dipyridamole 200 mG/aspirin 25 mG 1 Capsule(s) Oral two times a day  ferrous    sulfate 325 milliGRAM(s) Oral daily  glucagon  Injectable 1 milliGRAM(s) IntraMuscular once  heparin  Infusion.  Unit(s)/Hr (8 mL/Hr) IV Continuous <Continuous>  influenza  Vaccine (HIGH DOSE) 0.7 milliLiter(s) IntraMuscular once  insulin lispro (ADMELOG) corrective regimen sliding scale   SubCutaneous three times a day before meals  insulin lispro (ADMELOG) corrective regimen sliding scale   SubCutaneous at bedtime  iron sucrose Injectable 100 milliGRAM(s) IV Push every 24 hours  levothyroxine 75 MICROGram(s) Oral daily  polyethylene glycol 3350 17 Gram(s) Oral daily  potassium chloride   Powder 40 milliEquivalent(s) Oral once  senna 2 Tablet(s) Oral at bedtime  valproate sodium IVPB 500 milliGRAM(s) IV Intermittent every 12 hours    MEDICATIONS  (PRN):  ALBUTerol    90 MICROgram(s) HFA Inhaler 2 Puff(s) Inhalation every 6 hours PRN Shortness of Breath and/or Wheezing  dextrose Oral Gel 15 Gram(s) Oral once PRN Blood Glucose LESS THAN 70 milliGRAM(s)/deciliter  heparin   Injectable 4100 Unit(s) IV Push every 6 hours PRN For aPTT less than 40  ondansetron Injectable 4 milliGRAM(s) IV Push every 8 hours PRN Nausea and/or Vomiting    Allergies    No Known Allergies    Intolerances    Vital Signs Last 24 Hrs  T(C): 36.7 (01 Apr 2022 05:18), Max: 37.1 (31 Mar 2022 19:54)  T(F): 98 (01 Apr 2022 05:18), Max: 98.7 (31 Mar 2022 19:54)  HR: 78 (01 Apr 2022 05:18) (78 - 86)  BP: 127/71 (01 Apr 2022 05:18) (106/- - 129/76)  BP(mean): --  RR: 19 (01 Apr 2022 05:18) (19 - 20)  SpO2: 96% (01 Apr 2022 05:18) (95% - 96%)  I&O's Summary    31 Mar 2022 07:01  -  01 Apr 2022 07:00  --------------------------------------------------------  IN: 442.5 mL / OUT: 1100 mL / NET: -657.5 mL    01 Apr 2022 07:01  -  01 Apr 2022 12:02  --------------------------------------------------------  IN: 262 mL / OUT: 200 mL / NET: 62 mL      PHYSICAL EXAM:  TELE: Afib  Constitutional: NAD, awake and alert, well-developed  HEENT: Moist Mucous Membranes, Anicteric  Pulmonary: Non-labored, breath sounds are clear bilaterally, No wheezing, rales or rhonchi  Cardiovascular: Regular, S1 and S2, + murmurs, no rubs, gallops or clicks  Gastrointestinal: Bowel Sounds present, soft, nontender.   Lymph: No peripheral edema. No lymphadenopathy.  Skin: No visible rashes or ulcers.  Psych:  Mood & affect: Flat  LABS: All Labs Reviewed:                        9.4    9.54  )-----------( 190      ( 01 Apr 2022 06:36 )             29.1                         9.6    11.68 )-----------( 188      ( 31 Mar 2022 07:24 )             30.4                         9.2    12.49 )-----------( 175      ( 31 Mar 2022 00:01 )             28.3     01 Apr 2022 06:37    142    |  106    |  36     ----------------------------<  147    3.6     |  24     |  2.10   31 Mar 2022 07:24    142    |  107    |  35     ----------------------------<  150    3.5     |  25     |  2.30   30 Mar 2022 06:12    139    |  108    |  33     ----------------------------<  375    4.8     |  24     |  2.30     Ca    8.8        01 Apr 2022 06:37  Ca    8.9        31 Mar 2022 07:24  Ca    8.5        30 Mar 2022 06:12  Phos  3.3       01 Apr 2022 06:37  Phos  2.8       31 Mar 2022 07:24  Mg     2.3       01 Apr 2022 06:37  Mg     2.2       31 Mar 2022 07:24    TPro  6.0    /  Alb  2.7    /  TBili  0.9    /  DBili  x      /  AST  41     /  ALT  42     /  AlkPhos  77     01 Apr 2022 06:37  TPro  6.4    /  Alb  2.9    /  TBili  1.3    /  DBili  x      /  AST  51     /  ALT  38     /  AlkPhos  83     31 Mar 2022 07:24  TPro  7.0    /  Alb  3.2    /  TBili  1.1    /  DBili  x      /  AST  53     /  ALT  48     /  AlkPhos  104    30 Mar 2022 06:12    PTT - ( 01 Apr 2022 06:36 )  PTT:53.1 sec  CARDIAC MARKERS ( 31 Mar 2022 16:33 )  x     / x     / 202 U/L / x     / 3.1 ng/mL  CARDIAC MARKERS ( 30 Mar 2022 21:07 )  x     / x     / 342 U/L / x     / 12.8 ng/mL  CARDIAC MARKERS ( 30 Mar 2022 14:34 )  x     / x     / 398 U/L / x     / 18.4 ng/mL     TTE pending      ACC: [id number] EXAM:  CT CHEST                          *** ADDENDUM***    Findings discussed with Dr. Rich Patten at 3/31/2022 2:35 PM with   readback.    --- End of Report ---    *** END OF ADDENDUM***      PROCEDURE DATE:  03/31/2022          INTERPRETATION:  CLINICAL INFORMATION: 76 years  Male with opacifications   on cxr r/o PNA.    COMPARISON: None.    CONTRAST/COMPLICATIONS:  IV Contrast: IV contrast documented in associated exam  Oral Contrast: NONE  Complications: None reported attime of study completion    PROCEDURE:  CT of the Chest was performed.  Sagittal and coronal reformats were performed.    FINDINGS:    LUNGS AND AIRWAYS: Patent central airways.  Mild centrilobular emphysema.   Nodular posterior right upper lobe infiltrate. Smooth intralobular septal   thickening.  PLEURA: Small to moderate bilateral pleural effusions. Bilateral   calcified pleural plaques.  MEDIASTINUM AND PETE: No lymphadenopathy.  VESSELS: Aortic atherosclerosis without aneurysm. Coronary   atherosclerosis.  HEART: Heart size is normal. Pacemaker/defibrillator leads in the heart.   No pericardial effusion.  CHEST WALL AND LOWER NECK: Pacemaker pulse generator in the left chest   wall.  VISUALIZED UPPER ABDOMEN: Within normal limits.  BONES: Degenerative changes.    IMPRESSION:  Right upper lobe pneumonia superimposed on mild pulmonary edema.   Bilateral pleural effusions.    Pacemaker/defibrillator.    Other chronic findings as above.    --- End of Report ---    ***Please see the addendum at the top of this report. It may contain   additional important information or changes.****    LASHAE HAYES MD; Attending Radiologist  This document has been electronically signed. Mar 31 2022  9:21AM  Addend: LASHAE HAYES MD; Attending Radiologist  This addendum was electronically signed on: Mar 31 2022  2:35PM.    Ventricular Rate 103 BPM    Atrial Rate 103 BPM    P-R Interval 154 ms    QRS Duration 124 ms    Q-T Interval 382 ms    QTC Calculation(Bazett) 500 ms    P Axis 56 degrees    R Axis -22 degrees    T Axis 105 degrees    Diagnosis Line Sinus tachycardia with premature atrial complexes with aberrant conduction  Possible Left atrial enlargement  Left ventricular hypertrophy with QRS widening ( Downs product )  Anteroseptal infarct (cited on or before 01-AUG-2018)  ST & T wave abnormality, consider lateral ischemia  Abnormal ECG  When compared with ECG of 01-AUG-2018 11:46,  rhythm no longer ap, hr faster, stt abns prob unchanged  Confirmed by Isaac Hahn MD ([id number]) on 3/30/2022 1:17:19 PM

## 2022-04-01 NOTE — PROVIDER CONTACT NOTE (CHANGE IN STATUS NOTIFICATION) - ASSESSMENT
Pt A&Ox4, comfortable and resting in bed. Pt denies any pain/discomfort. Pt denies feeling urgency to void, no distress

## 2022-04-01 NOTE — PROGRESS NOTE ADULT - PROBLEM SELECTOR PLAN 3
2/2 to NSTEMI -On IV Heparin drip for ~48 hrs   - Troponin 1011.9 > 5252.0 > 51226 > 98403  - CK and CKMB also downtrending   - EKG: ST-depressions in V5 V6, new from prior EKG   - patient currently on IV  heparin drip   - [facility] consulted, f/u reccs  -As d/w Dr Terrell /NP -pt does NOT have any cardiac stents/Intervention in past  -ECHO to follow, statin 2/2 to NSTEMI -On IV Heparin drip for ~48 hrs   - Troponin 1011.9 > 5252.0 > 20647 > 71642  - CK and CKMB also downtrending   - EKG: ST-depressions in V5 V6, new from prior EKG   - patient currently on IV  heparin drip   - [facility] follow up -STOP IV Heparin drip 6 PM on 4/1   -As d/w Dr Terrell /NP -pt does NOT have any cardiac stents/Intervention in past  -ECHO to follow, statin

## 2022-04-01 NOTE — PROGRESS NOTE ADULT - PROBLEM SELECTOR PLAN 6
Chronic, stable -CKD 3   - patient w/ admission Cr 2.3, baseline based off of outpatient records 2.0-2.1   - GFR 29   - avoid nephrotoxic medications, hold patients home benazepril   - f/u urine lyANNABEL cruz in AM   - Nephro consulted, f/u recs Dr Porter

## 2022-04-01 NOTE — PROGRESS NOTE ADULT - PROBLEM SELECTOR PLAN 4
Acute, 2/2 reactive Likely , Doubt  PNA in RUL   - lactate 1.8, procal 18.46 up from 3.48  - no white count and no fevers  - CT chest: Right Upper Lobe PNA superimposed pulmonary edema, b/l pleural effusions   - ID DR Sandor perez.-Observe off ABx  - low suspicion for true infection, will monitor off abx  - trend daily CBCs, WBC trending down

## 2022-04-01 NOTE — DIETITIAN INITIAL EVALUATION ADULT. - OTHER INFO
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 Admitted for pulmonary edema, leukocytosis, elevated troponin, JARRED on CKD-3, suspect possible CVA and NSTEMI".    Seen by SLP this admission, MBS results recommending puree diet moderately thick liquids both via tsp only. As per charting, pt is a "77 yo male w/ PMHx of HTN, HLD, HFrEF (EF 30% 2009), 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 Admitted for pulmonary edema, leukocytosis, elevated troponin, JARRED on CKD-3, suspect possible CVA and NSTEMI".    Seen by SLP this admission, MBS results recommending puree diet moderately thick liquids both via tsp only.  Possible ~10# wt loss.  Recommend capture and trend additional wts to confirm accuracy of current wt.  Hga1c 8.3% indicating poorly controlled DM. Pt states he is not on any DM meds in years.  Diet hx reveals diet high in CHO  foods.  Pt did not seem interested in any dietary modifications.

## 2022-04-01 NOTE — DIETITIAN INITIAL EVALUATION ADULT. - DIET TYPE
consistent carbohydrate (no snacks)/DASH/TLC (sodium and cholesterol restricted diet)/pureed/moderately thick liquids

## 2022-04-01 NOTE — PROGRESS NOTE ADULT - PROBLEM SELECTOR PLAN 10
Chronic, stable on admission  - Continue home medications hydralazine 5mg TID w/ holding parameters   - Monitor routine hemodynamics Chronic, stable on admission  - Continue home medications hydralazine 5mg TID w/ holding parameters

## 2022-04-01 NOTE — PROGRESS NOTE ADULT - ATTENDING COMMENTS
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 Admitted for pulmonary edema, leukocytosis, elevated troponin, JARRED on CKD-3  and seizure.   Pt seen, examined, case & care plan d/w pt, residents at detail.  -D/W Neuro-Dr Brownlee - AC CVA,, EEG- neg    -Cardiology-Dr Velarde  d/w at detail. -continue Aggrenox 2x day , STOP IV heparin drip at 6 PM today  -Renal eval DR Porter follow up, TOV with d/c mcclellan cath today, PVR with Bladder scan   AM labs , PT eval---> [facility]   -PO diet  -S & S eval-Pureed with Moderately thickened liquid D/W dtr  at detail at bed side.  Palliative care Eval, Prognosis is Guarded.  Total care time is 45 minutes.

## 2022-04-01 NOTE — PROGRESS NOTE ADULT - SUBJECTIVE AND OBJECTIVE BOX
Neurology follow up note    ANTONIO PONCE76yMale      Interval History:    Patient feels ok no new complaints.    Allergies    No Known Allergies    Intolerances        MEDICATIONS    ALBUTerol    90 MICROgram(s) HFA Inhaler 2 Puff(s) Inhalation every 6 hours PRN  atorvastatin 10 milliGRAM(s) Oral at bedtime  calcitriol   Capsule 0.25 MICROGram(s) Oral daily  dextrose 5%. 1000 milliLiter(s) IV Continuous <Continuous>  dextrose 5%. 1000 milliLiter(s) IV Continuous <Continuous>  dextrose 50% Injectable 25 Gram(s) IV Push once  dextrose 50% Injectable 12.5 Gram(s) IV Push once  dextrose 50% Injectable 25 Gram(s) IV Push once  dextrose Oral Gel 15 Gram(s) Oral once PRN  dipyridamole 200 mG/aspirin 25 mG 1 Capsule(s) Oral two times a day  ferrous    sulfate 325 milliGRAM(s) Oral daily  glucagon  Injectable 1 milliGRAM(s) IntraMuscular once  heparin   Injectable 4100 Unit(s) IV Push every 6 hours PRN  heparin  Infusion.  Unit(s)/Hr IV Continuous <Continuous>  influenza  Vaccine (HIGH DOSE) 0.7 milliLiter(s) IntraMuscular once  insulin lispro (ADMELOG) corrective regimen sliding scale   SubCutaneous three times a day before meals  insulin lispro (ADMELOG) corrective regimen sliding scale   SubCutaneous at bedtime  iron sucrose Injectable 100 milliGRAM(s) IV Push every 24 hours  levothyroxine 75 MICROGram(s) Oral daily  ondansetron Injectable 4 milliGRAM(s) IV Push every 8 hours PRN  polyethylene glycol 3350 17 Gram(s) Oral daily  potassium chloride   Powder 40 milliEquivalent(s) Oral once  senna 2 Tablet(s) Oral at bedtime  valproate sodium IVPB 500 milliGRAM(s) IV Intermittent every 12 hours              Vital Signs Last 24 Hrs  T(C): 36.7 (2022 05:18), Max: 37.1 (31 Mar 2022 19:54)  T(F): 98 (2022 05:18), Max: 98.7 (31 Mar 2022 19:54)  HR: 78 (2022 05:18) (71 - 86)  BP: 127/71 (2022 05:18) (106/- - 129/76)  BP(mean): --  RR: 19 (2022 05:18) (17 - 20)  SpO2: 96% (2022 05:18) (91% - 96%)      REVIEW OF SYSTEMS:  Review of systems are limited secondary to the patient is status post Ativan, on BiPAP, but as per my conversation with spouse, had been in his normal state of health.    PHYSICAL EXAMINATION:   HEENT:  Head:  Normocephalic, atraumatic.  Eyes:  No scleral icterus.  Ears:  Hard of hearing as per my conversation with spouse.  NECK:  Supple.  CARDIOVASCULAR:  S1 and S2 are heard.  RESPIRATORY:  Decreased breath sounds bilaterally.  ABDOMEN:  Soft, nontender.  EXTREMITIES:  No clubbing or cyanosis was noted.      NEUROLOGIC:  The patient was awake alert Extraocular movements appeared to be intact.  full visual fields   The patient appears to have intact bilateral nasolabial folds, but I could ascertain from looking through the BiPAP.  Speech was fluent Motor:  Right upper appeared to be 4/5, left upper was 3+/5.   Right lower extremity was 5/5, left lower extremities  4/5.  The patient did appear to Sensory appeared to be intact.              LABS:  CBC Full  -  ( 2022 06:36 )  WBC Count : 9.54 K/uL  RBC Count : 3.91 M/uL  Hemoglobin : 9.4 g/dL  Hematocrit : 29.1 %  Platelet Count - Automated : 190 K/uL  Mean Cell Volume : 74.4 fl  Mean Cell Hemoglobin : 24.0 pg  Mean Cell Hemoglobin Concentration : 32.3 gm/dL  Auto Neutrophil # : 7.63 K/uL  Auto Lymphocyte # : 0.95 K/uL  Auto Monocyte # : 0.76 K/uL  Auto Eosinophil # : 0.10 K/uL  Auto Basophil # : 0.10 K/uL  Auto Neutrophil % : 80.0 %  Auto Lymphocyte % : 10.0 %  Auto Monocyte % : 8.0 %  Auto Eosinophil % : 1.0 %  Auto Basophil % : 1.0 %    Urinalysis Basic - ( 30 Mar 2022 20:41 )    Color: Brown / Appearance: Slightly Turbid / S.010 / pH: x  Gluc: x / Ketone: Trace  / Bili: Negative / Urobili: Negative   Blood: x / Protein: 100 / Nitrite: Positive   Leuk Esterase: Moderate / RBC: >50 /HPF / WBC 3-5   Sq Epi: x / Non Sq Epi: Occasional / Bacteria: Few      04-    142  |  106  |  36<H>  ----------------------------<  147<H>  3.6   |  24  |  2.10<H>    Ca    8.8      2022 06:37  Phos  3.3     04-  Mg     2.3         TPro  6.0  /  Alb  2.7<L>  /  TBili  0.9  /  DBili  x   /  AST  41<H>  /  ALT  42  /  AlkPhos  77      Hemoglobin A1C:   Lipid Panel  @ 10:51  Total Cholesterol, Serum 103  LDL --  Triglycerides 76    LIVER FUNCTIONS - ( 2022 06:37 )  Alb: 2.7 g/dL / Pro: 6.0 g/dL / ALK PHOS: 77 U/L / ALT: 42 U/L / AST: 41 U/L / GGT: x           Vitamin B12 Vitamin B12, Serum: 476 pg/mL ( @ 10:42)    PTT - ( 2022 06:36 )  PTT:53.1 sec      RADIOLOGY      ANALYSIS AND PLAN:  This is a 76-year-old with episode of altered mental status, left-sided weakness, and possibly seizure event.  Clinical impression cerebrovascular accident  right MCA territory which possibly could have lead to seizure event, questionable this was precipitated by any type of cardiac event with the patient having elevated troponin and the patient feeling short of breath.  CT imaging of the brain positive for CVA unable to do MRI secondary to the patient having defibrillator.  Aggrenox 1 tablet twice a day spoke to cardiology ok for that   For possible seizure event which could have been induced secondary to cerebrovascular accident secondary to elevated renal function, I will start the patient on Depakote 500 mg twice a day.  For history of hypertension monitor SBP PLEASE avoid hypotension if possible   For history of hyperlipidemia, continue on statin.  For hypothyroidism, continue the patient on Synthroid.  AC risks vs benefits were addressed with patient and spouse they understand   spoke to patient in detail any new complaints he is to let the nurse know immediately   EEG was normal   neurologic wise stable no new events     Spoke with spouse   Her name is Radha, telephone number is [phone].    Greater than 40 minutes of time was spent with the patient, plan of care, reviewing data, with greater than 50% of the visit was spent counseling and/or coordinating care with multidisciplinary healthcare team

## 2022-04-01 NOTE — DIETITIAN INITIAL EVALUATION ADULT. - PROBLEM SELECTOR PLAN 5
TTE 2009 showed LVEF 30% w/ severely reduced ejection fraction   - f/u repeat TTE   - continue home Imdur and carvedilol w/ holding parameters   - hold patients home benazepril in setting of CKD   - patient s/p Lasix in ED, Lasix 40 mg x 1 dose this PM. reassess in AM need for IV Lasix as d/w cardio  - Cardio consult [facility], f/u reccs  - does not appear volume overloaded on physical exam

## 2022-04-01 NOTE — PROGRESS NOTE ADULT - ASSESSMENT
CKD 4  HTN  CHF EF 30%  Anemia      -BLCR about 2.3; renal indices are currently stable at baseline range; stable lytes  - protein  -Urine lytes reviewed  -Lasix PRN  -V/Q scan low prob  -Chest CT mild pulm Edema + PNA  -Anemia per Heme  -BP controlled now    Thank you

## 2022-04-01 NOTE — GOALS OF CARE CONVERSATION - ADVANCED CARE PLANNING - CONVERSATION DETAILS
Writer met with pt at bedside. Reviewed patient's medical and social history as well as events leading to patient's hospitalization. Writer discussed patient's current diagnosis (pulmonary edema,CAD,renal insufficiency,DM,HLD,HTN,throat cancer vent. arrythmia),  medical condition and management,  prognosis, and life expectancy. Inquired about patient's wishes regarding extent of medical care to be provided including escalation of medical care into the ICU and use of vasopressor support. In addition, the writer inquired about thoughts regarding cardiopulmonary resuscitation, artificial nutrition and hydration including use of feeding tubes and IVF, antibiotics, and further investigative studies such as blood draws and radiology. Karlos showed good insight into medical condition. All questions answered. Writer recommended MOLST. Patient consented to DNR/trial intubation status. MOLST form filled out and placed in chart.  Psychosocial support provided.
Writer met with.pt Reviewed patient's medical and social history as well as events leading to patient's hospitalization. Writer discussed patient's current diagnosis HF ,HTN  pulmonary edema, CKD medical condition and management. prognosis, and life expectancy .Pt  states  he has a HCP with his wife as agent  Writer recommended he speak with his agent his  wishes regarding extent of medical care to be provided including escalation of medical care into the ICU and use of vasopressor support. In addition, his  thoughts regarding cardiopulmonary resuscitation, artificial nutrition and hydration including use of feeding tubes and IVF, antibiotics, and further investigative studies such as blood draws and radiology .pt  showed good insight into medical condition. All questions answered.

## 2022-04-01 NOTE — PROVIDER CONTACT NOTE (CHANGE IN STATUS NOTIFICATION) - ACTION/TREATMENT ORDERED:
Dr. Thibodeaux made aware, straight cath ordered  16:20, pt voided ~100mL urine, Dr. Thibodeaux made aware, ordered to hold straight cath at this time

## 2022-04-01 NOTE — PROGRESS NOTE ADULT - ASSESSMENT
Multifactorial anemia related to acute illness. 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  Admittd Acute hypoxic resp failure, acute decompensated heart failure  likely 2/2 flash pulmonary edema, acute decompensated heart failure  ·	s/p CXR- diffuse opacifications b/l  ·	s/p VQ scan- very low probability of PE  ·	patient w/o fever, chills, productive cough, pleurisy  ·	procal elevated however, in the setting of real insufficiency  ·	s/p CT chest read as Right upper lobe pneumonia superimposed on mild pulmonary edema. Bilateral pleural effusions.  however, clinically no evidence of PNA; perhaps reflects pneumonitis  leukocytosis likely reactive. Cause unclear.   has resolved without antibiotics    CVA  L arm weakness  s/p CT head- Interval demarcation of the right perirolandic cortex infarct.    Renal insufficieny    RECOMMEND  Continue IV iron  Venofer 100mg IV x 3 days  Start PO B12 1000mcg daily.     Mgmt CVA, HTN emergency per neuro, Cardio  on heparin gtt, per cardiology & neuro   BP mgmt per cardio and neuro. Permissive HTN    mgmt CKD per renal    Thank you for consulting us.   No additional recommendations at current time.   Will sign off on case for now.   Please call, or re-consult if needed.       Can follow in office post DC for anemia, or with PMD.

## 2022-04-01 NOTE — DIETITIAN INITIAL EVALUATION ADULT. - PERTINENT MEDS FT
Aggrenox, Heparin, Lispro coverage, Lipitor, B12, FeSO4, Miralax, Senna, Rocaltrol, Synthroid, Zofran

## 2022-04-01 NOTE — DIETITIAN INITIAL EVALUATION ADULT. - PROBLEM SELECTOR PLAN 1
Pt presents with SOB 2/2 flash pulmonary edema likely due to uncontrolled BP. CHF w/ heart strain ? NSTEMI   - ProBNP 4963  - CXR: diffuse b/l scattered infiltrates  - s/p lasix 40mg IVP in ED   -1 more dose of IV lasix this PM   - currently on bipap, wean off supplementary O2 as tolerated   - elevated D-dimer on admission, f/u V/Q scan   - last TTE in 2009 showed LVEF 30% w/ severe systolic dysfunction, f/u TTE   - Patient does not appear volume overloaded on exam, monitor volume status   - caution with IVF

## 2022-04-01 NOTE — OCCUPATIONAL THERAPY INITIAL EVALUATION ADULT - PERTINENT HX OF CURRENT PROBLEM, REHAB EVAL
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 Admitted for pulmonary edema, leukocytosis, elevated troponin, JARRED on CKD-3 and seizure. CT head 3/31: Interval demarcation of cytotoxic edema in the region of the right perirolandic cortex.

## 2022-04-01 NOTE — DIETITIAN INITIAL EVALUATION ADULT. - PROBLEM SELECTOR PLAN 3
Patient w/ left upper extremity weakness of unknown duration, 2/2 to possible CVA   - patient w/ seizure like activity in ED s/p ativan, EEG   - started on valproate by neuro  - CT brain stroke negative for acute hemorrhage or infarct, repeat CT in the AM  - f/u AM TSH   - Neuro Bhachawat consulted, f/u recs D/W at detail, plan to change to Aggrenox daily.

## 2022-04-01 NOTE — PROGRESS NOTE ADULT - SUBJECTIVE AND OBJECTIVE BOX
[facility], Division of Infectious Diseases  MEGAN Marinelli Y. Patel, S. Shah, G. Casimir  [phone]  ([phone] - weekdays after 5pm and weekends)    Name: ANTONIO PONCE  Age/Gender: 76y Male  MRN: [id number]    Interval History:  Patient seen this morning.   Notes reviewed.   No concerning overnight events.  Afebrile.   states he hopes to regain some strength in his L arm  denies SOB    Allergies: No Known Allergies      Objective:  Vitals:   T(F): 98 (22 @ 05:18), Max: 98.7 (22 @ 19:54)  HR: 78 (22 @ 05:18) (71 - 86)  BP: 127/71 (22 @ 05:18) (106/- - 129/76)  RR: 19 (22 @ 05:18) (17 - 20)  SpO2: 96% (22 @ 05:18) (91% - 96%)  Physical Examination:  General: no acute distress  HEENT: anicteric  Cardio: S1, S2 present, normal rate  Resp: frail appearing, breathing comfortably  Abd: soft, ND, NT  Ext: no LE edema  Skin: warm, dry, no visible rash   Lines:    Laboratory Studies:  CBC:                       9.4    9.54  )-----------( 190      ( 2022 06:36 )             29.1     WBC Trend:  9.54 22 @ 06:36  11.68 22 @ 07:24  12.49 22 @ 00:01  16.12 22 @ 14:34  20.24 22 @ 06:12    CMP: 04-01    142  |  106  |  36<H>  ----------------------------<  147<H>  3.6   |  24  |  2.10<H>    Ca    8.8      2022 06:37  Phos  3.3     04-  Mg     2.3     04-    TPro  6.0  /  Alb  2.7<L>  /  TBili  0.9  /  DBili  x   /  AST  41<H>  /  ALT  42  /  AlkPhos  77  04-01      LIVER FUNCTIONS - ( 2022 06:37 )  Alb: 2.7 g/dL / Pro: 6.0 g/dL / ALK PHOS: 77 U/L / ALT: 42 U/L / AST: 41 U/L / GGT: x             Urinalysis Basic - ( 30 Mar 2022 20:41 )    Color: Brown / Appearance: Slightly Turbid / S.010 / pH: x  Gluc: x / Ketone: Trace  / Bili: Negative / Urobili: Negative   Blood: x / Protein: 100 / Nitrite: Positive   Leuk Esterase: Moderate / RBC: >50 /HPF / WBC 3-5   Sq Epi: x / Non Sq Epi: Occasional / Bacteria: Few      Microbiology: reviewed     Culture - Urine (collected 22 @ 01:17)  Source: Clean Catch Clean Catch (Midstream)  Final Report (22 @ 21:27):    No growth    Culture - Blood (collected 22 @ 15:01)  Source: .Blood Blood-Peripheral  Preliminary Report (22 @ 16:01):    No growth to date.    Culture - Blood (collected 22 @ 15:01)  Source: .Blood Blood-Peripheral  Preliminary Report (22 @ 16:01):    No growth to date.      Radiology: reviewed     Medications:  ALBUTerol    90 MICROgram(s) HFA Inhaler 2 Puff(s) Inhalation every 6 hours PRN  atorvastatin 10 milliGRAM(s) Oral at bedtime  calcitriol   Capsule 0.25 MICROGram(s) Oral daily  dextrose 5%. 1000 milliLiter(s) IV Continuous <Continuous>  dextrose 5%. 1000 milliLiter(s) IV Continuous <Continuous>  dextrose 50% Injectable 25 Gram(s) IV Push once  dextrose 50% Injectable 12.5 Gram(s) IV Push once  dextrose 50% Injectable 25 Gram(s) IV Push once  dextrose Oral Gel 15 Gram(s) Oral once PRN  dipyridamole 200 mG/aspirin 25 mG 1 Capsule(s) Oral two times a day  ferrous    sulfate 325 milliGRAM(s) Oral daily  glucagon  Injectable 1 milliGRAM(s) IntraMuscular once  heparin   Injectable 4100 Unit(s) IV Push every 6 hours PRN  heparin  Infusion.  Unit(s)/Hr IV Continuous <Continuous>  influenza  Vaccine (HIGH DOSE) 0.7 milliLiter(s) IntraMuscular once  insulin lispro (ADMELOG) corrective regimen sliding scale   SubCutaneous three times a day before meals  insulin lispro (ADMELOG) corrective regimen sliding scale   SubCutaneous at bedtime  iron sucrose Injectable 100 milliGRAM(s) IV Push every 24 hours  levothyroxine 75 MICROGram(s) Oral daily  ondansetron Injectable 4 milliGRAM(s) IV Push every 8 hours PRN  polyethylene glycol 3350 17 Gram(s) Oral daily  potassium chloride   Powder 40 milliEquivalent(s) Oral once  senna 2 Tablet(s) Oral at bedtime  valproate sodium IVPB 500 milliGRAM(s) IV Intermittent every 12 hours    Antimicrobials:

## 2022-04-02 LAB
ANION GAP SERPL CALC-SCNC: 8 MMOL/L — SIGNIFICANT CHANGE UP (ref 5–17)
APTT BLD: 26 SEC — LOW (ref 27.5–35.5)
BASOPHILS # BLD AUTO: 0.03 K/UL — SIGNIFICANT CHANGE UP (ref 0–0.2)
BASOPHILS NFR BLD AUTO: 0.4 % — SIGNIFICANT CHANGE UP (ref 0–2)
BUN SERPL-MCNC: 28 MG/DL — HIGH (ref 7–23)
CALCIUM SERPL-MCNC: 9.7 MG/DL — SIGNIFICANT CHANGE UP (ref 8.5–10.1)
CHLORIDE SERPL-SCNC: 107 MMOL/L — SIGNIFICANT CHANGE UP (ref 96–108)
CO2 SERPL-SCNC: 27 MMOL/L — SIGNIFICANT CHANGE UP (ref 22–31)
CREAT SERPL-MCNC: 2 MG/DL — HIGH (ref 0.5–1.3)
EGFR: 34 ML/MIN/1.73M2 — LOW
EOSINOPHIL # BLD AUTO: 0.1 K/UL — SIGNIFICANT CHANGE UP (ref 0–0.5)
EOSINOPHIL NFR BLD AUTO: 1.4 % — SIGNIFICANT CHANGE UP (ref 0–6)
GLUCOSE SERPL-MCNC: 107 MG/DL — HIGH (ref 70–99)
HCT VFR BLD CALC: 33.4 % — LOW (ref 39–50)
HGB BLD-MCNC: 10.7 G/DL — LOW (ref 13–17)
IMM GRANULOCYTES NFR BLD AUTO: 0.9 % — SIGNIFICANT CHANGE UP (ref 0–1.5)
LYMPHOCYTES # BLD AUTO: 0.65 K/UL — LOW (ref 1–3.3)
LYMPHOCYTES # BLD AUTO: 9.3 % — LOW (ref 13–44)
MAGNESIUM SERPL-MCNC: 2.5 MG/DL — SIGNIFICANT CHANGE UP (ref 1.6–2.6)
MCHC RBC-ENTMCNC: 24 PG — LOW (ref 27–34)
MCHC RBC-ENTMCNC: 32 GM/DL — SIGNIFICANT CHANGE UP (ref 32–36)
MCV RBC AUTO: 74.9 FL — LOW (ref 80–100)
MONOCYTES # BLD AUTO: 0.68 K/UL — SIGNIFICANT CHANGE UP (ref 0–0.9)
MONOCYTES NFR BLD AUTO: 9.8 % — SIGNIFICANT CHANGE UP (ref 2–14)
NEUTROPHILS # BLD AUTO: 5.45 K/UL — SIGNIFICANT CHANGE UP (ref 1.8–7.4)
NEUTROPHILS NFR BLD AUTO: 78.2 % — HIGH (ref 43–77)
NRBC # BLD: 0 /100 WBCS — SIGNIFICANT CHANGE UP (ref 0–0)
PHOSPHATE SERPL-MCNC: 3.5 MG/DL — SIGNIFICANT CHANGE UP (ref 2.5–4.5)
PLATELET # BLD AUTO: 207 K/UL — SIGNIFICANT CHANGE UP (ref 150–400)
POTASSIUM SERPL-MCNC: 4.5 MMOL/L — SIGNIFICANT CHANGE UP (ref 3.5–5.3)
POTASSIUM SERPL-SCNC: 4.5 MMOL/L — SIGNIFICANT CHANGE UP (ref 3.5–5.3)
RBC # BLD: 4.46 M/UL — SIGNIFICANT CHANGE UP (ref 4.2–5.8)
RBC # FLD: 14.2 % — SIGNIFICANT CHANGE UP (ref 10.3–14.5)
SODIUM SERPL-SCNC: 142 MMOL/L — SIGNIFICANT CHANGE UP (ref 135–145)
WBC # BLD: 6.97 K/UL — SIGNIFICANT CHANGE UP (ref 3.8–10.5)
WBC # FLD AUTO: 6.97 K/UL — SIGNIFICANT CHANGE UP (ref 3.8–10.5)

## 2022-04-02 PROCEDURE — 99232 SBSQ HOSP IP/OBS MODERATE 35: CPT | Mod: 25

## 2022-04-02 PROCEDURE — 93283 PRGRMG EVAL IMPLANTABLE DFB: CPT | Mod: 26

## 2022-04-02 RX ORDER — TAMSULOSIN HYDROCHLORIDE 0.4 MG/1
0.4 CAPSULE ORAL AT BEDTIME
Refills: 0 | Status: DISCONTINUED | OUTPATIENT
Start: 2022-04-03 | End: 2022-04-05

## 2022-04-02 RX ORDER — TAMSULOSIN HYDROCHLORIDE 0.4 MG/1
0.4 CAPSULE ORAL ONCE
Refills: 0 | Status: COMPLETED | OUTPATIENT
Start: 2022-04-02 | End: 2022-04-02

## 2022-04-02 RX ORDER — LIDOCAINE 4 G/100G
1 CREAM TOPICAL ONCE
Refills: 0 | Status: COMPLETED | OUTPATIENT
Start: 2022-04-02 | End: 2022-04-02

## 2022-04-02 RX ORDER — ACETAMINOPHEN 500 MG
650 TABLET ORAL ONCE
Refills: 0 | Status: COMPLETED | OUTPATIENT
Start: 2022-04-02 | End: 2022-04-02

## 2022-04-02 RX ADMIN — HEPARIN SODIUM 5000 UNIT(S): 5000 INJECTION INTRAVENOUS; SUBCUTANEOUS at 06:12

## 2022-04-02 RX ADMIN — ASPIRIN AND DIPYRIDAMOLE 1 CAPSULE(S): 25; 200 CAPSULE, EXTENDED RELEASE ORAL at 17:17

## 2022-04-02 RX ADMIN — ASPIRIN AND DIPYRIDAMOLE 1 CAPSULE(S): 25; 200 CAPSULE, EXTENDED RELEASE ORAL at 06:12

## 2022-04-02 RX ADMIN — HEPARIN SODIUM 5000 UNIT(S): 5000 INJECTION INTRAVENOUS; SUBCUTANEOUS at 13:46

## 2022-04-02 RX ADMIN — CALCITRIOL 0.25 MICROGRAM(S): 0.5 CAPSULE ORAL at 12:34

## 2022-04-02 RX ADMIN — SENNA PLUS 2 TABLET(S): 8.6 TABLET ORAL at 21:20

## 2022-04-02 RX ADMIN — ATORVASTATIN CALCIUM 80 MILLIGRAM(S): 80 TABLET, FILM COATED ORAL at 21:20

## 2022-04-02 RX ADMIN — IRON SUCROSE 100 MILLIGRAM(S): 20 INJECTION, SOLUTION INTRAVENOUS at 06:12

## 2022-04-02 RX ADMIN — Medication 650 MILLIGRAM(S): at 01:30

## 2022-04-02 RX ADMIN — TAMSULOSIN HYDROCHLORIDE 0.4 MILLIGRAM(S): 0.4 CAPSULE ORAL at 13:46

## 2022-04-02 RX ADMIN — Medication 1: at 17:17

## 2022-04-02 RX ADMIN — HEPARIN SODIUM 5000 UNIT(S): 5000 INJECTION INTRAVENOUS; SUBCUTANEOUS at 21:20

## 2022-04-02 RX ADMIN — Medication 55 MILLIGRAM(S): at 17:17

## 2022-04-02 RX ADMIN — PREGABALIN 1000 MICROGRAM(S): 225 CAPSULE ORAL at 12:34

## 2022-04-02 RX ADMIN — Medication 650 MILLIGRAM(S): at 00:35

## 2022-04-02 RX ADMIN — Medication 55 MILLIGRAM(S): at 06:12

## 2022-04-02 RX ADMIN — Medication 75 MICROGRAM(S): at 06:12

## 2022-04-02 RX ADMIN — Medication 325 MILLIGRAM(S): at 12:34

## 2022-04-02 RX ADMIN — LIDOCAINE 1 PATCH: 4 CREAM TOPICAL at 09:09

## 2022-04-02 NOTE — PROGRESS NOTE ADULT - SUBJECTIVE AND OBJECTIVE BOX
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 As per patient, he was watching a movie when he felt very short of breath suddenly and dropped to the floor, denies LOC and denies any trauma to his head, was on the floor for ~2 hours. Patient was unable to rise from the floor by himself due to his left arm weakness. When first brought to the ED, patient was hypoxic and hypertensive o2 saturation in EMS was >80%. While in the ED, patient had episode of siezure-like activity in his b/l upper extremities which was controlled w/ ativan. Patient was seen and examined at bedside, BiPAP still in place, patient was still mildly lethargic from ativan and could not speak well with bipap mask on. Patient able to open eyes and nod/shake head to questions. Patient denied any headache, vision changes, cp, abd pain, msk pain. Patient still short of breath.      In ED:   Vitals: HR 80, 176/82 -> 115/60, RR 20, 96% on BiPAP/CPAP  Labs significant for: WBC 20.24, H/H 10.8/24.1, D-Dimer 2924, Troponin 1011.9 > 5252.0, CKMB: 13.1, CPK%: 4%, BUN 33, Creatinine 2.3 (baseline unknown) eGFR 29, serum pro BNP 4963, creatine kinase 328, POCT glucose: 353  Imaging:   CXR: diffuse b/l scattered infiltrates  CT brain stroke: No acute hemorrhage, infarct, or mass effect   EKG: sinus tachy at ST- depressions in V5 V6   Received Lasix 40mg IVP x1, Aspirin 600mg x1, Hydralazine 10mg x1, Ativan 2mg IVP x1, Lispro 6u in the ED    (30 Mar 2022 08:48)    INTERVAL HPI:  3/31/2022: Patient overnight had clots in urine with slight pinkish appearance of urine, stat h/h showed that patients hgb dropped to 9.2 from 10.1. Heparin drip was continued as benefits outweighed risks. AM hgb showed recovery to 9.6. Patient also had 6 beats of vtach overnight, was asymptomatic. Patient seen and examined at bedside, denied any lightheadedness/dizziness, CP/palpitations, abd pain, dysuria, MSK pain, any sensory deficits. Patient continues to have LUE weakness. OBN IV Heparin drip, + CVA on CT Head  04/01/2022: Pt seen and examined at bedside. Hb stable this morning. Pt w no complaints this morning. Denies fever, chills, chest pain, palpitations, shortness of breath, palpitations, lightheadedness, dizziness, headache, n/v/d/c. TOV , d/c Hillman cath. heparin drip was dc in evening   4/2/22: Pt was seen and examined at bedside. Pt states that he had a BM yesterday, normal. AM Bladder scan revealed 450 cc urine but pt refused to be straight cath because he wants to urinate on his own. States that he cannot urinate because he is not on a regular diet. Denies history of BPH. Reports mild back pain, states that Tylenol does not help. Pt denies headache, dizziness, CP, SOB, palpitations, abdominal pain, n/v. No other complaints at this time.     OVERNIGHT EVENTS: Bladder scan yesterday evening with 1L urine, pt refused Hillman. AM. Bladder scan revealed 450 cc urine but pt refused to be straight cath because he wants to urinate on his own.    Home Medications:  benazepril 10 mg oral tablet: 1 tab(s) orally once a day (30 Mar 2022 10:22)  calcitriol 0.25 mcg oral capsule: 1 cap(s) orally once a day (30 Mar 2022 10:22)  carvedilol 12.5 mg oral tablet: 1 tab(s) orally 2 times a day      *Last filled 5/21, spoke to MATTHEW Nguyen at Dr. Garcia&#x27;s office, she states patient should still be taking med as per MD notes from 2/22*  (30 Mar 2022 10:22)  hydrALAZINE 10 mg oral tablet: 0.5 tab(s) orally 3 times a day (30 Mar 2022 10:22)  isosorbide mononitrate 30 mg oral tablet, extended release: 1 tab(s) orally once a day (in the morning) (30 Mar 2022 10:22)  levothyroxine 75 mcg (0.075 mg) oral tablet: 1 tab(s) orally once a day (30 Mar 2022 10:22)  midodrine 10 mg oral tablet: 1 tab(s) orally 3 times a day (30 Mar 2022 10:22)  Plavix 75 mg oral tablet: 1 tab(s) orally once a day (30 Mar 2022 10:22)  pravastatin 40 mg oral tablet: 1 tab(s) orally once a day (30 Mar 2022 10:22)      MEDICATIONS  (STANDING):  atorvastatin 80 milliGRAM(s) Oral at bedtime  bisacodyl Suppository 10 milliGRAM(s) Rectal daily  calcitriol   Capsule 0.25 MICROGram(s) Oral daily  cyanocobalamin 1000 MICROGram(s) Oral daily  dextrose 5%. 1000 milliLiter(s) (50 mL/Hr) IV Continuous <Continuous>  dextrose 5%. 1000 milliLiter(s) (100 mL/Hr) IV Continuous <Continuous>  dextrose 50% Injectable 25 Gram(s) IV Push once  dextrose 50% Injectable 12.5 Gram(s) IV Push once  dextrose 50% Injectable 25 Gram(s) IV Push once  dipyridamole 200 mG/aspirin 25 mG 1 Capsule(s) Oral two times a day  ferrous    sulfate 325 milliGRAM(s) Oral daily  glucagon  Injectable 1 milliGRAM(s) IntraMuscular once  heparin   Injectable 5000 Unit(s) SubCutaneous every 8 hours  influenza  Vaccine (HIGH DOSE) 0.7 milliLiter(s) IntraMuscular once  insulin lispro (ADMELOG) corrective regimen sliding scale   SubCutaneous three times a day before meals  insulin lispro (ADMELOG) corrective regimen sliding scale   SubCutaneous at bedtime  iron sucrose Injectable 100 milliGRAM(s) IV Push every 24 hours  levothyroxine 75 MICROGram(s) Oral daily  senna 2 Tablet(s) Oral at bedtime  valproate sodium IVPB 500 milliGRAM(s) IV Intermittent every 12 hours    MEDICATIONS  (PRN):  ALBUTerol    90 MICROgram(s) HFA Inhaler 2 Puff(s) Inhalation every 6 hours PRN Shortness of Breath and/or Wheezing  dextrose Oral Gel 15 Gram(s) Oral once PRN Blood Glucose LESS THAN 70 milliGRAM(s)/deciliter  ondansetron Injectable 4 milliGRAM(s) IV Push every 8 hours PRN Nausea and/or Vomiting      No Known Allergies      Social History:  Lives at home w/ wife   ADL independent   Tobacco former smoker, quit 40 years ago, 20 pack year history  Alcohol denies  Drugs denies  COVID Pfizer x3   Occupation retired- former  (30 Mar 2022 08:48)      REVIEW OF SYSTEMS: I am ok  CONSTITUTIONAL: No fever, No chills, No fatigue, No myalgia, No Body ache, No Weakness  EYES: No eye pain,  No visual disturbances, No discharge, No Redness  ENMT: No ear pain, No nose bleed, No vertigo; No sinus pain, No throat pain, No Congestion  NECK: No pain, No stiffness  RESPIRATORY: No cough, No wheezing, No hemoptysis, No shortness of breath  CARDIOVASCULAR: No chest pain, No palpitations  GASTROINTESTINAL: No abdominal pain, No epigastric pain. No nausea, No vomiting, No diarrhea, No constipation; [ x ] BM - 1  GENITOURINARY: No dysuria, No frequency, No urgency, No incontinence  NEUROLOGICAL: No headaches, No dizziness, No numbness, No tingling, No tremors, [ x ] LUE weakness   EXTREMITIES: No Swelling, No Pain, No Edema  SKIN: [ x ] No itching, burning, rashes, or lesions   MUSCULOSKELETAL: No joint pain, No joint swelling; No muscle pain, +back pain, No extremity pain  PSYCHIATRIC: No depression, No anxiety, No mood swings, No difficulty sleeping at night  PAIN SCALE: [  ] None  [ x ] Other- Back pain  ROS Unable to obtain due to: [  ] Dementia  [  ] Lethargy  [  ] Sedated  [  ] Non verbal  REST OF REVIEW OF SYSTEMS: [ x ] Normal     Vital Signs Last 24 Hrs  T(C): 36.7 (02 Apr 2022 04:11), Max: 37.2 (01 Apr 2022 20:02)  T(F): 98 (02 Apr 2022 04:11), Max: 99 (01 Apr 2022 20:02)  HR: 72 (02 Apr 2022 04:11) (72 - 84)  BP: 136/62 (02 Apr 2022 04:11) (131/63 - 144/76)  BP(mean): --  RR: 18 (02 Apr 2022 04:11) (17 - 18)  SpO2: 92% (02 Apr 2022 04:11) (91% - 94%)    CAPILLARY BLOOD GLUCOSE      POCT Blood Glucose.: 119 mg/dL (02 Apr 2022 08:09)  POCT Blood Glucose.: 140 mg/dL (01 Apr 2022 21:37)  POCT Blood Glucose.: 132 mg/dL (01 Apr 2022 17:46)  POCT Blood Glucose.: 152 mg/dL (01 Apr 2022 12:05)      I&O's Summary    01 Apr 2022 07:01  -  02 Apr 2022 07:00  --------------------------------------------------------  IN: 612 mL / OUT: 1450 mL / NET: -838 mL      PHYSICAL EXAM:  GENERAL:  [ x ] NAD, [ x ] Well appearing, [  ] Agitated, [  ] Drowsy, [  ] Lethargy, [  ] Confused   HEAD:  [ x ] Normal, [  ] Other  EYES:  [ x ] EOMI, [ x ] PERRLA, [ x ] Conjunctiva and sclera clear normal, [  ] Other, [ x ] Pallor, [  ] Discharge  ENMT:  [ x ] Normal, [ x ] Moist mucous membranes, [  ] Good dentition, [ x ] No thrush  NECK:  [ x ] Supple, [ x ] No JVD, [x  ] Normal thyroid, [  ] Lymphadenopathy, [  ] Other  CHEST/LUNG:  [ x ] Clear to auscultation bilaterally, [ x ] Breath Sounds equal B/L, [ x ] Poor effort, [ x ] No rales, [ x ] No rhonchi, [ x ] No wheezing  HEART:  [ x ] Regular rate and rhythm, [  ] Tachycardia, [  ] Bradycardia, [  ] Irregular, [ x ] No murmurs, No rubs, No gallops, [  ] PPM in place (Mfr:  )  ABDOMEN:  [ x ] Soft, [ x ] Nontender, [ x ] Nondistended, [ x ] No mass, [ x ] Bowel sounds present, [  ] Obese  NERVOUS SYSTEM:  [ x ] Alert & Oriented x3, [  ] Nonfocal, [  ] Confusion, [  ] Encephalopathic, [  ] Sedated, [  ] Unable to assess, [  ] Dementia, [ x ] Other- LUE weakness compared to RUE, left tongue deviation   EXTREMITIES:  [ x ] 2+ Peripheral Pulses, No clubbing, No cyanosis,  [  ] Edema B/L lower EXT, [  ] PVD stasis skin changes B/L lower EXT, [  ] Wound  LYMPH:  No lymphadenopathy noted  SKIN:  [ x ] No rashes or lesions, [  ] Pressure ulcers, [  ] Ecchymosis, [  ] Skin tears, [  ] Other      DIET: pureed diet, Moderately thickened    LABS:                        10.7   6.97  )-----------( 207      ( 02 Apr 2022 08:34 )             33.4     02 Apr 2022 08:34    142    |  107    |  28     ----------------------------<  107    4.5     |  27     |  2.00     Ca    9.7        02 Apr 2022 08:34  Phos  3.5       02 Apr 2022 08:34  Mg     2.5       02 Apr 2022 08:34      PTT - ( 02 Apr 2022 08:34 )  PTT:26.0 sec    Culture Results:   No growth (03-31 @ 01:17)  Culture Results:   No growth to date. (03-30 @ 15:01)  Culture Results:   No growth to date. (03-30 @ 15:01)      CARDIAC MARKERS ( 31 Mar 2022 16:33 )  x     / x     / 202 U/L / x     / 3.1 ng/mL  CARDIAC MARKERS ( 30 Mar 2022 21:07 )  x     / x     / 342 U/L / x     / 12.8 ng/mL  CARDIAC MARKERS ( 30 Mar 2022 14:34 )  x     / x     / 398 U/L / x     / 18.4 ng/mL  CARDIAC MARKERS ( 30 Mar 2022 08:13 )  x     / x     / x     / x     / 13.1 ng/mL  CARDIAC MARKERS ( 30 Mar 2022 07:53 )  x     / x     / 328 U/L / x     / x            Culture - Urine (collected 31 Mar 2022 01:17)  Source: Clean Catch Clean Catch (Midstream)  Final Report (31 Mar 2022 21:27):    No growth    Culture - Blood (collected 30 Mar 2022 15:01)  Source: .Blood Blood-Peripheral  Preliminary Report (31 Mar 2022 16:01):    No growth to date.    Culture - Blood (collected 30 Mar 2022 15:01)  Source: .Blood Blood-Peripheral  Preliminary Report (31 Mar 2022 16:01):    No growth to date.       Anemia Panel:  Iron Total, Serum: 15 ug/dL (03-31-22 @ 10:51)  Iron - Total Binding Capacity.: 318 ug/dL (03-31-22 @ 10:51)  Ferritin, Serum: 53 ng/mL (03-31-22 @ 10:42)  Vitamin B12, Serum: 476 pg/mL (03-31-22 @ 10:42)  Folate, Serum: 10.7 ng/mL (03-31-22 @ 10:42)  Ferritin, Serum: 44 ng/mL (03-31-22 @ 05:21)  Iron - Total Binding Capacity.: 329 ug/dL (03-31-22 @ 05:21)  Iron Total, Serum: 11 ug/dL (03-31-22 @ 05:21)      Thyroid Panel:  T4, Serum: 6.2 ug/dL (03-31-22 @ 10:42)  Thyroid Stimulating Hormone, Serum: 2.99 uIU/mL (03-31-22 @ 07:24)  T4, Serum: 5.9 ug/dL (03-31-22 @ 05:21)  Thyroid Stimulating Hormone, Serum: 2.81 uIU/mL (03-30-22 @ 14:34)            Serum Pro-Brain Natriuretic Peptide: 9256 pg/mL (03-31-22 @ 07:24)  Serum Pro-Brain Natriuretic Peptide: 4963 pg/mL (03-30-22 @ 06:12)      RADIOLOGY & ADDITIONAL TESTS:      HEALTH ISSUES - PROBLEM Dx:  Acute pulmonary edema    Leukocytosis    Elevated troponin    Acute kidney injury superimposed on CKD    HTN (hypertension)    CAD (coronary artery disease)    Hypothyroidism    Need for prophylactic measure    Chronic HFrEF (heart failure with reduced ejection fraction)    Anemia    Elevated creatine kinase    Diabetes mellitus    CVA (cerebrovascular accident)    Tonsillar cancer          Consultant(s) Notes Reviewed:  [ x ] YES     Care Discussed with [ x ] Consultants, [ x ] Patient, [ x ] Family, [  ] HCP, [  ] , [ x ] Social Service, [ x ] RN, [  ] Physical Therapy, [  ] Palliative Care Team  DVT PPX: [  ] Lovenox, [ x ] SC Heparin, [  ] Coumadin, [  ] Xarelto, [  ] Eliquis, [  ] Pradaxa, [  ] IV Heparin drip, [  ] SCD, [  ] Ambulation, [  ] Contraindicated 2/2 GI Bleed, [  ] Contraindicated 2/2  Bleed, [  ] Contraindicated 2/2 Brain Bleed  Advanced Directive: [  ] None, [ x ] DNR Patient is a 76y old  Male who presents with a chief complaint of SOB (02 Apr 2022 09:32)    HPI:  75 yo male w/ PMHx of HTN, HLD, HFrEF (EF 30% 2009), 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 As per patient, he was watching a movie when he felt very short of breath suddenly and dropped to the floor, denies LOC and denies any trauma to his head, was on the floor for ~2 hours. Patient was unable to rise from the floor by himself due to his left arm weakness. When first brought to the ED, patient was hypoxic and hypertensive o2 saturation in EMS was >80%. While in the ED, patient had episode of siezure-like activity in his b/l upper extremities which was controlled w/ ativan. Patient was seen and examined at bedside, BiPAP still in place, patient was still mildly lethargic from ativan and could not speak well with bipap mask on. Patient able to open eyes and nod/shake head to questions. Patient denied any headache, vision changes, cp, abd pain, msk pain. Patient still short of breath.      In ED:   Vitals: HR 80, 176/82 -> 115/60, RR 20, 96% on BiPAP/CPAP  Labs significant for: WBC 20.24, H/H 10.8/24.1, D-Dimer 2924, Troponin 1011.9 > 5252.0, CKMB: 13.1, CPK%: 4%, BUN 33, Creatinine 2.3 (baseline unknown) eGFR 29, serum pro BNP 4963, creatine kinase 328, POCT glucose: 353  Imaging:   CXR: diffuse b/l scattered infiltrates  CT brain stroke: No acute hemorrhage, infarct, or mass effect   EKG: sinus tachy at ST- depressions in V5 V6   Received Lasix 40mg IVP x1, Aspirin 600mg x1, Hydralazine 10mg x1, Ativan 2mg IVP x1, Lispro 6u in the ED    (30 Mar 2022 08:48)    INTERVAL HPI:  3/31/2022: Patient overnight had clots in urine with slight pinkish appearance of urine, stat h/h showed that patients hgb dropped to 9.2 from 10.1. Heparin drip was continued as benefits outweighed risks. AM hgb showed recovery to 9.6. Patient also had 6 beats of vtach overnight, was asymptomatic. Patient seen and examined at bedside, denied any lightheadedness/dizziness, CP/palpitations, abd pain, dysuria, MSK pain, any sensory deficits. Patient continues to have LUE weakness. OBN IV Heparin drip, + CVA on CT Head  04/01/2022: Pt seen and examined at bedside. Hb stable this morning. Pt w no complaints this morning. Denies fever, chills, chest pain, palpitations, shortness of breath, palpitations, lightheadedness, dizziness, headache, n/v/d/c. TOV , d/c Hillman cath. heparin drip was dc in evening   4/2/22: Pt was seen and examined at bedside. Pt states that he had a BM yesterday, normal. AM Bladder scan revealed 450 cc urine but pt refused to be straight cath because he wants to urinate on his own. States that he cannot urinate because he is not on a regular diet. Denies history of BPH. Reports mild back pain, states that Tylenol does not help. Pt denies headache, dizziness, CP, SOB, palpitations, abdominal pain, n/v. No other complaints at this time.     OVERNIGHT EVENTS: Bladder scan yesterday evening with 1L urine, pt refused Hillman-straight cath with 1L urine. This AM bladder scan revealed 450 cc urine but pt refused to be straight cath because he wants to urinate on his own.    Home Medications:  benazepril 10 mg oral tablet: 1 tab(s) orally once a day (30 Mar 2022 10:22)  calcitriol 0.25 mcg oral capsule: 1 cap(s) orally once a day (30 Mar 2022 10:22)  carvedilol 12.5 mg oral tablet: 1 tab(s) orally 2 times a day      *Last filled 5/21, spoke to MATTHEW Nguyen at Dr. Garcia&#x27;s office, she states patient should still be taking med as per MD notes from 2/22*  (30 Mar 2022 10:22)  hydrALAZINE 10 mg oral tablet: 0.5 tab(s) orally 3 times a day (30 Mar 2022 10:22)  isosorbide mononitrate 30 mg oral tablet, extended release: 1 tab(s) orally once a day (in the morning) (30 Mar 2022 10:22)  levothyroxine 75 mcg (0.075 mg) oral tablet: 1 tab(s) orally once a day (30 Mar 2022 10:22)  midodrine 10 mg oral tablet: 1 tab(s) orally 3 times a day (30 Mar 2022 10:22)  Plavix 75 mg oral tablet: 1 tab(s) orally once a day (30 Mar 2022 10:22)  pravastatin 40 mg oral tablet: 1 tab(s) orally once a day (30 Mar 2022 10:22)      MEDICATIONS  (STANDING):  atorvastatin 80 milliGRAM(s) Oral at bedtime  bisacodyl Suppository 10 milliGRAM(s) Rectal daily  calcitriol   Capsule 0.25 MICROGram(s) Oral daily  cyanocobalamin 1000 MICROGram(s) Oral daily  dextrose 5%. 1000 milliLiter(s) (50 mL/Hr) IV Continuous <Continuous>  dextrose 5%. 1000 milliLiter(s) (100 mL/Hr) IV Continuous <Continuous>  dextrose 50% Injectable 25 Gram(s) IV Push once  dextrose 50% Injectable 12.5 Gram(s) IV Push once  dextrose 50% Injectable 25 Gram(s) IV Push once  dipyridamole 200 mG/aspirin 25 mG 1 Capsule(s) Oral two times a day  ferrous    sulfate 325 milliGRAM(s) Oral daily  glucagon  Injectable 1 milliGRAM(s) IntraMuscular once  heparin   Injectable 5000 Unit(s) SubCutaneous every 8 hours  influenza  Vaccine (HIGH DOSE) 0.7 milliLiter(s) IntraMuscular once  insulin lispro (ADMELOG) corrective regimen sliding scale   SubCutaneous three times a day before meals  insulin lispro (ADMELOG) corrective regimen sliding scale   SubCutaneous at bedtime  iron sucrose Injectable 100 milliGRAM(s) IV Push every 24 hours  levothyroxine 75 MICROGram(s) Oral daily  senna 2 Tablet(s) Oral at bedtime  valproate sodium IVPB 500 milliGRAM(s) IV Intermittent every 12 hours    MEDICATIONS  (PRN):  ALBUTerol    90 MICROgram(s) HFA Inhaler 2 Puff(s) Inhalation every 6 hours PRN Shortness of Breath and/or Wheezing  dextrose Oral Gel 15 Gram(s) Oral once PRN Blood Glucose LESS THAN 70 milliGRAM(s)/deciliter  ondansetron Injectable 4 milliGRAM(s) IV Push every 8 hours PRN Nausea and/or Vomiting      No Known Allergies      Social History:  Lives at home w/ wife   ADL independent   Tobacco former smoker, quit 40 years ago, 20 pack year history  Alcohol denies  Drugs denies  COVID Pf[employer] x3   Occupation retired- former  (30 Mar 2022 08:48)      REVIEW OF SYSTEMS: I am ok  CONSTITUTIONAL: No fever, No chills, No fatigue, No myalgia, No Body ache, No Weakness  EYES: No eye pain,  No visual disturbances, No discharge, No Redness  ENMT: No ear pain, No nose bleed, No vertigo; No sinus pain, No throat pain, No Congestion  NECK: No pain, No stiffness  RESPIRATORY: No cough, No wheezing, No hemoptysis, No shortness of breath  CARDIOVASCULAR: No chest pain, No palpitations  GASTROINTESTINAL: No abdominal pain, No epigastric pain. No nausea, No vomiting, No diarrhea, No constipation; [ x ] BM - 1  GENITOURINARY: No dysuria, No frequency, No urgency, No incontinence  NEUROLOGICAL: No headaches, No dizziness, No numbness, No tingling, No tremors, [ x ] LUE weakness   EXTREMITIES: No Swelling, No Pain, No Edema  SKIN: [ x ] No itching, burning, rashes, or lesions   MUSCULOSKELETAL: No joint pain, No joint swelling; No muscle pain, +back pain, No extremity pain  PSYCHIATRIC: No depression, No anxiety, No mood swings, No difficulty sleeping at night  PAIN SCALE: [  ] None  [ x ] Other- Back pain  ROS Unable to obtain due to: [  ] Dementia  [  ] Lethargy  [  ] Sedated  [  ] Non verbal  REST OF REVIEW OF SYSTEMS: [ x ] Normal     Vital Signs Last 24 Hrs  T(C): 36.7 (02 Apr 2022 04:11), Max: 37.2 (01 Apr 2022 20:02)  T(F): 98 (02 Apr 2022 04:11), Max: 99 (01 Apr 2022 20:02)  HR: 72 (02 Apr 2022 04:11) (72 - 84)  BP: 136/62 (02 Apr 2022 04:11) (131/63 - 144/76)  BP(mean): --  RR: 18 (02 Apr 2022 04:11) (17 - 18)  SpO2: 92% (02 Apr 2022 04:11) (91% - 94%)    CAPILLARY BLOOD GLUCOSE      POCT Blood Glucose.: 119 mg/dL (02 Apr 2022 08:09)  POCT Blood Glucose.: 140 mg/dL (01 Apr 2022 21:37)  POCT Blood Glucose.: 132 mg/dL (01 Apr 2022 17:46)  POCT Blood Glucose.: 152 mg/dL (01 Apr 2022 12:05)      I&O's Summary    01 Apr 2022 07:01  -  02 Apr 2022 07:00  --------------------------------------------------------  IN: 612 mL / OUT: 1450 mL / NET: -838 mL      PHYSICAL EXAM:  GENERAL:  [ x ] NAD, [ x ] Well appearing, [  ] Agitated, [  ] Drowsy, [  ] Lethargy, [  ] Confused   HEAD:  [ x ] Normal, [  ] Other  EYES:  [ x ] EOMI, [ x ] PERRLA, [ x ] Conjunctiva and sclera clear normal, [  ] Other, [ x ] Pallor, [  ] Discharge  ENMT:  [ x ] Normal, [ x ] Moist mucous membranes, [  ] Good dentition, [ x ] No thrush  NECK:  [ x ] Supple, [ x ] No JVD, [x  ] Normal thyroid, [  ] Lymphadenopathy, [  ] Other  CHEST/LUNG:  [ x ] Clear to auscultation bilaterally, [ x ] Breath Sounds equal B/L, [ x ] Poor effort, [ x ] No rales, [ x ] No rhonchi, [ x ] No wheezing  HEART:  [ x ] Regular rate and rhythm, [  ] Tachycardia, [  ] Bradycardia, [  ] Irregular, [ x ] No murmurs, No rubs, No gallops, [  ] PPM in place (Mfr:  )  ABDOMEN:  [ x ] Soft, [ x ] Nontender, [ x ] Nondistended, [ x ] No mass, [ x ] Bowel sounds present, [  ] Obese  NERVOUS SYSTEM:  [ x ] Alert & Oriented x3, [  ] Nonfocal, [  ] Confusion, [  ] Encephalopathic, [  ] Sedated, [  ] Unable to assess, [  ] Dementia, [ x ] Other- LUE weakness compared to RUE, left tongue deviation   EXTREMITIES:  [ x ] 2+ Peripheral Pulses, No clubbing, No cyanosis,  [  ] Edema B/L lower EXT, [  ] PVD stasis skin changes B/L lower EXT, [  ] Wound  LYMPH:  No lymphadenopathy noted  SKIN:  [ x ] No rashes or lesions, [  ] Pressure ulcers, [  ] Ecchymosis, [  ] Skin tears, [  ] Other      DIET: pureed diet, Moderately thickened    LABS:                        10.7   6.97  )-----------( 207      ( 02 Apr 2022 08:34 )             33.4     02 Apr 2022 08:34    142    |  107    |  28     ----------------------------<  107    4.5     |  27     |  2.00     Ca    9.7        02 Apr 2022 08:34  Phos  3.5       02 Apr 2022 08:34  Mg     2.5       02 Apr 2022 08:34      PTT - ( 02 Apr 2022 08:34 )  PTT:26.0 sec    Culture Results:   No growth (03-31 @ 01:17)  Culture Results:   No growth to date. (03-30 @ 15:01)  Culture Results:   No growth to date. (03-30 @ 15:01)      CARDIAC MARKERS ( 31 Mar 2022 16:33 )  x     / x     / 202 U/L / x     / 3.1 ng/mL  CARDIAC MARKERS ( 30 Mar 2022 21:07 )  x     / x     / 342 U/L / x     / 12.8 ng/mL  CARDIAC MARKERS ( 30 Mar 2022 14:34 )  x     / x     / 398 U/L / x     / 18.4 ng/mL  CARDIAC MARKERS ( 30 Mar 2022 08:13 )  x     / x     / x     / x     / 13.1 ng/mL  CARDIAC MARKERS ( 30 Mar 2022 07:53 )  x     / x     / 328 U/L / x     / x            Culture - Urine (collected 31 Mar 2022 01:17)  Source: Clean Catch Clean Catch (Midstream)  Final Report (31 Mar 2022 21:27):    No growth    Culture - Blood (collected 30 Mar 2022 15:01)  Source: .Blood Blood-Peripheral  Preliminary Report (31 Mar 2022 16:01):    No growth to date.    Culture - Blood (collected 30 Mar 2022 15:01)  Source: .Blood Blood-Peripheral  Preliminary Report (31 Mar 2022 16:01):    No growth to date.       Anemia Panel:  Iron Total, Serum: 15 ug/dL (03-31-22 @ 10:51)  Iron - Total Binding Capacity.: 318 ug/dL (03-31-22 @ 10:51)  Ferritin, Serum: 53 ng/mL (03-31-22 @ 10:42)  Vitamin B12, Serum: 476 pg/mL (03-31-22 @ 10:42)  Folate, Serum: 10.7 ng/mL (03-31-22 @ 10:42)  Ferritin, Serum: 44 ng/mL (03-31-22 @ 05:21)  Iron - Total Binding Capacity.: 329 ug/dL (03-31-22 @ 05:21)  Iron Total, Serum: 11 ug/dL (03-31-22 @ 05:21)      Thyroid Panel:  T4, Serum: 6.2 ug/dL (03-31-22 @ 10:42)  Thyroid Stimulating Hormone, Serum: 2.99 uIU/mL (03-31-22 @ 07:24)  T4, Serum: 5.9 ug/dL (03-31-22 @ 05:21)  Thyroid Stimulating Hormone, Serum: 2.81 uIU/mL (03-30-22 @ 14:34)            Serum Pro-Brain Natriuretic Peptide: 9256 pg/mL (03-31-22 @ 07:24)  Serum Pro-Brain Natriuretic Peptide: 4963 pg/mL (03-30-22 @ 06:12)      RADIOLOGY & ADDITIONAL TESTS:      HEALTH ISSUES - PROBLEM Dx:  Acute pulmonary edema    Leukocytosis    Elevated troponin    Acute kidney injury superimposed on CKD    HTN (hypertension)    CAD (coronary artery disease)    Hypothyroidism    Need for prophylactic measure    Chronic HFrEF (heart failure with reduced ejection fraction)    Anemia    Elevated creatine kinase    Diabetes mellitus    CVA (cerebrovascular accident)    Tonsillar cancer          Consultant(s) Notes Reviewed:  [ x ] YES     Care Discussed with [ x ] Consultants, [ x ] Patient, [ x ] Family, [  ] HCP, [  ] , [ x ] Social Service, [ x ] RN, [  ] Physical Therapy, [  ] Palliative Care Team  DVT PPX: [  ] Lovenox, [ x ] SC Heparin, [  ] Coumadin, [  ] Xarelto, [  ] Eliquis, [  ] Pradaxa, [  ] IV Heparin drip, [  ] SCD, [  ] Ambulation, [  ] Contraindicated 2/2 GI Bleed, [  ] Contraindicated 2/2  Bleed, [  ] Contraindicated 2/2 Brain Bleed  Advanced Directive: [  ] None, [ x ] DNR Patient is a 76y old  Male who presents with a chief complaint of SOB (02 Apr 2022 09:32)    HPI:  77 yo male w/ PMHx of HTN, HLD, HFrEF (EF 30% 2009), 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 As per patient, he was watching a movie when he felt very short of breath suddenly and dropped to the floor, denies LOC and denies any trauma to his head, was on the floor for ~2 hours. Patient was unable to rise from the floor by himself due to his left arm weakness. When first brought to the ED, patient was hypoxic and hypertensive o2 saturation in EMS was >80%. While in the ED, patient had episode of siezure-like activity in his b/l upper extremities which was controlled w/ ativan. Patient was seen and examined at bedside, BiPAP still in place, patient was still mildly lethargic from ativan and could not speak well with bipap mask on. Patient able to open eyes and nod/shake head to questions. Patient denied any headache, vision changes, cp, abd pain, msk pain. Patient still short of breath.      In ED:   Vitals: HR 80, 176/82 -> 115/60, RR 20, 96% on BiPAP/CPAP  Labs significant for: WBC 20.24, H/H 10.8/24.1, D-Dimer 2924, Troponin 1011.9 > 5252.0, CKMB: 13.1, CPK%: 4%, BUN 33, Creatinine 2.3 (baseline unknown) eGFR 29, serum pro BNP 4963, creatine kinase 328, POCT glucose: 353  Imaging:   CXR: diffuse b/l scattered infiltrates  CT brain stroke: No acute hemorrhage, infarct, or mass effect   EKG: sinus tachy at ST- depressions in V5 V6   Received Lasix 40mg IVP x1, Aspirin 600mg x1, Hydralazine 10mg x1, Ativan 2mg IVP x1, Lispro 6u in the ED    (30 Mar 2022 08:48)    INTERVAL HPI:  3/31/2022: Patient overnight had clots in urine with slight pinkish appearance of urine, stat h/h showed that patients hgb dropped to 9.2 from 10.1. Heparin drip was continued as benefits outweighed risks. AM hgb showed recovery to 9.6. Patient also had 6 beats of vtach overnight, was asymptomatic. Patient seen and examined at bedside, denied any lightheadedness/dizziness, CP/palpitations, abd pain, dysuria, MSK pain, any sensory deficits. Patient continues to have LUE weakness. OBN IV Heparin drip, + CVA on CT Head  04/01/2022: Pt seen and examined at bedside. Hb stable this morning. Pt w no complaints this morning. Denies fever, chills, chest pain, palpitations, shortness of breath, palpitations, lightheadedness, dizziness, headache, n/v/d/c. TOV , d/c [facility] cath. heparin drip was dc in evening   4/2/22: Pt was seen and examined at bedside. Pt states that he had a BM yesterday, normal. AM Bladder scan revealed 450 cc urine but pt refused to be straight cath because he wants to urinate on his own. States that he cannot urinate because he is not on a regular diet. Denies history of BPH. Reports mild back pain, states that Tylenol does not help. Pt denies headache, dizziness, CP, SOB, palpitations, abdominal pain, n/v. No other complaints at this time. Will start Flomax.     OVERNIGHT EVENTS: Bladder scan yesterday evening with 1L urine, pt refused [facility]man-straight cath with 1L urine. This AM bladder scan revealed 450 cc urine but pt refused to be straight cath because he wants to urinate on his own.    Home Medications:  benazepril 10 mg oral tablet: 1 tab(s) orally once a day (30 Mar 2022 10:22)  calcitriol 0.25 mcg oral capsule: 1 cap(s) orally once a day (30 Mar 2022 10:22)  carvedilol 12.5 mg oral tablet: 1 tab(s) orally 2 times a day      *Last filled 5/21, spoke to MATTHEW Nguyen at Dr. Garcia&#x27;s office, she states patient should still be taking med as per MD notes from 2/22*  (30 Mar 2022 10:22)  hydrALAZINE 10 mg oral tablet: 0.5 tab(s) orally 3 times a day (30 Mar 2022 10:22)  isosorbide mononitrate 30 mg oral tablet, extended release: 1 tab(s) orally once a day (in the morning) (30 Mar 2022 10:22)  levothyroxine 75 mcg (0.075 mg) oral tablet: 1 tab(s) orally once a day (30 Mar 2022 10:22)  midodrine 10 mg oral tablet: 1 tab(s) orally 3 times a day (30 Mar 2022 10:22)  Plavix 75 mg oral tablet: 1 tab(s) orally once a day (30 Mar 2022 10:22)  pravastatin 40 mg oral tablet: 1 tab(s) orally once a day (30 Mar 2022 10:22)      MEDICATIONS  (STANDING):  atorvastatin 80 milliGRAM(s) Oral at bedtime  bisacodyl Suppository 10 milliGRAM(s) Rectal daily  calcitriol   Capsule 0.25 MICROGram(s) Oral daily  cyanocobalamin 1000 MICROGram(s) Oral daily  dextrose 5%. 1000 milliLiter(s) (50 mL/Hr) IV Continuous <Continuous>  dextrose 5%. 1000 milliLiter(s) (100 mL/Hr) IV Continuous <Continuous>  dextrose 50% Injectable 25 Gram(s) IV Push once  dextrose 50% Injectable 12.5 Gram(s) IV Push once  dextrose 50% Injectable 25 Gram(s) IV Push once  dipyridamole 200 mG/aspirin 25 mG 1 Capsule(s) Oral two times a day  ferrous    sulfate 325 milliGRAM(s) Oral daily  glucagon  Injectable 1 milliGRAM(s) IntraMuscular once  heparin   Injectable 5000 Unit(s) SubCutaneous every 8 hours  influenza  Vaccine (HIGH DOSE) 0.7 milliLiter(s) IntraMuscular once  insulin lispro (ADMELOG) corrective regimen sliding scale   SubCutaneous three times a day before meals  insulin lispro (ADMELOG) corrective regimen sliding scale   SubCutaneous at bedtime  iron sucrose Injectable 100 milliGRAM(s) IV Push every 24 hours  levothyroxine 75 MICROGram(s) Oral daily  senna 2 Tablet(s) Oral at bedtime  valproate sodium IVPB 500 milliGRAM(s) IV Intermittent every 12 hours    MEDICATIONS  (PRN):  ALBUTerol    90 MICROgram(s) HFA Inhaler 2 Puff(s) Inhalation every 6 hours PRN Shortness of Breath and/or Wheezing  dextrose Oral Gel 15 Gram(s) Oral once PRN Blood Glucose LESS THAN 70 milliGRAM(s)/deciliter  ondansetron Injectable 4 milliGRAM(s) IV Push every 8 hours PRN Nausea and/or Vomiting      No Known Allergies      Social History:  Lives at home w/ wife   ADL independent   Tobacco former smoker, quit 40 years ago, 20 pack year history  Alcohol denies  Drugs denies  COVID Pfizer x3   Occupation retired- former  (30 Mar 2022 08:48)      REVIEW OF SYSTEMS: I am ok  CONSTITUTIONAL: No fever, No chills, No fatigue, No myalgia, No Body ache, No Weakness  EYES: No eye pain,  No visual disturbances, No discharge, No Redness  ENMT: No ear pain, No nose bleed, No vertigo; No sinus pain, No throat pain, No Congestion  NECK: No pain, No stiffness  RESPIRATORY: No cough, No wheezing, No hemoptysis, No shortness of breath  CARDIOVASCULAR: No chest pain, No palpitations  GASTROINTESTINAL: No abdominal pain, No epigastric pain. No nausea, No vomiting, No diarrhea, No constipation; [ x ] BM - 1  GENITOURINARY: No dysuria, No frequency, No urgency, No incontinence  NEUROLOGICAL: No headaches, No dizziness, No numbness, No tingling, No tremors, [ x ] LUE weakness   EXTREMITIES: No Swelling, No Pain, No Edema  SKIN: [ x ] No itching, burning, rashes, or lesions   MUSCULOSKELETAL: No joint pain, No joint swelling; No muscle pain, +back pain, No extremity pain  PSYCHIATRIC: No depression, No anxiety, No mood swings, No difficulty sleeping at night  PAIN SCALE: [  ] None  [ x ] Other- Back pain  ROS Unable to obtain due to: [  ] Dementia  [  ] Lethargy  [  ] Sedated  [  ] Non verbal  REST OF REVIEW OF SYSTEMS: [ x ] Normal     Vital Signs Last 24 Hrs  T(C): 36.7 (02 Apr 2022 04:11), Max: 37.2 (01 Apr 2022 20:02)  T(F): 98 (02 Apr 2022 04:11), Max: 99 (01 Apr 2022 20:02)  HR: 72 (02 Apr 2022 04:11) (72 - 84)  BP: 136/62 (02 Apr 2022 04:11) (131/63 - 144/76)  BP(mean): --  RR: 18 (02 Apr 2022 04:11) (17 - 18)  SpO2: 92% (02 Apr 2022 04:11) (91% - 94%)    CAPILLARY BLOOD GLUCOSE      POCT Blood Glucose.: 119 mg/dL (02 Apr 2022 08:09)  POCT Blood Glucose.: 140 mg/dL (01 Apr 2022 21:37)  POCT Blood Glucose.: 132 mg/dL (01 Apr 2022 17:46)  POCT Blood Glucose.: 152 mg/dL (01 Apr 2022 12:05)      I&O's Summary    01 Apr 2022 07:01  -  02 Apr 2022 07:00  --------------------------------------------------------  IN: 612 mL / OUT: 1450 mL / NET: -838 mL      PHYSICAL EXAM:  GENERAL:  [ x ] NAD, [ x ] Well appearing, [  ] Agitated, [  ] Drowsy, [  ] Lethargy, [  ] Confused   HEAD:  [ x ] Normal, [  ] Other  EYES:  [ x ] EOMI, [ x ] PERRLA, [ x ] Conjunctiva and sclera clear normal, [  ] Other, [ x ] Pallor, [  ] Discharge  ENMT:  [ x ] Normal, [ x ] Moist mucous membranes, [  ] Good dentition, [ x ] No thrush  NECK:  [ x ] Supple, [ x ] No JVD, [x  ] Normal thyroid, [  ] Lymphadenopathy, [  ] Other  CHEST/LUNG:  [ x ] Clear to auscultation bilaterally, [ x ] Breath Sounds equal B/L, [ x ] Poor effort, [ x ] No rales, [ x ] No rhonchi, [ x ] No wheezing  HEART:  [ x ] Regular rate and rhythm, [  ] Tachycardia, [  ] Bradycardia, [  ] Irregular, [ x ] No murmurs, No rubs, No gallops, [  ] PPM in place (Mfr:  )  ABDOMEN:  [ x ] Soft, [ x ] Nontender, [ x ] Nondistended, [ x ] No mass, [ x ] Bowel sounds present, [  ] Obese  NERVOUS SYSTEM:  [ x ] Alert & Oriented x3, [  ] Nonfocal, [  ] Confusion, [  ] Encephalopathic, [  ] Sedated, [  ] Unable to assess, [  ] Dementia, [ x ] Other- LUE weakness compared to RUE, left tongue deviation   EXTREMITIES:  [ x ] 2+ Peripheral Pulses, No clubbing, No cyanosis,  [  ] Edema B/L lower EXT, [  ] PVD stasis skin changes B/L lower EXT, [  ] Wound  LYMPH:  No lymphadenopathy noted  SKIN:  [ x ] No rashes or lesions, [  ] Pressure ulcers, [  ] Ecchymosis, [  ] Skin tears, [  ] Other      DIET: pureed diet, Moderately thickened    LABS:                        10.7   6.97  )-----------( 207      ( 02 Apr 2022 08:34 )             33.4     02 Apr 2022 08:34    142    |  107    |  28     ----------------------------<  107    4.5     |  27     |  2.00     Ca    9.7        02 Apr 2022 08:34  Phos  3.5       02 Apr 2022 08:34  Mg     2.5       02 Apr 2022 08:34      PTT - ( 02 Apr 2022 08:34 )  PTT:26.0 sec    Culture Results:   No growth (03-31 @ 01:17)  Culture Results:   No growth to date. (03-30 @ 15:01)  Culture Results:   No growth to date. (03-30 @ 15:01)      CARDIAC MARKERS ( 31 Mar 2022 16:33 )  x     / x     / 202 U/L / x     / 3.1 ng/mL  CARDIAC MARKERS ( 30 Mar 2022 21:07 )  x     / x     / 342 U/L / x     / 12.8 ng/mL  CARDIAC MARKERS ( 30 Mar 2022 14:34 )  x     / x     / 398 U/L / x     / 18.4 ng/mL  CARDIAC MARKERS ( 30 Mar 2022 08:13 )  x     / x     / x     / x     / 13.1 ng/mL  CARDIAC MARKERS ( 30 Mar 2022 07:53 )  x     / x     / 328 U/L / x     / x            Culture - Urine (collected 31 Mar 2022 01:17)  Source: Clean Catch Clean Catch (Midstream)  Final Report (31 Mar 2022 21:27):    No growth    Culture - Blood (collected 30 Mar 2022 15:01)  Source: .Blood Blood-Peripheral  Preliminary Report (31 Mar 2022 16:01):    No growth to date.    Culture - Blood (collected 30 Mar 2022 15:01)  Source: .Blood Blood-Peripheral  Preliminary Report (31 Mar 2022 16:01):    No growth to date.       Anemia Panel:  Iron Total, Serum: 15 ug/dL (03-31-22 @ 10:51)  Iron - Total Binding Capacity.: 318 ug/dL (03-31-22 @ 10:51)  Ferritin, Serum: 53 ng/mL (03-31-22 @ 10:42)  Vitamin B12, Serum: 476 pg/mL (03-31-22 @ 10:42)  Folate, Serum: 10.7 ng/mL (03-31-22 @ 10:42)  Ferritin, Serum: 44 ng/mL (03-31-22 @ 05:21)  Iron - Total Binding Capacity.: 329 ug/dL (03-31-22 @ 05:21)  Iron Total, Serum: 11 ug/dL (03-31-22 @ 05:21)      Thyroid Panel:  T4, Serum: 6.2 ug/dL (03-31-22 @ 10:42)  Thyroid Stimulating Hormone, Serum: 2.99 uIU/mL (03-31-22 @ 07:24)  T4, Serum: 5.9 ug/dL (03-31-22 @ 05:21)  Thyroid Stimulating Hormone, Serum: 2.81 uIU/mL (03-30-22 @ 14:34)            Serum Pro-Brain Natriuretic Peptide: 9256 pg/mL (03-31-22 @ 07:24)  Serum Pro-Brain Natriuretic Peptide: 4963 pg/mL (03-30-22 @ 06:12)      RADIOLOGY & ADDITIONAL TESTS:      HEALTH ISSUES - PROBLEM Dx:  Acute pulmonary edema    Leukocytosis    Elevated troponin    Acute kidney injury superimposed on CKD    HTN (hypertension)    CAD (coronary artery disease)    Hypothyroidism    Need for prophylactic measure    Chronic HFrEF (heart failure with reduced ejection fraction)    Anemia    Elevated creatine kinase    Diabetes mellitus    CVA (cerebrovascular accident)    Tonsillar cancer          Consultant(s) Notes Reviewed:  [ x ] YES     Care Discussed with [ x ] Consultants, [ x ] Patient, [ x ] Family, [  ] HCP, [  ] , [ x ] Social Service, [ x ] RN, [  ] Physical Therapy, [  ] Palliative Care Team  DVT PPX: [  ] Lovenox, [ x ] SC Heparin, [  ] Coumadin, [  ] Xarelto, [  ] Eliquis, [  ] Pradaxa, [  ] IV Heparin drip, [  ] SCD, [  ] Ambulation, [  ] Contraindicated 2/2 GI Bleed, [  ] Contraindicated 2/2  Bleed, [  ] Contraindicated 2/2 Brain Bleed  Advanced Directive: [  ] None, [ x ] DNR Patient is a 76y old  Male who presents with a chief complaint of SOB (02 Apr 2022 09:32)    HPI:  77 yo male w/ PMHx of HTN, HLD, HFrEF (EF 30% 2009), 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 As per patient, he was watching a movie when he felt very short of breath suddenly and dropped to the floor, denies LOC and denies any trauma to his head, was on the floor for ~2 hours. Patient was unable to rise from the floor by himself due to his left arm weakness. When first brought to the ED, patient was hypoxic and hypertensive o2 saturation in EMS was >80%. While in the ED, patient had episode of siezure-like activity in his b/l upper extremities which was controlled w/ ativan. Patient was seen and examined at bedside, BiPAP still in place, patient was still mildly lethargic from ativan and could not speak well with bipap mask on. Patient able to open eyes and nod/shake head to questions. Patient denied any headache, vision changes, cp, abd pain, msk pain. Patient still short of breath.      In ED:   Vitals: HR 80, 176/82 -> 115/60, RR 20, 96% on BiPAP/CPAP  Labs significant for: WBC 20.24, H/H 10.8/24.1, D-Dimer 2924, Troponin 1011.9 > 5252.0, CKMB: 13.1, CPK%: 4%, BUN 33, Creatinine 2.3 (baseline unknown) eGFR 29, serum pro BNP 4963, creatine kinase 328, POCT glucose: 353  Imaging:   CXR: diffuse b/l scattered infiltrates  CT brain stroke: No acute hemorrhage, infarct, or mass effect   EKG: sinus tachy at ST- depressions in V5 V6   Received Lasix 40mg IVP x1, Aspirin 600mg x1, Hydralazine 10mg x1, Ativan 2mg IVP x1, Lispro 6u in the ED    (30 Mar 2022 08:48)    INTERVAL HPI:  3/31/2022: Patient overnight had clots in urine with slight pinkish appearance of urine, stat h/h showed that patients hgb dropped to 9.2 from 10.1. Heparin drip was continued as benefits outweighed risks. AM hgb showed recovery to 9.6. Patient also had 6 beats of vtach overnight, was asymptomatic. Patient seen and examined at bedside, denied any lightheadedness/dizziness, CP/palpitations, abd pain, dysuria, MSK pain, any sensory deficits. Patient continues to have LUE weakness. OBN IV Heparin drip, + CVA on CT Head  04/01/2022: Pt seen and examined at bedside. Hb stable this morning. Pt w no complaints this morning. Denies fever, chills, chest pain, palpitations, shortness of breath, palpitations, lightheadedness, dizziness, headache, n/v/d/c. TOV , d/c [facility] cath. heparin drip was dc in evening   4/2/22: Pt was seen and examined at bedside. Pt states that he had a BM yesterday, normal. AM Bladder scan revealed 450 cc urine but pt refused to be straight cath because he wants to urinate on his own. States that he cannot urinate because he is not on a regular diet. Denies history of BPH. Reports mild back pain, states that Tylenol does not help. Pt denies headache, dizziness, CP, SOB, palpitations, abdominal pain, n/v. No other complaints at this time. Will start Flomax.     OVERNIGHT EVENTS: Bladder scan yesterday evening with 1L urine, pt refused [facility]man-straight cath with 1L urine. This AM bladder scan revealed 450 cc urine but pt refused to be straight cath because he wants to urinate on his own.    Home Medications:  benazepril 10 mg oral tablet: 1 tab(s) orally once a day (30 Mar 2022 10:22)  calcitriol 0.25 mcg oral capsule: 1 cap(s) orally once a day (30 Mar 2022 10:22)  carvedilol 12.5 mg oral tablet: 1 tab(s) orally 2 times a day      *Last filled 5/21, spoke to MATTHEW Nguyen at Dr. Garcia&#x27;s office, she states patient should still be taking med as per MD notes from 2/22*  (30 Mar 2022 10:22)  hydrALAZINE 10 mg oral tablet: 0.5 tab(s) orally 3 times a day (30 Mar 2022 10:22)  isosorbide mononitrate 30 mg oral tablet, extended release: 1 tab(s) orally once a day (in the morning) (30 Mar 2022 10:22)  levothyroxine 75 mcg (0.075 mg) oral tablet: 1 tab(s) orally once a day (30 Mar 2022 10:22)  midodrine 10 mg oral tablet: 1 tab(s) orally 3 times a day (30 Mar 2022 10:22)  Plavix 75 mg oral tablet: 1 tab(s) orally once a day (30 Mar 2022 10:22)  pravastatin 40 mg oral tablet: 1 tab(s) orally once a day (30 Mar 2022 10:22)      MEDICATIONS  (STANDING):  atorvastatin 80 milliGRAM(s) Oral at bedtime  bisacodyl Suppository 10 milliGRAM(s) Rectal daily  calcitriol   Capsule 0.25 MICROGram(s) Oral daily  cyanocobalamin 1000 MICROGram(s) Oral daily  dextrose 5%. 1000 milliLiter(s) (50 mL/Hr) IV Continuous <Continuous>  dextrose 5%. 1000 milliLiter(s) (100 mL/Hr) IV Continuous <Continuous>  dextrose 50% Injectable 25 Gram(s) IV Push once  dextrose 50% Injectable 12.5 Gram(s) IV Push once  dextrose 50% Injectable 25 Gram(s) IV Push once  dipyridamole 200 mG/aspirin 25 mG 1 Capsule(s) Oral two times a day  ferrous    sulfate 325 milliGRAM(s) Oral daily  glucagon  Injectable 1 milliGRAM(s) IntraMuscular once  heparin   Injectable 5000 Unit(s) SubCutaneous every 8 hours  influenza  Vaccine (HIGH DOSE) 0.7 milliLiter(s) IntraMuscular once  insulin lispro (ADMELOG) corrective regimen sliding scale   SubCutaneous three times a day before meals  insulin lispro (ADMELOG) corrective regimen sliding scale   SubCutaneous at bedtime  iron sucrose Injectable 100 milliGRAM(s) IV Push every 24 hours  levothyroxine 75 MICROGram(s) Oral daily  senna 2 Tablet(s) Oral at bedtime  valproate sodium IVPB 500 milliGRAM(s) IV Intermittent every 12 hours    MEDICATIONS  (PRN):  ALBUTerol    90 MICROgram(s) HFA Inhaler 2 Puff(s) Inhalation every 6 hours PRN Shortness of Breath and/or Wheezing  dextrose Oral Gel 15 Gram(s) Oral once PRN Blood Glucose LESS THAN 70 milliGRAM(s)/deciliter  ondansetron Injectable 4 milliGRAM(s) IV Push every 8 hours PRN Nausea and/or Vomiting      No Known Allergies      Social History:  Lives at home w/ wife   ADL independent   Tobacco former smoker, quit 40 years ago, 20 pack year history  Alcohol denies  Drugs denies  COVID Pf[employer] x3   Occupation retired- former  (30 Mar 2022 08:48)      REVIEW OF SYSTEMS: I am ok  CONSTITUTIONAL: No fever, No chills, No fatigue, No myalgia, No Body ache, No Weakness  EYES: No eye pain,  No visual disturbances, No discharge, No Redness  ENMT: No ear pain, No nose bleed, No vertigo; No sinus pain, No throat pain, No Congestion  NECK: No pain, No stiffness  RESPIRATORY: No cough, No wheezing, No hemoptysis, No shortness of breath  CARDIOVASCULAR: No chest pain, No palpitations  GASTROINTESTINAL: No abdominal pain, No epigastric pain. No nausea, No vomiting, No diarrhea, No constipation; [ x ] BM - 1  GENITOURINARY: No dysuria, No frequency, No urgency, No incontinence  NEUROLOGICAL: No headaches, No dizziness, No numbness, No tingling, No tremors, [ x ] LUE weakness   EXTREMITIES: No Swelling, No Pain, No Edema  SKIN: [ x ] No itching, burning, rashes, or lesions   MUSCULOSKELETAL: No joint pain, No joint swelling; No muscle pain, +back pain, No extremity pain  PSYCHIATRIC: No depression, No anxiety, No mood swings, No difficulty sleeping at night  PAIN SCALE: [  ] None  [ x ] Other- Back pain  ROS Unable to obtain due to: [  ] Dementia  [  ] Lethargy  [  ] Sedated  [  ] Non verbal  REST OF REVIEW OF SYSTEMS: [ x ] Normal     Vital Signs Last 24 Hrs  T(C): 36.7 (02 Apr 2022 04:11), Max: 37.2 (01 Apr 2022 20:02)  T(F): 98 (02 Apr 2022 04:11), Max: 99 (01 Apr 2022 20:02)  HR: 72 (02 Apr 2022 04:11) (72 - 84)  BP: 136/62 (02 Apr 2022 04:11) (131/63 - 144/76)  BP(mean): --  RR: 18 (02 Apr 2022 04:11) (17 - 18)  SpO2: 92% (02 Apr 2022 04:11) (91% - 94%)    CAPILLARY BLOOD GLUCOSE      POCT Blood Glucose.: 119 mg/dL (02 Apr 2022 08:09)  POCT Blood Glucose.: 140 mg/dL (01 Apr 2022 21:37)  POCT Blood Glucose.: 132 mg/dL (01 Apr 2022 17:46)  POCT Blood Glucose.: 152 mg/dL (01 Apr 2022 12:05)      I&O's Summary    01 Apr 2022 07:01  -  02 Apr 2022 07:00  --------------------------------------------------------  IN: 612 mL / OUT: 1450 mL / NET: -838 mL      PHYSICAL EXAM:  GENERAL:  [ x ] NAD, [ x ] Well appearing, [  ] Agitated, [  ] Drowsy, [  ] Lethargy, [  ] Confused   HEAD:  [ x ] Normal, [  ] Other  EYES:  [ x ] EOMI, [ x ] PERRLA, [ x ] Conjunctiva and sclera clear normal, [  ] Other, [ x ] Pallor, [  ] Discharge  ENMT:  [ x ] Normal, [ x ] Moist mucous membranes, [  ] Good dentition, [ x ] No thrush  NECK:  [ x ] Supple, [ x ] No JVD, [x  ] Normal thyroid, [  ] Lymphadenopathy, [  ] Other  CHEST/LUNG:  [ x ] Clear to auscultation bilaterally, [ x ] Breath Sounds equal B/L, [ x ] Poor effort, [ x ] No rales, [ x ] No rhonchi, [ x ] No wheezing  HEART:  [ x ] Regular rate and rhythm, [  ] Tachycardia, [  ] Bradycardia, [  ] Irregular, [ x ] No murmurs, No rubs, No gallops, [  ] PPM in place (Mfr:  )  ABDOMEN:  [ x ] Soft, [ x ] Nontender, [ x ] Nondistended, [ x ] No mass, [ x ] Bowel sounds present, [  ] Obese  NERVOUS SYSTEM:  [ x ] Alert & Oriented x3, [  ] Nonfocal, [  ] Confusion, [  ] Encephalopathic, [  ] Sedated, [  ] Unable to assess, [  ] Dementia, [ x ] Other- LUE weakness compared to RUE, left tongue deviation   EXTREMITIES:  [ x ] 2+ Peripheral Pulses, No clubbing, No cyanosis,  [  ] Edema B/L lower EXT, [  ] PVD stasis skin changes B/L lower EXT, [  ] Wound  LYMPH:  No lymphadenopathy noted  SKIN:  [ x ] No rashes or lesions, [  ] Pressure ulcers, [  ] Ecchymosis, [  ] Skin tears, [  ] Other      DIET: pureed diet, Moderately thickened    LABS:                        10.7   6.97  )-----------( 207      ( 02 Apr 2022 08:34 )             33.4     02 Apr 2022 08:34    142    |  107    |  28     ----------------------------<  107    4.5     |  27     |  2.00     Ca    9.7        02 Apr 2022 08:34  Phos  3.5       02 Apr 2022 08:34  Mg     2.5       02 Apr 2022 08:34      PTT - ( 02 Apr 2022 08:34 )  PTT:26.0 sec    Culture Results:   No growth (03-31 @ 01:17)  Culture Results:   No growth to date. (03-30 @ 15:01)  Culture Results:   No growth to date. (03-30 @ 15:01)      CARDIAC MARKERS ( 31 Mar 2022 16:33 )  x     / x     / 202 U/L / x     / 3.1 ng/mL  CARDIAC MARKERS ( 30 Mar 2022 21:07 )  x     / x     / 342 U/L / x     / 12.8 ng/mL  CARDIAC MARKERS ( 30 Mar 2022 14:34 )  x     / x     / 398 U/L / x     / 18.4 ng/mL  CARDIAC MARKERS ( 30 Mar 2022 08:13 )  x     / x     / x     / x     / 13.1 ng/mL  CARDIAC MARKERS ( 30 Mar 2022 07:53 )  x     / x     / 328 U/L / x     / x            Culture - Urine (collected 31 Mar 2022 01:17)  Source: Clean Catch Clean Catch (Midstream)  Final Report (31 Mar 2022 21:27):    No growth    Culture - Blood (collected 30 Mar 2022 15:01)  Source: .Blood Blood-Peripheral  Preliminary Report (31 Mar 2022 16:01):    No growth to date.    Culture - Blood (collected 30 Mar 2022 15:01)  Source: .Blood Blood-Peripheral  Preliminary Report (31 Mar 2022 16:01):    No growth to date.       Anemia Panel:  Iron Total, Serum: 15 ug/dL (03-31-22 @ 10:51)  Iron - Total Binding Capacity.: 318 ug/dL (03-31-22 @ 10:51)  Ferritin, Serum: 53 ng/mL (03-31-22 @ 10:42)  Vitamin B12, Serum: 476 pg/mL (03-31-22 @ 10:42)  Folate, Serum: 10.7 ng/mL (03-31-22 @ 10:42)  Ferritin, Serum: 44 ng/mL (03-31-22 @ 05:21)  Iron - Total Binding Capacity.: 329 ug/dL (03-31-22 @ 05:21)  Iron Total, Serum: 11 ug/dL (03-31-22 @ 05:21)      Thyroid Panel:  T4, Serum: 6.2 ug/dL (03-31-22 @ 10:42)  Thyroid Stimulating Hormone, Serum: 2.99 uIU/mL (03-31-22 @ 07:24)  T4, Serum: 5.9 ug/dL (03-31-22 @ 05:21)  Thyroid Stimulating Hormone, Serum: 2.81 uIU/mL (03-30-22 @ 14:34)            Serum Pro-Brain Natriuretic Peptide: 9256 pg/mL (03-31-22 @ 07:24)  Serum Pro-Brain Natriuretic Peptide: 4963 pg/mL (03-30-22 @ 06:12)      RADIOLOGY & ADDITIONAL TESTS:      HEALTH ISSUES - PROBLEM Dx:  Acute pulmonary edema    Leukocytosis    Elevated troponin    Acute kidney injury superimposed on CKD    HTN (hypertension)    CAD (coronary artery disease)    Hypothyroidism    Need for prophylactic measure    Chronic HFrEF (heart failure with reduced ejection fraction)    Anemia    Elevated creatine kinase    Diabetes mellitus    CVA (cerebrovascular accident)    Tonsillar cancer          Consultant(s) Notes Reviewed:  [ x ] YES     Care Discussed with [ x ] Consultants, [ x ] Patient, [ x ] Family,- wife  [  ] HCP, [  ] , [ x ] Social Service, [ x ] RN, [  ] Physical Therapy, [  ] Palliative Care Team  DVT PPX: [  ] Lovenox, [ x ] SC Heparin, [  ] Coumadin, [  ] Xarelto, [  ] Eliquis, [  ] Pradaxa, [  ] IV Heparin drip, [  ] SCD, [  ] Ambulation, [  ] Contraindicated 2/2 GI Bleed, [  ] Contraindicated 2/2  Bleed, [  ] Contraindicated 2/2 Brain Bleed  Advanced Directive: [  ] None, [ x ] DNR/DNI

## 2022-04-02 NOTE — PROGRESS NOTE ADULT - PROBLEM SELECTOR PLAN 2
Patient w/ left upper extremity weakness of unknown duration, 2/2 to CVA   - patient w/ seizure like activity in ED s/p ativan, EEG normal  - started on valproate by neuro, will continue   - CT brain stroke negative for acute hemorrhage or infarct, repeat CT: acute stroke in right central sulcus   - on Aggrenox, high dose statin - May change Aggrenox to ASA if okay with Neuro   - Neuro [facility] following   - PT/OT ---> [facility]   - MBS: Puree with Moderately thick liquids, all food and liquids via teaspoon presentation, utilizing chin tuck and 2 swallows after each bolus Patient w/ left upper extremity weakness of unknown duration, 2/2 to CVA   - patient w/ seizure like activity in ED s/p ativan, EEG normal  - started on valproate by neuro, will continue   - CT brain stroke negative for acute hemorrhage or infarct, repeat CT: acute stroke in right central sulcus   - On Aggrenox, high dose statin, continue   - Neuro [facility] following   - PT/OT ---> [facility]   - MBS: Puree with Moderately thick liquids, all food and liquids via teaspoon presentation, utilizing chin tuck and 2 swallows after each bolus

## 2022-04-02 NOTE — PROGRESS NOTE ADULT - ASSESSMENT
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 Admitted for pulmonary edema, leukocytosis, elevated troponin, JARRED on CKD-3, found to have right sulcus CVA and NSTEMI.

## 2022-04-02 NOTE — PROGRESS NOTE ADULT - PROBLEM SELECTOR PLAN 5
TTE 2009 showed LVEF 30% w/ severely reduced ejection fraction   - f/u repeat TTE   - Continue to HOLD  home Imdur and carvedilol   - Continue to HOLD home benazepril in setting of  JARRED/CKD 3  - s/p lasix 40mg IVPx 2 doses on 3/30, continue to HOLD Lasix and diurese prn   - [facility] following  - Does not appear volume overloaded on physical exam, continue to monitor volume status TTE 2009 showed LVEF 30% w/ severely reduced ejection fraction Chronic Systolic CHF  - f/u repeat TTE   - Continue to HOLD  home Imdur and carvedilol   - Continue to HOLD home benazepril in setting of  JARRED/CKD 3  - s/p lasix 40mg IVPx 2 doses on 3/30, continue to HOLD Lasix and diurese prn   - [facility] following  - Does not appear volume overloaded on physical exam, continue to monitor volume status

## 2022-04-02 NOTE — PROGRESS NOTE ADULT - SUBJECTIVE AND OBJECTIVE BOX
[facility] -- Rianna Horn, Selma Terrell, Vlad Velarde Savella, [place]  Office # [phone]    Follow Up:  Hypertensive Emergency, CVA, NSTEMI    Subjective/Observations: Awake and alert, comfortable on RA.  Denies any form of respiratory or cardiac discomfort.  No tele events.  No overnight events    REVIEW OF SYSTEMS: All other review of systems is negative unless indicated above  PAST MEDICAL & SURGICAL HISTORY:  MI (myocardial infarction)  1992    HTN (hypertension)    HLD (hyperlipidemia)    Throat cancer  2011, treated with chemo, RT    Ventricular arrhythmia  s/p AICD    Diabetes  Type2, not on any meds since weight loss after throat Ca    Renal insufficiency  s/p chemo    CAD (coronary artery disease)    Inguinal hernia, left    H/O prior ablation treatment  VT, 2009    Cardiac defibrillator in place  - 2009, battery change 2017    S/P left inguinal hernia repair  2018 at [place]    MEDICATIONS  (STANDING):  atorvastatin 80 milliGRAM(s) Oral at bedtime  bisacodyl Suppository 10 milliGRAM(s) Rectal daily  calcitriol   Capsule 0.25 MICROGram(s) Oral daily  cyanocobalamin 1000 MICROGram(s) Oral daily  dextrose 5%. 1000 milliLiter(s) (50 mL/Hr) IV Continuous <Continuous>  dextrose 5%. 1000 milliLiter(s) (100 mL/Hr) IV Continuous <Continuous>  dextrose 50% Injectable 25 Gram(s) IV Push once  dextrose 50% Injectable 12.5 Gram(s) IV Push once  dextrose 50% Injectable 25 Gram(s) IV Push once  dipyridamole 200 mG/aspirin 25 mG 1 Capsule(s) Oral two times a day  ferrous    sulfate 325 milliGRAM(s) Oral daily  glucagon  Injectable 1 milliGRAM(s) IntraMuscular once  heparin   Injectable 5000 Unit(s) SubCutaneous every 8 hours  influenza  Vaccine (HIGH DOSE) 0.7 milliLiter(s) IntraMuscular once  insulin lispro (ADMELOG) corrective regimen sliding scale   SubCutaneous three times a day before meals  insulin lispro (ADMELOG) corrective regimen sliding scale   SubCutaneous at bedtime  iron sucrose Injectable 100 milliGRAM(s) IV Push every 24 hours  levothyroxine 75 MICROGram(s) Oral daily  senna 2 Tablet(s) Oral at bedtime  valproate sodium IVPB 500 milliGRAM(s) IV Intermittent every 12 hours    MEDICATIONS  (PRN):  ALBUTerol    90 MICROgram(s) HFA Inhaler 2 Puff(s) Inhalation every 6 hours PRN Shortness of Breath and/or Wheezing  dextrose Oral Gel 15 Gram(s) Oral once PRN Blood Glucose LESS THAN 70 milliGRAM(s)/deciliter  ondansetron Injectable 4 milliGRAM(s) IV Push every 8 hours PRN Nausea and/or Vomiting    Allergies    No Known Allergies    Intolerances    Vital Signs Last 24 Hrs  T(C): 36.7 (02 Apr 2022 04:11), Max: 37.2 (01 Apr 2022 20:02)  T(F): 98 (02 Apr 2022 04:11), Max: 99 (01 Apr 2022 20:02)  HR: 72 (02 Apr 2022 04:11) (72 - 84)  BP: 136/62 (02 Apr 2022 04:11) (131/63 - 144/76)  BP(mean): --  RR: 18 (02 Apr 2022 04:11) (17 - 18)  SpO2: 92% (02 Apr 2022 04:11) (91% - 94%)  I&O's Summary    01 Apr 2022 07:01  -  02 Apr 2022 07:00  --------------------------------------------------------  IN: 612 mL / OUT: 1450 mL / NET: -838 mL    PHYSICAL EXAM:  TELE: Afib  Constitutional: NAD, awake and alert, well-developed  HEENT: Moist Mucous Membranes, Anicteric  Pulmonary: Non-labored, breath sounds are clear bilaterally, No wheezing, rales or rhonchi  Cardiovascular: IRRR, S1 and S2, +murmurs, no rubs, gallops or clicks  Gastrointestinal: Bowel Sounds present, soft, nontender.   Lymph: No peripheral edema. No lymphadenopathy.  Skin: No visible rashes or ulcers.  Psych:  Mood & affect: Flat    LABS: All Labs Reviewed:                        10.7   6.97  )-----------( 207      ( 02 Apr 2022 08:34 )             33.4                         9.4    9.54  )-----------( 190      ( 01 Apr 2022 06:36 )             29.1                         9.6    11.68 )-----------( 188      ( 31 Mar 2022 07:24 )             30.4     02 Apr 2022 08:34    142    |  107    |  28     ----------------------------<  107    4.5     |  27     |  2.00   01 Apr 2022 06:37    142    |  106    |  36     ----------------------------<  147    3.6     |  24     |  2.10   31 Mar 2022 07:24    142    |  107    |  35     ----------------------------<  150    3.5     |  25     |  2.30     Ca    9.7        02 Apr 2022 08:34  Ca    8.8        01 Apr 2022 06:37  Ca    8.9        31 Mar 2022 07:24  Phos  3.5       02 Apr 2022 08:34  Phos  3.3       01 Apr 2022 06:37  Phos  2.8       31 Mar 2022 07:24  Mg     2.5       02 Apr 2022 08:34  Mg     2.3       01 Apr 2022 06:37  Mg     2.2       31 Mar 2022 07:24    TPro  6.0    /  Alb  2.7    /  TBili  0.9    /  DBili  x      /  AST  41     /  ALT  42     /  AlkPhos  77     01 Apr 2022 06:37  TPro  6.4    /  Alb  2.9    /  TBili  1.3    /  DBili  x      /  AST  51     /  ALT  38     /  AlkPhos  83     31 Mar 2022 07:24    PTT - ( 02 Apr 2022 08:34 )  PTT:26.0 sec  CARDIAC MARKERS ( 31 Mar 2022 16:33 )  x     / x     / 202 U/L / x     / 3.1 ng/mL         Yoselyn Schwartz [facility]  Cardiology   Spectra #[phone]      [facility] -- Rianna Horn, Selma Terrell, Vlad Velarde Savella, [place]  Office # [phone]    Follow Up:  Hypertensive Emergency, CVA, NSTEMI    Subjective/Observations: Awake and alert, comfortable on RA.  Denies any form of respiratory or cardiac discomfort.  No tele events.  No overnight events    REVIEW OF SYSTEMS: All other review of systems is negative unless indicated above  PAST MEDICAL & SURGICAL HISTORY:  MI (myocardial infarction)  1992    HTN (hypertension)    HLD (hyperlipidemia)    Throat cancer  2011, treated with chemo, RT    Ventricular arrhythmia  s/p AICD    Diabetes  Type2, not on any meds since weight loss after throat Ca    Renal insufficiency  s/p chemo    CAD (coronary artery disease)    Inguinal hernia, left    H/O prior ablation treatment  VT, 2009    Cardiac defibrillator in place  - 2009, battery change 2017    S/P left inguinal hernia repair  2018 at [place]    MEDICATIONS  (STANDING):  atorvastatin 80 milliGRAM(s) Oral at bedtime  bisacodyl Suppository 10 milliGRAM(s) Rectal daily  calcitriol   Capsule 0.25 MICROGram(s) Oral daily  cyanocobalamin 1000 MICROGram(s) Oral daily  dextrose 5%. 1000 milliLiter(s) (50 mL/Hr) IV Continuous <Continuous>  dextrose 5%. 1000 milliLiter(s) (100 mL/Hr) IV Continuous <Continuous>  dextrose 50% Injectable 25 Gram(s) IV Push once  dextrose 50% Injectable 12.5 Gram(s) IV Push once  dextrose 50% Injectable 25 Gram(s) IV Push once  dipyridamole 200 mG/aspirin 25 mG 1 Capsule(s) Oral two times a day  ferrous    sulfate 325 milliGRAM(s) Oral daily  glucagon  Injectable 1 milliGRAM(s) IntraMuscular once  heparin   Injectable 5000 Unit(s) SubCutaneous every 8 hours  influenza  Vaccine (HIGH DOSE) 0.7 milliLiter(s) IntraMuscular once  insulin lispro (ADMELOG) corrective regimen sliding scale   SubCutaneous three times a day before meals  insulin lispro (ADMELOG) corrective regimen sliding scale   SubCutaneous at bedtime  iron sucrose Injectable 100 milliGRAM(s) IV Push every 24 hours  levothyroxine 75 MICROGram(s) Oral daily  senna 2 Tablet(s) Oral at bedtime  valproate sodium IVPB 500 milliGRAM(s) IV Intermittent every 12 hours    MEDICATIONS  (PRN):  ALBUTerol    90 MICROgram(s) HFA Inhaler 2 Puff(s) Inhalation every 6 hours PRN Shortness of Breath and/or Wheezing  dextrose Oral Gel 15 Gram(s) Oral once PRN Blood Glucose LESS THAN 70 milliGRAM(s)/deciliter  ondansetron Injectable 4 milliGRAM(s) IV Push every 8 hours PRN Nausea and/or Vomiting    Allergies    No Known Allergies    Intolerances    Vital Signs Last 24 Hrs  T(C): 36.7 (02 Apr 2022 04:11), Max: 37.2 (01 Apr 2022 20:02)  T(F): 98 (02 Apr 2022 04:11), Max: 99 (01 Apr 2022 20:02)  HR: 72 (02 Apr 2022 04:11) (72 - 84)  BP: 136/62 (02 Apr 2022 04:11) (131/63 - 144/76)  BP(mean): --  RR: 18 (02 Apr 2022 04:11) (17 - 18)  SpO2: 92% (02 Apr 2022 04:11) (91% - 94%)  I&O's Summary    01 Apr 2022 07:01  -  02 Apr 2022 07:00  --------------------------------------------------------  IN: 612 mL / OUT: 1450 mL / NET: -838 mL    PHYSICAL EXAM:  TELE: NSR   Constitutional: NAD, awake and alert, well-developed  HEENT: Moist Mucous Membranes, Anicteric  Pulmonary: Non-labored, breath sounds are clear bilaterally, No wheezing, rales or rhonchi  Cardiovascular: IRRR, S1 and S2, +murmurs, no rubs, gallops or clicks  Gastrointestinal: Bowel Sounds present, soft, nontender.   Lymph: No peripheral edema. No lymphadenopathy.  Skin: No visible rashes or ulcers.  Psych:  Mood & affect: appropriate    LABS: All Labs Reviewed:                        10.7   6.97  )-----------( 207      ( 02 Apr 2022 08:34 )             33.4                         9.4    9.54  )-----------( 190      ( 01 Apr 2022 06:36 )             29.1                         9.6    11.68 )-----------( 188      ( 31 Mar 2022 07:24 )             30.4     02 Apr 2022 08:34    142    |  107    |  28     ----------------------------<  107    4.5     |  27     |  2.00   01 Apr 2022 06:37    142    |  106    |  36     ----------------------------<  147    3.6     |  24     |  2.10   31 Mar 2022 07:24    142    |  107    |  35     ----------------------------<  150    3.5     |  25     |  2.30     Ca    9.7        02 Apr 2022 08:34  Ca    8.8        01 Apr 2022 06:37  Ca    8.9        31 Mar 2022 07:24  Phos  3.5       02 Apr 2022 08:34  Phos  3.3       01 Apr 2022 06:37  Phos  2.8       31 Mar 2022 07:24  Mg     2.5       02 Apr 2022 08:34  Mg     2.3       01 Apr 2022 06:37  Mg     2.2       31 Mar 2022 07:24    TPro  6.0    /  Alb  2.7    /  TBili  0.9    /  DBili  x      /  AST  41     /  ALT  42     /  AlkPhos  77     01 Apr 2022 06:37  TPro  6.4    /  Alb  2.9    /  TBili  1.3    /  DBili  x      /  AST  51     /  ALT  38     /  AlkPhos  83     31 Mar 2022 07:24    PTT - ( 02 Apr 2022 08:34 )  PTT:26.0 sec  CARDIAC MARKERS ( 31 Mar 2022 16:33 )  x     / x     / 202 U/L / x     / 3.1 ng/mL    TTE pending       ACC: [id number] EXAM:  CT CHEST                          *** ADDENDUM***    Findings discussed with Dr. Rich Patten at 3/31/2022 2:35 PM with   readback.    --- End of Report ---    *** END OF ADDENDUM***    PROCEDURE DATE:  03/31/2022      INTERPRETATION:  CLINICAL INFORMATION: 76 years  Male with opacifications   on cxr r/o PNA.    COMPARISON: None.    CONTRAST/COMPLICATIONS:  IV Contrast: IV contrast documented in associated exam  Oral Contrast: NONE  Complications: None reported attime of study completion    PROCEDURE:  CT of the Chest was performed.  Sagittal and coronal reformats were performed.    FINDINGS:    LUNGS AND AIRWAYS: Patent central airways.  Mild centrilobular emphysema.   Nodular posterior right upper lobe infiltrate. Smooth intralobular septal   thickening.  PLEURA: Small to moderate bilateral pleural effusions. Bilateral   calcified pleural plaques.  MEDIASTINUM AND PETE: No lymphadenopathy.  VESSELS: Aortic atherosclerosis without aneurysm. Coronary   atherosclerosis.  HEART: Heart size is normal. Pacemaker/defibrillator leads in the heart.   No pericardial effusion.  CHEST WALL AND LOWER NECK: Pacemaker pulse generator in the left chest   wall.  VISUALIZED UPPER ABDOMEN: Within normal limits.  BONES: Degenerative changes.    IMPRESSION:  Right upper lobe pneumonia superimposed on mild pulmonary edema.   Bilateral pleural effusions.    Pacemaker/defibrillator.    Other chronic findings as above.    --- End of Report ---    ***Please see the addendum at the top of this report. It may contain   additional important information or changes.****    LASHAE HAYES MD; Attending Radiologist  This document has been electronically signed. Mar 31 2022  9:21AM  Addend:LASHAE HAYES MD; Attending Radiologist  This addendum was electronically signed on: Mar 31 2022  2:35PM.  \    ACC: [id number] EXAM:  CT BRAIN                          PROCEDURE DATE:  03/31/2022          INTERPRETATION:  Noncontrast CT of the brain.    CLINICAL INDICATION:  Left-sided weakness    TECHNIQUE : Axial CT scanning of the brain was obtained from the skull   base to the vertex without the administration of intravenous contrast.   Sagittal and coronal reformats were provided.    COMPARISON: CT brain 3/30/2022    FINDINGS:    Interval demarcation of cytotoxic edema in the region of the right   perirolandic cortex. No CT evidence for hemorrhagic transformation.    No hydrocephalus, midline shift, or effacement of basal cisterns.    Mild right maxillary sinus shows thickening. Remaining visualized   paranasal sinuses and mastoid air cells are clear.    IMPRESSION:    Interval demarcation of the right perirolandic cortex infarct.  No CT evidence for hemorrhagic transformation.    Dr. Moreno discussed these findings with Dr. Manuel on 3/31/2022 9:19 AM   with read back.    --- End of Report---    ANUP MORENO MD; Attending Radiologist  This document has been electronically signed. Mar 31 2022  9:20AM     Ventricular Rate 103 BPM    Atrial Rate 103 BPM    P-R Interval 154 ms    QRS Duration 124 ms    Q-T Interval 382 ms    QTC Calculation(Bazett) 500 ms    P Axis 56 degrees    R Axis -22 degrees    T Axis 105 degrees    Diagnosis Line Sinus tachycardia with premature atrial complexes with aberrant conduction  Possible Left atrial enlargement  Left ventricular hypertrophy with QRS widening ( Monument product )  Anteroseptal infarct (cited on or before 01-AUG-2018)  ST & T wave abnormality, consider lateral ischemia  Abnormal ECG  When compared with ECG of 01-AUG-2018 11:46,  rhythm no longer ap, hr faster, stt abns prob unchanged  Confirmed by Isaac Hahn MD ([id number]) on 3/30/2022 1:17:19 PM

## 2022-04-02 NOTE — PROGRESS NOTE ADULT - PROBLEM SELECTOR PLAN 8
Patient w/ admission CK of 328  2/2 likely NSTEMI  - less likely rhabdo, likely elevated in the setting of chronic kidney disease   - repeat CK, trending down  - F/u repeat TTE

## 2022-04-02 NOTE — PROGRESS NOTE ADULT - PROBLEM SELECTOR PLAN 11
S/P SX & RT in past  Pt seen by DR Lynn -ENT as out pt - saw him 3 weeks ago  - S+S and MBS: Puree with Moderately thick liquids, all food and liquids via teaspoon presentation, utilizing chin tuck and 2 swallows after each bolus  - D/W pt & wife both about diet change.

## 2022-04-02 NOTE — PROGRESS NOTE ADULT - PROBLEM SELECTOR PLAN 6
Chronic, stable -CKD 3   - patient w/ admission Cr 2.3, baseline based off of outpatient records 2.0-2.1, GFR 29   - avoid nephrotoxic medications, HOLD patients home benazepril   - f/u urine lytes, BMP in AM   - Nephro Dr. Porter following

## 2022-04-02 NOTE — PROGRESS NOTE ADULT - PROBLEM SELECTOR PLAN 7
Patient w/ admission Hgb of 10.8, baseline 12   - Hb stable   - 03/30 patient w/ hematuria; No gross hematuria today, continue to monitor    - transfusion goal hgb>8   - Iron panel consistent w/ iron deficiency anemia - Venofer 100 mg x3  - trend daily CBCs  - Hematology Dr. Pandya following   - Hep drip dc on 4/1, now on Hep subq for ppx

## 2022-04-02 NOTE — PROGRESS NOTE ADULT - ASSESSMENT
Assessment/Plan: 76 yr old male with stated hx significant for HFrEF, s/p AICD, tonsillar Ca s/p Lt tonsillectomy, Lt partial glossectomy, s/p Rt CEA, DM2, hypothyroidism, CKD3 who  presented from home via EMS after being found of floor with LUE weakness, sob. Consult called for hypoxic resp failure secondary to stroke,  hypertensive emergency, r/o CVA.    HTN emergency/CVA/Pulmonary Edema  - BP now stable at systolic 130's.  No further neuro symptoms  - Avoid hypotension.  Continue to hold all BP meds for now  - CT head and EEG noted.  Follow Neuro recs  - On Aggrenox and statin   - +AICD Medtronic interrogation on chart>  [employer] life: 4.4yrs.  No event noted since 1/28/22.  Device set at: AAIR-DDDR.  Rate   - Remains euvolemic on exam, s/p Lasix.  Continue to hold daily Lasix.  Diurese prn only in the setting of JARRED on CKD    NSTEMI  - hsT peaked at 86096 and trended down to 9200.  EKG showed ST depression in lateral leads.  - s/p Heparin gtt x  48 hrs.    - On Aggrenox.  Would switch to ASA if okay with Neuro.  Discussed with Primary  - Would resume home BB once BP is optimized.  Continue high intensity statin  - No ischemic symptoms.  Repeat EKG  - f/u official TTE report  - NSR on tele with no events  - Will eventually do ischemic eval once renally stable.  Would need clearance from Renal    - Monitor and replete Lytes. Keep K > 4 and Mg > 2  - Will eventually do ischemic eval  - Continue to monitor on tele    - All other medical needs as per primary team.  - Other cardiovascular workup will depend on clinical course.  - Will continue to follow.    Yoselyn Schwartz DNP, NP-C  Cardiology   Spectra #[phone]         Assessment/Plan: 76 yr old male with stated hx significant for HFrEF, s/p AICD, tonsillar Ca s/p Lt tonsillectomy, Lt partial glossectomy, s/p Rt CEA, DM2, hypothyroidism, CKD3 who  presented from home via EMS after being found of floor with LUE weakness, sob. Consult called for hypoxic resp failure secondary to stroke,  hypertensive emergency, r/o CVA.    HTN emergency/CVA/Pulmonary Edema  - BP now stable at systolic 130's.  No further neuro symptoms  - Avoid hypotension.  Continue to hold all BP meds for now  - CT head and EEG noted.  Follow Neuro recs  - On Aggrenox and statin   - +AICD Medtronic interrogation on chart>  [employer] life: 4.4yrs.  No event noted since 1/28/22.  Device set at: AAIR-DDDR.  Rate   - Remains euvolemic on exam, s/p Lasix.  Continue to hold daily Lasix.  Diurese prn only in the setting of JARRED on CKD    NSTEMI  - hsT peaked at 95794 and trended down to 9200.  EKG showed ST depression in lateral leads.  - s/p Heparin gtt x  48 hrs.    - On Aggrenox.  If this is better strategy from neuro perspective, I am fine with this from a cardiac perspective  - Would resume home BB once BP is optimized.  Continue high intensity statin  - No ischemic symptoms.  Repeat EKG  - f/u official TTE report  - NSR on tele with no events  - Will eventually do ischemic eval once renally stable.  Would need clearance from Renal    - Monitor and replete Lytes. Keep K > 4 and Mg > 2  - Will eventually do ischemic eval  - Continue to monitor on tele    - All other medical needs as per primary team.  - Other cardiovascular workup will depend on clinical course.  - Will continue to follow.    Yoselyn Schwartz DNP, NP-C  Cardiology   Spectra #[phone]

## 2022-04-02 NOTE — PROVIDER CONTACT NOTE (OTHER) - SITUATION
Repeated bladder scan, showed an amount of greater than 450ml of urine. Pt is refusing to be straight cath. Pt stated he it done later after he tries to void.

## 2022-04-02 NOTE — PROGRESS NOTE ADULT - SUBJECTIVE AND OBJECTIVE BOX
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 As per patient, he was watching a movie when he felt very short of breath suddenly and dropped to the floor, denies LOC and denies any trauma to his head, was on the floor for ~2 hours. Patient was unable to rise from the floor by himself due to his left arm weakness. When first brought to the ED, patient was hypoxic and hypertensive o2 saturation in EMS was >80%. While in the ED, patient had episode of siezure-like activity in his b/l upper extremities which was controlled w/ ativan. Patient was seen and examined at bedside, BiPAP still in place, patient was still mildly lethargic from ativan and could not speak well with bipap mask on. Patient able to open eyes and nod/shake head to questions. Patient denied any headache, vision changes, cp, abd pain, msk pain. Patient still short of breath.      In ED:   Vitals: HR 80, 176/82 -> 115/60, RR 20, 96% on BiPAP/CPAP  Labs significant for: WBC 20.24, H/H 10.8/24.1, D-Dimer 2924, Troponin 1011.9 > 5252.0, CKMB: 13.1, CPK%: 4%, BUN 33, Creatinine 2.3 (baseline unknown) eGFR 29, serum pro BNP 4963, creatine kinase 328, POCT glucose: 353  Imaging:   CXR: diffuse b/l scattered infiltrates  CT brain stroke: No acute hemorrhage, infarct, or mass effect   EKG: sinus tachy at ST- depressions in V5 V6   Received Lasix 40mg IVP x1, Aspirin 600mg x1, Hydralazine 10mg x1, Ativan 2mg IVP x1, Lispro 6u in the ED    (30 Mar 2022 08:48)      Interval History -doing better    Patient is seen, chart was reviewed and case was discussed with the treatment team.  Pt is not in any distress.   Lying on bed comfortably.   No events reported overnight.     Vital Signs Last 24 Hrs  T(C): 36.8 (02 Apr 2022 12:04), Max: 37.2 (01 Apr 2022 20:02)  T(F): 98.2 (02 Apr 2022 12:04), Max: 99 (01 Apr 2022 20:02)  HR: 71 (02 Apr 2022 12:04) (71 - 83)  BP: 151/81 (02 Apr 2022 12:04) (136/62 - 151/81)  BP(mean): --  RR: 17 (02 Apr 2022 12:04) (17 - 18)  SpO2: 97% (02 Apr 2022 12:04) (92% - 97%)        REVIEW OF SYSTEMS:    Constitutional: No fever, weight loss or fatigue  Eyes: No eye pain, visual disturbances, or discharge  ENT:  No difficulty hearing, tinnitus, vertigo; No sinus or throat pain  Neck: No pain or stiffness  Respiratory: No , wheezing, chills or hemoptysis  Cardiovascular: No chest pain, palpitations, shortness of breath, dizziness  Gastrointestinal: No abdominal or epigastric pain. No nausea, vomiting or hematemesis;   Genitourinary: No dysuria, frequency, hematuria or incontinence  Neurological: No headaches, memory loss,   Psychiatric: No depression, anxiety, mood swings or difficulty sleeping  Musculoskeletal: No joint pain or swelling; No muscle, back or extremity pain  Skin: No itching, burning, rashes or lesions   Lymph Nodes: No enlarged glands  Endocrine: No heat or cold intolerance; No hair   Allergy and Immunologic: No hives or eczema    On Neurological Examination:    Mental Status - Pt is alert, awake, oriented X3.. Follows commands well and able to answer questions appropriately.Mood and affect  normal    Speech -  Normal.    Cranial Nerves - Pupils 3 mm equal and reactive to light, extraocular eye movements intact. Pt has no visual field deficit.  Pt has no  left facial asymmetry. Facial sensation is intact.Tongue - is in midline.    Muscle tone - is normal     Motor Exam - left hemiparesis arm 3/5; leg 4/5   left pronator drift    Sensory Exam -  Pt withdraws all extremities equally on stimulation. No asymmetry seen. No complaints of tingling, numbness.        coordination:    Finger to nose: normal      Deep tendon Reflexes - 2 plus all over.   .    Neck Supple -  Yes.     MEDICATIONS    ALBUTerol    90 MICROgram(s) HFA Inhaler 2 Puff(s) Inhalation every 6 hours PRN  atorvastatin 80 milliGRAM(s) Oral at bedtime  bisacodyl Suppository 10 milliGRAM(s) Rectal daily  calcitriol   Capsule 0.25 MICROGram(s) Oral daily  cyanocobalamin 1000 MICROGram(s) Oral daily  dextrose 5%. 1000 milliLiter(s) IV Continuous <Continuous>  dextrose 5%. 1000 milliLiter(s) IV Continuous <Continuous>  dextrose 50% Injectable 25 Gram(s) IV Push once  dextrose 50% Injectable 12.5 Gram(s) IV Push once  dextrose 50% Injectable 25 Gram(s) IV Push once  dextrose Oral Gel 15 Gram(s) Oral once PRN  dipyridamole 200 mG/aspirin 25 mG 1 Capsule(s) Oral two times a day  ferrous    sulfate 325 milliGRAM(s) Oral daily  glucagon  Injectable 1 milliGRAM(s) IntraMuscular once  heparin   Injectable 5000 Unit(s) SubCutaneous every 8 hours  influenza  Vaccine (HIGH DOSE) 0.7 milliLiter(s) IntraMuscular once  insulin lispro (ADMELOG) corrective regimen sliding scale   SubCutaneous three times a day before meals  insulin lispro (ADMELOG) corrective regimen sliding scale   SubCutaneous at bedtime  iron sucrose Injectable 100 milliGRAM(s) IV Push every 24 hours  levothyroxine 75 MICROGram(s) Oral daily  ondansetron Injectable 4 milliGRAM(s) IV Push every 8 hours PRN  senna 2 Tablet(s) Oral at bedtime  valproate sodium IVPB 500 milliGRAM(s) IV Intermittent every 12 hours      Allergies    No Known Allergies    Intolerances        LABS:  CBC Full  -  ( 02 Apr 2022 08:34 )  WBC Count : 6.97 K/uL  RBC Count : 4.46 M/uL  Hemoglobin : 10.7 g/dL  Hematocrit : 33.4 %  Platelet Count - Automated : 207 K/uL  Mean Cell Volume : 74.9 fl  Mean Cell Hemoglobin : 24.0 pg  Mean Cell Hemoglobin Concentration : 32.0 gm/dL  Auto Neutrophil # : 5.45 K/uL  %      04-02    142  |  107  |  28<H>  ----------------------------<  107<H>  4.5   |  27  |  2.00<H>    Ca    9.7      02 Apr 2022 08:34  Phos  3.5     04-02  Mg     2.5     04-02    TPro  6.0  /  Alb  2.7<L>  /  TBili  0.9  /  DBili  x   /  AST  41<H>  /  ALT  42  /  AlkPhos  77  04-01    Hemoglobin A1C:     Vitamin B12     RADIOLOGY    ASSESSMENT AND PLAN:      seen for left sided weakness-  consistent with subacute right mca infarct    continue  aggrenox   on statin  Physical therapy evaluation.  OOB to chair/ambulation with assistance only.  Pain is accessed and addressed.  Would continue to follow.

## 2022-04-02 NOTE — PROGRESS NOTE ADULT - PROBLEM SELECTOR PLAN 10
Chronic, stable on admission  - Will HOLD antihypertensives in the setting of permissive HTN due to pt's CVA  - Stable, may resume home BB once BP is optimized

## 2022-04-02 NOTE — PROGRESS NOTE ADULT - PROBLEM SELECTOR PLAN 3
2/2 to NSTEMI -On IV Heparin drip for ~48 hrs dc 4/1 evening and placed on subq Heparin for ppx   - Troponin 1011.9 > 5252.0 > 81840 > 22943, no longer need to trend   - EKG: ST-depressions in V5 V6, new from prior EKG   - [facility] following   - As d/w Cardio-pt does NOT have any cardiac stents/Intervention in past  - on Aggrenox, high dose statin - May change Aggrenox to ASA if okay with Neuro   - F/u TTE 2/2 to NSTEMI -On IV Heparin drip for ~48 hrs dc 4/1 evening and placed on subq Heparin for ppx   - Troponin 1011.9 > 5252.0 > 81777 > 64337, no longer need to trend   - EKG: ST-depressions in V5 V6, new from prior EKG   - [facility] following   - As d/w Cardio-pt does NOT have any cardiac stents/Intervention in past  - On Aggrenox, high dose statin, continue   - F/u TTE

## 2022-04-02 NOTE — PROGRESS NOTE ADULT - PROBLEM SELECTOR PLAN 1
Pt presents with SOB 2/2 flash pulmonary edema likely due to uncontrolled BP. CHF w/ heart strain Likely  NSTEMI   - ProBNP 4963  - CXR: diffuse b/l scattered infiltrates  - CT chest: RUL PNA   - procal 18.46, normal lactate, BCx x2 NGTD   - elevated D-dimer on admission, V/Q scan low probability for PE   - s/p lasix 40mg IVPx 2 doses on 3/30, continue to HOLD Lasix and diurese prn   - off of supplemental oxygen  - last TTE in 2009 showed LVEF 30% w/ severe systolic dysfunction, f/u repeat TTE   - Patient does not appear volume overloaded on exam, monitor volume status

## 2022-04-02 NOTE — PROGRESS NOTE ADULT - PROBLEM SELECTOR PLAN 12
Chronic stable   - D/w Neurology Dr. Brownlee  & Cardiology Dr. Hahn- OK to change plavix to Aggrenox on admission  - Off of Hep drip for NSTEMI on 4/1, may change aggrenox to ASA pending Neuro recs   - Continue statin     #GOC  - Palliative following   - Pt is DNR, intubation trial Chronic stable   - D/w Neurology Dr. Brownlee  & Cardiology Dr. Hahn- OK to change plavix to Aggrenox on admission  - Off of Hep drip for NSTEMI on 4/1  - On Aggrenox, high dose statin, continue     #GOC  - Palliative following   - Pt is DNR, intubation trial

## 2022-04-02 NOTE — PROGRESS NOTE ADULT - PROBLEM SELECTOR PLAN 4
RESOLVED  Acute, 2/2 reactive Likely , Doubt  PNA in RUL   - lactate 1.8, procal 18.46 < 3.48  - No white count and no fevers  - CT chest: Right Upper Lobe PNA superimposed pulmonary edema, b/l pleural effusions   - ID Dr. Patten following - Observe off ABx  - low suspicion for true infection, will monitor off abx  - Trend daily CBCs, WBC trending down

## 2022-04-02 NOTE — PROGRESS NOTE ADULT - SUBJECTIVE AND OBJECTIVE BOX
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 As per patient, he was watching a movie when he felt very short of breath suddenly and dropped to the floor, denies LOC and denies any trauma to his head, was on the floor for ~2 hours. Patient was unable to rise from the floor by himself due to his left arm weakness. When first brought to the ED, patient was hypoxic and hypertensive o2 saturation in EMS was >80%. While in the ED, patient had episode of siezure-like activity in his b/l upper extremities which was controlled w/ ativan. Patient was seen and examined at bedside, BiPAP still in place, patient was still mildly lethargic from ativan and could not speak well with bipap mask on. Patient able to open eyes and nod/shake head to questions. Patient denied any headache, vision changes, cp, abd pain, msk pain. Patient still short of breath.    Patient states see he sees Dr. Tomas for outpatient nephrologist. States he is urinating well.  Denies any N/V/dizziness.  No SOB presently. States he is urinating.         No acute events noted    PAST MEDICAL & SURGICAL HISTORY:  MI (myocardial infarction)  1992    HTN (hypertension)    HLD (hyperlipidemia)    Throat cancer  2011, treated with chemo, RT    Ventricular arrhythmia  s/p AICD    Diabetes  Type2, not on any meds since weight loss after throat Ca    Renal insufficiency  s/p chemo    CAD (coronary artery disease)    Inguinal hernia, left    H/O prior ablation treatment  VT, 2009    Cardiac defibrillator in place  - 2009, battery change 2017    S/P left inguinal hernia repair  2018 at [place]         FAMILY HISTORY:  Family history of cancer (Sibling)    NC    Social History:Non smoker    MEDICATIONS  (STANDING):  atorvastatin 10 milliGRAM(s) Oral at bedtime  calcitriol   Capsule 0.25 MICROGram(s) Oral daily  dextrose 5%. 1000 milliLiter(s) (50 mL/Hr) IV Continuous <Continuous>  dextrose 5%. 1000 milliLiter(s) (100 mL/Hr) IV Continuous <Continuous>  dextrose 50% Injectable 25 Gram(s) IV Push once  dextrose 50% Injectable 12.5 Gram(s) IV Push once  dextrose 50% Injectable 25 Gram(s) IV Push once  dipyridamole 200 mG/aspirin 25 mG 1 Capsule(s) Oral two times a day  ferrous    sulfate 325 milliGRAM(s) Oral daily  glucagon  Injectable 1 milliGRAM(s) IntraMuscular once  heparin  Infusion.  Unit(s)/Hr (8 mL/Hr) IV Continuous <Continuous>  influenza  Vaccine (HIGH DOSE) 0.7 milliLiter(s) IntraMuscular once  insulin lispro (ADMELOG) corrective regimen sliding scale   SubCutaneous three times a day before meals  insulin lispro (ADMELOG) corrective regimen sliding scale   SubCutaneous at bedtime  iron sucrose Injectable 100 milliGRAM(s) IV Push every 24 hours  levothyroxine 75 MICROGram(s) Oral daily  polyethylene glycol 3350 17 Gram(s) Oral daily  senna 2 Tablet(s) Oral at bedtime  valproate sodium IVPB 500 milliGRAM(s) IV Intermittent every 12 hours    MEDICATIONS  (PRN):  ALBUTerol    90 MICROgram(s) HFA Inhaler 2 Puff(s) Inhalation every 6 hours PRN Shortness of Breath and/or Wheezing  dextrose Oral Gel 15 Gram(s) Oral once PRN Blood Glucose LESS THAN 70 milliGRAM(s)/deciliter  heparin   Injectable 4100 Unit(s) IV Push every 6 hours PRN For aPTT less than 40  ondansetron Injectable 4 milliGRAM(s) IV Push every 8 hours PRN Nausea and/or Vomiting      Allergies    No Known Allergies    Intolerances         REVIEW OF SYSTEMS:    Review of Systems:   Constitutional: Denies fatigue  HEENT: Denies headaches and dizziness  Respiratory: denies SOB, cough, or wheezing  Cardiovascular: denies CP, palpitations  Gastrointestinal: Denies nausea, denies vomiting, diarrhea, constipation, abdominal pain, or bloody stools  Genitourinary: denies painful urination, increased frequency, urgency, or bloody urine  Skin: denies rashes or itching  Musculoskeletal: denies muscle aches, joint swelling  Neurologic: Denies generalized weakness, denies loss of sensation, numbness, or tingling    Vital Signs Last 24 Hrs  T(C): 36.7 (02 Apr 2022 04:11), Max: 37.2 (01 Apr 2022 20:02)  T(F): 98 (02 Apr 2022 04:11), Max: 99 (01 Apr 2022 20:02)  HR: 72 (02 Apr 2022 04:11) (72 - 84)  BP: 136/62 (02 Apr 2022 04:11) (131/63 - 144/76)  BP(mean): --  RR: 18 (02 Apr 2022 04:11) (17 - 18)  SpO2: 92% (02 Apr 2022 04:11) (91% - 94%)    PHYSICAL EXAM:    GENERAL: NAD  HEAD:  Atraumatic, Normocephalic  EYES: EOMI, conjunctiva and sclera clear  ENMT: No Drainage from nares, No drainage from ears  NECK: Supple, neck  veins full  NERVOUS SYSTEM:  Awake and Alert  CHEST/LUNG: Clear to percussion bilaterally; No rales, rhonchi, wheezing, or rubs  HEART: Regular rate and rhythm; No murmurs, rubs, or gallops  ABDOMEN: Soft, Nontender, Nondistended; Bowel sounds present  EXTREMITIES:  No Edema  SKIN: No rashes No obvious ecchymosis      LABS:                        10.7   6.97  )-----------( 207      ( 02 Apr 2022 08:34 )             33.4     04-02    142  |  107  |  28<H>  ----------------------------<  107<H>  4.5   |  27  |  2.00<H>    Ca    9.7      02 Apr 2022 08:34  Phos  3.5     04-02  Mg     2.5     04-02    TPro  6.0  /  Alb  2.7<L>  /  TBili  0.9  /  DBili  x   /  AST  41<H>  /  ALT  42  /  AlkPhos  77  04-01    PTT - ( 02 Apr 2022 08:34 )  PTT:26.0 sec

## 2022-04-02 NOTE — PROGRESS NOTE ADULT - ASSESSMENT
CKD 4  HTN  CHF EF 30%  Anemia  Urinary retention refusing [facility]   CHF  CVA      -BLCR about 2.3; renal indices are currently stable at baseline range; stable lytes  - protein  -Urine lytes reviewed  -Lasix PRN  -V/Q scan low prob  -Chest CT mild pulm Edema + PNA  -Anemia per Heme  -BP controlled now    Thank you

## 2022-04-02 NOTE — PROGRESS NOTE ADULT - ATTENDING COMMENTS
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 Admitted for pulmonary edema, leukocytosis, elevated troponin, JARRED on CKD-3  and seizure.   Pt seen, examined, case & care plan d/w pt, residents at detail.  -D/W Neuro-Dr Brownlee -follow up  -Cardiology-Dr Hewitt follow up  -Renal eval DR Porter follow up,  -TOV PVR with Bladder scan showed large urine volume -pt is not agreeable for repeat Straight cath, start Flomax 0.4 mg Po daily  AM labs , PT eval---> [facility]   -PO diet  D/W wife -Wendy at detail at bed side.  Palliative care Eval, Prognosis is Guarded.  Total care time is 45 minutes

## 2022-04-02 NOTE — CHART NOTE - NSCHARTNOTEFT_GEN_A_CORE
Received sign out from day team to follow up bladder scan and re-insert mcclellan with > 750 cc urine. However, called by RN as patient with bladder scan ~1L and patient refusing mcclellan catheter re-insertion at this time. States he wants to trial voiding on his own. Will straight cath patient x1. Will follow up bladder scan in 6 hours. RN to call with any changes.

## 2022-04-03 LAB
ALBUMIN SERPL ELPH-MCNC: 2.7 G/DL — LOW (ref 3.3–5)
ALP SERPL-CCNC: 72 U/L — SIGNIFICANT CHANGE UP (ref 40–120)
ALT FLD-CCNC: 30 U/L — SIGNIFICANT CHANGE UP (ref 12–78)
ANION GAP SERPL CALC-SCNC: 7 MMOL/L — SIGNIFICANT CHANGE UP (ref 5–17)
AST SERPL-CCNC: 20 U/L — SIGNIFICANT CHANGE UP (ref 15–37)
BASOPHILS # BLD AUTO: 0.03 K/UL — SIGNIFICANT CHANGE UP (ref 0–0.2)
BASOPHILS NFR BLD AUTO: 0.5 % — SIGNIFICANT CHANGE UP (ref 0–2)
BILIRUB SERPL-MCNC: 0.7 MG/DL — SIGNIFICANT CHANGE UP (ref 0.2–1.2)
BUN SERPL-MCNC: 32 MG/DL — HIGH (ref 7–23)
CALCIUM SERPL-MCNC: 9.3 MG/DL — SIGNIFICANT CHANGE UP (ref 8.5–10.1)
CHLORIDE SERPL-SCNC: 108 MMOL/L — SIGNIFICANT CHANGE UP (ref 96–108)
CO2 SERPL-SCNC: 27 MMOL/L — SIGNIFICANT CHANGE UP (ref 22–31)
CREAT SERPL-MCNC: 2.1 MG/DL — HIGH (ref 0.5–1.3)
EGFR: 32 ML/MIN/1.73M2 — LOW
EOSINOPHIL # BLD AUTO: 0.09 K/UL — SIGNIFICANT CHANGE UP (ref 0–0.5)
EOSINOPHIL NFR BLD AUTO: 1.5 % — SIGNIFICANT CHANGE UP (ref 0–6)
GLUCOSE SERPL-MCNC: 119 MG/DL — HIGH (ref 70–99)
HCT VFR BLD CALC: 32.3 % — LOW (ref 39–50)
HGB BLD-MCNC: 10.2 G/DL — LOW (ref 13–17)
IMM GRANULOCYTES NFR BLD AUTO: 1.6 % — HIGH (ref 0–1.5)
LYMPHOCYTES # BLD AUTO: 0.75 K/UL — LOW (ref 1–3.3)
LYMPHOCYTES # BLD AUTO: 12.3 % — LOW (ref 13–44)
MAGNESIUM SERPL-MCNC: 2.6 MG/DL — SIGNIFICANT CHANGE UP (ref 1.6–2.6)
MCHC RBC-ENTMCNC: 23.7 PG — LOW (ref 27–34)
MCHC RBC-ENTMCNC: 31.6 GM/DL — LOW (ref 32–36)
MCV RBC AUTO: 75.1 FL — LOW (ref 80–100)
MONOCYTES # BLD AUTO: 0.71 K/UL — SIGNIFICANT CHANGE UP (ref 0–0.9)
MONOCYTES NFR BLD AUTO: 11.6 % — SIGNIFICANT CHANGE UP (ref 2–14)
NEUTROPHILS # BLD AUTO: 4.44 K/UL — SIGNIFICANT CHANGE UP (ref 1.8–7.4)
NEUTROPHILS NFR BLD AUTO: 72.5 % — SIGNIFICANT CHANGE UP (ref 43–77)
NRBC # BLD: 0 /100 WBCS — SIGNIFICANT CHANGE UP (ref 0–0)
PHOSPHATE SERPL-MCNC: 3.1 MG/DL — SIGNIFICANT CHANGE UP (ref 2.5–4.5)
PLATELET # BLD AUTO: 211 K/UL — SIGNIFICANT CHANGE UP (ref 150–400)
POTASSIUM SERPL-MCNC: 4.1 MMOL/L — SIGNIFICANT CHANGE UP (ref 3.5–5.3)
POTASSIUM SERPL-SCNC: 4.1 MMOL/L — SIGNIFICANT CHANGE UP (ref 3.5–5.3)
PROT SERPL-MCNC: 6.3 G/DL — SIGNIFICANT CHANGE UP (ref 6–8.3)
RBC # BLD: 4.3 M/UL — SIGNIFICANT CHANGE UP (ref 4.2–5.8)
RBC # FLD: 14.2 % — SIGNIFICANT CHANGE UP (ref 10.3–14.5)
SODIUM SERPL-SCNC: 142 MMOL/L — SIGNIFICANT CHANGE UP (ref 135–145)
WBC # BLD: 6.12 K/UL — SIGNIFICANT CHANGE UP (ref 3.8–10.5)
WBC # FLD AUTO: 6.12 K/UL — SIGNIFICANT CHANGE UP (ref 3.8–10.5)

## 2022-04-03 PROCEDURE — 99232 SBSQ HOSP IP/OBS MODERATE 35: CPT

## 2022-04-03 RX ORDER — SALIVA SUBSTITUTE COMB NO.11 351 MG
5 POWDER IN PACKET (EA) MUCOUS MEMBRANE DAILY
Refills: 0 | Status: DISCONTINUED | OUTPATIENT
Start: 2022-04-03 | End: 2022-04-05

## 2022-04-03 RX ADMIN — CALCITRIOL 0.25 MICROGRAM(S): 0.5 CAPSULE ORAL at 11:17

## 2022-04-03 RX ADMIN — Medication 75 MICROGRAM(S): at 05:15

## 2022-04-03 RX ADMIN — Medication 10 MILLIGRAM(S): at 11:19

## 2022-04-03 RX ADMIN — Medication 55 MILLIGRAM(S): at 17:42

## 2022-04-03 RX ADMIN — HEPARIN SODIUM 5000 UNIT(S): 5000 INJECTION INTRAVENOUS; SUBCUTANEOUS at 21:46

## 2022-04-03 RX ADMIN — IRON SUCROSE 100 MILLIGRAM(S): 20 INJECTION, SOLUTION INTRAVENOUS at 05:16

## 2022-04-03 RX ADMIN — Medication 5 MILLILITER(S): at 13:56

## 2022-04-03 RX ADMIN — ATORVASTATIN CALCIUM 80 MILLIGRAM(S): 80 TABLET, FILM COATED ORAL at 21:46

## 2022-04-03 RX ADMIN — ASPIRIN AND DIPYRIDAMOLE 1 CAPSULE(S): 25; 200 CAPSULE, EXTENDED RELEASE ORAL at 17:42

## 2022-04-03 RX ADMIN — HEPARIN SODIUM 5000 UNIT(S): 5000 INJECTION INTRAVENOUS; SUBCUTANEOUS at 05:14

## 2022-04-03 RX ADMIN — PREGABALIN 1000 MICROGRAM(S): 225 CAPSULE ORAL at 11:17

## 2022-04-03 RX ADMIN — Medication 325 MILLIGRAM(S): at 11:17

## 2022-04-03 RX ADMIN — Medication 55 MILLIGRAM(S): at 05:14

## 2022-04-03 RX ADMIN — TAMSULOSIN HYDROCHLORIDE 0.4 MILLIGRAM(S): 0.4 CAPSULE ORAL at 21:45

## 2022-04-03 RX ADMIN — HEPARIN SODIUM 5000 UNIT(S): 5000 INJECTION INTRAVENOUS; SUBCUTANEOUS at 13:58

## 2022-04-03 RX ADMIN — Medication 1: at 12:12

## 2022-04-03 RX ADMIN — ASPIRIN AND DIPYRIDAMOLE 1 CAPSULE(S): 25; 200 CAPSULE, EXTENDED RELEASE ORAL at 05:14

## 2022-04-03 RX ADMIN — SENNA PLUS 2 TABLET(S): 8.6 TABLET ORAL at 21:46

## 2022-04-03 NOTE — CONSULT NOTE ADULT - SUBJECTIVE AND OBJECTIVE BOX
Patient is a 76y old  Male who presents with a chief complaint of SOB (03 Apr 2022 09:54)      HISTORY OF PRESENT ILLNESS:  75 y/o male with h/o tonsilar Ca, s/p chemo/RT in 2011, with renal insufficiency resulting from chemo, admitted for SOB, found to have urinary retention.  Pt denies prior urologic problems/workup.  He admits to slowing of urine, nocturia x1, but no hematuria, dysuria, incontinence.  His baseline Creatinine at his regular nephrologist Dr. Tomas is 2.1.  According to nursing, pt was straight cath'd early this morning, and pt has not voided for >8 hours.  He denies having urge to urinate.  Pt states that he has not had BM x 2 days, which is normal for him.  CT of chest (see report below) shows no hydronephrosis.  Flomax was started yesterday.    PAST MEDICAL & SURGICAL HISTORY:  MI (myocardial infarction)  1992    HTN (hypertension)    HLD (hyperlipidemia)    Throat cancer  2011, treated with chemo, RT    Ventricular arrhythmia  s/p AICD    Diabetes  Type2, not on any meds since weight loss after throat Ca    Renal insufficiency  s/p chemo    CAD (coronary artery disease)    Inguinal hernia, left    H/O prior ablation treatment  VT, 2009    Cardiac defibrillator in place  - 2009, battery change 2017    S/P left inguinal hernia repair  2018 at [place]        REVIEW OF SYSTEMS:    CONSTITUTIONAL: No weakness, fevers or chills  SKIN: No itching, burning, rashes, or lesions   EYES/ENT: No visual changes;  No vertigo or throat pain   NECK: No pain or stiffness  RESPIRATORY: No cough, wheezing, hemoptysis; No shortness of breath  CARDIOVASCULAR: No chest pain or palpitations  GASTROINTESTINAL: No abdominal or epigastric pain. No nausea, vomiting, diarrhea  NEUROLOGICAL: No numbness or weakness  GENITOURINARY:     No hematuria, dysuria, incontinence    All other review of systems is negative unless indicated above.    MEDICATIONS  (STANDING):  atorvastatin 80 milliGRAM(s) Oral at bedtime  Biotene Dry Mouth Oral Rinse 5 milliLiter(s) Swish and Spit daily  bisacodyl Suppository 10 milliGRAM(s) Rectal daily  calcitriol   Capsule 0.25 MICROGram(s) Oral daily  cyanocobalamin 1000 MICROGram(s) Oral daily  dextrose 5%. 1000 milliLiter(s) (50 mL/Hr) IV Continuous <Continuous>  dextrose 5%. 1000 milliLiter(s) (100 mL/Hr) IV Continuous <Continuous>  dextrose 50% Injectable 25 Gram(s) IV Push once  dextrose 50% Injectable 12.5 Gram(s) IV Push once  dextrose 50% Injectable 25 Gram(s) IV Push once  dipyridamole 200 mG/aspirin 25 mG 1 Capsule(s) Oral two times a day  ferrous    sulfate 325 milliGRAM(s) Oral daily  glucagon  Injectable 1 milliGRAM(s) IntraMuscular once  heparin   Injectable 5000 Unit(s) SubCutaneous every 8 hours  influenza  Vaccine (HIGH DOSE) 0.7 milliLiter(s) IntraMuscular once  insulin lispro (ADMELOG) corrective regimen sliding scale   SubCutaneous three times a day before meals  insulin lispro (ADMELOG) corrective regimen sliding scale   SubCutaneous at bedtime  levothyroxine 75 MICROGram(s) Oral daily  senna 2 Tablet(s) Oral at bedtime  tamsulosin 0.4 milliGRAM(s) Oral at bedtime  valproate sodium IVPB 500 milliGRAM(s) IV Intermittent every 12 hours    MEDICATIONS  (PRN):  ALBUTerol    90 MICROgram(s) HFA Inhaler 2 Puff(s) Inhalation every 6 hours PRN Shortness of Breath and/or Wheezing  dextrose Oral Gel 15 Gram(s) Oral once PRN Blood Glucose LESS THAN 70 milliGRAM(s)/deciliter  ondansetron Injectable 4 milliGRAM(s) IV Push every 8 hours PRN Nausea and/or Vomiting      Allergies    No Known Allergies      SOCIAL HISTORY:   Smoking:  quit smoking many years ago   Work: former ; hung ceilings      FAMILY HISTORY:  Family history of cancer (Sibling)        Vital Signs Last 24 Hrs  T(C): 36.7 (03 Apr 2022 12:49), Max: 36.7 (03 Apr 2022 12:49)  T(F): 98 (03 Apr 2022 12:49), Max: 98 (03 Apr 2022 12:49)  HR: 72 (03 Apr 2022 12:49) (72 - 828)  BP: 151/72 (03 Apr 2022 12:49) (130/74 - 151/72)  BP(mean): --  RR: 18 (03 Apr 2022 12:49) (17 - 18)  SpO2: 94% (03 Apr 2022 12:49) (91% - 94%)    PHYSICAL EXAM:    Constitutional: NAD, thin  HEENT: PERRLA, EOMI  Neck: Supple, full ROM  Back: No CVA tenderness  Respiratory: Normal repiratory effort  Cardiovascular: RRR  Abd: Soft, NT/ND, mild suprapubic distention, no SP tenderness  : Normal circumcised phallus,,bilateral descended testes, non-tender  NICOLASA: prostate 3+,smooth, non-tender, large amount of soft stool in rectum  Extremities: No peripheral edema  Neurological: A&O x 3, no focal deficits  Psychiatric: Normal mood, normal affect  Musculoskeletal: no clubbing/cyanosis/edema  Skin: No rashes    LABS:                        10.2   6.12  )-----------( 211      ( 03 Apr 2022 08:03 )             32.3     04-03    142  |  108  |  32<H>  ----------------------------<  119<H>  4.1   |  27  |  2.10<H>    Ca    9.3      03 Apr 2022 08:03  Phos  3.1     04-03  Mg     2.6     04-03    TPro  6.3  /  Alb  2.7<L>  /  TBili  0.7  /  DBili  x   /  AST  20  /  ALT  30  /  AlkPhos  72  04-03    PTT - ( 02 Apr 2022 08:34 )  PTT:26.0 sec    Urine Culture:       RADIOLOGY & ADDITIONAL STUDIES:  EXAM:  CT CHEST                            PROCEDURE DATE:  03/31/2022          INTERPRETATION:  CLINICAL INFORMATION: 76 years  Male with opacifications   on cxr r/o PNA.    COMPARISON: None.    CONTRAST/COMPLICATIONS:  IV Contrast: IV contrast documented in associated exam  Oral Contrast: NONE  Complications: None reported attime of study completion    PROCEDURE:  CT of the Chest was performed.  Sagittal and coronal reformats were performed.    FINDINGS:    LUNGS AND AIRWAYS: Patent central airways.  Mild centrilobular emphysema.   Nodular posterior right upper lobe infiltrate. Smooth intralobular septal   thickening.  PLEURA: Small to moderate bilateral pleural effusions. Bilateral   calcified pleural plaques.  MEDIASTINUM AND PETE: No lymphadenopathy.  VESSELS: Aortic atherosclerosis without aneurysm. Coronary   atherosclerosis.  HEART: Heart size is normal. Pacemaker/defibrillator leads in the heart.   No pericardial effusion.  CHEST WALL AND LOWER NECK: Pacemaker pulse generator in the left chest   wall.  VISUALIZED UPPER ABDOMEN: Within normal limits.  BONES: Degenerative changes.    IMPRESSION:  Right upper lobe pneumonia superimposed on mild pulmonary edema.   Bilateral pleural effusions.    Pacemaker/defibrillator.    Other chronic findings as above.

## 2022-04-03 NOTE — CONSULT NOTE ADULT - ASSESSMENT
I placed 16 Fr mcclelaln, drained 600cc clear, yellow urine    Continue mcclellan, Flomax.  Bowel management    Discussed with pt, daughter, nurse, Dr. Larson

## 2022-04-03 NOTE — PROGRESS NOTE ADULT - SUBJECTIVE AND OBJECTIVE BOX
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 As per patient, he was watching a movie when he felt very short of breath suddenly and dropped to the floor, denies LOC and denies any trauma to his head, was on the floor for ~2 hours. Patient was unable to rise from the floor by himself due to his left arm weakness. When first brought to the ED, patient was hypoxic and hypertensive o2 saturation in EMS was >80%. While in the ED, patient had episode of siezure-like activity in his b/l upper extremities which was controlled w/ ativan. Patient was seen and examined at bedside, BiPAP still in place, patient was still mildly lethargic from ativan and could not speak well with bipap mask on. Patient able to open eyes and nod/shake head to questions. Patient denied any headache, vision changes, cp, abd pain, msk pain. Patient still short of breath.    Patient states see he sees Dr. Tomas for outpatient nephrologist. States he is urinating well.  Denies any N/V/dizziness.  No SOB presently. States he is urinating.         No acute events noted    PAST MEDICAL & SURGICAL HISTORY:  MI (myocardial infarction)  1992    HTN (hypertension)    HLD (hyperlipidemia)    Throat cancer  2011, treated with chemo, RT    Ventricular arrhythmia  s/p AICD    Diabetes  Type2, not on any meds since weight loss after throat Ca    Renal insufficiency  s/p chemo    CAD (coronary artery disease)    Inguinal hernia, left    H/O prior ablation treatment  VT, 2009    Cardiac defibrillator in place  - 2009, battery change 2017    S/P left inguinal hernia repair  2018 at [facility]         FAMILY HISTORY:  Family history of cancer (Sibling)    NC    Social History:Non smoker    MEDICATIONS  (STANDING):  atorvastatin 10 milliGRAM(s) Oral at bedtime  calcitriol   Capsule 0.25 MICROGram(s) Oral daily  dextrose 5%. 1000 milliLiter(s) (50 mL/Hr) IV Continuous <Continuous>  dextrose 5%. 1000 milliLiter(s) (100 mL/Hr) IV Continuous <Continuous>  dextrose 50% Injectable 25 Gram(s) IV Push once  dextrose 50% Injectable 12.5 Gram(s) IV Push once  dextrose 50% Injectable 25 Gram(s) IV Push once  dipyridamole 200 mG/aspirin 25 mG 1 Capsule(s) Oral two times a day  ferrous    sulfate 325 milliGRAM(s) Oral daily  glucagon  Injectable 1 milliGRAM(s) IntraMuscular once  heparin  Infusion.  Unit(s)/Hr (8 mL/Hr) IV Continuous <Continuous>  influenza  Vaccine (HIGH DOSE) 0.7 milliLiter(s) IntraMuscular once  insulin lispro (ADMELOG) corrective regimen sliding scale   SubCutaneous three times a day before meals  insulin lispro (ADMELOG) corrective regimen sliding scale   SubCutaneous at bedtime  iron sucrose Injectable 100 milliGRAM(s) IV Push every 24 hours  levothyroxine 75 MICROGram(s) Oral daily  polyethylene glycol 3350 17 Gram(s) Oral daily  senna 2 Tablet(s) Oral at bedtime  valproate sodium IVPB 500 milliGRAM(s) IV Intermittent every 12 hours    MEDICATIONS  (PRN):  ALBUTerol    90 MICROgram(s) HFA Inhaler 2 Puff(s) Inhalation every 6 hours PRN Shortness of Breath and/or Wheezing  dextrose Oral Gel 15 Gram(s) Oral once PRN Blood Glucose LESS THAN 70 milliGRAM(s)/deciliter  heparin   Injectable 4100 Unit(s) IV Push every 6 hours PRN For aPTT less than 40  ondansetron Injectable 4 milliGRAM(s) IV Push every 8 hours PRN Nausea and/or Vomiting      Allergies    No Known Allergies    Intolerances         REVIEW OF SYSTEMS:    Review of Systems:   Constitutional: Denies fatigue  HEENT: Denies headaches and dizziness  Respiratory: denies SOB, cough, or wheezing  Cardiovascular: denies CP, palpitations  Gastrointestinal: Denies nausea, denies vomiting, diarrhea, constipation, abdominal pain, or bloody stools  Genitourinary: denies painful urination, increased frequency, urgency, or bloody urine  Skin: denies rashes or itching  Musculoskeletal: denies muscle aches, joint swelling  Neurologic: Denies generalized weakness, denies loss of sensation, numbness, or tingling    Vital Signs Last 24 Hrs  T(C): 36.4 (03 Apr 2022 04:32), Max: 36.8 (02 Apr 2022 12:04)  T(F): 97.5 (03 Apr 2022 04:32), Max: 98.2 (02 Apr 2022 12:04)  HR: 78 (03 Apr 2022 04:32) (71 - 828)  BP: 130/74 (03 Apr 2022 04:32) (130/74 - 151/81)  BP(mean): --  RR: 17 (03 Apr 2022 04:32) (17 - 17)  SpO2: 91% (03 Apr 2022 04:32) (91% - 97%)    PHYSICAL EXAM:    GENERAL: NAD  HEAD:  Atraumatic, Normocephalic  EYES: EOMI, conjunctiva and sclera clear  ENMT: No Drainage from nares, No drainage from ears  NECK: Supple, neck  veins full  NERVOUS SYSTEM:  Awake and Alert  CHEST/LUNG: Clear to percussion bilaterally; No rales, rhonchi, wheezing, or rubs  HEART: Regular rate and rhythm; No murmurs, rubs, or gallops  ABDOMEN: Soft, Nontender, Nondistended; Bowel sounds present  EXTREMITIES:  No Edema  SKIN: No rashes No obvious ecchymosis      LABS:                        10.2   6.12  )-----------( 211      ( 03 Apr 2022 08:03 )             32.3     04-03    142  |  108  |  32<H>  ----------------------------<  119<H>  4.1   |  27  |  2.10<H>    Ca    9.3      03 Apr 2022 08:03  Phos  3.1     04-03  Mg     2.6     04-03    TPro  6.3  /  Alb  2.7<L>  /  TBili  0.7  /  DBili  x   /  AST  20  /  ALT  30  /  AlkPhos  72  04-03    PTT - ( 02 Apr 2022 08:34 )  PTT:26.0 sec

## 2022-04-03 NOTE — PROGRESS NOTE ADULT - SUBJECTIVE AND OBJECTIVE BOX
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 As per patient, he was watching a movie when he felt very short of breath suddenly and dropped to the floor, denies LOC and denies any trauma to his head, was on the floor for ~2 hours. Patient was unable to rise from the floor by himself due to his left arm weakness. When first brought to the ED, patient was hypoxic and hypertensive o2 saturation in EMS was >80%. While in the ED, patient had episode of siezure-like activity in his b/l upper extremities which was controlled w/ ativan. Patient was seen and examined at bedside, BiPAP still in place, patient was still mildly lethargic from ativan and could not speak well with bipap mask on. Patient able to open eyes and nod/shake head to questions. Patient denied any headache, vision changes, cp, abd pain, msk pain. Patient still short of breath.      In ED:   Vitals: HR 80, 176/82 -> 115/60, RR 20, 96% on BiPAP/CPAP  Labs significant for: WBC 20.24, H/H 10.8/24.1, D-Dimer 2924, Troponin 1011.9 > 5252.0, CKMB: 13.1, CPK%: 4%, BUN 33, Creatinine 2.3 (baseline unknown) eGFR 29, serum pro BNP 4963, creatine kinase 328, POCT glucose: 353  Imaging:   CXR: diffuse b/l scattered infiltrates  CT brain stroke: No acute hemorrhage, infarct, or mass effect   EKG: sinus tachy at ST- depressions in V5 V6   Received Lasix 40mg IVP x1, Aspirin 600mg x1, Hydralazine 10mg x1, Ativan 2mg IVP x1, Lispro 6u in the ED    (30 Mar 2022 08:48)      INTERVAL HPI:  3/31/2022: Patient overnight had clots in urine with slight pinkish appearance of urine, stat h/h showed that patients hgb dropped to 9.2 from 10.1. Heparin drip was continued as benefits outweighed risks. AM hgb showed recovery to 9.6. Patient also had 6 beats of vtach overnight, was asymptomatic. Patient seen and examined at bedside, denied any lightheadedness/dizziness, CP/palpitations, abd pain, dysuria, MSK pain, any sensory deficits. Patient continues to have LUE weakness. OBN IV Heparin drip, + CVA on CT Head  04/01/2022: Pt seen and examined at bedside. Hb stable this morning. Pt w no complaints this morning. Denies fever, chills, chest pain, palpitations, shortness of breath, palpitations, lightheadedness, dizziness, headache, n/v/d/c. TOV , d/c [facility] cath. heparin drip was dc in evening   4/2/22: Pt was seen and examined at bedside. Pt states that he had a BM yesterday, normal. AM Bladder scan revealed 450 cc urine but pt refused to be straight cath because he wants to urinate on his own. States that he cannot urinate because he is not on a regular diet. Denies history of BPH. Reports mild back pain, states that Tylenol does not help. Pt denies headache, dizziness, CP, SOB, palpitations, abdominal pain, n/v. No other complaints at this time. Will start Flomax.   4/3/22: Pt seen and examined at bedside. Pt had no BM this AM. F/u bladder scan from this AM showed________. Patient denies any headache, dizziness, CP, SOB, palpitations, abdominal pain, n/v. Patient states back pain is still there but mild. No other complaints at this time.     OVERNIGHT EVENTS:    Home Medications:  benazepril 10 mg oral tablet: 1 tab(s) orally once a day (30 Mar 2022 10:22)  calcitriol 0.25 mcg oral capsule: 1 cap(s) orally once a day (30 Mar 2022 10:22)  carvedilol 12.5 mg oral tablet: 1 tab(s) orally 2 times a day      *Last filled 5/21, spoke to MATTHEW Nguyen at Dr. Garcia&#x27;s office, she states patient should still be taking med as per MD notes from 2/22*  (30 Mar 2022 10:22)  hydrALAZINE 10 mg oral tablet: 0.5 tab(s) orally 3 times a day (30 Mar 2022 10:22)  isosorbide mononitrate 30 mg oral tablet, extended release: 1 tab(s) orally once a day (in the morning) (30 Mar 2022 10:22)  levothyroxine 75 mcg (0.075 mg) oral tablet: 1 tab(s) orally once a day (30 Mar 2022 10:22)  midodrine 10 mg oral tablet: 1 tab(s) orally 3 times a day (30 Mar 2022 10:22)  Plavix 75 mg oral tablet: 1 tab(s) orally once a day (30 Mar 2022 10:22)  pravastatin 40 mg oral tablet: 1 tab(s) orally once a day (30 Mar 2022 10:22)      MEDICATIONS  (STANDING):  atorvastatin 80 milliGRAM(s) Oral at bedtime  bisacodyl Suppository 10 milliGRAM(s) Rectal daily  calcitriol   Capsule 0.25 MICROGram(s) Oral daily  cyanocobalamin 1000 MICROGram(s) Oral daily  dextrose 5%. 1000 milliLiter(s) (100 mL/Hr) IV Continuous <Continuous>  dextrose 5%. 1000 milliLiter(s) (50 mL/Hr) IV Continuous <Continuous>  dextrose 50% Injectable 25 Gram(s) IV Push once  dextrose 50% Injectable 12.5 Gram(s) IV Push once  dextrose 50% Injectable 25 Gram(s) IV Push once  dipyridamole 200 mG/aspirin 25 mG 1 Capsule(s) Oral two times a day  ferrous    sulfate 325 milliGRAM(s) Oral daily  glucagon  Injectable 1 milliGRAM(s) IntraMuscular once  heparin   Injectable 5000 Unit(s) SubCutaneous every 8 hours  influenza  Vaccine (HIGH DOSE) 0.7 milliLiter(s) IntraMuscular once  insulin lispro (ADMELOG) corrective regimen sliding scale   SubCutaneous three times a day before meals  insulin lispro (ADMELOG) corrective regimen sliding scale   SubCutaneous at bedtime  levothyroxine 75 MICROGram(s) Oral daily  senna 2 Tablet(s) Oral at bedtime  tamsulosin 0.4 milliGRAM(s) Oral at bedtime  valproate sodium IVPB 500 milliGRAM(s) IV Intermittent every 12 hours    MEDICATIONS  (PRN):  ALBUTerol    90 MICROgram(s) HFA Inhaler 2 Puff(s) Inhalation every 6 hours PRN Shortness of Breath and/or Wheezing  dextrose Oral Gel 15 Gram(s) Oral once PRN Blood Glucose LESS THAN 70 milliGRAM(s)/deciliter  ondansetron Injectable 4 milliGRAM(s) IV Push every 8 hours PRN Nausea and/or Vomiting      No Known Allergies      Social History:  Lives at home w/ wife   ADL independent   Tobacco former smoker, quit 40 years ago, 20 pack year history  Alcohol denies  Drugs denies  COVID Pf[employer] x3   Occupation retired- former  (30 Mar 2022 08:48)      REVIEW OF SYSTEMS:  REVIEW OF SYSTEMS: I am ok  CONSTITUTIONAL: No fever, No chills, No fatigue, No myalgia, No Body ache, No Weakness  EYES: No eye pain,  No visual disturbances, No discharge, No Redness  ENMT: No ear pain, No nose bleed, No vertigo; No sinus pain, No throat pain, No Congestion  NECK: No pain, No stiffness  RESPIRATORY: No cough, No wheezing, No hemoptysis, No shortness of breath  CARDIOVASCULAR: No chest pain, No palpitations  GASTROINTESTINAL: No abdominal pain, No epigastric pain. No nausea, No vomiting, No diarrhea, No constipation; [ x ] BM - 1  GENITOURINARY: No dysuria, No frequency, No urgency, No incontinence  NEUROLOGICAL: No headaches, No dizziness, No numbness, No tingling, No tremors, [ x ] LUE weakness   EXTREMITIES: No Swelling, No Pain, No Edema  SKIN: [ x ] No itching, burning, rashes, or lesions   MUSCULOSKELETAL: No joint pain, No joint swelling; No muscle pain, +back pain, No extremity pain  PSYCHIATRIC: No depression, No anxiety, No mood swings, No difficulty sleeping at night  PAIN SCALE: [  ] None  [ x ] Other- Back pain  ROS Unable to obtain due to: [  ] Dementia  [  ] Lethargy  [  ] Sedated  [  ] Non verbal  REST OF REVIEW OF SYSTEMS: [ x ] Normal     Vital Signs Last 24 Hrs  T(C): 36.4 (03 Apr 2022 04:32), Max: 36.8 (02 Apr 2022 12:04)  T(F): 97.5 (03 Apr 2022 04:32), Max: 98.2 (02 Apr 2022 12:04)  HR: 78 (03 Apr 2022 04:32) (71 - 828)  BP: 130/74 (03 Apr 2022 04:32) (130/74 - 151/81)  BP(mean): --  RR: 17 (03 Apr 2022 04:32) (17 - 17)  SpO2: 91% (03 Apr 2022 04:32) (91% - 97%)    CAPILLARY BLOOD GLUCOSE      POCT Blood Glucose.: 113 mg/dL (02 Apr 2022 21:28)  POCT Blood Glucose.: 164 mg/dL (02 Apr 2022 17:06)  POCT Blood Glucose.: 122 mg/dL (02 Apr 2022 12:06)  POCT Blood Glucose.: 119 mg/dL (02 Apr 2022 08:09)      I&O's Summary    02 Apr 2022 07:01  -  03 Apr 2022 07:00  --------------------------------------------------------  IN: 0 mL / OUT: 1050 mL / NET: -1050 mL      PHYSICAL EXAM:  GENERAL:  [ x ] NAD, [ x ] Well appearing, [  ] Agitated, [  ] Drowsy, [  ] Lethargy, [  ] Confused   HEAD:  [ x ] Normal, [  ] Other  EYES:  [ x ] EOMI, [ x ] PERRLA, [ x ] Conjunctiva and sclera clear normal, [  ] Other, [ x ] Pallor, [  ] Discharge  ENMT:  [ x ] Normal, [ x ] Moist mucous membranes, [  ] Good dentition, [ x ] No thrush  NECK:  [ x ] Supple, [ x ] No JVD, [ x ] Normal thyroid, [  ] Lymphadenopathy, [  ] Other  CHEST/LUNG:  [ x ] Clear to auscultation bilaterally, [ x ] Breath Sounds equal B/L, [ x ] Poor effort, [ x ] No rales, [ x ] No rhonchi, [ x ] No wheezing  HEART:  [ x ] Regular rate and rhythm, [  ] Tachycardia, [  ] Bradycardia, [  ] Irregular, [ x ] No murmurs, No rubs, No gallops, [  ] PPM in place (Mfr:  )  ABDOMEN:  [ x ] Soft, [ x ] Nontender, [ x ] Nondistended, [ x ] No mass, [ x ] Bowel sounds present, [  ] Obese  NERVOUS SYSTEM:  [ x ] Alert & Oriented x3, [  ] Nonfocal, [  ] Confusion, [  ] Encephalopathic, [  ] Sedated, [  ] Unable to assess, [  ] Dementia, [ x ] Other- LUE weakness compared to RUE, left tongue deviation   EXTREMITIES:  [ x ] 2+ Peripheral Pulses, No clubbing, No cyanosis,  [  ] Edema B/L lower EXT, [  ] PVD stasis skin changes B/L lower EXT, [  ] Wound  LYMPH:  No lymphadenopathy noted  SKIN:  [ x ] No rashes or lesions, [  ] Pressure ulcers, [  ] Ecchymosis, [  ] Skin tears, [  ] Other    DIET: pureed diet, Moderately thickened    LABS:                        10.7   6.97  )-----------( 207      ( 02 Apr 2022 08:34 )             33.4     02 Apr 2022 08:34    142    |  107    |  28     ----------------------------<  107    4.5     |  27     |  2.00     Ca    9.7        02 Apr 2022 08:34  Phos  3.5       02 Apr 2022 08:34  Mg     2.5       02 Apr 2022 08:34      PTT - ( 02 Apr 2022 08:34 )  PTT:26.0 sec    Culture Results:   No growth (03-31 @ 01:17)  Culture Results:   No growth to date. (03-30 @ 15:01)  Culture Results:   No growth to date. (03-30 @ 15:01)      CARDIAC MARKERS ( 31 Mar 2022 16:33 )  x     / x     / 202 U/L / x     / 3.1 ng/mL  CARDIAC MARKERS ( 30 Mar 2022 21:07 )  x     / x     / 342 U/L / x     / 12.8 ng/mL  CARDIAC MARKERS ( 30 Mar 2022 14:34 )  x     / x     / 398 U/L / x     / 18.4 ng/mL  CARDIAC MARKERS ( 30 Mar 2022 08:13 )  x     / x     / x     / x     / 13.1 ng/mL  CARDIAC MARKERS ( 30 Mar 2022 07:53 )  x     / x     / 328 U/L / x     / x            Culture - Urine (collected 31 Mar 2022 01:17)  Source: Clean Catch Clean Catch (Midstream)  Final Report (31 Mar 2022 21:27):    No growth    Culture - Blood (collected 30 Mar 2022 15:01)  Source: .Blood Blood-Peripheral  Preliminary Report (31 Mar 2022 16:01):    No growth to date.    Culture - Blood (collected 30 Mar 2022 15:01)  Source: .Blood Blood-Peripheral  Preliminary Report (31 Mar 2022 16:01):    No growth to date.       Anemia Panel:  Iron Total, Serum: 15 ug/dL (03-31-22 @ 10:51)  Iron - Total Binding Capacity.: 318 ug/dL (03-31-22 @ 10:51)  Ferritin, Serum: 53 ng/mL (03-31-22 @ 10:42)  Vitamin B12, Serum: 476 pg/mL (03-31-22 @ 10:42)  Folate, Serum: 10.7 ng/mL (03-31-22 @ 10:42)  Ferritin, Serum: 44 ng/mL (03-31-22 @ 05:21)  Iron - Total Binding Capacity.: 329 ug/dL (03-31-22 @ 05:21)  Iron Total, Serum: 11 ug/dL (03-31-22 @ 05:21)      Thyroid Panel:  T4, Serum: 6.2 ug/dL (03-31-22 @ 10:42)  Thyroid Stimulating Hormone, Serum: 2.99 uIU/mL (03-31-22 @ 07:24)  T4, Serum: 5.9 ug/dL (03-31-22 @ 05:21)  Thyroid Stimulating Hormone, Serum: 2.81 uIU/mL (03-30-22 @ 14:34)            Serum Pro-Brain Natriuretic Peptide: 9256 pg/mL (03-31-22 @ 07:24)  Serum Pro-Brain Natriuretic Peptide: 4963 pg/mL (03-30-22 @ 06:12)      RADIOLOGY & ADDITIONAL TESTS:  No new studies in the past 24 hours    HEALTH ISSUES - PROBLEM Dx:  Acute pulmonary edema    CVA (cerebrovascular accident)    Elevated troponin    Leukocytosis    Chronic HFrEF (heart failure with reduced ejection fraction)    Acute kidney injury superimposed on CKD    Anemia    Elevated creatine kinase    Diabetes mellitus    HTN (hypertension)    CAD (coronary artery disease)    Hypothyroidism    Need for prophylactic measure    Tonsillar cancer          Consultant(s) Notes Reviewed:  [ x ] YES     Care Discussed with [ x ] Consultants, [ x ] Patient, [ x ] Family,- wife  [  ] HCP, [  ] , [ x ] Social Service, [ x ] RN, [  ] Physical Therapy, [  ] Palliative Care Team  DVT PPX: [  ] Lovenox, [ x ] SC Heparin, [  ] Coumadin, [  ] Xarelto, [  ] Eliquis, [  ] Pradaxa, [  ] IV Heparin drip, [  ] SCD, [  ] Ambulation, [  ] Contraindicated 2/2 GI Bleed, [  ] Contraindicated 2/2  Bleed, [  ] Contraindicated 2/2 Brain Bleed  Advanced Directive: [  ] None, [ x ] DNR/DNI Patient is a 76y old  Male who presents with a chief complaint of SOB (02 Apr 2022 13:50)      HPI:  75 yo male w/ PMHx of HTN, HLD, HFrEF (EF 30% 2009), 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 As per patient, he was watching a movie when he felt very short of breath suddenly and dropped to the floor, denies LOC and denies any trauma to his head, was on the floor for ~2 hours. Patient was unable to rise from the floor by himself due to his left arm weakness. When first brought to the ED, patient was hypoxic and hypertensive o2 saturation in EMS was >80%. While in the ED, patient had episode of siezure-like activity in his b/l upper extremities which was controlled w/ ativan. Patient was seen and examined at bedside, BiPAP still in place, patient was still mildly lethargic from ativan and could not speak well with bipap mask on. Patient able to open eyes and nod/shake head to questions. Patient denied any headache, vision changes, cp, abd pain, msk pain. Patient still short of breath.      In ED:   Vitals: HR 80, 176/82 -> 115/60, RR 20, 96% on BiPAP/CPAP  Labs significant for: WBC 20.24, H/H 10.8/24.1, D-Dimer 2924, Troponin 1011.9 > 5252.0, CKMB: 13.1, CPK%: 4%, BUN 33, Creatinine 2.3 (baseline unknown) eGFR 29, serum pro BNP 4963, creatine kinase 328, POCT glucose: 353  Imaging:   CXR: diffuse b/l scattered infiltrates  CT brain stroke: No acute hemorrhage, infarct, or mass effect   EKG: sinus tachy at ST- depressions in V5 V6   Received Lasix 40mg IVP x1, Aspirin 600mg x1, Hydralazine 10mg x1, Ativan 2mg IVP x1, Lispro 6u in the ED    (30 Mar 2022 08:48)      INTERVAL HPI:  3/31/2022: Patient overnight had clots in urine with slight pinkish appearance of urine, stat h/h showed that patients hgb dropped to 9.2 from 10.1. Heparin drip was continued as benefits outweighed risks. AM hgb showed recovery to 9.6. Patient also had 6 beats of vtach overnight, was asymptomatic. Patient seen and examined at bedside, denied any lightheadedness/dizziness, CP/palpitations, abd pain, dysuria, MSK pain, any sensory deficits. Patient continues to have LUE weakness. OBN IV Heparin drip, + CVA on CT Head  04/01/2022: Pt seen and examined at bedside. Hb stable this morning. Pt w no complaints this morning. Denies fever, chills, chest pain, palpitations, shortness of breath, palpitations, lightheadedness, dizziness, headache, n/v/d/c. TOV , d/c [facility] cath. heparin drip was dc in evening   4/2/22: Pt was seen and examined at bedside. Pt states that he had a BM yesterday, normal. AM Bladder scan revealed 450 cc urine but pt refused to be straight cath because he wants to urinate on his own. States that he cannot urinate because he is not on a regular diet. Denies history of BPH. Reports mild back pain, states that Tylenol does not help. Pt denies headache, dizziness, CP, SOB, palpitations, abdominal pain, n/v. No other complaints at this time. Will start Flomax.   4/3/22: Pt seen and examined at bedside. Pt had no BM this AM. F/u bladder scan from this AM showed________. Patient denies any headache, dizziness, CP, SOB, palpitations, abdominal pain, n/v. Patient states back pain is still there but mild. No other complaints at this time.     OVERNIGHT EVENTS:  Bladder scan overnight showed ~600cc of retained urine, patient straight cathed.     Home Medications:  benazepril 10 mg oral tablet: 1 tab(s) orally once a day (30 Mar 2022 10:22)  calcitriol 0.25 mcg oral capsule: 1 cap(s) orally once a day (30 Mar 2022 10:22)  carvedilol 12.5 mg oral tablet: 1 tab(s) orally 2 times a day      *Last filled 5/21, spoke to MATTHEW Nguyen at Dr. Garcia&#x27;s office, she states patient should still be taking med as per MD notes from 2/22*  (30 Mar 2022 10:22)  hydrALAZINE 10 mg oral tablet: 0.5 tab(s) orally 3 times a day (30 Mar 2022 10:22)  isosorbide mononitrate 30 mg oral tablet, extended release: 1 tab(s) orally once a day (in the morning) (30 Mar 2022 10:22)  levothyroxine 75 mcg (0.075 mg) oral tablet: 1 tab(s) orally once a day (30 Mar 2022 10:22)  midodrine 10 mg oral tablet: 1 tab(s) orally 3 times a day (30 Mar 2022 10:22)  Plavix 75 mg oral tablet: 1 tab(s) orally once a day (30 Mar 2022 10:22)  pravastatin 40 mg oral tablet: 1 tab(s) orally once a day (30 Mar 2022 10:22)      MEDICATIONS  (STANDING):  atorvastatin 80 milliGRAM(s) Oral at bedtime  bisacodyl Suppository 10 milliGRAM(s) Rectal daily  calcitriol   Capsule 0.25 MICROGram(s) Oral daily  cyanocobalamin 1000 MICROGram(s) Oral daily  dextrose 5%. 1000 milliLiter(s) (100 mL/Hr) IV Continuous <Continuous>  dextrose 5%. 1000 milliLiter(s) (50 mL/Hr) IV Continuous <Continuous>  dextrose 50% Injectable 25 Gram(s) IV Push once  dextrose 50% Injectable 12.5 Gram(s) IV Push once  dextrose 50% Injectable 25 Gram(s) IV Push once  dipyridamole 200 mG/aspirin 25 mG 1 Capsule(s) Oral two times a day  ferrous    sulfate 325 milliGRAM(s) Oral daily  glucagon  Injectable 1 milliGRAM(s) IntraMuscular once  heparin   Injectable 5000 Unit(s) SubCutaneous every 8 hours  influenza  Vaccine (HIGH DOSE) 0.7 milliLiter(s) IntraMuscular once  insulin lispro (ADMELOG) corrective regimen sliding scale   SubCutaneous three times a day before meals  insulin lispro (ADMELOG) corrective regimen sliding scale   SubCutaneous at bedtime  levothyroxine 75 MICROGram(s) Oral daily  senna 2 Tablet(s) Oral at bedtime  tamsulosin 0.4 milliGRAM(s) Oral at bedtime  valproate sodium IVPB 500 milliGRAM(s) IV Intermittent every 12 hours    MEDICATIONS  (PRN):  ALBUTerol    90 MICROgram(s) HFA Inhaler 2 Puff(s) Inhalation every 6 hours PRN Shortness of Breath and/or Wheezing  dextrose Oral Gel 15 Gram(s) Oral once PRN Blood Glucose LESS THAN 70 milliGRAM(s)/deciliter  ondansetron Injectable 4 milliGRAM(s) IV Push every 8 hours PRN Nausea and/or Vomiting      No Known Allergies      Social History:  Lives at home w/ wife   ADL independent   Tobacco former smoker, quit 40 years ago, 20 pack year history  Alcohol denies  Drugs denies  COVID Pf[employer] x3   Occupation retired- former  (30 Mar 2022 08:48)      REVIEW OF SYSTEMS:  REVIEW OF SYSTEMS: I am ok  CONSTITUTIONAL: No fever, No chills, No fatigue, No myalgia, No Body ache, No Weakness  EYES: No eye pain,  No visual disturbances, No discharge, No Redness  ENMT: No ear pain, No nose bleed, No vertigo; No sinus pain, No throat pain, No Congestion  NECK: No pain, No stiffness  RESPIRATORY: No cough, No wheezing, No hemoptysis, No shortness of breath  CARDIOVASCULAR: No chest pain, No palpitations  GASTROINTESTINAL: No abdominal pain, No epigastric pain. No nausea, No vomiting, No diarrhea, No constipation; [ x ] BM - 1  GENITOURINARY: No dysuria, No frequency, No urgency, No incontinence  NEUROLOGICAL: No headaches, No dizziness, No numbness, No tingling, No tremors, [ x ] LUE weakness   EXTREMITIES: No Swelling, No Pain, No Edema  SKIN: [ x ] No itching, burning, rashes, or lesions   MUSCULOSKELETAL: No joint pain, No joint swelling; No muscle pain, +back pain, No extremity pain  PSYCHIATRIC: No depression, No anxiety, No mood swings, No difficulty sleeping at night  PAIN SCALE: [  ] None  [ x ] Other- Back pain  ROS Unable to obtain due to: [  ] Dementia  [  ] Lethargy  [  ] Sedated  [  ] Non verbal  REST OF REVIEW OF SYSTEMS: [ x ] Normal     Vital Signs Last 24 Hrs  T(C): 36.4 (03 Apr 2022 04:32), Max: 36.8 (02 Apr 2022 12:04)  T(F): 97.5 (03 Apr 2022 04:32), Max: 98.2 (02 Apr 2022 12:04)  HR: 78 (03 Apr 2022 04:32) (71 - 828)  BP: 130/74 (03 Apr 2022 04:32) (130/74 - 151/81)  BP(mean): --  RR: 17 (03 Apr 2022 04:32) (17 - 17)  SpO2: 91% (03 Apr 2022 04:32) (91% - 97%)    CAPILLARY BLOOD GLUCOSE      POCT Blood Glucose.: 113 mg/dL (02 Apr 2022 21:28)  POCT Blood Glucose.: 164 mg/dL (02 Apr 2022 17:06)  POCT Blood Glucose.: 122 mg/dL (02 Apr 2022 12:06)  POCT Blood Glucose.: 119 mg/dL (02 Apr 2022 08:09)      I&O's Summary    02 Apr 2022 07:01  -  03 Apr 2022 07:00  --------------------------------------------------------  IN: 0 mL / OUT: 1050 mL / NET: -1050 mL      PHYSICAL EXAM:  GENERAL:  [ x ] NAD, [ x ] Well appearing, [  ] Agitated, [  ] Drowsy, [  ] Lethargy, [  ] Confused   HEAD:  [ x ] Normal, [  ] Other  EYES:  [ x ] EOMI, [ x ] PERRLA, [ x ] Conjunctiva and sclera clear normal, [  ] Other, [ x ] Pallor, [  ] Discharge  ENMT:  [ x ] Normal, [ x ] Moist mucous membranes, [  ] Good dentition, [ x ] No thrush  NECK:  [ x ] Supple, [ x ] No JVD, [ x ] Normal thyroid, [  ] Lymphadenopathy, [  ] Other  CHEST/LUNG:  [ x ] Clear to auscultation bilaterally, [ x ] Breath Sounds equal B/L, [ x ] Poor effort, [ x ] No rales, [ x ] No rhonchi, [ x ] No wheezing  HEART:  [ x ] Regular rate and rhythm, [  ] Tachycardia, [  ] Bradycardia, [  ] Irregular, [ x ] No murmurs, No rubs, No gallops, [  ] PPM in place (Mfr:  )  ABDOMEN:  [ x ] Soft, [ x ] Nontender, [ x ] Nondistended, [ x ] No mass, [ x ] Bowel sounds present, [  ] Obese  NERVOUS SYSTEM:  [ x ] Alert & Oriented x3, [  ] Nonfocal, [  ] Confusion, [  ] Encephalopathic, [  ] Sedated, [  ] Unable to assess, [  ] Dementia, [ x ] Other- LUE weakness compared to RUE, left tongue deviation   EXTREMITIES:  [ x ] 2+ Peripheral Pulses, No clubbing, No cyanosis,  [  ] Edema B/L lower EXT, [  ] PVD stasis skin changes B/L lower EXT, [  ] Wound  LYMPH:  No lymphadenopathy noted  SKIN:  [ x ] No rashes or lesions, [  ] Pressure ulcers, [  ] Ecchymosis, [  ] Skin tears, [  ] Other    DIET: pureed diet, Moderately thickened    LABS:                        10.7   6.97  )-----------( 207      ( 02 Apr 2022 08:34 )             33.4     02 Apr 2022 08:34    142    |  107    |  28     ----------------------------<  107    4.5     |  27     |  2.00     Ca    9.7        02 Apr 2022 08:34  Phos  3.5       02 Apr 2022 08:34  Mg     2.5       02 Apr 2022 08:34      PTT - ( 02 Apr 2022 08:34 )  PTT:26.0 sec    Culture Results:   No growth (03-31 @ 01:17)  Culture Results:   No growth to date. (03-30 @ 15:01)  Culture Results:   No growth to date. (03-30 @ 15:01)      CARDIAC MARKERS ( 31 Mar 2022 16:33 )  x     / x     / 202 U/L / x     / 3.1 ng/mL  CARDIAC MARKERS ( 30 Mar 2022 21:07 )  x     / x     / 342 U/L / x     / 12.8 ng/mL  CARDIAC MARKERS ( 30 Mar 2022 14:34 )  x     / x     / 398 U/L / x     / 18.4 ng/mL  CARDIAC MARKERS ( 30 Mar 2022 08:13 )  x     / x     / x     / x     / 13.1 ng/mL  CARDIAC MARKERS ( 30 Mar 2022 07:53 )  x     / x     / 328 U/L / x     / x            Culture - Urine (collected 31 Mar 2022 01:17)  Source: Clean Catch Clean Catch (Midstream)  Final Report (31 Mar 2022 21:27):    No growth    Culture - Blood (collected 30 Mar 2022 15:01)  Source: .Blood Blood-Peripheral  Preliminary Report (31 Mar 2022 16:01):    No growth to date.    Culture - Blood (collected 30 Mar 2022 15:01)  Source: .Blood Blood-Peripheral  Preliminary Report (31 Mar 2022 16:01):    No growth to date.       Anemia Panel:  Iron Total, Serum: 15 ug/dL (03-31-22 @ 10:51)  Iron - Total Binding Capacity.: 318 ug/dL (03-31-22 @ 10:51)  Ferritin, Serum: 53 ng/mL (03-31-22 @ 10:42)  Vitamin B12, Serum: 476 pg/mL (03-31-22 @ 10:42)  Folate, Serum: 10.7 ng/mL (03-31-22 @ 10:42)  Ferritin, Serum: 44 ng/mL (03-31-22 @ 05:21)  Iron - Total Binding Capacity.: 329 ug/dL (03-31-22 @ 05:21)  Iron Total, Serum: 11 ug/dL (03-31-22 @ 05:21)      Thyroid Panel:  T4, Serum: 6.2 ug/dL (03-31-22 @ 10:42)  Thyroid Stimulating Hormone, Serum: 2.99 uIU/mL (03-31-22 @ 07:24)  T4, Serum: 5.9 ug/dL (03-31-22 @ 05:21)  Thyroid Stimulating Hormone, Serum: 2.81 uIU/mL (03-30-22 @ 14:34)            Serum Pro-Brain Natriuretic Peptide: 9256 pg/mL (03-31-22 @ 07:24)  Serum Pro-Brain Natriuretic Peptide: 4963 pg/mL (03-30-22 @ 06:12)      RADIOLOGY & ADDITIONAL TESTS:  No new studies in the past 24 hours    HEALTH ISSUES - PROBLEM Dx:  Acute pulmonary edema    CVA (cerebrovascular accident)    Elevated troponin    Leukocytosis    Chronic HFrEF (heart failure with reduced ejection fraction)    Acute kidney injury superimposed on CKD    Anemia    Elevated creatine kinase    Diabetes mellitus    HTN (hypertension)    CAD (coronary artery disease)    Hypothyroidism    Need for prophylactic measure    Tonsillar cancer          Consultant(s) Notes Reviewed:  [ x ] YES     Care Discussed with [ x ] Consultants, [ x ] Patient, [ x ] Family,- wife  [  ] HCP, [  ] , [ x ] Social Service, [ x ] RN, [  ] Physical Therapy, [  ] Palliative Care Team  DVT PPX: [  ] Lovenox, [ x ] SC Heparin, [  ] Coumadin, [  ] Xarelto, [  ] Eliquis, [  ] Pradaxa, [  ] IV Heparin drip, [  ] SCD, [  ] Ambulation, [  ] Contraindicated 2/2 GI Bleed, [  ] Contraindicated 2/2  Bleed, [  ] Contraindicated 2/2 Brain Bleed  Advanced Directive: [  ] None, [ x ] DNR/DNI Patient is a 76y old  Male who presents with a chief complaint of SOB (02 Apr 2022 13:50)      HPI:  75 yo male w/ PMHx of HTN, HLD, HFrEF (EF 30% 2009), 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 As per patient, he was watching a movie when he felt very short of breath suddenly and dropped to the floor, denies LOC and denies any trauma to his head, was on the floor for ~2 hours. Patient was unable to rise from the floor by himself due to his left arm weakness. When first brought to the ED, patient was hypoxic and hypertensive o2 saturation in EMS was >80%. While in the ED, patient had episode of siezure-like activity in his b/l upper extremities which was controlled w/ ativan. Patient was seen and examined at bedside, BiPAP still in place, patient was still mildly lethargic from ativan and could not speak well with bipap mask on. Patient able to open eyes and nod/shake head to questions. Patient denied any headache, vision changes, cp, abd pain, msk pain. Patient still short of breath.      In ED:   Vitals: HR 80, 176/82 -> 115/60, RR 20, 96% on BiPAP/CPAP  Labs significant for: WBC 20.24, H/H 10.8/24.1, D-Dimer 2924, Troponin 1011.9 > 5252.0, CKMB: 13.1, CPK%: 4%, BUN 33, Creatinine 2.3 (baseline unknown) eGFR 29, serum pro BNP 4963, creatine kinase 328, POCT glucose: 353  Imaging:   CXR: diffuse b/l scattered infiltrates  CT brain stroke: No acute hemorrhage, infarct, or mass effect   EKG: sinus tachy at ST- depressions in V5 V6   Received Lasix 40mg IVP x1, Aspirin 600mg x1, Hydralazine 10mg x1, Ativan 2mg IVP x1, Lispro 6u in the ED    (30 Mar 2022 08:48)      INTERVAL HPI:  3/31/2022: Patient overnight had clots in urine with slight pinkish appearance of urine, stat h/h showed that patients hgb dropped to 9.2 from 10.1. Heparin drip was continued as benefits outweighed risks. AM hgb showed recovery to 9.6. Patient also had 6 beats of vtach overnight, was asymptomatic. Patient seen and examined at bedside, denied any lightheadedness/dizziness, CP/palpitations, abd pain, dysuria, MSK pain, any sensory deficits. Patient continues to have LUE weakness. OBN IV Heparin drip, + CVA on CT Head  04/01/2022: Pt seen and examined at bedside. Hb stable this morning. Pt w no complaints this morning. Denies fever, chills, chest pain, palpitations, shortness of breath, palpitations, lightheadedness, dizziness, headache, n/v/d/c. TOV , d/c Mcclellan cath. heparin drip was dc in evening   4/2/22: Pt was seen and examined at bedside. Pt states that he had a BM yesterday, normal. AM Bladder scan revealed 450 cc urine but pt refused to be straight cath because he wants to urinate on his own. States that he cannot urinate because he is not on a regular diet. Denies history of BPH. Reports mild back pain, states that Tylenol does not help. Pt denies headache, dizziness, CP, SOB, palpitations, abdominal pain, n/v. No other complaints at this time. Will start Flomax.   4/3/22: Pt seen and examined at bedside. Pt had no BM this AM. F/u bladder scan from this AM showed urinary retention of ~470cc, patient refusing straight cath, will replace mcclellan catheter and consult uro. Patient denies any headache, dizziness, CP, SOB, palpitations, abdominal pain, n/v. Patient states back pain is still there but mild. No other complaints at this time.     OVERNIGHT EVENTS:  Bladder scan overnight showed ~600cc of retained urine, patient straight cathed.     Home Medications:  benazepril 10 mg oral tablet: 1 tab(s) orally once a day (30 Mar 2022 10:22)  calcitriol 0.25 mcg oral capsule: 1 cap(s) orally once a day (30 Mar 2022 10:22)  carvedilol 12.5 mg oral tablet: 1 tab(s) orally 2 times a day      *Last filled 5/21, spoke to MATTHEW Nguyen at Dr. Garcia&#x27;s office, she states patient should still be taking med as per MD notes from 2/22*  (30 Mar 2022 10:22)  hydrALAZINE 10 mg oral tablet: 0.5 tab(s) orally 3 times a day (30 Mar 2022 10:22)  isosorbide mononitrate 30 mg oral tablet, extended release: 1 tab(s) orally once a day (in the morning) (30 Mar 2022 10:22)  levothyroxine 75 mcg (0.075 mg) oral tablet: 1 tab(s) orally once a day (30 Mar 2022 10:22)  midodrine 10 mg oral tablet: 1 tab(s) orally 3 times a day (30 Mar 2022 10:22)  Plavix 75 mg oral tablet: 1 tab(s) orally once a day (30 Mar 2022 10:22)  pravastatin 40 mg oral tablet: 1 tab(s) orally once a day (30 Mar 2022 10:22)      MEDICATIONS  (STANDING):  atorvastatin 80 milliGRAM(s) Oral at bedtime  bisacodyl Suppository 10 milliGRAM(s) Rectal daily  calcitriol   Capsule 0.25 MICROGram(s) Oral daily  cyanocobalamin 1000 MICROGram(s) Oral daily  dextrose 5%. 1000 milliLiter(s) (100 mL/Hr) IV Continuous <Continuous>  dextrose 5%. 1000 milliLiter(s) (50 mL/Hr) IV Continuous <Continuous>  dextrose 50% Injectable 25 Gram(s) IV Push once  dextrose 50% Injectable 12.5 Gram(s) IV Push once  dextrose 50% Injectable 25 Gram(s) IV Push once  dipyridamole 200 mG/aspirin 25 mG 1 Capsule(s) Oral two times a day  ferrous    sulfate 325 milliGRAM(s) Oral daily  glucagon  Injectable 1 milliGRAM(s) IntraMuscular once  heparin   Injectable 5000 Unit(s) SubCutaneous every 8 hours  influenza  Vaccine (HIGH DOSE) 0.7 milliLiter(s) IntraMuscular once  insulin lispro (ADMELOG) corrective regimen sliding scale   SubCutaneous three times a day before meals  insulin lispro (ADMELOG) corrective regimen sliding scale   SubCutaneous at bedtime  levothyroxine 75 MICROGram(s) Oral daily  senna 2 Tablet(s) Oral at bedtime  tamsulosin 0.4 milliGRAM(s) Oral at bedtime  valproate sodium IVPB 500 milliGRAM(s) IV Intermittent every 12 hours    MEDICATIONS  (PRN):  ALBUTerol    90 MICROgram(s) HFA Inhaler 2 Puff(s) Inhalation every 6 hours PRN Shortness of Breath and/or Wheezing  dextrose Oral Gel 15 Gram(s) Oral once PRN Blood Glucose LESS THAN 70 milliGRAM(s)/deciliter  ondansetron Injectable 4 milliGRAM(s) IV Push every 8 hours PRN Nausea and/or Vomiting      No Known Allergies      Social History:  Lives at home w/ wife   ADL independent   Tobacco former smoker, quit 40 years ago, 20 pack year history  Alcohol denies  Drugs denies  COVID Pf[employer] x3   Occupation retired- former  (30 Mar 2022 08:48)      REVIEW OF SYSTEMS:  REVIEW OF SYSTEMS: I am ok  CONSTITUTIONAL: No fever, No chills, No fatigue, No myalgia, No Body ache, No Weakness  EYES: No eye pain,  No visual disturbances, No discharge, No Redness  ENMT: No ear pain, No nose bleed, No vertigo; No sinus pain, No throat pain, No Congestion  NECK: No pain, No stiffness  RESPIRATORY: No cough, No wheezing, No hemoptysis, No shortness of breath  CARDIOVASCULAR: No chest pain, No palpitations  GASTROINTESTINAL: No abdominal pain, No epigastric pain. No nausea, No vomiting, No diarrhea, No constipation; [ x ] BM - 1  GENITOURINARY: No dysuria, No frequency, No urgency, No incontinence  NEUROLOGICAL: No headaches, No dizziness, No numbness, No tingling, No tremors, [ x ] LUE weakness   EXTREMITIES: No Swelling, No Pain, No Edema  SKIN: [ x ] No itching, burning, rashes, or lesions   MUSCULOSKELETAL: No joint pain, No joint swelling; No muscle pain, +back pain, No extremity pain  PSYCHIATRIC: No depression, No anxiety, No mood swings, No difficulty sleeping at night  PAIN SCALE: [  ] None  [ x ] Other- Back pain  ROS Unable to obtain due to: [  ] Dementia  [  ] Lethargy  [  ] Sedated  [  ] Non verbal  REST OF REVIEW OF SYSTEMS: [ x ] Normal     Vital Signs Last 24 Hrs  T(C): 36.4 (03 Apr 2022 04:32), Max: 36.8 (02 Apr 2022 12:04)  T(F): 97.5 (03 Apr 2022 04:32), Max: 98.2 (02 Apr 2022 12:04)  HR: 78 (03 Apr 2022 04:32) (71 - 828)  BP: 130/74 (03 Apr 2022 04:32) (130/74 - 151/81)  BP(mean): --  RR: 17 (03 Apr 2022 04:32) (17 - 17)  SpO2: 91% (03 Apr 2022 04:32) (91% - 97%)    CAPILLARY BLOOD GLUCOSE      POCT Blood Glucose.: 113 mg/dL (02 Apr 2022 21:28)  POCT Blood Glucose.: 164 mg/dL (02 Apr 2022 17:06)  POCT Blood Glucose.: 122 mg/dL (02 Apr 2022 12:06)  POCT Blood Glucose.: 119 mg/dL (02 Apr 2022 08:09)      I&O's Summary    02 Apr 2022 07:01  -  03 Apr 2022 07:00  --------------------------------------------------------  IN: 0 mL / OUT: 1050 mL / NET: -1050 mL      PHYSICAL EXAM:  GENERAL:  [ x ] NAD, [ x ] Well appearing, [  ] Agitated, [  ] Drowsy, [  ] Lethargy, [  ] Confused   HEAD:  [ x ] Normal, [  ] Other  EYES:  [ x ] EOMI, [ x ] PERRLA, [ x ] Conjunctiva and sclera clear normal, [  ] Other, [ x ] Pallor, [  ] Discharge  ENMT:  [ x ] Normal, [ x ] Moist mucous membranes, [  ] Good dentition, [ x ] No thrush  NECK:  [ x ] Supple, [ x ] No JVD, [ x ] Normal thyroid, [  ] Lymphadenopathy, [  ] Other  CHEST/LUNG:  [ x ] Clear to auscultation bilaterally, [ x ] Breath Sounds equal B/L, [ x ] Poor effort, [ x ] No rales, [ x ] No rhonchi, [ x ] No wheezing  HEART:  [ x ] Regular rate and rhythm, [  ] Tachycardia, [  ] Bradycardia, [  ] Irregular, [ x ] No murmurs, No rubs, No gallops, [  ] PPM in place (Mfr:  )  ABDOMEN:  [ x ] Soft, [ x ] Nontender, [ x ] Nondistended, [ x ] No mass, [ x ] Bowel sounds present, [  ] Obese  NERVOUS SYSTEM:  [ x ] Alert & Oriented x3, [  ] Nonfocal, [  ] Confusion, [  ] Encephalopathic, [  ] Sedated, [  ] Unable to assess, [  ] Dementia, [ x ] Other- LUE weakness compared to RUE, left tongue deviation   EXTREMITIES:  [ x ] 2+ Peripheral Pulses, No clubbing, No cyanosis,  [  ] Edema B/L lower EXT, [  ] PVD stasis skin changes B/L lower EXT, [  ] Wound  LYMPH:  No lymphadenopathy noted  SKIN:  [ x ] No rashes or lesions, [  ] Pressure ulcers, [  ] Ecchymosis, [  ] Skin tears, [  ] Other    DIET: pureed diet, Moderately thickened    LABS:                        10.7   6.97  )-----------( 207      ( 02 Apr 2022 08:34 )             33.4     02 Apr 2022 08:34    142    |  107    |  28     ----------------------------<  107    4.5     |  27     |  2.00     Ca    9.7        02 Apr 2022 08:34  Phos  3.5       02 Apr 2022 08:34  Mg     2.5       02 Apr 2022 08:34      PTT - ( 02 Apr 2022 08:34 )  PTT:26.0 sec    Culture Results:   No growth (03-31 @ 01:17)  Culture Results:   No growth to date. (03-30 @ 15:01)  Culture Results:   No growth to date. (03-30 @ 15:01)      CARDIAC MARKERS ( 31 Mar 2022 16:33 )  x     / x     / 202 U/L / x     / 3.1 ng/mL  CARDIAC MARKERS ( 30 Mar 2022 21:07 )  x     / x     / 342 U/L / x     / 12.8 ng/mL  CARDIAC MARKERS ( 30 Mar 2022 14:34 )  x     / x     / 398 U/L / x     / 18.4 ng/mL  CARDIAC MARKERS ( 30 Mar 2022 08:13 )  x     / x     / x     / x     / 13.1 ng/mL  CARDIAC MARKERS ( 30 Mar 2022 07:53 )  x     / x     / 328 U/L / x     / x            Culture - Urine (collected 31 Mar 2022 01:17)  Source: Clean Catch Clean Catch (Midstream)  Final Report (31 Mar 2022 21:27):    No growth    Culture - Blood (collected 30 Mar 2022 15:01)  Source: .Blood Blood-Peripheral  Preliminary Report (31 Mar 2022 16:01):    No growth to date.    Culture - Blood (collected 30 Mar 2022 15:01)  Source: .Blood Blood-Peripheral  Preliminary Report (31 Mar 2022 16:01):    No growth to date.       Anemia Panel:  Iron Total, Serum: 15 ug/dL (03-31-22 @ 10:51)  Iron - Total Binding Capacity.: 318 ug/dL (03-31-22 @ 10:51)  Ferritin, Serum: 53 ng/mL (03-31-22 @ 10:42)  Vitamin B12, Serum: 476 pg/mL (03-31-22 @ 10:42)  Folate, Serum: 10.7 ng/mL (03-31-22 @ 10:42)  Ferritin, Serum: 44 ng/mL (03-31-22 @ 05:21)  Iron - Total Binding Capacity.: 329 ug/dL (03-31-22 @ 05:21)  Iron Total, Serum: 11 ug/dL (03-31-22 @ 05:21)      Thyroid Panel:  T4, Serum: 6.2 ug/dL (03-31-22 @ 10:42)  Thyroid Stimulating Hormone, Serum: 2.99 uIU/mL (03-31-22 @ 07:24)  T4, Serum: 5.9 ug/dL (03-31-22 @ 05:21)  Thyroid Stimulating Hormone, Serum: 2.81 uIU/mL (03-30-22 @ 14:34)            Serum Pro-Brain Natriuretic Peptide: 9256 pg/mL (03-31-22 @ 07:24)  Serum Pro-Brain Natriuretic Peptide: 4963 pg/mL (03-30-22 @ 06:12)      RADIOLOGY & ADDITIONAL TESTS:  No new studies in the past 24 hours    HEALTH ISSUES - PROBLEM Dx:  Acute pulmonary edema    CVA (cerebrovascular accident)    Elevated troponin    Leukocytosis    Chronic HFrEF (heart failure with reduced ejection fraction)    Acute kidney injury superimposed on CKD    Anemia    Elevated creatine kinase    Diabetes mellitus    HTN (hypertension)    CAD (coronary artery disease)    Hypothyroidism    Need for prophylactic measure    Tonsillar cancer          Consultant(s) Notes Reviewed:  [ x ] YES     Care Discussed with [ x ] Consultants, [ x ] Patient, [ x ] Family,- wife  [  ] HCP, [  ] , [ x ] Social Service, [ x ] RN, [  ] Physical Therapy, [  ] Palliative Care Team  DVT PPX: [  ] Lovenox, [ x ] SC Heparin, [  ] Coumadin, [  ] Xarelto, [  ] Eliquis, [  ] Pradaxa, [  ] IV Heparin drip, [  ] SCD, [  ] Ambulation, [  ] Contraindicated 2/2 GI Bleed, [  ] Contraindicated 2/2  Bleed, [  ] Contraindicated 2/2 Brain Bleed  Advanced Directive: [  ] None, [ x ] DNR/DNI Patient is a 76y old  Male who presents with a chief complaint of SOB (02 Apr 2022 13:50)      HPI:  77 yo male w/ PMHx of HTN, HLD, HFrEF (EF 30% 2009), 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 As per patient, he was watching a movie when he felt very short of breath suddenly and dropped to the floor, denies LOC and denies any trauma to his head, was on the floor for ~2 hours. Patient was unable to rise from the floor by himself due to his left arm weakness. When first brought to the ED, patient was hypoxic and hypertensive o2 saturation in EMS was >80%. While in the ED, patient had episode of siezure-like activity in his b/l upper extremities which was controlled w/ ativan. Patient was seen and examined at bedside, BiPAP still in place, patient was still mildly lethargic from ativan and could not speak well with bipap mask on. Patient able to open eyes and nod/shake head to questions. Patient denied any headache, vision changes, cp, abd pain, msk pain. Patient still short of breath.      In ED:   Vitals: HR 80, 176/82 -> 115/60, RR 20, 96% on BiPAP/CPAP  Labs significant for: WBC 20.24, H/H 10.8/24.1, D-Dimer 2924, Troponin 1011.9 > 5252.0, CKMB: 13.1, CPK%: 4%, BUN 33, Creatinine 2.3 (baseline unknown) eGFR 29, serum pro BNP 4963, creatine kinase 328, POCT glucose: 353  Imaging:   CXR: diffuse b/l scattered infiltrates  CT brain stroke: No acute hemorrhage, infarct, or mass effect   EKG: sinus tachy at ST- depressions in V5 V6   Received Lasix 40mg IVP x1, Aspirin 600mg x1, Hydralazine 10mg x1, Ativan 2mg IVP x1, Lispro 6u in the ED    (30 Mar 2022 08:48)      INTERVAL HPI:  3/31/2022: Patient overnight had clots in urine with slight pinkish appearance of urine, stat h/h showed that patients hgb dropped to 9.2 from 10.1. Heparin drip was continued as benefits outweighed risks. AM hgb showed recovery to 9.6. Patient also had 6 beats of vtach overnight, was asymptomatic. Patient seen and examined at bedside, denied any lightheadedness/dizziness, CP/palpitations, abd pain, dysuria, MSK pain, any sensory deficits. Patient continues to have LUE weakness. OBN IV Heparin drip, + CVA on CT Head  04/01/2022: Pt seen and examined at bedside. Hb stable this morning. Pt w no complaints this morning. Denies fever, chills, chest pain, palpitations, shortness of breath, palpitations, lightheadedness, dizziness, headache, n/v/d/c. TOV , d/c Mcclellan cath. heparin drip was dc in evening   4/2/22: Pt was seen and examined at bedside. Pt states that he had a BM yesterday, normal. AM Bladder scan revealed 450 cc urine but pt refused to be straight cath because he wants to urinate on his own. States that he cannot urinate because he is not on a regular diet. Denies history of BPH. Reports mild back pain, states that Tylenol does not help. Pt denies headache, dizziness, CP, SOB, palpitations, abdominal pain, n/v. No other complaints at this time. Will start Flomax.   4/3/22: Pt seen and examined at bedside. Pt had no BM this AM. F/u bladder scan from this AM showed urinary retention of ~470cc, patient refusing straight cath, will replace mcclellan catheter and consult uro. Patient denies any headache, dizziness, CP, SOB, palpitations, abdominal pain, n/v. Patient states back pain is still there but mild. No other complaints at this time. Failed TOV, Mcclellan cath placed     OVERNIGHT EVENTS:  Bladder scan overnight showed ~600cc of retained urine, patient straight cathed.     Home Medications:  benazepril 10 mg oral tablet: 1 tab(s) orally once a day (30 Mar 2022 10:22)  calcitriol 0.25 mcg oral capsule: 1 cap(s) orally once a day (30 Mar 2022 10:22)  carvedilol 12.5 mg oral tablet: 1 tab(s) orally 2 times a day      *Last filled 5/21, spoke to MATTHEW Nguyen at Dr. Garcia&#x27;s office, she states patient should still be taking med as per MD notes from 2/22*  (30 Mar 2022 10:22)  hydrALAZINE 10 mg oral tablet: 0.5 tab(s) orally 3 times a day (30 Mar 2022 10:22)  isosorbide mononitrate 30 mg oral tablet, extended release: 1 tab(s) orally once a day (in the morning) (30 Mar 2022 10:22)  levothyroxine 75 mcg (0.075 mg) oral tablet: 1 tab(s) orally once a day (30 Mar 2022 10:22)  midodrine 10 mg oral tablet: 1 tab(s) orally 3 times a day (30 Mar 2022 10:22)  Plavix 75 mg oral tablet: 1 tab(s) orally once a day (30 Mar 2022 10:22)  pravastatin 40 mg oral tablet: 1 tab(s) orally once a day (30 Mar 2022 10:22)      MEDICATIONS  (STANDING):  atorvastatin 80 milliGRAM(s) Oral at bedtime  bisacodyl Suppository 10 milliGRAM(s) Rectal daily  calcitriol   Capsule 0.25 MICROGram(s) Oral daily  cyanocobalamin 1000 MICROGram(s) Oral daily  dextrose 5%. 1000 milliLiter(s) (100 mL/Hr) IV Continuous <Continuous>  dextrose 5%. 1000 milliLiter(s) (50 mL/Hr) IV Continuous <Continuous>  dextrose 50% Injectable 25 Gram(s) IV Push once  dextrose 50% Injectable 12.5 Gram(s) IV Push once  dextrose 50% Injectable 25 Gram(s) IV Push once  dipyridamole 200 mG/aspirin 25 mG 1 Capsule(s) Oral two times a day  ferrous    sulfate 325 milliGRAM(s) Oral daily  glucagon  Injectable 1 milliGRAM(s) IntraMuscular once  heparin   Injectable 5000 Unit(s) SubCutaneous every 8 hours  influenza  Vaccine (HIGH DOSE) 0.7 milliLiter(s) IntraMuscular once  insulin lispro (ADMELOG) corrective regimen sliding scale   SubCutaneous three times a day before meals  insulin lispro (ADMELOG) corrective regimen sliding scale   SubCutaneous at bedtime  levothyroxine 75 MICROGram(s) Oral daily  senna 2 Tablet(s) Oral at bedtime  tamsulosin 0.4 milliGRAM(s) Oral at bedtime  valproate sodium IVPB 500 milliGRAM(s) IV Intermittent every 12 hours    MEDICATIONS  (PRN):  ALBUTerol    90 MICROgram(s) HFA Inhaler 2 Puff(s) Inhalation every 6 hours PRN Shortness of Breath and/or Wheezing  dextrose Oral Gel 15 Gram(s) Oral once PRN Blood Glucose LESS THAN 70 milliGRAM(s)/deciliter  ondansetron Injectable 4 milliGRAM(s) IV Push every 8 hours PRN Nausea and/or Vomiting      No Known Allergies      Social History:  Lives at home w/ wife   ADL independent   Tobacco former smoker, quit 40 years ago, 20 pack year history  Alcohol denies  Drugs denies  COVID [employer] x3   Occupation retired- former  (30 Mar 2022 08:48)      REVIEW OF SYSTEMS:  REVIEW OF SYSTEMS: I am ok  CONSTITUTIONAL: No fever, No chills, No fatigue, No myalgia, No Body ache, No Weakness  EYES: No eye pain,  No visual disturbances, No discharge, No Redness  ENMT: No ear pain, No nose bleed, No vertigo; No sinus pain, No throat pain, No Congestion  NECK: No pain, No stiffness  RESPIRATORY: No cough, No wheezing, No hemoptysis, No shortness of breath  CARDIOVASCULAR: No chest pain, No palpitations  GASTROINTESTINAL: No abdominal pain, No epigastric pain. No nausea, No vomiting, No diarrhea, No constipation; [ x ] BM - 1  GENITOURINARY: No dysuria, No frequency, No urgency, No incontinence  NEUROLOGICAL: No headaches, No dizziness, No numbness, No tingling, No tremors, [ x ] LUE weakness   EXTREMITIES: No Swelling, No Pain, No Edema  SKIN: [ x ] No itching, burning, rashes, or lesions   MUSCULOSKELETAL: No joint pain, No joint swelling; No muscle pain, +back pain, No extremity pain  PSYCHIATRIC: No depression, No anxiety, No mood swings, No difficulty sleeping at night  PAIN SCALE: [  ] None  [ x ] Other- Back pain  ROS Unable to obtain due to: [  ] Dementia  [  ] Lethargy  [  ] Sedated  [  ] Non verbal  REST OF REVIEW OF SYSTEMS: [ x ] Normal     Vital Signs Last 24 Hrs  T(C): 36.4 (03 Apr 2022 04:32), Max: 36.8 (02 Apr 2022 12:04)  T(F): 97.5 (03 Apr 2022 04:32), Max: 98.2 (02 Apr 2022 12:04)  HR: 78 (03 Apr 2022 04:32) (71 - 828)  BP: 130/74 (03 Apr 2022 04:32) (130/74 - 151/81)  BP(mean): --  RR: 17 (03 Apr 2022 04:32) (17 - 17)  SpO2: 91% (03 Apr 2022 04:32) (91% - 97%)    CAPILLARY BLOOD GLUCOSE      POCT Blood Glucose.: 113 mg/dL (02 Apr 2022 21:28)  POCT Blood Glucose.: 164 mg/dL (02 Apr 2022 17:06)  POCT Blood Glucose.: 122 mg/dL (02 Apr 2022 12:06)  POCT Blood Glucose.: 119 mg/dL (02 Apr 2022 08:09)      I&O's Summary    02 Apr 2022 07:01  -  03 Apr 2022 07:00  --------------------------------------------------------  IN: 0 mL / OUT: 1050 mL / NET: -1050 mL      PHYSICAL EXAM:  GENERAL:  [ x ] NAD, [ x ] Well appearing, [  ] Agitated, [  ] Drowsy, [  ] Lethargy, [  ] Confused   HEAD:  [ x ] Normal, [  ] Other  EYES:  [ x ] EOMI, [ x ] PERRLA, [ x ] Conjunctiva and sclera clear normal, [  ] Other, [ x ] Pallor, [  ] Discharge  ENMT:  [ x ] Normal, [ x ] Moist mucous membranes, [  ] Good dentition, [ x ] No thrush  NECK:  [ x ] Supple, [ x ] No JVD, [ x ] Normal thyroid, [  ] Lymphadenopathy, [  ] Other  CHEST/LUNG:  [ x ] Clear to auscultation bilaterally, [ x ] Breath Sounds equal B/L, [ x ] Poor effort, [ x ] No rales, [ x ] No rhonchi, [ x ] No wheezing  HEART:  [ x ] Regular rate and rhythm, [  ] Tachycardia, [  ] Bradycardia, [  ] Irregular, [ x ] No murmurs, No rubs, No gallops, [  ] PPM in place (Mfr:  )  ABDOMEN:  [ x ] Soft, [ x ] Nontender, [ x ] Nondistended, [ x ] No mass, [ x ] Bowel sounds present, [  ] Obese  NERVOUS SYSTEM:  [ x ] Alert & Oriented x3, [  ] Nonfocal, [  ] Confusion, [  ] Encephalopathic, [  ] Sedated, [  ] Unable to assess, [  ] Dementia, [ x ] Other- LUE weakness compared to RUE, left tongue deviation   EXTREMITIES:  [ x ] 2+ Peripheral Pulses, No clubbing, No cyanosis,  [  ] Edema B/L lower EXT, [  ] PVD stasis skin changes B/L lower EXT, [  ] Wound  LYMPH:  No lymphadenopathy noted  SKIN:  [ x ] No rashes or lesions, [  ] Pressure ulcers, [  ] Ecchymosis, [  ] Skin tears, [  ] Other    DIET: pureed diet, Moderately thickened    LABS:                        10.7   6.97  )-----------( 207      ( 02 Apr 2022 08:34 )             33.4     02 Apr 2022 08:34    142    |  107    |  28     ----------------------------<  107    4.5     |  27     |  2.00     Ca    9.7        02 Apr 2022 08:34  Phos  3.5       02 Apr 2022 08:34  Mg     2.5       02 Apr 2022 08:34      PTT - ( 02 Apr 2022 08:34 )  PTT:26.0 sec    Culture Results:   No growth (03-31 @ 01:17)  Culture Results:   No growth to date. (03-30 @ 15:01)  Culture Results:   No growth to date. (03-30 @ 15:01)      CARDIAC MARKERS ( 31 Mar 2022 16:33 )  x     / x     / 202 U/L / x     / 3.1 ng/mL  CARDIAC MARKERS ( 30 Mar 2022 21:07 )  x     / x     / 342 U/L / x     / 12.8 ng/mL  CARDIAC MARKERS ( 30 Mar 2022 14:34 )  x     / x     / 398 U/L / x     / 18.4 ng/mL  CARDIAC MARKERS ( 30 Mar 2022 08:13 )  x     / x     / x     / x     / 13.1 ng/mL  CARDIAC MARKERS ( 30 Mar 2022 07:53 )  x     / x     / 328 U/L / x     / x            Culture - Urine (collected 31 Mar 2022 01:17)  Source: Clean Catch Clean Catch (Midstream)  Final Report (31 Mar 2022 21:27):    No growth    Culture - Blood (collected 30 Mar 2022 15:01)  Source: .Blood Blood-Peripheral  Preliminary Report (31 Mar 2022 16:01):    No growth to date.    Culture - Blood (collected 30 Mar 2022 15:01)  Source: .Blood Blood-Peripheral  Preliminary Report (31 Mar 2022 16:01):    No growth to date.       Anemia Panel:  Iron Total, Serum: 15 ug/dL (03-31-22 @ 10:51)  Iron - Total Binding Capacity.: 318 ug/dL (03-31-22 @ 10:51)  Ferritin, Serum: 53 ng/mL (03-31-22 @ 10:42)  Vitamin B12, Serum: 476 pg/mL (03-31-22 @ 10:42)  Folate, Serum: 10.7 ng/mL (03-31-22 @ 10:42)  Ferritin, Serum: 44 ng/mL (03-31-22 @ 05:21)  Iron - Total Binding Capacity.: 329 ug/dL (03-31-22 @ 05:21)  Iron Total, Serum: 11 ug/dL (03-31-22 @ 05:21)      Thyroid Panel:  T4, Serum: 6.2 ug/dL (03-31-22 @ 10:42)  Thyroid Stimulating Hormone, Serum: 2.99 uIU/mL (03-31-22 @ 07:24)  T4, Serum: 5.9 ug/dL (03-31-22 @ 05:21)  Thyroid Stimulating Hormone, Serum: 2.81 uIU/mL (03-30-22 @ 14:34)            Serum Pro-Brain Natriuretic Peptide: 9256 pg/mL (03-31-22 @ 07:24)  Serum Pro-Brain Natriuretic Peptide: 4963 pg/mL (03-30-22 @ 06:12)      RADIOLOGY & ADDITIONAL TESTS:  No new studies in the past 24 hours    HEALTH ISSUES - PROBLEM Dx:  Acute pulmonary edema    CVA (cerebrovascular accident)    Elevated troponin    Leukocytosis    Chronic HFrEF (heart failure with reduced ejection fraction)    Acute kidney injury superimposed on CKD    Anemia    Elevated creatine kinase    Diabetes mellitus    HTN (hypertension)    CAD (coronary artery disease)    Hypothyroidism    Need for prophylactic measure    Tonsillar cancer          Consultant(s) Notes Reviewed:  [ x ] YES     Care Discussed with [ x ] Consultants, [ x ] Patient, [ x ] Family,- Dtr  [  ] HCP, [  ] , [ x ] Social Service, [ x ] RN, [  ] Physical Therapy, [  ] Palliative Care Team  DVT PPX: [  ] Lovenox, [ x ] SC Heparin, [  ] Coumadin, [  ] Xarelto, [  ] Eliquis, [  ] Pradaxa, [  ] IV Heparin drip, [  ] SCD, [  ] Ambulation, [  ] Contraindicated 2/2 GI Bleed, [  ] Contraindicated 2/2  Bleed, [  ] Contraindicated 2/2 Brain Bleed  Advanced Directive: [  ] None, [ x ] DNR/DNI

## 2022-04-03 NOTE — PROGRESS NOTE ADULT - ATTENDING COMMENTS
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 Admitted for pulmonary edema, leukocytosis, elevated troponin, JARRED on CKD-3  and seizure.   Pt seen, examined, case & care plan d/w pt, residents at detail.  -D/W Neuro-Dr Brownlee -follow up  -Cardiology-Dr Hewitt follow up  -Renal eval DR Porter follow up,  -Urology DR Harrison-, start Flomax 0.4 mg Po daily, Hillman cath placed as failed TOV  AM labs , PT eval---> [facility]   -PO diet  D/W Dtr at detail at bed side.  Palliative care Eval, Prognosis is Guarded.  Total care time is 40 minutes .

## 2022-04-03 NOTE — CHART NOTE - NSCHARTNOTEFT_GEN_A_CORE
Called by RN as patient with bladder scan showing ~600 cc urine. STAT straight cath x1 and repeat bladder scan for 9AM ordered. RN to call if any changes.

## 2022-04-03 NOTE — CONSULT NOTE ADULT - CONSULT REQUESTED DATE/TIME
03-Apr-2022
30-Mar-2022 15:38
31-Mar-2022 22:42
30-Mar-2022 07:33
30-Mar-2022 16:18
30-Mar-2022 10:46
30-Mar-2022 10:57
31-Mar-2022 15:26

## 2022-04-03 NOTE — CONSULT NOTE ADULT - CONSULT REASON
.
shortness of breath and elevated cardiac enzymes
CKD
Acute urinary retention
Hypoxia, hypertensive emergency
CKD
acute hypoxic resp failure
anemia D64.89

## 2022-04-03 NOTE — PROGRESS NOTE ADULT - ASSESSMENT
76 yr old male with stated hx significant for HFrEF, s/p AICD, tonsillar Ca s/p Lt tonsillectomy, Lt partial glossectomy, s/p Rt CEA, DM2, hypothyroidism, CKD3 who  presented from home via EMS after being found of floor with LUE weakness, sob. Consult called for hypoxic resp failure secondary to stroke,  hypertensive emergency, r/o CVA.    HTN emergency/CVA/Pulmonary Edema  - BP remains controlled and stable.  No further neuro symptoms  - Avoid hypotension.  Continue to hold all BP meds for now  - CT head and EEG noted.  Follow Neuro recs  - On Aggrenox and statin   - +AICD Medtronic interrogation on chart>  [employer] life: 4.4yrs.  No event noted since 1/28/22.  Device set at: AAIR-DDDR.  Rate   - Remains euvolemic on exam, s/p Lasix.  Continue to hold daily Lasix.  Diurese prn only in the setting of JARRED on CKD    NSTEMI  - hsT peaked at 90167 and trended down to 9200.  EKG showed ST depression in lateral leads.  - s/p Heparin gtt x  48 hrs.    - On Aggrenox.  If this is better strategy from neuro perspective, would follow Neuro recs  - Start Lopressor 25 mg q12H with parameter.  Continue high intensity statin  - No ischemic symptoms.  Repeat EKG  - f/u official TTE report  - NSR on tele with no events.  Can D/C  - Will eventually do ischemic eval once renally stable.  Would need clearance from Renal    - Monitor and replete Lytes. Keep K > 4 and Mg > 2  - Continue to monitor on tele    - Will continue to follow.    Yoselyn Schwartz DNP, NP-C  Cardiology   Spectra #[phone]       76 yr old male with stated hx significant for HFrEF, s/p AICD, tonsillar Ca s/p Lt tonsillectomy, Lt partial glossectomy, s/p Rt CEA, DM2, hypothyroidism, CKD3 who  presented from home via EMS after being found of floor with LUE weakness, sob. Consult called for hypoxic resp failure secondary to stroke,  hypertensive emergency, r/o CVA.    HTN emergency/CVA/Pulmonary Edema  - BP remains controlled and stable.  No further neuro symptoms  - Avoid hypotension.  Continue to hold all BP meds for now  - CT head and EEG noted.  Follow Neuro recs  - On Aggrenox and statin   - +AICD Medtronic interrogation on chart>  [employer] life: 4.4yrs.  No event noted since 1/28/22.  Device set at: AAIR-DDDR.  Rate   - Remains euvolemic on exam, s/p Lasix.  Continue to hold daily Lasix.  Diurese prn only in the setting of JARRED on CKD    NSTEMI  - hsT peaked at 50868 and trended down to 9200.  EKG showed ST depression in lateral leads.  - s/p Heparin gtt x  48 hrs.    - On Aggrenox.  If this is better strategy from neuro perspective, would follow Neuro recs  - Start Lopressor 25 mg q12H with parameter.  Continue high intensity statin  - No ischemic symptoms.  Repeat EKG  - f/u official TTE report  - NSR on tele with no events.  Can D/C  - Will eventually do ischemic eval once renally stable.  Would need clearance from Renal.  Can also be done outpatient given recent fresh neurological event    - Monitor and replete Lytes. Keep K > 4 and Mg > 2  - Continue to monitor on tele    - Will continue to follow.    Yoselyn Schwartz DNP, NP-C  Cardiology   Spectra #[phone]

## 2022-04-03 NOTE — PROGRESS NOTE ADULT - SUBJECTIVE AND OBJECTIVE BOX
[facility] -- Rianna Horn, Selma Terrell, Vlad Velarde Savella, [place]  Office # [phone]    Follow Up:   Hypertensive Emergency, CVA, NSTEMI    Subjective/Observations: Denies chest pain or palpitations.  Denies SOB or REZA.  Tolerating RA.  Denies chest pain or palpitations  no overnight events noted.  No tele events    REVIEW OF SYSTEMS: All other review of systems is negative unless indicated above  PAST MEDICAL & SURGICAL HISTORY:  MI (myocardial infarction)  1992    HTN (hypertension)    HLD (hyperlipidemia)    Throat cancer  2011, treated with chemo, RT    Ventricular arrhythmia  s/p AICD    Diabetes  Type2, not on any meds since weight loss after throat Ca    Renal insufficiency  s/p chemo    CAD (coronary artery disease)    Inguinal hernia, left    H/O prior ablation treatment  VT, 2009    Cardiac defibrillator in place  - 2009, battery change 2017    S/P left inguinal hernia repair  2018 at [place]    MEDICATIONS  (STANDING):  atorvastatin 80 milliGRAM(s) Oral at bedtime  bisacodyl Suppository 10 milliGRAM(s) Rectal daily  calcitriol   Capsule 0.25 MICROGram(s) Oral daily  cyanocobalamin 1000 MICROGram(s) Oral daily  dextrose 5%. 1000 milliLiter(s) (50 mL/Hr) IV Continuous <Continuous>  dextrose 5%. 1000 milliLiter(s) (100 mL/Hr) IV Continuous <Continuous>  dextrose 50% Injectable 25 Gram(s) IV Push once  dextrose 50% Injectable 12.5 Gram(s) IV Push once  dextrose 50% Injectable 25 Gram(s) IV Push once  dipyridamole 200 mG/aspirin 25 mG 1 Capsule(s) Oral two times a day  ferrous    sulfate 325 milliGRAM(s) Oral daily  glucagon  Injectable 1 milliGRAM(s) IntraMuscular once  heparin   Injectable 5000 Unit(s) SubCutaneous every 8 hours  influenza  Vaccine (HIGH DOSE) 0.7 milliLiter(s) IntraMuscular once  insulin lispro (ADMELOG) corrective regimen sliding scale   SubCutaneous three times a day before meals  insulin lispro (ADMELOG) corrective regimen sliding scale   SubCutaneous at bedtime  levothyroxine 75 MICROGram(s) Oral daily  senna 2 Tablet(s) Oral at bedtime  tamsulosin 0.4 milliGRAM(s) Oral at bedtime  valproate sodium IVPB 500 milliGRAM(s) IV Intermittent every 12 hours    MEDICATIONS  (PRN):  ALBUTerol    90 MICROgram(s) HFA Inhaler 2 Puff(s) Inhalation every 6 hours PRN Shortness of Breath and/or Wheezing  dextrose Oral Gel 15 Gram(s) Oral once PRN Blood Glucose LESS THAN 70 milliGRAM(s)/deciliter  ondansetron Injectable 4 milliGRAM(s) IV Push every 8 hours PRN Nausea and/or Vomiting    Allergies    No Known Allergies    Intolerances    Vital Signs Last 24 Hrs  T(C): 36.4 (03 Apr 2022 04:32), Max: 36.8 (02 Apr 2022 12:04)  T(F): 97.5 (03 Apr 2022 04:32), Max: 98.2 (02 Apr 2022 12:04)  HR: 78 (03 Apr 2022 04:32) (71 - 828)  BP: 130/74 (03 Apr 2022 04:32) (130/74 - 151/81)  BP(mean): --  RR: 17 (03 Apr 2022 04:32) (17 - 17)  SpO2: 91% (03 Apr 2022 04:32) (91% - 97%)  I&O's Summary    02 Apr 2022 07:01  -  03 Apr 2022 07:00  --------------------------------------------------------  IN: 0 mL / OUT: 1050 mL / NET: -1050 mL    PHYSICAL EXAM:  TELE: NSR   Constitutional: NAD, awake and alert, well-developed  HEENT: Moist Mucous Membranes, Anicteric  Pulmonary: Non-labored, breath sounds are clear bilaterally, No wheezing, rales or rhonchi  Cardiovascular: IRRR, S1 and S2, +murmurs, no rubs, gallops or clicks  Gastrointestinal: Bowel Sounds present, soft, nontender.   Lymph: No peripheral edema. No lymphadenopathy.  Skin: No visible rashes or ulcers.  Psych:  Mood & affect: appropriate    LABS: All Labs Reviewed:                        10.2   6.12  )-----------( 211      ( 03 Apr 2022 08:03 )             32.3                         10.7   6.97  )-----------( 207      ( 02 Apr 2022 08:34 )             33.4                         9.4    9.54  )-----------( 190      ( 01 Apr 2022 06:36 )             29.1     03 Apr 2022 08:03    142    |  108    |  32     ----------------------------<  119    4.1     |  27     |  2.10   02 Apr 2022 08:34    142    |  107    |  28     ----------------------------<  107    4.5     |  27     |  2.00   01 Apr 2022 06:37    142    |  106    |  36     ----------------------------<  147    3.6     |  24     |  2.10     Ca    9.3        03 Apr 2022 08:03  Ca    9.7        02 Apr 2022 08:34  Ca    8.8        01 Apr 2022 06:37  Phos  3.1       03 Apr 2022 08:03  Phos  3.5       02 Apr 2022 08:34  Phos  3.3       01 Apr 2022 06:37  Mg     2.6       03 Apr 2022 08:03  Mg     2.5       02 Apr 2022 08:34  Mg     2.3       01 Apr 2022 06:37    TPro  6.3    /  Alb  2.7    /  TBili  0.7    /  DBili  x      /  AST  20     /  ALT  30     /  AlkPhos  72     03 Apr 2022 08:03  TPro  6.0    /  Alb  2.7    /  TBili  0.9    /  DBili  x      /  AST  41     /  ALT  42     /  AlkPhos  77     01 Apr 2022 06:37    PTT - ( 02 Apr 2022 08:34 )  PTT:26.0 sec    Follow up TTE    ACC: [id number] EXAM:  CT CHEST                          *** ADDENDUM***    Findings discussed with Dr. Rich Patten at 3/31/2022 2:35 PM with   readback.    --- End of Report ---    *** END OF ADDENDUM***    PROCEDURE DATE:  03/31/2022      INTERPRETATION:  CLINICAL INFORMATION: 76 years  Male with opacifications   on cxr r/o PNA.    COMPARISON: None.    CONTRAST/COMPLICATIONS:  IV Contrast: IV contrast documented in associated exam  Oral Contrast: NONE  Complications: None reported attime of study completion    PROCEDURE:  CT of the Chest was performed.  Sagittal and coronal reformats were performed.    FINDINGS:    LUNGS AND AIRWAYS: Patent central airways.  Mild centrilobular emphysema.   Nodular posterior right upper lobe infiltrate. Smooth intralobular septal   thickening.  PLEURA: Small to moderate bilateral pleural effusions. Bilateral   calcified pleural plaques.  MEDIASTINUM AND PETE: No lymphadenopathy.  VESSELS: Aortic atherosclerosis without aneurysm. Coronary   atherosclerosis.  HEART: Heart size is normal. Pacemaker/defibrillator leads in the heart.   No pericardial effusion.  CHEST WALL AND LOWER NECK: Pacemaker pulse generator in the left chest   wall.  VISUALIZED UPPER ABDOMEN: Within normal limits.  BONES: Degenerative changes.    IMPRESSION:  Right upper lobe pneumonia superimposed on mild pulmonary edema.   Bilateral pleural effusions.    Pacemaker/defibrillator.    Other chronic findings as above.    --- End of Report ---    ***Please see the addendum at the top of this report. It may contain   additional important information or changes.****    LASHAE HAYES MD; Attending Radiologist  This document has been electronically signed. Mar 31 2022  9:21AM  Addend:LASHAE HAYES MD; Attending Radiologist  This addendum was electronically signed on: Mar 31 2022  2:35PM.  \    ACC: [id number] EXAM:  CT BRAIN                          PROCEDURE DATE:  03/31/2022          INTERPRETATION:  Noncontrast CT of the brain.    CLINICAL INDICATION:  Left-sided weakness    TECHNIQUE : Axial CT scanning of the brain was obtained from the skull   base to the vertex without the administration of intravenous contrast.   Sagittal and coronal reformats were provided.    COMPARISON: CT brain 3/30/2022    FINDINGS:    Interval demarcation of cytotoxic edema in the region of the right   perirolandic cortex. No CT evidence for hemorrhagic transformation.    No hydrocephalus, midline shift, or effacement of basal cisterns.    Mild right maxillary sinus shows thickening. Remaining visualized   paranasal sinuses and mastoid air cells are clear.    IMPRESSION:    Interval demarcation of the right perirolandic cortex infarct.  No CT evidence for hemorrhagic transformation.    Dr. Moreno discussed these findings with Dr. Manuel on 3/31/2022 9:19 AM   with read back.    --- End of Report---    ANUP MORENO MD; Attending Radiologist  This document has been electronically signed. Mar 31 2022  9:20AM     Ventricular Rate 103 BPM    Atrial Rate 103 BPM    P-R Interval 154 ms    QRS Duration 124 ms    Q-T Interval 382 ms    QTC Calculation(Bazett) 500 ms    P Axis 56 degrees    R Axis -22 degrees    T Axis 105 degrees    Diagnosis Line Sinus tachycardia with premature atrial complexes with aberrant conduction  Possible Left atrial enlargement  Left ventricular hypertrophy with QRS widening ( [place] product )  Anteroseptal infarct (cited on or before 01-AUG-2018)  ST & T wave abnormality, consider lateral ischemia  Abnormal ECG  When compared with ECG of 01-AUG-2018 11:46,  rhythm no longer ap, hr faster, stt abns prob unchanged  Confirmed by Isaac Hahn MD ([id number]) on 3/30/2022 1:17:19 PM

## 2022-04-03 NOTE — PROGRESS NOTE ADULT - SUBJECTIVE AND OBJECTIVE BOX
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 As per patient, he was watching a movie when he felt very short of breath suddenly and dropped to the floor, denies LOC and denies any trauma to his head, was on the floor for ~2 hours. Patient was unable to rise from the floor by himself due to his left arm weakness. When first brought to the ED, patient was hypoxic and hypertensive o2 saturation in EMS was >80%. While in the ED, patient had episode of siezure-like activity in his b/l upper extremities which was controlled w/ ativan. Patient was seen and examined at bedside, BiPAP still in place, patient was still mildly lethargic from ativan and could not speak well with bipap mask on. Patient able to open eyes and nod/shake head to questions. Patient denied any headache, vision changes, cp, abd pain, msk pain. Patient still short of breath.      In ED:   Vitals: HR 80, 176/82 -> 115/60, RR 20, 96% on BiPAP/CPAP  Labs significant for: WBC 20.24, H/H 10.8/24.1, D-Dimer 2924, Troponin 1011.9 > 5252.0, CKMB: 13.1, CPK%: 4%, BUN 33, Creatinine 2.3 (baseline unknown) eGFR 29, serum pro BNP 4963, creatine kinase 328, POCT glucose: 353  Imaging:   CXR: diffuse b/l scattered infiltrates  CT brain stroke: No acute hemorrhage, infarct, or mass effect   EKG: sinus tachy at ST- depressions in V5 V6   Received Lasix 40mg IVP x1, Aspirin 600mg x1, Hydralazine 10mg x1, Ativan 2mg IVP x1, Lispro 6u in the ED    (30 Mar 2022 08:48)      Interval History -no new events    Patient is seen, chart was reviewed and case was discussed with the treatment team.  Pt is not in any distress.   Lying on bed comfortably.     Vital Signs Last 24 Hrs  T(C): 36.7 (03 Apr 2022 12:49), Max: 36.7 (03 Apr 2022 12:49)  T(F): 98 (03 Apr 2022 12:49), Max: 98 (03 Apr 2022 12:49)  HR: 72 (03 Apr 2022 12:49) (72 - 828)  BP: 151/72 (03 Apr 2022 12:49) (130/74 - 151/72)  BP(mean): --  RR: 18 (03 Apr 2022 12:49) (17 - 18)  SpO2: 94% (03 Apr 2022 12:49) (91% - 94%)        REVIEW OF SYSTEMS:    Constitutional: No fever, weight loss or fatigue  Eyes: No eye pain, visual disturbances, or discharge  ENT:  No difficulty hearing, tinnitus, vertigo; No sinus or throat pain  Neck: No pain or stiffness  Respiratory: No , wheezing, chills or hemoptysis  Cardiovascular: No chest pain, palpitations, shortness of breath, dizziness  Gastrointestinal: No abdominal or epigastric pain. No nausea, vomiting or hematemesis;   Genitourinary: No dysuria, frequency, hematuria or incontinence  Neurological: No headaches, memory loss,   Psychiatric: No depression, anxiety, mood swings or difficulty sleeping  Musculoskeletal: No joint pain or swelling; No muscle, back or extremity pain  Skin: No itching, burning, rashes or lesions   Lymph Nodes: No enlarged glands  Endocrine: No heat or cold intolerance; No hair   Allergy and Immunologic: No hives or eczema    On Neurological Examination:    Mental Status - Pt is alert, awake, oriented X3.. Follows commands well and able to answer questions appropriately.Mood and affect  normal    Speech -  Normal.    Cranial Nerves - Pupils 3 mm equal and reactive to light, extraocular eye movements intact. Pt has no visual field deficit.  Pt has no  left facial asymmetry. Facial sensation is intact.Tongue - is in midline.    Muscle tone - is normal     Motor Exam - left hemiparesis arm 3+ to 4-/5/5; leg 4/5   left pronator drift    Sensory Exam -  Pt withdraws all extremities equally on stimulation. No asymmetry seen. No complaints of tingling, numbness.        coordination:    Finger to nose: normal      Deep tendon Reflexes - 2 plus all over.   .    Neck Supple -  Yes.     MEDICATIONS    ALBUTerol    90 MICROgram(s) HFA Inhaler 2 Puff(s) Inhalation every 6 hours PRN  atorvastatin 80 milliGRAM(s) Oral at bedtime  bisacodyl Suppository 10 milliGRAM(s) Rectal daily  calcitriol   Capsule 0.25 MICROGram(s) Oral daily  cyanocobalamin 1000 MICROGram(s) Oral daily  dextrose 5%. 1000 milliLiter(s) IV Continuous <Continuous>  dextrose 5%. 1000 milliLiter(s) IV Continuous <Continuous>  dextrose 50% Injectable 25 Gram(s) IV Push once  dextrose 50% Injectable 12.5 Gram(s) IV Push once  dextrose 50% Injectable 25 Gram(s) IV Push once  dextrose Oral Gel 15 Gram(s) Oral once PRN  dipyridamole 200 mG/aspirin 25 mG 1 Capsule(s) Oral two times a day  ferrous    sulfate 325 milliGRAM(s) Oral daily  glucagon  Injectable 1 milliGRAM(s) IntraMuscular once  heparin   Injectable 5000 Unit(s) SubCutaneous every 8 hours  influenza  Vaccine (HIGH DOSE) 0.7 milliLiter(s) IntraMuscular once  insulin lispro (ADMELOG) corrective regimen sliding scale   SubCutaneous three times a day before meals  insulin lispro (ADMELOG) corrective regimen sliding scale   SubCutaneous at bedtime  iron sucrose Injectable 100 milliGRAM(s) IV Push every 24 hours  levothyroxine 75 MICROGram(s) Oral daily  ondansetron Injectable 4 milliGRAM(s) IV Push every 8 hours PRN  senna 2 Tablet(s) Oral at bedtime  valproate sodium IVPB 500 milliGRAM(s) IV Intermittent every 12 hours      Allergies    No Known Allergies    Intolerances                            10.2   6.12  )-----------( 211      ( 03 Apr 2022 08:03 )             32.3       Hemoglobin A1C:     Vitamin B12     RADIOLOGY    ASSESSMENT AND PLAN:      seen for left sided weakness-  consistent with subacute right mca infarct    continue  aggrenox   on statin  Physical therapy evaluation.  OOB to chair/ambulation with assistance only.  Pain is accessed and addressed.  Would continue to follow.

## 2022-04-03 NOTE — PROGRESS NOTE ADULT - PROBLEM SELECTOR PLAN 1
Pt presents with SOB 2/2 flash pulmonary edema likely due to uncontrolled BP. CHF w/ heart strain Likely  NSTEMI   - ProBNP 4963  - CXR: diffuse b/l scattered infiltrates  - CT chest: RUL PNA   - procal 18.46, normal lactate, BCx x2 NGTD   - elevated D-dimer on admission, V/Q scan low probability for PE   - s/p lasix 40mg IVPx 2 doses on 3/30, continue to HOLD Lasix and diurese prn   - off of supplemental oxygen  - Repeat TTE showed LVEF 30% - 35% w/ severe systolic dysfunction  - Patient does not appear volume overloaded on exam, monitor volume status

## 2022-04-03 NOTE — CONSULT NOTE ADULT - CONSULT REQUESTED BY NAME
Dr. REY Larson
FERMIN
.
Dr. Larson
Dr. Larson
Dr. Shailesh Larson
Dr. Shailesh Larson
Shailesh Larson)

## 2022-04-03 NOTE — PROGRESS NOTE ADULT - PROBLEM SELECTOR PLAN 5
TTE 2009 showed LVEF 30% w/ severely reduced ejection fraction Chronic Systolic CHF  - TTE LVEF 30% - 35% w/ severe systolic dysfunction  - Continue to HOLD  home Imdur and carvedilol   - Continue to HOLD home benazepril in setting of  JARRED/CKD 3  - s/p lasix 40mg IVPx 2 doses on 3/30, continue to HOLD Lasix and diurese prn   - [facility] following  - Does not appear volume overloaded on physical exam, continue to monitor volume status

## 2022-04-03 NOTE — PROGRESS NOTE ADULT - PROBLEM SELECTOR PLAN 3
2/2 to NSTEMI -On IV Heparin drip for ~48 hrs dc 4/1 evening and placed on subq Heparin for ppx   - Troponin 1011.9 > 5252.0 > 08730 > 09242, no longer need to trend   - EKG: ST-depressions in V5 V6, new from prior EKG   - [facility] following   - As d/w Cardio-pt does NOT have any cardiac stents/Intervention in past  - On Aggrenox, high dose statin, continue   - TTE LVEF 30% - 35% w/ severe systolic dysfunction

## 2022-04-03 NOTE — PROGRESS NOTE ADULT - PROBLEM SELECTOR PLAN 12
Chronic stable   - D/w Neurology Dr. Brownlee  & Cardiology Dr. Hahn- OK to change plavix to Aggrenox on admission  - Off of Hep drip for NSTEMI on 4/1  - On Aggrenox, high dose statin, continue     #GOC  - Palliative following   - Pt is DNR, intubation trial Chronic stable   - D/w Neurology Dr. Brownlee  & Cardiology Dr. Hahn- OK to change plavix to Aggrenox on admission  - Off of Hep drip for NSTEMI on 4/1  - On Aggrenox, high dose statin, continue     #GOC  - Palliative following   - Pt is DNR, intubation trial    #Urinary Retention   - Patient had mcclellan placed as apart of stroke workup, mcclellan d/c'ed on 4/1   - Patient with significant urinary retention on bladder scans, continually refusing straight caths   - Patient retaining 600cc last night, straigth cathed once last night, this am retaining ~470cc of urine, refusing straight cath   - will reintroduce mcclellan   - on flomax 0.4mg   - uro consult

## 2022-04-03 NOTE — PROGRESS NOTE ADULT - PROBLEM SELECTOR PLAN 2
Patient w/ left upper extremity weakness of unknown duration, 2/2 to CVA   - patient w/ seizure like activity in ED s/p ativan, EEG normal  - started on valproate by neuro, will continue   - CT brain stroke negative for acute hemorrhage or infarct, repeat CT: acute stroke in right central sulcus   - On Aggrenox, high dose statin, continue   - Neuro [facility] following   - PT/OT ---> [facility]   - MBS: Puree with Moderately thick liquids, all food and liquids via teaspoon presentation, utilizing chin tuck and 2 swallows after each bolus

## 2022-04-04 LAB
ALBUMIN SERPL ELPH-MCNC: 2.5 G/DL — LOW (ref 3.3–5)
ALP SERPL-CCNC: 82 U/L — SIGNIFICANT CHANGE UP (ref 40–120)
ALT FLD-CCNC: 34 U/L — SIGNIFICANT CHANGE UP (ref 12–78)
ANION GAP SERPL CALC-SCNC: 7 MMOL/L — SIGNIFICANT CHANGE UP (ref 5–17)
AST SERPL-CCNC: 28 U/L — SIGNIFICANT CHANGE UP (ref 15–37)
BILIRUB SERPL-MCNC: 0.5 MG/DL — SIGNIFICANT CHANGE UP (ref 0.2–1.2)
BUN SERPL-MCNC: 33 MG/DL — HIGH (ref 7–23)
CALCIUM SERPL-MCNC: 9 MG/DL — SIGNIFICANT CHANGE UP (ref 8.5–10.1)
CHLORIDE SERPL-SCNC: 108 MMOL/L — SIGNIFICANT CHANGE UP (ref 96–108)
CO2 SERPL-SCNC: 27 MMOL/L — SIGNIFICANT CHANGE UP (ref 22–31)
CREAT SERPL-MCNC: 2.1 MG/DL — HIGH (ref 0.5–1.3)
CULTURE RESULTS: SIGNIFICANT CHANGE UP
CULTURE RESULTS: SIGNIFICANT CHANGE UP
EGFR: 32 ML/MIN/1.73M2 — LOW
GLUCOSE SERPL-MCNC: 148 MG/DL — HIGH (ref 70–99)
HCT VFR BLD CALC: 31.9 % — LOW (ref 39–50)
HGB BLD-MCNC: 10 G/DL — LOW (ref 13–17)
MAGNESIUM SERPL-MCNC: 2.2 MG/DL — SIGNIFICANT CHANGE UP (ref 1.6–2.6)
MCHC RBC-ENTMCNC: 23.9 PG — LOW (ref 27–34)
MCHC RBC-ENTMCNC: 31.3 GM/DL — LOW (ref 32–36)
MCV RBC AUTO: 76.1 FL — LOW (ref 80–100)
NRBC # BLD: 0 /100 WBCS — SIGNIFICANT CHANGE UP (ref 0–0)
PHOSPHATE SERPL-MCNC: 3.3 MG/DL — SIGNIFICANT CHANGE UP (ref 2.5–4.5)
PLATELET # BLD AUTO: 202 K/UL — SIGNIFICANT CHANGE UP (ref 150–400)
POTASSIUM SERPL-MCNC: 4 MMOL/L — SIGNIFICANT CHANGE UP (ref 3.5–5.3)
POTASSIUM SERPL-SCNC: 4 MMOL/L — SIGNIFICANT CHANGE UP (ref 3.5–5.3)
PROT SERPL-MCNC: 6.3 G/DL — SIGNIFICANT CHANGE UP (ref 6–8.3)
RBC # BLD: 4.19 M/UL — LOW (ref 4.2–5.8)
RBC # FLD: 14.4 % — SIGNIFICANT CHANGE UP (ref 10.3–14.5)
SARS-COV-2 RNA SPEC QL NAA+PROBE: SIGNIFICANT CHANGE UP
SODIUM SERPL-SCNC: 142 MMOL/L — SIGNIFICANT CHANGE UP (ref 135–145)
SPECIMEN SOURCE: SIGNIFICANT CHANGE UP
SPECIMEN SOURCE: SIGNIFICANT CHANGE UP
WBC # BLD: 6.93 K/UL — SIGNIFICANT CHANGE UP (ref 3.8–10.5)
WBC # FLD AUTO: 6.93 K/UL — SIGNIFICANT CHANGE UP (ref 3.8–10.5)

## 2022-04-04 PROCEDURE — 99232 SBSQ HOSP IP/OBS MODERATE 35: CPT

## 2022-04-04 RX ORDER — METOPROLOL TARTRATE 50 MG
25 TABLET ORAL EVERY 12 HOURS
Refills: 0 | Status: DISCONTINUED | OUTPATIENT
Start: 2022-04-04 | End: 2022-04-04

## 2022-04-04 RX ORDER — ATORVASTATIN CALCIUM 80 MG/1
1 TABLET, FILM COATED ORAL
Qty: 0 | Refills: 0 | DISCHARGE
Start: 2022-04-04

## 2022-04-04 RX ORDER — METOPROLOL TARTRATE 50 MG
1 TABLET ORAL
Qty: 0 | Refills: 0 | DISCHARGE
Start: 2022-04-04

## 2022-04-04 RX ORDER — TAMSULOSIN HYDROCHLORIDE 0.4 MG/1
1 CAPSULE ORAL
Qty: 0 | Refills: 0 | DISCHARGE
Start: 2022-04-04

## 2022-04-04 RX ORDER — METOPROLOL TARTRATE 50 MG
50 TABLET ORAL DAILY
Refills: 0 | Status: DISCONTINUED | OUTPATIENT
Start: 2022-04-04 | End: 2022-04-05

## 2022-04-04 RX ORDER — DIVALPROEX SODIUM 500 MG/1
500 TABLET, DELAYED RELEASE ORAL
Refills: 0 | Status: DISCONTINUED | OUTPATIENT
Start: 2022-04-04 | End: 2022-04-05

## 2022-04-04 RX ORDER — ASPIRIN AND DIPYRIDAMOLE 25; 200 MG/1; MG/1
1 CAPSULE, EXTENDED RELEASE ORAL
Qty: 0 | Refills: 0 | DISCHARGE
Start: 2022-04-04

## 2022-04-04 RX ADMIN — Medication 75 MICROGRAM(S): at 05:30

## 2022-04-04 RX ADMIN — DIVALPROEX SODIUM 500 MILLIGRAM(S): 500 TABLET, DELAYED RELEASE ORAL at 17:32

## 2022-04-04 RX ADMIN — ASPIRIN AND DIPYRIDAMOLE 1 CAPSULE(S): 25; 200 CAPSULE, EXTENDED RELEASE ORAL at 17:32

## 2022-04-04 RX ADMIN — ASPIRIN AND DIPYRIDAMOLE 1 CAPSULE(S): 25; 200 CAPSULE, EXTENDED RELEASE ORAL at 05:30

## 2022-04-04 RX ADMIN — Medication 55 MILLIGRAM(S): at 05:30

## 2022-04-04 RX ADMIN — Medication 5 MILLILITER(S): at 12:06

## 2022-04-04 RX ADMIN — HEPARIN SODIUM 5000 UNIT(S): 5000 INJECTION INTRAVENOUS; SUBCUTANEOUS at 21:41

## 2022-04-04 RX ADMIN — Medication 2: at 12:06

## 2022-04-04 RX ADMIN — Medication 325 MILLIGRAM(S): at 12:06

## 2022-04-04 RX ADMIN — TAMSULOSIN HYDROCHLORIDE 0.4 MILLIGRAM(S): 0.4 CAPSULE ORAL at 21:41

## 2022-04-04 RX ADMIN — PREGABALIN 1000 MICROGRAM(S): 225 CAPSULE ORAL at 12:06

## 2022-04-04 RX ADMIN — HEPARIN SODIUM 5000 UNIT(S): 5000 INJECTION INTRAVENOUS; SUBCUTANEOUS at 05:29

## 2022-04-04 RX ADMIN — CALCITRIOL 0.25 MICROGRAM(S): 0.5 CAPSULE ORAL at 12:06

## 2022-04-04 RX ADMIN — SENNA PLUS 2 TABLET(S): 8.6 TABLET ORAL at 21:41

## 2022-04-04 RX ADMIN — ATORVASTATIN CALCIUM 80 MILLIGRAM(S): 80 TABLET, FILM COATED ORAL at 21:41

## 2022-04-04 RX ADMIN — HEPARIN SODIUM 5000 UNIT(S): 5000 INJECTION INTRAVENOUS; SUBCUTANEOUS at 13:09

## 2022-04-04 NOTE — PROGRESS NOTE ADULT - SUBJECTIVE AND OBJECTIVE BOX
[facility] DIVISION of INFECTIOUS DISEASE  Isaac Monaco MD PhD, Layla Dowd MD, Gay Larson MD, Andrews Chu MD, Rich Patten MD  and providing coverage with Xena Loaiza MD and Geraldo Barrios MD  Providing Infectious Disease Consultations at [facility], [facility]    Office# [phone] to schedule follow up appointments  Answering Service for urgent calls or New Consults [phone]  Cell# to text for urgent issues Isaac Monaco [phone]     infectious diseases progress note:    ANTONIO PONCE is a 76y y. o. Male patient    Patient reports: they thouyght I had pneumonia but I feel fine    ROS:    EYES:  Negative  blurry vision or double vision  GASTROINTESTINAL:  Negative for nausea, vomiting, diarrhea  -otherwise negative except for subjective    Allergies    No Known Allergies    Intolerances        ANTIBIOTICS/RELEVANT:  antimicrobials    immunologic:  influenza  Vaccine (HIGH DOSE) 0.7 milliLiter(s) IntraMuscular once    OTHER:  ALBUTerol    90 MICROgram(s) HFA Inhaler 2 Puff(s) Inhalation every 6 hours PRN  atorvastatin 80 milliGRAM(s) Oral at bedtime  Biotene Dry Mouth Oral Rinse 5 milliLiter(s) Swish and Spit daily  bisacodyl Suppository 10 milliGRAM(s) Rectal daily  calcitriol   Capsule 0.25 MICROGram(s) Oral daily  cyanocobalamin 1000 MICROGram(s) Oral daily  dextrose 5%. 1000 milliLiter(s) IV Continuous <Continuous>  dextrose 5%. 1000 milliLiter(s) IV Continuous <Continuous>  dextrose 50% Injectable 25 Gram(s) IV Push once  dextrose 50% Injectable 12.5 Gram(s) IV Push once  dextrose 50% Injectable 25 Gram(s) IV Push once  dextrose Oral Gel 15 Gram(s) Oral once PRN  dipyridamole 200 mG/aspirin 25 mG 1 Capsule(s) Oral two times a day  ferrous    sulfate 325 milliGRAM(s) Oral daily  glucagon  Injectable 1 milliGRAM(s) IntraMuscular once  heparin   Injectable 5000 Unit(s) SubCutaneous every 8 hours  insulin lispro (ADMELOG) corrective regimen sliding scale   SubCutaneous three times a day before meals  insulin lispro (ADMELOG) corrective regimen sliding scale   SubCutaneous at bedtime  levothyroxine 75 MICROGram(s) Oral daily  metoprolol tartrate 25 milliGRAM(s) Oral every 12 hours  ondansetron Injectable 4 milliGRAM(s) IV Push every 8 hours PRN  senna 2 Tablet(s) Oral at bedtime  tamsulosin 0.4 milliGRAM(s) Oral at bedtime  valproate sodium IVPB 500 milliGRAM(s) IV Intermittent every 12 hours      Objective:  Last 24-Vital Signs Last 24 Hrs  T(C): 36.9 (04 Apr 2022 04:38), Max: 37.1 (03 Apr 2022 18:54)  T(F): 98.5 (04 Apr 2022 04:38), Max: 98.7 (03 Apr 2022 18:54)  HR: 83 (04 Apr 2022 04:38) (72 - 83)  BP: 124/72 (04 Apr 2022 04:38) (114/68 - 151/72)  BP(mean): --  RR: 17 (04 Apr 2022 04:38) (17 - 18)  SpO2: 92% (04 Apr 2022 04:38) (92% - 94%)    T(C): 36.9 (04-04-22 @ 04:38), Max: 37.1 (04-03-22 @ 18:54)  T(F): 98.5 (04-04-22 @ 04:38), Max: 98.7 (04-03-22 @ 18:54)  T(C): 36.9 (04-04-22 @ 04:38), Max: 37.2 (04-01-22 @ 20:02)  T(F): 98.5 (04-04-22 @ 04:38), Max: 99 (04-01-22 @ 20:02)  T(C): 36.9 (04-04-22 @ 04:38), Max: 37.2 (04-01-22 @ 20:02)  T(F): 98.5 (04-04-22 @ 04:38), Max: 99 (04-01-22 @ 20:02)    PHYSICAL EXAM:  Constitutional: Well-developed, well nourished  Eyes: PERRLA, EOMI  Ear/Nose/Throat: oropharynx normal	  Neck: no JVD, no lymphadenopathy, supple  Respiratory: no accessory muscle use, lung fields bilaterally clear  Cardiovascular: RRR, normal S1, S2 no m/r/g  Gastrointestinal: soft, NT, no HSM, BS-normal  Extremities: no clubbing, no cyanosis, edema absent  Neuro: patient alert, oriented and appropriate  Skin: no sig lesions        LABS:                        10.0   6.93  )-----------( 202      ( 04 Apr 2022 07:23 )             31.9       WBC 6.93  04-04 @ 07:23  WBC 6.12  04-03 @ 08:03  WBC 6.97  04-02 @ 08:34  WBC 9.54  04-01 @ 06:36  WBC 11.68  03-31 @ 07:24  WBC 12.49  03-31 @ 00:01  WBC 16.12  03-30 @ 14:34  WBC 20.24  03-30 @ 06:12      04-04    142  |  108  |  33<H>  ----------------------------<  148<H>  4.0   |  27  |  2.10<H>    Ca    9.0      04 Apr 2022 07:23  Phos  3.3     04-04  Mg     2.2     04-04    TPro  6.3  /  Alb  2.5<L>  /  TBili  0.5  /  DBili  x   /  AST  28  /  ALT  34  /  AlkPhos  82  04-04      Creatinine, Serum: 2.10 mg/dL (04-04-22 @ 07:23)  Creatinine, Serum: 2.10 mg/dL (04-03-22 @ 08:03)  Creatinine, Serum: 2.00 mg/dL (04-02-22 @ 08:34)  Creatinine, Serum: 2.10 mg/dL (04-01-22 @ 06:37)  Creatinine, Serum: 2.30 mg/dL (03-31-22 @ 07:24)  Creatinine, Serum: 2.30 mg/dL (03-30-22 @ 06:12)                MICROBIOLOGY:              RADIOLOGY & ADDITIONAL STUDIES:

## 2022-04-04 NOTE — PROGRESS NOTE ADULT - SUBJECTIVE AND OBJECTIVE BOX
Neurology follow up note    ANTONIO PONCE76yMale      Interval History:    Patient feels ok no new complaints.    Allergies    No Known Allergies    Intolerances        MEDICATIONS    ALBUTerol    90 MICROgram(s) HFA Inhaler 2 Puff(s) Inhalation every 6 hours PRN  atorvastatin 80 milliGRAM(s) Oral at bedtime  Biotene Dry Mouth Oral Rinse 5 milliLiter(s) Swish and Spit daily  bisacodyl Suppository 10 milliGRAM(s) Rectal daily  calcitriol   Capsule 0.25 MICROGram(s) Oral daily  cyanocobalamin 1000 MICROGram(s) Oral daily  dextrose 5%. 1000 milliLiter(s) IV Continuous <Continuous>  dextrose 5%. 1000 milliLiter(s) IV Continuous <Continuous>  dextrose 50% Injectable 25 Gram(s) IV Push once  dextrose 50% Injectable 12.5 Gram(s) IV Push once  dextrose 50% Injectable 25 Gram(s) IV Push once  dextrose Oral Gel 15 Gram(s) Oral once PRN  dipyridamole 200 mG/aspirin 25 mG 1 Capsule(s) Oral two times a day  ferrous    sulfate 325 milliGRAM(s) Oral daily  glucagon  Injectable 1 milliGRAM(s) IntraMuscular once  heparin   Injectable 5000 Unit(s) SubCutaneous every 8 hours  influenza  Vaccine (HIGH DOSE) 0.7 milliLiter(s) IntraMuscular once  insulin lispro (ADMELOG) corrective regimen sliding scale   SubCutaneous three times a day before meals  insulin lispro (ADMELOG) corrective regimen sliding scale   SubCutaneous at bedtime  levothyroxine 75 MICROGram(s) Oral daily  metoprolol tartrate 25 milliGRAM(s) Oral every 12 hours  ondansetron Injectable 4 milliGRAM(s) IV Push every 8 hours PRN  senna 2 Tablet(s) Oral at bedtime  tamsulosin 0.4 milliGRAM(s) Oral at bedtime  valproate sodium IVPB 500 milliGRAM(s) IV Intermittent every 12 hours              Vital Signs Last 24 Hrs  T(C): 36.9 (04 Apr 2022 04:38), Max: 37.1 (03 Apr 2022 18:54)  T(F): 98.5 (04 Apr 2022 04:38), Max: 98.7 (03 Apr 2022 18:54)  HR: 83 (04 Apr 2022 04:38) (72 - 83)  BP: 124/72 (04 Apr 2022 04:38) (114/68 - 151/72)  BP(mean): --  RR: 17 (04 Apr 2022 04:38) (17 - 18)  SpO2: 92% (04 Apr 2022 04:38) (92% - 94%)      REVIEW OF SYSTEMS:  Review of systems feel ok.     PHYSICAL EXAMINATION:   HEENT:  Head:  Normocephalic, atraumatic.  Eyes:  No scleral icterus.  Ears:  Hard of hearing as per my conversation with spouse.  NECK:  Supple.  CARDIOVASCULAR:  S1 and S2 are heard.  RESPIRATORY:  Decreased breath sounds bilaterally.  ABDOMEN:  Soft, nontender.  EXTREMITIES:  No clubbing or cyanosis was noted.      NEUROLOGIC:  The patient was awake alert Extraocular movements appeared to be intact.  full visual fields   The patient appears to have intact bilateral nasolabial folds.  Speech was fluent Motor:  Right upper appeared to be 4/5, left upper was 4/5.   Right lower extremity was 5/5, left lower extremities  4/5.  The patient did appear to Sensory appeared to be intact.              LABS:  CBC Full  -  ( 04 Apr 2022 07:23 )  WBC Count : 6.93 K/uL  RBC Count : 4.19 M/uL  Hemoglobin : 10.0 g/dL  Hematocrit : 31.9 %  Platelet Count - Automated : 202 K/uL  Mean Cell Volume : 76.1 fl  Mean Cell Hemoglobin : 23.9 pg  Mean Cell Hemoglobin Concentration : 31.3 gm/dL  Auto Neutrophil # : x  Auto Lymphocyte # : x  Auto Monocyte # : x  Auto Eosinophil # : x  Auto Basophil # : x  Auto Neutrophil % : x  Auto Lymphocyte % : x  Auto Monocyte % : x  Auto Eosinophil % : x  Auto Basophil % : x      04-04    142  |  108  |  33<H>  ----------------------------<  148<H>  4.0   |  27  |  2.10<H>    Ca    9.0      04 Apr 2022 07:23  Phos  3.3     04-04  Mg     2.2     04-04    TPro  6.3  /  Alb  2.5<L>  /  TBili  0.5  /  DBili  x   /  AST  28  /  ALT  34  /  AlkPhos  82  04-04    Hemoglobin A1C:     LIVER FUNCTIONS - ( 04 Apr 2022 07:23 )  Alb: 2.5 g/dL / Pro: 6.3 g/dL / ALK PHOS: 82 U/L / ALT: 34 U/L / AST: 28 U/L / GGT: x           Vitamin B12         RADIOLOGY      ANALYSIS AND PLAN:  This is a 76-year-old with episode of altered mental status, left-sided weakness, and possibly seizure event.  Clinical impression cerebrovascular accident  right MCA territory which possibly could have lead to seizure event, questionable this was precipitated by any type of cardiac event with the patient having elevated troponin and the patient feeling short of breath.  CT imaging of the brain positive for CVA unable to do MRI secondary to the patient having defibrillator.  Aggrenox 1 tablet twice a day  For possible seizure event which could have been induced secondary to cerebrovascular accident secondary to elevated renal function, I will start the patient on Depakote 500 mg twice a day.  For history of hypertension monitor SBP PLEASE avoid hypotension if possible   For history of hyperlipidemia, continue on statin.  For hypothyroidism, continue the patient on Synthroid.  EEG was normal   neurologic wise stable no new events  stable for dc planning only from neurology     Spoke with spouse 4/4  Her name is Radha, telephone number is [phone].    Greater than 34 minutes of time was spent with the patient, plan of care, reviewing data, with greater than 50% of the visit was spent counseling and/or coordinating care with multidisciplinary healthcare team

## 2022-04-04 NOTE — PROGRESS NOTE ADULT - ASSESSMENT
BPH with urinary retention  Continue mcclellan upon transfer to Rehab  TOV when ambulating  Continue Flomax    Discussed with Dr. Shailesh Larson

## 2022-04-04 NOTE — PROGRESS NOTE ADULT - PROBLEM SELECTOR PLAN 12
Chronic stable   - D/w Neurology Dr. Brownlee  & Cardiology Dr. Hahn- OK to change plavix to Aggrenox on admission  - Off of Hep drip for NSTEMI on 4/1  - On Aggrenox, high dose statin, continue     #GOC  - Palliative following   - Pt is DNR, intubation trial    #Urinary Retention   - Patient had mcclellan placed as apart of stroke workup, mcclellan d/c'ed on 4/1   - Patient with significant urinary retention on bladder scans, continually refusing straight caths   - Patient retaining 600cc last night, straigth cathed once last night, this am retaining ~470cc of urine, refusing straight cath   - mcclellan in place  - on flomax 0.4mg   - uro consult

## 2022-04-04 NOTE — PROGRESS NOTE ADULT - ATTENDING COMMENTS
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 Admitted for pulmonary edema, leukocytosis, elevated troponin, JARRED on CKD-3  and seizure.   Pt seen, examined, case & care plan d/w pt, residents at detail.  -D/W Neuro-Dr Brownlee -Stable on Meds  -Cardiology-Dr Jose follow up- all Meds   -Renal eval DR CAM BROWN follow up,  -Urology DR Harrison-, start Flomax 0.4 mg Po daily, Hillman cath placed ,  AM labs , PT eval---> [facility]   -PO diet, awaiting Insurance Auth for D/C   Palliative care Eval, Prognosis is Guarded.  Total care time is 40 minutes .

## 2022-04-04 NOTE — PROGRESS NOTE ADULT - PROBLEM SELECTOR PLAN 8
Patient w/ admission CK of 328  2/2 likely NSTEMI  - less likely rhabdo, likely elevated in the setting of chronic kidney disease   - repeat CK, trending down  - TTE LVEF 30% - 35% w/ severe systolic dysfunction

## 2022-04-04 NOTE — PROGRESS NOTE ADULT - PROBLEM SELECTOR PLAN 3
2/2 to NSTEMI -On IV Heparin drip for ~48 hrs dc 4/1 evening and placed on subq Heparin for ppx   - Troponin 1011.9 > 5252.0 > 63726 > 68585, no longer need to trend   - EKG: ST-depressions in V5 V6, new from prior EKG   - [facility] following   - As d/w Cardio-pt does NOT have any cardiac stents/Intervention in past  - On Aggrenox, high dose statin, continue   - TTE LVEF 30% - 35% w/ severe systolic dysfunction

## 2022-04-04 NOTE — PROGRESS NOTE ADULT - ASSESSMENT
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 Admitted for pulmonary edema, leukocytosis, elevated troponin, JARRED on CKD-3, found to have right sulcus CVA and NSTEMI.

## 2022-04-04 NOTE — PROGRESS NOTE ADULT - PROBLEM SELECTOR PLAN 10
Pt here today for postpartum exam.  Operation Date: 02/17/2019 (repeat C/Section)  Formula feeding only  BCM: Nexplanon, [facility].   LMP: 03/23/2019  WT: 227 lb  BP: 120/60  Pt states no complaints or concerns today  Good ph:[phone]          Chronic, stable on admission  - Will HOLD antihypertensives in the setting of permissive HTN due to pt's CVA  - Stable, may resume home BB once BP is optimized Chronic, stable on admission  - Will HOLD antihypertensives in the setting of permissive HTN due to pt's CVA  - Stable, start metoprolol 50mg qD today in line w/ cardio recs

## 2022-04-04 NOTE — PROGRESS NOTE ADULT - ASSESSMENT
76 yr old male with stated hx significant for HFrEF, s/p AICD, tonsillar Ca s/p Lt tonsillectomy, Lt partial glossectomy, s/p Rt CEA, DM2, hypothyroidism, CKD3 who  presented from home via EMS after being found of floor with LUE weakness, sob. Consult called for hypoxic resp failure secondary to stroke,  hypertensive emergency, r/o CVA.    HTN emergency/CVA/Pulmonary Edema  - BP remains controlled and stable.  No further neuro symptoms  - c/w BB with hold parameters   - CT head and EEG noted.  Follow Neuro recs  - on Aggrenox and statin   - +AICD [employer] interrogation on chart>  [employer] life: 4.4yrs.  No event noted since 1/28/22.  Device set at: AAIR-DDDR.  Rate   - Remains euvolemic on exam, s/p Lasix.  Continue to hold daily Lasix.  Diurese prn only in the setting of JARRED on CKD    NSTEMI  - hsT peaked at 47551 and trended down to 9200.  EKG showed ST depression in lateral leads.  - s/p Heparin gtt x  48 hrs.    - On Aggrenox.  If this is better strategy from neuro perspective, would follow Neuro recs  - c/w BB and statin   - No ischemic symptoms.  Repeat EKG  - TTE: Technically difficult and limited study, LV enlarged w/ mod to sev LVSF, LVEF 30-35% apical cap, apical septum and apical   lateral walls are akinetic with hypokinesis of the remaining walls. no LV thrombus.   - HR controlled per flow sheet, off telemetry   - Will eventually do ischemic eval once renally stable.  Would need clearance from Renal.  Can also be done outpatient given recent fresh neurological event    - Monitor and replete Lytes. Keep K > 4 and Mg > 2  - All other medical needs as per primary team.  - Other cardiovascular workup will depend on clinical course.  - Will continue to follow.    Kelsey Kelly [facility]  Nurse Practitioner - Cardiology   Spectra #[phone] 76 yr old male with stated hx significant for HFrEF, s/p AICD, tonsillar Ca s/p Lt tonsillectomy, Lt partial glossectomy, s/p Rt CEA, DM2, hypothyroidism, CKD3 who  presented from home via EMS after being found of floor with LUE weakness, sob. Consult called for hypoxic resp failure secondary to stroke,  hypertensive emergency, r/o CVA.    HTN emergency/CVA/Pulmonary Edema  - BP remains controlled and stable.  No further neuro symptoms  - c/w BB with hold parameters, avoid Hypotension   - CT head and EEG noted.  Follow Neuro recs  - on Aggrenox and statin   - +AICD Med[employer] interrogation on chart>  [employer] life: 4.4yrs.  No event noted since 1/28/22.  Device set at: AAIR-DDDR.  Rate   - Remains euvolemic on exam, s/p Lasix.  Continue to hold daily Lasix.  Diurese prn only in the setting of JARRED on CKD    NSTEMI  - hsT peaked at 04120 and trended down to 9200.  EKG showed ST depression in lateral leads.  - s/p Heparin gtt x  48 hrs.    - On Aggrenox.  If this is better strategy from neuro perspective, would follow Neuro recs  - c/w BB and statin   - No ischemic symptoms.  Repeat EKG  - TTE: Technically difficult and limited study, LV enlarged w/ mod to sev LVSF, LVEF 30-35% apical cap, apical septum and apical   lateral walls are akinetic with hypokinesis of the remaining walls. no LV thrombus.   - HR controlled per flow sheet, off telemetry   - Will eventually do ischemic eval once renally stable.  Would need clearance from Renal.  Can also be done outpatient given recent fresh neurological event    - Monitor and replete Lytes. Keep K > 4 and Mg > 2  - All other medical needs as per primary team.  - Other cardiovascular workup will depend on clinical course.  - Will continue to follow.    Kelsey Kelly [facility]  Nurse Practitioner - Cardiology   Spectra #[phone]

## 2022-04-04 NOTE — PROGRESS NOTE ADULT - SUBJECTIVE AND OBJECTIVE BOX
INTERVAL Hx:  Hillman placed yesterday for AUR.  Flomax started 2 days ago.  Possible transfer to acute rehab tomorrow.    MEDICATIONS  (STANDING):  atorvastatin 80 milliGRAM(s) Oral at bedtime  Biotene Dry Mouth Oral Rinse 5 milliLiter(s) Swish and Spit daily  bisacodyl Suppository 10 milliGRAM(s) Rectal daily  calcitriol   Capsule 0.25 MICROGram(s) Oral daily  cyanocobalamin 1000 MICROGram(s) Oral daily  dextrose 5%. 1000 milliLiter(s) (50 mL/Hr) IV Continuous <Continuous>  dextrose 5%. 1000 milliLiter(s) (100 mL/Hr) IV Continuous <Continuous>  dextrose 50% Injectable 25 Gram(s) IV Push once  dextrose 50% Injectable 12.5 Gram(s) IV Push once  dextrose 50% Injectable 25 Gram(s) IV Push once  dipyridamole 200 mG/aspirin 25 mG 1 Capsule(s) Oral two times a day  diVALproex  milliGRAM(s) Oral two times a day  ferrous    sulfate 325 milliGRAM(s) Oral daily  glucagon  Injectable 1 milliGRAM(s) IntraMuscular once  heparin   Injectable 5000 Unit(s) SubCutaneous every 8 hours  influenza  Vaccine (HIGH DOSE) 0.7 milliLiter(s) IntraMuscular once  insulin lispro (ADMELOG) corrective regimen sliding scale   SubCutaneous three times a day before meals  insulin lispro (ADMELOG) corrective regimen sliding scale   SubCutaneous at bedtime  levothyroxine 75 MICROGram(s) Oral daily  metoprolol succinate ER 50 milliGRAM(s) Oral daily  senna 2 Tablet(s) Oral at bedtime  tamsulosin 0.4 milliGRAM(s) Oral at bedtime    MEDICATIONS  (PRN):  ALBUTerol    90 MICROgram(s) HFA Inhaler 2 Puff(s) Inhalation every 6 hours PRN Shortness of Breath and/or Wheezing  dextrose Oral Gel 15 Gram(s) Oral once PRN Blood Glucose LESS THAN 70 milliGRAM(s)/deciliter  ondansetron Injectable 4 milliGRAM(s) IV Push every 8 hours PRN Nausea and/or Vomiting        Vital Signs Last 24 Hrs  T(C): 37.2 (04 Apr 2022 11:03), Max: 37.2 (04 Apr 2022 11:03)  T(F): 99 (04 Apr 2022 11:03), Max: 99 (04 Apr 2022 11:03)  HR: 82 (04 Apr 2022 11:03) (82 - 92)  BP: 109/69 (04 Apr 2022 14:36) (93/60 - 124/72)  BP(mean): --  RR: 17 (04 Apr 2022 11:03) (17 - 18)  SpO2: 95% (04 Apr 2022 11:03) (92% - 95%)    PHYSICAL EXAM:    ABDOMEN: soft, NT    Hillman: draining clear, yellow urine    LABS:                        10.0   6.93  )-----------( 202      ( 04 Apr 2022 07:23 )             31.9     04-04    142  |  108  |  33<H>  ----------------------------<  148<H>  4.0   |  27  |  2.10<H>    Ca    9.0      04 Apr 2022 07:23  Phos  3.3     04-04  Mg     2.2     04-04    TPro  6.3  /  Alb  2.5<L>  /  TBili  0.5  /  DBili  x   /  AST  28  /  ALT  34  /  AlkPhos  82  04-04

## 2022-04-04 NOTE — PROGRESS NOTE ADULT - SUBJECTIVE AND OBJECTIVE BOX
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 As per patient, he was watching a movie when he felt very short of breath suddenly and dropped to the floor, denies LOC and denies any trauma to his head, was on the floor for ~2 hours. Patient was unable to rise from the floor by himself due to his left arm weakness. When first brought to the ED, patient was hypoxic and hypertensive o2 saturation in EMS was >80%. While in the ED, patient had episode of siezure-like activity in his b/l upper extremities which was controlled w/ ativan. Patient was seen and examined at bedside, BiPAP still in place, patient was still mildly lethargic from ativan and could not speak well with bipap mask on. Patient able to open eyes and nod/shake head to questions. Patient denied any headache, vision changes, cp, abd pain, msk pain. Patient still short of breath.    Patient states see he sees Dr. Tomas for outpatient nephrologist. States he is urinating well.  Denies any N/V/dizziness.  No SOB presently. States he is urinating.         No acute events noted    PAST MEDICAL & SURGICAL HISTORY:  MI (myocardial infarction)  1992    HTN (hypertension)    HLD (hyperlipidemia)    Throat cancer  2011, treated with chemo, RT    Ventricular arrhythmia  s/p AICD    Diabetes  Type2, not on any meds since weight loss after throat Ca    Renal insufficiency  s/p chemo    CAD (coronary artery disease)    Inguinal hernia, left    H/O prior ablation treatment  VT, 2009    Cardiac defibrillator in place  - 2009, battery change 2017    S/P left inguinal hernia repair  2018 at [facility]         FAMILY HISTORY:  Family history of cancer (Sibling)    NC    Social History:Non smoker    MEDICATIONS  (STANDING):  atorvastatin 10 milliGRAM(s) Oral at bedtime  calcitriol   Capsule 0.25 MICROGram(s) Oral daily  dextrose 5%. 1000 milliLiter(s) (50 mL/Hr) IV Continuous <Continuous>  dextrose 5%. 1000 milliLiter(s) (100 mL/Hr) IV Continuous <Continuous>  dextrose 50% Injectable 25 Gram(s) IV Push once  dextrose 50% Injectable 12.5 Gram(s) IV Push once  dextrose 50% Injectable 25 Gram(s) IV Push once  dipyridamole 200 mG/aspirin 25 mG 1 Capsule(s) Oral two times a day  ferrous    sulfate 325 milliGRAM(s) Oral daily  glucagon  Injectable 1 milliGRAM(s) IntraMuscular once  heparin  Infusion.  Unit(s)/Hr (8 mL/Hr) IV Continuous <Continuous>  influenza  Vaccine (HIGH DOSE) 0.7 milliLiter(s) IntraMuscular once  insulin lispro (ADMELOG) corrective regimen sliding scale   SubCutaneous three times a day before meals  insulin lispro (ADMELOG) corrective regimen sliding scale   SubCutaneous at bedtime  iron sucrose Injectable 100 milliGRAM(s) IV Push every 24 hours  levothyroxine 75 MICROGram(s) Oral daily  polyethylene glycol 3350 17 Gram(s) Oral daily  senna 2 Tablet(s) Oral at bedtime  valproate sodium IVPB 500 milliGRAM(s) IV Intermittent every 12 hours    MEDICATIONS  (PRN):  ALBUTerol    90 MICROgram(s) HFA Inhaler 2 Puff(s) Inhalation every 6 hours PRN Shortness of Breath and/or Wheezing  dextrose Oral Gel 15 Gram(s) Oral once PRN Blood Glucose LESS THAN 70 milliGRAM(s)/deciliter  heparin   Injectable 4100 Unit(s) IV Push every 6 hours PRN For aPTT less than 40  ondansetron Injectable 4 milliGRAM(s) IV Push every 8 hours PRN Nausea and/or Vomiting      Allergies    No Known Allergies    Intolerances         REVIEW OF SYSTEMS:    Review of Systems:   Constitutional: Denies fatigue  HEENT: Denies headaches and dizziness  Respiratory: denies SOB, cough, or wheezing  Cardiovascular: denies CP, palpitations  Gastrointestinal: Denies nausea, denies vomiting, diarrhea, constipation, abdominal pain, or bloody stools  Genitourinary: denies painful urination, increased frequency, urgency, or bloody urine  Skin: denies rashes or itching  Musculoskeletal: denies muscle aches, joint swelling  Neurologic: Denies generalized weakness, denies loss of sensation, numbness, or tingling    ICU Vital Signs Last 24 Hrs  T(C): 37.2 (04 Apr 2022 11:03), Max: 37.2 (04 Apr 2022 11:03)  T(F): 99 (04 Apr 2022 11:03), Max: 99 (04 Apr 2022 11:03)  HR: 82 (04 Apr 2022 11:03) (82 - 92)  BP: 109/69 (04 Apr 2022 14:36) (93/60 - 124/72)  BP(mean): --  ABP: --  ABP(mean): --  RR: 17 (04 Apr 2022 11:03) (17 - 18)  SpO2: 95% (04 Apr 2022 11:03) (92% - 95%)    PHYSICAL EXAM:    GENERAL: NAD  HEAD:  Atraumatic, Normocephalic  EYES: EOMI, conjunctiva and sclera clear  ENMT: No Drainage from nares, No drainage from ears  NECK: Supple, neck  veins full  NERVOUS SYSTEM:  Awake and Alert  CHEST/LUNG: Clear to percussion bilaterally; No rales, rhonchi, wheezing, or rubs  HEART: Regular rate and rhythm; No murmurs, rubs, or gallops  ABDOMEN: Soft, Nontender, Nondistended; Bowel sounds present  EXTREMITIES:  No Edema  SKIN: No rashes No obvious ecchymosis      LABS:                           10.0   6.93  )-----------( 202      ( 04 Apr 2022 07:23 )             31.9     04-04    142  |  108  |  33<H>  ----------------------------<  148<H>  4.0   |  27  |  2.10<H>    Ca    9.0      04 Apr 2022 07:23  Phos  3.3     04-04  Mg     2.2     04-04    TPro  6.3  /  Alb  2.5<L>  /  TBili  0.5  /  DBili  x   /  AST  28  /  ALT  34  /  AlkPhos  82  04-04

## 2022-04-04 NOTE — PROGRESS NOTE ADULT - ASSESSMENT
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    Acute hypoxic resp failure, acute decompensated heart failure  likely 2/2 flash pulmonary edema, acute decompensated heart failure  s/p CXR- diffuse opacifications b/l  s/p VQ scan- very low probability of PE  patient w/o fever, chills, productive cough, pleurisy  procal elevated however, in the setting of real insufficiency  s/p CT chest read as Right upper lobe pneumonia superimposed on mild pulmonary edema. Bilateral pleural effusions.  however, clinically no evidence of PNA; perhaps reflects pneumonitis  pt on RA, SOB resolved; leukocytosis resolved without antibiotics  he is at risk for aspiration PNA and pneumonitis; aspiration precautions  continue to monitor off antibiotics    CVA  L arm weakness  s/p CT head- Interval demarcation of the right perirolandic cortex infarct.  on heparin gtt, f/u cardiology & neuro recs  f/u neurology recs    We will follow along in the care of this patient. Please call us at [phone] or text me directly on my cell# at [phone] with any concerns.

## 2022-04-04 NOTE — PROGRESS NOTE ADULT - PROBLEM SELECTOR PLAN 2
Patient w/ left upper extremity weakness of unknown duration, 2/2 to CVA   - patient w/ seizure like activity in ED s/p ativan, EEG normal  - started on valproate by neuro, will continue   - CT brain stroke negative for acute hemorrhage or infarct, repeat CT: acute stroke in right central sulcus   - On Aggrenox, high dose statin, continue   - Neuro [facility]achawat following   - PT/OT ---> [facility]   - MBS: Puree with Moderately thick liquids, all food and liquids via teaspoon presentation, utilizing chin tuck and 2 swallows after each bolus, glucerna Patient w/ left upper extremity weakness of unknown duration, 2/2 to CVA   - patient w/ seizure like activity in ED s/p ativan, EEG normal  - started Depakote 500mg BID in line w/ neuro's reccs  - CT brain stroke negative for acute hemorrhage or infarct, repeat CT: acute stroke in right central sulcus   - On Aggrenox, high dose statin, continue   - Neuro [facility]ach[facility] following   - PT/OT ---> [facility]   - MBS: Puree with Moderately thick liquids, all food and liquids via teaspoon presentation, utilizing chin tuck and 2 swallows after each bolus, glucerna

## 2022-04-04 NOTE — PROGRESS NOTE ADULT - ASSESSMENT
CKD 4  HTN  CHF EF 30%  Anemia  Urinary retention  CHF  CVA      -BLCR about 2.3; renal indices are currently stable at baseline range; stable lytes  - protein  -Urine lytes reviewed  -Lasix PRN  -V/Q scan low prob  -Chest CT mild pulm Edema + PNA  -Anemia per Heme  -BP controlled now  -Urology following for vy    Thank you

## 2022-04-04 NOTE — PROGRESS NOTE ADULT - SUBJECTIVE AND OBJECTIVE BOX
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 As per patient, he was watching a movie when he felt very short of breath suddenly and dropped to the floor, denies LOC and denies any trauma to his head, was on the floor for ~2 hours. Patient was unable to rise from the floor by himself due to his left arm weakness. When first brought to the ED, patient was hypoxic and hypertensive o2 saturation in EMS was >80%. While in the ED, patient had episode of siezure-like activity in his b/l upper extremities which was controlled w/ ativan. Patient was seen and examined at bedside, BiPAP still in place, patient was still mildly lethargic from ativan and could not speak well with bipap mask on. Patient able to open eyes and nod/shake head to questions. Patient denied any headache, vision changes, cp, abd pain, msk pain. Patient still short of breath.      In ED:   Vitals: HR 80, 176/82 -> 115/60, RR 20, 96% on BiPAP/CPAP  Labs significant for: WBC 20.24, H/H 10.8/24.1, D-Dimer 2924, Troponin 1011.9 > 5252.0, CKMB: 13.1, CPK%: 4%, BUN 33, Creatinine 2.3 (baseline unknown) eGFR 29, serum pro BNP 4963, creatine kinase 328, POCT glucose: 353  Imaging:   CXR: diffuse b/l scattered infiltrates  CT brain stroke: No acute hemorrhage, infarct, or mass effect   EKG: sinus tachy at ST- depressions in V5 V6   Received Lasix 40mg IVP x1, Aspirin 600mg x1, Hydralazine 10mg x1, Ativan 2mg IVP x1, Lispro 6u in the ED    (30 Mar 2022 08:48)      INTERVAL HPI:  3/31/2022: Patient overnight had clots in urine with slight pinkish appearance of urine, stat h/h showed that patients hgb dropped to 9.2 from 10.1. Heparin drip was continued as benefits outweighed risks. AM hgb showed recovery to 9.6. Patient also had 6 beats of vtach overnight, was asymptomatic. Patient seen and examined at bedside, denied any lightheadedness/dizziness, CP/palpitations, abd pain, dysuria, MSK pain, any sensory deficits. Patient continues to have LUE weakness. OBN IV Heparin drip, + CVA on CT Head  04/01/2022: Pt seen and examined at bedside. Hb stable this morning. Pt w no complaints this morning. Denies fever, chills, chest pain, palpitations, shortness of breath, palpitations, lightheadedness, dizziness, headache, n/v/d/c. TOV , d/c Mcclellan cath. heparin drip was dc in evening   4/2/22: Pt was seen and examined at bedside. Pt states that he had a BM yesterday, normal. AM Bladder scan revealed 450 cc urine but pt refused to be straight cath because he wants to urinate on his own. States that he cannot urinate because he is not on a regular diet. Denies history of BPH. Reports mild back pain, states that Tylenol does not help. Pt denies headache, dizziness, CP, SOB, palpitations, abdominal pain, n/v. No other complaints at this time. Will start Flomax.   4/3/22: Pt seen and examined at bedside. Pt had no BM this AM. F/u bladder scan from this AM showed urinary retention of ~470cc, patient refusing straight cath, will replace mcclellan catheter and consult uro. Patient denies any headache, dizziness, CP, SOB, palpitations, abdominal pain, n/v. Patient states back pain is still there but mild. No other complaints at this time. Failed TOV, Mccelllan cath placed   4/4/22: Patient seen and examined at bedside. Pt has BM yesterday, mcclellan in place draining clear urine. Patient without any complaints. Patient denies any headache, dizziness, CP, SOB, palpitations, abdominal pain, n/v. VSS    OVERNIGHT EVENTS:  No overnight events    Home Medications:  benazepril 10 mg oral tablet: 1 tab(s) orally once a day (30 Mar 2022 10:22)  calcitriol 0.25 mcg oral capsule: 1 cap(s) orally once a day (30 Mar 2022 10:22)  carvedilol 12.5 mg oral tablet: 1 tab(s) orally 2 times a day      *Last filled 5/21, spoke to MATTHEW Nguyen at Dr. Garcia&#x27;s office, she states patient should still be taking med as per MD notes from 2/22*  (30 Mar 2022 10:22)  hydrALAZINE 10 mg oral tablet: 0.5 tab(s) orally 3 times a day (30 Mar 2022 10:22)  isosorbide mononitrate 30 mg oral tablet, extended release: 1 tab(s) orally once a day (in the morning) (30 Mar 2022 10:22)  levothyroxine 75 mcg (0.075 mg) oral tablet: 1 tab(s) orally once a day (30 Mar 2022 10:22)  midodrine 10 mg oral tablet: 1 tab(s) orally 3 times a day (30 Mar 2022 10:22)  Plavix 75 mg oral tablet: 1 tab(s) orally once a day (30 Mar 2022 10:22)  pravastatin 40 mg oral tablet: 1 tab(s) orally once a day (30 Mar 2022 10:22)      MEDICATIONS  (STANDING):  atorvastatin 80 milliGRAM(s) Oral at bedtime  Biotene Dry Mouth Oral Rinse 5 milliLiter(s) Swish and Spit daily  bisacodyl Suppository 10 milliGRAM(s) Rectal daily  calcitriol   Capsule 0.25 MICROGram(s) Oral daily  cyanocobalamin 1000 MICROGram(s) Oral daily  dextrose 5%. 1000 milliLiter(s) (50 mL/Hr) IV Continuous <Continuous>  dextrose 5%. 1000 milliLiter(s) (100 mL/Hr) IV Continuous <Continuous>  dextrose 50% Injectable 25 Gram(s) IV Push once  dextrose 50% Injectable 12.5 Gram(s) IV Push once  dextrose 50% Injectable 25 Gram(s) IV Push once  dipyridamole 200 mG/aspirin 25 mG 1 Capsule(s) Oral two times a day  ferrous    sulfate 325 milliGRAM(s) Oral daily  glucagon  Injectable 1 milliGRAM(s) IntraMuscular once  heparin   Injectable 5000 Unit(s) SubCutaneous every 8 hours  influenza  Vaccine (HIGH DOSE) 0.7 milliLiter(s) IntraMuscular once  insulin lispro (ADMELOG) corrective regimen sliding scale   SubCutaneous three times a day before meals  insulin lispro (ADMELOG) corrective regimen sliding scale   SubCutaneous at bedtime  levothyroxine 75 MICROGram(s) Oral daily  metoprolol tartrate 25 milliGRAM(s) Oral every 12 hours  senna 2 Tablet(s) Oral at bedtime  tamsulosin 0.4 milliGRAM(s) Oral at bedtime  valproate sodium IVPB 500 milliGRAM(s) IV Intermittent every 12 hours    MEDICATIONS  (PRN):  ALBUTerol    90 MICROgram(s) HFA Inhaler 2 Puff(s) Inhalation every 6 hours PRN Shortness of Breath and/or Wheezing  dextrose Oral Gel 15 Gram(s) Oral once PRN Blood Glucose LESS THAN 70 milliGRAM(s)/deciliter  ondansetron Injectable 4 milliGRAM(s) IV Push every 8 hours PRN Nausea and/or Vomiting      No Known Allergies      Social History:  Lives at home w/ wife   ADL independent   Tobacco former smoker, quit 40 years ago, 20 pack year history  Alcohol denies  Drugs denies  COVID Pfizer x3   Occupation retired- former  (30 Mar 2022 08:48)      REVIEW OF SYSTEMS: I am ok  CONSTITUTIONAL: No fever, No chills, No fatigue, No myalgia, No Body ache, No Weakness  EYES: No eye pain,  No visual disturbances, No discharge, No Redness  ENMT: No ear pain, No nose bleed, No vertigo; No sinus pain, No throat pain, No Congestion  NECK: No pain, No stiffness  RESPIRATORY: No cough, No wheezing, No hemoptysis, No shortness of breath  CARDIOVASCULAR: No chest pain, No palpitations  GASTROINTESTINAL: No abdominal pain, No epigastric pain. No nausea, No vomiting, No diarrhea, No constipation; [ x ] BM - 1  GENITOURINARY: No dysuria, No frequency, No urgency, No incontinence  NEUROLOGICAL: No headaches, No dizziness, No numbness, No tingling, No tremors, [ x ] LUE weakness   EXTREMITIES: No Swelling, No Pain, No Edema  SKIN: [ x ] No itching, burning, rashes, or lesions   MUSCULOSKELETAL: No joint pain, No joint swelling; No muscle pain, No back pain, No extremity pain  PSYCHIATRIC: No depression, No anxiety, No mood swings, No difficulty sleeping at night  PAIN SCALE: [ x ] None  [  ] Other  ROS Unable to obtain due to: [  ] Dementia  [  ] Lethargy  [  ] Sedated  [  ] Non verbal  REST OF REVIEW OF SYSTEMS: [ x ] Normal     Vital Signs Last 24 Hrs  T(C): 36.9 (04 Apr 2022 04:38), Max: 37.1 (03 Apr 2022 18:54)  T(F): 98.5 (04 Apr 2022 04:38), Max: 98.7 (03 Apr 2022 18:54)  HR: 83 (04 Apr 2022 04:38) (72 - 83)  BP: 124/72 (04 Apr 2022 04:38) (114/68 - 151/72)  BP(mean): --  RR: 17 (04 Apr 2022 04:38) (17 - 18)  SpO2: 92% (04 Apr 2022 04:38) (92% - 94%)    CAPILLARY BLOOD GLUCOSE      POCT Blood Glucose.: 129 mg/dL (03 Apr 2022 21:41)  POCT Blood Glucose.: 121 mg/dL (03 Apr 2022 16:45)  POCT Blood Glucose.: 165 mg/dL (03 Apr 2022 12:06)  POCT Blood Glucose.: 116 mg/dL (03 Apr 2022 08:07)      I&O's Summary    03 Apr 2022 07:01  -  04 Apr 2022 07:00  --------------------------------------------------------  IN: 0 mL / OUT: 1700 mL / NET: -1700 mL      PHYSICAL EXAM:  GENERAL:  [ x ] NAD, [ x ] Well appearing, [  ] Agitated, [  ] Drowsy, [  ] Lethargy, [  ] Confused   HEAD:  [ x ] Normal, [  ] Other  EYES:  [ x ] EOMI, [ x ] PERRLA, [ x ] Conjunctiva and sclera clear normal, [  ] Other, [ x ] Pallor, [  ] Discharge  ENMT:  [ x ] Normal, [ x ] Moist mucous membranes, [  ] Good dentition, [ x ] No thrush  NECK:  [ x ] Supple, [ x ] No JVD, [ x ] Normal thyroid, [  ] Lymphadenopathy, [  ] Other  CHEST/LUNG:  [ x ] Clear to auscultation bilaterally, [ x ] Breath Sounds equal B/L, [ x ] Poor effort, [ x ] No rales, [ x ] No rhonchi, [ x ] No wheezing  HEART:  [ x ] Regular rate and rhythm, [  ] Tachycardia, [  ] Bradycardia, [  ] Irregular, [ x ] No murmurs, No rubs, No gallops, [  ] PPM in place (Mfr:  )  ABDOMEN:  [ x ] Soft, [ x ] Nontender, [ x ] Nondistended, [ x ] No mass, [ x ] Bowel sounds present, [  ] Obese  NERVOUS SYSTEM:  [ x ] Alert & Oriented x3, [  ] Nonfocal, [  ] Confusion, [  ] Encephalopathic, [  ] Sedated, [  ] Unable to assess, [  ] Dementia, [ x ] Other- LUE weakness compared to RUE, left tongue deviation   EXTREMITIES:  [ x ] 2+ Peripheral Pulses, No clubbing, No cyanosis,  [  ] Edema B/L lower EXT, [  ] PVD stasis skin changes B/L lower EXT, [  ] Wound  LYMPH:  No lymphadenopathy noted  SKIN:  [ x ] No rashes or lesions, [  ] Pressure ulcers, [  ] Ecchymosis, [  ] Skin tears, [  ] OtherOther    DIET: Diet, Pureed:   Consistent Carbohydrate No Snacks  DASH/TLC Sodium & Cholesterol Restricted  Moderately Thick Liquids (MODTHICKLIQS)  Supplement Feeding Modality:  Oral  Glucerna Shake Cans or Servings Per Day:  1       Frequency:  Three Times a day (04-03-22 @ 13:32)      LABS:                        10.2   6.12  )-----------( 211      ( 03 Apr 2022 08:03 )             32.3     03 Apr 2022 08:03    142    |  108    |  32     ----------------------------<  119    4.1     |  27     |  2.10     Ca    9.3        03 Apr 2022 08:03  Phos  3.1       03 Apr 2022 08:03  Mg     2.6       03 Apr 2022 08:03    TPro  6.3    /  Alb  2.7    /  TBili  0.7    /  DBili  x      /  AST  20     /  ALT  30     /  AlkPhos  72     03 Apr 2022 08:03    PTT - ( 02 Apr 2022 08:34 )  PTT:26.0 sec    Culture Results:   No growth (03-31 @ 01:17)  Culture Results:   No growth to date. (03-30 @ 15:01)  Culture Results:   No growth to date. (03-30 @ 15:01)      CARDIAC MARKERS ( 31 Mar 2022 16:33 )  x     / x     / 202 U/L / x     / 3.1 ng/mL  CARDIAC MARKERS ( 30 Mar 2022 21:07 )  x     / x     / 342 U/L / x     / 12.8 ng/mL  CARDIAC MARKERS ( 30 Mar 2022 14:34 )  x     / x     / 398 U/L / x     / 18.4 ng/mL  CARDIAC MARKERS ( 30 Mar 2022 08:13 )  x     / x     / x     / x     / 13.1 ng/mL  CARDIAC MARKERS ( 30 Mar 2022 07:53 )  x     / x     / 328 U/L / x     / x            Culture - Urine (collected 31 Mar 2022 01:17)  Source: Clean Catch Clean Catch (Midstream)  Final Report (31 Mar 2022 21:27):    No growth    Culture - Blood (collected 30 Mar 2022 15:01)  Source: .Blood Blood-Peripheral  Preliminary Report (31 Mar 2022 16:01):    No growth to date.    Culture - Blood (collected 30 Mar 2022 15:01)  Source: .Blood Blood-Peripheral  Preliminary Report (31 Mar 2022 16:01):    No growth to date.       Anemia Panel:  Iron Total, Serum: 15 ug/dL (03-31-22 @ 10:51)  Iron - Total Binding Capacity.: 318 ug/dL (03-31-22 @ 10:51)  Ferritin, Serum: 53 ng/mL (03-31-22 @ 10:42)  Vitamin B12, Serum: 476 pg/mL (03-31-22 @ 10:42)  Folate, Serum: 10.7 ng/mL (03-31-22 @ 10:42)  Ferritin, Serum: 44 ng/mL (03-31-22 @ 05:21)  Iron - Total Binding Capacity.: 329 ug/dL (03-31-22 @ 05:21)  Iron Total, Serum: 11 ug/dL (03-31-22 @ 05:21)      Thyroid Panel:  T4, Serum: 6.2 ug/dL (03-31-22 @ 10:42)  Thyroid Stimulating Hormone, Serum: 2.99 uIU/mL (03-31-22 @ 07:24)  T4, Serum: 5.9 ug/dL (03-31-22 @ 05:21)  Thyroid Stimulating Hormone, Serum: 2.81 uIU/mL (03-30-22 @ 14:34)            Serum Pro-Brain Natriuretic Peptide: 9256 pg/mL (03-31-22 @ 07:24)  Serum Pro-Brain Natriuretic Peptide: 4963 pg/mL (03-30-22 @ 06:12)      RADIOLOGY & ADDITIONAL TESTS:  No new studies in the past 24 hours    HEALTH ISSUES - PROBLEM Dx:  Acute pulmonary edema    CVA (cerebrovascular accident)    Elevated troponin    Leukocytosis    Chronic HFrEF (heart failure with reduced ejection fraction)    Acute kidney injury superimposed on CKD    Anemia    Elevated creatine kinase    Diabetes mellitus    HTN (hypertension)    CAD (coronary artery disease)    Hypothyroidism    Need for prophylactic measure    Tonsillar cancer          Consultant(s) Notes Reviewed:  [ x ] YES     Care Discussed with [ x ] Consultants, [ x ] Patient, [ x ] Family,- Dtr  [  ] HCP, [  ] , [ x ] Social Service, [ x ] RN, [  ] Physical Therapy, [  ] Palliative Care Team  DVT PPX: [  ] Lovenox, [ x ] SC Heparin, [  ] Coumadin, [  ] Xarelto, [  ] Eliquis, [  ] Pradaxa, [  ] IV Heparin drip, [  ] SCD, [  ] Ambulation, [  ] Contraindicated 2/2 GI Bleed, [  ] Contraindicated 2/2  Bleed, [  ] Contraindicated 2/2 Brain Bleed  Advanced Directive: [  ] None, [ x ] DNR/trial of intubation  Patient is a 76y old  Male who presents with a chief complaint of SOB (03 Apr 2022 14:26)      HPI:  77 yo male w/ PMHx of HTN, HLD, HFrEF (EF 30% 2009), 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 As per patient, he was watching a movie when he felt very short of breath suddenly and dropped to the floor, denies LOC and denies any trauma to his head, was on the floor for ~2 hours. Patient was unable to rise from the floor by himself due to his left arm weakness. When first brought to the ED, patient was hypoxic and hypertensive o2 saturation in EMS was >80%. While in the ED, patient had episode of siezure-like activity in his b/l upper extremities which was controlled w/ ativan. Patient was seen and examined at bedside, BiPAP still in place, patient was still mildly lethargic from ativan and could not speak well with bipap mask on. Patient able to open eyes and nod/shake head to questions. Patient denied any headache, vision changes, cp, abd pain, msk pain. Patient still short of breath.      In ED:   Vitals: HR 80, 176/82 -> 115/60, RR 20, 96% on BiPAP/CPAP  Labs significant for: WBC 20.24, H/H 10.8/24.1, D-Dimer 2924, Troponin 1011.9 > 5252.0, CKMB: 13.1, CPK%: 4%, BUN 33, Creatinine 2.3 (baseline unknown) eGFR 29, serum pro BNP 4963, creatine kinase 328, POCT glucose: 353  Imaging:   CXR: diffuse b/l scattered infiltrates  CT brain stroke: No acute hemorrhage, infarct, or mass effect   EKG: sinus tachy at ST- depressions in V5 V6   Received Lasix 40mg IVP x1, Aspirin 600mg x1, Hydralazine 10mg x1, Ativan 2mg IVP x1, Lispro 6u in the ED    (30 Mar 2022 08:48)      INTERVAL HPI:  3/31/2022: Patient overnight had clots in urine with slight pinkish appearance of urine, stat h/h showed that patients hgb dropped to 9.2 from 10.1. Heparin drip was continued as benefits outweighed risks. AM hgb showed recovery to 9.6. Patient also had 6 beats of vtach overnight, was asymptomatic. Patient seen and examined at bedside, denied any lightheadedness/dizziness, CP/palpitations, abd pain, dysuria, MSK pain, any sensory deficits. Patient continues to have LUE weakness. OBN IV Heparin drip, + CVA on CT Head  04/01/2022: Pt seen and examined at bedside. Hb stable this morning. Pt w no complaints this morning. Denies fever, chills, chest pain, palpitations, shortness of breath, palpitations, lightheadedness, dizziness, headache, n/v/d/c. TOV , d/c Mcclellan cath. heparin drip was dc in evening   4/2/22: Pt was seen and examined at bedside. Pt states that he had a BM yesterday, normal. AM Bladder scan revealed 450 cc urine but pt refused to be straight cath because he wants to urinate on his own. States that he cannot urinate because he is not on a regular diet. Denies history of BPH. Reports mild back pain, states that Tylenol does not help. Pt denies headache, dizziness, CP, SOB, palpitations, abdominal pain, n/v. No other complaints at this time. Will start Flomax.   4/3/22: Pt seen and examined at bedside. Pt had no BM this AM. F/u bladder scan from this AM showed urinary retention of ~470cc, patient refusing straight cath, will replace mcclellan catheter and consult uro. Patient denies any headache, dizziness, CP, SOB, palpitations, abdominal pain, n/v. Patient states back pain is still there but mild. No other complaints at this time. Failed TOV, Mcclellan cath placed   4/4/22: Patient seen and examined at bedside. Pt has BM yesterday, mcclellan in place draining clear urine. Patient without any complaints. Patient denies any headache, dizziness, CP, SOB, palpitations, abdominal pain, n/v. VSS, D/C Planning to Rehab     OVERNIGHT EVENTS:  No overnight events    Home Medications:  benazepril 10 mg oral tablet: 1 tab(s) orally once a day (30 Mar 2022 10:22)  calcitriol 0.25 mcg oral capsule: 1 cap(s) orally once a day (30 Mar 2022 10:22)  carvedilol 12.5 mg oral tablet: 1 tab(s) orally 2 times a day      *Last filled 5/21, spoke to MATTHEW Nguyen at Dr. Garcia&#x27;s office, she states patient should still be taking med as per MD notes from 2/22*  (30 Mar 2022 10:22)  hydrALAZINE 10 mg oral tablet: 0.5 tab(s) orally 3 times a day (30 Mar 2022 10:22)  isosorbide mononitrate 30 mg oral tablet, extended release: 1 tab(s) orally once a day (in the morning) (30 Mar 2022 10:22)  levothyroxine 75 mcg (0.075 mg) oral tablet: 1 tab(s) orally once a day (30 Mar 2022 10:22)  midodrine 10 mg oral tablet: 1 tab(s) orally 3 times a day (30 Mar 2022 10:22)  Plavix 75 mg oral tablet: 1 tab(s) orally once a day (30 Mar 2022 10:22)  pravastatin 40 mg oral tablet: 1 tab(s) orally once a day (30 Mar 2022 10:22)      MEDICATIONS  (STANDING):  atorvastatin 80 milliGRAM(s) Oral at bedtime  Biotene Dry Mouth Oral Rinse 5 milliLiter(s) Swish and Spit daily  bisacodyl Suppository 10 milliGRAM(s) Rectal daily  calcitriol   Capsule 0.25 MICROGram(s) Oral daily  cyanocobalamin 1000 MICROGram(s) Oral daily  dextrose 5%. 1000 milliLiter(s) (50 mL/Hr) IV Continuous <Continuous>  dextrose 5%. 1000 milliLiter(s) (100 mL/Hr) IV Continuous <Continuous>  dextrose 50% Injectable 25 Gram(s) IV Push once  dextrose 50% Injectable 12.5 Gram(s) IV Push once  dextrose 50% Injectable 25 Gram(s) IV Push once  dipyridamole 200 mG/aspirin 25 mG 1 Capsule(s) Oral two times a day  ferrous    sulfate 325 milliGRAM(s) Oral daily  glucagon  Injectable 1 milliGRAM(s) IntraMuscular once  heparin   Injectable 5000 Unit(s) SubCutaneous every 8 hours  influenza  Vaccine (HIGH DOSE) 0.7 milliLiter(s) IntraMuscular once  insulin lispro (ADMELOG) corrective regimen sliding scale   SubCutaneous three times a day before meals  insulin lispro (ADMELOG) corrective regimen sliding scale   SubCutaneous at bedtime  levothyroxine 75 MICROGram(s) Oral daily  metoprolol tartrate 25 milliGRAM(s) Oral every 12 hours  senna 2 Tablet(s) Oral at bedtime  tamsulosin 0.4 milliGRAM(s) Oral at bedtime  valproate sodium IVPB 500 milliGRAM(s) IV Intermittent every 12 hours    MEDICATIONS  (PRN):  ALBUTerol    90 MICROgram(s) HFA Inhaler 2 Puff(s) Inhalation every 6 hours PRN Shortness of Breath and/or Wheezing  dextrose Oral Gel 15 Gram(s) Oral once PRN Blood Glucose LESS THAN 70 milliGRAM(s)/deciliter  ondansetron Injectable 4 milliGRAM(s) IV Push every 8 hours PRN Nausea and/or Vomiting      No Known Allergies      Social History:  Lives at home w/ wife   ADL independent   Tobacco former smoker, quit 40 years ago, 20 pack year history  Alcohol denies  Drugs denies  COVID Pfizer x3   Occupation retired- former  (30 Mar 2022 08:48)      REVIEW OF SYSTEMS: I am ok  CONSTITUTIONAL: No fever, No chills, No fatigue, No myalgia, No Body ache, No Weakness  EYES: No eye pain,  No visual disturbances, No discharge, No Redness  ENMT: No ear pain, No nose bleed, No vertigo; No sinus pain, No throat pain, No Congestion  NECK: No pain, No stiffness  RESPIRATORY: No cough, No wheezing, No hemoptysis, No shortness of breath  CARDIOVASCULAR: No chest pain, No palpitations  GASTROINTESTINAL: No abdominal pain, No epigastric pain. No nausea, No vomiting, No diarrhea, No constipation; [ x ] BM - 1  GENITOURINARY: No dysuria, No frequency, No urgency, No incontinence  NEUROLOGICAL: No headaches, No dizziness, No numbness, No tingling, No tremors, [ x ] LUE weakness   EXTREMITIES: No Swelling, No Pain, No Edema  SKIN: [ x ] No itching, burning, rashes, or lesions   MUSCULOSKELETAL: No joint pain, No joint swelling; No muscle pain, No back pain, No extremity pain  PSYCHIATRIC: No depression, No anxiety, No mood swings, No difficulty sleeping at night  PAIN SCALE: [ x ] None  [  ] Other  ROS Unable to obtain due to: [  ] Dementia  [  ] Lethargy  [  ] Sedated  [  ] Non verbal  REST OF REVIEW OF SYSTEMS: [ x ] Normal     Vital Signs Last 24 Hrs  T(C): 36.9 (04 Apr 2022 04:38), Max: 37.1 (03 Apr 2022 18:54)  T(F): 98.5 (04 Apr 2022 04:38), Max: 98.7 (03 Apr 2022 18:54)  HR: 83 (04 Apr 2022 04:38) (72 - 83)  BP: 124/72 (04 Apr 2022 04:38) (114/68 - 151/72)  BP(mean): --  RR: 17 (04 Apr 2022 04:38) (17 - 18)  SpO2: 92% (04 Apr 2022 04:38) (92% - 94%)    CAPILLARY BLOOD GLUCOSE      POCT Blood Glucose.: 129 mg/dL (03 Apr 2022 21:41)  POCT Blood Glucose.: 121 mg/dL (03 Apr 2022 16:45)  POCT Blood Glucose.: 165 mg/dL (03 Apr 2022 12:06)  POCT Blood Glucose.: 116 mg/dL (03 Apr 2022 08:07)      I&O's Summary    03 Apr 2022 07:01  -  04 Apr 2022 07:00  --------------------------------------------------------  IN: 0 mL / OUT: 1700 mL / NET: -1700 mL      PHYSICAL EXAM:  GENERAL:  [ x ] NAD, [ x ] Well appearing, [  ] Agitated, [  ] Drowsy, [  ] Lethargy, [  ] Confused   HEAD:  [ x ] Normal, [  ] Other  EYES:  [ x ] EOMI, [ x ] PERRLA, [ x ] Conjunctiva and sclera clear normal, [  ] Other, [ x ] Pallor, [  ] Discharge  ENMT:  [ x ] Normal, [ x ] Moist mucous membranes, [  ] Good dentition, [ x ] No thrush  NECK:  [ x ] Supple, [ x ] No JVD, [ x ] Normal thyroid, [  ] Lymphadenopathy, [  ] Other  CHEST/LUNG:  [ x ] Clear to auscultation bilaterally, [ x ] Breath Sounds equal B/L, [ x ] Poor effort, [ x ] No rales, [ x ] No rhonchi, [ x ] No wheezing  HEART:  [ x ] Regular rate and rhythm, [  ] Tachycardia, [  ] Bradycardia, [  ] Irregular, [ x ] No murmurs, No rubs, No gallops, [  ] PPM in place (Mfr:  )  ABDOMEN:  [ x ] Soft, [ x ] Nontender, [ x ] Nondistended, [ x ] No mass, [ x ] Bowel sounds present, [  ] Obese  NERVOUS SYSTEM:  [ x ] Alert & Oriented x3, [  ] Nonfocal, [  ] Confusion, [  ] Encephalopathic, [  ] Sedated, [  ] Unable to assess, [  ] Dementia, [ x ] Other- LUE weakness compared to RUE, left tongue deviation   EXTREMITIES:  [ x ] 2+ Peripheral Pulses, No clubbing, No cyanosis,  [  ] Edema B/L lower EXT, [  ] PVD stasis skin changes B/L lower EXT, [  ] Wound  LYMPH:  No lymphadenopathy noted  SKIN:  [ x ] No rashes or lesions, [  ] Pressure ulcers, [  ] Ecchymosis, [  ] Skin tears, [  ] OtherOther    DIET: Diet, Pureed:   Consistent Carbohydrate No Snacks  DASH/TLC Sodium & Cholesterol Restricted  Moderately Thick Liquids (MODTHICKLIQS)  Supplement Feeding Modality:  Oral  Glucerna Shake Cans or Servings Per Day:  1       Frequency:  Three Times a day (04-03-22 @ 13:32)      LABS:                          10.0   6.93  )-----------( 202      ( 04 Apr 2022 07:23 )             31.9     04 Apr 2022 07:23    142    |  108    |  33     ----------------------------<  148    4.0     |  27     |  2.10     Ca    9.0        04 Apr 2022 07:23  Phos  3.3       04 Apr 2022 07:23  Mg     2.2       04 Apr 2022 07:23    TPro  6.3    /  Alb  2.5    /  TBili  0.5    /  DBili  x      /  AST  28     /  ALT  34     /  AlkPhos  82     04 Apr 2022 07:23                          10.2   6.12  )-----------( 211      ( 03 Apr 2022 08:03 )             32.3     03 Apr 2022 08:03    142    |  108    |  32     ----------------------------<  119    4.1     |  27     |  2.10     Ca    9.3        03 Apr 2022 08:03  Phos  3.1       03 Apr 2022 08:03  Mg     2.6       03 Apr 2022 08:03    TPro  6.3    /  Alb  2.7    /  TBili  0.7    /  DBili  x      /  AST  20     /  ALT  30     /  AlkPhos  72     03 Apr 2022 08:03    PTT - ( 02 Apr 2022 08:34 )  PTT:26.0 sec    Culture Results:   No growth (03-31 @ 01:17)  Culture Results:   No growth to date. (03-30 @ 15:01)  Culture Results:   No growth to date. (03-30 @ 15:01)      CARDIAC MARKERS ( 31 Mar 2022 16:33 )  x     / x     / 202 U/L / x     / 3.1 ng/mL  CARDIAC MARKERS ( 30 Mar 2022 21:07 )  x     / x     / 342 U/L / x     / 12.8 ng/mL  CARDIAC MARKERS ( 30 Mar 2022 14:34 )  x     / x     / 398 U/L / x     / 18.4 ng/mL  CARDIAC MARKERS ( 30 Mar 2022 08:13 )  x     / x     / x     / x     / 13.1 ng/mL  CARDIAC MARKERS ( 30 Mar 2022 07:53 )  x     / x     / 328 U/L / x     / x            Culture - Urine (collected 31 Mar 2022 01:17)  Source: Clean Catch Clean Catch (Midstream)  Final Report (31 Mar 2022 21:27):    No growth    Culture - Blood (collected 30 Mar 2022 15:01)  Source: .Blood Blood-Peripheral  Preliminary Report (31 Mar 2022 16:01):    No growth to date.    Culture - Blood (collected 30 Mar 2022 15:01)  Source: .Blood Blood-Peripheral  Preliminary Report (31 Mar 2022 16:01):    No growth to date.       Anemia Panel:  Iron Total, Serum: 15 ug/dL (03-31-22 @ 10:51)  Iron - Total Binding Capacity.: 318 ug/dL (03-31-22 @ 10:51)  Ferritin, Serum: 53 ng/mL (03-31-22 @ 10:42)  Vitamin B12, Serum: 476 pg/mL (03-31-22 @ 10:42)  Folate, Serum: 10.7 ng/mL (03-31-22 @ 10:42)  Ferritin, Serum: 44 ng/mL (03-31-22 @ 05:21)  Iron - Total Binding Capacity.: 329 ug/dL (03-31-22 @ 05:21)  Iron Total, Serum: 11 ug/dL (03-31-22 @ 05:21)      Thyroid Panel:  T4, Serum: 6.2 ug/dL (03-31-22 @ 10:42)  Thyroid Stimulating Hormone, Serum: 2.99 uIU/mL (03-31-22 @ 07:24)  T4, Serum: 5.9 ug/dL (03-31-22 @ 05:21)  Thyroid Stimulating Hormone, Serum: 2.81 uIU/mL (03-30-22 @ 14:34)            Serum Pro-Brain Natriuretic Peptide: 9256 pg/mL (03-31-22 @ 07:24)  Serum Pro-Brain Natriuretic Peptide: 4963 pg/mL (03-30-22 @ 06:12)      RADIOLOGY & ADDITIONAL TESTS:  No new studies in the past 24 hours    HEALTH ISSUES - PROBLEM Dx:  Acute pulmonary edema    CVA (cerebrovascular accident)    Elevated troponin    Leukocytosis    Chronic HFrEF (heart failure with reduced ejection fraction)    Acute kidney injury superimposed on CKD    Anemia    Elevated creatine kinase    Diabetes mellitus    HTN (hypertension)    CAD (coronary artery disease)    Hypothyroidism    Need for prophylactic measure    Tonsillar cancer          Consultant(s) Notes Reviewed:  [ x ] YES     Care Discussed with [ x ] Consultants, [ x ] Patient, [ x ] Family,- Dtr  [  ] HCP, [  ] , [ x ] Social Service, [ x ] RN, [  ] Physical Therapy, [  ] Palliative Care Team  DVT PPX: [  ] Lovenox, [ x ] SC Heparin, [  ] Coumadin, [  ] Xarelto, [  ] Eliquis, [  ] Pradaxa, [  ] IV Heparin drip, [  ] SCD, [  ] Ambulation, [  ] Contraindicated 2/2 GI Bleed, [  ] Contraindicated 2/2  Bleed, [  ] Contraindicated 2/2 Brain Bleed  Advanced Directive: [  ] None, [ x ] DNR/trial of intubation

## 2022-04-04 NOTE — PROGRESS NOTE ADULT - SUBJECTIVE AND OBJECTIVE BOX
[facility] -- Rianna Horn Grossman, [place], Vlad Nicole, Nazanin Jose: Office # [phone]    Follow Up:  Hypertensive Emergency, CVA, NSTEMI    Subjective/Observations: Patient seen and examined, awake, alert, eating breakfast in bed, denies chest pain, dyspnea, palpitations or dizziness, orthopnea and PND. Tolerating room air.     REVIEW OF SYSTEMS: All review of systems is negative for eye, ENT, GI, , allergic, dermatologic, musculoskeletal and neurologic except as described above    PAST MEDICAL & SURGICAL HISTORY:  MI (myocardial infarction)  1992    HTN (hypertension)    HLD (hyperlipidemia)    Throat cancer  2011, treated with chemo, RT    Ventricular arrhythmia  s/p AICD    Diabetes  Type2, not on any meds since weight loss after throat Ca    Renal insufficiency  s/p chemo    CAD (coronary artery disease)    Inguinal hernia, left    H/O prior ablation treatment  VT, 2009    Cardiac defibrillator in place  - 2009, battery change 2017    S/P left inguinal hernia repair  2018 at [place]        MEDICATIONS  (STANDING):  atorvastatin 80 milliGRAM(s) Oral at bedtime  Biotene Dry Mouth Oral Rinse 5 milliLiter(s) Swish and Spit daily  bisacodyl Suppository 10 milliGRAM(s) Rectal daily  calcitriol   Capsule 0.25 MICROGram(s) Oral daily  cyanocobalamin 1000 MICROGram(s) Oral daily  dextrose 5%. 1000 milliLiter(s) (50 mL/Hr) IV Continuous <Continuous>  dextrose 5%. 1000 milliLiter(s) (100 mL/Hr) IV Continuous <Continuous>  dextrose 50% Injectable 25 Gram(s) IV Push once  dextrose 50% Injectable 12.5 Gram(s) IV Push once  dextrose 50% Injectable 25 Gram(s) IV Push once  dipyridamole 200 mG/aspirin 25 mG 1 Capsule(s) Oral two times a day  ferrous    sulfate 325 milliGRAM(s) Oral daily  glucagon  Injectable 1 milliGRAM(s) IntraMuscular once  heparin   Injectable 5000 Unit(s) SubCutaneous every 8 hours  influenza  Vaccine (HIGH DOSE) 0.7 milliLiter(s) IntraMuscular once  insulin lispro (ADMELOG) corrective regimen sliding scale   SubCutaneous three times a day before meals  insulin lispro (ADMELOG) corrective regimen sliding scale   SubCutaneous at bedtime  levothyroxine 75 MICROGram(s) Oral daily  metoprolol tartrate 25 milliGRAM(s) Oral every 12 hours  senna 2 Tablet(s) Oral at bedtime  tamsulosin 0.4 milliGRAM(s) Oral at bedtime  valproate sodium IVPB 500 milliGRAM(s) IV Intermittent every 12 hours    MEDICATIONS  (PRN):  ALBUTerol    90 MICROgram(s) HFA Inhaler 2 Puff(s) Inhalation every 6 hours PRN Shortness of Breath and/or Wheezing  dextrose Oral Gel 15 Gram(s) Oral once PRN Blood Glucose LESS THAN 70 milliGRAM(s)/deciliter  ondansetron Injectable 4 milliGRAM(s) IV Push every 8 hours PRN Nausea and/or Vomiting    Allergies    No Known Allergies    Intolerances      Vital Signs Last 24 Hrs  T(C): 36.9 (04 Apr 2022 04:38), Max: 37.1 (03 Apr 2022 18:54)  T(F): 98.5 (04 Apr 2022 04:38), Max: 98.7 (03 Apr 2022 18:54)  HR: 83 (04 Apr 2022 04:38) (72 - 83)  BP: 124/72 (04 Apr 2022 04:38) (114/68 - 151/72)  BP(mean): --  RR: 17 (04 Apr 2022 04:38) (17 - 18)  SpO2: 92% (04 Apr 2022 04:38) (92% - 94%)  I&O's Summary    03 Apr 2022 07:01  -  04 Apr 2022 07:00  --------------------------------------------------------  IN: 0 mL / OUT: 1700 mL / NET: -1700 mL          TELE: Not on telemetry   PHYSICAL EXAM:  Appearance: NAD, no distress, alert,  HEENT: Moist Mucous Membranes, Anicteric  Cardiovascular: Regular rate and rhythm, Normal S1 S2, No JVD, + murmurs, No rubs, gallops or clicks  Respiratory: Non-labored, Clear to auscultation, No rales, No rhonchi, No wheezing.   Gastrointestinal:  Soft, Non-tender, + BS  Neurologic: Non-focal  Skin: Warm and dry, No visible rashes or ulcers, No ecchymosis, No cyanosis  Musculoskeletal: No clubbing, No cyanosis, No joint swelling/tenderness  Psychiatry: Mood & affect appropriate  Lymph: No peripheral edema.     LABS: All Labs Reviewed:                        10.0   6.93  )-----------( 202      ( 04 Apr 2022 07:23 )             31.9                         10.2   6.12  )-----------( 211      ( 03 Apr 2022 08:03 )             32.3                         10.7   6.97  )-----------( 207      ( 02 Apr 2022 08:34 )             33.4     04 Apr 2022 07:23    142    |  108    |  33     ----------------------------<  148    4.0     |  27     |  2.10   03 Apr 2022 08:03    142    |  108    |  32     ----------------------------<  119    4.1     |  27     |  2.10   02 Apr 2022 08:34    142    |  107    |  28     ----------------------------<  107    4.5     |  27     |  2.00     Ca    9.0        04 Apr 2022 07:23  Ca    9.3        03 Apr 2022 08:03  Ca    9.7        02 Apr 2022 08:34  Phos  3.3       04 Apr 2022 07:23  Phos  3.1       03 Apr 2022 08:03  Phos  3.5       02 Apr 2022 08:34  Mg     2.2       04 Apr 2022 07:23  Mg     2.6       03 Apr 2022 08:03  Mg     2.5       02 Apr 2022 08:34    TPro  6.3    /  Alb  2.5    /  TBili  0.5    /  DBili  x      /  AST  28     /  ALT  34     /  AlkPhos  82     04 Apr 2022 07:23  TPro  6.3    /  Alb  2.7    /  TBili  0.7    /  DBili  x      /  AST  20     /  ALT  30     /  AlkPhos  72     03 Apr 2022 08:03      Creatine Kinase, Serum: 202 U/L (03-31-22 @ 16:33)  Troponin I, High Sensitivity Result: 9246.0 ng/L (03-31-22 @ 16:33)  Creatine Kinase, Serum: 342 U/L (03-30-22 @ 21:07)  Troponin I, High Sensitivity Result: 87289.6 ng/L (03-30-22 @ 21:07)  Creatine Kinase, Serum: 398 U/L (03-30-22 @ 14:34)      D-Dimer Assay, Quantitative: 2924 ng/mL DDU (03-30-22 @ 06:12)      Triiodothyronine, Total (T3 Total): 54 ng/dL (03-31-22 @ 05:21)    Cholesterol, Serum: 103 mg/dL (03-31-22 @ 10:51)  HDL Cholesterol, Serum: 30 mg/dL (03-31-22 @ 10:51)  Triglycerides, Serum: 76 mg/dL (03-31-22 @ 10:51)      12 Lead ECG:   Ventricular Rate 83 BPM    Atrial Rate 83 BPM    P-R Interval 152 ms    QRS Duration 122 ms    Q-T Interval 382 ms    QTC Calculation(Bazett) 448 ms    P Axis 56 degrees    R Axis -32 degrees    T Axis 101 degrees    Diagnosis Line Sinus rhythm with premature supraventricular complexes  and ventricular complexes  Left axis deviation  Left ventricular hypertrophy with QRS widening  Anteroseptal infarct (cited on or before 01-AUG-2018)  Confirmed by ANA PAULA NICOLE ([id number]) on 4/2/2022 11:17:27 AM (04-01-22 @ 14:50)    < from: Xray Chest 1 View- PORTABLE-Urgent (03.30.22 @ 06:03) >    ACC: [id number] EXAM:  XR CHEST PORTABLE URGENT 1V                          PROCEDURE DATE:  03/30/2022          INTERPRETATION:  AP chest on March 30, 2022 at 5:50 AM. Patient has chest   pain.    Heart magnified by technique. Left-sided defibrillator again noted.    There are diffuse scattered bilateral infiltrates which are new since   August 22, 2018. Covid not excluded.    IMPRESSION: Fairly advanced diffuse bilateral scattered infiltrates.    --- End of Report ---            HAYLEY ALBERTO MD; Attending Radiologist  This document has been electronically signed. Mar 30 2022 10:08AM    < end of copied text >  < from: TTE Echo Complete w/ Contrast w/ Doppler (03.31.22 @ 20:42) >    ACC: [id number] EXAM:  ECHO TTE WITH CON COMP W DOPP                          PROCEDURE DATE:  03/31/2022          INTERPRETATION:  INDICATION: Heart failure  Sonographer KL    Blood Pressure 145/78    Height 175.3 cm     Weight 72.6 kg       BSA 1.9  sq m    Dimensions:  LA 4.1       Normal Values: 2.0 - 4.0 cm  Ao 3.4        Normal Values: 2.0 - 3.8 cm  SEPTUM 1.0       Normal Values: 0.6 - 1.2 cm  PWT 1.0       Normal Values: 0.6 - 1.1 cm  LVIDd 6.4         Normal Values: 3.0 - 5.6 cm  LVIDs 6.0         Normal Values: 1.8 - 4.0 cm      OBSERVATIONS:  Technically difficult and limited study  Mitral Valve: Mitral annular calcification, mild MR.  Aortic Valve/Aorta: Sclerotic trileaflet aortic valve with grossly normal   opening.  Tricuspid Valve: normal with trace TR.  Pulmonic Valve: Not well-visualized  Left Atrium: Enlarged  Right Atrium: Not well-visualized  Left Ventricle: The left ventricular endocardium is not well-visualized.   Injectable echo contrast was used to help enhance the images. The left   ventricle is enlarged with moderate to severe left ventricular systolic   dysfunction, estimated LVEF of 30-35%. The apical cap, apical septum and   apical lateral walls are akinetic with hypokinesis of the remaining   walls. There is no evidence of left ventricular thrombus  Right Ventricle: The right ventricle is not well-visualized, device wire   is noted within the right heart  Pericardium: no significant pericardial effusion.  LV diastolic dysfunction is present        IMPRESSION:  Technically difficult and limited study  The left ventricular endocardium is not well-visualized. Injectable echo   contrast was used to help enhance the images. The left ventricle is   enlarged with moderate to severe left ventricular systolic dysfunction,   estimated LVEF of 30-35%. The apical cap, apical septum and apical   lateral walls are akinetic with hypokinesis of the remaining walls. There   is no evidence of left ventricular thrombus  The right ventricle is not well-visualized, device wire is noted within   the right heart  Sclerotic trileaflet aortic valve with grossly normal opening, without AI.  Mild MR  Trace TR.    --- End of Report ---            SARAHY CASTRO MD; Attending Cardiologist  This document has beenelectronically signed. Apr 2 2022  4:11PM    < end of copied text >

## 2022-04-05 ENCOUNTER — TRANSCRIPTION ENCOUNTER (OUTPATIENT)
Age: 77
End: 2022-04-05

## 2022-04-05 ENCOUNTER — INPATIENT (INPATIENT)
Facility: HOSPITAL | Age: 77
LOS: 13 days | Discharge: HOME HEALTH | DRG: 949 | End: 2022-04-19
Attending: PSYCHIATRY & NEUROLOGY | Admitting: PSYCHIATRY & NEUROLOGY
Payer: MEDICARE

## 2022-04-05 VITALS
WEIGHT: 150.13 LBS | SYSTOLIC BLOOD PRESSURE: 139 MMHG | OXYGEN SATURATION: 97 % | TEMPERATURE: 98 F | DIASTOLIC BLOOD PRESSURE: 76 MMHG | HEART RATE: 77 BPM | RESPIRATION RATE: 14 BRPM | HEIGHT: 69 IN

## 2022-04-05 VITALS
TEMPERATURE: 98 F | HEART RATE: 72 BPM | OXYGEN SATURATION: 93 % | DIASTOLIC BLOOD PRESSURE: 82 MMHG | SYSTOLIC BLOOD PRESSURE: 149 MMHG | RESPIRATION RATE: 18 BRPM

## 2022-04-05 DIAGNOSIS — I63.9 CEREBRAL INFARCTION, UNSPECIFIED: ICD-10-CM

## 2022-04-05 DIAGNOSIS — Z98.890 OTHER SPECIFIED POSTPROCEDURAL STATES: Chronic | ICD-10-CM

## 2022-04-05 DIAGNOSIS — Z95.810 PRESENCE OF AUTOMATIC (IMPLANTABLE) CARDIAC DEFIBRILLATOR: Chronic | ICD-10-CM

## 2022-04-05 LAB
ALBUMIN SERPL ELPH-MCNC: 2.6 G/DL — LOW (ref 3.3–5)
ALP SERPL-CCNC: 76 U/L — SIGNIFICANT CHANGE UP (ref 40–120)
ALT FLD-CCNC: 40 U/L — SIGNIFICANT CHANGE UP (ref 12–78)
ANION GAP SERPL CALC-SCNC: 7 MMOL/L — SIGNIFICANT CHANGE UP (ref 5–17)
AST SERPL-CCNC: 31 U/L — SIGNIFICANT CHANGE UP (ref 15–37)
BASOPHILS # BLD AUTO: 0 K/UL — SIGNIFICANT CHANGE UP (ref 0–0.2)
BASOPHILS NFR BLD AUTO: 0 % — SIGNIFICANT CHANGE UP (ref 0–2)
BILIRUB SERPL-MCNC: 0.5 MG/DL — SIGNIFICANT CHANGE UP (ref 0.2–1.2)
BUN SERPL-MCNC: 30 MG/DL — HIGH (ref 7–23)
CALCIUM SERPL-MCNC: 9.2 MG/DL — SIGNIFICANT CHANGE UP (ref 8.5–10.1)
CHLORIDE SERPL-SCNC: 109 MMOL/L — HIGH (ref 96–108)
CO2 SERPL-SCNC: 29 MMOL/L — SIGNIFICANT CHANGE UP (ref 22–31)
CREAT SERPL-MCNC: 2 MG/DL — HIGH (ref 0.5–1.3)
EGFR: 34 ML/MIN/1.73M2 — LOW
EOSINOPHIL # BLD AUTO: 0.28 K/UL — SIGNIFICANT CHANGE UP (ref 0–0.5)
EOSINOPHIL NFR BLD AUTO: 5 % — SIGNIFICANT CHANGE UP (ref 0–6)
GLUCOSE BLDC GLUCOMTR-MCNC: 153 MG/DL — HIGH (ref 70–99)
GLUCOSE SERPL-MCNC: 131 MG/DL — HIGH (ref 70–99)
HCT VFR BLD CALC: 33.5 % — LOW (ref 39–50)
HGB BLD-MCNC: 10.3 G/DL — LOW (ref 13–17)
LYMPHOCYTES # BLD AUTO: 0.84 K/UL — LOW (ref 1–3.3)
LYMPHOCYTES # BLD AUTO: 15 % — SIGNIFICANT CHANGE UP (ref 13–44)
MAGNESIUM SERPL-MCNC: 2.4 MG/DL — SIGNIFICANT CHANGE UP (ref 1.6–2.6)
MCHC RBC-ENTMCNC: 23.7 PG — LOW (ref 27–34)
MCHC RBC-ENTMCNC: 30.7 GM/DL — LOW (ref 32–36)
MCV RBC AUTO: 77.2 FL — LOW (ref 80–100)
MONOCYTES # BLD AUTO: 0.78 K/UL — SIGNIFICANT CHANGE UP (ref 0–0.9)
MONOCYTES NFR BLD AUTO: 14 % — SIGNIFICANT CHANGE UP (ref 2–14)
NEUTROPHILS # BLD AUTO: 3.58 K/UL — SIGNIFICANT CHANGE UP (ref 1.8–7.4)
NEUTROPHILS NFR BLD AUTO: 64 % — SIGNIFICANT CHANGE UP (ref 43–77)
NRBC # BLD: SIGNIFICANT CHANGE UP /100 WBCS (ref 0–0)
OSMOLALITY UR: 342 MOSM/KG — SIGNIFICANT CHANGE UP (ref 50–1200)
PHOSPHATE SERPL-MCNC: 3.4 MG/DL — SIGNIFICANT CHANGE UP (ref 2.5–4.5)
PLATELET # BLD AUTO: 204 K/UL — SIGNIFICANT CHANGE UP (ref 150–400)
POTASSIUM SERPL-MCNC: 3.9 MMOL/L — SIGNIFICANT CHANGE UP (ref 3.5–5.3)
POTASSIUM SERPL-SCNC: 3.9 MMOL/L — SIGNIFICANT CHANGE UP (ref 3.5–5.3)
PROT SERPL-MCNC: 6.4 G/DL — SIGNIFICANT CHANGE UP (ref 6–8.3)
RBC # BLD: 4.34 M/UL — SIGNIFICANT CHANGE UP (ref 4.2–5.8)
RBC # FLD: 14.4 % — SIGNIFICANT CHANGE UP (ref 10.3–14.5)
SARS-COV-2 RNA SPEC QL NAA+PROBE: SIGNIFICANT CHANGE UP
SODIUM SERPL-SCNC: 145 MMOL/L — SIGNIFICANT CHANGE UP (ref 135–145)
WBC # BLD: 5.6 K/UL — SIGNIFICANT CHANGE UP (ref 3.8–10.5)
WBC # FLD AUTO: 5.6 K/UL — SIGNIFICANT CHANGE UP (ref 3.8–10.5)

## 2022-04-05 PROCEDURE — 81001 URINALYSIS AUTO W/SCOPE: CPT

## 2022-04-05 PROCEDURE — 84145 PROCALCITONIN (PCT): CPT

## 2022-04-05 PROCEDURE — 94660 CPAP INITIATION&MGMT: CPT

## 2022-04-05 PROCEDURE — A9567: CPT

## 2022-04-05 PROCEDURE — 97166 OT EVAL MOD COMPLEX 45 MIN: CPT

## 2022-04-05 PROCEDURE — 86803 HEPATITIS C AB TEST: CPT

## 2022-04-05 PROCEDURE — 82607 VITAMIN B-12: CPT

## 2022-04-05 PROCEDURE — 78582 LUNG VENTILAT&PERFUS IMAGING: CPT

## 2022-04-05 PROCEDURE — 84540 ASSAY OF URINE/UREA-N: CPT

## 2022-04-05 PROCEDURE — 96374 THER/PROPH/DIAG INJ IV PUSH: CPT

## 2022-04-05 PROCEDURE — 87640 STAPH A DNA AMP PROBE: CPT

## 2022-04-05 PROCEDURE — 83880 ASSAY OF NATRIURETIC PEPTIDE: CPT

## 2022-04-05 PROCEDURE — 36415 COLL VENOUS BLD VENIPUNCTURE: CPT

## 2022-04-05 PROCEDURE — 84466 ASSAY OF TRANSFERRIN: CPT

## 2022-04-05 PROCEDURE — 71045 X-RAY EXAM CHEST 1 VIEW: CPT

## 2022-04-05 PROCEDURE — 85025 COMPLETE CBC W/AUTO DIFF WBC: CPT

## 2022-04-05 PROCEDURE — 97162 PT EVAL MOD COMPLEX 30 MIN: CPT

## 2022-04-05 PROCEDURE — 85379 FIBRIN DEGRADATION QUANT: CPT

## 2022-04-05 PROCEDURE — 84443 ASSAY THYROID STIM HORMONE: CPT

## 2022-04-05 PROCEDURE — 93306 TTE W/DOPPLER COMPLETE: CPT

## 2022-04-05 PROCEDURE — 87449 NOS EACH ORGANISM AG IA: CPT

## 2022-04-05 PROCEDURE — 96375 TX/PRO/DX INJ NEW DRUG ADDON: CPT

## 2022-04-05 PROCEDURE — 82962 GLUCOSE BLOOD TEST: CPT

## 2022-04-05 PROCEDURE — 99223 1ST HOSP IP/OBS HIGH 75: CPT

## 2022-04-05 PROCEDURE — 99232 SBSQ HOSP IP/OBS MODERATE 35: CPT

## 2022-04-05 PROCEDURE — 87641 MR-STAPH DNA AMP PROBE: CPT

## 2022-04-05 PROCEDURE — 84479 ASSAY OF THYROID (T3 OR T4): CPT

## 2022-04-05 PROCEDURE — 92611 MOTION FLUOROSCOPY/SWALLOW: CPT

## 2022-04-05 PROCEDURE — 80061 LIPID PANEL: CPT

## 2022-04-05 PROCEDURE — A9540: CPT

## 2022-04-05 PROCEDURE — 36600 WITHDRAWAL OF ARTERIAL BLOOD: CPT

## 2022-04-05 PROCEDURE — 71250 CT THORAX DX C-: CPT

## 2022-04-05 PROCEDURE — 99291 CRITICAL CARE FIRST HOUR: CPT

## 2022-04-05 PROCEDURE — 85027 COMPLETE CBC AUTOMATED: CPT

## 2022-04-05 PROCEDURE — 84484 ASSAY OF TROPONIN QUANT: CPT

## 2022-04-05 PROCEDURE — 93005 ELECTROCARDIOGRAM TRACING: CPT

## 2022-04-05 PROCEDURE — 82746 ASSAY OF FOLIC ACID SERUM: CPT

## 2022-04-05 PROCEDURE — 82550 ASSAY OF CK (CPK): CPT

## 2022-04-05 PROCEDURE — 87086 URINE CULTURE/COLONY COUNT: CPT

## 2022-04-05 PROCEDURE — 80048 BASIC METABOLIC PNL TOTAL CA: CPT

## 2022-04-05 PROCEDURE — 84436 ASSAY OF TOTAL THYROXINE: CPT

## 2022-04-05 PROCEDURE — 97116 GAIT TRAINING THERAPY: CPT

## 2022-04-05 PROCEDURE — 87637 SARSCOV2&INF A&B&RSV AMP PRB: CPT

## 2022-04-05 PROCEDURE — 87899 AGENT NOS ASSAY W/OPTIC: CPT

## 2022-04-05 PROCEDURE — 84480 ASSAY TRIIODOTHYRONINE (T3): CPT

## 2022-04-05 PROCEDURE — 87040 BLOOD CULTURE FOR BACTERIA: CPT

## 2022-04-05 PROCEDURE — 83550 IRON BINDING TEST: CPT

## 2022-04-05 PROCEDURE — 83605 ASSAY OF LACTIC ACID: CPT

## 2022-04-05 PROCEDURE — A9698: CPT

## 2022-04-05 PROCEDURE — 82553 CREATINE MB FRACTION: CPT

## 2022-04-05 PROCEDURE — 83735 ASSAY OF MAGNESIUM: CPT

## 2022-04-05 PROCEDURE — 85610 PROTHROMBIN TIME: CPT

## 2022-04-05 PROCEDURE — 85730 THROMBOPLASTIN TIME PARTIAL: CPT

## 2022-04-05 PROCEDURE — 84300 ASSAY OF URINE SODIUM: CPT

## 2022-04-05 PROCEDURE — 70450 CT HEAD/BRAIN W/O DYE: CPT

## 2022-04-05 PROCEDURE — 97530 THERAPEUTIC ACTIVITIES: CPT

## 2022-04-05 PROCEDURE — 74230 X-RAY XM SWLNG FUNCJ C+: CPT

## 2022-04-05 PROCEDURE — 84146 ASSAY OF PROLACTIN: CPT

## 2022-04-05 PROCEDURE — 97535 SELF CARE MNGMENT TRAINING: CPT

## 2022-04-05 PROCEDURE — 83036 HEMOGLOBIN GLYCOSYLATED A1C: CPT

## 2022-04-05 PROCEDURE — 92610 EVALUATE SWALLOWING FUNCTION: CPT

## 2022-04-05 PROCEDURE — C8929: CPT

## 2022-04-05 PROCEDURE — 80053 COMPREHEN METABOLIC PANEL: CPT

## 2022-04-05 PROCEDURE — 83935 ASSAY OF URINE OSMOLALITY: CPT

## 2022-04-05 PROCEDURE — 95816 EEG AWAKE AND DROWSY: CPT

## 2022-04-05 PROCEDURE — 87635 SARS-COV-2 COVID-19 AMP PRB: CPT

## 2022-04-05 PROCEDURE — 82803 BLOOD GASES ANY COMBINATION: CPT

## 2022-04-05 PROCEDURE — 83540 ASSAY OF IRON: CPT

## 2022-04-05 PROCEDURE — 82728 ASSAY OF FERRITIN: CPT

## 2022-04-05 PROCEDURE — 84100 ASSAY OF PHOSPHORUS: CPT

## 2022-04-05 RX ORDER — ISOSORBIDE MONONITRATE 60 MG/1
1 TABLET, EXTENDED RELEASE ORAL
Qty: 0 | Refills: 0 | DISCHARGE

## 2022-04-05 RX ORDER — METOPROLOL TARTRATE 50 MG
50 TABLET ORAL DAILY
Refills: 0 | Status: DISCONTINUED | OUTPATIENT
Start: 2022-04-06 | End: 2022-04-07

## 2022-04-05 RX ORDER — ASPIRIN AND DIPYRIDAMOLE 25; 200 MG/1; MG/1
1 CAPSULE, EXTENDED RELEASE ORAL
Refills: 0 | Status: DISCONTINUED | OUTPATIENT
Start: 2022-04-06 | End: 2022-04-19

## 2022-04-05 RX ORDER — PREGABALIN 225 MG/1
1000 CAPSULE ORAL DAILY
Refills: 0 | Status: DISCONTINUED | OUTPATIENT
Start: 2022-04-06 | End: 2022-04-19

## 2022-04-05 RX ORDER — SENNA PLUS 8.6 MG/1
2 TABLET ORAL
Qty: 0 | Refills: 0 | DISCHARGE
Start: 2022-04-05

## 2022-04-05 RX ORDER — MIDODRINE HYDROCHLORIDE 2.5 MG/1
1 TABLET ORAL
Qty: 0 | Refills: 0 | DISCHARGE

## 2022-04-05 RX ORDER — LEVOTHYROXINE SODIUM 125 MCG
75 TABLET ORAL DAILY
Refills: 0 | Status: DISCONTINUED | OUTPATIENT
Start: 2022-04-06 | End: 2022-04-19

## 2022-04-05 RX ORDER — CLOPIDOGREL BISULFATE 75 MG/1
1 TABLET, FILM COATED ORAL
Qty: 0 | Refills: 0 | DISCHARGE

## 2022-04-05 RX ORDER — SENNA PLUS 8.6 MG/1
2 TABLET ORAL AT BEDTIME
Refills: 0 | Status: DISCONTINUED | OUTPATIENT
Start: 2022-04-05 | End: 2022-04-07

## 2022-04-05 RX ORDER — DIVALPROEX SODIUM 500 MG/1
500 TABLET, DELAYED RELEASE ORAL
Refills: 0 | Status: DISCONTINUED | OUTPATIENT
Start: 2022-04-06 | End: 2022-04-19

## 2022-04-05 RX ORDER — GLUCAGON INJECTION, SOLUTION 0.5 MG/.1ML
1 INJECTION, SOLUTION SUBCUTANEOUS ONCE
Refills: 0 | Status: DISCONTINUED | OUTPATIENT
Start: 2022-04-05 | End: 2022-04-19

## 2022-04-05 RX ORDER — ALBUTEROL 90 UG/1
2 AEROSOL, METERED ORAL EVERY 6 HOURS
Refills: 0 | Status: DISCONTINUED | OUTPATIENT
Start: 2022-04-05 | End: 2022-04-19

## 2022-04-05 RX ORDER — DEXTROSE 50 % IN WATER 50 %
12.5 SYRINGE (ML) INTRAVENOUS ONCE
Refills: 0 | Status: DISCONTINUED | OUTPATIENT
Start: 2022-04-05 | End: 2022-04-19

## 2022-04-05 RX ORDER — ATORVASTATIN CALCIUM 80 MG/1
80 TABLET, FILM COATED ORAL AT BEDTIME
Refills: 0 | Status: DISCONTINUED | OUTPATIENT
Start: 2022-04-05 | End: 2022-04-19

## 2022-04-05 RX ORDER — DEXTROSE 50 % IN WATER 50 %
25 SYRINGE (ML) INTRAVENOUS ONCE
Refills: 0 | Status: DISCONTINUED | OUTPATIENT
Start: 2022-04-05 | End: 2022-04-19

## 2022-04-05 RX ORDER — TAMSULOSIN HYDROCHLORIDE 0.4 MG/1
0.4 CAPSULE ORAL AT BEDTIME
Refills: 0 | Status: DISCONTINUED | OUTPATIENT
Start: 2022-04-05 | End: 2022-04-12

## 2022-04-05 RX ORDER — SODIUM CHLORIDE 9 MG/ML
1000 INJECTION, SOLUTION INTRAVENOUS
Refills: 0 | Status: DISCONTINUED | OUTPATIENT
Start: 2022-04-05 | End: 2022-04-19

## 2022-04-05 RX ORDER — CARVEDILOL PHOSPHATE 80 MG/1
1 CAPSULE, EXTENDED RELEASE ORAL
Qty: 0 | Refills: 0 | DISCHARGE

## 2022-04-05 RX ORDER — BENAZEPRIL HYDROCHLORIDE 40 MG/1
1 TABLET ORAL
Qty: 0 | Refills: 0 | DISCHARGE

## 2022-04-05 RX ORDER — HEPARIN SODIUM 5000 [USP'U]/ML
5000 INJECTION INTRAVENOUS; SUBCUTANEOUS EVERY 8 HOURS
Refills: 0 | Status: DISCONTINUED | OUTPATIENT
Start: 2022-04-05 | End: 2022-04-13

## 2022-04-05 RX ORDER — FERROUS SULFATE 325(65) MG
325 TABLET ORAL DAILY
Refills: 0 | Status: DISCONTINUED | OUTPATIENT
Start: 2022-04-06 | End: 2022-04-19

## 2022-04-05 RX ORDER — PREGABALIN 225 MG/1
1 CAPSULE ORAL
Qty: 0 | Refills: 0 | DISCHARGE
Start: 2022-04-05

## 2022-04-05 RX ORDER — DEXTROSE 50 % IN WATER 50 %
15 SYRINGE (ML) INTRAVENOUS ONCE
Refills: 0 | Status: DISCONTINUED | OUTPATIENT
Start: 2022-04-05 | End: 2022-04-19

## 2022-04-05 RX ORDER — CALCITRIOL 0.5 UG/1
0.25 CAPSULE ORAL DAILY
Refills: 0 | Status: DISCONTINUED | OUTPATIENT
Start: 2022-04-06 | End: 2022-04-12

## 2022-04-05 RX ORDER — HYDRALAZINE HCL 50 MG
0.5 TABLET ORAL
Qty: 0 | Refills: 0 | DISCHARGE

## 2022-04-05 RX ORDER — INSULIN LISPRO 100/ML
VIAL (ML) SUBCUTANEOUS
Refills: 0 | Status: DISCONTINUED | OUTPATIENT
Start: 2022-04-05 | End: 2022-04-09

## 2022-04-05 RX ORDER — HYDRALAZINE HCL 50 MG
5 TABLET ORAL THREE TIMES A DAY
Refills: 0 | Status: DISCONTINUED | OUTPATIENT
Start: 2022-04-05 | End: 2022-04-05

## 2022-04-05 RX ORDER — INSULIN LISPRO 100/ML
VIAL (ML) SUBCUTANEOUS AT BEDTIME
Refills: 0 | Status: DISCONTINUED | OUTPATIENT
Start: 2022-04-05 | End: 2022-04-09

## 2022-04-05 RX ORDER — HYDRALAZINE HCL 50 MG
5 TABLET ORAL THREE TIMES A DAY
Refills: 0 | Status: DISCONTINUED | OUTPATIENT
Start: 2022-04-05 | End: 2022-04-07

## 2022-04-05 RX ORDER — SALIVA SUBSTITUTE COMB NO.11 351 MG
5 POWDER IN PACKET (EA) MUCOUS MEMBRANE DAILY
Refills: 0 | Status: DISCONTINUED | OUTPATIENT
Start: 2022-04-05 | End: 2022-04-19

## 2022-04-05 RX ORDER — DIVALPROEX SODIUM 500 MG/1
1 TABLET, DELAYED RELEASE ORAL
Qty: 0 | Refills: 0 | DISCHARGE
Start: 2022-04-05

## 2022-04-05 RX ORDER — FERROUS SULFATE 325(65) MG
1 TABLET ORAL
Qty: 0 | Refills: 0 | DISCHARGE
Start: 2022-04-05

## 2022-04-05 RX ADMIN — HEPARIN SODIUM 5000 UNIT(S): 5000 INJECTION INTRAVENOUS; SUBCUTANEOUS at 05:25

## 2022-04-05 RX ADMIN — Medication 5 MILLIGRAM(S): at 13:47

## 2022-04-05 RX ADMIN — PREGABALIN 1000 MICROGRAM(S): 225 CAPSULE ORAL at 12:59

## 2022-04-05 RX ADMIN — DIVALPROEX SODIUM 500 MILLIGRAM(S): 500 TABLET, DELAYED RELEASE ORAL at 17:16

## 2022-04-05 RX ADMIN — ASPIRIN AND DIPYRIDAMOLE 1 CAPSULE(S): 25; 200 CAPSULE, EXTENDED RELEASE ORAL at 05:25

## 2022-04-05 RX ADMIN — DIVALPROEX SODIUM 500 MILLIGRAM(S): 500 TABLET, DELAYED RELEASE ORAL at 05:25

## 2022-04-05 RX ADMIN — Medication 1: at 12:58

## 2022-04-05 RX ADMIN — CALCITRIOL 0.25 MICROGRAM(S): 0.5 CAPSULE ORAL at 12:59

## 2022-04-05 RX ADMIN — Medication 75 MICROGRAM(S): at 05:25

## 2022-04-05 RX ADMIN — HEPARIN SODIUM 5000 UNIT(S): 5000 INJECTION INTRAVENOUS; SUBCUTANEOUS at 13:00

## 2022-04-05 RX ADMIN — Medication 50 MILLIGRAM(S): at 05:25

## 2022-04-05 RX ADMIN — Medication 325 MILLIGRAM(S): at 12:59

## 2022-04-05 RX ADMIN — HEPARIN SODIUM 5000 UNIT(S): 5000 INJECTION INTRAVENOUS; SUBCUTANEOUS at 21:53

## 2022-04-05 RX ADMIN — ASPIRIN AND DIPYRIDAMOLE 1 CAPSULE(S): 25; 200 CAPSULE, EXTENDED RELEASE ORAL at 17:16

## 2022-04-05 RX ADMIN — TAMSULOSIN HYDROCHLORIDE 0.4 MILLIGRAM(S): 0.4 CAPSULE ORAL at 21:52

## 2022-04-05 RX ADMIN — SENNA PLUS 2 TABLET(S): 8.6 TABLET ORAL at 21:53

## 2022-04-05 RX ADMIN — ATORVASTATIN CALCIUM 80 MILLIGRAM(S): 80 TABLET, FILM COATED ORAL at 21:52

## 2022-04-05 RX ADMIN — Medication 5 MILLIGRAM(S): at 21:52

## 2022-04-05 NOTE — PROGRESS NOTE ADULT - SUBJECTIVE AND OBJECTIVE BOX
[facility] -- Rianna Horn, Selma Terrell, Vlad Velarde Savella, [place]  Office # [phone]    Follow Up:    presented with left upper extremity weakness  Subjective/Observations:   Patient seen and examined. He has no complaints of chest pain or SOB. He presented to hospital with NSTEMI in setting of HTN emergency. Plan is for dc to rehab today.      REVIEW OF SYSTEMS: All other review of systems is negative unless indicated above  PAST MEDICAL & SURGICAL HISTORY:  MI (myocardial infarction)  1992    HTN (hypertension)    HLD (hyperlipidemia)    Throat cancer  2011, treated with chemo, RT    Ventricular arrhythmia  s/p AICD    Diabetes  Type2, not on any meds since weight loss after throat Ca    Renal insufficiency  s/p chemo    CAD (coronary artery disease)    Inguinal hernia, left    H/O prior ablation treatment  VT, 2009    Cardiac defibrillator in place  - 2009, battery change 2017    S/P left inguinal hernia repair  2018 at [place]      MEDICATIONS  (STANDING):  atorvastatin 80 milliGRAM(s) Oral at bedtime  Biotene Dry Mouth Oral Rinse 5 milliLiter(s) Swish and Spit daily  bisacodyl Suppository 10 milliGRAM(s) Rectal daily  calcitriol   Capsule 0.25 MICROGram(s) Oral daily  cyanocobalamin 1000 MICROGram(s) Oral daily  dextrose 5%. 1000 milliLiter(s) (50 mL/Hr) IV Continuous <Continuous>  dextrose 5%. 1000 milliLiter(s) (100 mL/Hr) IV Continuous <Continuous>  dextrose 50% Injectable 25 Gram(s) IV Push once  dextrose 50% Injectable 12.5 Gram(s) IV Push once  dextrose 50% Injectable 25 Gram(s) IV Push once  dipyridamole 200 mG/aspirin 25 mG 1 Capsule(s) Oral two times a day  diVALproex  milliGRAM(s) Oral two times a day  ferrous    sulfate 325 milliGRAM(s) Oral daily  glucagon  Injectable 1 milliGRAM(s) IntraMuscular once  heparin   Injectable 5000 Unit(s) SubCutaneous every 8 hours  influenza  Vaccine (HIGH DOSE) 0.7 milliLiter(s) IntraMuscular once  insulin lispro (ADMELOG) corrective regimen sliding scale   SubCutaneous three times a day before meals  insulin lispro (ADMELOG) corrective regimen sliding scale   SubCutaneous at bedtime  levothyroxine 75 MICROGram(s) Oral daily  metoprolol succinate ER 50 milliGRAM(s) Oral daily  senna 2 Tablet(s) Oral at bedtime  tamsulosin 0.4 milliGRAM(s) Oral at bedtime    MEDICATIONS  (PRN):  ALBUTerol    90 MICROgram(s) HFA Inhaler 2 Puff(s) Inhalation every 6 hours PRN Shortness of Breath and/or Wheezing  dextrose Oral Gel 15 Gram(s) Oral once PRN Blood Glucose LESS THAN 70 milliGRAM(s)/deciliter  ondansetron Injectable 4 milliGRAM(s) IV Push every 8 hours PRN Nausea and/or Vomiting    Allergies    No Known Allergies    Intolerances      Vital Signs Last 24 Hrs  T(C): 36.5 (05 Apr 2022 05:01), Max: 37.2 (04 Apr 2022 21:19)  T(F): 97.7 (05 Apr 2022 05:01), Max: 98.9 (04 Apr 2022 21:19)  HR: 82 (05 Apr 2022 05:01) (79 - 82)  BP: 142/67 (05 Apr 2022 05:01) (109/69 - 155/78)  BP(mean): --  RR: 19 (05 Apr 2022 05:01) (18 - 19)  SpO2: 93% (05 Apr 2022 05:01) (93% - 93%)  I&O's Summary    04 Apr 2022 07:01  -  05 Apr 2022 07:00  --------------------------------------------------------  IN: 0 mL / OUT: 1150 mL / NET: -1150 mL        PHYSICAL EXAM:  TELE: off  Constitutional: NAD, awake and alert, well-developed  HEENT: Moist Mucous Membranes, Anicteric  Pulmonary: Non-labored, breath sounds are clear bilaterally, No wheezing, rales or rhonchi  Cardiovascular: Regular, S1 and S2, No murmurs, rubs, gallops or clicks  Gastrointestinal: Bowel Sounds present, soft, nontender.   Lymph: No peripheral edema. No lymphadenopathy.  Skin: No visible rashes or ulcers.  Psych:  Mood & affect appropriate  LABS: All Labs Reviewed:                        10.3   5.60  )-----------( 204      ( 05 Apr 2022 08:37 )             33.5                         10.0   6.93  )-----------( 202      ( 04 Apr 2022 07:23 )             31.9                         10.2   6.12  )-----------( 211      ( 03 Apr 2022 08:03 )             32.3     05 Apr 2022 08:37    145    |  109    |  30     ----------------------------<  131    3.9     |  29     |  2.00   04 Apr 2022 07:23    142    |  108    |  33     ----------------------------<  148    4.0     |  27     |  2.10   03 Apr 2022 08:03    142    |  108    |  32     ----------------------------<  119    4.1     |  27     |  2.10     Ca    9.2        05 Apr 2022 08:37  Ca    9.0        04 Apr 2022 07:23  Ca    9.3        03 Apr 2022 08:03  Phos  3.4       05 Apr 2022 08:37  Phos  3.3       04 Apr 2022 07:23  Phos  3.1       03 Apr 2022 08:03  Mg     2.4       05 Apr 2022 08:37  Mg     2.2       04 Apr 2022 07:23  Mg     2.6       03 Apr 2022 08:03    TPro  6.4    /  Alb  2.6    /  TBili  0.5    /  DBili  x      /  AST  31     /  ALT  40     /  AlkPhos  76     05 Apr 2022 08:37  TPro  6.3    /  Alb  2.5    /  TBili  0.5    /  DBili  x      /  AST  28     /  ALT  34     /  AlkPhos  82     04 Apr 2022 07:23  TPro  6.3    /  Alb  2.7    /  TBili  0.7    /  DBili  x      /  AST  20     /  ALT  30     /  AlkPhos  72     03 Apr 2022 08:03              HAYLEY ALBERTO MD; Attending Radiologist  This document has been electronically signed. Mar 30 2022 10:08AM    < end of copied text >  < from: TTE Echo Complete w/ Contrast w/ Doppler (03.31.22 @ 20:42) >    ACC: [id number] EXAM:  ECHO TTE WITH CON COMP W DOPP                          PROCEDURE DATE:  03/31/2022          INTERPRETATION:  INDICATION: Heart failure  Sonographer KL    Blood Pressure 145/78    Height 175.3 cm     Weight 72.6 kg       BSA 1.9  sq m    Dimensions:  LA 4.1       Normal Values: 2.0 - 4.0 cm  Ao 3.4        Normal Values: 2.0 - 3.8 cm  SEPTUM 1.0       Normal Values: 0.6 - 1.2 cm  PWT 1.0       Normal Values: 0.6 - 1.1 cm  LVIDd 6.4         Normal Values: 3.0 - 5.6 cm  LVIDs 6.0         Normal Values: 1.8 - 4.0 cm      OBSERVATIONS:  Technically difficult and limited study  Mitral Valve: Mitral annular calcification, mild MR.  Aortic Valve/Aorta: Sclerotic trileaflet aortic valve with grossly normal   opening.  Tricuspid Valve: normal with trace TR.  Pulmonic Valve: Not well-visualized  Left Atrium: Enlarged  Right Atrium: Not well-visualized  Left Ventricle: The left ventricular endocardium is not well-visualized.   Injectable echo contrast was used to help enhance the images. The left   ventricle is enlarged with moderate to severe left ventricular systolic   dysfunction, estimated LVEF of 30-35%. The apical cap, apical septum and   apical lateral walls are akinetic with hypokinesis of the remaining   walls. There is no evidence of left ventricular thrombus  Right Ventricle: The right ventricle is not well-visualized, device wire   is noted within the right heart  Pericardium: no significant pericardial effusion.  LV diastolic dysfunction is present        IMPRESSION:  Technically difficult and limited study  The left ventricular endocardium is not well-visualized. Injectable echo   contrast was used to help enhance the images. The left ventricle is   enlarged with moderate to severe left ventricular systolic dysfunction,   estimated LVEF of 30-35%. The apical cap, apical septum and apical   lateral walls are akinetic with hypokinesis of the remaining walls. There   is no evidence of left ventricular thrombus  The right ventricle is not well-visualized, device wire is noted within   the right heart  Sclerotic trileaflet aortic valve with grossly normal opening, without AI.  Mild MR  Trace TR.    --- End of Report ---            SARAHY CASTRO MD; Attending Cardiologist  This document has beenelectronically signed. Apr 2 2022  4:11PM    < end of copied text >

## 2022-04-05 NOTE — H&P ADULT - NSHPREVIEWOFSYSTEMS_GEN_ALL_CORE
REVIEW OF SYSTEMS  Constitutional: No fever, No Chills, No fatigue  HEENT: No eye pain, No visual disturbances, No difficulty hearing  Pulm: No cough,  No shortness of breath  Cardio: No chest pain, No palpitations  GI:  No abdominal pain, No nausea, No vomiting, No diarrhea, No constipation  : No dysuria, No frequency, No hematuria  Neuro: No headaches, No memory loss, No numbness, No tremors. (+) loss of strength  Skin: No itching, No rashes, No lesions   Endo: No temperature intolerance  MSK: No joint pain, No joint swelling, No muscle pain, No Neck or back pain  Psych:  No depression, No anxiety

## 2022-04-05 NOTE — H&P ADULT - NSHPLABSRESULTS_GEN_ALL_CORE
TTE: 3/31   IMPRESSION:  Technically difficult and limited study  The left ventricular endocardium is not well-visualized. Injectable echo   contrast was used to help enhance the images. The left ventricle is   enlarged with moderate to severe left ventricular systolic dysfunction,   estimated LVEF of 30-35%. The apical cap, apical septum and apical   lateral walls are akinetic with hypokinesis of the remaining walls. There   is no evidence of left ventricular thrombus  The right ventricle is not well-visualized, device wire is noted within   the right heart  Sclerotic trileaflet aortic valve with grossly normal opening, without AI.  Mild MR  Trace TR.    CTH 3/31  FINDINGS:    Interval demarcation of cytotoxic edema in the region of the right   perirolandic cortex. No CT evidence for hemorrhagic transformation.    No hydrocephalus, midline shift, or effacement of basal cisterns.    Mild right maxillary sinus shows thickening. Remaining visualized   paranasal sinuses and mastoid air cells are clear.    IMPRESSION:    Interval demarcation of the right perirolandic cortex infarct.  No CT evidence for hemorrhagic transformation.    CT Chest 3/31  IMPRESSION:  Right upper lobe pneumonia superimposed on mild pulmonary edema.   Bilateral pleural effusions.    Pacemaker/defibrillator.

## 2022-04-05 NOTE — PROGRESS NOTE ADULT - ASSESSMENT
CKD 4  HTN  CHF EF 30%  Anemia  Urinary retention  CHF  CVA      -Renal indices are currently stable at baseline range; stable lytes  - protein  -Urine lytes reviewed  -Lasix PRN  -V/Q scan low prob  -Chest CT mild pulm Edema + PNA  -Anemia per Heme  -BP controlled now  -Urology following for vy    Thank you

## 2022-04-05 NOTE — PROGRESS NOTE ADULT - PROBLEM SELECTOR PLAN 5
TTE 2009 showed LVEF 30% w/ severely reduced ejection fraction Chronic Systolic CHF  - TTE LVEF 30% - 35% w/ severe systolic dysfunction  - Continue to HOLD  home Imdur and carvedilol   - Continue metoprolol 50mg qD   - Continue to HOLD home benazepril in setting of  JARRED/CKD 3  - s/p lasix 40mg IVPx 2 doses on 3/30, continue to HOLD Lasix and diurese prn   - Cardio [facility] group following  - Does not appear volume overloaded on physical exam, continue to monitor volume status

## 2022-04-05 NOTE — PROGRESS NOTE ADULT - PROBLEM SELECTOR PLAN 12
Chronic stable   - D/w Neurology Dr. Brownlee  & Cardiology Dr. Hahn- OK to change plavix to Aggrenox on admission  - Off of Hep drip for NSTEMI on 4/1  - On Aggrenox, high dose statin, continue     #GOC  - Palliative following   - Pt is DNR, intubation trial    #Urinary Retention   - Patient had mcclellan placed as apart of stroke workup, mcclellan d/c'ed on 4/1   - Patient with significant urinary retention on bladder scans, continually refusing straight caths   - mcclellan in place  - on flomax 0.4mg   - uro Dr. Harrison, recs appreciated

## 2022-04-05 NOTE — PROGRESS NOTE ADULT - PROBLEM SELECTOR PLAN 2
Patient w/ left upper extremity weakness of unknown duration, 2/2 to CVA   - patient w/ seizure like activity in ED s/p ativan, EEG normal  - started Depakote 500mg BID in line w/ neuro's reccs  - CT brain stroke negative for acute hemorrhage or infarct, repeat CT: acute stroke in right central sulcus   - On Aggrenox, high dose statin, continue   - Neuro Bhachawat following   - PT/OT ---> [facility]   - MBS: Puree with Moderately thick liquids, all food and liquids via teaspoon presentation, utilizing chin tuck and 2 swallows after each bolus, glucerna

## 2022-04-05 NOTE — PATIENT PROFILE ADULT - FUNCTIONAL ASSESSMENT - BASIC MOBILITY 3.
3 = A little assistance Azathioprine Counseling:  I discussed with the patient the risks of azathioprine including but not limited to myelosuppression, immunosuppression, hepatotoxicity, lymphoma, and infections.  The patient understands that monitoring is required including baseline LFTs, Creatinine, possible TPMP genotyping and weekly CBCs for the first month and then every 2 weeks thereafter.  The patient verbalized understanding of the proper use and possible adverse effects of azathioprine.  All of the patient's questions and concerns were addressed.

## 2022-04-05 NOTE — PROGRESS NOTE ADULT - PROBLEM SELECTOR PROBLEM 8
Elevated creatine kinase

## 2022-04-05 NOTE — PROGRESS NOTE ADULT - ASSESSMENT
76 yr old male with stated hx significant for HFrEF, s/p AICD, tonsillar Ca s/p Lt tonsillectomy, Lt partial glossectomy, s/p Rt CEA, DM2, hypothyroidism, CKD3 who  presented from home via EMS after being found of floor with LUE weakness, sob. Consult called for hypoxic resp failure secondary to stroke,  hypertensive emergency, r/o CVA.    HTN emergency/CVA/Pulmonary Edema  - BP remains controlled and stable.  No further neuro symptoms  - c/w BB with hold parameters, avoid Hypotension   - CT head and EEG noted.  Follow Neuro recs  - on Aggrenox and statin   - +AICD [employer] interrogation on chart>  [employer] life: 4.4yrs.  No event noted since 1/28/22.  Device set at: [id number].  Rate   - Remains euvolemic on exam, s/p Lasix.  Continue to hold daily Lasix.  Diurese prn only in the setting of JARRED on CKD    NSTEMI  - hsT peaked at 22167 and trended down to 9200.  EKG showed ST depression in lateral leads.  - c/w BB and statin   - TTE: Technically difficult and limited study, LV enlarged w/ mod to sev LVSF, LVEF 30-35% apical cap, apical septum and apical   lateral walls are akinetic with hypokinesis of the remaining walls. no LV thrombus.   - HR controlled per flow sheet, off telemetry   - Will eventually do ischemic eval once renally stable.  Would need clearance from Renal.  Can also be done outpatient given recent fresh neurological event    - Monitor and replete Lytes. Keep K > 4 and Mg > 2  - All other medical needs as per primary team.  - Other cardiovascular workup will depend on clinical course.  - Will continue to follow.    Lucia Muhammad, MATTHEW  [id number]

## 2022-04-05 NOTE — PROGRESS NOTE ADULT - SUBJECTIVE AND OBJECTIVE BOX
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 As per patient, he was watching a movie when he felt very short of breath suddenly and dropped to the floor, denies LOC and denies any trauma to his head, was on the floor for ~2 hours. Patient was unable to rise from the floor by himself due to his left arm weakness. When first brought to the ED, patient was hypoxic and hypertensive o2 saturation in EMS was >80%. While in the ED, patient had episode of siezure-like activity in his b/l upper extremities which was controlled w/ ativan. Patient was seen and examined at bedside, BiPAP still in place, patient was still mildly lethargic from ativan and could not speak well with bipap mask on. Patient able to open eyes and nod/shake head to questions. Patient denied any headache, vision changes, cp, abd pain, msk pain. Patient still short of breath.      In ED:   Vitals: HR 80, 176/82 -> 115/60, RR 20, 96% on BiPAP/CPAP  Labs significant for: WBC 20.24, H/H 10.8/24.1, D-Dimer 2924, Troponin 1011.9 > 5252.0, CKMB: 13.1, CPK%: 4%, BUN 33, Creatinine 2.3 (baseline unknown) eGFR 29, serum pro BNP 4963, creatine kinase 328, POCT glucose: 353  Imaging:   CXR: diffuse b/l scattered infiltrates  CT brain stroke: No acute hemorrhage, infarct, or mass effect   EKG: sinus tachy at ST- depressions in V5 V6   Received Lasix 40mg IVP x1, Aspirin 600mg x1, Hydralazine 10mg x1, Ativan 2mg IVP x1, Lispro 6u in the ED    (30 Mar 2022 08:48)      INTERVAL HPI:  3/31/2022: Patient overnight had clots in urine with slight pinkish appearance of urine, stat h/h showed that patients hgb dropped to 9.2 from 10.1. Heparin drip was continued as benefits outweighed risks. AM hgb showed recovery to 9.6. Patient also had 6 beats of vtach overnight, was asymptomatic. Patient seen and examined at bedside, denied any lightheadedness/dizziness, CP/palpitations, abd pain, dysuria, MSK pain, any sensory deficits. Patient continues to have LUE weakness. OBN IV Heparin drip, + CVA on CT Head  04/01/2022: Pt seen and examined at bedside. Hb stable this morning. Pt w no complaints this morning. Denies fever, chills, chest pain, palpitations, shortness of breath, palpitations, lightheadedness, dizziness, headache, n/v/d/c. TOV , d/c Mcclellan cath. heparin drip was dc in evening   4/2/22: Pt was seen and examined at bedside. Pt states that he had a BM yesterday, normal. AM Bladder scan revealed 450 cc urine but pt refused to be straight cath because he wants to urinate on his own. States that he cannot urinate because he is not on a regular diet. Denies history of BPH. Reports mild back pain, states that Tylenol does not help. Pt denies headache, dizziness, CP, SOB, palpitations, abdominal pain, n/v. No other complaints at this time. Will start Flomax.   4/3/22: Pt seen and examined at bedside. Pt had no BM this AM. F/u bladder scan from this AM showed urinary retention of ~470cc, patient refusing straight cath, will replace mcclellan catheter and consult uro. Patient denies any headache, dizziness, CP, SOB, palpitations, abdominal pain, n/v. Patient states back pain is still there but mild. No other complaints at this time. Failed TOV, Mcclellan cath placed   4/4/22: Patient seen and examined at bedside. Pt has BM yesterday, mcclellan in place draining clear urine. Patient without any complaints. Patient denies any headache, dizziness, CP, SOB, palpitations, abdominal pain, n/v. VSS, D/C Planning to Rehab   4/5/22: Patient seen and examined at bedside. Pt had non-bloody BM yesterday, mcclellan in place draining clear urine. Patient without any complaints. Patient denies any headache, dizziness, CP, SOB, palpitations, abdominal pain, n/v. VSS, Awaiting authorization for rehab.    OVERNIGHT EVENTS:    Home Medications:  Aggrenox 25 mg-200 mg oral capsule, extended release: 1 cap(s) orally 2 times a day (04 Apr 2022 12:40)  atorvastatin 80 mg oral tablet: 1 tab(s) orally once a day (at bedtime) (04 Apr 2022 12:40)  benazepril 10 mg oral tablet: 1 tab(s) orally once a day (30 Mar 2022 10:22)  calcitriol 0.25 mcg oral capsule: 1 cap(s) orally once a day (30 Mar 2022 10:22)  carvedilol 12.5 mg oral tablet: 1 tab(s) orally 2 times a day      *Last filled 5/21, spoke to MATTHEW Nguyen at Dr. Garcia&#x27;s office, she states patient should still be taking med as per MD notes from 2/22*  (30 Mar 2022 10:22)  hydrALAZINE 10 mg oral tablet: 0.5 tab(s) orally 3 times a day (30 Mar 2022 10:22)  isosorbide mononitrate 30 mg oral tablet, extended release: 1 tab(s) orally once a day (in the morning) (30 Mar 2022 10:22)  levothyroxine 75 mcg (0.075 mg) oral tablet: 1 tab(s) orally once a day (30 Mar 2022 10:22)  Metoprolol Succinate ER 50 mg oral tablet, extended release: 1 tab(s) orally once a day (04 Apr 2022 12:40)  midodrine 10 mg oral tablet: 1 tab(s) orally 3 times a day (30 Mar 2022 10:22)  Plavix 75 mg oral tablet: 1 tab(s) orally once a day (30 Mar 2022 10:22)  pravastatin 40 mg oral tablet: 1 tab(s) orally once a day (30 Mar 2022 10:22)  tamsulosin 0.4 mg oral capsule: 1 cap(s) orally once a day (at bedtime) (04 Apr 2022 12:40)      MEDICATIONS  (STANDING):  atorvastatin 80 milliGRAM(s) Oral at bedtime  Biotene Dry Mouth Oral Rinse 5 milliLiter(s) Swish and Spit daily  bisacodyl Suppository 10 milliGRAM(s) Rectal daily  calcitriol   Capsule 0.25 MICROGram(s) Oral daily  cyanocobalamin 1000 MICROGram(s) Oral daily  dextrose 5%. 1000 milliLiter(s) (50 mL/Hr) IV Continuous <Continuous>  dextrose 5%. 1000 milliLiter(s) (100 mL/Hr) IV Continuous <Continuous>  dextrose 50% Injectable 25 Gram(s) IV Push once  dextrose 50% Injectable 12.5 Gram(s) IV Push once  dextrose 50% Injectable 25 Gram(s) IV Push once  dipyridamole 200 mG/aspirin 25 mG 1 Capsule(s) Oral two times a day  diVALproex  milliGRAM(s) Oral two times a day  ferrous    sulfate 325 milliGRAM(s) Oral daily  glucagon  Injectable 1 milliGRAM(s) IntraMuscular once  heparin   Injectable 5000 Unit(s) SubCutaneous every 8 hours  influenza  Vaccine (HIGH DOSE) 0.7 milliLiter(s) IntraMuscular once  insulin lispro (ADMELOG) corrective regimen sliding scale   SubCutaneous three times a day before meals  insulin lispro (ADMELOG) corrective regimen sliding scale   SubCutaneous at bedtime  levothyroxine 75 MICROGram(s) Oral daily  metoprolol succinate ER 50 milliGRAM(s) Oral daily  senna 2 Tablet(s) Oral at bedtime  tamsulosin 0.4 milliGRAM(s) Oral at bedtime    MEDICATIONS  (PRN):  ALBUTerol    90 MICROgram(s) HFA Inhaler 2 Puff(s) Inhalation every 6 hours PRN Shortness of Breath and/or Wheezing  dextrose Oral Gel 15 Gram(s) Oral once PRN Blood Glucose LESS THAN 70 milliGRAM(s)/deciliter  ondansetron Injectable 4 milliGRAM(s) IV Push every 8 hours PRN Nausea and/or Vomiting      No Known Allergies      Social History:  Lives at home w/ wife   ADL independent   Tobacco former smoker, quit 40 years ago, 20 pack year history  Alcohol denies  Drugs denies  COVID Pfizer x3   Occupation retired- former  (30 Mar 2022 08:48)      REVIEW OF SYSTEMS: I am ok  CONSTITUTIONAL: No fever, No chills, No fatigue, No myalgia, No Body ache, No Weakness  EYES: No eye pain,  No visual disturbances, No discharge, No Redness  ENMT: No ear pain, No nose bleed, No vertigo; No sinus pain, No throat pain, No Congestion  NECK: No pain, No stiffness  RESPIRATORY: No cough, No wheezing, No hemoptysis, No shortness of breath  CARDIOVASCULAR: No chest pain, No palpitations  GASTROINTESTINAL: No abdominal pain, No epigastric pain. No nausea, No vomiting, No diarrhea, No constipation; [ x ] BM - 1  GENITOURINARY: No dysuria, No frequency, No urgency, No incontinence  NEUROLOGICAL: No headaches, No dizziness, No numbness, No tingling, No tremors, [ x ] LUE weakness   EXTREMITIES: No Swelling, No Pain, No Edema  SKIN: [ x ] No itching, burning, rashes, or lesions   MUSCULOSKELETAL: No joint pain, No joint swelling; No muscle pain, No back pain, No extremity pain  PSYCHIATRIC: No depression, No anxiety, No mood swings, No difficulty sleeping at night  PAIN SCALE: [ x ] None  [  ] Other  ROS Unable to obtain due to: [  ] Dementia  [  ] Lethargy  [  ] Sedated  [  ] Non verbal  REST OF REVIEW OF SYSTEMS: [ x ] Normal     Vital Signs Last 24 Hrs  T(C): 36.5 (05 Apr 2022 05:01), Max: 37.2 (04 Apr 2022 11:03)  T(F): 97.7 (05 Apr 2022 05:01), Max: 99 (04 Apr 2022 11:03)  HR: 82 (05 Apr 2022 05:01) (79 - 92)  BP: 142/67 (05 Apr 2022 05:01) (93/60 - 155/78)  BP(mean): --  RR: 19 (05 Apr 2022 05:01) (17 - 19)  SpO2: 93% (05 Apr 2022 05:01) (93% - 95%)    CAPILLARY BLOOD GLUCOSE      POCT Blood Glucose.: 190 mg/dL (04 Apr 2022 21:21)  POCT Blood Glucose.: 136 mg/dL (04 Apr 2022 17:27)  POCT Blood Glucose.: 204 mg/dL (04 Apr 2022 12:02)  POCT Blood Glucose.: 146 mg/dL (04 Apr 2022 08:16)      I&O's Summary    04 Apr 2022 07:01  -  05 Apr 2022 07:00  --------------------------------------------------------  IN: 0 mL / OUT: 1150 mL / NET: -1150 mL      PHYSICAL EXAM:  GENERAL:  [ x ] NAD, [ x ] Well appearing, [  ] Agitated, [  ] Drowsy, [  ] Lethargy, [  ] Confused   HEAD:  [ x ] Normal, [  ] Other  EYES:  [ x ] EOMI, [ x ] PERRLA, [ x ] Conjunctiva and sclera clear normal, [  ] Other, [ x ] Pallor, [  ] Discharge  ENMT:  [ x ] Normal, [ x ] Moist mucous membranes, [  ] Good dentition, [ x ] No thrush  NECK:  [ x ] Supple, [ x ] No JVD, [ x ] Normal thyroid, [  ] Lymphadenopathy, [  ] Other  CHEST/LUNG:  [ x ] Clear to auscultation bilaterally, [ x ] Breath Sounds equal B/L, [ x ] Poor effort, [ x ] No rales, [ x ] No rhonchi, [ x ] No wheezing  HEART:  [ x ] Regular rate and rhythm, [  ] Tachycardia, [  ] Bradycardia, [  ] Irregular, [ x ] No murmurs, No rubs, No gallops, [  ] PPM in place (Mfr:  )  ABDOMEN:  [ x ] Soft, [ x ] Nontender, [ x ] Nondistended, [ x ] No mass, [ x ] Bowel sounds present, [  ] Obese  NERVOUS SYSTEM:  [ x ] Alert & Oriented x3, [  ] Nonfocal, [  ] Confusion, [  ] Encephalopathic, [  ] Sedated, [  ] Unable to assess, [  ] Dementia, [ x ] Other- LUE weakness compared to RUE, left tongue deviation   EXTREMITIES:  [ x ] 2+ Peripheral Pulses, No clubbing, No cyanosis,  [  ] Edema B/L lower EXT, [  ] PVD stasis skin changes B/L lower EXT, [  ] Wound  LYMPH:  No lymphadenopathy noted  SKIN:  [ x ] No rashes or lesions, [  ] Pressure ulcers, [  ] Ecchymosis, [  ] Skin tears, [  ] Othe    DIET: Diet, Pureed:   Consistent Carbohydrate No Snacks  DASH/TLC Sodium & Cholesterol Restricted  Moderately Thick Liquids (MODTHICKLIQS)  Supplement Feeding Modality:  Oral  Glucerna Shake Cans or Servings Per Day:  1       Frequency:  Three Times a day (04-03-22 @ 13:32)      LABS:      Ca    9.0        04 Apr 2022 07:23          Culture Results:   No growth (03-31 @ 01:17)  Culture Results:   No Growth Final (03-30 @ 15:01)  Culture Results:   No Growth Final (03-30 @ 15:01)      CARDIAC MARKERS ( 31 Mar 2022 16:33 )  x     / x     / 202 U/L / x     / 3.1 ng/mL  CARDIAC MARKERS ( 30 Mar 2022 21:07 )  x     / x     / 342 U/L / x     / 12.8 ng/mL  CARDIAC MARKERS ( 30 Mar 2022 14:34 )  x     / x     / 398 U/L / x     / 18.4 ng/mL  CARDIAC MARKERS ( 30 Mar 2022 08:13 )  x     / x     / x     / x     / 13.1 ng/mL  CARDIAC MARKERS ( 30 Mar 2022 07:53 )  x     / x     / 328 U/L / x     / x            Culture - Urine (collected 31 Mar 2022 01:17)  Source: Clean Catch Clean Catch (Midstream)  Final Report (31 Mar 2022 21:27):    No growth    Culture - Blood (collected 30 Mar 2022 15:01)  Source: .Blood Blood-Peripheral  Final Report (04 Apr 2022 16:00):    No Growth Final    Culture - Blood (collected 30 Mar 2022 15:01)  Source: .Blood Blood-Peripheral  Final Report (04 Apr 2022 16:00):    No Growth Final       Anemia Panel:  Iron Total, Serum: 15 ug/dL (03-31-22 @ 10:51)  Iron - Total Binding Capacity.: 318 ug/dL (03-31-22 @ 10:51)  Ferritin, Serum: 53 ng/mL (03-31-22 @ 10:42)  Vitamin B12, Serum: 476 pg/mL (03-31-22 @ 10:42)  Folate, Serum: 10.7 ng/mL (03-31-22 @ 10:42)  Ferritin, Serum: 44 ng/mL (03-31-22 @ 05:21)  Iron - Total Binding Capacity.: 329 ug/dL (03-31-22 @ 05:21)  Iron Total, Serum: 11 ug/dL (03-31-22 @ 05:21)      Thyroid Panel:  T4, Serum: 6.2 ug/dL (03-31-22 @ 10:42)  Thyroid Stimulating Hormone, Serum: 2.99 uIU/mL (03-31-22 @ 07:24)  T4, Serum: 5.9 ug/dL (03-31-22 @ 05:21)  Thyroid Stimulating Hormone, Serum: 2.81 uIU/mL (03-30-22 @ 14:34)            Serum Pro-Brain Natriuretic Peptide: 9256 pg/mL (03-31-22 @ 07:24)  Serum Pro-Brain Natriuretic Peptide: 4963 pg/mL (03-30-22 @ 06:12)      RADIOLOGY & ADDITIONAL TESTS:  No new studies in the past 24 hours     HEALTH ISSUES - PROBLEM Dx:  Acute pulmonary edema    CVA (cerebrovascular accident)    Elevated troponin    Leukocytosis    Acute kidney injury superimposed on CKD    Anemia    Elevated creatine kinase    Diabetes mellitus    Need for prophylactic measure    Tonsillar cancer    HTN (hypertension)    Hypothyroidism    Chronic HFrEF (heart failure with reduced ejection fraction)    CAD (coronary artery disease)          Consultant(s) Notes Reviewed:  [ x ] YES     Care Discussed with [ x ] Consultants, [ x ] Patient, [ x ] Family,- Dtr  [  ] HCP, [  ] , [ x ] Social Service, [ x ] RN, [  ] Physical Therapy, [  ] Palliative Care Team  DVT PPX: [  ] Lovenox, [ x ] SC Heparin, [  ] Coumadin, [  ] Xarelto, [  ] Eliquis, [  ] Pradaxa, [  ] IV Heparin drip, [  ] SCD, [  ] Ambulation, [  ] Contraindicated 2/2 GI Bleed, [  ] Contraindicated 2/2  Bleed, [  ] Contraindicated 2/2 Brain Bleed  Advanced Directive: [  ] None, [ x ] DNR/trial of intubation  Patient is a 76y old  Male who presents with a chief complaint of SOB (04 Apr 2022 16:54)      HPI:  75 yo male w/ PMHx of HTN, HLD, HFrEF (EF 30% 2009), 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 As per patient, he was watching a movie when he felt very short of breath suddenly and dropped to the floor, denies LOC and denies any trauma to his head, was on the floor for ~2 hours. Patient was unable to rise from the floor by himself due to his left arm weakness. When first brought to the ED, patient was hypoxic and hypertensive o2 saturation in EMS was >80%. While in the ED, patient had episode of siezure-like activity in his b/l upper extremities which was controlled w/ ativan. Patient was seen and examined at bedside, BiPAP still in place, patient was still mildly lethargic from ativan and could not speak well with bipap mask on. Patient able to open eyes and nod/shake head to questions. Patient denied any headache, vision changes, cp, abd pain, msk pain. Patient still short of breath.      In ED:   Vitals: HR 80, 176/82 -> 115/60, RR 20, 96% on BiPAP/CPAP  Labs significant for: WBC 20.24, H/H 10.8/24.1, D-Dimer 2924, Troponin 1011.9 > 5252.0, CKMB: 13.1, CPK%: 4%, BUN 33, Creatinine 2.3 (baseline unknown) eGFR 29, serum pro BNP 4963, creatine kinase 328, POCT glucose: 353  Imaging:   CXR: diffuse b/l scattered infiltrates  CT brain stroke: No acute hemorrhage, infarct, or mass effect   EKG: sinus tachy at ST- depressions in V5 V6   Received Lasix 40mg IVP x1, Aspirin 600mg x1, Hydralazine 10mg x1, Ativan 2mg IVP x1, Lispro 6u in the ED    (30 Mar 2022 08:48)      INTERVAL HPI:  3/31/2022: Patient overnight had clots in urine with slight pinkish appearance of urine, stat h/h showed that patients hgb dropped to 9.2 from 10.1. Heparin drip was continued as benefits outweighed risks. AM hgb showed recovery to 9.6. Patient also had 6 beats of vtach overnight, was asymptomatic. Patient seen and examined at bedside, denied any lightheadedness/dizziness, CP/palpitations, abd pain, dysuria, MSK pain, any sensory deficits. Patient continues to have LUE weakness. OBN IV Heparin drip, + CVA on CT Head  04/01/2022: Pt seen and examined at bedside. Hb stable this morning. Pt w no complaints this morning. Denies fever, chills, chest pain, palpitations, shortness of breath, palpitations, lightheadedness, dizziness, headache, n/v/d/c. TOV , d/c Mcclellan cath. heparin drip was dc in evening   4/2/22: Pt was seen and examined at bedside. Pt states that he had a BM yesterday, normal. AM Bladder scan revealed 450 cc urine but pt refused to be straight cath because he wants to urinate on his own. States that he cannot urinate because he is not on a regular diet. Denies history of BPH. Reports mild back pain, states that Tylenol does not help. Pt denies headache, dizziness, CP, SOB, palpitations, abdominal pain, n/v. No other complaints at this time. Will start Flomax.   4/3/22: Pt seen and examined at bedside. Pt had no BM this AM. F/u bladder scan from this AM showed urinary retention of ~470cc, patient refusing straight cath, will replace mcclellan catheter and consult uro. Patient denies any headache, dizziness, CP, SOB, palpitations, abdominal pain, n/v. Patient states back pain is still there but mild. No other complaints at this time. Failed TOV, Mcclellan cath placed   4/4/22: Patient seen and examined at bedside. Pt has BM yesterday, mcclellan in place draining clear urine. Patient without any complaints. Patient denies any headache, dizziness, CP, SOB, palpitations, abdominal pain, n/v. VSS, D/C Planning to Rehab   4/5/22: Patient seen and examined at bedside. Pt had non-bloody BM yesterday, mcclellan in place draining clear urine. Patient without any complaints. Patient denies any headache, dizziness, CP, SOB, palpitations, abdominal pain, n/v. VSS, Awaiting authorization for rehab.    OVERNIGHT EVENTS: NONE    Home Medications:  Aggrenox 25 mg-200 mg oral capsule, extended release: 1 cap(s) orally 2 times a day (04 Apr 2022 12:40)  atorvastatin 80 mg oral tablet: 1 tab(s) orally once a day (at bedtime) (04 Apr 2022 12:40)  benazepril 10 mg oral tablet: 1 tab(s) orally once a day (30 Mar 2022 10:22)  calcitriol 0.25 mcg oral capsule: 1 cap(s) orally once a day (30 Mar 2022 10:22)  carvedilol 12.5 mg oral tablet: 1 tab(s) orally 2 times a day      *Last filled 5/21, spoke to MATTHEW Nguyen at Dr. Garcia&#x27;s office, she states patient should still be taking med as per MD notes from 2/22*  (30 Mar 2022 10:22)  hydrALAZINE 10 mg oral tablet: 0.5 tab(s) orally 3 times a day (30 Mar 2022 10:22)  isosorbide mononitrate 30 mg oral tablet, extended release: 1 tab(s) orally once a day (in the morning) (30 Mar 2022 10:22)  levothyroxine 75 mcg (0.075 mg) oral tablet: 1 tab(s) orally once a day (30 Mar 2022 10:22)  Metoprolol Succinate ER 50 mg oral tablet, extended release: 1 tab(s) orally once a day (04 Apr 2022 12:40)  midodrine 10 mg oral tablet: 1 tab(s) orally 3 times a day (30 Mar 2022 10:22)  Plavix 75 mg oral tablet: 1 tab(s) orally once a day (30 Mar 2022 10:22)  pravastatin 40 mg oral tablet: 1 tab(s) orally once a day (30 Mar 2022 10:22)  tamsulosin 0.4 mg oral capsule: 1 cap(s) orally once a day (at bedtime) (04 Apr 2022 12:40)      MEDICATIONS  (STANDING):  atorvastatin 80 milliGRAM(s) Oral at bedtime  Biotene Dry Mouth Oral Rinse 5 milliLiter(s) Swish and Spit daily  bisacodyl Suppository 10 milliGRAM(s) Rectal daily  calcitriol   Capsule 0.25 MICROGram(s) Oral daily  cyanocobalamin 1000 MICROGram(s) Oral daily  dextrose 5%. 1000 milliLiter(s) (50 mL/Hr) IV Continuous <Continuous>  dextrose 5%. 1000 milliLiter(s) (100 mL/Hr) IV Continuous <Continuous>  dextrose 50% Injectable 25 Gram(s) IV Push once  dextrose 50% Injectable 12.5 Gram(s) IV Push once  dextrose 50% Injectable 25 Gram(s) IV Push once  dipyridamole 200 mG/aspirin 25 mG 1 Capsule(s) Oral two times a day  diVALproex  milliGRAM(s) Oral two times a day  ferrous    sulfate 325 milliGRAM(s) Oral daily  glucagon  Injectable 1 milliGRAM(s) IntraMuscular once  heparin   Injectable 5000 Unit(s) SubCutaneous every 8 hours  influenza  Vaccine (HIGH DOSE) 0.7 milliLiter(s) IntraMuscular once  insulin lispro (ADMELOG) corrective regimen sliding scale   SubCutaneous three times a day before meals  insulin lispro (ADMELOG) corrective regimen sliding scale   SubCutaneous at bedtime  levothyroxine 75 MICROGram(s) Oral daily  metoprolol succinate ER 50 milliGRAM(s) Oral daily  senna 2 Tablet(s) Oral at bedtime  tamsulosin 0.4 milliGRAM(s) Oral at bedtime    MEDICATIONS  (PRN):  ALBUTerol    90 MICROgram(s) HFA Inhaler 2 Puff(s) Inhalation every 6 hours PRN Shortness of Breath and/or Wheezing  dextrose Oral Gel 15 Gram(s) Oral once PRN Blood Glucose LESS THAN 70 milliGRAM(s)/deciliter  ondansetron Injectable 4 milliGRAM(s) IV Push every 8 hours PRN Nausea and/or Vomiting      No Known Allergies      Social History:  Lives at home w/ wife   ADL independent   Tobacco former smoker, quit 40 years ago, 20 pack year history  Alcohol denies  Drugs denies  COVID Pfizer x3   Occupation retired- former  (30 Mar 2022 08:48)      REVIEW OF SYSTEMS: I am ok  CONSTITUTIONAL: No fever, No chills, No fatigue, No myalgia, No Body ache, No Weakness  EYES: No eye pain,  No visual disturbances, No discharge, No Redness  ENMT: No ear pain, No nose bleed, No vertigo; No sinus pain, No throat pain, No Congestion  NECK: No pain, No stiffness  RESPIRATORY: No cough, No wheezing, No hemoptysis, No shortness of breath  CARDIOVASCULAR: No chest pain, No palpitations  GASTROINTESTINAL: No abdominal pain, No epigastric pain. No nausea, No vomiting, No diarrhea, No constipation; [ x ] BM - 1  GENITOURINARY: No dysuria, No frequency, No urgency, No incontinence  NEUROLOGICAL: No headaches, No dizziness, No numbness, No tingling, No tremors, [ x ] LUE weakness   EXTREMITIES: No Swelling, No Pain, No Edema  SKIN: [ x ] No itching, burning, rashes, or lesions   MUSCULOSKELETAL: No joint pain, No joint swelling; No muscle pain, No back pain, No extremity pain  PSYCHIATRIC: No depression, No anxiety, No mood swings, No difficulty sleeping at night  PAIN SCALE: [ x ] None  [  ] Other  ROS Unable to obtain due to: [  ] Dementia  [  ] Lethargy  [  ] Sedated  [  ] Non verbal  REST OF REVIEW OF SYSTEMS: [ x ] Normal     Vital Signs Last 24 Hrs  T(C): 36.5 (05 Apr 2022 05:01), Max: 37.2 (04 Apr 2022 11:03)  T(F): 97.7 (05 Apr 2022 05:01), Max: 99 (04 Apr 2022 11:03)  HR: 82 (05 Apr 2022 05:01) (79 - 92)  BP: 142/67 (05 Apr 2022 05:01) (93/60 - 155/78)  BP(mean): --  RR: 19 (05 Apr 2022 05:01) (17 - 19)  SpO2: 93% (05 Apr 2022 05:01) (93% - 95%)    CAPILLARY BLOOD GLUCOSE      POCT Blood Glucose.: 190 mg/dL (04 Apr 2022 21:21)  POCT Blood Glucose.: 136 mg/dL (04 Apr 2022 17:27)  POCT Blood Glucose.: 204 mg/dL (04 Apr 2022 12:02)  POCT Blood Glucose.: 146 mg/dL (04 Apr 2022 08:16)      I&O's Summary    04 Apr 2022 07:01  -  05 Apr 2022 07:00  --------------------------------------------------------  IN: 0 mL / OUT: 1150 mL / NET: -1150 mL      PHYSICAL EXAM:  GENERAL:  [ x ] NAD, [ x ] Well appearing, [  ] Agitated, [  ] Drowsy, [  ] Lethargy, [  ] Confused   HEAD:  [ x ] Normal, [  ] Other  EYES:  [ x ] EOMI, [ x ] PERRLA, [ x ] Conjunctiva and sclera clear normal, [  ] Other, [ x ] Pallor, [  ] Discharge  ENMT:  [ x ] Normal, [ x ] Moist mucous membranes, [  ] Good dentition, [ x ] No thrush  NECK:  [ x ] Supple, [ x ] No JVD, [ x ] Normal thyroid, [  ] Lymphadenopathy, [  ] Other  CHEST/LUNG:  [ x ] Clear to auscultation bilaterally, [ x ] Breath Sounds equal B/L, [ x ] Poor effort, [ x ] No rales, [ x ] No rhonchi, [ x ] No wheezing  HEART:  [ x ] Regular rate and rhythm, [  ] Tachycardia, [  ] Bradycardia, [  ] Irregular, [ x ] No murmurs, No rubs, No gallops, [  ] PPM in place (Mfr:  )  ABDOMEN:  [ x ] Soft, [ x ] Nontender, [ x ] Nondistended, [ x ] No mass, [ x ] Bowel sounds present, [  ] Obese  NERVOUS SYSTEM:  [ x ] Alert & Oriented x3, [  ] Nonfocal, [  ] Confusion, [  ] Encephalopathic, [  ] Sedated, [  ] Unable to assess, [  ] Dementia, [ x ] Other- LUE weakness compared to RUE, left tongue deviation   EXTREMITIES:  [ x ] 2+ Peripheral Pulses, No clubbing, No cyanosis,  [  ] Edema B/L lower EXT, [  ] PVD stasis skin changes B/L lower EXT, [  ] Wound  LYMPH:  No lymphadenopathy noted  SKIN:  [ x ] No rashes or lesions, [  ] Pressure ulcers, [  ] Ecchymosis, [  ] Skin tears, [  ] Othe    DIET: Diet, Pureed:   Consistent Carbohydrate No Snacks  DASH/TLC Sodium & Cholesterol Restricted  Moderately Thick Liquids (MODTHICKLIQS)  Supplement Feeding Modality:  Oral  Glucerna Shake Cans or Servings Per Day:  1       Frequency:  Three Times a day (04-03-22 @ 13:32)      LABS:                          10.3   5.60  )-----------( 204      ( 05 Apr 2022 08:37 )             33.5     05 Apr 2022 08:37    145    |  109    |  30     ----------------------------<  131    3.9     |  29     |  2.00     Ca    9.2        05 Apr 2022 08:37  Phos  3.4       05 Apr 2022 08:37  Mg     2.4       05 Apr 2022 08:37    TPro  6.4    /  Alb  2.6    /  TBili  0.5    /  DBili  x      /  AST  31     /  ALT  40     /  AlkPhos  76     05 Apr 2022 08:37      Ca    9.0        04 Apr 2022 07:23          Culture Results:   No growth (03-31 @ 01:17)  Culture Results:   No Growth Final (03-30 @ 15:01)  Culture Results:   No Growth Final (03-30 @ 15:01)      CARDIAC MARKERS ( 31 Mar 2022 16:33 )  x     / x     / 202 U/L / x     / 3.1 ng/mL  CARDIAC MARKERS ( 30 Mar 2022 21:07 )  x     / x     / 342 U/L / x     / 12.8 ng/mL  CARDIAC MARKERS ( 30 Mar 2022 14:34 )  x     / x     / 398 U/L / x     / 18.4 ng/mL  CARDIAC MARKERS ( 30 Mar 2022 08:13 )  x     / x     / x     / x     / 13.1 ng/mL  CARDIAC MARKERS ( 30 Mar 2022 07:53 )  x     / x     / 328 U/L / x     / x            Culture - Urine (collected 31 Mar 2022 01:17)  Source: Clean Catch Clean Catch (Midstream)  Final Report (31 Mar 2022 21:27):    No growth    Culture - Blood (collected 30 Mar 2022 15:01)  Source: .Blood Blood-Peripheral  Final Report (04 Apr 2022 16:00):    No Growth Final    Culture - Blood (collected 30 Mar 2022 15:01)  Source: .Blood Blood-Peripheral  Final Report (04 Apr 2022 16:00):    No Growth Final       Anemia Panel:  Iron Total, Serum: 15 ug/dL (03-31-22 @ 10:51)  Iron - Total Binding Capacity.: 318 ug/dL (03-31-22 @ 10:51)  Ferritin, Serum: 53 ng/mL (03-31-22 @ 10:42)  Vitamin B12, Serum: 476 pg/mL (03-31-22 @ 10:42)  Folate, Serum: 10.7 ng/mL (03-31-22 @ 10:42)  Ferritin, Serum: 44 ng/mL (03-31-22 @ 05:21)  Iron - Total Binding Capacity.: 329 ug/dL (03-31-22 @ 05:21)  Iron Total, Serum: 11 ug/dL (03-31-22 @ 05:21)      Thyroid Panel:  T4, Serum: 6.2 ug/dL (03-31-22 @ 10:42)  Thyroid Stimulating Hormone, Serum: 2.99 uIU/mL (03-31-22 @ 07:24)  T4, Serum: 5.9 ug/dL (03-31-22 @ 05:21)  Thyroid Stimulating Hormone, Serum: 2.81 uIU/mL (03-30-22 @ 14:34)      Serum Pro-Brain Natriuretic Peptide: 9256 pg/mL (03-31-22 @ 07:24)  Serum Pro-Brain Natriuretic Peptide: 4963 pg/mL (03-30-22 @ 06:12)      RADIOLOGY & ADDITIONAL TESTS:  No new studies in the past 24 hours     HEALTH ISSUES - PROBLEM Dx:  Acute pulmonary edema    CVA (cerebrovascular accident)    Elevated troponin    Leukocytosis    Acute kidney injury superimposed on CKD    Anemia    Elevated creatine kinase    Diabetes mellitus    Need for prophylactic measure    Tonsillar cancer    HTN (hypertension)    Hypothyroidism    Chronic HFrEF (heart failure with reduced ejection fraction)    CAD (coronary artery disease)      Consultant(s) Notes Reviewed:  [ x ] YES     Care Discussed with [ x ] Consultants, [ x ] Patient, [ x ] Family,- Dtr  [  ] HCP, [  ] , [ x ] Social Service, [ x ] RN, [  ] Physical Therapy, [  ] Palliative Care Team  DVT PPX: [  ] Lovenox, [ x ] SC Heparin, [  ] Coumadin, [  ] Xarelto, [  ] Eliquis, [  ] Pradaxa, [  ] IV Heparin drip, [  ] SCD, [  ] Ambulation, [  ] Contraindicated 2/2 GI Bleed, [  ] Contraindicated 2/2  Bleed, [  ] Contraindicated 2/2 Brain Bleed  Advanced Directive: [  ] None, [ x ] DNR/trial of intubation

## 2022-04-05 NOTE — PROGRESS NOTE ADULT - SUBJECTIVE AND OBJECTIVE BOX
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 As per patient, he was watching a movie when he felt very short of breath suddenly and dropped to the floor, denies LOC and denies any trauma to his head, was on the floor for ~2 hours. Patient was unable to rise from the floor by himself due to his left arm weakness. When first brought to the ED, patient was hypoxic and hypertensive o2 saturation in EMS was >80%. While in the ED, patient had episode of siezure-like activity in his b/l upper extremities which was controlled w/ ativan. Patient was seen and examined at bedside, BiPAP still in place, patient was still mildly lethargic from ativan and could not speak well with bipap mask on. Patient able to open eyes and nod/shake head to questions. Patient denied any headache, vision changes, cp, abd pain, msk pain. Patient still short of breath.    Patient states see he sees Dr. Tomas for outpatient nephrologist. States he is urinating well.  Denies any N/V/dizziness.  No SOB presently. States he is urinating.         No acute events noted    PAST MEDICAL & SURGICAL HISTORY:  MI (myocardial infarction)  1992    HTN (hypertension)    HLD (hyperlipidemia)    Throat cancer  2011, treated with chemo, RT    Ventricular arrhythmia  s/p AICD    Diabetes  Type2, not on any meds since weight loss after throat Ca    Renal insufficiency  s/p chemo    CAD (coronary artery disease)    Inguinal hernia, left    H/O prior ablation treatment  VT, 2009    Cardiac defibrillator in place  - 2009, battery change 2017    S/P left inguinal hernia repair  2018 at [place]         FAMILY HISTORY:  Family history of cancer (Sibling)    NC    Social History:Non smoker    MEDICATIONS  (STANDING):  atorvastatin 80 milliGRAM(s) Oral at bedtime  Biotene Dry Mouth Oral Rinse 5 milliLiter(s) Swish and Spit daily  bisacodyl Suppository 10 milliGRAM(s) Rectal daily  calcitriol   Capsule 0.25 MICROGram(s) Oral daily  cyanocobalamin 1000 MICROGram(s) Oral daily  dextrose 5%. 1000 milliLiter(s) (50 mL/Hr) IV Continuous <Continuous>  dextrose 5%. 1000 milliLiter(s) (100 mL/Hr) IV Continuous <Continuous>  dextrose 50% Injectable 25 Gram(s) IV Push once  dextrose 50% Injectable 12.5 Gram(s) IV Push once  dextrose 50% Injectable 25 Gram(s) IV Push once  dipyridamole 200 mG/aspirin 25 mG 1 Capsule(s) Oral two times a day  diVALproex  milliGRAM(s) Oral two times a day  ferrous    sulfate 325 milliGRAM(s) Oral daily  glucagon  Injectable 1 milliGRAM(s) IntraMuscular once  heparin   Injectable 5000 Unit(s) SubCutaneous every 8 hours  influenza  Vaccine (HIGH DOSE) 0.7 milliLiter(s) IntraMuscular once  insulin lispro (ADMELOG) corrective regimen sliding scale   SubCutaneous three times a day before meals  insulin lispro (ADMELOG) corrective regimen sliding scale   SubCutaneous at bedtime  levothyroxine 75 MICROGram(s) Oral daily  metoprolol succinate ER 50 milliGRAM(s) Oral daily  senna 2 Tablet(s) Oral at bedtime  tamsulosin 0.4 milliGRAM(s) Oral at bedtime    MEDICATIONS  (PRN):  ALBUTerol    90 MICROgram(s) HFA Inhaler 2 Puff(s) Inhalation every 6 hours PRN Shortness of Breath and/or Wheezing  dextrose Oral Gel 15 Gram(s) Oral once PRN Blood Glucose LESS THAN 70 milliGRAM(s)/deciliter  ondansetron Injectable 4 milliGRAM(s) IV Push every 8 hours PRN Nausea and/or Vomiting        Allergies    No Known Allergies    Intolerances         REVIEW OF SYSTEMS:    Review of Systems:   Constitutional: Denies fatigue  HEENT: Denies headaches and dizziness  Respiratory: denies SOB, cough, or wheezing  Cardiovascular: denies CP, palpitations  Gastrointestinal: Denies nausea, denies vomiting, diarrhea, constipation, abdominal pain, or bloody stools  Genitourinary: denies painful urination, increased frequency, urgency, or bloody urine  Skin: denies rashes or itching  Musculoskeletal: denies muscle aches, joint swelling  Neurologic: Denies generalized weakness, denies loss of sensation, numbness, or tingling    Vital Signs Last 24 Hrs  T(C): 36.5 (05 Apr 2022 05:01), Max: 37.2 (04 Apr 2022 11:03)  T(F): 97.7 (05 Apr 2022 05:01), Max: 99 (04 Apr 2022 11:03)  HR: 82 (05 Apr 2022 05:01) (79 - 92)  BP: 142/67 (05 Apr 2022 05:01) (93/60 - 155/78)  BP(mean): --  RR: 19 (05 Apr 2022 05:01) (17 - 19)  SpO2: 93% (05 Apr 2022 05:01) (93% - 95%)    PHYSICAL EXAM:    GENERAL: NAD  HEAD:  Atraumatic, Normocephalic  EYES: EOMI, conjunctiva and sclera clear  ENMT: No Drainage from nares, No drainage from ears  NECK: Supple, neck  veins full  NERVOUS SYSTEM:  Awake and Alert  CHEST/LUNG: Clear to percussion bilaterally; No rales, rhonchi, wheezing, or rubs  HEART: Regular rate and rhythm; No murmurs, rubs, or gallops  ABDOMEN: Soft, Nontender, Nondistended; Bowel sounds present  EXTREMITIES:  No Edema  SKIN: No rashes No obvious ecchymosis      LABS:                                   10.3   5.60  )-----------( 204      ( 05 Apr 2022 08:37 )             33.5     04-05    145  |  109<H>  |  30<H>  ----------------------------<  131<H>  3.9   |  29  |  2.00<H>    Ca    9.2      05 Apr 2022 08:37  Phos  3.4     04-05  Mg     2.4     04-05    TPro  6.4  /  Alb  2.6<L>  /  TBili  0.5  /  DBili  x   /  AST  31  /  ALT  40  /  AlkPhos  76  04-05

## 2022-04-05 NOTE — PROGRESS NOTE ADULT - REASON FOR ADMISSION
SOB
SOB, CVA
SOB

## 2022-04-05 NOTE — DISCHARGE NOTE NURSING/CASE MANAGEMENT/SOCIAL WORK - PATIENT PORTAL LINK FT
You can access the FollowMyHealth Patient Portal offered by [facility] by registering at the following website: [email or web address]. By joining [employer]’s FollowMyHealth portal, you will also be able to view your health information using other applications (apps) compatible with our system.

## 2022-04-05 NOTE — DISCHARGE NOTE NURSING/CASE MANAGEMENT/SOCIAL WORK - NSDCPEFALRISK_GEN_ALL_CORE
For information on Fall & Injury Prevention, visit: [email or web address] OR  [email or web address] OR  https://www.cdc.gov/steadi/patient.html

## 2022-04-05 NOTE — PROGRESS NOTE ADULT - SUBJECTIVE AND OBJECTIVE BOX
Neurology follow up note    ANTONIO PONCE76yMale      Interval History:    Patient feels ok no new complaints.    Allergies    No Known Allergies    Intolerances        MEDICATIONS    ALBUTerol    90 MICROgram(s) HFA Inhaler 2 Puff(s) Inhalation every 6 hours PRN  atorvastatin 80 milliGRAM(s) Oral at bedtime  Biotene Dry Mouth Oral Rinse 5 milliLiter(s) Swish and Spit daily  bisacodyl Suppository 10 milliGRAM(s) Rectal daily  calcitriol   Capsule 0.25 MICROGram(s) Oral daily  cyanocobalamin 1000 MICROGram(s) Oral daily  dextrose 5%. 1000 milliLiter(s) IV Continuous <Continuous>  dextrose 5%. 1000 milliLiter(s) IV Continuous <Continuous>  dextrose 50% Injectable 25 Gram(s) IV Push once  dextrose 50% Injectable 12.5 Gram(s) IV Push once  dextrose 50% Injectable 25 Gram(s) IV Push once  dextrose Oral Gel 15 Gram(s) Oral once PRN  dipyridamole 200 mG/aspirin 25 mG 1 Capsule(s) Oral two times a day  diVALproex  milliGRAM(s) Oral two times a day  ferrous    sulfate 325 milliGRAM(s) Oral daily  glucagon  Injectable 1 milliGRAM(s) IntraMuscular once  heparin   Injectable 5000 Unit(s) SubCutaneous every 8 hours  influenza  Vaccine (HIGH DOSE) 0.7 milliLiter(s) IntraMuscular once  insulin lispro (ADMELOG) corrective regimen sliding scale   SubCutaneous three times a day before meals  insulin lispro (ADMELOG) corrective regimen sliding scale   SubCutaneous at bedtime  levothyroxine 75 MICROGram(s) Oral daily  metoprolol succinate ER 50 milliGRAM(s) Oral daily  ondansetron Injectable 4 milliGRAM(s) IV Push every 8 hours PRN  senna 2 Tablet(s) Oral at bedtime  tamsulosin 0.4 milliGRAM(s) Oral at bedtime              Vital Signs Last 24 Hrs  T(C): 36.5 (05 Apr 2022 05:01), Max: 37.2 (04 Apr 2022 11:03)  T(F): 97.7 (05 Apr 2022 05:01), Max: 99 (04 Apr 2022 11:03)  HR: 82 (05 Apr 2022 05:01) (79 - 92)  BP: 142/67 (05 Apr 2022 05:01) (93/60 - 155/78)  BP(mean): --  RR: 19 (05 Apr 2022 05:01) (17 - 19)  SpO2: 93% (05 Apr 2022 05:01) (93% - 95%)    REVIEW OF SYSTEMS:  Review of systems feel ok.     PHYSICAL EXAMINATION:   HEENT:  Head:  Normocephalic, atraumatic.  Eyes:  No scleral icterus.  Ears:  Hard of hearing as per my conversation with spouse.  NECK:  Supple.  CARDIOVASCULAR:  S1 and S2 are heard.  RESPIRATORY:  Decreased breath sounds bilaterally.  ABDOMEN:  Soft, nontender.  EXTREMITIES:  No clubbing or cyanosis was noted.      NEUROLOGIC:  The patient was awake alert Extraocular movements appeared to be intact.  full visual fields   The patient appears to have intact bilateral nasolabial folds.  Speech was fluent Motor:  Right upper appeared to be 4/5, left upper was 4/5.   Right lower extremity was 5/5, left lower extremities  4/5.  The patient did appear to Sensory appeared to be intact.              LABS:  CBC Full  -  ( 05 Apr 2022 08:37 )  WBC Count : 5.60 K/uL  RBC Count : 4.34 M/uL  Hemoglobin : 10.3 g/dL  Hematocrit : 33.5 %  Platelet Count - Automated : 204 K/uL  Mean Cell Volume : 77.2 fl  Mean Cell Hemoglobin : 23.7 pg  Mean Cell Hemoglobin Concentration : 30.7 gm/dL  Auto Neutrophil # : x  Auto Lymphocyte # : x  Auto Monocyte # : x  Auto Eosinophil # : x  Auto Basophil # : x  Auto Neutrophil % : x  Auto Lymphocyte % : x  Auto Monocyte % : x  Auto Eosinophil % : x  Auto Basophil % : x      04-05    145  |  109<H>  |  30<H>  ----------------------------<  131<H>  3.9   |  29  |  2.00<H>    Ca    9.2      05 Apr 2022 08:37  Phos  3.3     04-04  Mg     2.2     04-04    TPro  6.4  /  Alb  2.6<L>  /  TBili  0.5  /  DBili  x   /  AST  31  /  ALT  40  /  AlkPhos  76  04-05    Hemoglobin A1C:     LIVER FUNCTIONS - ( 05 Apr 2022 08:37 )  Alb: 2.6 g/dL / Pro: 6.4 g/dL / ALK PHOS: 76 U/L / ALT: 40 U/L / AST: 31 U/L / GGT: x           Vitamin B12         RADIOLOGY        ANALYSIS AND PLAN:  This is a 76-year-old with episode of altered mental status, left-sided weakness, and possibly seizure event.  Clinical impression cerebrovascular accident  right MCA territory which possibly could have lead to seizure event, questionable this was precipitated by any type of cardiac event with the patient having elevated troponin and the patient feeling short of breath.  CT imaging of the brain positive for CVA unable to do MRI secondary to the patient having defibrillator.  Aggrenox 1 tablet twice a day  For possible seizure event which could have been induced secondary to cerebrovascular accident secondary to elevated renal function, I will start the patient on Depakote 500 mg twice a day recommend for now to continue for 6 months   For history of hyperlipidemia, continue on statin.  For hypothyroidism, continue the patient on Synthroid.  EEG was normal   neurologic wise stable no new events stable for dc planning only from neurology     Spoke with spouse 4/5  Her name is Radha, telephone number is [phone].    Greater than 31 minutes of time was spent with the patient, plan of care, reviewing data, with greater than 50% of the visit was spent counseling and/or coordinating care with multidisciplinary healthcare team

## 2022-04-05 NOTE — PROGRESS NOTE ADULT - PROBLEM SELECTOR PLAN 3
2/2 to NSTEMI -On IV Heparin drip for ~48 hrs dc 4/1 evening and placed on subq Heparin for ppx   - Troponin 1011.9 > 5252.0 > 73662 > 25257, no longer need to trend   - EKG: ST-depressions in V5 V6, new from prior EKG   - [facility]iology [facility] following   - As d/w Cardio-pt does NOT have any cardiac stents/Intervention in past  - On Aggrenox, high dose statin, continue   - TTE LVEF 30% - 35% w/ severe systolic dysfunction

## 2022-04-05 NOTE — PROGRESS NOTE ADULT - PROBLEM SELECTOR PROBLEM 10
Limited refill only, needs appt before any further refills    
HTN (hypertension)

## 2022-04-05 NOTE — H&P ADULT - NSICDXPASTSURGICALHX_GEN_ALL_CORE_FT
PAST SURGICAL HISTORY:  Cardiac defibrillator in place - 2009, battery change 2017    H/O prior ablation treatment VT, 2009    S/P left inguinal hernia repair 2018 at [place]

## 2022-04-05 NOTE — PROGRESS NOTE ADULT - PROVIDER SPECIALTY LIST ADULT
Cardiology
Infectious Disease
Nephrology
Nephrology
Neurology
Neurology
Cardiology
Nephrology
Cardiology
Cardiology
Infectious Disease
Nephrology
Nephrology
Neurology
Cardiology
Infectious Disease
Infectious Disease
Nephrology
Urology
Heme/Onc
Internal Medicine

## 2022-04-05 NOTE — H&P ADULT - ASSESSMENT
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 Found to have R central sulcus infarct on repeat CTH. Hospital course complicated by leukocytosis, JARRED on CKD. Admitted to [facility] acute inpatient rehab on 4/5 for ADL, gait, and functional impairments.     # s/p R central Sulcus CVA  - Comprehensive Multidisciplinary Rehab Program: 3 hours a day, 5 days a week with PT/OT/SLP  - Continue aggrenox  - Continue atorvastatin  - Seizure management as below    # Seizures  - I/s/o R central sulcus infarct  - Continue Depakote 500mg BID    #    #    # Mood/Cognition:    # Sleep:  - Melatonin qHS    # Pain Management:  - Tylenol PRN    # GI/Bowel:  - Senna QHS, Miralax daily PRN  - GI ppx:    # /Bladder:   - Bladder scan x1 on admission, SC PRN  - Encourage timed voids every 4 hours while awake       # Skin/Pressure Injury:  - Skin assessment on admission: ***  - Monitor Incisions: ***  - Turn every 2 hours while in bed    # Diet:   - Diet Consistency/Modifications: ***  - Aspiration Precautions  - SLP consult for swallow function evaluation and treatment    # DVT ppx:  - ***  - SCDs  - Last Doppler on ***    # Restrictions/Precautions:  - Weight bearing status: ****  - ROM restrictions: ***  - Precautions:    ---------------  Outpatient Follow-up:      --------------    Goals: Safe discharge to home  Estimated Length of Stay: 10-14 days  Rehab Potential: Good  Medical Prognosis: Good  Estimated Disposition: Home with Home Care  ---------------    PRESCREEN COMPARISON:  I have reviewed the prescreen information and I have found no relevant changes between the preadmission screening and my post admission evaluation.    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 Found to have R central sulcus MCA territory infarct on repeat CTH. Hospital course complicated by leukocytosis, JARRED on CKD. Admitted to [facility] acute inpatient rehab on 4/5 for ADL, gait, and functional impairments.     # s/p R central Sulcus CVA  - Comprehensive Multidisciplinary Rehab Program: 3 hours a day, 5 days a week with PT/OT/SLP  - Unable to obtain MRI due to ICD  - Continue aggrenox  - Continue atorvastatin  - Seizure management as below    # Seizures  - I/s/o R central sulcus infarct  - Continue Depakote 500mg BID for 6 months per neuro    # NSTEMI  # HFrEF  # HTN  - S/p heparin gtt, continue aggrenox  - Course c/b acute pulmonary edema s/p lasix x2 3/30. Diurese PRN  - TTE with severe systolic dysfunction 30-35%  - Continue statin  - Holding home imdur and coreg. Hold ACEI i/s/o JARRED on CKD  - Continue metroprolol, hydralazine    # JARRED on CKD - improved  - SCr near baseline of 2.0-2.1  - Monitor routine BMP  - Avoid nephrotoxic medications; holding ACEI    #Type 2 Diabetes Mellitus  - A1c 8.3  - Continue SSI  - Hospitalist consult appreciated    #Tonsillar Cancer #Dysphagia  - Seen by ENT recent  - Continue puree/mildly thick    # Mood/Cognition:    # Sleep:  - Melatonin qHS    # Pain Management:  - Tylenol PRN    # GI/Bowel:  - Senna QHS, Miralax daily PRN  - GI ppx:    # /Bladder:   - Bladder scan x1 on admission, SC PRN  - Encourage timed voids every 4 hours while awake       # Skin/Pressure Injury:  - Skin assessment on admission: ***  - Monitor Incisions: ***  - Turn every 2 hours while in bed    # Diet:   - Diet Consistency/Modifications: Puree/Mildly thick  - Aspiration Precautions  - SLP consult for swallow function evaluation and treatment    # DVT ppx:  - HSQ    # Restrictions/Precautions:  - Precautions: Falls, aspiration, cardiac    ---------------  Outpatient Follow-up:    CHANCE FRANCO  Internal Medicine  [address], [place] [address]  Phone: [phone]  Fax: [phone]  Follow Up Time: 1-3 days    Chet Garcia)  Cardiovascular Disease; Internal Medicine  [facility], [address], [address]  [place] [address]  Phone: [phone]  Fax: [phone]  Follow Up Time: 1-3 days    Brandon Harrison)  Urology  [address] [place] [address], [address]  [place] [address]  Phone: [phone]  Fax: [phone]  Follow Up Time: 1-3 days    Yenifer Faulkner)  Neurology  [address] [place] [address]  Phone: [phone]  Fax: [phone]  --------------    Goals: Safe discharge to home  Estimated Length of Stay: 10-14 days  Rehab Potential: Good  Medical Prognosis: Good  Estimated Disposition: Home with Home Care  ---------------    PRESCREEN COMPARISON:  I have reviewed the prescreen information and I have found no relevant changes between the preadmission screening and my post admission evaluation.    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 Found to have R central sulcus MCA territory infarct on repeat CTH. Hospital course complicated by leukocytosis, JARRED on CKD. Admitted to [facility] acute inpatient rehab on 4/5 for ADL, gait, and functional impairments.     # s/p R central Sulcus CVA  - Comprehensive Multidisciplinary Rehab Program: 3 hours a day, 5 days a week with PT/OT/SLP  - Unable to obtain MRI due to ICD  - Continue aggrenox  - Continue atorvastatin  - Seizure management as below    # Seizures  - I/s/o R central sulcus infarct  - Continue Depakote 500mg BID for 6 months per neuro    # NSTEMI  # HFrEF  # HTN  - S/p heparin gtt, continue aggrenox  - Course c/b acute pulmonary edema s/p lasix x2 3/30. Diurese PRN  - TTE with severe systolic dysfunction 30-35%  - Continue statin  - Holding home imdur and coreg. Hold ACEI i/s/o JARRED on CKD  - Continue metroprolol, hydralazine    # JARRED on CKD - improved  - SCr near baseline of 2.0-2.1  - Monitor routine BMP  - Avoid nephrotoxic medications; holding ACEI    #Type 2 Diabetes Mellitus  - A1c 8.3  - Continue SSI  - Hospitalist consult appreciated    #Tonsillar Cancer #Dysphagia  - Seen by ENT recent  - Continue puree/mildly thick  - Residual L hearing loss    # Sleep:  - Melatonin qHS    # Pain Management:  - Tylenol PRN    # GI/Bowel:  - Senna QHS, Miralax daily PRN  - GI ppx:    # /Bladder:   - Bladder scan x1 on admission, SC PRN  - Encourage timed voids every 4 hours while awake       # Skin/Pressure Injury:  - Skin assessment on admission: unremarkable    # Diet:   - Diet Consistency/Modifications: Puree/Mildly thick  - Aspiration Precautions  - SLP consult for swallow function evaluation and treatment    # DVT ppx:  - HSQ    # Restrictions/Precautions:  - Precautions: Falls, aspiration, cardiac    ---------------  Outpatient Follow-up:    CHANCE FRANCO  Internal Medicine  [address], [place] [address]  Phone: [phone]  Fax: [phone]  Follow Up Time: 1-3 days    Chet Garcia)  Cardiovascular Disease; Internal Medicine  [facility], [address], [address]  [place] [address]  Phone: [phone]  Fax: [phone]  Follow Up Time: 1-3 days    Brandon Harrison)  Urology  [address] [place] [address], [facility] [address]  [place] [address]  Phone: [phone]  Fax: [phone]  Follow Up Time: 1-3 days    Yenifer Faulkner)  Neurology  [address] [place] [address]  Phone: [phone]  Fax: [phone]  --------------    Goals: Safe discharge to home  Estimated Length of Stay: 10-14 days  Rehab Potential: Good  Medical Prognosis: Good  Estimated Disposition: Home with Home Care  ---------------    PRESCREEN COMPARISON:  I have reviewed the prescreen information and I have found no relevant changes between the preadmission screening and my post admission evaluation.    RATIONALE FOR INPATIENT ADMISSION: Patient demonstrates the following:  [X] Medically appropriate for rehabilitation admission  [X] Has attainable rehab goals with an appropriate initial discharge plan  [X]Has rehabilitation potential (expected to make a significant improvement within a reasonable period of time)  [X] Requires close medical management by a rehab physician, rehab nursing care, Hospitalist and comprehensive interdisciplinary team (including PT, OT and/or SLP, Prosthetics and Orthotics)

## 2022-04-05 NOTE — PROGRESS NOTE ADULT - NS ATTEND AMEND GEN_ALL_CORE FT
Treating NSTEMI with full ACS protocol.  Continue heparin gtt.  Eventual ischemia evaluation.  To follow closely while admitted.
Chart reviewed    Patient seen and examined    Agree with plan as outlined above    76 yr old male with stated hx significant for HFrEF, s/p AICD, tonsillar Ca s/p Lt tonsillectomy, Lt partial glossectomy, s/p Rt CEA, DM2, hypothyroidism, CKD3 who  presented from home via EMS after being found of floor with LUE weakness, sob. Consult called for hypoxic resp failure secondary to stroke,  hypertensive emergency, r/o CVA.    HTN emergency/ r/o CVA/ SOB   - p/w  /105 and tachypnea  - s/p lasix 40 mg x1, hydralazine 10mg x1   - now hypotensive SBP: 100's, hold all anti HTN meds to allow for permissive HTN per neuro, keep 's   - continue aggrenox, statin and hep gtt   - EKG unchanged from previous EKG, no new signs of ischemia   - + troponemia now downtrending likely in setting of demand ischemia 2/2 to neuro event, continue to trend   - f/u TTE ordered  - telemetry: SR 70's with 4 bts NSVT, no other events noted, continue tele for now  - +AICD [employer], will interrogate  - respiratory status improved, s/p lasix iv, will hold off further diuresing for now, no clinical signs of volume overload on exam, continue to monitor volume status  - CXR suspicious for pulmonary edema   - POCUS by ICU team showed moderate pulmonary effusion and few b lines, less consistent w/ CHF   - Monitor and replete Lytes. Keep K > 4 and Mg > 2
Continue current medical therapy.  OK with aggrenox.  Will need ischemia evaluation, pending renal assessment.  May be best to do as outpatient given recent neurological event.
Continue current medical therapy. cont antiplatelets.  Will need ischemia evaluation, pending renal assessment.  May be best to do as outpatient given recent neurological event.
Patient with no chest pain or dyspnea. Off hep gtt. Now on aggrenox, which is fine from a cardiac pov if better neurologically.  To follow closely.
bp controlled  euvolemic  significant trop elevation, decr lv fxn, eventual ischemic eval

## 2022-04-05 NOTE — PROGRESS NOTE ADULT - ATTENDING COMMENTS
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 Admitted for pulmonary edema, leukocytosis, elevated troponin, JARRED on CKD-3  and seizure.   Pt seen, examined, case & care plan d/w pt, residents at detail.  -D/W Neuro-Dr Brownlee -Stable on Meds  -Cardiology-Dr Hahn  follow up- restart Hydralazine, HOLD all other meds  -Renal eval DR Porter follow up,  -Urology DR Harrison-, on Flomax 0.4 mg Po daily, [place] cath placed ,TOV as out pt at Rehab   AM labs , PT eval---> [facility]   -PO diet, awaiting Insurance Auth for D/C , D/W Wife at detail. Pt is medically stable for D/C.  Palliative care Eval, Prognosis is Guarded.  Total care time is 40 minutes . 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 Admitted for pulmonary edema, leukocytosis, elevated troponin, JARRED on CKD-3  and seizure.   Pt seen, examined, case & care plan d/w pt, residents at detail.  -D/W Neuro-Dr Brownlee -Stable on Meds  -Cardiology-Dr Hahn  follow up- restart Hydralazine, HOLD all other meds  -Renal eval DR Porter follow up,  -Urology DR Harrison-, on Flomax 0.4 mg Po daily, Hillman cath placed ,TOV as out pt at Rehab   AM labs , PT eval---> [facility]   -PO diet, awaiting Insurance Auth for D/C , D/W Wife at detail. Pt is medically stable for D/C.  Palliative care Eval, Prognosis is Guarded.DNR/DNI  D/W Dr Hahn about meds- HOLD ACEi/ Imdur/ Midodrine. D/C to rehab today.  Total care time is 60 minutes .

## 2022-04-05 NOTE — PROGRESS NOTE ADULT - SUBJECTIVE AND OBJECTIVE BOX
[facility] DIVISION of INFECTIOUS DISEASE  Isaac Monaco MD PhD, Layla Dowd MD, Gay Larson MD, Andrews Chu MD, Rich Patten MD  and providing coverage with Xena Loaiza MD and Geraldo Barrios MD  Providing Infectious Disease Consultations at [facility], [facility]    Office# [phone] to schedule follow up appointments  Answering Service for urgent calls or New Consults [phone]  Cell# to text for urgent issues Isaac Monaco [phone]     infectious diseases progress note:    ATNONIO PONCE is a 76y y. o. Male patient    No concerning overnight events    Allergies    No Known Allergies    Intolerances        ANTIBIOTICS/RELEVANT:  antimicrobials    immunologic:  influenza  Vaccine (HIGH DOSE) 0.7 milliLiter(s) IntraMuscular once    OTHER:  ALBUTerol    90 MICROgram(s) HFA Inhaler 2 Puff(s) Inhalation every 6 hours PRN  atorvastatin 80 milliGRAM(s) Oral at bedtime  Biotene Dry Mouth Oral Rinse 5 milliLiter(s) Swish and Spit daily  bisacodyl Suppository 10 milliGRAM(s) Rectal daily  calcitriol   Capsule 0.25 MICROGram(s) Oral daily  cyanocobalamin 1000 MICROGram(s) Oral daily  dextrose 5%. 1000 milliLiter(s) IV Continuous <Continuous>  dextrose 5%. 1000 milliLiter(s) IV Continuous <Continuous>  dextrose 50% Injectable 25 Gram(s) IV Push once  dextrose 50% Injectable 12.5 Gram(s) IV Push once  dextrose 50% Injectable 25 Gram(s) IV Push once  dextrose Oral Gel 15 Gram(s) Oral once PRN  dipyridamole 200 mG/aspirin 25 mG 1 Capsule(s) Oral two times a day  diVALproex  milliGRAM(s) Oral two times a day  ferrous    sulfate 325 milliGRAM(s) Oral daily  glucagon  Injectable 1 milliGRAM(s) IntraMuscular once  heparin   Injectable 5000 Unit(s) SubCutaneous every 8 hours  insulin lispro (ADMELOG) corrective regimen sliding scale   SubCutaneous three times a day before meals  insulin lispro (ADMELOG) corrective regimen sliding scale   SubCutaneous at bedtime  levothyroxine 75 MICROGram(s) Oral daily  metoprolol succinate ER 50 milliGRAM(s) Oral daily  ondansetron Injectable 4 milliGRAM(s) IV Push every 8 hours PRN  senna 2 Tablet(s) Oral at bedtime  tamsulosin 0.4 milliGRAM(s) Oral at bedtime      Objective:  Vital Signs Last 24 Hrs  T(C): 36.5 (05 Apr 2022 05:01), Max: 37.2 (04 Apr 2022 11:03)  T(F): 97.7 (05 Apr 2022 05:01), Max: 99 (04 Apr 2022 11:03)  HR: 82 (05 Apr 2022 05:01) (79 - 92)  BP: 142/67 (05 Apr 2022 05:01) (93/60 - 155/78)  BP(mean): --  RR: 19 (05 Apr 2022 05:01) (17 - 19)  SpO2: 93% (05 Apr 2022 05:01) (93% - 95%)    T(C): 36.5 (04-05-22 @ 05:01), Max: 37.2 (04-04-22 @ 11:03)  T(C): 36.5 (04-05-22 @ 05:01), Max: 37.2 (04-04-22 @ 11:03)  T(C): 36.5 (04-05-22 @ 05:01), Max: 37.2 (04-01-22 @ 20:02)    PHYSICAL EXAM:  HEENT: NC atraumatic  Neck: supple  Respiratory: no accessory muscle use, breathing comfortably  Cardiovascular: distant  Gastrointestinal: normal appearing, nondistended  Extremities: no clubbing, no cyanosis,        LABS:                          10.3   5.60  )-----------( 204      ( 05 Apr 2022 08:37 )             33.5       WBC  5.60 04-05 @ 08:37  6.93 04-04 @ 07:23  6.12 04-03 @ 08:03  6.97 04-02 @ 08:34  9.54 04-01 @ 06:36  11.68 03-31 @ 07:24  12.49 03-31 @ 00:01  16.12 03-30 @ 14:34  20.24 03-30 @ 06:12      04-05    145  |  109<H>  |  30<H>  ----------------------------<  131<H>  3.9   |  29  |  2.00<H>    Ca    9.2      05 Apr 2022 08:37  Phos  3.3     04-04  Mg     2.2     04-04    TPro  6.4  /  Alb  2.6<L>  /  TBili  0.5  /  DBili  x   /  AST  31  /  ALT  40  /  AlkPhos  76  04-05      Creatinine, Serum: 2.00 mg/dL (04-05-22 @ 08:37)  Creatinine, Serum: 2.10 mg/dL (04-04-22 @ 07:23)  Creatinine, Serum: 2.10 mg/dL (04-03-22 @ 08:03)  Creatinine, Serum: 2.00 mg/dL (04-02-22 @ 08:34)  Creatinine, Serum: 2.10 mg/dL (04-01-22 @ 06:37)  Creatinine, Serum: 2.30 mg/dL (03-31-22 @ 07:24)  Creatinine, Serum: 2.30 mg/dL (03-30-22 @ 06:12)                INFLAMMATORY MARKERS  Auto Neutrophil #: 4.44 K/uL (04-03-22 @ 08:03)  Auto Lymphocyte #: 0.75 K/uL (04-03-22 @ 08:03)  Auto Neutrophil #: 5.45 K/uL (04-02-22 @ 08:34)  Auto Lymphocyte #: 0.65 K/uL (04-02-22 @ 08:34)  Auto Neutrophil #: 7.63 K/uL (04-01-22 @ 06:36)  Auto Lymphocyte #: 0.95 K/uL (04-01-22 @ 06:36)  Auto Neutrophil #: 9.78 K/uL (03-31-22 @ 07:24)  Auto Lymphocyte #: 0.93 K/uL (03-31-22 @ 07:24)  Auto Lymphocyte #: 0.20 K/uL (03-30-22 @ 06:12)  Auto Neutrophil #: 18.82 K/uL (03-30-22 @ 06:12)    Lactate, Blood: 1.8 mmol/L (03-31-22 @ 07:24)  Lactate, Blood: 1.2 mmol/L (03-30-22 @ 14:34)    Auto Eosinophil #: 0.09 K/uL (04-03-22 @ 08:03)  Auto Eosinophil #: 0.10 K/uL (04-02-22 @ 08:34)  Auto Eosinophil #: 0.10 K/uL (04-01-22 @ 06:36)  Auto Eosinophil #: 0.04 K/uL (03-31-22 @ 07:24)  Auto Eosinophil #: 0.00 K/uL (03-30-22 @ 06:12)        Procalcitonin, Serum: 18.46 ng/mL (03-31-22 @ 07:24)  Procalcitonin, Serum: 3.84 ng/mL (03-30-22 @ 08:13)      Creatine Kinase, Serum: 202 U/L (03-31-22 @ 16:33)  Creatine Kinase, Serum: 342 U/L (03-30-22 @ 21:07)  Creatine Kinase, Serum: 398 U/L (03-30-22 @ 14:34)  Creatine Kinase, Serum: 328 U/L (03-30-22 @ 07:53)        Ferritin, Serum: 53 ng/mL (03-31-22 @ 10:42)  Ferritin, Serum: 44 ng/mL (03-31-22 @ 05:21)        Activated Partial Thromboplastin Time: 26.0 sec (04-02-22 @ 08:34)  Activated Partial Thromboplastin Time: 62.2 sec (04-01-22 @ 14:26)  Activated Partial Thromboplastin Time: 53.1 sec (04-01-22 @ 06:36)  Activated Partial Thromboplastin Time: 51.0 sec (03-31-22 @ 20:29)  Activated Partial Thromboplastin Time: 42.3 sec (03-31-22 @ 13:36)  Activated Partial Thromboplastin Time: 41.0 sec (03-31-22 @ 07:24)  Activated Partial Thromboplastin Time: 46.3 sec (03-31-22 @ 00:01)  INR: 1.30 ratio (03-30-22 @ 06:43)  Activated Partial Thromboplastin Time: 25.8 sec (03-30-22 @ 06:43)    D-Dimer Assay, Quantitative: 2924 ng/mL DDU (03-30-22 @ 06:12)        MICROBIOLOGY:              RADIOLOGY & ADDITIONAL STUDIES:

## 2022-04-05 NOTE — H&P ADULT - NSHPPHYSICALEXAM_GEN_ALL_CORE
Vital Signs Last 24 Hrs  T(C): 36.7 (05 Apr 2022 20:36), Max: 36.7 (05 Apr 2022 20:36)  T(F): 98.1 (05 Apr 2022 20:36), Max: 98.1 (05 Apr 2022 20:36)  HR: 77 (05 Apr 2022 20:36) (72 - 82)  BP: 139/76 (05 Apr 2022 20:36) (139/76 - 149/82)  BP(mean): --  RR: 14 (05 Apr 2022 20:36) (14 - 19)  SpO2: 97% (05 Apr 2022 20:36) (93% - 97%)    Gen - NAD, Comfortable  HEENT - NCAT, EOMI, MMM  Neck - Supple, No limited ROM  Pulm - CTAB, No wheeze, No rhonchi, No crackles  Cardiovascular - RRR, S1S2, No murmurs (+) AICD  Abdomen - Soft, NT/ND, +BS  Extremities - No C/C/E, No calf tenderness  Neuro-     Cognitive - AAOx3     Communication - Fluent, No dysarthria     Attention: Intact, able to perform serial 7s     Memory: Recall 3 objects immediate and 3 min later         Cranial Nerves - L hearing loss, L tongue deviation. Mild impaired L shoulder shrug     Motor -                     LEFT    UE - ShAB 4/5, EF 5/5, EE 5/5, WE 5/5,  5/5                    RIGHT UE - ShAB 5/5, EF 5/5, EE 5/5, WE 5/5,  5/5                    LEFT    LE - HF 5/5, KE 5/5, DF 5/5, PF 5/5                    RIGHT LE - HF 5/5, KE 5/5, DF 5/5, PF 5/5        Sensory - Intact to LT     Reflexes - DTR Intact, No primitive reflexive     Coordination - FTN intact     Tone - normal  Psychiatric - Mood stable, Affect WNL  Skin:  all skin intact    Wounds: None Present

## 2022-04-05 NOTE — PATIENT PROFILE ADULT - FALL HARM RISK - HARM RISK INTERVENTIONS
Assistance with ambulation/Assistance OOB with selected safe patient handling equipment/Communicate Risk of Fall with Harm to all staff/Discuss with provider need for PT consult/Monitor gait and stability/Reinforce activity limits and safety measures with patient and family/Tailored Fall Risk Interventions/Visual Cue: Yellow wristband and red socks/Bed in lowest position, wheels locked, appropriate side rails in place/Call bell, personal items and telephone in reach/Instruct patient to call for assistance before getting out of bed or chair/Non-slip footwear when patient is out of bed/[place] to call system/Physically safe environment - no spills, clutter or unnecessary equipment/Purposeful Proactive Rounding/Room/bathroom lighting operational, light cord in reach

## 2022-04-05 NOTE — PROGRESS NOTE ADULT - NS ATTEND OPT1 GEN_ALL_CORE
I attest my time as attending is greater than 50% of the total combined time spent on qualifying patient care activities by the PA/NP and attending.

## 2022-04-05 NOTE — PROGRESS NOTE ADULT - ASSESSMENT
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    Acute hypoxic resp failure, acute decompensated heart failure  likely 2/2 flash pulmonary edema, acute decompensated heart failure  s/p CXR- diffuse opacifications b/l  s/p VQ scan- very low probability of PE  patient w/o fever, chills, productive cough, pleurisy  procal elevated however, in the setting of real insufficiency  s/p CT chest read as Right upper lobe pneumonia superimposed on mild pulmonary edema. Bilateral pleural effusions.  however, clinically no evidence of PNA; perhaps reflects pneumonitis  pt on RA, SOB resolved; leukocytosis resolved without antibiotics  he is at risk for aspiration PNA and pneumonitis; aspiration precautions  continue to monitor off antibiotics    CVA  L arm weakness  s/p CT head- Interval demarcation of the right perirolandic cortex infarct.  on heparin gtt, f/u cardiology & neuro recs  f/u neurology recs    We will follow along in the care of this patient. Please call us at [phone] or text me directly on my cell# at [phone] with any concerns.    From an ID standpoint no further requirement for inpatient status for the management of ID issues. Fine with discharge from ID standpoint when other medical issues no longer require inpatient care and social issues allow for a safe discharge plan. To schedule an outpatient ID follow up appointment please call our office at [phone]

## 2022-04-05 NOTE — H&P ADULT - NSHPSOCIALHISTORY_GEN_ALL_CORE
SOCIAL HISTORY  Smoking - Former smoker quit 40 years ago, 20-pack-years  EtOH - denies  Illicit drugs - drugs    FUNCTIONAL HISTORY  Bob lives at home with his wife. He is retired; formerly worked in construction    Previous Functional Status:  Independent in ambulation, ADL's, transfers prior to hospitalization    Current Functional Status:  Bed mobility: CG  Transfers: CG  Gait / ambulation: Min assist  ADL's: Min-Mod assist

## 2022-04-05 NOTE — PROGRESS NOTE ADULT - PROBLEM SELECTOR PLAN 10
Chronic, stable on admission  - Will HOLD antihypertensives in the setting of permissive HTN due to pt's CVA  - Stable, continue metoprolol 50mg qD Chronic, stable on admission  - Will HOLD antihypertensives in the setting of permissive HTN due to pt's CVA  - Stable, continue metoprolol 50mg qD  - restart hydralazine today as discussed with cardiology

## 2022-04-05 NOTE — H&P ADULT - HISTORY OF PRESENT ILLNESS
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 As per patient, he was watching a movie when he felt very short of breath suddenly and dropped to the floor, denies LOC and denies any trauma to his head, was on the floor for ~2 hours. Patient was unable to rise from the floor by himself due to his left arm weakness. When first brought to the ED, patient was hypoxic and hypertensive o2 saturation in EMS was >80%. While in the ED, patient had episode of seizure-like activity in his b/l upper extremities which was controlled w/ ativan. Patient was seen and examined at bedside, BiPAP still in place, patient was still mildly lethargic from ativan and SOB with bipap mask on. Patient able to open eyes and nod/shake head to questions. He was admitted     In ED:   Vitals: HR 80, 176/82 -> 115/60, RR 20, 96% on BiPAP/CPAP  Labs significant for: WBC 20.24, H/H 10.8/24.1, D-Dimer 2924, Troponin 1011.9 > 5252.0, CKMB: 13.1, CPK%: 4%, BUN 33, Creatinine 2.3 (baseline unknown) eGFR 29, serum pro BNP 4963, creatine kinase 328, POCT glucose: 353  Imaging:   CXR: diffuse b/l scattered infiltrates  CT brain stroke: No acute hemorrhage, infarct, or mass effect   EKG: sinus tachy at ST- depressions in V5 V6   Received Lasix 40mg IVP x1, Aspirin 600mg x1, Hydralazine 10mg x1, Ativan 2mg IVP x1, Lispro 6u in the ED    (30 Mar 2022 08:48)      -------------------------------------------------------------------  HOSPITAL COURSE:   Patient admitted for LUE weakness and hypoxia 2/2 to acute pulmonary edema. Neuro Kem was consulted to siezure like activity and LUE, started patient on depakote and ordered EEG/repeat CT head. Repeat CT head positive for infart in right sulcus. Pt was started on Depakote, BB and high dose statin. Cardiology [facility] group consulted for elevated troponins. Patient's troponins also elevated as high as ~[id number], was started on heparin drip and aggrenox for CVA and NSTEMI. Heparin drip was discontinued after 48 hours and pt was started on PPX Heparin. ID Dr. Patten was consulted for patient leukocytosis, although leukocytosis resolved on its own, patient was found to have RUL PNA on CT chest, however Dr. Patten believed it to not be a true PNA and thus recommended continue monitoring off antibiotics. Nephro Dr. Porter consulted for patients CKD, recommended further avoidance of nephrotoxic agents but no other workup as patients renal indices were at baseline. Hematology Dr. Pandya consulted for patients anemia, recommended  starting venofer injections as patient's anemia likely a combination of REANNA based off iron studies and anemia of chronic disease. Patient initially had mcclellan placed in ED as a part of stroke workup, was removed but patient failed trial of void and had mcclellan replaced and was started on Flomax. Urology Dr. Harrison was consulted who recommended continuing flomax and mcclellan. Pt was evaluated by Palliative for GOC discussion and pt was made DNR with trial of intubation. Patient clinically improved over the course of his hospitalization. Patient was evaluated by PM&R and therapy for functional deficits and gait/ ADL impairments and recommended acute rehabilitation. Patient was medically optimized for discharge to [facility] on 4/5. 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 As per patient, he was watching a movie when he felt very short of breath suddenly and dropped to the floor, denies LOC and denies any trauma to his head, was on the floor for ~2 hours. Patient was unable to rise from the floor by himself due to his left arm weakness. When first brought to the ED, patient was hypoxic and hypertensive o2 saturation in EMS was >80%. While in the ED, patient had episode of seizure-like activity in his b/l upper extremities which was controlled w/ ativan. Patient was seen and examined at bedside, BiPAP still in place, patient was still mildly lethargic from ativan and SOB with bipap mask on. Patient able to open eyes and nod/shake head to questions. He was admitted for pulmonary edema and found to have leukocytosis, JARRED on CKD, elevated troponin/NSTEMI. He was started on depakote for seizures and heparin gtt and aggrenox. Repeat CTH 3/31 demonstrated infarct in R perirolandic cortex. Hospital course further complicated by RUL PNA on CT chest; however leukocytosis resolved and ID recommended monitoring off abx. Patient was evaluated by Palliative for GOC discussion and pt was made DNR with trial of intubation. He clinically improved over the course of his hospitalization. Patient was evaluated by therapy for functional deficits and gait/ ADL impairments and recommended acute rehabilitation. Patient was medically optimized for discharge to [facility] on 4/5.

## 2022-04-05 NOTE — PROGRESS NOTE ADULT - PROBLEM SELECTOR PROBLEM 6
Acute kidney injury superimposed on CKD

## 2022-04-05 NOTE — PROGRESS NOTE ADULT - PROBLEM SELECTOR PROBLEM 5
Chronic HFrEF (heart failure with reduced ejection fraction)

## 2022-04-06 LAB
ALBUMIN SERPL ELPH-MCNC: 2.8 G/DL — LOW (ref 3.3–5)
ALP SERPL-CCNC: 78 U/L — SIGNIFICANT CHANGE UP (ref 40–120)
ALT FLD-CCNC: 39 U/L — SIGNIFICANT CHANGE UP (ref 10–45)
ANION GAP SERPL CALC-SCNC: 10 MMOL/L — SIGNIFICANT CHANGE UP (ref 5–17)
AST SERPL-CCNC: 30 U/L — SIGNIFICANT CHANGE UP (ref 10–40)
BASOPHILS # BLD AUTO: 0.04 K/UL — SIGNIFICANT CHANGE UP (ref 0–0.2)
BASOPHILS NFR BLD AUTO: 0.5 % — SIGNIFICANT CHANGE UP (ref 0–2)
BILIRUB SERPL-MCNC: 0.4 MG/DL — SIGNIFICANT CHANGE UP (ref 0.2–1.2)
BUN SERPL-MCNC: 35 MG/DL — HIGH (ref 7–23)
CALCIUM SERPL-MCNC: 9.7 MG/DL — SIGNIFICANT CHANGE UP (ref 8.4–10.5)
CHLORIDE SERPL-SCNC: 106 MMOL/L — SIGNIFICANT CHANGE UP (ref 96–108)
CO2 SERPL-SCNC: 27 MMOL/L — SIGNIFICANT CHANGE UP (ref 22–31)
CREAT SERPL-MCNC: 2.35 MG/DL — HIGH (ref 0.5–1.3)
EGFR: 28 ML/MIN/1.73M2 — LOW
EOSINOPHIL # BLD AUTO: 0.29 K/UL — SIGNIFICANT CHANGE UP (ref 0–0.5)
EOSINOPHIL NFR BLD AUTO: 3.6 % — SIGNIFICANT CHANGE UP (ref 0–6)
GLUCOSE BLDC GLUCOMTR-MCNC: 107 MG/DL — HIGH (ref 70–99)
GLUCOSE BLDC GLUCOMTR-MCNC: 169 MG/DL — HIGH (ref 70–99)
GLUCOSE BLDC GLUCOMTR-MCNC: 191 MG/DL — HIGH (ref 70–99)
GLUCOSE BLDC GLUCOMTR-MCNC: 195 MG/DL — HIGH (ref 70–99)
GLUCOSE SERPL-MCNC: 108 MG/DL — HIGH (ref 70–99)
HCT VFR BLD CALC: 34 % — LOW (ref 39–50)
HGB BLD-MCNC: 10.6 G/DL — LOW (ref 13–17)
IMM GRANULOCYTES NFR BLD AUTO: 3.1 % — HIGH (ref 0–1.5)
LYMPHOCYTES # BLD AUTO: 0.94 K/UL — LOW (ref 1–3.3)
LYMPHOCYTES # BLD AUTO: 11.8 % — LOW (ref 13–44)
MCHC RBC-ENTMCNC: 24 PG — LOW (ref 27–34)
MCHC RBC-ENTMCNC: 31.2 GM/DL — LOW (ref 32–36)
MCV RBC AUTO: 77.1 FL — LOW (ref 80–100)
MONOCYTES # BLD AUTO: 0.95 K/UL — HIGH (ref 0–0.9)
MONOCYTES NFR BLD AUTO: 11.9 % — SIGNIFICANT CHANGE UP (ref 2–14)
NEUTROPHILS # BLD AUTO: 5.53 K/UL — SIGNIFICANT CHANGE UP (ref 1.8–7.4)
NEUTROPHILS NFR BLD AUTO: 69.1 % — SIGNIFICANT CHANGE UP (ref 43–77)
NRBC # BLD: 0 /100 WBCS — SIGNIFICANT CHANGE UP (ref 0–0)
PLATELET # BLD AUTO: 210 K/UL — SIGNIFICANT CHANGE UP (ref 150–400)
POTASSIUM SERPL-MCNC: 4.4 MMOL/L — SIGNIFICANT CHANGE UP (ref 3.5–5.3)
POTASSIUM SERPL-SCNC: 4.4 MMOL/L — SIGNIFICANT CHANGE UP (ref 3.5–5.3)
PROT SERPL-MCNC: 6.5 G/DL — SIGNIFICANT CHANGE UP (ref 6–8.3)
RBC # BLD: 4.41 M/UL — SIGNIFICANT CHANGE UP (ref 4.2–5.8)
RBC # FLD: 14.9 % — HIGH (ref 10.3–14.5)
SODIUM SERPL-SCNC: 143 MMOL/L — SIGNIFICANT CHANGE UP (ref 135–145)
WBC # BLD: 7.88 K/UL — SIGNIFICANT CHANGE UP (ref 3.8–10.5)
WBC # FLD AUTO: 7.88 K/UL — SIGNIFICANT CHANGE UP (ref 3.8–10.5)

## 2022-04-06 PROCEDURE — 99232 SBSQ HOSP IP/OBS MODERATE 35: CPT

## 2022-04-06 PROCEDURE — 99223 1ST HOSP IP/OBS HIGH 75: CPT

## 2022-04-06 RX ORDER — LANOLIN ALCOHOL/MO/W.PET/CERES
3 CREAM (GRAM) TOPICAL AT BEDTIME
Refills: 0 | Status: DISCONTINUED | OUTPATIENT
Start: 2022-04-06 | End: 2022-04-19

## 2022-04-06 RX ADMIN — Medication 325 MILLIGRAM(S): at 11:44

## 2022-04-06 RX ADMIN — Medication 2: at 12:30

## 2022-04-06 RX ADMIN — SENNA PLUS 2 TABLET(S): 8.6 TABLET ORAL at 21:54

## 2022-04-06 RX ADMIN — ASPIRIN AND DIPYRIDAMOLE 1 CAPSULE(S): 25; 200 CAPSULE, EXTENDED RELEASE ORAL at 05:15

## 2022-04-06 RX ADMIN — HEPARIN SODIUM 5000 UNIT(S): 5000 INJECTION INTRAVENOUS; SUBCUTANEOUS at 05:15

## 2022-04-06 RX ADMIN — DIVALPROEX SODIUM 500 MILLIGRAM(S): 500 TABLET, DELAYED RELEASE ORAL at 20:22

## 2022-04-06 RX ADMIN — ATORVASTATIN CALCIUM 80 MILLIGRAM(S): 80 TABLET, FILM COATED ORAL at 21:54

## 2022-04-06 RX ADMIN — Medication 50 MILLIGRAM(S): at 05:17

## 2022-04-06 RX ADMIN — Medication 3 MILLIGRAM(S): at 21:54

## 2022-04-06 RX ADMIN — DIVALPROEX SODIUM 500 MILLIGRAM(S): 500 TABLET, DELAYED RELEASE ORAL at 05:16

## 2022-04-06 RX ADMIN — Medication 75 MICROGRAM(S): at 05:15

## 2022-04-06 RX ADMIN — CALCITRIOL 0.25 MICROGRAM(S): 0.5 CAPSULE ORAL at 11:44

## 2022-04-06 RX ADMIN — HEPARIN SODIUM 5000 UNIT(S): 5000 INJECTION INTRAVENOUS; SUBCUTANEOUS at 21:54

## 2022-04-06 RX ADMIN — Medication 5 MILLIGRAM(S): at 15:11

## 2022-04-06 RX ADMIN — Medication 5 MILLIGRAM(S): at 05:15

## 2022-04-06 RX ADMIN — HEPARIN SODIUM 5000 UNIT(S): 5000 INJECTION INTRAVENOUS; SUBCUTANEOUS at 15:11

## 2022-04-06 RX ADMIN — PREGABALIN 1000 MICROGRAM(S): 225 CAPSULE ORAL at 11:44

## 2022-04-06 RX ADMIN — ASPIRIN AND DIPYRIDAMOLE 1 CAPSULE(S): 25; 200 CAPSULE, EXTENDED RELEASE ORAL at 20:22

## 2022-04-06 NOTE — PROVIDER CONTACT NOTE (MEDICATION) - ACTION/TREATMENT ORDERED:
continue to encourage pt to take coverage when needed and stress importance of blood glucose control

## 2022-04-06 NOTE — CONSULT NOTE ADULT - SUBJECTIVE AND OBJECTIVE BOX
Patient is a 76y old  Male who presents with a chief complaint of fatigue.    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 As per patient, he was watching a movie when he felt very short of breath suddenly and dropped to the floor, denies LOC and denies any trauma to his head, was on the floor for ~2 hours. Patient was unable to rise from the floor by himself due to his left arm weakness. When first brought to the ED, patient was hypoxic and hypertensive o2 saturation in EMS was >80%. While in the ED, patient had episode of seizure-like activity in his b/l upper extremities which was controlled w/ ativan. Patient was seen and examined at bedside, BiPAP still in place, patient was still mildly lethargic from ativan and SOB with bipap mask on. Patient able to open eyes and nod/shake head to questions. He was admitted for pulmonary edema and found to have leukocytosis, JARRED on CKD, elevated troponin/NSTEMI. He was started on depakote for seizures and heparin gtt and aggrenox. Repeat CTH 3/31 demonstrated infarct in R perirolandic cortex. Hospital course further complicated by RUL PNA on CT chest; however leukocytosis resolved and ID recommended monitoring off abx. Patient was evaluated by Palliative for GOC discussion and pt was made DNR with trial of intubation. He clinically improved over the course of his hospitalization. Patient was evaluated by therapy for functional deficits and gait/ ADL impairments and recommended acute rehabilitation. Patient was medically optimized for discharge to [facility] on 4/5. (05 Apr 2022 16:18)    Patient seen and examined at bedside, stable, NAD. denies headache, fever, chills, cp, sob, n/v, abd pain.      PAST MEDICAL & SURGICAL HISTORY:  MI (myocardial infarction) 1992  HTN (hypertension)  HLD (hyperlipidemia)  Throat cancer 2011, treated with chemo, RT  Ventricular arrhythmia s/p AICD  Diabetes Type2, not on any meds since weight loss after throat Ca  Renal insufficiency s/p chemo  CAD (coronary artery disease)  Inguinal hernia, left  H/O prior ablation treatment VT, 2009  Cardiac defibrillator in place - 2009, battery change 2017  S/P left inguinal hernia repair 2018 at [place]    Family History:     Social History:     Allergies  No Known Allergies  Intolerances    REVIEW OF SYSTEMS:  CONSTITUTIONAL: No fever, weight loss, or fatigue  EYES: No eye pain, visual disturbances, or discharge  ENMT:  No difficulty hearing, tinnitus, vertigo; No sinus or throat pain  NECK: No pain or stiffness  BREASTS: No pain, masses, or nipple discharge  RESPIRATORY: No cough, wheezing, chills or hemoptysis; No shortness of breath  CARDIOVASCULAR: No chest pain, palpitations, dizziness, or leg swelling  GASTROINTESTINAL: No abdominal or epigastric pain. No nausea, vomiting, or hematemesis; No diarrhea or constipation. No melena or hematochezia.  GENITOURINARY: No dysuria, frequency, hematuria, or incontinence  NEUROLOGICAL: No headaches, memory loss, loss of strength, numbness, or tremors  SKIN: No itching, burning, rashes, or lesions   LYMPH NODES: No enlarged glands  ENDOCRINE: No heat or cold intolerance; No hair loss  MUSCULOSKELETAL: No joint pain or swelling; No muscle, back, or extremity pain  PSYCHIATRIC: No depression, anxiety, mood swings, or difficulty sleeping  HEME/LYMPH: No easy bruising, or bleeding gums  ALLERY AND IMMUNOLOGIC: No hives or eczema    ALL ROS REVIEWED AND NORMAL EXCEPT AS STATED ABOVE    T(C): 36.8 (04-06-22 @ 05:11), Max: 36.8 (04-06-22 @ 05:11)  HR: 80 (04-06-22 @ 05:11) (72 - 80)  BP: 130/73 (04-06-22 @ 05:11) (130/73 - 149/82)  RR: 14 (04-06-22 @ 05:11) (14 - 18)  SpO2: 96% (04-06-22 @ 05:11) (93% - 97%)  Wt(kg): --Vital Signs Last 24 Hrs  T(C): 36.8 (06 Apr 2022 05:11), Max: 36.8 (06 Apr 2022 05:11)  T(F): 98.3 (06 Apr 2022 05:11), Max: 98.3 (06 Apr 2022 05:11)  HR: 80 (06 Apr 2022 05:11) (72 - 80)  BP: 130/73 (06 Apr 2022 05:11) (130/73 - 149/82)  BP(mean): --  RR: 14 (06 Apr 2022 05:11) (14 - 18)  SpO2: 96% (06 Apr 2022 05:11) (93% - 97%)    PHYSICAL EXAM:  GENERAL: NAD, well-groomed, well-developed  HEAD:  Atraumatic, Normocephalic  EYES: EOMI, PERRLA, conjunctiva and sclera clear  ENMT: No tonsillar erythema, exudates, or enlargement; Moist mucous membranes, Good dentition, No lesions  NECK: Supple, No JVD, Normal thyroid  NERVOUS SYSTEM:  Alert & Oriented X3, Good concentration; Motor Strength 5/5 B/L upper and lower extremities; DTRs 2+ intact and symmetric  CHEST/LUNG: Clear to percussion bilaterally; No rales, rhonchi, wheezing, or rubs  HEART: Regular rate and rhythm; No murmurs, rubs, or gallops  ABDOMEN: Soft, Nontender, Nondistended; Bowel sounds present  EXTREMITIES:  2+ Peripheral Pulses, No clubbing, cyanosis, or edema  LYMPH: No lymphadenopathy noted  SKIN: No rashes or lesions    LABS:                        10.6   7.88  )-----------( 210      ( 06 Apr 2022 05:00 )             34.0     04-06    143  |  106  |  35<H>  ----------------------------<  108<H>  4.4   |  27  |  2.35<H>    Ca    9.7      06 Apr 2022 05:00  Phos  3.4     04-05  Mg     2.4     04-05    TPro  6.5  /  Alb  2.8<L>  /  TBili  0.4  /  DBili  x   /  AST  30  /  ALT  39  /  AlkPhos  78  04-06      CAPILLARY BLOOD GLUCOSE  POCT Blood Glucose.: 153 mg/dL (05 Apr 2022 21:51)  POCT Blood Glucose.: 111 mg/dL (05 Apr 2022 17:05)  POCT Blood Glucose.: 152 mg/dL (05 Apr 2022 12:09)  POCT Blood Glucose.: 134 mg/dL (05 Apr 2022 08:05)    RADIOLOGY & ADDITIONAL TESTS: reviewed    Consultant(s) Notes Reviewed:  [x] YES  [ ] NO  Care Discussed with Consultants/Other Providers [x] YES  [ ] NO  Imaging Personally Reviewed:  [x] YES  [ ] NO Patient is a 75 y/o Male who presents with a chief complaint of fatigue.    HPI:  Patient is a 77 yo male w/ PMHx of HTN, HLD, HFrEF (EF 30% 2009), 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 As per patient, he was watching a movie when he felt very short of breath suddenly and dropped to the floor, denies LOC and denies any trauma to his head, was on the floor for ~2 hours. Patient was unable to rise from the floor by himself due to his left arm weakness. When first brought to the ED, patient was hypoxic and hypertensive o2 saturation in EMS was >80%. While in the ED, patient had episode of seizure-like activity in his b/l upper extremities which was controlled w/ ativan. Patient was seen and examined at bedside, BiPAP still in place, patient was still mildly lethargic from ativan and SOB with bipap mask on. Patient able to open eyes and nod/shake head to questions. He was admitted for pulmonary edema and found to have leukocytosis, JARRED on CKD, elevated troponin/NSTEMI. He was started on depakote for seizures and heparin gtt and aggrenox. Repeat CTH 3/31 demonstrated infarct in R perirolandic cortex. Hospital course further complicated by RUL PNA on CT chest; however leukocytosis resolved and ID recommended monitoring off abx. Patient was evaluated by Palliative for GOC discussion and pt was made DNR with trial of intubation. He clinically improved over the course of his hospitalization. Patient was evaluated by therapy for functional deficits and gait/ ADL impairments and recommended acute rehabilitation. Patient was medically optimized for discharge to [facility] on 4/5. (05 Apr 2022 16:18)    Patient seen and examined at bedside, stable, NAD. denies headache, fever, chills, cp, sob, n/v, abd pain.    PAST MEDICAL & SURGICAL HISTORY:  MI (myocardial infarction) 1992  HTN (hypertension)  HLD (hyperlipidemia)  Throat cancer 2011, treated with chemo, RT  Ventricular arrhythmia s/p AICD  Diabetes Type2, not on any meds since weight loss after throat Ca  Renal insufficiency s/p chemo  CAD (coronary artery disease)  Inguinal hernia, left  H/O prior ablation treatment VT, 2009  Cardiac defibrillator in place - 2009, battery change 2017  S/P left inguinal hernia repair 2018 at [place]    Family History: Denies pertinent family hx in first degree relatives    Social History: Denies EtOH, or illicit drug use. Former tobacco use 20 pack years, quit 40 years ago. PTA independent in ADLs, IADLs, and ambulation. Retired, worked in construction.    Allergies  No Known Allergies  Intolerances    REVIEW OF SYSTEMS:  CONSTITUTIONAL: No fever, weight loss, or fatigue  EYES: No eye pain, visual disturbances, or discharge  ENMT:  No difficulty hearing, tinnitus, vertigo; No sinus or throat pain  NECK: No pain or stiffness  RESPIRATORY: No cough, wheezing, chills or hemoptysis; No shortness of breath  CARDIOVASCULAR: No chest pain, palpitations, dizziness, or leg swelling  GASTROINTESTINAL: No abdominal or epigastric pain. No nausea, vomiting, or hematemesis; No diarrhea or constipation. No melena or hematochezia.  GENITOURINARY: No dysuria, frequency, hematuria, or incontinence  NEUROLOGICAL: No headaches, memory loss, loss of strength, numbness, or tremors  SKIN: No itching, burning, rashes, or lesions   LYMPH NODES: No enlarged glands  ENDOCRINE: No heat or cold intolerance; No hair loss  MUSCULOSKELETAL: +MSK weakness  PSYCHIATRIC: No depression, anxiety, mood swings, or difficulty sleeping  HEME/LYMPH: No easy bruising, or bleeding gums  ALLERGY AND IMMUNOLOGIC: No hives or eczema    ALL ROS REVIEWED AND NORMAL EXCEPT AS STATED ABOVE    T(C): 36.8 (04-06-22 @ 05:11), Max: 36.8 (04-06-22 @ 05:11)  HR: 80 (04-06-22 @ 05:11) (72 - 80)  BP: 130/73 (04-06-22 @ 05:11) (130/73 - 149/82)  RR: 14 (04-06-22 @ 05:11) (14 - 18)  SpO2: 96% (04-06-22 @ 05:11) (93% - 97%)  Wt(kg): --Vital Signs Last 24 Hrs  T(C): 36.8 (06 Apr 2022 05:11), Max: 36.8 (06 Apr 2022 05:11)  T(F): 98.3 (06 Apr 2022 05:11), Max: 98.3 (06 Apr 2022 05:11)  HR: 80 (06 Apr 2022 05:11) (72 - 80)  BP: 130/73 (06 Apr 2022 05:11) (130/73 - 149/82)  BP(mean): --  RR: 14 (06 Apr 2022 05:11) (14 - 18)  SpO2: 96% (06 Apr 2022 05:11) (93% - 97%)    PHYSICAL EXAM:  GENERAL: NAD, well-groomed, well-developed  HEAD:  Atraumatic, Normocephalic  EYES: EOMI, PERRLA, conjunctiva and sclera clear  ENMT: No tonsillar erythema, exudates, or enlargement; Moist mucous membranes  NECK: Supple, No JVD, Normal thyroid  NERVOUS SYSTEM:  Alert & Oriented X3, Good concentration; Motor Strength 4/5 B/L upper and lower extremities  CHEST/LUNG: Clear to percussion bilaterally; No rales, rhonchi, wheezing, or rubs  HEART: Regular rate and rhythm; No murmurs, rubs, or gallops  ABDOMEN: Soft, Nontender, Nondistended; Bowel sounds present  EXTREMITIES:  2+ Peripheral Pulses, No clubbing, cyanosis, or edema LE b/l  LYMPH: No lymphadenopathy noted  SKIN: No rashes or lesions  : + [place]    LABS:                        10.6   7.88  )-----------( 210      ( 06 Apr 2022 05:00 )             34.0     04-06    143  |  106  |  35<H>  ----------------------------<  108<H>  4.4   |  27  |  2.35<H>    Ca    9.7      06 Apr 2022 05:00  Phos  3.4     04-05  Mg     2.4     04-05    TPro  6.5  /  Alb  2.8<L>  /  TBili  0.4  /  DBili  x   /  AST  30  /  ALT  39  /  AlkPhos  78  04-06      CAPILLARY BLOOD GLUCOSE  POCT Blood Glucose.: 153 mg/dL (05 Apr 2022 21:51)  POCT Blood Glucose.: 111 mg/dL (05 Apr 2022 17:05)  POCT Blood Glucose.: 152 mg/dL (05 Apr 2022 12:09)  POCT Blood Glucose.: 134 mg/dL (05 Apr 2022 08:05)    RADIOLOGY & ADDITIONAL TESTS: reviewed    Consultant(s) Notes Reviewed:  [x] YES  [ ] NO  Care Discussed with Consultants/Other Providers [x] YES  [ ] NO  Imaging Personally Reviewed:  [x] YES  [ ] NO

## 2022-04-06 NOTE — PROGRESS NOTE ADULT - SUBJECTIVE AND OBJECTIVE BOX
SUBJECTIVE/ROS: He states that he is eager to go home and just wants to get better. He is concerned about his mcclellan as well as his swallowing ability. Denies chest pain, fever, chills, nausea, vomiting, abdominal pain, headache, or BLE pain.     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 As per patient, he was watching a movie when he felt very short of breath suddenly and dropped to the floor, denies LOC and denies any trauma to his head, was on the floor for ~2 hours. Patient was unable to rise from the floor by himself due to his left arm weakness. When first brought to the ED, patient was hypoxic and hypertensive o2 saturation in EMS was >80%. While in the ED, patient had episode of seizure-like activity in his b/l upper extremities which was controlled w/ ativan. Patient was seen and examined at bedside, BiPAP still in place, patient was still mildly lethargic from ativan and SOB with bipap mask on. Patient able to open eyes and nod/shake head to questions. He was admitted for pulmonary edema and found to have leukocytosis, JARRED on CKD, elevated troponin/NSTEMI. He was started on depakote for seizures and heparin gtt and aggrenox. Repeat CTH 3/31 demonstrated infarct in R perirolandic cortex. Hospital course further complicated by RUL PNA on CT chest; however leukocytosis resolved and ID recommended monitoring off abx. Patient was evaluated by Palliative for GOC discussion and pt was made DNR with trial of intubation. He clinically improved over the course of his hospitalization. Patient was evaluated by therapy for functional deficits and gait/ ADL impairments and recommended acute rehabilitation. Patient was medically optimized for discharge to [facility] on 4/5. (05 Apr 2022 16:18)      PAST MEDICAL & SURGICAL HISTORY:  MI (myocardial infarction)  1992    HTN (hypertension)    HLD (hyperlipidemia)    Throat cancer  2011, treated with chemo, RT    Ventricular arrhythmia  s/p AICD    Diabetes  Type2, not on any meds since weight loss after throat Ca    Renal insufficiency  s/p chemo    CAD (coronary artery disease)    Inguinal hernia, left    H/O prior ablation treatment  VT, 2009    Cardiac defibrillator in place  - 2009, battery change 2017    S/P left inguinal hernia repair  2018 at [place]        Allergies    No Known Allergies    Intolerances          PHYSICAL EXAM    Vital Signs Last 24 Hrs  T(C): 36.8 (06 Apr 2022 07:50), Max: 36.8 (06 Apr 2022 05:11)  T(F): 98.3 (06 Apr 2022 07:50), Max: 98.3 (06 Apr 2022 05:11)  HR: 82 (06 Apr 2022 07:50) (77 - 82)  BP: 100/66 (06 Apr 2022 07:50) (100/66 - 139/76)  BP(mean): --  RR: 14 (06 Apr 2022 07:50) (14 - 14)  SpO2: 100% (06 Apr 2022 07:50) (96% - 100%)  Daily Height in cm: 175.26 (05 Apr 2022 20:36)    Daily       PHYSICAL EXAM  Constitutional - NAD, Comfortable  HEENT - NCAT, EOMI  Neck - Supple, No limited ROM  Chest - CTA bilaterally  Cardiovascular - RRR, S1S2  Abdomen -BS+, Soft, NTND  Extremities - No C/C/E, No calf tenderness   Neurologic Exam - aox3, RU 5/5, RL 5/5, JOHN 4-5. LL 4-/5,  strength decreased, left tongue deviation, mild sensory deficit on the left     RECENT LABS:                          10.6   7.88  )-----------( 210      ( 06 Apr 2022 05:00 )             34.0     04-06    143  |  106  |  35<H>  ----------------------------<  108<H>  4.4   |  27  |  2.35<H>    Ca    9.7      06 Apr 2022 05:00  Phos  3.4     04-05  Mg     2.4     04-05    TPro  6.5  /  Alb  2.8<L>  /  TBili  0.4  /  DBili  x   /  AST  30  /  ALT  39  /  AlkPhos  78  04-06    LIVER FUNCTIONS - ( 06 Apr 2022 05:00 )  Alb: 2.8 g/dL / Pro: 6.5 g/dL / ALK PHOS: 78 U/L / ALT: 39 U/L / AST: 30 U/L / GGT: x                   CAPILLARY BLOOD GLUCOSE      POCT Blood Glucose.: 195 mg/dL (06 Apr 2022 11:52)  POCT Blood Glucose.: 107 mg/dL (06 Apr 2022 07:55)  POCT Blood Glucose.: 153 mg/dL (05 Apr 2022 21:51)  POCT Blood Glucose.: 111 mg/dL (05 Apr 2022 17:05)      MEDICATIONS  (STANDING):  atorvastatin 80 milliGRAM(s) Oral at bedtime  Biotene Dry Mouth Oral Rinse 5 milliLiter(s) Swish and Spit daily  calcitriol   Capsule 0.25 MICROGram(s) Oral daily  cyanocobalamin 1000 MICROGram(s) Oral daily  dextrose 5%. 1000 milliLiter(s) (50 mL/Hr) IV Continuous <Continuous>  dextrose 5%. 1000 milliLiter(s) (100 mL/Hr) IV Continuous <Continuous>  dextrose 50% Injectable 25 Gram(s) IV Push once  dextrose 50% Injectable 12.5 Gram(s) IV Push once  dextrose 50% Injectable 25 Gram(s) IV Push once  dipyridamole 200 mG/aspirin 25 mG 1 Capsule(s) Oral two times a day  diVALproex  milliGRAM(s) Oral two times a day  ferrous    sulfate 325 milliGRAM(s) Oral daily  glucagon  Injectable 1 milliGRAM(s) IntraMuscular once  heparin   Injectable 5000 Unit(s) SubCutaneous every 8 hours  hydrALAZINE 5 milliGRAM(s) Oral three times a day  insulin lispro (ADMELOG) corrective regimen sliding scale   SubCutaneous three times a day before meals  insulin lispro (ADMELOG) corrective regimen sliding scale   SubCutaneous at bedtime  levothyroxine 75 MICROGram(s) Oral daily  metoprolol succinate ER 50 milliGRAM(s) Oral daily  senna 2 Tablet(s) Oral at bedtime  tamsulosin 0.4 milliGRAM(s) Oral at bedtime    MEDICATIONS  (PRN):  ALBUTerol    90 MICROgram(s) HFA Inhaler 2 Puff(s) Inhalation every 6 hours PRN Shortness of Breath and/or Wheezing  dextrose Oral Gel 15 Gram(s) Oral once PRN Blood Glucose LESS THAN 70 milliGRAM(s)/deciliter

## 2022-04-06 NOTE — DIETITIAN INITIAL EVALUATION ADULT - PERTINENT MEDS FT
MEDICATIONS  (STANDING):  atorvastatin 80 milliGRAM(s) Oral at bedtime  Biotene Dry Mouth Oral Rinse 5 milliLiter(s) Swish and Spit daily  calcitriol   Capsule 0.25 MICROGram(s) Oral daily  cyanocobalamin 1000 MICROGram(s) Oral daily  dextrose 5%. 1000 milliLiter(s) (50 mL/Hr) IV Continuous <Continuous>  dextrose 5%. 1000 milliLiter(s) (100 mL/Hr) IV Continuous <Continuous>  dextrose 50% Injectable 25 Gram(s) IV Push once  dextrose 50% Injectable 12.5 Gram(s) IV Push once  dextrose 50% Injectable 25 Gram(s) IV Push once  dipyridamole 200 mG/aspirin 25 mG 1 Capsule(s) Oral two times a day  diVALproex  milliGRAM(s) Oral two times a day  ferrous    sulfate 325 milliGRAM(s) Oral daily  glucagon  Injectable 1 milliGRAM(s) IntraMuscular once  heparin   Injectable 5000 Unit(s) SubCutaneous every 8 hours  hydrALAZINE 5 milliGRAM(s) Oral three times a day  insulin lispro (ADMELOG) corrective regimen sliding scale   SubCutaneous three times a day before meals  insulin lispro (ADMELOG) corrective regimen sliding scale   SubCutaneous at bedtime  levothyroxine 75 MICROGram(s) Oral daily  metoprolol succinate ER 50 milliGRAM(s) Oral daily  senna 2 Tablet(s) Oral at bedtime  tamsulosin 0.4 milliGRAM(s) Oral at bedtime    MEDICATIONS  (PRN):  ALBUTerol    90 MICROgram(s) HFA Inhaler 2 Puff(s) Inhalation every 6 hours PRN Shortness of Breath and/or Wheezing  dextrose Oral Gel 15 Gram(s) Oral once PRN Blood Glucose LESS THAN 70 milliGRAM(s)/deciliter

## 2022-04-06 NOTE — DIETITIAN INITIAL EVALUATION ADULT - NS FNS DIET ORDER
Consistent Carbohydrate DASH-TLC Puree (IDDSI Level 4/Dysphagia 1) Diet w/ Moderately Thick Liquids (IDDS Level 3/Honey Thick)   on Glucerna 8oz PO TID (Provides 660kcal-30grams of Protein)

## 2022-04-06 NOTE — DIETITIAN INITIAL EVALUATION ADULT - ADD RECOMMEND
1) Monitor Weights, Intake, Tolerance, Skin & Labwork  2) Education Provided on Proper Nutrition  3) Continue Nutrition Plan of Care

## 2022-04-06 NOTE — PROGRESS NOTE ADULT - ASSESSMENT
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 Found to have R central sulcus MCA territory infarct on repeat CTH. Hospital course complicated by leukocytosis, JARRED on CKD. Admitted to [facility] acute inpatient rehab on 4/5 for ADL, gait, and functional impairments.     # s/p R central Sulcus CVA  - Comprehensive Multidisciplinary Rehab Program: 3 hours a day, 5 days a week with PT/OT/SLP  - Unable to obtain MRI due to ICD  - Continue aggrenox  - Continue atorvastatin  - Seizure management as below    # Seizures  - I/s/o R central sulcus infarct  - Continue Depakote 500mg BID for 6 months per neuro    # NSTEMI  # HFrEF  # HTN  - S/p heparin gtt, continue aggrenox  - Course c/b acute pulmonary edema s/p lasix x2 3/30. Diurese PRN  - TTE with severe systolic dysfunction 30-35%  - Continue statin  - Holding home imdur and coreg. Hold ACEI i/s/o JARRED on CKD  - Continue Metroprolol ER 50mg   -Continue hydralazine 5mg TID    # JARRED on CKD - improved  - SCr near baseline of 2.0-2.1  - Monitor routine BMP  - Avoid nephrotoxic medications; holding ACEI    #Type 2 Diabetes Mellitus  - A1c 8.3  - Continue SSI  -Not on medication at home  - Hospitalist consult appreciated    #Tonsillar Cancer #Dysphagia  - Seen by ENT recent  - Continue puree/mildly thick  - Residual L hearing loss    #Hypothyroid  -Continue synthroid 75mcg daily    #Urinary retention  -Continue flomax 0.4mg daily  -TOV on 4/6 at midnight  -Monitor UO  -Straight cath PRN    # Sleep:  - Melatonin qHS    # Pain Management:  - Tylenol PRN    # GI/Bowel:  - Senna QHS    # Skin/Pressure Injury:  - Skin assessment on admission: unremarkable    # Diet:   - Diet Consistency/Modifications: Puree/Mildly thick  - Aspiration Precautions  - SLP consult for swallow function evaluation and treatment    # DVT ppx:  - HSQ    # Restrictions/Precautions:  - Precautions: Falls, aspiration, cardiac    ---------------  Outpatient Follow-up:    CHANCE FRANCO  Internal Medicine  [address] [place] [address], [place] [address]  Phone: [phone]  Fax: [phone]  Follow Up Time: 1-3 days    Chet Garcia)  Cardiovascular Disease; Internal Medicine  [facility], [address] [place] [address], [address] [place] [address]  Phone: [phone]  Fax: [phone]  Follow Up Time: 1-3 days    Brandon Harrison)  Urology  [address] [place] [address], [address]  [place] [address]  Phone: [phone]  Fax: [phone]  Follow Up Time: 1-3 days    Yenifer Faulkner)  Neurology  [address] [place] [address]  Phone: [phone]  Fax: [phone]

## 2022-04-06 NOTE — DIETITIAN INITIAL EVALUATION ADULT - ORAL NUTRITION SUPPLEMENTS
Continue on Glucerna 8oz PO TID (Provides 660kcal-30grams of Protein)  Will Add Magic Cup BID (Provides 580kcal-18grams of Protein)

## 2022-04-06 NOTE — DIETITIAN INITIAL EVALUATION ADULT - ORAL INTAKE PTA/DIET HISTORY
Patient Does Not Follow Diet @Home, Consumes 2 Meals a Day, Orders Takeout/Delivery Regularly & Doesn't Take Vitamin/Supplements @Home   Ate Well Prior to Admission to Hospital

## 2022-04-06 NOTE — DIETITIAN INITIAL EVALUATION ADULT - PERTINENT LABORATORY DATA
04-06    143  |  106  |  35<H>  ----------------------------<  108<H>  4.4   |  27  |  2.35<H>    Ca    9.7      06 Apr 2022 05:00  Phos  3.4     04-05  Mg     2.4     04-05    TPro  6.5  /  Alb  2.8<L>  /  TBili  0.4  /  DBili  x   /  AST  30  /  ALT  39  /  AlkPhos  78  04-06  POCT Blood Glucose.: 195 mg/dL (04-06-22 @ 11:52)  A1C with Estimated Average Glucose Result: 8.3 % (03-31-22 @ 09:37)  A1C with Estimated Average Glucose Result: 8.3 % (03-30-22 @ 06:12)

## 2022-04-06 NOTE — DIETITIAN INITIAL EVALUATION ADULT - NSICDXPASTSURGICALHX_GEN_ALL_CORE_FT
PAST SURGICAL HISTORY:  Cardiac defibrillator in place - 2009, battery change 2017    H/O prior ablation treatment VT, 2009    S/P left inguinal hernia repair 2018 at [place]

## 2022-04-07 LAB
ALBUMIN SERPL ELPH-MCNC: 2.5 G/DL — LOW (ref 3.3–5)
ALP SERPL-CCNC: 66 U/L — SIGNIFICANT CHANGE UP (ref 40–120)
ALT FLD-CCNC: 28 U/L — SIGNIFICANT CHANGE UP (ref 10–45)
ANION GAP SERPL CALC-SCNC: 11 MMOL/L — SIGNIFICANT CHANGE UP (ref 5–17)
AST SERPL-CCNC: 24 U/L — SIGNIFICANT CHANGE UP (ref 10–40)
BASOPHILS # BLD AUTO: 0.03 K/UL — SIGNIFICANT CHANGE UP (ref 0–0.2)
BASOPHILS NFR BLD AUTO: 0.3 % — SIGNIFICANT CHANGE UP (ref 0–2)
BILIRUB SERPL-MCNC: 0.5 MG/DL — SIGNIFICANT CHANGE UP (ref 0.2–1.2)
BUN SERPL-MCNC: 49 MG/DL — HIGH (ref 7–23)
CALCIUM SERPL-MCNC: 9.1 MG/DL — SIGNIFICANT CHANGE UP (ref 8.4–10.5)
CHLORIDE SERPL-SCNC: 103 MMOL/L — SIGNIFICANT CHANGE UP (ref 96–108)
CO2 SERPL-SCNC: 25 MMOL/L — SIGNIFICANT CHANGE UP (ref 22–31)
CREAT SERPL-MCNC: 2.99 MG/DL — HIGH (ref 0.5–1.3)
EGFR: 21 ML/MIN/1.73M2 — LOW
EOSINOPHIL # BLD AUTO: 0.09 K/UL — SIGNIFICANT CHANGE UP (ref 0–0.5)
EOSINOPHIL NFR BLD AUTO: 0.9 % — SIGNIFICANT CHANGE UP (ref 0–6)
GLUCOSE BLDC GLUCOMTR-MCNC: 127 MG/DL — HIGH (ref 70–99)
GLUCOSE BLDC GLUCOMTR-MCNC: 150 MG/DL — HIGH (ref 70–99)
GLUCOSE BLDC GLUCOMTR-MCNC: 168 MG/DL — HIGH (ref 70–99)
GLUCOSE BLDC GLUCOMTR-MCNC: 186 MG/DL — HIGH (ref 70–99)
GLUCOSE SERPL-MCNC: 117 MG/DL — HIGH (ref 70–99)
HCT VFR BLD CALC: 30.7 % — LOW (ref 39–50)
HGB BLD-MCNC: 9.6 G/DL — LOW (ref 13–17)
IMM GRANULOCYTES NFR BLD AUTO: 1.2 % — SIGNIFICANT CHANGE UP (ref 0–1.5)
LYMPHOCYTES # BLD AUTO: 0.89 K/UL — LOW (ref 1–3.3)
LYMPHOCYTES # BLD AUTO: 9.1 % — LOW (ref 13–44)
MCHC RBC-ENTMCNC: 23.9 PG — LOW (ref 27–34)
MCHC RBC-ENTMCNC: 31.3 GM/DL — LOW (ref 32–36)
MCV RBC AUTO: 76.6 FL — LOW (ref 80–100)
MONOCYTES # BLD AUTO: 1.29 K/UL — HIGH (ref 0–0.9)
MONOCYTES NFR BLD AUTO: 13.2 % — SIGNIFICANT CHANGE UP (ref 2–14)
NEUTROPHILS # BLD AUTO: 7.36 K/UL — SIGNIFICANT CHANGE UP (ref 1.8–7.4)
NEUTROPHILS NFR BLD AUTO: 75.3 % — SIGNIFICANT CHANGE UP (ref 43–77)
NRBC # BLD: 0 /100 WBCS — SIGNIFICANT CHANGE UP (ref 0–0)
PLATELET # BLD AUTO: 185 K/UL — SIGNIFICANT CHANGE UP (ref 150–400)
POTASSIUM SERPL-MCNC: 3.8 MMOL/L — SIGNIFICANT CHANGE UP (ref 3.5–5.3)
POTASSIUM SERPL-SCNC: 3.8 MMOL/L — SIGNIFICANT CHANGE UP (ref 3.5–5.3)
PROT SERPL-MCNC: 6.1 G/DL — SIGNIFICANT CHANGE UP (ref 6–8.3)
RBC # BLD: 4.01 M/UL — LOW (ref 4.2–5.8)
RBC # FLD: 15.3 % — HIGH (ref 10.3–14.5)
SODIUM SERPL-SCNC: 139 MMOL/L — SIGNIFICANT CHANGE UP (ref 135–145)
WBC # BLD: 9.78 K/UL — SIGNIFICANT CHANGE UP (ref 3.8–10.5)
WBC # FLD AUTO: 9.78 K/UL — SIGNIFICANT CHANGE UP (ref 3.8–10.5)

## 2022-04-07 PROCEDURE — 99233 SBSQ HOSP IP/OBS HIGH 50: CPT

## 2022-04-07 PROCEDURE — 99232 SBSQ HOSP IP/OBS MODERATE 35: CPT

## 2022-04-07 PROCEDURE — 99222 1ST HOSP IP/OBS MODERATE 55: CPT

## 2022-04-07 RX ORDER — SODIUM CHLORIDE 9 MG/ML
500 INJECTION INTRAMUSCULAR; INTRAVENOUS; SUBCUTANEOUS ONCE
Refills: 0 | Status: DISCONTINUED | OUTPATIENT
Start: 2022-04-07 | End: 2022-04-08

## 2022-04-07 RX ADMIN — HEPARIN SODIUM 5000 UNIT(S): 5000 INJECTION INTRAVENOUS; SUBCUTANEOUS at 21:35

## 2022-04-07 RX ADMIN — Medication 5 MILLILITER(S): at 11:11

## 2022-04-07 RX ADMIN — ASPIRIN AND DIPYRIDAMOLE 1 CAPSULE(S): 25; 200 CAPSULE, EXTENDED RELEASE ORAL at 17:06

## 2022-04-07 RX ADMIN — Medication 3 MILLIGRAM(S): at 21:35

## 2022-04-07 RX ADMIN — TAMSULOSIN HYDROCHLORIDE 0.4 MILLIGRAM(S): 0.4 CAPSULE ORAL at 21:36

## 2022-04-07 RX ADMIN — Medication 325 MILLIGRAM(S): at 11:11

## 2022-04-07 RX ADMIN — DIVALPROEX SODIUM 500 MILLIGRAM(S): 500 TABLET, DELAYED RELEASE ORAL at 06:18

## 2022-04-07 RX ADMIN — HEPARIN SODIUM 5000 UNIT(S): 5000 INJECTION INTRAVENOUS; SUBCUTANEOUS at 06:23

## 2022-04-07 RX ADMIN — Medication 75 MICROGRAM(S): at 06:18

## 2022-04-07 RX ADMIN — HEPARIN SODIUM 5000 UNIT(S): 5000 INJECTION INTRAVENOUS; SUBCUTANEOUS at 14:16

## 2022-04-07 RX ADMIN — DIVALPROEX SODIUM 500 MILLIGRAM(S): 500 TABLET, DELAYED RELEASE ORAL at 17:06

## 2022-04-07 RX ADMIN — Medication 2: at 17:12

## 2022-04-07 RX ADMIN — ATORVASTATIN CALCIUM 80 MILLIGRAM(S): 80 TABLET, FILM COATED ORAL at 21:36

## 2022-04-07 RX ADMIN — PREGABALIN 1000 MICROGRAM(S): 225 CAPSULE ORAL at 11:11

## 2022-04-07 RX ADMIN — CALCITRIOL 0.25 MICROGRAM(S): 0.5 CAPSULE ORAL at 11:11

## 2022-04-07 RX ADMIN — ASPIRIN AND DIPYRIDAMOLE 1 CAPSULE(S): 25; 200 CAPSULE, EXTENDED RELEASE ORAL at 06:18

## 2022-04-07 NOTE — CONSULT NOTE ADULT - SUBJECTIVE AND OBJECTIVE BOX
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 Patient was unable to rise from the floor by himself due to his left arm weakness. When first brought to the ED, patient was hypoxic and hypertensive. While in the ED, patient had episode of seizure-like activity in his b/l upper extremities which was controlled w/ativan. He was admitted for pulmonary edema and found to have leukocytosis, JARRED on CKD, elevated troponin/NSTEMI. He was started on depakote for seizures and heparin gtt and aggrenox. Repeat CTH 3/31 demonstrated infarct in R perirolandic cortex. Hospital course further complicated by RUL PNA on CT chest, leukocytosis resolved and ID recommended monitoring off abx. Patient was evaluated by Palliative for GOC discussion and pt was made DNR with trial of intubation. He clinically improved over the course of his hospitalization. Adm to [facility] 4/5/22. Noted to have JARRED/CKD.     ROS:  as above    Allergies:  No Known Allergies    PAST MEDICAL & SURGICAL HISTORY:  MI (myocardial infarction)  1992  HTN (hypertension)  HLD (hyperlipidemia)  Throat cancer  2011, treated with chemo, RT  Ventricular arrhythmia  s/p AICD  Diabetes  Type2, not on any meds since weight loss after throat Ca  Renal insufficiency  s/p chemo  CAD (coronary artery disease)  Inguinal hernia, left  H/O prior ablation treatment  VT, 2009  Cardiac defibrillator in place  - 2009, battery change 2017  S/P left inguinal hernia repair  2018 at [place]    SOCIAL HISTORY:  negative    FAMILY HISTORY:  Family history of cancer (Sibling)    MEDICATIONS  (STANDING):  atorvastatin 80 milliGRAM(s) Oral at bedtime  Biotene Dry Mouth Oral Rinse 5 milliLiter(s) Swish and Spit daily  calcitriol   Capsule 0.25 MICROGram(s) Oral daily  cyanocobalamin 1000 MICROGram(s) Oral daily  dextrose 5%. 1000 milliLiter(s) (50 mL/Hr) IV Continuous <Continuous>  dextrose 5%. 1000 milliLiter(s) (100 mL/Hr) IV Continuous <Continuous>  dextrose 50% Injectable 25 Gram(s) IV Push once  dextrose 50% Injectable 12.5 Gram(s) IV Push once  dextrose 50% Injectable 25 Gram(s) IV Push once  dipyridamole 200 mG/aspirin 25 mG 1 Capsule(s) Oral two times a day  diVALproex  milliGRAM(s) Oral two times a day  ferrous    sulfate 325 milliGRAM(s) Oral daily  glucagon  Injectable 1 milliGRAM(s) IntraMuscular once  heparin   Injectable 5000 Unit(s) SubCutaneous every 8 hours  insulin lispro (ADMELOG) corrective regimen sliding scale   SubCutaneous three times a day before meals  insulin lispro (ADMELOG) corrective regimen sliding scale   SubCutaneous at bedtime  levothyroxine 75 MICROGram(s) Oral daily  melatonin 3 milliGRAM(s) Oral at bedtime  senna 2 Tablet(s) Oral at bedtime  sodium chloride 0.9% Bolus 500 milliLiter(s) IV Bolus once  tamsulosin 0.4 milliGRAM(s) Oral at bedtime    Vital Signs Last 24 Hrs  T(C): 36.6 (04-07-22 @ 07:50), Max: 36.8 (04-06-22 @ 20:12)  T(F): 97.8 (04-07-22 @ 07:50), Max: 98.2 (04-06-22 @ 20:12)  HR: 73 (04-07-22 @ 07:50) (70 - 84)  BP: 81/43 (04-07-22 @ 07:50) (81/43 - 110/67)  RR: 15 (04-07-22 @ 07:50) (14 - 15)  SpO2: 95% (04-07-22 @ 07:50) (95% - 97%)    LABS:                        9.6    9.78  )-----------( 185      ( 07 Apr 2022 05:30 )             30.7     04-07    139  |  103  |  49<H>  ----------------------------<  117<H>  3.8   |  25  |  2.99<H>    Ca    9.1      07 Apr 2022 05:30    TPro  6.1  /  Alb  2.5<L>  /  TBili  0.5  /  DBili  x   /  AST  24  /  ALT  28  /  AlkPhos  66  04-07    LIVER FUNCTIONS - ( 07 Apr 2022 05:30 )  Alb: 2.5 g/dL / Pro: 6.1 g/dL / ALK PHOS: 66 U/L / ALT: 28 U/L / AST: 24 U/L / GGT: x           A/P:    full consult to follow    [phone]   NEPHROLOGY CONSULTATION    CHIEF COMPLAINT: CVA    HPI:  Patient is a 75 yo male w/PMH of HTN, HLD, HFrEF (EF 30% 2009), 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 Patient was unable to rise from the floor by himself due to his left arm weakness. When first brought to the ED, patient was hypoxic and hypertensive. While in the ED, patient had episode of seizure-like activity in his b/l upper extremities which was controlled w/ativan. He was admitted for pulmonary edema and found to have leukocytosis, JARRED on CKD, elevated troponin/NSTEMI. He was started on depakote for seizures and heparin gtt and aggrenox. Repeat CTH 3/31 demonstrated infarct in R perirolandic cortex. Hospital course further complicated by RUL PNA on CT chest, leukocytosis resolved and ID recommended monitoring off abx. Patient was evaluated by Palliative for GOC discussion and pt was made DNR with trial of intubation. He clinically improved over the course of his hospitalization. Adm to [facility] 4/5/22. Noted to have JARRED/CKD, hypotension. Denies CP, SOB, N/V/D/C/F/C, refused IVL and IVF.    ROS:  as above    Allergies:  No Known Allergies    PAST MEDICAL & SURGICAL HISTORY:  MI (myocardial infarction)  1992  HTN (hypertension)  HLD (hyperlipidemia)  Throat cancer  2011, treated with chemo, RT  Ventricular arrhythmia  s/p AICD  Diabetes  Type2, not on any meds since weight loss after throat Ca  Renal insufficiency  s/p chemo  CAD (coronary artery disease)  Inguinal hernia, left  H/O prior ablation treatment  VT, 2009  Cardiac defibrillator in place  - 2009, battery change 2017  S/P left inguinal hernia repair  2018 at [place]    SOCIAL HISTORY:  negative    FAMILY HISTORY:  Family history of cancer (Sibling)    MEDICATIONS  (STANDING):  atorvastatin 80 milliGRAM(s) Oral at bedtime  Biotene Dry Mouth Oral Rinse 5 milliLiter(s) Swish and Spit daily  calcitriol   Capsule 0.25 MICROGram(s) Oral daily  cyanocobalamin 1000 MICROGram(s) Oral daily  dextrose 5%. 1000 milliLiter(s) (50 mL/Hr) IV Continuous <Continuous>  dextrose 5%. 1000 milliLiter(s) (100 mL/Hr) IV Continuous <Continuous>  dextrose 50% Injectable 25 Gram(s) IV Push once  dextrose 50% Injectable 12.5 Gram(s) IV Push once  dextrose 50% Injectable 25 Gram(s) IV Push once  dipyridamole 200 mG/aspirin 25 mG 1 Capsule(s) Oral two times a day  diVALproex  milliGRAM(s) Oral two times a day  ferrous    sulfate 325 milliGRAM(s) Oral daily  glucagon  Injectable 1 milliGRAM(s) IntraMuscular once  heparin   Injectable 5000 Unit(s) SubCutaneous every 8 hours  insulin lispro (ADMELOG) corrective regimen sliding scale   SubCutaneous three times a day before meals  insulin lispro (ADMELOG) corrective regimen sliding scale   SubCutaneous at bedtime  levothyroxine 75 MICROGram(s) Oral daily  melatonin 3 milliGRAM(s) Oral at bedtime  senna 2 Tablet(s) Oral at bedtime  sodium chloride 0.9% Bolus 500 milliLiter(s) IV Bolus once  tamsulosin 0.4 milliGRAM(s) Oral at bedtime    Vital Signs Last 24 Hrs  T(C): 36.6 (04-07-22 @ 07:50), Max: 36.8 (04-06-22 @ 20:12)  T(F): 97.8 (04-07-22 @ 07:50), Max: 98.2 (04-06-22 @ 20:12)  HR: 73 (04-07-22 @ 07:50) (70 - 84)  BP: 81/43 (04-07-22 @ 07:50) (81/43 - 96/58)  RR: 15 (04-07-22 @ 07:50) (14 - 15)  SpO2: 95% (04-07-22 @ 07:50) (95% - 97%)    s1s2  b/l air entry  soft, ND  no edema    LABS:                        9.6    9.78  )-----------( 185      ( 07 Apr 2022 05:30 )             30.7     04-07    139  |  103  |  49<H>  ----------------------------<  117<H>  3.8   |  25  |  2.99<H>    Ca    9.1      07 Apr 2022 05:30    TPro  6.1  /  Alb  2.5<L>  /  TBili  0.5  /  DBili  x   /  AST  24  /  ALT  28  /  AlkPhos  66  04-07    LIVER FUNCTIONS - ( 07 Apr 2022 05:30 )  Alb: 2.5 g/dL / Pro: 6.1 g/dL / ALK PHOS: 66 U/L / ALT: 28 U/L / AST: 24 U/L / GGT: x           A/P:    JARRED/CKD 3 in setting of hypotension, possible retention (Cr 2.0 - 4/5/22)  Avoid BP lowering meds  Avoid nephrotoxins  NS at 75cc/hr if pt agrees  Will check UA, PVR  BMP in am  D/w rehab team    [phone]

## 2022-04-07 NOTE — PROVIDER CONTACT NOTE (OTHER) - SITUATION
Patient's BP is 85/52- HR 74 Patient's BP is 85/52  Pt A&OX4, asymptomatic, denies any discomfort and stated that he gets this BP reading at home too.

## 2022-04-07 NOTE — PROGRESS NOTE ADULT - SUBJECTIVE AND OBJECTIVE BOX
Patient is a 76y old  Male who presents with a chief complaint of Stroke (07 Apr 2022 11:06)      Patient seen and examined at bedside. refusing PO hydration. +asymptomatic denies headache, fever, chills, cp, sob, n/v, abd pain.     ALLERGIES:  No Known Allergies    MEDICATIONS  (STANDING):  atorvastatin 80 milliGRAM(s) Oral at bedtime  Biotene Dry Mouth Oral Rinse 5 milliLiter(s) Swish and Spit daily  calcitriol   Capsule 0.25 MICROGram(s) Oral daily  cyanocobalamin 1000 MICROGram(s) Oral daily  dextrose 5%. 1000 milliLiter(s) (50 mL/Hr) IV Continuous <Continuous>  dextrose 5%. 1000 milliLiter(s) (100 mL/Hr) IV Continuous <Continuous>  dextrose 50% Injectable 25 Gram(s) IV Push once  dextrose 50% Injectable 12.5 Gram(s) IV Push once  dextrose 50% Injectable 25 Gram(s) IV Push once  dipyridamole 200 mG/aspirin 25 mG 1 Capsule(s) Oral two times a day  diVALproex  milliGRAM(s) Oral two times a day  ferrous    sulfate 325 milliGRAM(s) Oral daily  glucagon  Injectable 1 milliGRAM(s) IntraMuscular once  heparin   Injectable 5000 Unit(s) SubCutaneous every 8 hours  insulin lispro (ADMELOG) corrective regimen sliding scale   SubCutaneous three times a day before meals  insulin lispro (ADMELOG) corrective regimen sliding scale   SubCutaneous at bedtime  levothyroxine 75 MICROGram(s) Oral daily  melatonin 3 milliGRAM(s) Oral at bedtime  senna 2 Tablet(s) Oral at bedtime  sodium chloride 0.9% Bolus 500 milliLiter(s) IV Bolus once  tamsulosin 0.4 milliGRAM(s) Oral at bedtime    MEDICATIONS  (PRN):  ALBUTerol    90 MICROgram(s) HFA Inhaler 2 Puff(s) Inhalation every 6 hours PRN Shortness of Breath and/or Wheezing  dextrose Oral Gel 15 Gram(s) Oral once PRN Blood Glucose LESS THAN 70 milliGRAM(s)/deciliter    Vital Signs Last 24 Hrs  T(F): 97.8 (07 Apr 2022 07:50), Max: 98.2 (06 Apr 2022 20:12)  HR: 73 (07 Apr 2022 07:50) (70 - 84)  BP: 81/43 (07 Apr 2022 07:50) (81/43 - 110/67)  RR: 15 (07 Apr 2022 07:50) (14 - 15)  SpO2: 95% (07 Apr 2022 07:50) (95% - 97%)  I&O's Summary    06 Apr 2022 07:01  -  07 Apr 2022 07:00  --------------------------------------------------------  IN: 0 mL / OUT: 130 mL / NET: -130 mL      BMI (kg/m2): 22.2 (04-05-22 @ 20:36)    PHYSICAL EXAM:  General: NAD  ENT: MMM, no scleral icterus  Neck: Supple  Lungs: Respirations unlabored. Clear to auscultation bilaterally, no wheezes, rales, rhonchi  Cardio: RRR, S1/S2, +systolic murmur  Abdomen: Soft, Nontender, Nondistended; Bowel sounds present  Extremities: No calf tenderness, No pitting edema LE b/l    LABS:                        9.6    9.78  )-----------( 185      ( 07 Apr 2022 05:30 )             30.7       04-07    139  |  103  |  49  ----------------------------<  117  3.8   |  25  |  2.99    Ca    9.1      07 Apr 2022 05:30  Phos  3.4     04-05  Mg     2.4     04-05    TPro  6.1  /  Alb  2.5  /  TBili  0.5  /  DBili  x   /  AST  24  /  ALT  28  /  AlkPhos  66  04-07    03-31 Chol 103 mg/dL LDL -- HDL 30 mg/dL Trig 76 mg/dL    POCT Blood Glucose.: 150 mg/dL (07 Apr 2022 11:50)  POCT Blood Glucose.: 127 mg/dL (07 Apr 2022 08:50)  POCT Blood Glucose.: 169 mg/dL (06 Apr 2022 21:51)  POCT Blood Glucose.: 191 mg/dL (06 Apr 2022 16:34)    COVID-19 PCR: NotDetec (04-05-22 @ 22:00)  COVID-19 PCR: NotDetec (04-04-22 @ 08:18)    RADIOLOGY & ADDITIONAL TESTS: reviewed    Care Discussed with Consultants/Other Providers: yes

## 2022-04-07 NOTE — CONSULT NOTE ADULT - SUBJECTIVE AND OBJECTIVE BOX
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 As per patient, he was watching a movie when he felt very short of breath suddenly and dropped to the floor, denies LOC and denies any trauma to his head, was on the floor for ~2 hours. Patient was unable to rise from the floor by himself due to his left arm weakness. When first brought to the ED, patient was hypoxic and hypertensive o2 saturation in EMS was >80%. While in the ED, patient had episode of seizure-like activity in his b/l upper extremities which was controlled w/ ativan. Patient was seen and examined at bedside, BiPAP still in place, patient was still mildly lethargic from ativan and SOB with bipap mask on. Patient able to open eyes and nod/shake head to questions. He was admitted for pulmonary edema and found to have leukocytosis, JARRED on CKD, elevated troponin/NSTEMI. He was started on depakote for seizures and heparin gtt and aggrenox. Repeat CTH 3/31 demonstrated infarct in R perirolandic cortex. Hospital course further complicated by RUL PNA on CT chest; however leukocytosis resolved and ID recommended monitoring off abx. Patient was evaluated by Palliative for GOC discussion and pt was made DNR with trial of intubation. He clinically improved over the course of his hospitalization. Patient was evaluated by therapy for functional deficits and gait/ ADL impairments and recommended acute rehabilitation. Patient was medically optimized for discharge to [facility] on 4/5. prevously seen 2/2/17 for an icd paige.     HPI:    PMH:   AICD at end of battery life    MI (myocardial infarction)    HTN (hypertension)    HLD (hyperlipidemia)    Throat cancer    Ventricular arrhythmia    Diabetes    Renal insufficiency    CAD (coronary artery disease)    Inguinal hernia, left      PSH:   H/O prior ablation treatment    Cardiac defibrillator in place    S/P left inguinal hernia repair      Family History:  FAMILY HISTORY:  Family history of cancer (Sibling)        Social History:  Smoking:  Alcohol:  Drugs:    Allergies:  No Known Allergies      Medications:  ALBUTerol    90 MICROgram(s) HFA Inhaler 2 Puff(s) Inhalation every 6 hours PRN  atorvastatin 80 milliGRAM(s) Oral at bedtime  Biotene Dry Mouth Oral Rinse 5 milliLiter(s) Swish and Spit daily  calcitriol   Capsule 0.25 MICROGram(s) Oral daily  cyanocobalamin 1000 MICROGram(s) Oral daily  dextrose 5%. 1000 milliLiter(s) IV Continuous <Continuous>  dextrose 5%. 1000 milliLiter(s) IV Continuous <Continuous>  dextrose 50% Injectable 25 Gram(s) IV Push once  dextrose 50% Injectable 12.5 Gram(s) IV Push once  dextrose 50% Injectable 25 Gram(s) IV Push once  dextrose Oral Gel 15 Gram(s) Oral once PRN  dipyridamole 200 mG/aspirin 25 mG 1 Capsule(s) Oral two times a day  diVALproex  milliGRAM(s) Oral two times a day  ferrous    sulfate 325 milliGRAM(s) Oral daily  glucagon  Injectable 1 milliGRAM(s) IntraMuscular once  heparin   Injectable 5000 Unit(s) SubCutaneous every 8 hours  insulin lispro (ADMELOG) corrective regimen sliding scale   SubCutaneous three times a day before meals  insulin lispro (ADMELOG) corrective regimen sliding scale   SubCutaneous at bedtime  levothyroxine 75 MICROGram(s) Oral daily  melatonin 3 milliGRAM(s) Oral at bedtime  senna 2 Tablet(s) Oral at bedtime  sodium chloride 0.9% Bolus 500 milliLiter(s) IV Bolus once  tamsulosin 0.4 milliGRAM(s) Oral at bedtime      REVIEW OF SYSTEMS:  CONSTITUTIONAL: No fever, weight loss, or fatigue  EYES: No eye pain, visual disturbances, or discharge  ENMT:  No difficulty hearing, tinnitus, vertigo; No sinus or throat pain  NECK: No pain or stiffness  BREASTS: No pain, masses, or nipple discharge  RESPIRATORY: No cough, wheezing, chills or hemoptysis; No shortness of breath  CARDIOVASCULAR: No chest pain, palpitations, dizziness, or leg swelling  GASTROINTESTINAL: No abdominal or epigastric pain. No nausea, vomiting, or hematemesis; No diarrhea or constipation. No melena or hematochezia.  GENITOURINARY: No dysuria, frequency, hematuria, or incontinence  NEUROLOGICAL: No headaches, memory loss, loss of strength, numbness, or tremors  SKIN: No itching, burning, rashes, or lesions   LYMPH NODES: No enlarged glands  ENDOCRINE: No heat or cold intolerance; No hair loss  MUSCULOSKELETAL: No joint pain or swelling; No muscle, back, or extremity pain  PSYCHIATRIC: No depression, anxiety, mood swings, or difficulty sleeping  HEME/LYMPH: No easy bruising, or bleeding gums  ALLERY AND IMMUNOLOGIC: No hives or eczema    Physical Exam:  T(C): 36.6 (04-07-22 @ 07:50), Max: 36.8 (04-06-22 @ 20:12)  HR: 73 (04-07-22 @ 07:50) (70 - 84)  BP: 81/43 (04-07-22 @ 07:50) (81/43 - 96/60)  RR: 15 (04-07-22 @ 07:50) (14 - 15)  SpO2: 95% (04-07-22 @ 07:50) (95% - 97%)  Wt(kg): --    GENERAL: NAD, well-groomed, well-developed  HEAD:  Atraumatic, Normocephalic  EYES: EOMI, conjunctiva and sclera clear  ENT: Moist mucous membranes,  NECK: Supple, No JVD, no bruits  CHEST/LUNG: Clear to percussion bilaterally; No rales, rhonchi, wheezing, or rubs  HEART: Regular rate and rhythm; No murmurs, rubs, or gallops PMI non displaced.  ABDOMEN: Soft, Nontender, Nondistended; Bowel sounds present  EXTREMITIES:  2+ Peripheral Pulses, No clubbing, cyanosis, or edema  SKIN: No rashes or lesions  NERVOUS SYSTEM:  Cranial Nerves II-XII intact     Cardiovascular Diagnostic Testing:  ECG:    < from: 12 Lead ECG (04.01.22 @ 14:50) >  Diagnosis Line Sinus rhythm with premature supraventricular complexes  and ventricular complexes  Left axis deviation  Left ventricular hypertrophy with QRS widening  Anteroseptal infarct (cited on or before 01-AUG-2018)  Confirmed by ANA PAULA NICOLE ([id number]) on 4/2/2022 11:17:27 AM    < end of copied text >      ECHO:    < from: TTE Echo Complete w/ Contrast w/ Doppler (03.31.22 @ 20:42) >  IMPRESSION:  Technically difficult and limited study  The left ventricular endocardium is not well-visualized. Injectable echo   contrast was used to help enhance the images. The left ventricle is   enlarged with moderate to severe left ventricular systolic dysfunction,   estimated LVEF of 30-35%. The apical cap, apical septum and apical   lateral walls are akinetic with hypokinesis of the remaining walls. There   is no evidence of left ventricular thrombus  The right ventricle is not well-visualized, device wire is noted within   the right heart  Sclerotic trileaflet aortic valve with grossly normal opening, without AI.  Mild MR  Trace TR.    --- End of Report ---    SARAHY CASTRO MD; Attending Cardiologist  This document has beenelectronically signed. Apr 2 2022  4:11PM    < end of copied text >      Labs:                        9.6    9.78  )-----------( 185      ( 07 Apr 2022 05:30 )             30.7     04-07    139  |  103  |  49<H>  ----------------------------<  117<H>  3.8   |  25  |  2.99<H>    Ca    9.1      07 Apr 2022 05:30    TPro  6.1  /  Alb  2.5<L>  /  TBili  0.5  /  DBili  x   /  AST  24  /  ALT  28  /  AlkPhos  66  04-07      Imaging:    < from: Xray Chest 1 View- PORTABLE-Urgent (03.30.22 @ 06:03) >  IMPRESSION: Fairly advanced diffuse bilateral scattered infiltrates.    --- End of Report ---      HAYLEY ALBERTO MD; Attending Radiologist  This document has been electronically signed. Mar 30 2022 10:08AM    < end of copied text >    impression  hfref  would consider patient for ace arb Arni (deferred for renal failure) or hydralazine 10 mb bid  and nitrates imdur 15 mg daily  in stead  and long acting beta blocker or carvedilol (when ChF resolved) given ef <.4 if BP >90 and HR>50. would also consider for spironolactone for ef <.35. (deferred for renal failure) consider Farxiga adjusted for eenal fallliure daily as well ef <.4. 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 As per patient, he was watching a movie when he felt very short of breath suddenly and dropped to the floor, denies LOC and denies any trauma to his head, was on the floor for ~2 hours. Patient was unable to rise from the floor by himself due to his left arm weakness. When first brought to the ED, patient was hypoxic and hypertensive o2 saturation in EMS was >80%. While in the ED, patient had episode of seizure-like activity in his b/l upper extremities which was controlled w/ ativan. Patient was seen and examined at bedside, BiPAP still in place, patient was still mildly lethargic from ativan and SOB with bipap mask on. Patient able to open eyes and nod/shake head to questions. He was admitted for pulmonary edema and found to have leukocytosis, JARRED on CKD, elevated troponin/NSTEMI. He was started on depakote for seizures and heparin gtt and aggrenox. Repeat CTH 3/31 demonstrated infarct in R perirolandic cortex. Hospital course further complicated by RUL PNA on CT chest; however leukocytosis resolved and ID recommended monitoring off abx. Patient was evaluated by Palliative for GOC discussion and pt was made DNR with trial of intubation. He clinically improved over the course of his hospitalization. Patient was evaluated by therapy for functional deficits and gait/ ADL impairments and recommended acute rehabilitation. Patient was medically optimized for discharge to [facility] on 4/5. prevously seen 2/2/17 for an icd paige.     HPI:    PMH:   AICD at end of battery life    MI (myocardial infarction)    HTN (hypertension)    HLD (hyperlipidemia)    Throat cancer    Ventricular arrhythmia    Diabetes    Renal insufficiency    CAD (coronary artery disease)    Inguinal hernia, left      PSH:   H/O prior ablation treatment    Cardiac defibrillator in place    S/P left inguinal hernia repair      Family History:  FAMILY HISTORY:  Family history of cancer (Sibling)        Social History:  Smoking:  Alcohol:  Drugs:    Allergies:  No Known Allergies      Medications:  ALBUTerol    90 MICROgram(s) HFA Inhaler 2 Puff(s) Inhalation every 6 hours PRN  atorvastatin 80 milliGRAM(s) Oral at bedtime  Biotene Dry Mouth Oral Rinse 5 milliLiter(s) Swish and Spit daily  calcitriol   Capsule 0.25 MICROGram(s) Oral daily  cyanocobalamin 1000 MICROGram(s) Oral daily  dextrose 5%. 1000 milliLiter(s) IV Continuous <Continuous>  dextrose 5%. 1000 milliLiter(s) IV Continuous <Continuous>  dextrose 50% Injectable 25 Gram(s) IV Push once  dextrose 50% Injectable 12.5 Gram(s) IV Push once  dextrose 50% Injectable 25 Gram(s) IV Push once  dextrose Oral Gel 15 Gram(s) Oral once PRN  dipyridamole 200 mG/aspirin 25 mG 1 Capsule(s) Oral two times a day  diVALproex  milliGRAM(s) Oral two times a day  ferrous    sulfate 325 milliGRAM(s) Oral daily  glucagon  Injectable 1 milliGRAM(s) IntraMuscular once  heparin   Injectable 5000 Unit(s) SubCutaneous every 8 hours  insulin lispro (ADMELOG) corrective regimen sliding scale   SubCutaneous three times a day before meals  insulin lispro (ADMELOG) corrective regimen sliding scale   SubCutaneous at bedtime  levothyroxine 75 MICROGram(s) Oral daily  melatonin 3 milliGRAM(s) Oral at bedtime  senna 2 Tablet(s) Oral at bedtime  sodium chloride 0.9% Bolus 500 milliLiter(s) IV Bolus once  tamsulosin 0.4 milliGRAM(s) Oral at bedtime      REVIEW OF SYSTEMS:  CONSTITUTIONAL: No fever, weight loss, or fatigue  EYES: No eye pain, visual disturbances, or discharge  ENMT:  No difficulty hearing, tinnitus, vertigo; No sinus or throat pain  NECK: No pain or stiffness  BREASTS: No pain, masses, or nipple discharge  RESPIRATORY: No cough, wheezing, chills or hemoptysis; No shortness of breath  CARDIOVASCULAR: No chest pain, palpitations, dizziness, or leg swelling  GASTROINTESTINAL: No abdominal or epigastric pain. No nausea, vomiting, or hematemesis; No diarrhea or constipation. No melena or hematochezia.  GENITOURINARY: No dysuria, frequency, hematuria, or incontinence  NEUROLOGICAL: No headaches, memory loss, loss of strength, numbness, or tremors  SKIN: No itching, burning, rashes, or lesions   LYMPH NODES: No enlarged glands  ENDOCRINE: No heat or cold intolerance; No hair loss  MUSCULOSKELETAL: No joint pain or swelling; No muscle, back, or extremity pain  PSYCHIATRIC: No depression, anxiety, mood swings, or difficulty sleeping  HEME/LYMPH: No easy bruising, or bleeding gums  ALLERY AND IMMUNOLOGIC: No hives or eczema    Physical Exam:  T(C): 36.6 (04-07-22 @ 07:50), Max: 36.8 (04-06-22 @ 20:12)  HR: 73 (04-07-22 @ 07:50) (70 - 84)  BP: 81/43 (04-07-22 @ 07:50) (81/43 - 96/60)  RR: 15 (04-07-22 @ 07:50) (14 - 15)  SpO2: 95% (04-07-22 @ 07:50) (95% - 97%)  Wt(kg): --    GENERAL: NAD, well-groomed, well-developed  HEAD:  Atraumatic, Normocephalic  EYES: EOMI, conjunctiva and sclera clear  ENT: Moist mucous membranes,  NECK: Supple, No JVD, no bruits  CHEST/LUNG: Clear to percussion bilaterally; No rales, rhonchi, wheezing, or rubs  HEART: Regular rate and rhythm; No murmurs, rubs, or gallops PMI non displaced.  ABDOMEN: Soft, Nontender, Nondistended; Bowel sounds present  EXTREMITIES:  2+ Peripheral Pulses, No clubbing, cyanosis, or edema  SKIN: No rashes or lesions  NERVOUS SYSTEM:  Cranial Nerves II-XII intact     Cardiovascular Diagnostic Testing:  ECG:    < from: 12 Lead ECG (04.01.22 @ 14:50) >  Diagnosis Line Sinus rhythm with premature supraventricular complexes  and ventricular complexes  Left axis deviation  Left ventricular hypertrophy with QRS widening  Anteroseptal infarct (cited on or before 01-AUG-2018)  Confirmed by ANA PAULA NICOLE ([id number]) on 4/2/2022 11:17:27 AM    < end of copied text >      ECHO:    < from: TTE Echo Complete w/ Contrast w/ Doppler (03.31.22 @ 20:42) >  IMPRESSION:  Technically difficult and limited study  The left ventricular endocardium is not well-visualized. Injectable echo   contrast was used to help enhance the images. The left ventricle is   enlarged with moderate to severe left ventricular systolic dysfunction,   estimated LVEF of 30-35%. The apical cap, apical septum and apical   lateral walls are akinetic with hypokinesis of the remaining walls. There   is no evidence of left ventricular thrombus  The right ventricle is not well-visualized, device wire is noted within   the right heart  Sclerotic trileaflet aortic valve with grossly normal opening, without AI.  Mild MR  Trace TR.    --- End of Report ---    SARAHY CASTRO MD; Attending Cardiologist  This document has beenelectronically signed. Apr 2 2022  4:11PM    < end of copied text >      Labs:                        9.6    9.78  )-----------( 185      ( 07 Apr 2022 05:30 )             30.7     04-07    139  |  103  |  49<H>  ----------------------------<  117<H>  3.8   |  25  |  2.99<H>  gfr 21  Ca    9.1      07 Apr 2022 05:30    TPro  6.1  /  Alb  2.5<L>  /  TBili  0.5  /  DBili  x   /  AST  24  /  ALT  28  /  AlkPhos  66  04-07      Imaging:    < from: Xray Chest 1 View- PORTABLE-Urgent (03.30.22 @ 06:03) >  IMPRESSION: Fairly advanced diffuse bilateral scattered infiltrates.    --- End of Report ---      HAYLEY ALBERTO MD; Attending Radiologist  This document has been electronically signed. Mar 30 2022 10:08AM    < end of copied text >    impression  hfref  would consider patient for ace arb Arni ( however deferred for renal failure) or hydralazine 10 mg bid  and nitrates imdur 15 mg daily  in stead  and long acting beta blocker or carvedilol (when ChF resolved) given ef <.4 if BP >90 and HR>50. would also consider for spironolactone for ef <.35. (deferred for renal failure) consider Farxiga not deemed a candidate give gfr <25. ef <.4. repeat Chest x sailaja diurese    interrogation of icd if not done recently

## 2022-04-07 NOTE — PROVIDER CONTACT NOTE (OTHER) - ASSESSMENT
Patient's BP is 85/52- HR 74, A&OX4, asymptomatic, denies any discomfort and stated that he gets this BP reading at home too. Patient's BP is 85/52,rest of Vital sings as charted. Pt A&OX4, asymptomatic, denies any discomfort and stated that he gets this BP reading at home too.

## 2022-04-07 NOTE — PROVIDER CONTACT NOTE (OTHER) - BACKGROUND
Dx: Acute pulmonary edema, SOB CVA   PHHx HTN<HLD<HFrEF 30%  Dx: Acute pulmonary edema, SOB, Tonsilar cancer, carotid stenosis, AICD, Hypothyroidisms.

## 2022-04-07 NOTE — PROGRESS NOTE ADULT - SUBJECTIVE AND OBJECTIVE BOX
SUBJECTIVE/ROS: He was noted to be hypotensive this morning despite hold BP meds. He was encourage to drink. He was asymptomatic while SBP was low. Denies chest pain, fever, chills, nausea, vomiting, abdominal pain, headache, or BLE pain.     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 As per patient, he was watching a movie when he felt very short of breath suddenly and dropped to the floor, denies LOC and denies any trauma to his head, was on the floor for ~2 hours. Patient was unable to rise from the floor by himself due to his left arm weakness. When first brought to the ED, patient was hypoxic and hypertensive o2 saturation in EMS was >80%. While in the ED, patient had episode of seizure-like activity in his b/l upper extremities which was controlled w/ ativan. Patient was seen and examined at bedside, BiPAP still in place, patient was still mildly lethargic from ativan and SOB with bipap mask on. Patient able to open eyes and nod/shake head to questions. He was admitted for pulmonary edema and found to have leukocytosis, JARRED on CKD, elevated troponin/NSTEMI. He was started on depakote for seizures and heparin gtt and aggrenox. Repeat CTH 3/31 demonstrated infarct in R perirolandic cortex. Hospital course further complicated by RUL PNA on CT chest; however leukocytosis resolved and ID recommended monitoring off abx. Patient was evaluated by Palliative for GOC discussion and pt was made DNR with trial of intubation. He clinically improved over the course of his hospitalization. Patient was evaluated by therapy for functional deficits and gait/ ADL impairments and recommended acute rehabilitation. Patient was medically optimized for discharge to [facility] on 4/5. (05 Apr 2022 16:18)      PAST MEDICAL & SURGICAL HISTORY:  MI (myocardial infarction)  1992    HTN (hypertension)    HLD (hyperlipidemia)    Throat cancer  2011, treated with chemo, RT    Ventricular arrhythmia  s/p AICD    Diabetes  Type2, not on any meds since weight loss after throat Ca    Renal insufficiency  s/p chemo    CAD (coronary artery disease)    Inguinal hernia, left    H/O prior ablation treatment  VT, 2009    Cardiac defibrillator in place  - 2009, battery change 2017    S/P left inguinal hernia repair  2018 at [place]        Allergies    No Known Allergies    Intolerances          PHYSICAL EXAM    Vital Signs Last 24 Hrs  T(C): 36.6 (07 Apr 2022 07:50), Max: 36.8 (06 Apr 2022 20:12)  T(F): 97.8 (07 Apr 2022 07:50), Max: 98.2 (06 Apr 2022 20:12)  HR: 73 (07 Apr 2022 07:50) (70 - 84)  BP: 81/43 (07 Apr 2022 07:50) (81/43 - 110/67)  BP(mean): --  RR: 15 (07 Apr 2022 07:50) (14 - 15)  SpO2: 95% (07 Apr 2022 07:50) (95% - 97%)  Daily     Daily       PHYSICAL EXAM  Constitutional - NAD, Comfortable  HEENT - NCAT, EOMI  Neck - Supple, No limited ROM  Chest - CTA bilaterally  Cardiovascular - RRR, S1S2  Abdomen -BS+, Soft, NTND  Extremities - No C/C/E, No calf tenderness   Neurologic Exam - no new focal deficits    RECENT LABS:                          9.6    9.78  )-----------( 185      ( 07 Apr 2022 05:30 )             30.7     04-07    139  |  103  |  49<H>  ----------------------------<  117<H>  3.8   |  25  |  2.99<H>    Ca    9.1      07 Apr 2022 05:30    TPro  6.1  /  Alb  2.5<L>  /  TBili  0.5  /  DBili  x   /  AST  24  /  ALT  28  /  AlkPhos  66  04-07    LIVER FUNCTIONS - ( 07 Apr 2022 05:30 )  Alb: 2.5 g/dL / Pro: 6.1 g/dL / ALK PHOS: 66 U/L / ALT: 28 U/L / AST: 24 U/L / GGT: x                   CAPILLARY BLOOD GLUCOSE      POCT Blood Glucose.: 127 mg/dL (07 Apr 2022 08:50)  POCT Blood Glucose.: 169 mg/dL (06 Apr 2022 21:51)  POCT Blood Glucose.: 191 mg/dL (06 Apr 2022 16:34)  POCT Blood Glucose.: 195 mg/dL (06 Apr 2022 11:52)      MEDICATIONS  (STANDING):  atorvastatin 80 milliGRAM(s) Oral at bedtime  Biotene Dry Mouth Oral Rinse 5 milliLiter(s) Swish and Spit daily  calcitriol   Capsule 0.25 MICROGram(s) Oral daily  cyanocobalamin 1000 MICROGram(s) Oral daily  dextrose 5%. 1000 milliLiter(s) (50 mL/Hr) IV Continuous <Continuous>  dextrose 5%. 1000 milliLiter(s) (100 mL/Hr) IV Continuous <Continuous>  dextrose 50% Injectable 25 Gram(s) IV Push once  dextrose 50% Injectable 12.5 Gram(s) IV Push once  dextrose 50% Injectable 25 Gram(s) IV Push once  dipyridamole 200 mG/aspirin 25 mG 1 Capsule(s) Oral two times a day  diVALproex  milliGRAM(s) Oral two times a day  ferrous    sulfate 325 milliGRAM(s) Oral daily  glucagon  Injectable 1 milliGRAM(s) IntraMuscular once  heparin   Injectable 5000 Unit(s) SubCutaneous every 8 hours  insulin lispro (ADMELOG) corrective regimen sliding scale   SubCutaneous three times a day before meals  insulin lispro (ADMELOG) corrective regimen sliding scale   SubCutaneous at bedtime  levothyroxine 75 MICROGram(s) Oral daily  melatonin 3 milliGRAM(s) Oral at bedtime  senna 2 Tablet(s) Oral at bedtime  sodium chloride 0.9% Bolus 500 milliLiter(s) IV Bolus once  tamsulosin 0.4 milliGRAM(s) Oral at bedtime    MEDICATIONS  (PRN):  ALBUTerol    90 MICROgram(s) HFA Inhaler 2 Puff(s) Inhalation every 6 hours PRN Shortness of Breath and/or Wheezing  dextrose Oral Gel 15 Gram(s) Oral once PRN Blood Glucose LESS THAN 70 milliGRAM(s)/deciliter

## 2022-04-07 NOTE — PROGRESS NOTE ADULT - ASSESSMENT
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 Found to have R central sulcus MCA territory infarct on repeat CTH. Hospital course complicated by leukocytosis, JARRED on CKD. Admitted to [facility] acute inpatient [facility] on 4/5 for ADL, gait, and functional impairments.     #s/p R central Sulcus CVA  - Unable to obtain MRI due to ICD  - Continue aggrenox bid  - Continue atorvastatin 80mg qd  - Continue comprehensive rehab program - PT/OT/SLP per rehab team  - Pain management, bowel regimen per rehab     #Seizures  - Likely due to R central sulcus infarct  - Continue Depakote 500mg BID for 6 months per neuro  - EEG wnl    # NSTEMI  # HFrEF  # HTN  - S/p heparin gtt, continue aggrenox  - Course c/b acute pulmonary edema s/p lasix x2 3/30. Diurese prn  - TTE LVEF 30% - 35% w/ severe systolic dysfunction  - Continue statin  - Holding home imdur, coreg. ACEI held 2/2 JARRED on CKD  - 4/7 hold metoprolol 50mg qd, hydralazine 5mg TID  - Cardio, nephro consult    #JARRED on CKD  - BUN/Cr 49/2.99, Cr baseline of 2.0-2.1  - Monitor routine BMP  - Avoid nephrotoxic medications; holding ACEI, lasix prn  - Encourage PO hydration, recommend gentle IVF     #Type 2 Diabetes Mellitus  - HbA1c 8.3  - Continue FS, ISS    #Tonsillar Cancer #Dysphagia  - Seen by ENT recent  - Continue puree/mildly thick. diet/nutrition follow up  - Residual L hearing loss    # Urinary Retention  - [place] per rehab, cont flomax - dc if hypotension persists    #Hypothyroidism  - Continue synthroid 75mcg qd    #DVT ppx - HSQ

## 2022-04-07 NOTE — PROGRESS NOTE ADULT - ASSESSMENT
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 Found to have R central sulcus MCA territory infarct on repeat CTH. Hospital course complicated by leukocytosis, JARRED on CKD. Admitted to [facility] acute inpatient rehab on 4/5 for ADL, gait, and functional impairments.     # s/p R central Sulcus CVA  - Comprehensive Multidisciplinary Rehab Program: 3 hours a day, 5 days a week with PT/OT/SLP  - Unable to obtain MRI due to ICD  - Continue aggrenox  - Continue atorvastatin  - Seizure management as below    # Seizures  - I/s/o R central sulcus infarct  - Continue Depakote 500mg BID for 6 months per neuro    # NSTEMI  # HFrEF  # HTN  - S/p heparin gtt, continue aggrenox  - Course c/b acute pulmonary edema s/p lasix x2 3/30. Diurese PRN  - TTE with severe systolic dysfunction 30-35%  - Continue statin  - Holding home imdur and coreg. Hold ACEI i/s/o JARRED on CKD  - Hold Metroprolol ER 50mg   and Hydralazine 5mg TID due to hypotension  -Encourage oral intake  -Fluid bolus to be given  -Cardiology consult     # JARRED on CKD - improved  - SCr near baseline of 2.0-2.1  -cr 2.9 (4/7)  - Monitor routine BMP  - Avoid nephrotoxic medications; holding ACEI  -Nephrology consult     #Type 2 Diabetes Mellitus  - A1c 8.3  - Continue SSI  -Not on medication at home  - Hospitalist consult appreciated    #Tonsillar Cancer #Dysphagia  - Seen by ENT recent  - Continue puree/mildly thick  - Residual L hearing loss    #Hypothyroid  -Continue synthroid 75mcg daily    #Urinary retention  -Continue flomax 0.4mg daily  -TOV on 4/6 at midnight - able to void with small residual  -Monitor UO    # Sleep:  - Melatonin qHS    # Pain Management:  - Tylenol PRN    # GI/Bowel:  - Senna QHS    # Skin/Pressure Injury:  - Skin assessment on admission: unremarkable    # Diet:   - Diet Consistency/Modifications: Puree/Mod thick  - Aspiration Precautions  - SLP consult for swallow function evaluation and treatment    # DVT ppx:  - HSQ    # Restrictions/Precautions:  - Precautions: Falls, aspiration, cardiac    ---------------  Outpatient Follow-up:    CHANCE FRANCO  Internal Medicine  [address] [place] [address], [place] [address]  Phone: [phone]  Fax: [phone]  Follow Up Time: 1-3 days    Chet Garcia)  Cardiovascular Disease; Internal Medicine  [facility], [address], [address] [place] [address]  Phone: [phone]  Fax: [phone]  Follow Up Time: 1-3 days    Brandon Harrison)  Urology  [address] [place] [address], [address]  [place] [address]  Phone: [phone]  Fax: [phone]  Follow Up Time: 1-3 days    Yenifer Faulkner)  Neurology  [address] [place] [address]  Phone: [phone]  Fax: [phone]     75 yo male w/ PMHx of HTN, HLD, HFrEF (EF 30% 2009), 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 Found to have R central sulcus MCA territory infarct on repeat CTH. Hospital course complicated by leukocytosis, JARRED on CKD. Admitted to [facility] acute inpatient rehab on 4/5 for ADL, gait, and functional impairments.     # s/p R central Sulcus CVA  - Comprehensive Multidisciplinary Rehab Program: 3 hours a day, 5 days a week with PT/OT/SLP  - Unable to obtain MRI due to ICD  - Continue aggrenox  - Continue atorvastatin  - Seizure management as below    # Seizures  - I/s/o R central sulcus infarct  - Continue Depakote 500mg BID for 6 months per neuro    # NSTEMI  # HFrEF  # HTN  - S/p heparin gtt, continue aggrenox  - Course c/b acute pulmonary edema s/p lasix x2 3/30. Diurese PRN  - TTE with severe systolic dysfunction 30-35%  - Continue statin  - Holding home imdur and coreg. Hold ACEI i/s/o JARRED on CKD  - Hold Metroprolol ER 50mg   and Hydralazine 5mg TID due to hypotension  -Encourage oral intake  -Fluid bolus to be given - patient refusing stating he just wants to try drinking water. Despite attempts to re-educate, patient declines this intervention. Will continue to monitor BP and reassess   -Cardiology consult     # JARRED on CKD - improved  - SCr near baseline of 2.0-2.1  -cr 2.9 (4/7)  - Monitor routine BMP  - Avoid nephrotoxic medications; holding ACEI  -Nephrology consult     #Type 2 Diabetes Mellitus  - A1c 8.3  - Continue SSI  -Not on medication at home  - Hospitalist consult appreciated    #Tonsillar Cancer #Dysphagia  - Seen by ENT recent  - Continue puree/mildly thick  - Residual L hearing loss    #Hypothyroid  -Continue synthroid 75mcg daily    #Urinary retention  -Continue flomax 0.4mg daily  -TOV on 4/6 at midnight - able to void with small residual  -Monitor UO    # Sleep:  - Melatonin qHS    # Pain Management:  - Tylenol PRN    # GI/Bowel:  - Senna QHS    # Skin/Pressure Injury:  - Skin assessment on admission: unremarkable    # Diet:   - Diet Consistency/Modifications: Puree/Mod thick  - Aspiration Precautions  - SLP consult for swallow function evaluation and treatment    # DVT ppx:  - HSQ    # Restrictions/Precautions:  - Precautions: Falls, aspiration, cardiac    ---------------  Outpatient Follow-up:    CHANCE FRANCO  Internal Medicine  [address] [place] [address], [place] [address]  Phone: [phone]  Fax: [phone]  Follow Up Time: 1-3 days    Chet Garcia)  Cardiovascular Disease; Internal Medicine  [facility], [address] [place] [address], [address]  [place] [address]  Phone: [phone]  Fax: [phone]  Follow Up Time: 1-3 days    Brandon Harrison)  Urology  [address] [place] [address], [address]  [place] [address]  Phone: [phone]  Fax: [phone]  Follow Up Time: 1-3 days    Yenifer Faulkner)  Neurology  [address] [place] [address]  Phone: [phone]  Fax: [phone]

## 2022-04-07 NOTE — PROVIDER CONTACT NOTE (OTHER) - ACTION/TREATMENT ORDERED:
Dr. Moat was notified. ordered to hold Hydralazine and Flomax for tonight. encourage PO fluid and chan Dr. Mota was notified. ordered to give Depakote and Aggrenox now. hold Hydralazine and Flomax for tonight. encourage PO fluids and monitor BP before bedtime med administration.

## 2022-04-08 DIAGNOSIS — F54 PSYCHOLOGICAL AND BEHAVIORAL FACTORS ASSOCIATED WITH DISORDERS OR DISEASES CLASSIFIED ELSEWHERE: ICD-10-CM

## 2022-04-08 LAB
ANION GAP SERPL CALC-SCNC: 11 MMOL/L — SIGNIFICANT CHANGE UP (ref 5–17)
BUN SERPL-MCNC: 64 MG/DL — HIGH (ref 7–23)
CALCIUM SERPL-MCNC: 9 MG/DL — SIGNIFICANT CHANGE UP (ref 8.4–10.5)
CHLORIDE SERPL-SCNC: 102 MMOL/L — SIGNIFICANT CHANGE UP (ref 96–108)
CO2 SERPL-SCNC: 26 MMOL/L — SIGNIFICANT CHANGE UP (ref 22–31)
CORTIS AM PEAK SERPL-MCNC: 17 UG/DL — SIGNIFICANT CHANGE UP (ref 6–18.4)
CREAT SERPL-MCNC: 4.18 MG/DL — HIGH (ref 0.5–1.3)
EGFR: 14 ML/MIN/1.73M2 — LOW
GLUCOSE BLDC GLUCOMTR-MCNC: 126 MG/DL — HIGH (ref 70–99)
GLUCOSE BLDC GLUCOMTR-MCNC: 130 MG/DL — HIGH (ref 70–99)
GLUCOSE BLDC GLUCOMTR-MCNC: 138 MG/DL — HIGH (ref 70–99)
GLUCOSE BLDC GLUCOMTR-MCNC: 157 MG/DL — HIGH (ref 70–99)
GLUCOSE SERPL-MCNC: 122 MG/DL — HIGH (ref 70–99)
POTASSIUM SERPL-MCNC: 3.9 MMOL/L — SIGNIFICANT CHANGE UP (ref 3.5–5.3)
POTASSIUM SERPL-SCNC: 3.9 MMOL/L — SIGNIFICANT CHANGE UP (ref 3.5–5.3)
SODIUM SERPL-SCNC: 139 MMOL/L — SIGNIFICANT CHANGE UP (ref 135–145)
TSH SERPL-MCNC: 4.47 UIU/ML — HIGH (ref 0.36–3.74)

## 2022-04-08 PROCEDURE — 90791 PSYCH DIAGNOSTIC EVALUATION: CPT

## 2022-04-08 PROCEDURE — 76775 US EXAM ABDO BACK WALL LIM: CPT | Mod: 26

## 2022-04-08 PROCEDURE — 99233 SBSQ HOSP IP/OBS HIGH 50: CPT

## 2022-04-08 RX ORDER — SODIUM CHLORIDE 9 MG/ML
1000 INJECTION INTRAMUSCULAR; INTRAVENOUS; SUBCUTANEOUS
Refills: 0 | Status: DISCONTINUED | OUTPATIENT
Start: 2022-04-08 | End: 2022-04-09

## 2022-04-08 RX ADMIN — Medication 2: at 11:20

## 2022-04-08 RX ADMIN — HEPARIN SODIUM 5000 UNIT(S): 5000 INJECTION INTRAVENOUS; SUBCUTANEOUS at 14:08

## 2022-04-08 RX ADMIN — Medication 75 MICROGRAM(S): at 05:04

## 2022-04-08 RX ADMIN — DIVALPROEX SODIUM 500 MILLIGRAM(S): 500 TABLET, DELAYED RELEASE ORAL at 05:04

## 2022-04-08 RX ADMIN — HEPARIN SODIUM 5000 UNIT(S): 5000 INJECTION INTRAVENOUS; SUBCUTANEOUS at 21:39

## 2022-04-08 RX ADMIN — SODIUM CHLORIDE 100 MILLILITER(S): 9 INJECTION INTRAMUSCULAR; INTRAVENOUS; SUBCUTANEOUS at 11:19

## 2022-04-08 RX ADMIN — CALCITRIOL 0.25 MICROGRAM(S): 0.5 CAPSULE ORAL at 11:21

## 2022-04-08 RX ADMIN — Medication 325 MILLIGRAM(S): at 11:21

## 2022-04-08 RX ADMIN — ASPIRIN AND DIPYRIDAMOLE 1 CAPSULE(S): 25; 200 CAPSULE, EXTENDED RELEASE ORAL at 05:04

## 2022-04-08 RX ADMIN — TAMSULOSIN HYDROCHLORIDE 0.4 MILLIGRAM(S): 0.4 CAPSULE ORAL at 21:39

## 2022-04-08 RX ADMIN — Medication 5 MILLILITER(S): at 11:20

## 2022-04-08 RX ADMIN — HEPARIN SODIUM 5000 UNIT(S): 5000 INJECTION INTRAVENOUS; SUBCUTANEOUS at 05:04

## 2022-04-08 RX ADMIN — DIVALPROEX SODIUM 500 MILLIGRAM(S): 500 TABLET, DELAYED RELEASE ORAL at 17:03

## 2022-04-08 RX ADMIN — PREGABALIN 1000 MICROGRAM(S): 225 CAPSULE ORAL at 11:21

## 2022-04-08 RX ADMIN — ATORVASTATIN CALCIUM 80 MILLIGRAM(S): 80 TABLET, FILM COATED ORAL at 21:39

## 2022-04-08 RX ADMIN — SODIUM CHLORIDE 100 MILLILITER(S): 9 INJECTION INTRAMUSCULAR; INTRAVENOUS; SUBCUTANEOUS at 17:03

## 2022-04-08 RX ADMIN — Medication 3 MILLIGRAM(S): at 21:39

## 2022-04-08 RX ADMIN — ASPIRIN AND DIPYRIDAMOLE 1 CAPSULE(S): 25; 200 CAPSULE, EXTENDED RELEASE ORAL at 17:03

## 2022-04-08 NOTE — PROGRESS NOTE ADULT - SUBJECTIVE AND OBJECTIVE BOX
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 As per patient, he was watching a movie when he felt very short of breath suddenly and dropped to the floor, denies LOC and denies any trauma to his head, was on the floor for ~2 hours. Patient was unable to rise from the floor by himself due to his left arm weakness. When first brought to the ED, patient was hypoxic and hypertensive o2 saturation in EMS was >80%. While in the ED, patient had episode of seizure-like activity in his b/l upper extremities which was controlled w/ ativan. Patient was seen and examined at bedside, BiPAP still in place, patient was still mildly lethargic from ativan and SOB with bipap mask on. Patient able to open eyes and nod/shake head to questions. He was admitted for pulmonary edema and found to have leukocytosis, JARRED on CKD, elevated troponin/NSTEMI. He was started on depakote for seizures and heparin gtt and aggrenox. Repeat CTH 3/31 demonstrated infarct in R perirolandic cortex. Hospital course further complicated by RUL PNA on CT chest; however leukocytosis resolved and ID recommended monitoring off abx. Patient was evaluated by Palliative for GOC discussion and pt was made DNR with trial of intubation. He clinically improved over the course of his hospitalization. Patient was evaluated by therapy for functional deficits and gait/ ADL impairments and recommended acute rehabilitation. Patient was medically optimized for discharge to [facility] on 4/5. (05 Apr 2022 16:18)        PAST MEDICAL & SURGICAL HISTORY:  MI (myocardial infarction)  1992    HTN (hypertension)    HLD (hyperlipidemia)    Throat cancer  2011, treated with chemo, RT    Ventricular arrhythmia  s/p AICD    Diabetes  Type2, not on any meds since weight loss after throat Ca    Renal insufficiency  s/p chemo    CAD (coronary artery disease)    Inguinal hernia, left    H/O prior ablation treatment  VT, 2009    Cardiac defibrillator in place  - 2009, battery change 2017    S/P left inguinal hernia repair  2018 at [place]      VITALS  Vital Signs Last 24 Hrs  T(C): 36.4 (08 Apr 2022 08:24), Max: 36.8 (07 Apr 2022 19:48)  T(F): 97.5 (08 Apr 2022 08:24), Max: 98.2 (07 Apr 2022 19:48)  HR: 73 (08 Apr 2022 08:24) (73 - 84)  BP: 89/57 (08 Apr 2022 05:09) (77/47 - 90/55)  BP(mean): --  RR: 15 (08 Apr 2022 08:24) (15 - 16)  SpO2: 95% (08 Apr 2022 08:24) (95% - 98%)      PHYSICAL EXAM  Constitutional - NAD, Comfortable  HEENT - NCAT, EOMI  Neck - Supple, No limited ROM  Chest - CTA bilaterally  Cardiovascular - RRR,   Abdomen - Soft, NTND  Extremities - No C/C/E, No calf tenderness   Neurologic Exam - no new focal deficit                   RECENT LABS                        9.6    9.78  )-----------( 185      ( 07 Apr 2022 05:30 )             30.7     04-08    139  |  102  |  64<H>  ----------------------------<  122<H>  3.9   |  26  |  4.18<H>    Ca    9.0      08 Apr 2022 05:30    TPro  6.1  /  Alb  2.5<L>  /  TBili  0.5  /  DBili  x   /  AST  24  /  ALT  28  /  AlkPhos  66  04-07      LIVER FUNCTIONS - ( 07 Apr 2022 05:30 )  Alb: 2.5 g/dL / Pro: 6.1 g/dL / ALK PHOS: 66 U/L / ALT: 28 U/L / AST: 24 U/L / GGT: x                         CURRENT MEDICATIONS  MEDICATIONS  (STANDING):  atorvastatin 80 milliGRAM(s) Oral at bedtime  Biotene Dry Mouth Oral Rinse 5 milliLiter(s) Swish and Spit daily  calcitriol   Capsule 0.25 MICROGram(s) Oral daily  cyanocobalamin 1000 MICROGram(s) Oral daily  dextrose 5%. 1000 milliLiter(s) (50 mL/Hr) IV Continuous <Continuous>  dextrose 5%. 1000 milliLiter(s) (100 mL/Hr) IV Continuous <Continuous>  dextrose 50% Injectable 25 Gram(s) IV Push once  dextrose 50% Injectable 12.5 Gram(s) IV Push once  dextrose 50% Injectable 25 Gram(s) IV Push once  dipyridamole 200 mG/aspirin 25 mG 1 Capsule(s) Oral two times a day  diVALproex  milliGRAM(s) Oral two times a day  ferrous    sulfate 325 milliGRAM(s) Oral daily  glucagon  Injectable 1 milliGRAM(s) IntraMuscular once  heparin   Injectable 5000 Unit(s) SubCutaneous every 8 hours  insulin lispro (ADMELOG) corrective regimen sliding scale   SubCutaneous three times a day before meals  insulin lispro (ADMELOG) corrective regimen sliding scale   SubCutaneous at bedtime  levothyroxine 75 MICROGram(s) Oral daily  melatonin 3 milliGRAM(s) Oral at bedtime  sodium chloride 0.9%. 1000 milliLiter(s) (100 mL/Hr) IV Continuous <Continuous>  tamsulosin 0.4 milliGRAM(s) Oral at bedtime    MEDICATIONS  (PRN):  ALBUTerol    90 MICROgram(s) HFA Inhaler 2 Puff(s) Inhalation every 6 hours PRN Shortness of Breath and/or Wheezing  dextrose Oral Gel 15 Gram(s) Oral once PRN Blood Glucose LESS THAN 70 milliGRAM(s)/deciliter

## 2022-04-08 NOTE — BH CONSULTATION LIAISON ASSESSMENT NOTE - NSBHCONSULTFOLLOWAFTERCARE_PSY_A_CORE FT
Pt can follow up with Neurology as an outpatient, no acute psychiatric interventions needed at this time.

## 2022-04-08 NOTE — BH CONSULTATION LIAISON ASSESSMENT NOTE - DIFFERENTIAL
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  Pt with psychological factors that affect medical condition / compliance.

## 2022-04-08 NOTE — BH CONSULTATION LIAISON ASSESSMENT NOTE - NSBHCONSULTPRIMARYDISCUSSYES_PSY_A_CORE FT
case discussed with Dr. Jurado , personality and coping style, level of independence and demoralization at having to depend on others and be stymied  in his requests play a role in his refusal of certain aspects of his care.

## 2022-04-08 NOTE — BH CONSULTATION LIAISON ASSESSMENT NOTE - NSBHCHARTREVIEWVS_PSY_A_CORE FT
Vital Signs Last 24 Hrs  T(C): 36.4 (08 Apr 2022 08:24), Max: 36.4 (08 Apr 2022 08:24)  T(F): 97.5 (08 Apr 2022 08:24), Max: 97.5 (08 Apr 2022 08:24)  HR: 87 (08 Apr 2022 14:00) (73 - 87)  BP: 112/62 (08 Apr 2022 14:00) (89/57 - 112/62)  BP(mean): --  RR: 15 (08 Apr 2022 08:24) (15 - 16)  SpO2: 95% (08 Apr 2022 08:24) (95% - 95%)

## 2022-04-08 NOTE — BH CONSULTATION LIAISON ASSESSMENT NOTE - HPI (INCLUDE ILLNESS QUALITY, SEVERITY, DURATION, TIMING, CONTEXT, MODIFYING FACTORS, ASSOCIATED SIGNS AND SYMPTOMS)
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 As per patient, he was watching a movie when he felt very short of breath suddenly and dropped to the floor, denies LOC and denies any trauma to his head, was on the floor for ~2 hours. Patient was unable to rise from the floor by himself due to his left arm weakness. When first brought to the ED, patient was hypoxic and hypertensive o2 saturation in EMS was >80%. While in the ED, patient had episode of seizure-like activity in his b/l upper extremities which was controlled w/ ativan. Patient was seen and examined at bedside, BiPAP still in place, patient was still mildly lethargic from ativan and SOB with bipap mask on. Patient able to open eyes and nod/shake head to questions. He was admitted for pulmonary edema and found to have leukocytosis, JARRED on CKD, elevated troponin/NSTEMI. He was started on depakote for seizures and heparin gtt and aggrenox. Repeat CTH 3/31 demonstrated infarct in R perirolandic cortex. Hospital course further complicated by RUL PNA on CT chest; however leukocytosis resolved and ID recommended monitoring off abx. Patient was evaluated by Palliative for GOC discussion and pt was made DNR with trial of intubation. He clinically improved over the course of his hospitalization. Patient was evaluated by therapy for functional deficits and gait/ ADL impairments and recommended acute rehabilitation. Patient was medically optimized for discharge to [facility] on 4/5. (05 Apr 2022 16:18)    Psychiatry C/L note: Writer asked to see patient emergently as he had been refusing IV fluids and was markedly hypotensive this am.   Pt at the time writer came to interview him , had been convinced by his wife to get IV hydration. Pt is very angry at having thickened liquids.   He has a history of cancer and partial tongue resection. Pt reports at home he eats and drinks what he likes. He is aware that should he have thin liquids he could aspirate and get pneumonia or some other life threatening complication. He reports " that's what life is, a chawla, I am willing to take whatever risks, ".   " Besides when I leave here, I'm going to do what I want anyway". Pt reports that he enjoys his life, is not giving up, is not wanting to die. " I want my arm better, but I could do this at home". Pt states he is willing to comply but kept asking writer to negotiate for at least 1 glass of water, he reports his thirst is not managed by thickened liquids, and his appetite not satisfied by pureed foods. Pt finds wearing a diaper demoralizing as well as having accidents and being changed or supervised by staff in the bathroom. " The whole thing is degrading."  Rest of psychiatric review of systems unremarkable or as documented below.

## 2022-04-08 NOTE — BH CONSULTATION LIAISON ASSESSMENT NOTE - NSBHMSETHTCONTENT_PSY_A_CORE
focused on wanting to have water, is aware he is likely to fail a speech and swallow evaluation on Monday, in the coming week, but is prepared to "quiñonez it out" concerning getting water to drink and is willing to take risks with his health to eat and drink what satisfies him./Preoccupations

## 2022-04-08 NOTE — PROGRESS NOTE ADULT - ASSESSMENT
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 Found to have R central sulcus MCA territory infarct on repeat CTH. Hospital course complicated by leukocytosis, JARRED on CKD. Admitted to [place] acute inpatient rehab on 4/5 for ADL, gait, and functional impairments.     # s/p R central Sulcus CVA  - Comprehensive Multidisciplinary Rehab Program: 3 hours a day, 5 days a week with PT/OT/SLP  - Unable to obtain MRI due to ICD  - Continue aggrenox  - Continue atorvastatin  - Seizure management as below    # Seizures  - I/s/o R central sulcus infarct  - Continue Depakote 500mg BID for 6 months per neuro    # NSTEMI  # HFrEF  # HTN  - S/p heparin gtt, continue aggrenox  - Course c/b acute pulmonary edema s/p lasix x2 3/30. Diurese PRN  - TTE with severe systolic dysfunction 30-35%  - Continue statin  - Holding home imdur and coreg. Hold ACEI i/s/o JARRED on CKD  - Hold Metroprolol ER 50mg   and Hydralazine 5mg TID due to hypotension  -Encourage oral intake  -Fluid bolus to be given - patient refusing stating he just wants to try drinking water. Despite attempts to re-educate, patient declines this intervention. Will continue to monitor BP and reassess   -Cardiology consult     # JARRED and Hyptension   - Cr near baseline of 2.0-2.1  - Avoid nephrotoxic medications; holding ACEI  - Nephrology is following  - Spoke with  wife- she was able convince patient to accept IV hydration as recommended, labs in the morning    - Psychiatry evaluation - for capacity and Depression -case discussed  - Medicine is following, case discussed      #Type 2 Diabetes Mellitus  - A1c 8.3  - Continue SSI  -Not on medication at home  - Hospitalist consult appreciated    #Tonsillar Cancer #Dysphagia  - Seen by ENT recent  - Continue puree/mildly thick  - Residual L hearing loss    #Hypothyroid  -Continue synthroid 75mcg daily    #Urinary retention  -Continue flomax 0.4mg daily  -TOV on 4/6 at midnight - able to void with small residual  -Monitor UO    # Sleep:  - Melatonin qHS    # Pain Management:  - Tylenol PRN    # GI/Bowel:  - Senna QHS    # Skin/Pressure Injury:  - Skin assessment on admission: unremarkable    # Diet:   - Diet Consistency/Modifications: Puree/Mod thick  - Aspiration Precautions  - SLP consult for swallow function evaluation and treatment    # DVT ppx:  - HSQ    # Restrictions/Precautions:  - Precautions: Falls, aspiration, cardiac    ---------------  Outpatient Follow-up:    CHANCE FRANCO  Internal Medicine  [address] [place] [address], [place] [address]  Phone: [phone]  Fax: [phone]  Follow Up Time: 1-3 days    Chet Garcia)  Cardiovascular Disease; Internal Medicine  [facility], [address] [facility], [address] [place] [address]  Phone: [phone]  Fax: [phone]  Follow Up Time: 1-3 days    Brandon Harrison)  Urology  [address] [place] [address], [address] [place] [address]  Phone: [phone]  Fax: [phone]  Follow Up Time: 1-3 days    Yenifer Faulkner)  Neurology  [address] [place] [address]  Phone: [phone]  Fax: [phone]

## 2022-04-08 NOTE — PROGRESS NOTE ADULT - ASSESSMENT
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 Found to have R central sulcus MCA territory infarct on repeat CTH. Hospital course complicated by leukocytosis, JARRED on CKD. Admitted to [place] acute inpatient rehab on 4/5 for ADL, gait, and functional impairments.     #s/p R central Sulcus CVA  - Unable to obtain MRI due to ICD  - Continue aggrenox bid  - Continue atorvastatin 80mg qd  - Continue comprehensive rehab program - PT/OT/SLP per rehab team  - Pain management, bowel regimen per rehab     #Seizures  - Likely due to R central sulcus infarct  - Continue Depakote 500mg BID for 6 months per neuro  - EEG wnl    # NSTEMI  # HFrEF  # HTN  - S/p heparin gtt, continue aggrenox  - Course c/b acute pulmonary edema s/p lasix x2 3/30. Diurese prn  - TTE LVEF 30% - 35% w/ severe systolic dysfunction  - Continue statin  - Holding home imdur, coreg. ACEI held 2/2 JARRED on CKD  - 4/7 hold metoprolol 50mg qd, hydralazine 5mg TID  - Cardio, nephro consult    #JARRED on CKD  - BUN/Cr 64/4.18, Cr baseline of 2.0-2.1  - Likely combination of dehydration and hypotension   - Monitor routine BMP  - Avoid nephrotoxic medications; holding ACEI, lasix prn  - Pt refusing IVF, will continue to encourage    #Type 2 Diabetes Mellitus  - HbA1c 8.3  - Continue FS, ISS    #Tonsillar Cancer #Dysphagia  - Seen by ENT recent  - Continue puree/mildly thick. diet/nutrition follow up  - Residual L hearing loss    # Urinary Retention  - Hillman per rehab, cont flomax - dc if hypotension persists    #Hypothyroidism  - Continue synthroid 75mcg qd    #DVT ppx - HSQ    Called patient's wife Georgiana ([phone]) to update on renal function, she states she will speak to her  and convince him to have IV placement. Will follow up

## 2022-04-08 NOTE — BH CONSULTATION LIAISON ASSESSMENT NOTE - NSBHCONSULTRECOMMENDOTHER_PSY_A_CORE FT
OB follow up > 20 weeks    Chief Complaint   Patient presents with   • Routine Prenatal Visit   • Non-stress Test       Delaney Duron is a 28 y.o.  34w4d being seen today for her obstetrical visit.  Patient reports feeling overwhelmed today. She is tearful. . Fetal movement: normal. +PNV.      Review of Systems  No bleeding. No cramping/contractions. No leaking of fluid. Good fetal movement.       /64   Wt 73.9 kg (163 lb)   LMP 2018 (Approximate)   BMI 26.51 kg/m²     FHT: present BPM   Uterine Size: size equals dates       Assessment    1) pregnancy at 34w4d- US IMP:  BPP 8/8. JESSICA 15cm.     2) Former smoker- Quit!       3) GMD A2- BS log is incomplete. When questioned about her log, she states that she does not check her BS in the morning because she is busy with her 2 year old and forgets. Some of her BS readings are in the expected range but she has several elevated readings. Taking Glyburide 2.5 mg BID.  Disc at length the risk of uncontrolled DM including but not limited to fetal macrosomia, polyhydramnios, fetal lung immaturity,  hypoglycemia, fetal death, etc. She is going to work on checking her sugars as directed: fasting and 2hr pp.      4) S/P tdap.      5)Depression-Taking Zoloft 100 mg QD. Reports the increase in Zoloft from 50mg to 100mg has improved her symptoms.      6)Hep C + - Quant 119,000 and normal liver enzymes on 19.      7) Breech presentation- Will monitor. She understands that if the fetal positioning continues to be breech, she will need a  for delivery.     Plan    Continue prenatal vitamins  Reviewed this stage of pregnancy  Problem list updated   Follow up in 1 weeks for NST/BPP    RADHA Ware  2019  11:07 AM   Involve family when need to weigh decisions, pt will ultimately do what he wishes as an autonomous human being   and despite having minor  neurocognitive issues, ( hearing impairment may affect repetition and naming), he is fully aware of risks  benefits, and alternatives to treatment/ diet choices.

## 2022-04-08 NOTE — PROGRESS NOTE ADULT - SUBJECTIVE AND OBJECTIVE BOX
Denies complaints    Vital Signs Last 24 Hrs  T(C): 36.4 (04-08-22 @ 08:24), Max: 36.8 (04-07-22 @ 19:48)  T(F): 97.5 (04-08-22 @ 08:24), Max: 98.2 (04-07-22 @ 19:48)  HR: 73 (04-08-22 @ 08:24) (73 - 84)  BP: 89/57 (04-08-22 @ 05:09) (77/47 - 90/55)  RR: 15 (04-08-22 @ 08:24) (15 - 16)  SpO2: 95% (04-08-22 @ 08:24) (95% - 98%)    s1s2  b/l air entry  soft, ND  no edema                        9.6    9.78  )-----------( 185      ( 07 Apr 2022 05:30 )             30.7     08 Apr 2022 05:30    139    |  102    |  64     ----------------------------<  122    3.9     |  26     |  4.18     Ca    9.0        08 Apr 2022 05:30    TPro  6.1    /  Alb  2.5    /  TBili  0.5    /  DBili  x      /  AST  24     /  ALT  28     /  AlkPhos  66     07 Apr 2022 05:30    LIVER FUNCTIONS - ( 07 Apr 2022 05:30 )  Alb: 2.5 g/dL / Pro: 6.1 g/dL / ALK PHOS: 66 U/L / ALT: 28 U/L / AST: 24 U/L / GGT: x           ALBUTerol    90 MICROgram(s) HFA Inhaler 2 Puff(s) Inhalation every 6 hours PRN  atorvastatin 80 milliGRAM(s) Oral at bedtime  Biotene Dry Mouth Oral Rinse 5 milliLiter(s) Swish and Spit daily  calcitriol   Capsule 0.25 MICROGram(s) Oral daily  cyanocobalamin 1000 MICROGram(s) Oral daily  dextrose 5%. 1000 milliLiter(s) IV Continuous <Continuous>  dextrose 5%. 1000 milliLiter(s) IV Continuous <Continuous>  dextrose 50% Injectable 25 Gram(s) IV Push once  dextrose 50% Injectable 12.5 Gram(s) IV Push once  dextrose 50% Injectable 25 Gram(s) IV Push once  dextrose Oral Gel 15 Gram(s) Oral once PRN  dipyridamole 200 mG/aspirin 25 mG 1 Capsule(s) Oral two times a day  diVALproex  milliGRAM(s) Oral two times a day  ferrous    sulfate 325 milliGRAM(s) Oral daily  glucagon  Injectable 1 milliGRAM(s) IntraMuscular once  heparin   Injectable 5000 Unit(s) SubCutaneous every 8 hours  insulin lispro (ADMELOG) corrective regimen sliding scale   SubCutaneous three times a day before meals  insulin lispro (ADMELOG) corrective regimen sliding scale   SubCutaneous at bedtime  levothyroxine 75 MICROGram(s) Oral daily  melatonin 3 milliGRAM(s) Oral at bedtime  sodium chloride 0.9%. 1000 milliLiter(s) IV Continuous <Continuous>  tamsulosin 0.4 milliGRAM(s) Oral at bedtime    A/P:    JARRED/CKD 3 in setting of hypotension, possible retention (Cr 2.0 - 4/5/22)  Avoid BP lowering meds  Avoid nephrotoxins  NS at 100cc/hr   F/u UA, PVR  BMP in am  D/w rehab team    [phone]   Denies complaints    Vital Signs Last 24 Hrs  T(C): 36.4 (04-08-22 @ 08:24), Max: 36.8 (04-07-22 @ 19:48)  T(F): 97.5 (04-08-22 @ 08:24), Max: 98.2 (04-07-22 @ 19:48)  HR: 73 (04-08-22 @ 08:24) (73 - 84)  BP: 89/57 (04-08-22 @ 05:09) (77/47 - 90/55)  RR: 15 (04-08-22 @ 08:24) (15 - 16)  SpO2: 95% (04-08-22 @ 08:24) (95% - 98%)    s1s2  b/l air entry  soft, ND  no edema                        9.6    9.78  )-----------( 185      ( 07 Apr 2022 05:30 )             30.7     08 Apr 2022 05:30    139    |  102    |  64     ----------------------------<  122    3.9     |  26     |  4.18     Ca    9.0        08 Apr 2022 05:30    TPro  6.1    /  Alb  2.5    /  TBili  0.5    /  DBili  x      /  AST  24     /  ALT  28     /  AlkPhos  66     07 Apr 2022 05:30    LIVER FUNCTIONS - ( 07 Apr 2022 05:30 )  Alb: 2.5 g/dL / Pro: 6.1 g/dL / ALK PHOS: 66 U/L / ALT: 28 U/L / AST: 24 U/L / GGT: x           ALBUTerol    90 MICROgram(s) HFA Inhaler 2 Puff(s) Inhalation every 6 hours PRN  atorvastatin 80 milliGRAM(s) Oral at bedtime  Biotene Dry Mouth Oral Rinse 5 milliLiter(s) Swish and Spit daily  calcitriol   Capsule 0.25 MICROGram(s) Oral daily  cyanocobalamin 1000 MICROGram(s) Oral daily  dextrose 5%. 1000 milliLiter(s) IV Continuous <Continuous>  dextrose 5%. 1000 milliLiter(s) IV Continuous <Continuous>  dextrose 50% Injectable 25 Gram(s) IV Push once  dextrose 50% Injectable 12.5 Gram(s) IV Push once  dextrose 50% Injectable 25 Gram(s) IV Push once  dextrose Oral Gel 15 Gram(s) Oral once PRN  dipyridamole 200 mG/aspirin 25 mG 1 Capsule(s) Oral two times a day  diVALproex  milliGRAM(s) Oral two times a day  ferrous    sulfate 325 milliGRAM(s) Oral daily  glucagon  Injectable 1 milliGRAM(s) IntraMuscular once  heparin   Injectable 5000 Unit(s) SubCutaneous every 8 hours  insulin lispro (ADMELOG) corrective regimen sliding scale   SubCutaneous three times a day before meals  insulin lispro (ADMELOG) corrective regimen sliding scale   SubCutaneous at bedtime  levothyroxine 75 MICROGram(s) Oral daily  melatonin 3 milliGRAM(s) Oral at bedtime  sodium chloride 0.9%. 1000 milliLiter(s) IV Continuous <Continuous>  tamsulosin 0.4 milliGRAM(s) Oral at bedtime    A/P:    JARRED/CKD 3 in setting of hypotension, possible retention (Cr 2.0 - 4/5/22)  Avoid BP lowering meds  Avoid nephrotoxins  NS at 100cc/hr   F/u UA  SONO reviewed: small R kidney, distended bladder, unable to void  Hillman, I/Os  Goal SBP > 90  BMP in am  D/w rehab team    [phone]

## 2022-04-08 NOTE — BH CONSULTATION LIAISON ASSESSMENT NOTE - RISK ASSESSMENT
Pt seen as a low risk, no previous psychiatric history, no anhedonia, enjoys his life, no access to lethal means.  Pt able to quit smoking has not had any alcohol since diagnosis with throat cancer.

## 2022-04-08 NOTE — BH CONSULTATION LIAISON ASSESSMENT NOTE - ADDITIONAL DETAILS / COMMENTS
Pt appears to have decision making capacity, while he has mild short term memory deficits he is aware of his medical condition, the risks he takes to eat solid food and drink thin liquids as well as risks of being hypotensive - he did agree to have IV fluids with family intervention.

## 2022-04-08 NOTE — BH CONSULTATION LIAISON ASSESSMENT NOTE - NSBHCHARTREVIEWLAB_PSY_A_CORE FT
04-08    139  |  102  |  64<H>  ----------------------------<  122<H>  3.9   |  26  |  4.18<H>    Ca    9.0      08 Apr 2022 05:30    TPro  6.1  /  Alb  2.5<L>  /  TBili  0.5  /  DBili  x   /  AST  24  /  ALT  28  /  AlkPhos  66  04-07    Complete Blood Count + Automated Diff (04.07.22 @ 05:30)    WBC Count: 9.78 K/uL    RBC Count: 4.01 M/uL    Hemoglobin: 9.6 g/dL    Hematocrit: 30.7 %    Mean Cell Volume: 76.6 fl    Mean Cell Hemoglobin: 23.9 pg    Mean Cell Hemoglobin Conc: 31.3 gm/dL    Red Cell Distrib Width: 15.3 %    Platelet Count - Automated: 185 K/uL    Auto Neutrophil #: 7.36 K/uL    Auto Lymphocyte #: 0.89 K/uL    Auto Monocyte #: 1.29 K/uL    Auto Eosinophil #: 0.09 K/uL    Auto Basophil #: 0.03 K/uL    Auto Neutrophil %: 75.3: Differential percentages must be correlated with absolute numbers for  clinical significance. %    Auto Lymphocyte %: 9.1 %    Auto Monocyte %: 13.2 %    Auto Eosinophil %: 0.9 %    Auto Basophil %: 0.3 %    Auto Immature Granulocyte %: 1.2: (Includes meta, myelo and promyelocytes) %    Nucleated RBC: 0 /100 WBCs

## 2022-04-08 NOTE — BH CONSULTATION LIAISON ASSESSMENT NOTE - GENERAL APPEARANCE
has lisp, partial tongue resection. Left sided weakness, but able to move all 4 limbs spontaneously./Deformities present

## 2022-04-08 NOTE — PROGRESS NOTE ADULT - SUBJECTIVE AND OBJECTIVE BOX
Patient is a 76y old  Male who presents with a chief complaint of Stroke (07 Apr 2022 11:06)    Patient seen and examined at bedside. No acute overnight events. Renal function worsening on morning BMP. Discussed labs with patient at bedside. Still refusing IV placement. Feels frustrated by thickened water.    ALLERGIES:  No Known Allergies    MEDICATIONS  (STANDING):  atorvastatin 80 milliGRAM(s) Oral at bedtime  Biotene Dry Mouth Oral Rinse 5 milliLiter(s) Swish and Spit daily  calcitriol   Capsule 0.25 MICROGram(s) Oral daily  cyanocobalamin 1000 MICROGram(s) Oral daily  dextrose 5%. 1000 milliLiter(s) (50 mL/Hr) IV Continuous <Continuous>  dextrose 5%. 1000 milliLiter(s) (100 mL/Hr) IV Continuous <Continuous>  dextrose 50% Injectable 25 Gram(s) IV Push once  dextrose 50% Injectable 12.5 Gram(s) IV Push once  dextrose 50% Injectable 25 Gram(s) IV Push once  dipyridamole 200 mG/aspirin 25 mG 1 Capsule(s) Oral two times a day  diVALproex  milliGRAM(s) Oral two times a day  ferrous    sulfate 325 milliGRAM(s) Oral daily  glucagon  Injectable 1 milliGRAM(s) IntraMuscular once  heparin   Injectable 5000 Unit(s) SubCutaneous every 8 hours  insulin lispro (ADMELOG) corrective regimen sliding scale   SubCutaneous three times a day before meals  insulin lispro (ADMELOG) corrective regimen sliding scale   SubCutaneous at bedtime  levothyroxine 75 MICROGram(s) Oral daily  melatonin 3 milliGRAM(s) Oral at bedtime  sodium chloride 0.9% Bolus 500 milliLiter(s) IV Bolus once  tamsulosin 0.4 milliGRAM(s) Oral at bedtime    MEDICATIONS  (PRN):  ALBUTerol    90 MICROgram(s) HFA Inhaler 2 Puff(s) Inhalation every 6 hours PRN Shortness of Breath and/or Wheezing  dextrose Oral Gel 15 Gram(s) Oral once PRN Blood Glucose LESS THAN 70 milliGRAM(s)/deciliter    Vital Signs Last 24 Hrs  T(F): 97.5 (08 Apr 2022 08:24), Max: 98.2 (07 Apr 2022 19:48)  HR: 73 (08 Apr 2022 08:24) (73 - 84)  BP: 89/57 (08 Apr 2022 05:09) (77/47 - 90/55)  RR: 15 (08 Apr 2022 08:24) (15 - 16)  SpO2: 95% (08 Apr 2022 08:24) (95% - 98%)  I&O's Summary    BMI (kg/m2): 22.2 (04-05-22 @ 20:36)    PHYSICAL EXAM:  General: NAD, awake, alert  ENT: MMM, no tonsilar exudate  Neck: Supple, No JVD  Lungs: Clear to auscultation bilaterally, no wheezes. Good air entry bilaterally   Cardio: RRR, S1/S2, +systolic murmur  Abdomen: Soft, Nontender, Nondistended; Bowel sounds present  Extremities: No calf tenderness, No pitting edema    LABS:                        9.6    9.78  )-----------( 185      ( 07 Apr 2022 05:30 )             30.7       04-08    139  |  102  |  64  ----------------------------<  122  3.9   |  26  |  4.18    Ca    9.0      08 Apr 2022 05:30    TPro  6.1  /  Alb  2.5  /  TBili  0.5  /  DBili  x   /  AST  24  /  ALT  28  /  AlkPhos  66  04-07     03-31 Chol 103 mg/dL LDL -- HDL 30 mg/dL Trig 76 mg/dL  TSH 4.470   TSH with FT4 reflex --  Total T3 --    POCT Blood Glucose.: 130 mg/dL (08 Apr 2022 07:47)  POCT Blood Glucose.: 186 mg/dL (07 Apr 2022 21:35)  POCT Blood Glucose.: 168 mg/dL (07 Apr 2022 16:58)  POCT Blood Glucose.: 150 mg/dL (07 Apr 2022 11:50)    COVID-19 PCR: NotDetec (04-05-22 @ 22:00)  COVID-19 PCR: NotDetec (04-04-22 @ 08:18)    RADIOLOGY & ADDITIONAL TESTS:     Care Discussed with Consultants/Other Providers:

## 2022-04-08 NOTE — BH CONSULTATION LIAISON ASSESSMENT NOTE - DESCRIPTION
Pt has a 12th grade education , worked in [employer].   Has 3 daughters, is  and has several grandchildren.   Told writer he spends his time "chasing the geese off my property."

## 2022-04-08 NOTE — BH CONSULTATION LIAISON ASSESSMENT NOTE - SUMMARY
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 Found to have R central sulcus MCA territory infarct on repeat CTH. Hospital course complicated by leukocytosis, JARRED on CKD. Admitted to [place] acute inpatient rehab on 4/5 for ADL, gait, and functional impairments.     Psychiatry asked to comment on decision making capacity, pt compliant with interview although sarcastic and irritable.   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

## 2022-04-08 NOTE — BH CONSULTATION LIAISON ASSESSMENT NOTE - NSBHCHARTREVIEWINVESTIGATE_PSY_A_CORE FT
< from: CT Head No Cont (03.31.22 @ 08:53) >    ACC: [id number] EXAM:  CT BRAIN                          PROCEDURE DATE:  03/31/2022    < from: CT Head No Cont (03.31.22 @ 08:53) >    ACC: [id number] EXAM:  CT BRAIN                          PROCEDURE DATE:  03/31/2022      INTERPRETATION:  Noncontrast CT of the brain.    CLINICAL INDICATION:  Left-sided weakness    TECHNIQUE : Axial CT scanning of the brain was obtained from the skull   base to the vertex without the administration of intravenous contrast.   Sagittal and coronal reformats were provided.    COMPARISON: CT brain 3/30/2022    FINDINGS:  Interval demarcation of cytotoxic edema in the region of the right   perirolandic cortex. No CT evidence for hemorrhagic transformation.  No hydrocephalus, midline shift, or effacement of basal cisterns.  Mild right maxillary sinus shows thickening. Remaining visualized   paranasal sinuses and mastoid air cells are clear.  IMPRESSION:  Interval demarcation of the right perirolandic cortex infarct.  No CT evidence for hemorrhagic transformation.    Dr. Larios discussed these findings with Dr. Manuel on 3/31/2022 9:19 AM   with read back.  -- End of Report---  ANUP LARIOS MD; Attending Radiologist  This document has been electronically signed. Mar 31 2022  9:20AM    < end of copied text >    < from: 12 Lead ECG (04.01.22 @ 14:50) >    Ventricular Rate 83 BPM    Atrial Rate 83 BPM    P-R Interval 152 ms    QRS Duration 122 ms    Q-T Interval 382 ms    QTC Calculation(Bazett) 448 ms    P Axis 56 degrees    R Axis -32 degrees    T Axis 101 degrees    Diagnosis Line Sinus rhythm with premature supraventricular complexes  and ventricular complexes  Left axis deviation  Left ventricular hypertrophy with QRS widening  Anteroseptal infarct (cited on or before 01-AUG-2018)  Confirmed by ANA PAULA NICOLE ([id number]) on 4/2/2022 11:17:27 AM    < end of copied text >

## 2022-04-08 NOTE — BH CONSULTATION LIAISON ASSESSMENT NOTE - CURRENT MEDICATION
MEDICATIONS  (STANDING):  atorvastatin 80 milliGRAM(s) Oral at bedtime  Biotene Dry Mouth Oral Rinse 5 milliLiter(s) Swish and Spit daily  calcitriol   Capsule 0.25 MICROGram(s) Oral daily  cyanocobalamin 1000 MICROGram(s) Oral daily  dextrose 5%. 1000 milliLiter(s) (50 mL/Hr) IV Continuous <Continuous>  dextrose 5%. 1000 milliLiter(s) (100 mL/Hr) IV Continuous <Continuous>  dextrose 50% Injectable 25 Gram(s) IV Push once  dextrose 50% Injectable 12.5 Gram(s) IV Push once  dextrose 50% Injectable 25 Gram(s) IV Push once  dipyridamole 200 mG/aspirin 25 mG 1 Capsule(s) Oral two times a day  diVALproex  milliGRAM(s) Oral two times a day  ferrous    sulfate 325 milliGRAM(s) Oral daily  glucagon  Injectable 1 milliGRAM(s) IntraMuscular once  heparin   Injectable 5000 Unit(s) SubCutaneous every 8 hours  insulin lispro (ADMELOG) corrective regimen sliding scale   SubCutaneous three times a day before meals  insulin lispro (ADMELOG) corrective regimen sliding scale   SubCutaneous at bedtime  levothyroxine 75 MICROGram(s) Oral daily  melatonin 3 milliGRAM(s) Oral at bedtime  sodium chloride 0.9%. 1000 milliLiter(s) (100 mL/Hr) IV Continuous <Continuous>  tamsulosin 0.4 milliGRAM(s) Oral at bedtime    MEDICATIONS  (PRN):  ALBUTerol    90 MICROgram(s) HFA Inhaler 2 Puff(s) Inhalation every 6 hours PRN Shortness of Breath and/or Wheezing  dextrose Oral Gel 15 Gram(s) Oral once PRN Blood Glucose LESS THAN 70 milliGRAM(s)/deciliter

## 2022-04-08 NOTE — BH CONSULTATION LIAISON ASSESSMENT NOTE - NSICDXPASTSURGICALHX_GEN_ALL_CORE_FT
PAST SURGICAL HISTORY:  Cardiac defibrillator in place - 2009, battery change 2017    H/O prior ablation treatment VT, 2009    S/P left inguinal hernia repair 2018 at [place]

## 2022-04-08 NOTE — BH CONSULTATION LIAISON ASSESSMENT NOTE - NSBHMSETHTPROC_PSY_A_CORE
reasoning can be followed, pt feels he has been responsible for his health most of his life, and will comply if he agrees with the MD assessment, gave an example of consenting to having an ACID implanted./Linear/Normal reasoning

## 2022-04-09 LAB
ALBUMIN SERPL ELPH-MCNC: 2.6 G/DL — LOW (ref 3.3–5)
ALP SERPL-CCNC: 71 U/L — SIGNIFICANT CHANGE UP (ref 40–120)
ALT FLD-CCNC: 55 U/L — HIGH (ref 10–45)
ANION GAP SERPL CALC-SCNC: 13 MMOL/L — SIGNIFICANT CHANGE UP (ref 5–17)
APPEARANCE UR: CLEAR — SIGNIFICANT CHANGE UP
AST SERPL-CCNC: 51 U/L — HIGH (ref 10–40)
BACTERIA # UR AUTO: NEGATIVE /HPF — SIGNIFICANT CHANGE UP
BILIRUB SERPL-MCNC: 0.4 MG/DL — SIGNIFICANT CHANGE UP (ref 0.2–1.2)
BILIRUB UR-MCNC: NEGATIVE — SIGNIFICANT CHANGE UP
BUN SERPL-MCNC: 52 MG/DL — HIGH (ref 7–23)
CALCIUM SERPL-MCNC: 8.9 MG/DL — SIGNIFICANT CHANGE UP (ref 8.4–10.5)
CHLORIDE SERPL-SCNC: 106 MMOL/L — SIGNIFICANT CHANGE UP (ref 96–108)
CO2 SERPL-SCNC: 24 MMOL/L — SIGNIFICANT CHANGE UP (ref 22–31)
COLOR SPEC: YELLOW — SIGNIFICANT CHANGE UP
CREAT SERPL-MCNC: 2.87 MG/DL — HIGH (ref 0.5–1.3)
DIFF PNL FLD: ABNORMAL
EGFR: 22 ML/MIN/1.73M2 — LOW
EPI CELLS # UR: SIGNIFICANT CHANGE UP
GLUCOSE BLDC GLUCOMTR-MCNC: 118 MG/DL — HIGH (ref 70–99)
GLUCOSE BLDC GLUCOMTR-MCNC: 151 MG/DL — HIGH (ref 70–99)
GLUCOSE SERPL-MCNC: 98 MG/DL — SIGNIFICANT CHANGE UP (ref 70–99)
GLUCOSE UR QL: NEGATIVE — SIGNIFICANT CHANGE UP
HCT VFR BLD CALC: 31.7 % — LOW (ref 39–50)
HGB BLD-MCNC: 10.1 G/DL — LOW (ref 13–17)
KETONES UR-MCNC: NEGATIVE — SIGNIFICANT CHANGE UP
LEUKOCYTE ESTERASE UR-ACNC: ABNORMAL
MCHC RBC-ENTMCNC: 24.2 PG — LOW (ref 27–34)
MCHC RBC-ENTMCNC: 31.9 GM/DL — LOW (ref 32–36)
MCV RBC AUTO: 76 FL — LOW (ref 80–100)
NITRITE UR-MCNC: NEGATIVE — SIGNIFICANT CHANGE UP
NRBC # BLD: 0 /100 WBCS — SIGNIFICANT CHANGE UP (ref 0–0)
PH UR: 5 — SIGNIFICANT CHANGE UP (ref 5–8)
PLATELET # BLD AUTO: 216 K/UL — SIGNIFICANT CHANGE UP (ref 150–400)
POTASSIUM SERPL-MCNC: 4 MMOL/L — SIGNIFICANT CHANGE UP (ref 3.5–5.3)
POTASSIUM SERPL-SCNC: 4 MMOL/L — SIGNIFICANT CHANGE UP (ref 3.5–5.3)
PROT SERPL-MCNC: 6.5 G/DL — SIGNIFICANT CHANGE UP (ref 6–8.3)
PROT UR-MCNC: 30 MG/DL
RBC # BLD: 4.17 M/UL — LOW (ref 4.2–5.8)
RBC # FLD: 15.8 % — HIGH (ref 10.3–14.5)
RBC CASTS # UR COMP ASSIST: SIGNIFICANT CHANGE UP /HPF (ref 0–4)
SODIUM SERPL-SCNC: 143 MMOL/L — SIGNIFICANT CHANGE UP (ref 135–145)
SP GR SPEC: 1.01 — SIGNIFICANT CHANGE UP (ref 1.01–1.02)
UROBILINOGEN FLD QL: NEGATIVE — SIGNIFICANT CHANGE UP
WBC # BLD: 7.14 K/UL — SIGNIFICANT CHANGE UP (ref 3.8–10.5)
WBC # FLD AUTO: 7.14 K/UL — SIGNIFICANT CHANGE UP (ref 3.8–10.5)
WBC UR QL: ABNORMAL /HPF (ref 0–5)

## 2022-04-09 PROCEDURE — 99232 SBSQ HOSP IP/OBS MODERATE 35: CPT

## 2022-04-09 RX ORDER — INSULIN LISPRO 100/ML
VIAL (ML) SUBCUTANEOUS AT BEDTIME
Refills: 0 | Status: DISCONTINUED | OUTPATIENT
Start: 2022-04-09 | End: 2022-04-09

## 2022-04-09 RX ORDER — SODIUM CHLORIDE 9 MG/ML
1000 INJECTION INTRAMUSCULAR; INTRAVENOUS; SUBCUTANEOUS
Refills: 0 | Status: DISCONTINUED | OUTPATIENT
Start: 2022-04-09 | End: 2022-04-10

## 2022-04-09 RX ORDER — SODIUM CHLORIDE 9 MG/ML
1000 INJECTION INTRAMUSCULAR; INTRAVENOUS; SUBCUTANEOUS
Refills: 0 | Status: DISCONTINUED | OUTPATIENT
Start: 2022-04-09 | End: 2022-04-09

## 2022-04-09 RX ORDER — INSULIN LISPRO 100/ML
VIAL (ML) SUBCUTANEOUS
Refills: 0 | Status: DISCONTINUED | OUTPATIENT
Start: 2022-04-09 | End: 2022-04-19

## 2022-04-09 RX ADMIN — ASPIRIN AND DIPYRIDAMOLE 1 CAPSULE(S): 25; 200 CAPSULE, EXTENDED RELEASE ORAL at 06:01

## 2022-04-09 RX ADMIN — Medication 5 MILLILITER(S): at 13:04

## 2022-04-09 RX ADMIN — SODIUM CHLORIDE 100 MILLILITER(S): 9 INJECTION INTRAMUSCULAR; INTRAVENOUS; SUBCUTANEOUS at 07:50

## 2022-04-09 RX ADMIN — CALCITRIOL 0.25 MICROGRAM(S): 0.5 CAPSULE ORAL at 13:00

## 2022-04-09 RX ADMIN — ASPIRIN AND DIPYRIDAMOLE 1 CAPSULE(S): 25; 200 CAPSULE, EXTENDED RELEASE ORAL at 17:43

## 2022-04-09 RX ADMIN — HEPARIN SODIUM 5000 UNIT(S): 5000 INJECTION INTRAVENOUS; SUBCUTANEOUS at 21:22

## 2022-04-09 RX ADMIN — DIVALPROEX SODIUM 500 MILLIGRAM(S): 500 TABLET, DELAYED RELEASE ORAL at 06:01

## 2022-04-09 RX ADMIN — DIVALPROEX SODIUM 500 MILLIGRAM(S): 500 TABLET, DELAYED RELEASE ORAL at 17:43

## 2022-04-09 RX ADMIN — Medication 3 MILLIGRAM(S): at 21:22

## 2022-04-09 RX ADMIN — SODIUM CHLORIDE 75 MILLILITER(S): 9 INJECTION INTRAMUSCULAR; INTRAVENOUS; SUBCUTANEOUS at 13:01

## 2022-04-09 RX ADMIN — HEPARIN SODIUM 5000 UNIT(S): 5000 INJECTION INTRAVENOUS; SUBCUTANEOUS at 13:01

## 2022-04-09 RX ADMIN — SODIUM CHLORIDE 75 MILLILITER(S): 9 INJECTION INTRAMUSCULAR; INTRAVENOUS; SUBCUTANEOUS at 11:33

## 2022-04-09 RX ADMIN — Medication 325 MILLIGRAM(S): at 13:00

## 2022-04-09 RX ADMIN — Medication 75 MICROGRAM(S): at 06:01

## 2022-04-09 RX ADMIN — PREGABALIN 1000 MICROGRAM(S): 225 CAPSULE ORAL at 13:00

## 2022-04-09 RX ADMIN — ATORVASTATIN CALCIUM 80 MILLIGRAM(S): 80 TABLET, FILM COATED ORAL at 21:22

## 2022-04-09 RX ADMIN — HEPARIN SODIUM 5000 UNIT(S): 5000 INJECTION INTRAVENOUS; SUBCUTANEOUS at 06:01

## 2022-04-09 RX ADMIN — SODIUM CHLORIDE 100 MILLILITER(S): 9 INJECTION INTRAMUSCULAR; INTRAVENOUS; SUBCUTANEOUS at 05:59

## 2022-04-09 RX ADMIN — Medication 2: at 16:33

## 2022-04-09 RX ADMIN — TAMSULOSIN HYDROCHLORIDE 0.4 MILLIGRAM(S): 0.4 CAPSULE ORAL at 21:23

## 2022-04-09 NOTE — PROGRESS NOTE ADULT - SUBJECTIVE AND OBJECTIVE BOX
Denies complaints    Vital Signs Last 24 Hrs  T(C): 36.7 (22 @ 07:59), Max: 36.8 (22 @ 21:49)  T(F): 98 (22 @ 07:59), Max: 98.3 (22 @ 21:49)  HR: 86 (22 @ 07:59) (85 - 86)  BP: 116/75 (22 @ 07:59) (105/61 - 116/75)  RR: 16 (22 @ 07:59) (16 - 16)  SpO2: 95% (22 @ 07:59) (95% - 97%)    I&O's Detail    2022 07:01  -  2022 07:00  --------------------------------------------------------  IN:    sodium chloride 0.9%: 1200 mL  Total IN: 1200 mL    OUT:    Indwelling Catheter - Urethral (mL): 1360 mL    Voided (mL): 150 mL  Total OUT: 1510 mL    Total NET: -310 mL    s1s2  b/l air entry  soft, ND  no edema                                10.1   7.14  )-----------( 216      ( 2022 06:25 )             31.7     2022 06:25    143    |  106    |  52     ----------------------------<  98     4.0     |  24     |  2.87     Ca    8.9        2022 06:25    TPro  6.5    /  Alb  2.6    /  TBili  0.4    /  DBili  x      /  AST  51     /  ALT  55     /  AlkPhos  71     2022 06:25    LIVER FUNCTIONS - ( 2022 06:25 )  Alb: 2.6 g/dL / Pro: 6.5 g/dL / ALK PHOS: 71 U/L / ALT: 55 U/L / AST: 51 U/L / GGT: x           Urinalysis Basic - ( 2022 05:50 )    Color: Yellow / Appearance: Clear / S.015 / pH: x  Gluc: x / Ketone: Negative  / Bili: Negative / Urobili: Negative   Blood: x / Protein: 30 mg/dL / Nitrite: Negative   Leuk Esterase: Small / RBC: 0-4 /HPF / WBC 6-10 /HPF   Sq Epi: x / Non Sq Epi: Neg.-Few / Bacteria: Negative /HPF    ALBUTerol    90 MICROgram(s) HFA Inhaler 2 Puff(s) Inhalation every 6 hours PRN  atorvastatin 80 milliGRAM(s) Oral at bedtime  Biotene Dry Mouth Oral Rinse 5 milliLiter(s) Swish and Spit daily  calcitriol   Capsule 0.25 MICROGram(s) Oral daily  cyanocobalamin 1000 MICROGram(s) Oral daily  dextrose 5%. 1000 milliLiter(s) IV Continuous <Continuous>  dextrose 5%. 1000 milliLiter(s) IV Continuous <Continuous>  dextrose 50% Injectable 25 Gram(s) IV Push once  dextrose 50% Injectable 12.5 Gram(s) IV Push once  dextrose 50% Injectable 25 Gram(s) IV Push once  dextrose Oral Gel 15 Gram(s) Oral once PRN  dipyridamole 200 mG/aspirin 25 mG 1 Capsule(s) Oral two times a day  diVALproex  milliGRAM(s) Oral two times a day  ferrous    sulfate 325 milliGRAM(s) Oral daily  glucagon  Injectable 1 milliGRAM(s) IntraMuscular once  heparin   Injectable 5000 Unit(s) SubCutaneous every 8 hours  insulin lispro (ADMELOG) corrective regimen sliding scale   SubCutaneous three times a day before meals  insulin lispro (ADMELOG) corrective regimen sliding scale   SubCutaneous at bedtime  levothyroxine 75 MICROGram(s) Oral daily  melatonin 3 milliGRAM(s) Oral at bedtime  sodium chloride 0.9%. 1000 milliLiter(s) IV Continuous <Continuous>  tamsulosin 0.4 milliGRAM(s) Oral at bedtime    A/P:    JARRED/CKD 3 in setting of hypotension, possible retention (Cr 2.0 - 22)  Avoid BP lowering meds  Avoid nephrotoxins  NS at 75cc/hr   F/u UA  SONO: small R kidney, distended bladder, was unable to void  Hillman, I/Os  Goal SBP > 90  BMP in am    [phone]

## 2022-04-09 NOTE — PROGRESS NOTE ADULT - SUBJECTIVE AND OBJECTIVE BOX
Cc: Gait dysfunction    HPI: Patient seen and examined at bedside. No acute events overnight. Frustrated that he needs IVF.   Pain controlled, no chest pain, no N/V, no Fevers/Chills. No other new ROS  Has been tolerating rehabilitation program.    ALBUTerol    90 MICROgram(s) HFA Inhaler 2 Puff(s) Inhalation every 6 hours PRN  atorvastatin 80 milliGRAM(s) Oral at bedtime  Biotene Dry Mouth Oral Rinse 5 milliLiter(s) Swish and Spit daily  calcitriol   Capsule 0.25 MICROGram(s) Oral daily  cyanocobalamin 1000 MICROGram(s) Oral daily  dextrose 5%. 1000 milliLiter(s) IV Continuous <Continuous>  dextrose 5%. 1000 milliLiter(s) IV Continuous <Continuous>  dextrose 50% Injectable 25 Gram(s) IV Push once  dextrose 50% Injectable 12.5 Gram(s) IV Push once  dextrose 50% Injectable 25 Gram(s) IV Push once  dextrose Oral Gel 15 Gram(s) Oral once PRN  dipyridamole 200 mG/aspirin 25 mG 1 Capsule(s) Oral two times a day  diVALproex  milliGRAM(s) Oral two times a day  ferrous    sulfate 325 milliGRAM(s) Oral daily  glucagon  Injectable 1 milliGRAM(s) IntraMuscular once  heparin   Injectable 5000 Unit(s) SubCutaneous every 8 hours  insulin lispro (ADMELOG) corrective regimen sliding scale   SubCutaneous three times a day before meals  insulin lispro (ADMELOG) corrective regimen sliding scale   SubCutaneous at bedtime  levothyroxine 75 MICROGram(s) Oral daily  melatonin 3 milliGRAM(s) Oral at bedtime  sodium chloride 0.9%. 1000 milliLiter(s) IV Continuous <Continuous>  tamsulosin 0.4 milliGRAM(s) Oral at bedtime      T(C): 36.7 (04-09-22 @ 07:59), Max: 36.8 (04-08-22 @ 21:49)  HR: 86 (04-09-22 @ 07:59) (85 - 86)  BP: 116/75 (04-09-22 @ 07:59) (105/61 - 116/75)  RR: 16 (04-09-22 @ 07:59) (16 - 16)  SpO2: 95% (04-09-22 @ 07:59) (95% - 97%)    In NAD  HEENT- EOMI  Heart- S1S2  Lungs- CTA bl.  Abd- + BS, NT  Ext- No calf pain  Neuro- Exam unchanged          Imp: Patient with diagnosis of R central sulcus CVA admitted for comprehensive acute rehabilitation.    Plan:  - Continue PT/OT/SLP as indicated  - DVT prophylaxis  - Skin- Turn q2h, check skin daily  - Continue current medications  -Active issues-   CVA- continue statin/aggrenox  Seizures - continue Depakote   NSTEMI/HTN - continue statin, imdur, holding ACE 2/2 JARRED, hydralazine, cardio/nephro recs  appreciated  JARRED - Crt improved today, will reduce IVF to 50cc/hr, encourged PO hydration, monitor BMP, holding ACEI/Lasix  Urinary retention - continue mcclellan/flomax  - Patient is stable to continue current rehabilitation program.    Cc: Gait dysfunction    HPI: Patient seen and examined at bedside. No acute events overnight. Frustrated that he needs IVF.   Pain controlled, no chest pain, no N/V, no Fevers/Chills. No other new ROS  Has been tolerating rehabilitation program.    ALBUTerol    90 MICROgram(s) HFA Inhaler 2 Puff(s) Inhalation every 6 hours PRN  atorvastatin 80 milliGRAM(s) Oral at bedtime  Biotene Dry Mouth Oral Rinse 5 milliLiter(s) Swish and Spit daily  calcitriol   Capsule 0.25 MICROGram(s) Oral daily  cyanocobalamin 1000 MICROGram(s) Oral daily  dextrose 5%. 1000 milliLiter(s) IV Continuous <Continuous>  dextrose 5%. 1000 milliLiter(s) IV Continuous <Continuous>  dextrose 50% Injectable 25 Gram(s) IV Push once  dextrose 50% Injectable 12.5 Gram(s) IV Push once  dextrose 50% Injectable 25 Gram(s) IV Push once  dextrose Oral Gel 15 Gram(s) Oral once PRN  dipyridamole 200 mG/aspirin 25 mG 1 Capsule(s) Oral two times a day  diVALproex  milliGRAM(s) Oral two times a day  ferrous    sulfate 325 milliGRAM(s) Oral daily  glucagon  Injectable 1 milliGRAM(s) IntraMuscular once  heparin   Injectable 5000 Unit(s) SubCutaneous every 8 hours  insulin lispro (ADMELOG) corrective regimen sliding scale   SubCutaneous three times a day before meals  insulin lispro (ADMELOG) corrective regimen sliding scale   SubCutaneous at bedtime  levothyroxine 75 MICROGram(s) Oral daily  melatonin 3 milliGRAM(s) Oral at bedtime  sodium chloride 0.9%. 1000 milliLiter(s) IV Continuous <Continuous>  tamsulosin 0.4 milliGRAM(s) Oral at bedtime      T(C): 36.7 (04-09-22 @ 07:59), Max: 36.8 (04-08-22 @ 21:49)  HR: 86 (04-09-22 @ 07:59) (85 - 86)  BP: 116/75 (04-09-22 @ 07:59) (105/61 - 116/75)  RR: 16 (04-09-22 @ 07:59) (16 - 16)  SpO2: 95% (04-09-22 @ 07:59) (95% - 97%)    In NAD  HEENT- EOMI  Heart- S1S2  Lungs- CTA bl.  Abd- + BS, NT  Ext- No calf pain  Neuro- Exam unchanged          Imp: Patient with diagnosis of R central sulcus CVA admitted for comprehensive acute rehabilitation.    Plan:  - Continue PT/OT/SLP as indicated  - DVT prophylaxis  - Skin- Turn q2h, check skin daily  - Continue current medications  -Active issues-   CVA- continue statin/aggrenox  Seizures - continue Depakote   NSTEMI/HTN - continue statin, imdur, holding ACE 2/2 JARRED, hydralazine, cardio/nephro recs  appreciated. BP normalized.   JARRED - Crt improved today, will reduce IVF to 50cc/hr, encourged PO hydration, monitor BMP, holding ACEI/Lasix  Urinary retention - continue mcclellan/flomax  Psych - patient has capacity in medical decision making as per psych  - Patient is stable to continue current rehabilitation program.

## 2022-04-09 NOTE — PROGRESS NOTE ADULT - SUBJECTIVE AND OBJECTIVE BOX
Patient is a 76y old  Male who presents with a chief complaint of cva (2022 09:17)    Patient seen and examined at bedside. Received IV fluids, improvement of Cr noted  Frustrated by mcclellan catheter     ALLERGIES:  No Known Allergies    MEDICATIONS  (STANDING):  atorvastatin 80 milliGRAM(s) Oral at bedtime  Biotene Dry Mouth Oral Rinse 5 milliLiter(s) Swish and Spit daily  calcitriol   Capsule 0.25 MICROGram(s) Oral daily  cyanocobalamin 1000 MICROGram(s) Oral daily  dextrose 5%. 1000 milliLiter(s) (50 mL/Hr) IV Continuous <Continuous>  dextrose 5%. 1000 milliLiter(s) (100 mL/Hr) IV Continuous <Continuous>  dextrose 50% Injectable 25 Gram(s) IV Push once  dextrose 50% Injectable 12.5 Gram(s) IV Push once  dextrose 50% Injectable 25 Gram(s) IV Push once  dipyridamole 200 mG/aspirin 25 mG 1 Capsule(s) Oral two times a day  diVALproex  milliGRAM(s) Oral two times a day  ferrous    sulfate 325 milliGRAM(s) Oral daily  glucagon  Injectable 1 milliGRAM(s) IntraMuscular once  heparin   Injectable 5000 Unit(s) SubCutaneous every 8 hours  insulin lispro (ADMELOG) corrective regimen sliding scale   SubCutaneous three times a day before meals  insulin lispro (ADMELOG) corrective regimen sliding scale   SubCutaneous at bedtime  levothyroxine 75 MICROGram(s) Oral daily  melatonin 3 milliGRAM(s) Oral at bedtime  sodium chloride 0.9%. 1000 milliLiter(s) (50 mL/Hr) IV Continuous <Continuous>  tamsulosin 0.4 milliGRAM(s) Oral at bedtime    MEDICATIONS  (PRN):  ALBUTerol    90 MICROgram(s) HFA Inhaler 2 Puff(s) Inhalation every 6 hours PRN Shortness of Breath and/or Wheezing  dextrose Oral Gel 15 Gram(s) Oral once PRN Blood Glucose LESS THAN 70 milliGRAM(s)/deciliter    Vital Signs Last 24 Hrs  T(F): 98 (2022 07:59), Max: 98.3 (2022 21:49)  HR: 86 (2022 07:59) (85 - 87)  BP: 116/75 (2022 07:59) (105/61 - 116/75)  RR: 16 (2022 07:59) (16 - 16)  SpO2: 95% (2022 07:59) (95% - 97%)  I&O's Summary    2022 07:01  -  2022 07:00  --------------------------------------------------------  IN: 1200 mL / OUT: 1510 mL / NET: -310 mL    2022 07:01  -  2022 10:48  --------------------------------------------------------  IN: 400 mL / OUT: 0 mL / NET: 400 mL    BMI (kg/m2): 22.2 (22 @ 20:36)    PHYSICAL EXAM:  General: NAD, awake, alert  ENT: MMM, no tonsilar exudate  Neck: Supple, No JVD  Lungs: Clear to auscultation bilaterally, no wheezes. Good air entry bilaterally   Cardio: RRR, S1/S2, No murmurs  Abdomen: Soft, Nontender, Nondistended; Bowel sounds present  Extremities: No calf tenderness, No pitting edema    LABS:                        10.1   7.14  )-----------( 216      ( 2022 06:25 )             31.7           143  |  106  |  52  ----------------------------<  98  4.0   |  24  |  2.87    Ca    8.9      2022 06:25    TPro  6.5  /  Alb  2.6  /  TBili  0.4  /  DBili  x   /  AST  51  /  ALT  55  /  AlkPhos  71  04-     03-31 Chol 103 mg/dL LDL -- HDL 30 mg/dL Trig 76 mg/dL  TSH 4.470   TSH with FT4 reflex --  Total T3 --    POCT Blood Glucose.: 118 mg/dL (2022 07:44)  POCT Blood Glucose.: 138 mg/dL (2022 21:43)  POCT Blood Glucose.: 126 mg/dL (2022 16:18)  POCT Blood Glucose.: 157 mg/dL (2022 11:17)    Urinalysis Basic - ( 2022 05:50 )    Color: Yellow / Appearance: Clear / S.015 / pH: x  Gluc: x / Ketone: Negative  / Bili: Negative / Urobili: Negative   Blood: x / Protein: 30 mg/dL / Nitrite: Negative   Leuk Esterase: Small / RBC: 0-4 /HPF / WBC 6-10 /HPF   Sq Epi: x / Non Sq Epi: Neg.-Few / Bacteria: Negative /HPF    COVID-19 PCR: NotDetec (22 @ 22:00)  COVID-19 PCR: NotDetec (22 @ 08:18)    RADIOLOGY & ADDITIONAL TESTS:     Care Discussed with Consultants/Other Providers:

## 2022-04-09 NOTE — PROGRESS NOTE ADULT - ASSESSMENT
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 Found to have R central sulcus MCA territory infarct on repeat CTH. Hospital course complicated by leukocytosis, JARRED on CKD. Admitted to [place] acute inpatient rehab on 4/5 for ADL, gait, and functional impairments.     #s/p R central Sulcus CVA  - Unable to obtain MRI due to ICD  - Continue aggrenox bid  - Continue atorvastatin 80mg qd  - Continue comprehensive rehab program - PT/OT/SLP per rehab team  - Pain management, bowel regimen per rehab     #Seizures  - Likely due to R central sulcus infarct  - Continue Depakote 500mg BID for 6 months per neuro  - EEG wnl    # NSTEMI  # HFrEF  # HTN  - S/p heparin gtt, continue aggrenox  - Course c/b acute pulmonary edema s/p lasix x2 3/30. Diurese prn  - TTE LVEF 30% - 35% w/ severe systolic dysfunction  - Continue statin  - Holding home imdur, coreg. ACEI held 2/2 JARRED on CKD  - 4/7 hold metoprolol 50mg qd, hydralazine 5mg TID  - Cardio, nephro consult    #JARRED on CKD  - Cr improved from 4.18 to 2.87; will reduce IVF to 50cc  - Likely combination of dehydration and hypotension   - Monitor routine BMP  - Avoid nephrotoxic medications; holding ACEI, lasix prn  - Pt refusing IVF, will continue to encourage    #Type 2 Diabetes Mellitus  - HbA1c 8.3  - Continue FS, ISS    #Tonsillar Cancer #Dysphagia  - Seen by ENT recent  - Continue puree/mildly thick. diet/nutrition follow up  - Residual L hearing loss    # Urinary Retention  - Hillman per rehab, cont flomax - dc if hypotension persists    #Hypothyroidism  - Continue synthroid 75mcg qd    #DVT ppx - HSQ

## 2022-04-10 LAB
ANION GAP SERPL CALC-SCNC: 9 MMOL/L — SIGNIFICANT CHANGE UP (ref 5–17)
BUN SERPL-MCNC: 37 MG/DL — HIGH (ref 7–23)
CALCIUM SERPL-MCNC: 8.6 MG/DL — SIGNIFICANT CHANGE UP (ref 8.4–10.5)
CHLORIDE SERPL-SCNC: 110 MMOL/L — HIGH (ref 96–108)
CO2 SERPL-SCNC: 25 MMOL/L — SIGNIFICANT CHANGE UP (ref 22–31)
CREAT SERPL-MCNC: 2.23 MG/DL — HIGH (ref 0.5–1.3)
EGFR: 30 ML/MIN/1.73M2 — LOW
GLUCOSE BLDC GLUCOMTR-MCNC: 147 MG/DL — HIGH (ref 70–99)
GLUCOSE BLDC GLUCOMTR-MCNC: 96 MG/DL — SIGNIFICANT CHANGE UP (ref 70–99)
GLUCOSE SERPL-MCNC: 84 MG/DL — SIGNIFICANT CHANGE UP (ref 70–99)
POTASSIUM SERPL-MCNC: 3.9 MMOL/L — SIGNIFICANT CHANGE UP (ref 3.5–5.3)
POTASSIUM SERPL-SCNC: 3.9 MMOL/L — SIGNIFICANT CHANGE UP (ref 3.5–5.3)
SODIUM SERPL-SCNC: 144 MMOL/L — SIGNIFICANT CHANGE UP (ref 135–145)

## 2022-04-10 PROCEDURE — 99232 SBSQ HOSP IP/OBS MODERATE 35: CPT

## 2022-04-10 RX ORDER — SODIUM CHLORIDE 9 MG/ML
1000 INJECTION INTRAMUSCULAR; INTRAVENOUS; SUBCUTANEOUS
Refills: 0 | Status: DISCONTINUED | OUTPATIENT
Start: 2022-04-10 | End: 2022-04-11

## 2022-04-10 RX ADMIN — CALCITRIOL 0.25 MICROGRAM(S): 0.5 CAPSULE ORAL at 12:02

## 2022-04-10 RX ADMIN — PREGABALIN 1000 MICROGRAM(S): 225 CAPSULE ORAL at 12:03

## 2022-04-10 RX ADMIN — TAMSULOSIN HYDROCHLORIDE 0.4 MILLIGRAM(S): 0.4 CAPSULE ORAL at 21:01

## 2022-04-10 RX ADMIN — DIVALPROEX SODIUM 500 MILLIGRAM(S): 500 TABLET, DELAYED RELEASE ORAL at 05:41

## 2022-04-10 RX ADMIN — SODIUM CHLORIDE 50 MILLILITER(S): 9 INJECTION INTRAMUSCULAR; INTRAVENOUS; SUBCUTANEOUS at 09:15

## 2022-04-10 RX ADMIN — HEPARIN SODIUM 5000 UNIT(S): 5000 INJECTION INTRAVENOUS; SUBCUTANEOUS at 21:01

## 2022-04-10 RX ADMIN — Medication 3 MILLIGRAM(S): at 21:01

## 2022-04-10 RX ADMIN — HEPARIN SODIUM 5000 UNIT(S): 5000 INJECTION INTRAVENOUS; SUBCUTANEOUS at 05:41

## 2022-04-10 RX ADMIN — SODIUM CHLORIDE 50 MILLILITER(S): 9 INJECTION INTRAMUSCULAR; INTRAVENOUS; SUBCUTANEOUS at 21:01

## 2022-04-10 RX ADMIN — Medication 5 MILLILITER(S): at 12:06

## 2022-04-10 RX ADMIN — Medication 325 MILLIGRAM(S): at 12:02

## 2022-04-10 RX ADMIN — SODIUM CHLORIDE 50 MILLILITER(S): 9 INJECTION INTRAMUSCULAR; INTRAVENOUS; SUBCUTANEOUS at 16:21

## 2022-04-10 RX ADMIN — ASPIRIN AND DIPYRIDAMOLE 1 CAPSULE(S): 25; 200 CAPSULE, EXTENDED RELEASE ORAL at 17:58

## 2022-04-10 RX ADMIN — ASPIRIN AND DIPYRIDAMOLE 1 CAPSULE(S): 25; 200 CAPSULE, EXTENDED RELEASE ORAL at 05:41

## 2022-04-10 RX ADMIN — ATORVASTATIN CALCIUM 80 MILLIGRAM(S): 80 TABLET, FILM COATED ORAL at 21:01

## 2022-04-10 RX ADMIN — DIVALPROEX SODIUM 500 MILLIGRAM(S): 500 TABLET, DELAYED RELEASE ORAL at 17:58

## 2022-04-10 RX ADMIN — HEPARIN SODIUM 5000 UNIT(S): 5000 INJECTION INTRAVENOUS; SUBCUTANEOUS at 13:21

## 2022-04-10 RX ADMIN — SODIUM CHLORIDE 75 MILLILITER(S): 9 INJECTION INTRAMUSCULAR; INTRAVENOUS; SUBCUTANEOUS at 05:40

## 2022-04-10 RX ADMIN — Medication 75 MICROGRAM(S): at 05:41

## 2022-04-10 NOTE — PROGRESS NOTE ADULT - SUBJECTIVE AND OBJECTIVE BOX
Cc: Gait dysfunction    HPI: Patient seen and examined at bedside. No acute events overnight. Patient frustrated at being on IVF.   Pain controlled, no chest pain, no N/V, no Fevers/Chills. No other new ROS  Has been tolerating rehabilitation program.    ALBUTerol    90 MICROgram(s) HFA Inhaler 2 Puff(s) Inhalation every 6 hours PRN  atorvastatin 80 milliGRAM(s) Oral at bedtime  Biotene Dry Mouth Oral Rinse 5 milliLiter(s) Swish and Spit daily  calcitriol   Capsule 0.25 MICROGram(s) Oral daily  cyanocobalamin 1000 MICROGram(s) Oral daily  dextrose 5%. 1000 milliLiter(s) IV Continuous <Continuous>  dextrose 5%. 1000 milliLiter(s) IV Continuous <Continuous>  dextrose 50% Injectable 25 Gram(s) IV Push once  dextrose 50% Injectable 12.5 Gram(s) IV Push once  dextrose 50% Injectable 25 Gram(s) IV Push once  dextrose Oral Gel 15 Gram(s) Oral once PRN  dipyridamole 200 mG/aspirin 25 mG 1 Capsule(s) Oral two times a day  diVALproex  milliGRAM(s) Oral two times a day  ferrous    sulfate 325 milliGRAM(s) Oral daily  glucagon  Injectable 1 milliGRAM(s) IntraMuscular once  heparin   Injectable 5000 Unit(s) SubCutaneous every 8 hours  insulin lispro (ADMELOG) corrective regimen sliding scale   SubCutaneous two times a day with meals  levothyroxine 75 MICROGram(s) Oral daily  melatonin 3 milliGRAM(s) Oral at bedtime  sodium chloride 0.9%. 1000 milliLiter(s) IV Continuous <Continuous>  tamsulosin 0.4 milliGRAM(s) Oral at bedtime      T(C): 36.8 (04-10-22 @ 08:09), Max: 36.8 (04-10-22 @ 08:09)  HR: 76 (04-10-22 @ 08:09) (76 - 89)  BP: 86/51 (04-10-22 @ 08:09) (86/51 - 137/73)  RR: 15 (04-10-22 @ 08:09) (15 - 16)  SpO2: 93% (04-10-22 @ 08:09) (93% - 94%)    In NAD  HEENT- EOMI  Heart- S1S2  Lungs- CTA bl.  Abd- + BS, NT  Ext- No calf pain, IV in place  Neuro- Exam unchanged      Imp: Patient with diagnosis of R central sulcus CVA admitted for comprehensive acute rehabilitation.    Plan:  - Continue PT/OT/SLP as indicated  - DVT prophylaxis  - Skin- Turn q2h, check skin daily  - Continue current medications  -Active issues-   CVA- continue statin/aggrenox  Seizures - continue Depakote   NSTEMI/HTN - continue statin, imdur, holding ACE 2/2 JARRED, hydralazine, cardio/nephro recs  appreciated. BP normalized.   JARRED - Crt again improved today, will continue IVF to 50cc/hr, encouraged PO hydration, monitor BMP, holding ACEI/Lasix  Urinary retention - continue mcclellan/flomax  Psych - patient has capacity in medical decision making as per psych  - Patient is stable to continue current rehabilitation program.

## 2022-04-10 NOTE — PROGRESS NOTE ADULT - ASSESSMENT
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 Found to have R central sulcus MCA territory infarct on repeat CTH. Hospital course complicated by leukocytosis, JARRED on CKD. Admitted to [place] acute inpatient rehab on 4/5 for ADL, gait, and functional impairments.     #s/p R central Sulcus CVA  - Unable to obtain MRI due to ICD  - Continue aggrenox bid  - Continue atorvastatin 80mg qd  - Continue comprehensive rehab program - PT/OT/SLP per rehab team  - Pain management, bowel regimen per rehab     #Seizures  - Likely due to R central sulcus infarct  - Continue Depakote 500mg BID for 6 months per neuro  - EEG wnl    # NSTEMI  # HFrEF  # HTN  - S/p heparin gtt, continue aggrenox  - Course c/b acute pulmonary edema s/p lasix x2 3/30. Diurese prn  - TTE LVEF 30% - 35% w/ severe systolic dysfunction  - Continue statin  - Holding home imdur, coreg. ACEI held 2/2 JARRED on CKD  - 4/7 hold metoprolol 50mg qd, hydralazine 5mg TID  - Cardio, nephro consult    #JARRED on CKD  - Cr improved from 4.18 to 2.87; will reduce IVF to 50cc  - Likely combination of dehydration and hypotension   - Monitor routine BMP  - Avoid nephrotoxic medications; holding ACEI, lasix prn  - Pt refusing IVF, will continue to encourage    #Type 2 Diabetes Mellitus  - HbA1c 8.3  - Continue FS, ISS    #Tonsillar Cancer #Dysphagia  - Seen by ENT recent  - Continue puree/mildly thick. diet/nutrition follow up  - Residual L hearing loss    # Urinary Retention  - Hillman per rehab, cont flomax - dc if hypotension persists    #Hypothyroidism  - Continue synthroid 75mcg qd    #DVT ppx - HSQ

## 2022-04-10 NOTE — PROGRESS NOTE ADULT - SUBJECTIVE AND OBJECTIVE BOX
Denies complaints    Vital Signs Last 24 Hrs  T(C): 36.7 (04-10-22 @ 20:59), Max: 36.8 (04-10-22 @ 08:09)  T(F): 98 (04-10-22 @ 20:59), Max: 98.2 (04-10-22 @ 08:09)  HR: 77 (04-10-22 @ 20:59) (70 - 77)  BP: 129/78 (04-10-22 @ 20:59) (86/51 - 129/78)  RR: 16 (04-10-22 @ 20:59) (14 - 16)  SpO2: 96% (04-10-22 @ 20:59) (93% - 96%)    I&O's Detail    2022 07:01  -  10 Apr 2022 07:00  --------------------------------------------------------  IN:    sodium chloride 0.9%: 400 mL    sodium chloride 0.9%: 1500 mL  Total IN: 1900 mL    OUT:    Indwelling Catheter - Urethral (mL): 2120 mL  Total OUT: 2120 mL    Total NET: -220 mL    10 Apr 2022 07:01  -  10 Apr 2022 22:34  --------------------------------------------------------  IN:    sodium chloride 0.9%: 650 mL    sodium chloride 0.9%: 150 mL  Total IN: 800 mL    OUT:    Indwelling Catheter - Urethral (mL): 400 mL  Total OUT: 400 mL    Total NET: 400 mL    s1s2  b/l air entry  soft, ND  no edema                                        10.1   7.14  )-----------( 216      ( 2022 06:25 )             31.7     10 Apr 2022 06:00    144    |  110    |  37     ----------------------------<  84     3.9     |  25     |  2.23     Ca    8.6        10 Apr 2022 06:00    TPro  6.5    /  Alb  2.6    /  TBili  0.4    /  DBili  x      /  AST  51     /  ALT  55     /  AlkPhos  71     2022 06:25    LIVER FUNCTIONS - ( 2022 06:25 )  Alb: 2.6 g/dL / Pro: 6.5 g/dL / ALK PHOS: 71 U/L / ALT: 55 U/L / AST: 51 U/L / GGT: x           Urinalysis Basic - ( 2022 05:50 )    Color: Yellow / Appearance: Clear / S.015 / pH: x  Gluc: x / Ketone: Negative  / Bili: Negative / Urobili: Negative   Blood: x / Protein: 30 mg/dL / Nitrite: Negative   Leuk Esterase: Small / RBC: 0-4 /HPF / WBC 6-10 /HPF   Sq Epi: x / Non Sq Epi: Neg.-Few / Bacteria: Negative /HPF    ALBUTerol    90 MICROgram(s) HFA Inhaler 2 Puff(s) Inhalation every 6 hours PRN  atorvastatin 80 milliGRAM(s) Oral at bedtime  Biotene Dry Mouth Oral Rinse 5 milliLiter(s) Swish and Spit daily  calcitriol   Capsule 0.25 MICROGram(s) Oral daily  cyanocobalamin 1000 MICROGram(s) Oral daily  dextrose 5%. 1000 milliLiter(s) IV Continuous <Continuous>  dextrose 5%. 1000 milliLiter(s) IV Continuous <Continuous>  dextrose 50% Injectable 25 Gram(s) IV Push once  dextrose 50% Injectable 12.5 Gram(s) IV Push once  dextrose 50% Injectable 25 Gram(s) IV Push once  dextrose Oral Gel 15 Gram(s) Oral once PRN  dipyridamole 200 mG/aspirin 25 mG 1 Capsule(s) Oral two times a day  diVALproex  milliGRAM(s) Oral two times a day  ferrous    sulfate 325 milliGRAM(s) Oral daily  glucagon  Injectable 1 milliGRAM(s) IntraMuscular once  heparin   Injectable 5000 Unit(s) SubCutaneous every 8 hours  insulin lispro (ADMELOG) corrective regimen sliding scale   SubCutaneous two times a day with meals  levothyroxine 75 MICROGram(s) Oral daily  melatonin 3 milliGRAM(s) Oral at bedtime  sodium chloride 0.9%. 1000 milliLiter(s) IV Continuous <Continuous>  tamsulosin 0.4 milliGRAM(s) Oral at bedtime    A/P:    JARRED/CKD 3 in setting of hypotension, possible retention (Cr 2.0 - 22)  Avoid BP lowering meds  Avoid nephrotoxins  NS at 50cc/hr   Cr is improving  SONO: small R kidney, distended bladder, was unable to void  Hillman, I/Os  Goal SBP > 90  BMP in am  If Cr is better in am, would d/c IVF    [phone]

## 2022-04-10 NOTE — PROGRESS NOTE ADULT - SUBJECTIVE AND OBJECTIVE BOX
Patient is a 76y old  Male who presents with a chief complaint of cva (2022 10:48)    Patient seen and examined at bedside. No acute overnight events. Currently on IVF.     ALLERGIES:  No Known Allergies    MEDICATIONS  (STANDING):  atorvastatin 80 milliGRAM(s) Oral at bedtime  Biotene Dry Mouth Oral Rinse 5 milliLiter(s) Swish and Spit daily  calcitriol   Capsule 0.25 MICROGram(s) Oral daily  cyanocobalamin 1000 MICROGram(s) Oral daily  dextrose 5%. 1000 milliLiter(s) (50 mL/Hr) IV Continuous <Continuous>  dextrose 5%. 1000 milliLiter(s) (100 mL/Hr) IV Continuous <Continuous>  dextrose 50% Injectable 25 Gram(s) IV Push once  dextrose 50% Injectable 12.5 Gram(s) IV Push once  dextrose 50% Injectable 25 Gram(s) IV Push once  dipyridamole 200 mG/aspirin 25 mG 1 Capsule(s) Oral two times a day  diVALproex  milliGRAM(s) Oral two times a day  ferrous    sulfate 325 milliGRAM(s) Oral daily  glucagon  Injectable 1 milliGRAM(s) IntraMuscular once  heparin   Injectable 5000 Unit(s) SubCutaneous every 8 hours  insulin lispro (ADMELOG) corrective regimen sliding scale   SubCutaneous two times a day with meals  levothyroxine 75 MICROGram(s) Oral daily  melatonin 3 milliGRAM(s) Oral at bedtime  sodium chloride 0.9%. 1000 milliLiter(s) (50 mL/Hr) IV Continuous <Continuous>  tamsulosin 0.4 milliGRAM(s) Oral at bedtime    MEDICATIONS  (PRN):  ALBUTerol    90 MICROgram(s) HFA Inhaler 2 Puff(s) Inhalation every 6 hours PRN Shortness of Breath and/or Wheezing  dextrose Oral Gel 15 Gram(s) Oral once PRN Blood Glucose LESS THAN 70 milliGRAM(s)/deciliter    Vital Signs Last 24 Hrs  T(F): 98.2 (10 Apr 2022 08:09), Max: 98.2 (10 Apr 2022 08:09)  HR: 76 (10 Apr 2022 08:09) (76 - 89)  BP: 86/51 (10 Apr 2022 08:09) (86/51 - 137/73)  RR: 15 (10 Apr 2022 08:09) (15 - 16)  SpO2: 93% (10 Apr 2022 08:09) (93% - 94%)  I&O's Summary    2022 07:01  -  10 Apr 2022 07:00  --------------------------------------------------------  IN: 1825 mL / OUT: 1520 mL / NET: 305 mL    10 Apr 2022 07:01  -  10 Apr 2022 11:39  --------------------------------------------------------  IN: 200 mL / OUT: 0 mL / NET: 200 mL    BMI (kg/m2): 22.2 (22 @ 20:36)    PHYSICAL EXAM:  General: NAD, awake, alert  ENT: MMM, no tonsilar exudate  Neck: Supple, No JVD  Lungs: Clear to auscultation bilaterally, no wheezes. Good air entry bilaterally   Cardio: RRR, S1/S2, No murmurs  Abdomen: Soft, Nontender, Nondistended; Bowel sounds present +mcclellan  Extremities: No calf tenderness, No pitting edema    LABS:                        10.1   7.14  )-----------( 216      ( 2022 06:25 )             31.7       04-10    144  |  110  |  37  ----------------------------<  84  3.9   |  25  |  2.23    Ca    8.6      10 Apr 2022 06:00    TPro  6.5  /  Alb  2.6  /  TBili  0.4  /  DBili  x   /  AST  51  /  ALT  55  /  AlkPhos  71  04-     03-31 Chol 103 mg/dL LDL -- HDL 30 mg/dL Trig 76 mg/dL  TSH 4.470   TSH with FT4 reflex --  Total T3 --    POCT Blood Glucose.: 96 mg/dL (10 Apr 2022 07:56)  POCT Blood Glucose.: 151 mg/dL (2022 16:32)    Urinalysis Basic - ( 2022 05:50 )    Color: Yellow / Appearance: Clear / S.015 / pH: x  Gluc: x / Ketone: Negative  / Bili: Negative / Urobili: Negative   Blood: x / Protein: 30 mg/dL / Nitrite: Negative   Leuk Esterase: Small / RBC: 0-4 /HPF / WBC 6-10 /HPF   Sq Epi: x / Non Sq Epi: Neg.-Few / Bacteria: Negative /HPF    COVID-19 PCR: NotDetec (22 @ 22:00)  COVID-19 PCR: NotDetec (22 @ 08:18)    RADIOLOGY & ADDITIONAL TESTS:     Care Discussed with Consultants/Other Providers:

## 2022-04-11 DIAGNOSIS — C01 MALIGNANT NEOPLASM OF BASE OF TONGUE: ICD-10-CM

## 2022-04-11 DIAGNOSIS — J34.2 DEVIATED NASAL SEPTUM: ICD-10-CM

## 2022-04-11 DIAGNOSIS — R13.12 DYSPHAGIA, OROPHARYNGEAL PHASE: ICD-10-CM

## 2022-04-11 DIAGNOSIS — C09.9 MALIGNANT NEOPLASM OF TONSIL, UNSPECIFIED: ICD-10-CM

## 2022-04-11 LAB
ALBUMIN SERPL ELPH-MCNC: 2.1 G/DL — LOW (ref 3.3–5)
ALP SERPL-CCNC: 61 U/L — SIGNIFICANT CHANGE UP (ref 40–120)
ALT FLD-CCNC: 39 U/L — SIGNIFICANT CHANGE UP (ref 10–45)
ANION GAP SERPL CALC-SCNC: 10 MMOL/L — SIGNIFICANT CHANGE UP (ref 5–17)
AST SERPL-CCNC: 36 U/L — SIGNIFICANT CHANGE UP (ref 10–40)
BASOPHILS # BLD AUTO: 0.02 K/UL — SIGNIFICANT CHANGE UP (ref 0–0.2)
BASOPHILS NFR BLD AUTO: 0.4 % — SIGNIFICANT CHANGE UP (ref 0–2)
BILIRUB SERPL-MCNC: 0.3 MG/DL — SIGNIFICANT CHANGE UP (ref 0.2–1.2)
BUN SERPL-MCNC: 30 MG/DL — HIGH (ref 7–23)
CALCIUM SERPL-MCNC: 8.9 MG/DL — SIGNIFICANT CHANGE UP (ref 8.4–10.5)
CHLORIDE SERPL-SCNC: 110 MMOL/L — HIGH (ref 96–108)
CO2 SERPL-SCNC: 24 MMOL/L — SIGNIFICANT CHANGE UP (ref 22–31)
CREAT SERPL-MCNC: 1.96 MG/DL — HIGH (ref 0.5–1.3)
EGFR: 35 ML/MIN/1.73M2 — LOW
EOSINOPHIL # BLD AUTO: 0.12 K/UL — SIGNIFICANT CHANGE UP (ref 0–0.5)
EOSINOPHIL NFR BLD AUTO: 2.2 % — SIGNIFICANT CHANGE UP (ref 0–6)
GLUCOSE BLDC GLUCOMTR-MCNC: 113 MG/DL — HIGH (ref 70–99)
GLUCOSE BLDC GLUCOMTR-MCNC: 129 MG/DL — HIGH (ref 70–99)
GLUCOSE SERPL-MCNC: 93 MG/DL — SIGNIFICANT CHANGE UP (ref 70–99)
HCT VFR BLD CALC: 31.2 % — LOW (ref 39–50)
HGB BLD-MCNC: 9.7 G/DL — LOW (ref 13–17)
IMM GRANULOCYTES NFR BLD AUTO: 1.5 % — SIGNIFICANT CHANGE UP (ref 0–1.5)
LYMPHOCYTES # BLD AUTO: 0.82 K/UL — LOW (ref 1–3.3)
LYMPHOCYTES # BLD AUTO: 15.2 % — SIGNIFICANT CHANGE UP (ref 13–44)
MCHC RBC-ENTMCNC: 24 PG — LOW (ref 27–34)
MCHC RBC-ENTMCNC: 31.1 GM/DL — LOW (ref 32–36)
MCV RBC AUTO: 77 FL — LOW (ref 80–100)
MONOCYTES # BLD AUTO: 0.9 K/UL — SIGNIFICANT CHANGE UP (ref 0–0.9)
MONOCYTES NFR BLD AUTO: 16.7 % — HIGH (ref 2–14)
NEUTROPHILS # BLD AUTO: 3.45 K/UL — SIGNIFICANT CHANGE UP (ref 1.8–7.4)
NEUTROPHILS NFR BLD AUTO: 64 % — SIGNIFICANT CHANGE UP (ref 43–77)
NRBC # BLD: 0 /100 WBCS — SIGNIFICANT CHANGE UP (ref 0–0)
PLATELET # BLD AUTO: 185 K/UL — SIGNIFICANT CHANGE UP (ref 150–400)
POTASSIUM SERPL-MCNC: 4 MMOL/L — SIGNIFICANT CHANGE UP (ref 3.5–5.3)
POTASSIUM SERPL-SCNC: 4 MMOL/L — SIGNIFICANT CHANGE UP (ref 3.5–5.3)
PROT SERPL-MCNC: 5.7 G/DL — LOW (ref 6–8.3)
RBC # BLD: 4.05 M/UL — LOW (ref 4.2–5.8)
RBC # FLD: 15.9 % — HIGH (ref 10.3–14.5)
SODIUM SERPL-SCNC: 144 MMOL/L — SIGNIFICANT CHANGE UP (ref 135–145)
WBC # BLD: 5.39 K/UL — SIGNIFICANT CHANGE UP (ref 3.8–10.5)
WBC # FLD AUTO: 5.39 K/UL — SIGNIFICANT CHANGE UP (ref 3.8–10.5)

## 2022-04-11 PROCEDURE — 74230 X-RAY XM SWLNG FUNCJ C+: CPT | Mod: 26

## 2022-04-11 PROCEDURE — 99233 SBSQ HOSP IP/OBS HIGH 50: CPT

## 2022-04-11 PROCEDURE — 99232 SBSQ HOSP IP/OBS MODERATE 35: CPT

## 2022-04-11 RX ADMIN — HEPARIN SODIUM 5000 UNIT(S): 5000 INJECTION INTRAVENOUS; SUBCUTANEOUS at 14:00

## 2022-04-11 RX ADMIN — Medication 5 MILLILITER(S): at 11:01

## 2022-04-11 RX ADMIN — Medication 3 MILLIGRAM(S): at 21:05

## 2022-04-11 RX ADMIN — HEPARIN SODIUM 5000 UNIT(S): 5000 INJECTION INTRAVENOUS; SUBCUTANEOUS at 05:48

## 2022-04-11 RX ADMIN — DIVALPROEX SODIUM 500 MILLIGRAM(S): 500 TABLET, DELAYED RELEASE ORAL at 17:18

## 2022-04-11 RX ADMIN — DIVALPROEX SODIUM 500 MILLIGRAM(S): 500 TABLET, DELAYED RELEASE ORAL at 05:47

## 2022-04-11 RX ADMIN — PREGABALIN 1000 MICROGRAM(S): 225 CAPSULE ORAL at 11:00

## 2022-04-11 RX ADMIN — Medication 75 MICROGRAM(S): at 05:47

## 2022-04-11 RX ADMIN — TAMSULOSIN HYDROCHLORIDE 0.4 MILLIGRAM(S): 0.4 CAPSULE ORAL at 21:05

## 2022-04-11 RX ADMIN — HEPARIN SODIUM 5000 UNIT(S): 5000 INJECTION INTRAVENOUS; SUBCUTANEOUS at 21:05

## 2022-04-11 RX ADMIN — ASPIRIN AND DIPYRIDAMOLE 1 CAPSULE(S): 25; 200 CAPSULE, EXTENDED RELEASE ORAL at 05:47

## 2022-04-11 RX ADMIN — SODIUM CHLORIDE 50 MILLILITER(S): 9 INJECTION INTRAMUSCULAR; INTRAVENOUS; SUBCUTANEOUS at 11:03

## 2022-04-11 RX ADMIN — CALCITRIOL 0.25 MICROGRAM(S): 0.5 CAPSULE ORAL at 11:00

## 2022-04-11 RX ADMIN — SODIUM CHLORIDE 50 MILLILITER(S): 9 INJECTION INTRAMUSCULAR; INTRAVENOUS; SUBCUTANEOUS at 21:04

## 2022-04-11 RX ADMIN — Medication 325 MILLIGRAM(S): at 11:00

## 2022-04-11 RX ADMIN — ASPIRIN AND DIPYRIDAMOLE 1 CAPSULE(S): 25; 200 CAPSULE, EXTENDED RELEASE ORAL at 17:18

## 2022-04-11 RX ADMIN — ATORVASTATIN CALCIUM 80 MILLIGRAM(S): 80 TABLET, FILM COATED ORAL at 21:05

## 2022-04-11 NOTE — PROGRESS NOTE ADULT - SUBJECTIVE AND OBJECTIVE BOX
Patient is a 76y old  Male who presents with a chief complaint of cva (10 Apr 2022 11:39)      24 HOUR EVENTS:  No overnight events reported.     SUBJECTIVE:  Patient seen and examined at bedside.     ALLERGIES:  No Known Allergies    MEDICATIONS  (STANDING):  atorvastatin 80 milliGRAM(s) Oral at bedtime  Biotene Dry Mouth Oral Rinse 5 milliLiter(s) Swish and Spit daily  calcitriol   Capsule 0.25 MICROGram(s) Oral daily  cyanocobalamin 1000 MICROGram(s) Oral daily  dextrose 5%. 1000 milliLiter(s) (100 mL/Hr) IV Continuous <Continuous>  dextrose 5%. 1000 milliLiter(s) (50 mL/Hr) IV Continuous <Continuous>  dextrose 50% Injectable 25 Gram(s) IV Push once  dextrose 50% Injectable 12.5 Gram(s) IV Push once  dextrose 50% Injectable 25 Gram(s) IV Push once  dipyridamole 200 mG/aspirin 25 mG 1 Capsule(s) Oral two times a day  diVALproex  milliGRAM(s) Oral two times a day  ferrous    sulfate 325 milliGRAM(s) Oral daily  glucagon  Injectable 1 milliGRAM(s) IntraMuscular once  heparin   Injectable 5000 Unit(s) SubCutaneous every 8 hours  insulin lispro (ADMELOG) corrective regimen sliding scale   SubCutaneous two times a day with meals  levothyroxine 75 MICROGram(s) Oral daily  melatonin 3 milliGRAM(s) Oral at bedtime  sodium chloride 0.9%. 1000 milliLiter(s) (50 mL/Hr) IV Continuous <Continuous>  tamsulosin 0.4 milliGRAM(s) Oral at bedtime    MEDICATIONS  (PRN):  ALBUTerol    90 MICROgram(s) HFA Inhaler 2 Puff(s) Inhalation every 6 hours PRN Shortness of Breath and/or Wheezing  dextrose Oral Gel 15 Gram(s) Oral once PRN Blood Glucose LESS THAN 70 milliGRAM(s)/deciliter    Vital Signs Last 24 Hrs  T(F): 98.1 (2022 07:37), Max: 98.1 (2022 07:37)  HR: 81 (2022 07:37) (70 - 81)  BP: 126/70 (2022 07:37) (109/60 - 129/78)  RR: 15 (2022 07:37) (14 - 16)  SpO2: 95% (2022 07:37) (95% - 96%)  I&O's Summary    10 Apr 2022 07:01  -  2022 07:00  --------------------------------------------------------  IN: 1200 mL / OUT: 1400 mL / NET: -200 mL    2022 07:01  -  2022 11:48  --------------------------------------------------------  IN: 0 mL / OUT: 200 mL / NET: -200 mL      PHYSICAL EXAM:  General: NAD, A/O x 3  ENT: Moist mucous membranes, no thrush  Neck: Supple, No JVD  Lungs: Clear to auscultation bilaterally, good air entry, non-labored breathing  Cardio: RRR, S1/S2, No murmur  Abdomen: Soft, Nontender, Nondistended; Bowel sounds present  Extremities: No calf tenderness, No pitting edema    LABS:                        9.7    5.39  )-----------( 185      ( 2022 06:00 )             31.2     04-11    144  |  110  |  30  ----------------------------<  93  4.0   |  24  |  1.96    Ca    8.9      2022 06:00    TPro  5.7  /  Alb  2.1  /  TBili  0.3  /  DBili  x   /  AST  36  /  ALT  39  /  AlkPhos  61  04-11                    03-31 Chol 103 mg/dL LDL -- HDL 30 mg/dL Trig 76 mg/dL              POCT Blood Glucose.: 113 mg/dL (2022 07:36)  POCT Blood Glucose.: 147 mg/dL (10 Apr 2022 16:18)      Urinalysis Basic - ( 2022 05:50 )    Color: Yellow / Appearance: Clear / S.015 / pH: x  Gluc: x / Ketone: Negative  / Bili: Negative / Urobili: Negative   Blood: x / Protein: 30 mg/dL / Nitrite: Negative   Leuk Esterase: Small / RBC: 0-4 /HPF / WBC 6-10 /HPF   Sq Epi: x / Non Sq Epi: Neg.-Few / Bacteria: Negative /HPF        COVID-19 PCR: NotDetec (22 @ 22:00)  COVID-19 PCR: NotDetec (22 @ 08:18)    RADIOLOGY & ADDITIONAL TESTS:    Care Discussed with Consultants/Other Providers:    Patient is a 76y old  Male who presents with a chief complaint of cva (10 Apr 2022 11:39)      24 HOUR EVENTS:  No overnight events reported.     SUBJECTIVE:  Patient seen and examined at bedside.   anxious to leave [facility]  denies ever having i ssues with urinary retention or his prostate in the past. He would like to have mcclellan catheter removed.    ALLERGIES:  No Known Allergies    MEDICATIONS  (STANDING):  atorvastatin 80 milliGRAM(s) Oral at bedtime  Biotene Dry Mouth Oral Rinse 5 milliLiter(s) Swish and Spit daily  calcitriol   Capsule 0.25 MICROGram(s) Oral daily  cyanocobalamin 1000 MICROGram(s) Oral daily  dextrose 5%. 1000 milliLiter(s) (100 mL/Hr) IV Continuous <Continuous>  dextrose 5%. 1000 milliLiter(s) (50 mL/Hr) IV Continuous <Continuous>  dextrose 50% Injectable 25 Gram(s) IV Push once  dextrose 50% Injectable 12.5 Gram(s) IV Push once  dextrose 50% Injectable 25 Gram(s) IV Push once  dipyridamole 200 mG/aspirin 25 mG 1 Capsule(s) Oral two times a day  diVALproex  milliGRAM(s) Oral two times a day  ferrous    sulfate 325 milliGRAM(s) Oral daily  glucagon  Injectable 1 milliGRAM(s) IntraMuscular once  heparin   Injectable 5000 Unit(s) SubCutaneous every 8 hours  insulin lispro (ADMELOG) corrective regimen sliding scale   SubCutaneous two times a day with meals  levothyroxine 75 MICROGram(s) Oral daily  melatonin 3 milliGRAM(s) Oral at bedtime  sodium chloride 0.9%. 1000 milliLiter(s) (50 mL/Hr) IV Continuous <Continuous>  tamsulosin 0.4 milliGRAM(s) Oral at bedtime    MEDICATIONS  (PRN):  ALBUTerol    90 MICROgram(s) HFA Inhaler 2 Puff(s) Inhalation every 6 hours PRN Shortness of Breath and/or Wheezing  dextrose Oral Gel 15 Gram(s) Oral once PRN Blood Glucose LESS THAN 70 milliGRAM(s)/deciliter    Vital Signs Last 24 Hrs  T(F): 98.1 (2022 07:37), Max: 98.1 (2022 07:37)  HR: 81 (2022 07:37) (70 - 81)  BP: 126/70 (2022 07:37) (109/60 - 129/78)  RR: 15 (2022 07:37) (14 - 16)  SpO2: 95% (2022 07:37) (95% - 96%)  I&O's Summary    10 Apr 2022 07:01  -  2022 07:00  --------------------------------------------------------  IN: 1200 mL / OUT: 1400 mL / NET: -200 mL    2022 07:01  -  2022 11:48  --------------------------------------------------------  IN: 0 mL / OUT: 200 mL / NET: -200 mL      PHYSICAL EXAM:  General: NAD, Awake and alert  ENT: Moist mucous membranes, no thrush  Neck: Supple, No JVD  Lungs: Clear to auscultation bilaterally, good air entry, non-labored breathing  Cardio: RRR, S1/S2, No murmur  Abdomen: Soft, Nontender, Nondistended; Bowel sounds present  Extremities: No calf tenderness, No pitting edema    LABS:                        9.7    5.39  )-----------( 185      ( 2022 06:00 )             31.2     04-    144  |  110  |  30  ----------------------------<  93  4.0   |  24  |  1.96    Ca    8.9      2022 06:00    TPro  5.7  /  Alb  2.1  /  TBili  0.3  /  DBili  x   /  AST  36  /  ALT  39  /  AlkPhos  61  04-11                    03-31 Chol 103 mg/dL LDL -- HDL 30 mg/dL Trig 76 mg/dL              POCT Blood Glucose.: 113 mg/dL (2022 07:36)  POCT Blood Glucose.: 147 mg/dL (10 Apr 2022 16:18)      Urinalysis Basic - ( 2022 05:50 )    Color: Yellow / Appearance: Clear / S.015 / pH: x  Gluc: x / Ketone: Negative  / Bili: Negative / Urobili: Negative   Blood: x / Protein: 30 mg/dL / Nitrite: Negative   Leuk Esterase: Small / RBC: 0-4 /HPF / WBC 6-10 /HPF   Sq Epi: x / Non Sq Epi: Neg.-Few / Bacteria: Negative /HPF        COVID-19 PCR: NotDetec (22 @ 22:00)  COVID-19 PCR: NotDetec (22 @ 08:18)    RADIOLOGY & ADDITIONAL TESTS:  < from: US Renal (22 @ 16:59) >  IMPRESSION:    Medical renal disease with asymmetrically small right kidney.  No hydronephrosis bilaterally.   Distended bladder with a mildly diffusely thickened trabeculated wall.    < end of copied text >      Care Discussed with Consultants/Other Providers:

## 2022-04-11 NOTE — PROGRESS NOTE ADULT - ASSESSMENT
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 Found to have R central sulcus MCA territory infarct on repeat CTH. Hospital course complicated by leukocytosis, JARRED on CKD. Admitted to [facility] inpatient [facility] on 4/5 for ADL, gait, and functional impairments.     # s/p R central Sulcus CVA  - Comprehensive Multidisciplinary Rehab Program: 3 hours a day, 5 days a week with PT/OT/SLP  - Unable to obtain MRI due to ICD  - Continue aggrenox  - Continue atorvastatin  - Seizure management as below    # Seizures  - I/s/o R central sulcus infarct  - Continue Depakote 500mg BID for 6 months per neuro    # NSTEMI  # HFrEF  # HTN  - S/p heparin gtt, continue aggrenox  - Course c/b acute pulmonary edema s/p lasix x2 3/30. Diurese PRN  - TTE with severe systolic dysfunction 30-35%  - Continue statin  - Holding home imdur and coreg. Hold ACEI i/s/o JARRED on CKD  - Hold Metroprolol ER 50mg   and Hydralazine 5mg TID due to hypotension  -Encourage oral intake  -Given fluid bolus and now on continues IVF to promote hydration  -Cardiology consult appreciated     # JARRED and Hyptension   - Cr near baseline of 2.0-2.1  -Cr 1.96 (4/11)  - Avoid nephrotoxic medications; holding ACEI  - Nephrology is following  - Spoke with  wife- she was able convince patient to accept IV hydration as recommended, labs in the morning    - Psychiatry evaluation - for capacity and Depression -case discussed  - Medicine is following, case discussed      #Type 2 Diabetes Mellitus  - A1c 8.3  - Continue SSI  -Not on medication at home  - Hospitalist consult appreciated    #Tonsillar Cancer #Dysphagia  - Seen by ENT recently- reconsult pending  - puree/mildly thick - upgraded to minced/moist and mild thick   - Residual L hearing loss    #Hypothyroid  -Continue synthroid 75mcg daily    #Urinary retention  -Continue flomax 0.4mg daily  -TOV on 4/6 at midnight - able to void with small residual  - Hillman reinserted due to continued retention  -Monitor UO    # Sleep:  - Melatonin qHS    # Pain Management:  - Tylenol PRN    # GI/Bowel:  - Senna QHS    # Skin/Pressure Injury:  - Skin assessment on admission: unremarkable    # Diet:   - Diet Consistency/Modifications: Puree/Mod thick  - Aspiration Precautions  - SLP consult for swallow function evaluation and treatment    # DVT ppx:  - HSQ    # Restrictions/Precautions:  - Precautions: Falls, aspiration, cardiac    ---------------  Outpatient Follow-up:    CHANCE FRANCO  Internal Medicine  [address] [place] [address], [place] [address]  Phone: [phone]  Fax: [phone]  Follow Up Time: 1-3 days    Chet Garcia)  Cardiovascular Disease; Internal Medicine  [facility], [address] [place] [address], [address] [place] [address]  Phone: [phone]  Fax: [phone]  Follow Up Time: 1-3 days    Brandon Harrison)  Urology  [address] [place] [address], [address] [place] [address]  Phone: [phone]  Fax: [phone]  Follow Up Time: 1-3 days    Yenifer Faulkner)  Neurology  [address] [place] [address]  Phone: [phone]  Fax: [phone]    TEAM MEETING 4/11/22  SW:  Lives in lifted home with 11 stairs to enter, wife works, children unable to help  OT: SV for eat/groom, min for uBD, mod for transfers and LBD  PT: SV for amb and transfers, walks with cane 150', 12 steps with SV  SLP: ANGELINA 4/11, weak swallow, upgrade to mild thick can use compensatory methods   barriers: left inattention, dec strength  goals: mod I for adls, sv for transfers and ambulation  EDOD: Home with assistance 4/19

## 2022-04-11 NOTE — PROGRESS NOTE ADULT - ASSESSMENT
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 Found to have R central sulcus MCA territory infarct on repeat CTH. Hospital course complicated by leukocytosis, JARRED on CKD. Admitted to [facility] acute inpatient rehab on 4/5 for ADL, gait, and functional impairments.     #s/p R central Sulcus CVA  - Unable to obtain MRI due to ICD  - Continue aggrenox bid  - Continue atorvastatin 80mg qd  - Continue comprehensive rehab program - PT/OT/SLP per rehab team  - Pain management, bowel regimen per rehab     #Seizures  - Likely due to R central sulcus infarct  - Continue Depakote 500mg BID for 6 months per neuro  - EEG wnl    # NSTEMI  # HFrEF  # HTN  - S/p heparin gtt, continue aggrenox  - Course c/b acute pulmonary edema s/p lasix x2 3/30. Diurese prn  - TTE LVEF 30% - 35% w/ severe systolic dysfunction  - Continue statin  - Holding home imdur, coreg. ACEI held 2/2 JARRED on CKD  - 4/7 hold metoprolol 50mg qd, hydralazine 5mg TID  - Cardio, nephro consult    #JARRED on CKD  - Cr improved from 4.18 to 2.87; will reduce IVF to 50cc  - Likely combination of dehydration and hypotension   - Monitor routine BMP  - Avoid nephrotoxic medications; holding ACEI, lasix prn  - Pt refusing IVF, will continue to encourage    #Type 2 Diabetes Mellitus  - HbA1c 8.3  - Continue FS, ISS    #Tonsillar Cancer #Dysphagia  - Seen by ENT recent  - Continue puree/mildly thick. 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 Found to have R central sulcus MCA territory infarct on repeat CTH. Hospital course complicated by leukocytosis, JARRED on CKD. Admitted to [facility] acute inpatient rehab on 4/5 for ADL, gait, and functional impairments.     #s/p R central Sulcus CVA  - Unable to obtain MRI due to ICD  - Continue aggrenox bid  - Continue atorvastatin 80mg qd  - Continue comprehensive rehab program - PT/OT/SLP per rehab team  - Pain management, bowel regimen per rehab     #Seizures  - Likely due to R central sulcus infarct  - Continue Depakote 500mg BID for 6 months per neuro  - EEG wnl    # NSTEMI  # HFrEF  # HTN  - S/p heparin gtt, continue aggrenox  - Course c/b acute pulmonary edema s/p lasix x2 3/30. Diurese prn  - TTE LVEF 30% - 35% w/ severe systolic dysfunction  - Continue statin  - Holding home imdur, coreg. ACEI held 2/2 JARRED on CKD  - 4/7 hold metoprolol 50mg qd, hydralazine 5mg TID  - Cardio consulted, nephro still on board and providing recs.    #JARRED on probable CKD st. III  -improving.  Renal US showing evidence of CKD and a distended bladder.   - IVF @ 50cc. Nephro guiding.   - suspected to be due to combination of dehydration, urinary retention, and hypotension . May be related to neurogenic bladder, now with indwelling mcclellan catheter.   - Monitor routine BMP  - Avoid nephrotoxic medications; holding ACEI, lasix prn  - Pt refusing IVF, will continue to encourage - he was convinced by wife today to continue.  - pt denies urinary sx.     #Type 2 Diabetes Mellitus  - HbA1c 8.3  - Continue FS, ISS    #Tonsillar Cancer #Dysphagia  - Seen by ENT recent  - Continue puree/mildly thick. diet/nutrition follow up  - Residual L hearing loss    # Urinary Retention possibly due to neurogenic bladder from CVA vs. BPH  - Mcclellan per rehab, cont flomax - dc if hypotension persists  - consider trial to void today with serial bladder scan.    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 Found to have R central sulcus MCA territory infarct on repeat CTH. Hospital course complicated by leukocytosis, JARRED on CKD. Admitted to [facility] acute inpatient rehab on 4/5 for ADL, gait, and functional impairments.     #s/p R central Sulcus CVA  - Unable to obtain MRI due to ICD  - Continue aggrenox bid  - Continue atorvastatin 80mg qd  - Continue comprehensive rehab program - PT/OT/SLP per rehab team  - Pain management, bowel regimen per rehab     #Seizures  - Likely due to R central sulcus infarct  - Continue Depakote 500mg BID for 6 months per neuro  - EEG wnl    # NSTEMI  # HFrEF  # HTN  - S/p heparin gtt, continue aggrenox  - Course c/b acute pulmonary edema s/p lasix x2 3/30. Diurese prn  - TTE LVEF 30% - 35% w/ severe systolic dysfunction  - Continue statin  - Holding home imdur, coreg. ACEI held 2/2 JARRED on CKD  - 4/7 hold metoprolol 50mg qd, hydralazine 5mg TID  - Cardio consulted, nephro still on board and providing recs.  - recommend starting low dose Toprol 12.5 mg when feasible because of systolic HF    #JARRED on probable CKD st. III  -improving.  Renal US showing evidence of CKD and a distended bladder.   - IVF @ 50cc. Nephro guiding.   - suspected to be due to combination of dehydration, urinary retention, and hypotension. Now w/ indwelling mcclellan  - Monitor routine BMP  - Avoid nephrotoxic medications; holding ACEI, lasix prn  - Pt refusing IVF, will continue to encourage - he was convinced by wife today to continue.  - pt denies urinary sx.     #Type 2 Diabetes Mellitus  - HbA1c 8.3  - Continue FS, ISS    #Tonsillar Cancer #Dysphagia  - Seen by ENT recent  - Continue puree/mildly thick. diet/nutrition follow up  - Residual L hearing loss    # Urinary Retention possibly due to neurogenic bladder from CVA vs. BPH  - Mcclellan per rehab, cont flomax - dc if hypotension persists  - consider trial to void today with serial bladder scan.    #Hypothyroidism  - Continue synthroid 75mcg qd    #DVT ppx - HSQ

## 2022-04-11 NOTE — PROGRESS NOTE ADULT - SUBJECTIVE AND OBJECTIVE BOX
Denies complaints    Vital Signs Last 24 Hrs  T(C): 37.2 (04-11-22 @ 20:42), Max: 37.2 (04-11-22 @ 20:42)  T(F): 98.9 (04-11-22 @ 20:42), Max: 98.9 (04-11-22 @ 20:42)  HR: 80 (04-11-22 @ 20:42) (80 - 81)  BP: 140/67 (04-11-22 @ 20:42) (126/70 - 140/67)  RR: 16 (04-11-22 @ 20:42) (15 - 16)  SpO2: 96% (04-11-22 @ 20:42) (95% - 96%)    I&O's Detail    10 Apr 2022 07:01  -  11 Apr 2022 07:00  --------------------------------------------------------  IN:    sodium chloride 0.9%: 1100 mL    sodium chloride 0.9%: 150 mL  Total IN: 1250 mL    OUT:    Indwelling Catheter - Urethral (mL): 1400 mL  Total OUT: 1400 mL    Total NET: -150 mL    11 Apr 2022 07:01  -  11 Apr 2022 21:32  --------------------------------------------------------  OUT:    Indwelling Catheter - Urethral (mL): 750 mL  Total OUT: 750 mL    s1s2  b/l air entry  soft, ND  no edema                                        9.7    5.39  )-----------( 185      ( 11 Apr 2022 06:00 )             31.2     11 Apr 2022 06:00    144    |  110    |  30     ----------------------------<  93     4.0     |  24     |  1.96     Ca    8.9        11 Apr 2022 06:00    TPro  5.7    /  Alb  2.1    /  TBili  0.3    /  DBili  x      /  AST  36     /  ALT  39     /  AlkPhos  61     11 Apr 2022 06:00    LIVER FUNCTIONS - ( 11 Apr 2022 06:00 )  Alb: 2.1 g/dL / Pro: 5.7 g/dL / ALK PHOS: 61 U/L / ALT: 39 U/L / AST: 36 U/L / GGT: x           ALBUTerol    90 MICROgram(s) HFA Inhaler 2 Puff(s) Inhalation every 6 hours PRN  atorvastatin 80 milliGRAM(s) Oral at bedtime  Biotene Dry Mouth Oral Rinse 5 milliLiter(s) Swish and Spit daily  calcitriol   Capsule 0.25 MICROGram(s) Oral daily  cyanocobalamin 1000 MICROGram(s) Oral daily  dextrose 5%. 1000 milliLiter(s) IV Continuous <Continuous>  dextrose 5%. 1000 milliLiter(s) IV Continuous <Continuous>  dextrose 50% Injectable 25 Gram(s) IV Push once  dextrose 50% Injectable 12.5 Gram(s) IV Push once  dextrose 50% Injectable 25 Gram(s) IV Push once  dextrose Oral Gel 15 Gram(s) Oral once PRN  dipyridamole 200 mG/aspirin 25 mG 1 Capsule(s) Oral two times a day  diVALproex  milliGRAM(s) Oral two times a day  ferrous    sulfate 325 milliGRAM(s) Oral daily  glucagon  Injectable 1 milliGRAM(s) IntraMuscular once  heparin   Injectable 5000 Unit(s) SubCutaneous every 8 hours  insulin lispro (ADMELOG) corrective regimen sliding scale   SubCutaneous two times a day with meals  levothyroxine 75 MICROGram(s) Oral daily  melatonin 3 milliGRAM(s) Oral at bedtime  tamsulosin 0.4 milliGRAM(s) Oral at bedtime    A/P:    JARRED/CKD 3 in setting of hypotension, possible retention (Cr 2.0 - 4/5/22)  Avoid BP lowering meds  Avoid nephrotoxins  Cr is improving  SONO: small R kidney, distended bladder, was unable to void  [facility]man, I/Os  Goal SBP > 90  BMP in am  Consider  chris    [phone]

## 2022-04-11 NOTE — CONSULT NOTE ADULT - PROBLEM SELECTOR RECOMMENDATION 2
- No suspicious lesions appreciated.  - Likely scarring from RT and surgery.  - Follow up with ENT, Dr. José Miguel Lynn, 2 weeks after discharge for follow up.

## 2022-04-11 NOTE — PROGRESS NOTE ADULT - SUBJECTIVE AND OBJECTIVE BOX
SUBJECTIVE/ROS: He has periods of hypotension but has been overall better today and has been asymptomatic. Denies chest pain, fever, chills, nausea, vomiting, abdominal pain, headache, or BLE pain.     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 As per patient, he was watching a movie when he felt very short of breath suddenly and dropped to the floor, denies LOC and denies any trauma to his head, was on the floor for ~2 hours. Patient was unable to rise from the floor by himself due to his left arm weakness. When first brought to the ED, patient was hypoxic and hypertensive o2 saturation in EMS was >80%. While in the ED, patient had episode of seizure-like activity in his b/l upper extremities which was controlled w/ ativan. Patient was seen and examined at bedside, BiPAP still in place, patient was still mildly lethargic from ativan and SOB with bipap mask on. Patient able to open eyes and nod/shake head to questions. He was admitted for pulmonary edema and found to have leukocytosis, JARRED on CKD, elevated troponin/NSTEMI. He was started on depakote for seizures and heparin gtt and aggrenox. Repeat CTH 3/31 demonstrated infarct in R perirolandic cortex. Hospital course further complicated by RUL PNA on CT chest; however leukocytosis resolved and ID recommended monitoring off abx. Patient was evaluated by Palliative for GOC discussion and pt was made DNR with trial of intubation. He clinically improved over the course of his hospitalization. Patient was evaluated by therapy for functional deficits and gait/ ADL impairments and recommended acute rehabilitation. Patient was medically optimized for discharge to [facility] on 4/5. (05 Apr 2022 16:18)      PAST MEDICAL & SURGICAL HISTORY:  MI (myocardial infarction)  1992    HTN (hypertension)    HLD (hyperlipidemia)    Throat cancer  2011, treated with chemo, RT    Ventricular arrhythmia  s/p AICD    Diabetes  Type2, not on any meds since weight loss after throat Ca    Renal insufficiency  s/p chemo    CAD (coronary artery disease)    Inguinal hernia, left    H/O prior ablation treatment  VT, 2009    Cardiac defibrillator in place  - 2009, battery change 2017    S/P left inguinal hernia repair  2018 at [place]        Allergies    No Known Allergies    Intolerances          PHYSICAL EXAM    Vital Signs Last 24 Hrs  T(C): 36.7 (11 Apr 2022 07:37), Max: 36.7 (10 Apr 2022 20:59)  T(F): 98.1 (11 Apr 2022 07:37), Max: 98.1 (11 Apr 2022 07:37)  HR: 81 (11 Apr 2022 07:37) (70 - 81)  BP: 126/70 (11 Apr 2022 07:37) (109/60 - 129/78)  BP(mean): --  RR: 15 (11 Apr 2022 07:37) (14 - 16)  SpO2: 95% (11 Apr 2022 07:37) (95% - 96%)  Daily     Daily       PHYSICAL EXAM  Constitutional - NAD, Comfortable  HEENT - NCAT, EOMI  Neck - Supple, No limited ROM  Chest - CTA bilaterally  Cardiovascular - RRR, S1S2  Abdomen -BS+, Soft, NTND  Extremities - No C/C/E, No calf tenderness   Neurologic Exam - no new focal deficits    RECENT LABS:                          9.7    5.39  )-----------( 185      ( 11 Apr 2022 06:00 )             31.2     04-11    144  |  110<H>  |  30<H>  ----------------------------<  93  4.0   |  24  |  1.96<H>    Ca    8.9      11 Apr 2022 06:00    TPro  5.7<L>  /  Alb  2.1<L>  /  TBili  0.3  /  DBili  x   /  AST  36  /  ALT  39  /  AlkPhos  61  04-11    LIVER FUNCTIONS - ( 11 Apr 2022 06:00 )  Alb: 2.1 g/dL / Pro: 5.7 g/dL / ALK PHOS: 61 U/L / ALT: 39 U/L / AST: 36 U/L / GGT: x                   CAPILLARY BLOOD GLUCOSE      POCT Blood Glucose.: 113 mg/dL (11 Apr 2022 07:36)  POCT Blood Glucose.: 147 mg/dL (10 Apr 2022 16:18)      MEDICATIONS  (STANDING):  atorvastatin 80 milliGRAM(s) Oral at bedtime  Biotene Dry Mouth Oral Rinse 5 milliLiter(s) Swish and Spit daily  calcitriol   Capsule 0.25 MICROGram(s) Oral daily  cyanocobalamin 1000 MICROGram(s) Oral daily  dextrose 5%. 1000 milliLiter(s) (100 mL/Hr) IV Continuous <Continuous>  dextrose 5%. 1000 milliLiter(s) (50 mL/Hr) IV Continuous <Continuous>  dextrose 50% Injectable 25 Gram(s) IV Push once  dextrose 50% Injectable 12.5 Gram(s) IV Push once  dextrose 50% Injectable 25 Gram(s) IV Push once  dipyridamole 200 mG/aspirin 25 mG 1 Capsule(s) Oral two times a day  diVALproex  milliGRAM(s) Oral two times a day  ferrous    sulfate 325 milliGRAM(s) Oral daily  glucagon  Injectable 1 milliGRAM(s) IntraMuscular once  heparin   Injectable 5000 Unit(s) SubCutaneous every 8 hours  insulin lispro (ADMELOG) corrective regimen sliding scale   SubCutaneous two times a day with meals  levothyroxine 75 MICROGram(s) Oral daily  melatonin 3 milliGRAM(s) Oral at bedtime  sodium chloride 0.9%. 1000 milliLiter(s) (50 mL/Hr) IV Continuous <Continuous>  tamsulosin 0.4 milliGRAM(s) Oral at bedtime    MEDICATIONS  (PRN):  ALBUTerol    90 MICROgram(s) HFA Inhaler 2 Puff(s) Inhalation every 6 hours PRN Shortness of Breath and/or Wheezing  dextrose Oral Gel 15 Gram(s) Oral once PRN Blood Glucose LESS THAN 70 milliGRAM(s)/deciliter

## 2022-04-11 NOTE — CONSULT NOTE ADULT - SUBJECTIVE AND OBJECTIVE BOX
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 As per patient, he was watching a movie when he felt very short of breath suddenly and dropped to the floor, denies LOC and denies any trauma to his head, was on the floor for ~2 hours. Patient was unable to rise from the floor by himself due to his left arm weakness. When first brought to the ED, patient was hypoxic and hypertensive o2 saturation in EMS was >80%. While in the ED, patient had episode of seizure-like activity in his b/l upper extremities which was controlled w/ ativan. Patient was seen and examined at bedside, BiPAP still in place, patient was still mildly lethargic from ativan and SOB with bipap mask on. Patient able to open eyes and nod/shake head to questions. He was admitted for pulmonary edema and found to have leukocytosis, JARRED on CKD, elevated troponin/NSTEMI. He was started on depakote for seizures and heparin gtt and aggrenox. Repeat CTH 3/31 demonstrated infarct in R perirolandic cortex. Hospital course further complicated by RUL PNA on CT chest; however leukocytosis resolved and ID recommended monitoring off abx. Patient was evaluated by Palliative for GOC discussion and pt was made DNR with trial of intubation. He clinically improved over the course of his hospitalization. Patient was evaluated by therapy for functional deficits and gait/ ADL impairments and recommended acute rehabilitation. Patient was medically optimized for discharge to [facility] on 4/5. (05 Apr 2022 16:18)      Interval Events: Called to see and evaluate patient s/p MBS done today for dysphagia.  During the exam, radiology was concerned at a particular area which could either be a recurrence or scar tissue.  Patient was seen and evaluated at bedside without any complaints and wanting to eat a regular diet.  He has a history of right tonsil SCCa in 2011, s/p tonsillectomy and left partial glossectomy in 2019 for at least high grade dysplasia/SCCa in-situ with his ENT, Dr. Lynn.  He denies any pain or discomfort.    PAST MEDICAL & SURGICAL HISTORY:  MI (myocardial infarction) - 1992  HTN (hypertension)  HLD (hyperlipidemia)  Throat cancer - 2011, treated with chemo, RT  Ventricular arrhythmia - s/p AICD  Diabetes - Type2, not on any meds since weight loss after throat Ca  Renal insufficiency  s/p chemo  CAD (coronary artery disease)  Inguinal hernia, left  H/O prior ablation treatment - VT, 2009  Cardiac defibrillator in place - 2009, battery change 2017  S/P left inguinal hernia repair - 2018 at [place]      Allergies  No Known Allergies    Social History:  SOCIAL HISTORY  Smoking - Former smoker quit 40 years ago, 20-pack-years  EtOH - denies  Illicit drugs - drugs    FAMILY HISTORY:  Family history of cancer (Sibling)    REVIEW OF SYSTEMS:     CONSTITUTIONAL: No weakness, fevers or chills     EYES/ENT: No visual changes;  No vertigo or throat pain      NECK: No pain or stiffness     RESPIRATORY: No cough, wheezing, hemoptysis; No shortness of breath     CARDIOVASCULAR: No chest pain or palpitations     GASTROINTESTINAL: No abdominal or epigastric pain. No nausea, vomiting, or hematemesis; No diarrhea or constipation. No melena or hematochezia.     GENITOURINARY: No dysuria, frequency or hematuria     NEUROLOGICAL: No numbness or weakness     SKIN: No itching, burning, rashes, or lesions   All other review of systems is negative unless indicated above.    MEDICATIONS  (STANDING):  atorvastatin 80 milliGRAM(s) Oral at bedtime  Biotene Dry Mouth Oral Rinse 5 milliLiter(s) Swish and Spit daily  calcitriol   Capsule 0.25 MICROGram(s) Oral daily  cyanocobalamin 1000 MICROGram(s) Oral daily  dextrose 5%. 1000 milliLiter(s) (100 mL/Hr) IV Continuous <Continuous>  dextrose 5%. 1000 milliLiter(s) (50 mL/Hr) IV Continuous <Continuous>  dextrose 50% Injectable 25 Gram(s) IV Push once  dextrose 50% Injectable 12.5 Gram(s) IV Push once  dextrose 50% Injectable 25 Gram(s) IV Push once  dipyridamole 200 mG/aspirin 25 mG 1 Capsule(s) Oral two times a day  diVALproex  milliGRAM(s) Oral two times a day  ferrous    sulfate 325 milliGRAM(s) Oral daily  glucagon  Injectable 1 milliGRAM(s) IntraMuscular once  heparin   Injectable 5000 Unit(s) SubCutaneous every 8 hours  insulin lispro (ADMELOG) corrective regimen sliding scale   SubCutaneous two times a day with meals  levothyroxine 75 MICROGram(s) Oral daily  melatonin 3 milliGRAM(s) Oral at bedtime  sodium chloride 0.9%. 1000 milliLiter(s) (50 mL/Hr) IV Continuous <Continuous>  tamsulosin 0.4 milliGRAM(s) Oral at bedtime    MEDICATIONS  (PRN):  ALBUTerol    90 MICROgram(s) HFA Inhaler 2 Puff(s) Inhalation every 6 hours PRN Shortness of Breath and/or Wheezing  dextrose Oral Gel 15 Gram(s) Oral once PRN Blood Glucose LESS THAN 70 milliGRAM(s)/deciliter                          9.7    5.39  )-----------( 185      ( 11 Apr 2022 06:00 )             31.2     04-11    144  |  110<H>  |  30<H>  ----------------------------<  93  4.0   |  24  |  1.96<H>    Ca    8.9      11 Apr 2022 06:00    TPro  5.7<L>  /  Alb  2.1<L>  /  TBili  0.3  /  DBili  x   /  AST  36  /  ALT  39  /  AlkPhos  61  04-11    I&O's Detail    10 Apr 2022 07:01  -  11 Apr 2022 07:00  --------------------------------------------------------  IN:    sodium chloride 0.9%: 1050 mL    sodium chloride 0.9%: 150 mL  Total IN: 1200 mL    OUT:    Indwelling Catheter - Urethral (mL): 1400 mL  Total OUT: 1400 mL    Total NET: -200 mL      11 Apr 2022 07:01  -  11 Apr 2022 17:06  --------------------------------------------------------  IN:  Total IN: 0 mL    OUT:    Indwelling Catheter - Urethral (mL): 200 mL  Total OUT: 200 mL    Total NET: -200 mL        Vital Signs Last 24 Hrs  T(C): 36.7 (11 Apr 2022 07:37), Max: 36.7 (10 Apr 2022 20:59)  T(F): 98.1 (11 Apr 2022 07:37), Max: 98.1 (11 Apr 2022 07:37)  HR: 81 (11 Apr 2022 07:37) (77 - 81)  BP: 126/70 (11 Apr 2022 07:37) (126/70 - 129/78)  BP(mean): --  RR: 15 (11 Apr 2022 07:37) (15 - 16)  SpO2: 95% (11 Apr 2022 07:37) (95% - 96%)  CBC Full  -  ( 11 Apr 2022 06:00 )  WBC Count : 5.39 K/uL  RBC Count : 4.05 M/uL  Hemoglobin : 9.7 g/dL  Hematocrit : 31.2 %  Platelet Count - Automated : 185 K/uL  Mean Cell Volume : 77.0 fl  Mean Cell Hemoglobin : 24.0 pg  Mean Cell Hemoglobin Concentration : 31.1 gm/dL  Auto Neutrophil # : 3.45 K/uL  Auto Lymphocyte # : 0.82 K/uL  Auto Monocyte # : 0.90 K/uL  Auto Eosinophil # : 0.12 K/uL  Auto Basophil # : 0.02 K/uL  Auto Neutrophil % : 64.0 %  Auto Lymphocyte % : 15.2 %  Auto Monocyte % : 16.7 %  Auto Eosinophil % : 2.2 %  Auto Basophil % : 0.4 %      PHYSICAL EXAM:     Constitutional Normal: well nourished, well developed, non-dysmorphic, no acute distress     Psychiatric: age appropriate behavior, cooperative     Skin: no obvious skin lesions     Lymphatic: no cervical lymphadenopathy     Eyes: EOM Intact, non-icteric     ENT:        Left EAC: Normal, No cerumen, no exostoses         Right EAC: Normal, minimal cerumen        External Nose:  Normal, no structural deformities		        Anterior Nasal Cavity: Dry mucosa, no turbinate hypertrophy, septum deviated to the right     Oral Cavity:  adentulous, tongue slightly to patient's right, no lesions or ulcerations palpated, uvula midline, +trismus     Neck: No palpable lymphadenopathy,      Pulmonary: No Acute Distress.      Neurologic: awake and alert      LARYNGOSCOPY EXAM (Scope #[id number]):      -Verbal consent was obtained from patient prior to procedure.       Indication: h/o tonsil SCCa, tongue SCCa, BOT SCCa         Flexible laryngoscopy was performed and revealed the following:       -- Nasopharynx had no mass or exudate.  +dry mucosa with nasal debris       -- Base of tongue was symmetric and not enlarged.       -- Vallecula was clear       -- Epiglottis, both aryepiglottic folds and both false vocal folds were normal       -- Arytenoids both without edema and erythema        -- True vocal folds were fully mobile and without lesions.        -- Post cricoid area was clear.       -- Interarytenoid edema was absent       The patient tolerated the procedure well.

## 2022-04-12 LAB
ANION GAP SERPL CALC-SCNC: 11 MMOL/L — SIGNIFICANT CHANGE UP (ref 5–17)
BUN SERPL-MCNC: 24 MG/DL — HIGH (ref 7–23)
CALCIUM SERPL-MCNC: 8.9 MG/DL — SIGNIFICANT CHANGE UP (ref 8.4–10.5)
CHLORIDE SERPL-SCNC: 108 MMOL/L — SIGNIFICANT CHANGE UP (ref 96–108)
CO2 SERPL-SCNC: 24 MMOL/L — SIGNIFICANT CHANGE UP (ref 22–31)
CREAT SERPL-MCNC: 1.87 MG/DL — HIGH (ref 0.5–1.3)
EGFR: 37 ML/MIN/1.73M2 — LOW
GLUCOSE BLDC GLUCOMTR-MCNC: 136 MG/DL — HIGH (ref 70–99)
GLUCOSE BLDC GLUCOMTR-MCNC: 92 MG/DL — SIGNIFICANT CHANGE UP (ref 70–99)
GLUCOSE SERPL-MCNC: 86 MG/DL — SIGNIFICANT CHANGE UP (ref 70–99)
POTASSIUM SERPL-MCNC: 4 MMOL/L — SIGNIFICANT CHANGE UP (ref 3.5–5.3)
POTASSIUM SERPL-SCNC: 4 MMOL/L — SIGNIFICANT CHANGE UP (ref 3.5–5.3)
SARS-COV-2 RNA SPEC QL NAA+PROBE: SIGNIFICANT CHANGE UP
SODIUM SERPL-SCNC: 143 MMOL/L — SIGNIFICANT CHANGE UP (ref 135–145)

## 2022-04-12 PROCEDURE — 99232 SBSQ HOSP IP/OBS MODERATE 35: CPT

## 2022-04-12 RX ORDER — TAMSULOSIN HYDROCHLORIDE 0.4 MG/1
0.8 CAPSULE ORAL AT BEDTIME
Refills: 0 | Status: DISCONTINUED | OUTPATIENT
Start: 2022-04-12 | End: 2022-04-19

## 2022-04-12 RX ADMIN — TAMSULOSIN HYDROCHLORIDE 0.8 MILLIGRAM(S): 0.4 CAPSULE ORAL at 21:09

## 2022-04-12 RX ADMIN — HEPARIN SODIUM 5000 UNIT(S): 5000 INJECTION INTRAVENOUS; SUBCUTANEOUS at 21:09

## 2022-04-12 RX ADMIN — HEPARIN SODIUM 5000 UNIT(S): 5000 INJECTION INTRAVENOUS; SUBCUTANEOUS at 05:47

## 2022-04-12 RX ADMIN — DIVALPROEX SODIUM 500 MILLIGRAM(S): 500 TABLET, DELAYED RELEASE ORAL at 17:10

## 2022-04-12 RX ADMIN — PREGABALIN 1000 MICROGRAM(S): 225 CAPSULE ORAL at 12:11

## 2022-04-12 RX ADMIN — ATORVASTATIN CALCIUM 80 MILLIGRAM(S): 80 TABLET, FILM COATED ORAL at 21:09

## 2022-04-12 RX ADMIN — ASPIRIN AND DIPYRIDAMOLE 1 CAPSULE(S): 25; 200 CAPSULE, EXTENDED RELEASE ORAL at 17:10

## 2022-04-12 RX ADMIN — HEPARIN SODIUM 5000 UNIT(S): 5000 INJECTION INTRAVENOUS; SUBCUTANEOUS at 13:04

## 2022-04-12 RX ADMIN — ASPIRIN AND DIPYRIDAMOLE 1 CAPSULE(S): 25; 200 CAPSULE, EXTENDED RELEASE ORAL at 05:47

## 2022-04-12 RX ADMIN — Medication 3 MILLIGRAM(S): at 21:09

## 2022-04-12 RX ADMIN — DIVALPROEX SODIUM 500 MILLIGRAM(S): 500 TABLET, DELAYED RELEASE ORAL at 05:47

## 2022-04-12 RX ADMIN — Medication 75 MICROGRAM(S): at 05:47

## 2022-04-12 RX ADMIN — Medication 325 MILLIGRAM(S): at 12:11

## 2022-04-12 RX ADMIN — CALCITRIOL 0.25 MICROGRAM(S): 0.5 CAPSULE ORAL at 12:10

## 2022-04-12 RX ADMIN — Medication 5 MILLILITER(S): at 12:10

## 2022-04-12 NOTE — PROGRESS NOTE ADULT - SUBJECTIVE AND OBJECTIVE BOX
Patient is a 76y old  Male who presents with a chief complaint of Stroke (11 Apr 2022 14:54)      Patient seen and examined at bedside. NAD. c/o loose stools. wants to go home. denies headache, fever, chills, cp, sob, n/v, abd pain.      ALLERGIES:  No Known Allergies    MEDICATIONS  (STANDING):  atorvastatin 80 milliGRAM(s) Oral at bedtime  Biotene Dry Mouth Oral Rinse 5 milliLiter(s) Swish and Spit daily  calcitriol   Capsule 0.25 MICROGram(s) Oral daily  cyanocobalamin 1000 MICROGram(s) Oral daily  dextrose 5%. 1000 milliLiter(s) (50 mL/Hr) IV Continuous <Continuous>  dextrose 5%. 1000 milliLiter(s) (100 mL/Hr) IV Continuous <Continuous>  dextrose 50% Injectable 25 Gram(s) IV Push once  dextrose 50% Injectable 12.5 Gram(s) IV Push once  dextrose 50% Injectable 25 Gram(s) IV Push once  dipyridamole 200 mG/aspirin 25 mG 1 Capsule(s) Oral two times a day  diVALproex  milliGRAM(s) Oral two times a day  ferrous    sulfate 325 milliGRAM(s) Oral daily  glucagon  Injectable 1 milliGRAM(s) IntraMuscular once  heparin   Injectable 5000 Unit(s) SubCutaneous every 8 hours  insulin lispro (ADMELOG) corrective regimen sliding scale   SubCutaneous two times a day with meals  levothyroxine 75 MICROGram(s) Oral daily  melatonin 3 milliGRAM(s) Oral at bedtime  tamsulosin 0.4 milliGRAM(s) Oral at bedtime    MEDICATIONS  (PRN):  ALBUTerol    90 MICROgram(s) HFA Inhaler 2 Puff(s) Inhalation every 6 hours PRN Shortness of Breath and/or Wheezing  dextrose Oral Gel 15 Gram(s) Oral once PRN Blood Glucose LESS THAN 70 milliGRAM(s)/deciliter    Vital Signs Last 24 Hrs  T(F): 98 (12 Apr 2022 07:18), Max: 98.9 (11 Apr 2022 20:42)  HR: 77 (12 Apr 2022 07:18) (77 - 80)  BP: 102/60 (12 Apr 2022 07:18) (102/60 - 140/67)  RR: 15 (12 Apr 2022 07:18) (15 - 16)  SpO2: 95% (12 Apr 2022 07:18) (95% - 96%)  I&O's Summary    11 Apr 2022 07:01  -  12 Apr 2022 07:00  --------------------------------------------------------  IN: 100 mL / OUT: 1350 mL / NET: -1250 mL    12 Apr 2022 07:01  -  12 Apr 2022 10:59  --------------------------------------------------------  IN: 240 mL / OUT: 200 mL / NET: 40 mL        PHYSICAL EXAM:  General: NAD  ENT: MMM, no scleral icterus  Neck: Supple  Lungs: Respirations unlabored. Clear to auscultation bilaterally, no wheezes, rales, rhonchi  Cardio: RRR, S1/S2, +systolic murmur  Abdomen: Soft, Nontender, Nondistended; Bowel sounds present  Extremities: No calf tenderness, No pitting edema LE b/l    LABS:                        9.7    5.39  )-----------( 185      ( 11 Apr 2022 06:00 )             31.2       04-12    143  |  108  |  24  ----------------------------<  86  4.0   |  24  |  1.87    Ca    8.9      12 Apr 2022 05:45    TPro  5.7  /  Alb  2.1  /  TBili  0.3  /  DBili  x   /  AST  36  /  ALT  39  /  AlkPhos  61  04-11                03-31 Chol 103 mg/dL LDL -- HDL 30 mg/dL Trig 76 mg/dL              POCT Blood Glucose.: 92 mg/dL (12 Apr 2022 07:13)  POCT Blood Glucose.: 129 mg/dL (11 Apr 2022 16:57)          COVID-19 PCR: NotDetec (04-05-22 @ 22:00)  COVID-19 PCR: NotDetec (04-04-22 @ 08:18)      RADIOLOGY & ADDITIONAL TESTS: reviewed    Care Discussed with Consultants/Other Providers: yes

## 2022-04-12 NOTE — PROGRESS NOTE ADULT - SUBJECTIVE AND OBJECTIVE BOX
No distress    Vital Signs Last 24 Hrs  T(C): 36.6 (04-12-22 @ 20:03), Max: 36.7 (04-12-22 @ 07:18)  T(F): 97.8 (04-12-22 @ 20:03), Max: 98 (04-12-22 @ 07:18)  HR: 83 (04-12-22 @ 20:03) (77 - 83)  BP: 152/72 (04-12-22 @ 20:03) (95/61 - 152/72)  RR: 16 (04-12-22 @ 20:03) (15 - 16)  SpO2: 96% (04-12-22 @ 20:03) (95% - 96%)    I&O's Detail    11 Apr 2022 07:01  -  12 Apr 2022 07:00  --------------------------------------------------------  IN:    sodium chloride 0.9%: 100 mL  Total IN: 100 mL    OUT:    Indwelling Catheter - Urethral (mL): 1350 mL  Total OUT: 1350 mL    Total NET: -1250 mL    12 Apr 2022 07:01  -  12 Apr 2022 23:36  --------------------------------------------------------  IN:    Oral Fluid: 480 mL  Total IN: 480 mL    OUT:    Indwelling Catheter - Urethral (mL): 500 mL  Total OUT: 500 mL    Total NET: -20 mL    s1s2  b/l air entry  soft, ND  no edema                                                9.7    5.39  )-----------( 185      ( 11 Apr 2022 06:00 )             31.2     12 Apr 2022 05:45    143    |  108    |  24     ----------------------------<  86     4.0     |  24     |  1.87     Ca    8.9        12 Apr 2022 05:45    TPro  5.7    /  Alb  2.1    /  TBili  0.3    /  DBili  x      /  AST  36     /  ALT  39     /  AlkPhos  61     11 Apr 2022 06:00    LIVER FUNCTIONS - ( 11 Apr 2022 06:00 )  Alb: 2.1 g/dL / Pro: 5.7 g/dL / ALK PHOS: 61 U/L / ALT: 39 U/L / AST: 36 U/L / GGT: x           ALBUTerol    90 MICROgram(s) HFA Inhaler 2 Puff(s) Inhalation every 6 hours PRN  atorvastatin 80 milliGRAM(s) Oral at bedtime  Biotene Dry Mouth Oral Rinse 5 milliLiter(s) Swish and Spit daily  cyanocobalamin 1000 MICROGram(s) Oral daily  dextrose 5%. 1000 milliLiter(s) IV Continuous <Continuous>  dextrose 5%. 1000 milliLiter(s) IV Continuous <Continuous>  dextrose 50% Injectable 25 Gram(s) IV Push once  dextrose 50% Injectable 12.5 Gram(s) IV Push once  dextrose 50% Injectable 25 Gram(s) IV Push once  dextrose Oral Gel 15 Gram(s) Oral once PRN  dipyridamole 200 mG/aspirin 25 mG 1 Capsule(s) Oral two times a day  diVALproex  milliGRAM(s) Oral two times a day  ferrous    sulfate 325 milliGRAM(s) Oral daily  glucagon  Injectable 1 milliGRAM(s) IntraMuscular once  heparin   Injectable 5000 Unit(s) SubCutaneous every 8 hours  insulin lispro (ADMELOG) corrective regimen sliding scale   SubCutaneous two times a day with meals  levothyroxine 75 MICROGram(s) Oral daily  melatonin 3 milliGRAM(s) Oral at bedtime  tamsulosin 0.8 milliGRAM(s) Oral at bedtime    A/P:    JARRED/CKD 3 in setting of hypotension, possible retention   Avoid BP lowering meds  Avoid nephrotoxins  Cr has improved and is presently at bedside  SONO: small R kidney, distended bladder, was unable to void  Hillman, I/Os  Goal SBP > 90  F/u BMP  Consider  eval    [phone]

## 2022-04-12 NOTE — PROGRESS NOTE ADULT - ASSESSMENT
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 Found to have R central sulcus MCA territory infarct on repeat CTH. Hospital course complicated by leukocytosis, JARRED on CKD. Admitted to [facility] on 4/5 for ADL, gait, and functional impairments.     # s/p R central Sulcus CVA  - Comprehensive Multidisciplinary Rehab Program: 3 hours a day, 5 days a week with PT/OT/SLP  - Unable to obtain MRI due to ICD  - Continue aggrenox  - Continue atorvastatin  - Seizure management as below    # Seizures  - I/s/o R central sulcus infarct  - Continue Depakote 500mg BID for 6 months per neuro    # NSTEMI  # HFrEF  # HTN  - S/p heparin gtt, continue aggrenox  - Course c/b acute pulmonary edema s/p lasix x2 3/30. Diurese PRN  - TTE with severe systolic dysfunction 30-35%  - Continue statin  - Holding home imdur and coreg. Hold ACEI i/s/o JARRED on CKD  - Hold Metroprolol ER 50mg and Hydralazine 5mg TID due to hypotension  -Encourage oral intake  -Given fluid bolus and now on continues IVF to promote hydration  -Cardiology consult appreciated     # JARRED    - Cr near baseline of 2.0-2.1  -Cr 1.96 (4/11) --> 1.86 (4/12)   -Received total of 2L of fluids IV  - Avoid nephrotoxic medications; holding ACEI  - Nephrology is following  - Spoke with  wife- she was able convince patient to accept IV hydration as recommended, labs in the morning    - Psychiatry evaluation - for capacity and Depression -case discussed  - Medicine is following, case discussed      #Type 2 Diabetes Mellitus  - A1c 8.3  - Continue SSI  -Not on medication at home  - Hospitalist consult appreciated    #Tonsillar Cancer #Dysphagia  - Seen by ENT recently- reconsult pending  - puree/mildly thick - upgraded to minced/moist and mild thick   - Residual L hearing loss    #Hypothyroid  -Continue synthroid 75mcg daily    #Urinary retention  -Continue flomax 0.8mg daily  -TOV on 4/6 at midnight - able to void with small residual  - Hillman reinserted due to continued retention  -Monitor UO    # Sleep:  - Melatonin qHS    # Pain Management:  - Tylenol PRN    # GI/Bowel:  - Senna QHS    # Diet:   - Diet Consistency/Modifications: Minced/moist and mild thick  - Aspiration Precautions  - SLP consult for swallow function evaluation and treatment    # DVT ppx:  - HSQ    # Restrictions/Precautions:  - Precautions: Falls, aspiration, cardiac    ---------------  Outpatient Follow-up:    CHANCE FRANCO  Internal Medicine  [address] [place] [address]  Phone: [phone]  Fax: [phone]  Follow Up Time: 1-3 days    Chet Garcia)  Cardiovascular Disease; Internal Medicine  [facility], [address] [place] [address], [address] [place] [address]  Phone: [phone]  Fax: [phone]  Follow Up Time: 1-3 days    Brandon Harrison)  Urology  [address] [place] [address], [place] [address]  Phone: [phone]  Fax: [phone]  Follow Up Time: 1-3 days    Yenifer Faulkner)  Neurology  [address] [place] [address]  Phone: [phone]  Fax: [phone]    TEAM MEETING 4/11/22  SW:  Lives in lifted home with 11 stairs to enter, wife works, children unable to help  OT: SV for eat/groom, min for uBD, mod for transfers and LBD  PT: SV for amb and transfers, walks with cane 150', 12 steps with SV  SLP: MBS 4/11, weak swallow, upgrade to mild thick can use compensatory methods   barriers: left inattention, dec strength  goals: mod I for adls, sv for transfers and ambulation  EDOD: Home with assistance 4/19

## 2022-04-12 NOTE — PROGRESS NOTE ADULT - ASSESSMENT
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 Found to have R central sulcus MCA territory infarct on repeat CTH. Hospital course complicated by leukocytosis, JARRED on CKD. Admitted to [facility] acute inpatient rehab on 4/5 for ADL, gait, and functional impairments.     #s/p R central Sulcus CVA  - Unable to obtain MRI due to ICD  - Continue aggrenox bid  - Continue atorvastatin 80mg qd  - Continue comprehensive rehab program - PT/OT/SLP per rehab team  - Pain management, bowel regimen per rehab     #Seizures  - Likely due to R central sulcus infarct  - Continue Depakote 500mg BID for 6 months per neuro  - EEG wnl    # NSTEMI  # HFrEF  # HTN  - S/p heparin gtt, continue aggrenox  - Course c/b acute pulmonary edema s/p lasix x2 3/30  - TTE LVEF 30% - 35% w/ severe systolic dysfunction  - Continue statin  - Holding home imdur, coreg. ACEI, lasix held 2/2 JARRED on CKD  - Metoprolol 50mg qd, hydralazine 5mg TID held for hypotension  - Cardio consulted, nephro following    #JARRED on probable CKD Stage III - improving  #Urinary retention possibly due to neurogenic bladder from CVA vs. BPH  - Hillman per rehab, cont flomax - dc if hypotension persists  - Renal sono - CKD and a distended bladder  - Avoid nephrotoxic medications; holding ACEI, lasix prn    #Type 2 Diabetes Mellitus  - HbA1c 8.3  - Continue FS, ISS    #Tonsillar Cancer #Dysphagia  - Seen by ENT recent  - Continue puree/mildly thick. diet/nutrition follow up  - Residual L hearing loss    #Hypothyroidism  -Synthroid 75mcg qd    #DVT ppx - HSQ

## 2022-04-13 LAB
ANION GAP SERPL CALC-SCNC: 10 MMOL/L — SIGNIFICANT CHANGE UP (ref 5–17)
ANION GAP SERPL CALC-SCNC: 7 MMOL/L — SIGNIFICANT CHANGE UP (ref 5–17)
APPEARANCE UR: CLEAR — SIGNIFICANT CHANGE UP
BACTERIA # UR AUTO: ABNORMAL /HPF
BASOPHILS # BLD AUTO: 0.05 K/UL — SIGNIFICANT CHANGE UP (ref 0–0.2)
BASOPHILS NFR BLD AUTO: 0.7 % — SIGNIFICANT CHANGE UP (ref 0–2)
BILIRUB UR-MCNC: NEGATIVE — SIGNIFICANT CHANGE UP
BLD GP AB SCN SERPL QL: SIGNIFICANT CHANGE UP
BUN SERPL-MCNC: 25 MG/DL — HIGH (ref 7–23)
BUN SERPL-MCNC: 29 MG/DL — HIGH (ref 7–23)
CALCIUM SERPL-MCNC: 8.8 MG/DL — SIGNIFICANT CHANGE UP (ref 8.4–10.5)
CALCIUM SERPL-MCNC: 9.3 MG/DL — SIGNIFICANT CHANGE UP (ref 8.4–10.5)
CHLORIDE SERPL-SCNC: 105 MMOL/L — SIGNIFICANT CHANGE UP (ref 96–108)
CHLORIDE SERPL-SCNC: 107 MMOL/L — SIGNIFICANT CHANGE UP (ref 96–108)
CO2 SERPL-SCNC: 26 MMOL/L — SIGNIFICANT CHANGE UP (ref 22–31)
CO2 SERPL-SCNC: 28 MMOL/L — SIGNIFICANT CHANGE UP (ref 22–31)
COLOR SPEC: YELLOW — SIGNIFICANT CHANGE UP
CREAT SERPL-MCNC: 2.02 MG/DL — HIGH (ref 0.5–1.3)
CREAT SERPL-MCNC: 2.17 MG/DL — HIGH (ref 0.5–1.3)
DIFF PNL FLD: ABNORMAL
EGFR: 31 ML/MIN/1.73M2 — LOW
EGFR: 34 ML/MIN/1.73M2 — LOW
EOSINOPHIL # BLD AUTO: 0.09 K/UL — SIGNIFICANT CHANGE UP (ref 0–0.5)
EOSINOPHIL NFR BLD AUTO: 1.2 % — SIGNIFICANT CHANGE UP (ref 0–6)
EPI CELLS # UR: SIGNIFICANT CHANGE UP
GLUCOSE BLDC GLUCOMTR-MCNC: 106 MG/DL — HIGH (ref 70–99)
GLUCOSE BLDC GLUCOMTR-MCNC: 119 MG/DL — HIGH (ref 70–99)
GLUCOSE SERPL-MCNC: 109 MG/DL — HIGH (ref 70–99)
GLUCOSE SERPL-MCNC: 118 MG/DL — HIGH (ref 70–99)
GLUCOSE UR QL: 50 MG/DL
HCT VFR BLD CALC: 29.9 % — LOW (ref 39–50)
HGB BLD-MCNC: 9.5 G/DL — LOW (ref 13–17)
IMM GRANULOCYTES NFR BLD AUTO: 1.1 % — SIGNIFICANT CHANGE UP (ref 0–1.5)
KETONES UR-MCNC: NEGATIVE — SIGNIFICANT CHANGE UP
LEUKOCYTE ESTERASE UR-ACNC: ABNORMAL
LYMPHOCYTES # BLD AUTO: 0.8 K/UL — LOW (ref 1–3.3)
LYMPHOCYTES # BLD AUTO: 10.9 % — LOW (ref 13–44)
MCHC RBC-ENTMCNC: 24.3 PG — LOW (ref 27–34)
MCHC RBC-ENTMCNC: 31.8 GM/DL — LOW (ref 32–36)
MCV RBC AUTO: 76.5 FL — LOW (ref 80–100)
MONOCYTES # BLD AUTO: 1.01 K/UL — HIGH (ref 0–0.9)
MONOCYTES NFR BLD AUTO: 13.7 % — SIGNIFICANT CHANGE UP (ref 2–14)
NEUTROPHILS # BLD AUTO: 5.33 K/UL — SIGNIFICANT CHANGE UP (ref 1.8–7.4)
NEUTROPHILS NFR BLD AUTO: 72.4 % — SIGNIFICANT CHANGE UP (ref 43–77)
NITRITE UR-MCNC: POSITIVE
NRBC # BLD: 0 /100 WBCS — SIGNIFICANT CHANGE UP (ref 0–0)
PH UR: 5 — SIGNIFICANT CHANGE UP (ref 5–8)
PLATELET # BLD AUTO: 197 K/UL — SIGNIFICANT CHANGE UP (ref 150–400)
POTASSIUM SERPL-MCNC: 3.9 MMOL/L — SIGNIFICANT CHANGE UP (ref 3.5–5.3)
POTASSIUM SERPL-MCNC: 4.3 MMOL/L — SIGNIFICANT CHANGE UP (ref 3.5–5.3)
POTASSIUM SERPL-SCNC: 3.9 MMOL/L — SIGNIFICANT CHANGE UP (ref 3.5–5.3)
POTASSIUM SERPL-SCNC: 4.3 MMOL/L — SIGNIFICANT CHANGE UP (ref 3.5–5.3)
PROT UR-MCNC: 100
RBC # BLD: 3.91 M/UL — LOW (ref 4.2–5.8)
RBC # FLD: 16.3 % — HIGH (ref 10.3–14.5)
RBC CASTS # UR COMP ASSIST: ABNORMAL /HPF (ref 0–4)
SODIUM SERPL-SCNC: 141 MMOL/L — SIGNIFICANT CHANGE UP (ref 135–145)
SODIUM SERPL-SCNC: 142 MMOL/L — SIGNIFICANT CHANGE UP (ref 135–145)
SP GR SPEC: 1.01 — SIGNIFICANT CHANGE UP (ref 1.01–1.02)
UROBILINOGEN FLD QL: NEGATIVE — SIGNIFICANT CHANGE UP
WBC # BLD: 7.36 K/UL — SIGNIFICANT CHANGE UP (ref 3.8–10.5)
WBC # FLD AUTO: 7.36 K/UL — SIGNIFICANT CHANGE UP (ref 3.8–10.5)
WBC UR QL: ABNORMAL /HPF (ref 0–5)

## 2022-04-13 PROCEDURE — 99232 SBSQ HOSP IP/OBS MODERATE 35: CPT

## 2022-04-13 RX ORDER — FINASTERIDE 5 MG/1
5 TABLET, FILM COATED ORAL DAILY
Refills: 0 | Status: DISCONTINUED | OUTPATIENT
Start: 2022-04-13 | End: 2022-04-19

## 2022-04-13 RX ADMIN — TAMSULOSIN HYDROCHLORIDE 0.8 MILLIGRAM(S): 0.4 CAPSULE ORAL at 22:21

## 2022-04-13 RX ADMIN — HEPARIN SODIUM 5000 UNIT(S): 5000 INJECTION INTRAVENOUS; SUBCUTANEOUS at 14:55

## 2022-04-13 RX ADMIN — FINASTERIDE 5 MILLIGRAM(S): 5 TABLET, FILM COATED ORAL at 22:21

## 2022-04-13 RX ADMIN — Medication 325 MILLIGRAM(S): at 11:30

## 2022-04-13 RX ADMIN — ASPIRIN AND DIPYRIDAMOLE 1 CAPSULE(S): 25; 200 CAPSULE, EXTENDED RELEASE ORAL at 18:28

## 2022-04-13 RX ADMIN — ATORVASTATIN CALCIUM 80 MILLIGRAM(S): 80 TABLET, FILM COATED ORAL at 22:21

## 2022-04-13 RX ADMIN — DIVALPROEX SODIUM 500 MILLIGRAM(S): 500 TABLET, DELAYED RELEASE ORAL at 18:28

## 2022-04-13 RX ADMIN — Medication 3 MILLIGRAM(S): at 22:21

## 2022-04-13 RX ADMIN — PREGABALIN 1000 MICROGRAM(S): 225 CAPSULE ORAL at 11:30

## 2022-04-13 RX ADMIN — Medication 5 MILLILITER(S): at 11:30

## 2022-04-13 RX ADMIN — Medication 75 MICROGRAM(S): at 05:31

## 2022-04-13 RX ADMIN — DIVALPROEX SODIUM 500 MILLIGRAM(S): 500 TABLET, DELAYED RELEASE ORAL at 05:31

## 2022-04-13 RX ADMIN — ASPIRIN AND DIPYRIDAMOLE 1 CAPSULE(S): 25; 200 CAPSULE, EXTENDED RELEASE ORAL at 05:31

## 2022-04-13 RX ADMIN — HEPARIN SODIUM 5000 UNIT(S): 5000 INJECTION INTRAVENOUS; SUBCUTANEOUS at 05:31

## 2022-04-13 NOTE — CONSULT NOTE ADULT - SUBJECTIVE AND OBJECTIVE BOX
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 As per patient, he was watching a movie when he felt very short of breath suddenly and dropped to the floor, denies LOC and denies any trauma to his head, was on the floor for ~2 hours. Patient was unable to rise from the floor by himself due to his left arm weakness. When first brought to the ED, patient was hypoxic and hypertensive o2 saturation in EMS was >80%. While in the ED, patient had episode of seizure-like activity in his b/l upper extremities which was controlled w/ ativan. Patient was seen and examined at bedside, BiPAP still in place, patient was still mildly lethargic from ativan and SOB with bipap mask on. Patient able to open eyes and nod/shake head to questions. He was admitted for pulmonary edema and found to have leukocytosis, JARRED on CKD, elevated troponin/NSTEMI. He was started on depakote for seizures and heparin gtt and aggrenox. Repeat CTH 3/31 demonstrated infarct in R perirolandic cortex. Hospital course further complicated by RUL PNA on CT chest; however leukocytosis resolved and ID recommended monitoring off abx. Patient was evaluated by Palliative for GOC discussion and pt was made DNR with trial of intubation. He clinically improved over the course of his hospitalization. Patient was evaluated by therapy for functional deficits and gait/ ADL impairments and recommended acute rehabilitation. Patient was medically optimized for discharge to [facility]     Presented to rehab with retention and mcclellan catheter. No prior  surgery.    On Flomax 0.8mg. Denies recent void trial. No recurrent UTI's.      PAST MEDICAL & SURGICAL HISTORY:  MI (myocardial infarction)  1992    HTN (hypertension)    HLD (hyperlipidemia)    Throat cancer  2011, treated with chemo, RT    Ventricular arrhythmia  s/p AICD    Diabetes  Type2, not on any meds since weight loss after throat Ca    Renal insufficiency  s/p chemo    CAD (coronary artery disease)    Inguinal hernia, left    H/O prior ablation treatment  VT, 2009    Cardiac defibrillator in place  - 2009, battery change 2017    S/P left inguinal hernia repair  2018 at [facility]        REVIEW OF SYSTEMS:    CONSTITUTIONAL:  no fevers or chills  RESPIRATORY: No shortness of breath  CARDIOVASCULAR: No chest pain  GASTROINTESTINAL: No abdominal or epigastric pain.  NEUROLOGICAL: No mental status changes        MEDICATIONS  (STANDING):  atorvastatin 80 milliGRAM(s) Oral at bedtime  Biotene Dry Mouth Oral Rinse 5 milliLiter(s) Swish and Spit daily  cyanocobalamin 1000 MICROGram(s) Oral daily  dipyridamole 200 mG/aspirin 25 mG 1 Capsule(s) Oral two times a day  diVALproex  milliGRAM(s) Oral two times a day  ferrous    sulfate 325 milliGRAM(s) Oral daily  glucagon  Injectable 1 milliGRAM(s) IntraMuscular once  heparin   Injectable 5000 Unit(s) SubCutaneous every 8 hours  insulin lispro (ADMELOG) corrective regimen sliding scale   SubCutaneous two times a day with meals  levothyroxine 75 MICROGram(s) Oral daily  melatonin 3 milliGRAM(s) Oral at bedtime  tamsulosin 0.8 milliGRAM(s) Oral at bedtime    MEDICATIONS  (PRN):  ALBUTerol    90 MICROgram(s) HFA Inhaler 2 Puff(s) Inhalation every 6 hours PRN Shortness of Breath and/or Wheezing  dextrose Oral Gel 15 Gram(s) Oral once PRN Blood Glucose LESS THAN 70 milliGRAM(s)/deciliter      Allergies    No Known Allergies      SOCIAL HISTORY: No illicit drug use    FAMILY HISTORY:  Family history of cancer (Sibling)        Vital Signs Last 24 Hrs  T(C): 37.1 (13 Apr 2022 07:37), Max: 37.1 (13 Apr 2022 07:37)  T(F): 98.8 (13 Apr 2022 07:37), Max: 98.8 (13 Apr 2022 07:37)  HR: 79 (13 Apr 2022 16:02) (79 - 87)  BP: 148/76      PHYSICAL EXAM:    Constitutional: NAD, well-developed  Respiratory: No accessory respiratory muscle use  Abd: Soft, NT/ND   : Bladder NT. Mcclellan with tea colored urine.  Neurological: A/O x 3  Psychiatric: Normal mood, normal affect        04-13    142  |  107  |  25<H>  ----------------------------<  109<H>  4.3   |  28  |  2.02<H>    Ca    9.3      13 Apr 2022 06:11

## 2022-04-13 NOTE — CONSULT NOTE ADULT - ASSESSMENT
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 Found to have R central sulcus MCA territory infarct on repeat CTH. Hospital course complicated by leukocytosis, JARRED on CKD. Admitted to [facility] acute inpatient rehab on 4/5 for ADL, gait, and functional impairments.     #s/p R central Sulcus CVA  - Unable to obtain MRI due to ICD  - Continue aggrenox bid  - Continue atorvastatin 80mg qd  - Start comprehensive rehab program -PT/OT/SLP per rehab team  - Pain management, bowel regimen per rehab     #Seizures  - Likely due to R central sulcus infarct  - Continue Depakote 500mg BID for 6 months per neuro  - EEG wnl    # NSTEMI  # HFrEF  # HTN  - S/p heparin gtt, continue aggrenox  - Course c/b acute pulmonary edema s/p lasix x2 3/30. Diurese prn  - TTE LVEF 30% - 35% w/ severe systolic dysfunction  - Continue statin  - Holding home imdur, coreg. ACEI held 2/2 JARRED on CKD  - Continue metoprolol 50mg qd, hydralazine 5mg TID  - Avoid hypotension    #JARRED on CKD  - BUN/Cr 35/2.35, Cr baseline of 2.0-2.1  - Monitor routine BMP  - Avoid nephrotoxic medications; holding ACEI, lasix prn    #Type 2 Diabetes Mellitus  - HbA1c 8.3  - Continue FS, ISS    #Tonsillar Cancer #Dysphagia  - Seen by ENT recent  - Continue puree/mildly thick. diet/nutrition follow up  - Residual L hearing loss    # Urinary Retention  - Hillman per rehab, cont flomax    #Hypothyroidism  - Continue synthroid 75mcg qd    #DVT ppx - HSQ
Rehab after recent MI.    Has mcclellan for retention. On Flomax 0.8 mg.    - continue Flomax  - avoid constipation  - would start Finasteride 5 mg daily  - eventual void trial when progressing on rehab

## 2022-04-13 NOTE — PROGRESS NOTE ADULT - SUBJECTIVE AND OBJECTIVE BOX
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 As per patient, he was watching a movie when he felt very short of breath suddenly and dropped to the floor, denies LOC and denies any trauma to his head, was on the floor for ~2 hours. Patient was unable to rise from the floor by himself due to his left arm weakness. When first brought to the ED, patient was hypoxic and hypertensive o2 saturation in EMS was >80%. While in the ED, patient had episode of seizure-like activity in his b/l upper extremities which was controlled w/ ativan. Patient was seen and examined at bedside, BiPAP still in place, patient was still mildly lethargic from ativan and SOB with bipap mask on. Patient able to open eyes and nod/shake head to questions. He was admitted for pulmonary edema and found to have leukocytosis, JARRED on CKD, elevated troponin/NSTEMI. He was started on depakote for seizures and heparin gtt and aggrenox. Repeat CTH 3/31 demonstrated infarct in R perirolandic cortex. Hospital course further complicated by RUL PNA on CT chest; however leukocytosis resolved and ID recommended monitoring off abx. Patient was evaluated by Palliative for GOC discussion and pt was made DNR with trial of intubation. He clinically improved over the course of his hospitalization. Patient was evaluated by therapy for functional deficits and gait/ ADL impairments and recommended acute rehabilitation. Patient was medically optimized for discharge to [facility] on 4/5. (05 Apr 2022 16:18)        PAST MEDICAL & SURGICAL HISTORY:  MI (myocardial infarction)  1992    HTN (hypertension)    HLD (hyperlipidemia)    Throat cancer  2011, treated with chemo, RT    Ventricular arrhythmia  s/p AICD    Diabetes  Type2, not on any meds since weight loss after throat Ca    Renal insufficiency  s/p chemo    CAD (coronary artery disease)    Inguinal hernia, left    H/O prior ablation treatment  VT, 2009    Cardiac defibrillator in place  - 2009, battery change 2017    S/P left inguinal hernia repair  2018 at [place]      VITALS  Vital Signs Last 24 Hrs  T(C): 37.1 (13 Apr 2022 07:37), Max: 37.1 (13 Apr 2022 07:37)  T(F): 98.8 (13 Apr 2022 07:37), Max: 98.8 (13 Apr 2022 07:37)  HR: 87 (13 Apr 2022 07:37) (80 - 87)  BP: 146/74 (13 Apr 2022 07:37) (95/61 - 152/72)  BP(mean): --  RR: 16 (13 Apr 2022 07:37) (15 - 16)  SpO2: 96% (13 Apr 2022 07:37) (96% - 96%)      PHYSICAL EXAM  Constitutional - NAD, Comfortable  HEENT - NCAT, EOMI  Neck - Supple, No limited ROM  Chest - CTA bilaterally  Cardiovascular - RRR,  Abdomen -  Soft, NTND  Extremities - No calf tenderness   Neurologic Exam -  no new focal deficits                       RECENT LABS    04-13    142  |  107  |  25<H>  ----------------------------<  109<H>  4.3   |  28  |  2.02<H>    Ca    9.3      13 Apr 2022 06:11            CURRENT MEDICATIONS  MEDICATIONS  (STANDING):  atorvastatin 80 milliGRAM(s) Oral at bedtime  Biotene Dry Mouth Oral Rinse 5 milliLiter(s) Swish and Spit daily  cyanocobalamin 1000 MICROGram(s) Oral daily  dextrose 5%. 1000 milliLiter(s) (50 mL/Hr) IV Continuous <Continuous>  dextrose 5%. 1000 milliLiter(s) (100 mL/Hr) IV Continuous <Continuous>  dextrose 50% Injectable 25 Gram(s) IV Push once  dextrose 50% Injectable 25 Gram(s) IV Push once  dextrose 50% Injectable 12.5 Gram(s) IV Push once  dipyridamole 200 mG/aspirin 25 mG 1 Capsule(s) Oral two times a day  diVALproex  milliGRAM(s) Oral two times a day  ferrous    sulfate 325 milliGRAM(s) Oral daily  glucagon  Injectable 1 milliGRAM(s) IntraMuscular once  heparin   Injectable 5000 Unit(s) SubCutaneous every 8 hours  insulin lispro (ADMELOG) corrective regimen sliding scale   SubCutaneous two times a day with meals  levothyroxine 75 MICROGram(s) Oral daily  melatonin 3 milliGRAM(s) Oral at bedtime  tamsulosin 0.8 milliGRAM(s) Oral at bedtime    MEDICATIONS  (PRN):  ALBUTerol    90 MICROgram(s) HFA Inhaler 2 Puff(s) Inhalation every 6 hours PRN Shortness of Breath and/or Wheezing  dextrose Oral Gel 15 Gram(s) Oral once PRN Blood Glucose LESS THAN 70 milliGRAM(s)/deciliter      ASSESSMENT & PLAN          GI/Bowel Management - Dulcolax PRN, Fleet PRN   Management - Toilet Q2  Skin - Turn Q2  Pain - Tylenol PRN  DVT PPX - TEDs, SCDs  Diet -     Continue comprehensive acute rehab program consisting of 3hrs/day of OT/PT and SLP.

## 2022-04-13 NOTE — PROGRESS NOTE ADULT - ASSESSMENT
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 Found to have R central sulcus MCA territory infarct on repeat CTH. Hospital course complicated by leukocytosis, JARRED on CKD. Admitted to [place] acute inpatient rehab on 4/5 for ADL, gait, and functional impairments.     # s/p R central Sulcus CVA  - Comprehensive Multidisciplinary Rehab Program: 3 hours a day, 5 days a week with PT/OT/SLP  - Unable to obtain MRI due to ICD  - Continue aggrenox  - Continue atorvastatin  - Seizure management as below    # Seizures  - I/s/o R central sulcus infarct  - Continue Depakote 500mg BID for 6 months per neuro    # NSTEMI  # HFrEF  # HTN  - S/p heparin gtt, continue aggrenox  - Course c/b acute pulmonary edema s/p lasix x2 3/30. Diurese PRN  - TTE with severe systolic dysfunction 30-35%  - Continue statin  - Holding home imdur and coreg. Hold ACEI i/s/o JARRED on CKD  - Hold Metroprolol ER 50mg and Hydralazine 5mg TID due to hypotension  -Encourage oral intake  -Given fluid bolus and now on continues IVF to promote hydration  -Cardiology consult appreciated     # JARRED    - Cr near baseline of 2.0-2.1  -Cr 1.96 (4/11) --> 1.86 (4/12)   -Received total of 2L of fluids IV  - Avoid nephrotoxic medications; holding ACEI  - Nephrology is following  - Spoke with  wife- she was able convince patient to accept IV hydration as recommended, labs in the morning    - Psychiatry evaluation - for capacity and Depression -case discussed  - Medicine is following, case discussed      #Type 2 Diabetes Mellitus  - A1c 8.3  - Continue SSI  -Not on medication at home  - Hospitalist consult appreciated    #Tonsillar Cancer #Dysphagia  - Seen by ENT recently- reconsult pending  - puree/mildly thick - upgraded to minced/moist and mild thick   - Residual L hearing loss    #Hypothyroid  -Continue synthroid 75mcg daily    #Urinary retention  -Continue flomax 0.8mg daily  -TOV on 4/6 at midnight - able to void with small residual  - Hillman reinserted due to continued retention- awaiting  evaluation   -Monitor UO    # Sleep:  - Melatonin qHS    # Pain Management:  - Tylenol PRN    # GI/Bowel:  - Senna QHS    # Diet:   - Diet Consistency/Modifications: Minced/moist and mild thick  - Aspiration Precautions  - SLP consult for swallow function evaluation and treatment    # DVT ppx:  - HSQ    # Restrictions/Precautions:  - Precautions: Falls, aspiration, cardiac    ---------------  Outpatient Follow-up:    CHANCE FRANCO  Internal Medicine  [address] [place] [address], [place] [address]  Phone: [phone]  Fax: [phone]  Follow Up Time: 1-3 days    Chet Garcia)  Cardiovascular Disease; Internal Medicine  [facility], [address] [place] [address], [address] [place] [address]  Phone: [phone]  Fax: [phone]  Follow Up Time: 1-3 days    Brandon Harrison)  Urology  [address] [place] [address], [address] [place] [address]  Phone: [phone]  Fax: [phone]  Follow Up Time: 1-3 days    Yenifer Faulkner)  Neurology  [address] [place] [address]  Phone: [phone]  Fax: [phone]    TEAM MEETING 4/11/22  SW:  Lives in lifted home with 11 stairs to enter, wife works, children unable to help  OT: SV for eat/groom, min for uBD, mod for transfers and LBD  PT: SV for amb and transfers, walks with cane 150', 12 steps with SV  SLP: ANGELINA 4/11, weak swallow, upgrade to mild thick can use compensatory methods   barriers: left inattention, dec strength  goals: mod I for adls, sv for transfers and ambulation  EDOD: Home with assistance 4/19

## 2022-04-13 NOTE — PROGRESS NOTE ADULT - SUBJECTIVE AND OBJECTIVE BOX
No distress    Vital Signs Last 24 Hrs  T(C): 36.7 (22 @ 19:48), Max: 37.1 (22 @ 07:37)  T(F): 98 (22 @ 19:48), Max: 98.8 (22 @ 07:37)  HR: 80 (22 @ 19:48) (79 - 87)  BP: 153/86 (22 @ 19:48) (146/74 - 153/86)  RR: 16 (22 @ 19:48) (16 - 16)  SpO2: 96% (22 @ 19:48) (94% - 96%)    I&O's Detail    2022 07:01  -  2022 07:00  --------------------------------------------------------  IN:    Oral Fluid: 480 mL  Total IN: 480 mL    OUT:    Indwelling Catheter - Urethral (mL): 500 mL  Total OUT: 500 mL    Total NET: -20 mL    2022 07:01  -  2022 23:21  --------------------------------------------------------  IN:    Oral Fluid: 1450 mL  Total IN: 1450 mL    OUT:    Indwelling Catheter - Urethral (mL): 1050 mL  Total OUT: 1050 mL    Total NET: 400 mL    s1s2  b/l air entry  soft, ND  no edema                                                       9.5    7.36  )-----------( 197      ( 2022 17:10 )             29.9     2022 17:10    141    |  105    |  29     ----------------------------<  118    3.9     |  26     |  2.17     Ca    8.8        2022 17:10    Urinalysis Basic - ( 2022 18:10 )    Color: Yellow / Appearance: Clear / S.015 / pH: x  Gluc: x / Ketone: Negative  / Bili: Negative / Urobili: Negative   Blood: x / Protein: 100 / Nitrite: Positive   Leuk Esterase: Small / RBC: 11-25 /HPF / WBC 11-25 /HPF   Sq Epi: x / Non Sq Epi: Neg.-Few / Bacteria: Moderate /HPF    ALBUTerol    90 MICROgram(s) HFA Inhaler 2 Puff(s) Inhalation every 6 hours PRN  atorvastatin 80 milliGRAM(s) Oral at bedtime  Biotene Dry Mouth Oral Rinse 5 milliLiter(s) Swish and Spit daily  cyanocobalamin 1000 MICROGram(s) Oral daily  dextrose 5%. 1000 milliLiter(s) IV Continuous <Continuous>  dextrose 5%. 1000 milliLiter(s) IV Continuous <Continuous>  dextrose 50% Injectable 12.5 Gram(s) IV Push once  dextrose 50% Injectable 25 Gram(s) IV Push once  dextrose 50% Injectable 25 Gram(s) IV Push once  dextrose Oral Gel 15 Gram(s) Oral once PRN  dipyridamole 200 mG/aspirin 25 mG 1 Capsule(s) Oral two times a day  diVALproex  milliGRAM(s) Oral two times a day  ferrous    sulfate 325 milliGRAM(s) Oral daily  finasteride 5 milliGRAM(s) Oral daily  glucagon  Injectable 1 milliGRAM(s) IntraMuscular once  insulin lispro (ADMELOG) corrective regimen sliding scale   SubCutaneous two times a day with meals  levothyroxine 75 MICROGram(s) Oral daily  melatonin 3 milliGRAM(s) Oral at bedtime  tamsulosin 0.8 milliGRAM(s) Oral at bedtime    A/P:    JARRED/CKD 3 in setting of hypotension, possible retention   Avoid BP lowering meds  Avoid nephrotoxins  Cr has improved and is presently close to baseline  SONO: small R kidney, distended bladder, was unable to void  Continue mcclellan  F/u BMP   eval    [phone]

## 2022-04-13 NOTE — CONSULT NOTE ADULT - CONSULT REASON
JARRED/CKD
medical co management
medication maangement
Urinary retention
h/o tonsil cancer and tongue cancer

## 2022-04-14 LAB
ALBUMIN SERPL ELPH-MCNC: 2.1 G/DL — LOW (ref 3.3–5)
ALP SERPL-CCNC: 63 U/L — SIGNIFICANT CHANGE UP (ref 40–120)
ALT FLD-CCNC: 35 U/L — SIGNIFICANT CHANGE UP (ref 10–45)
ANION GAP SERPL CALC-SCNC: 10 MMOL/L — SIGNIFICANT CHANGE UP (ref 5–17)
AST SERPL-CCNC: 30 U/L — SIGNIFICANT CHANGE UP (ref 10–40)
BASOPHILS # BLD AUTO: 0.05 K/UL — SIGNIFICANT CHANGE UP (ref 0–0.2)
BASOPHILS NFR BLD AUTO: 0.7 % — SIGNIFICANT CHANGE UP (ref 0–2)
BILIRUB SERPL-MCNC: 0.3 MG/DL — SIGNIFICANT CHANGE UP (ref 0.2–1.2)
BUN SERPL-MCNC: 25 MG/DL — HIGH (ref 7–23)
CALCIUM SERPL-MCNC: 9.1 MG/DL — SIGNIFICANT CHANGE UP (ref 8.4–10.5)
CHLORIDE SERPL-SCNC: 106 MMOL/L — SIGNIFICANT CHANGE UP (ref 96–108)
CO2 SERPL-SCNC: 25 MMOL/L — SIGNIFICANT CHANGE UP (ref 22–31)
CREAT SERPL-MCNC: 2.01 MG/DL — HIGH (ref 0.5–1.3)
EGFR: 34 ML/MIN/1.73M2 — LOW
EOSINOPHIL # BLD AUTO: 0.14 K/UL — SIGNIFICANT CHANGE UP (ref 0–0.5)
EOSINOPHIL NFR BLD AUTO: 1.8 % — SIGNIFICANT CHANGE UP (ref 0–6)
GLUCOSE BLDC GLUCOMTR-MCNC: 117 MG/DL — HIGH (ref 70–99)
GLUCOSE BLDC GLUCOMTR-MCNC: 97 MG/DL — SIGNIFICANT CHANGE UP (ref 70–99)
GLUCOSE SERPL-MCNC: 90 MG/DL — SIGNIFICANT CHANGE UP (ref 70–99)
HCT VFR BLD CALC: 30.6 % — LOW (ref 39–50)
HGB BLD-MCNC: 9.7 G/DL — LOW (ref 13–17)
IMM GRANULOCYTES NFR BLD AUTO: 0.9 % — SIGNIFICANT CHANGE UP (ref 0–1.5)
LYMPHOCYTES # BLD AUTO: 0.87 K/UL — LOW (ref 1–3.3)
LYMPHOCYTES # BLD AUTO: 11.3 % — LOW (ref 13–44)
MCHC RBC-ENTMCNC: 24.1 PG — LOW (ref 27–34)
MCHC RBC-ENTMCNC: 31.7 GM/DL — LOW (ref 32–36)
MCV RBC AUTO: 75.9 FL — LOW (ref 80–100)
MONOCYTES # BLD AUTO: 1.12 K/UL — HIGH (ref 0–0.9)
MONOCYTES NFR BLD AUTO: 14.6 % — HIGH (ref 2–14)
NEUTROPHILS # BLD AUTO: 5.43 K/UL — SIGNIFICANT CHANGE UP (ref 1.8–7.4)
NEUTROPHILS NFR BLD AUTO: 70.7 % — SIGNIFICANT CHANGE UP (ref 43–77)
NRBC # BLD: 0 /100 WBCS — SIGNIFICANT CHANGE UP (ref 0–0)
PLATELET # BLD AUTO: 196 K/UL — SIGNIFICANT CHANGE UP (ref 150–400)
POTASSIUM SERPL-MCNC: 3.9 MMOL/L — SIGNIFICANT CHANGE UP (ref 3.5–5.3)
POTASSIUM SERPL-SCNC: 3.9 MMOL/L — SIGNIFICANT CHANGE UP (ref 3.5–5.3)
PROT SERPL-MCNC: 5.7 G/DL — LOW (ref 6–8.3)
RBC # BLD: 4.03 M/UL — LOW (ref 4.2–5.8)
RBC # FLD: 16.6 % — HIGH (ref 10.3–14.5)
SODIUM SERPL-SCNC: 141 MMOL/L — SIGNIFICANT CHANGE UP (ref 135–145)
WBC # BLD: 7.68 K/UL — SIGNIFICANT CHANGE UP (ref 3.8–10.5)
WBC # FLD AUTO: 7.68 K/UL — SIGNIFICANT CHANGE UP (ref 3.8–10.5)

## 2022-04-14 PROCEDURE — 99232 SBSQ HOSP IP/OBS MODERATE 35: CPT

## 2022-04-14 RX ORDER — ESCITALOPRAM OXALATE 10 MG/1
5 TABLET, FILM COATED ORAL DAILY
Refills: 0 | Status: DISCONTINUED | OUTPATIENT
Start: 2022-04-14 | End: 2022-04-19

## 2022-04-14 RX ORDER — CEFTRIAXONE 500 MG/1
1000 INJECTION, POWDER, FOR SOLUTION INTRAMUSCULAR; INTRAVENOUS EVERY 24 HOURS
Refills: 0 | Status: DISCONTINUED | OUTPATIENT
Start: 2022-04-14 | End: 2022-04-14

## 2022-04-14 RX ADMIN — Medication 325 MILLIGRAM(S): at 12:03

## 2022-04-14 RX ADMIN — CEFTRIAXONE 100 MILLIGRAM(S): 500 INJECTION, POWDER, FOR SOLUTION INTRAMUSCULAR; INTRAVENOUS at 09:30

## 2022-04-14 RX ADMIN — Medication 5 MILLILITER(S): at 12:03

## 2022-04-14 RX ADMIN — Medication 3 MILLIGRAM(S): at 21:40

## 2022-04-14 RX ADMIN — PREGABALIN 1000 MICROGRAM(S): 225 CAPSULE ORAL at 12:03

## 2022-04-14 RX ADMIN — Medication 75 MICROGRAM(S): at 05:43

## 2022-04-14 RX ADMIN — ASPIRIN AND DIPYRIDAMOLE 1 CAPSULE(S): 25; 200 CAPSULE, EXTENDED RELEASE ORAL at 05:43

## 2022-04-14 RX ADMIN — DIVALPROEX SODIUM 500 MILLIGRAM(S): 500 TABLET, DELAYED RELEASE ORAL at 18:11

## 2022-04-14 RX ADMIN — FINASTERIDE 5 MILLIGRAM(S): 5 TABLET, FILM COATED ORAL at 12:03

## 2022-04-14 RX ADMIN — TAMSULOSIN HYDROCHLORIDE 0.8 MILLIGRAM(S): 0.4 CAPSULE ORAL at 21:40

## 2022-04-14 RX ADMIN — ESCITALOPRAM OXALATE 5 MILLIGRAM(S): 10 TABLET, FILM COATED ORAL at 15:54

## 2022-04-14 RX ADMIN — ATORVASTATIN CALCIUM 80 MILLIGRAM(S): 80 TABLET, FILM COATED ORAL at 21:41

## 2022-04-14 RX ADMIN — ASPIRIN AND DIPYRIDAMOLE 1 CAPSULE(S): 25; 200 CAPSULE, EXTENDED RELEASE ORAL at 18:11

## 2022-04-14 RX ADMIN — DIVALPROEX SODIUM 500 MILLIGRAM(S): 500 TABLET, DELAYED RELEASE ORAL at 05:43

## 2022-04-14 NOTE — PROGRESS NOTE ADULT - SUBJECTIVE AND OBJECTIVE BOX
No distress    Vital Signs Last 24 Hrs  T(C): 36.7 (22 @ 20:15), Max: 36.8 (22 @ 07:48)  T(F): 98 (22 @ 20:15), Max: 98.3 (22 @ 07:48)  HR: 88 (22 @ 20:15) (82 - 92)  BP: 134/74 (22 @ 20:15) (106/54 - 155/72)  RR: 16 (22 @ 20:15) (15 - 16)  SpO2: 97% (22 @ 20:15) (94% - 97%)    I&O's Detail    2022 07:01  -  2022 07:00  --------------------------------------------------------  IN:    Oral Fluid: 1450 mL  Total IN: 1450 mL    OUT:    Indwelling Catheter - Urethral (mL): 1350 mL  Total OUT: 1350 mL    Total NET: 100 mL    2022 07:01  -  2022 20:40  --------------------------------------------------------  IN:    Oral Fluid: 1080 mL  Total IN: 1080 mL    OUT:    Indwelling Catheter - Urethral (mL): 800 mL  Total OUT: 800 mL    Total NET: 280 mL    s1s2  b/l air entry  soft, ND  no edema                                                                9.7    7.68  )-----------( 196      ( 2022 06:00 )             30.6     2022 06:00    141    |  106    |  25     ----------------------------<  90     3.9     |  25     |  2.01     Ca    9.1        2022 06:00    TPro  5.7    /  Alb  2.1    /  TBili  0.3    /  DBili  x      /  AST  30     /  ALT  35     /  AlkPhos  63     2022 06:00    LIVER FUNCTIONS - ( 2022 06:00 )  Alb: 2.1 g/dL / Pro: 5.7 g/dL / ALK PHOS: 63 U/L / ALT: 35 U/L / AST: 30 U/L / GGT: x           Urinalysis Basic - ( 2022 18:10 )    Color: Yellow / Appearance: Clear / S.015 / pH: x  Gluc: x / Ketone: Negative  / Bili: Negative / Urobili: Negative   Blood: x / Protein: 100 / Nitrite: Positive   Leuk Esterase: Small / RBC: 11-25 /HPF / WBC 11-25 /HPF   Sq Epi: x / Non Sq Epi: Neg.-Few / Bacteria: Moderate /HPF    ALBUTerol    90 MICROgram(s) HFA Inhaler 2 Puff(s) Inhalation every 6 hours PRN  atorvastatin 80 milliGRAM(s) Oral at bedtime  Biotene Dry Mouth Oral Rinse 5 milliLiter(s) Swish and Spit daily  cyanocobalamin 1000 MICROGram(s) Oral daily  dextrose 5%. 1000 milliLiter(s) IV Continuous <Continuous>  dextrose 5%. 1000 milliLiter(s) IV Continuous <Continuous>  dextrose 50% Injectable 25 Gram(s) IV Push once  dextrose 50% Injectable 12.5 Gram(s) IV Push once  dextrose 50% Injectable 25 Gram(s) IV Push once  dextrose Oral Gel 15 Gram(s) Oral once PRN  dipyridamole 200 mG/aspirin 25 mG 1 Capsule(s) Oral two times a day  diVALproex  milliGRAM(s) Oral two times a day  escitalopram 5 milliGRAM(s) Oral daily  ferrous    sulfate 325 milliGRAM(s) Oral daily  finasteride 5 milliGRAM(s) Oral daily  glucagon  Injectable 1 milliGRAM(s) IntraMuscular once  insulin lispro (ADMELOG) corrective regimen sliding scale   SubCutaneous two times a day with meals  levothyroxine 75 MICROGram(s) Oral daily  melatonin 3 milliGRAM(s) Oral at bedtime  tamsulosin 0.8 milliGRAM(s) Oral at bedtime    A/P:    JARRED/CKD 3 in setting of hypotension, urinary retention   Avoid nephrotoxins  SONO: small R kidney, distended bladder, was unable to void  Continue mcclellan  Cr has improved and is presently close to baseline  F/u BMP   eval appr    [phone]

## 2022-04-14 NOTE — CHART NOTE - NSCHARTNOTEFT_GEN_A_CORE
NUTRITION FOLLOW UP    SOURCE: Patient [X)   Family [ ]    Medical Record (X)    Diet, Pureed:   Consistent Carbohydrate {No Snacks}  DASH/TLC {Sodium & Cholesterol Restricted}  Mildly Thick Liquids (MILDTHICKLIQS)  Supplement Feeding Modality:  Oral  Glucerna Shake Cans or Servings Per Day:  1       Frequency:  Three Times a day (04-14-22 @ 09:20) [Active]      Pt eligible to upgrade to minced & moist c nectar thick liquids pr discussion with SLP, however pt prefers to stay on puree diet; stating that minced & moist is too dry (despite extra sauce/gravy added at meals). PO intake fair, reports that he is sometimes too full to drink Glucerna; encouraged intake between meals. Pt would like to continue TID. Receptive to trial Magic Cup fortified ice cream instead. Encouraged adequate oral hydration, tolerating mild thick liquids much better than moderate. Will provide 2x nectar water at each meal. Denies GI distress, last BM 4/12.            CURRENT WEIGHT: 150.1 lbs (4/5)  Recommend obtain new weight     PERTINENT MEDS:   Pertinent Medications: MEDICATIONS  (STANDING):  atorvastatin 80 milliGRAM(s) Oral at bedtime  Biotene Dry Mouth Oral Rinse 5 milliLiter(s) Swish and Spit daily  cyanocobalamin 1000 MICROGram(s) Oral daily  dextrose 5%. 1000 milliLiter(s) (50 mL/Hr) IV Continuous <Continuous>  dextrose 5%. 1000 milliLiter(s) (100 mL/Hr) IV Continuous <Continuous>  dextrose 50% Injectable 25 Gram(s) IV Push once  dextrose 50% Injectable 12.5 Gram(s) IV Push once  dextrose 50% Injectable 25 Gram(s) IV Push once  dipyridamole 200 mG/aspirin 25 mG 1 Capsule(s) Oral two times a day  diVALproex  milliGRAM(s) Oral two times a day  escitalopram 5 milliGRAM(s) Oral daily  ferrous    sulfate 325 milliGRAM(s) Oral daily  finasteride 5 milliGRAM(s) Oral daily  glucagon  Injectable 1 milliGRAM(s) IntraMuscular once  insulin lispro (ADMELOG) corrective regimen sliding scale   SubCutaneous two times a day with meals  levothyroxine 75 MICROGram(s) Oral daily  melatonin 3 milliGRAM(s) Oral at bedtime  tamsulosin 0.8 milliGRAM(s) Oral at bedtime    MEDICATIONS  (PRN):  ALBUTerol    90 MICROgram(s) HFA Inhaler 2 Puff(s) Inhalation every 6 hours PRN Shortness of Breath and/or Wheezing  dextrose Oral Gel 15 Gram(s) Oral once PRN Blood Glucose LESS THAN 70 milliGRAM(s)/deciliter      PERTINENT LABS:  04-14 Na141 mmol/L Glu 90 mg/dL K+ 3.9 mmol/L Cr  2.01 mg/dL<H> BUN 25 mg/dL<H> 04-14 Alb 2.1 g/dL<L> 03-31 Chol 103 mg/dL LDL --    HDL 30 mg/dL<L> Trig 76 mg/dL    CAPILLARY BLOOD GLUCOSE      POCT Blood Glucose.: 97 mg/dL (14 Apr 2022 07:39)  POCT Blood Glucose.: 119 mg/dL (13 Apr 2022 16:58)      SKIN:  ecchymotic  EDEMA: none  LAST BM: 4/12    ESTIMATED NEEDS:   [X] no change since previous assessment  [ ] recalculated:     PREVIOUS NUTRITION DIAGNOSIS:    1. Severe malnutrition- addressed with Glucerna TID    NUTRITION DIAGNOSIS is :  (X)  Ongoing         NEW NUTRITION DIAGNOSIS: N/A    NUTRITION RECOMMENDATIONS:   1. Continue Glucerna TID  2. Trial Magic Cup BID with lunch/dinner  3. Obtain new weight  4. Diet consistency per SLP (pt prefers maintaining puree texture)    MONITORING AND EVALUATION:   1. Tolerance to diet prescription   2. PO intake  3. Weights  4. Labs  5. Follow Up per protocol     RD to remain available   Sheila Henson RDN
Nutrition Follow Up Note    Source: Medical Record [X] Patient [X] Family [ ]         Diet: Pureed, consistent carbohydrate, no snacks, moderately thick liquids, Glucerna TID  Pt with suboptimal PO intake due to dislike of puree consistency, dislikes moderately thick liquids so has not been drinking much fluids. Pt is currently on IV fluids (NaCl 0.9% @ 50 ml/hr). Spoke with pt's daughter via phone last week to obtain food preferences and discussed pureed + moderately thick consistency foods/fluids. Pt is receiving Magic Cup BID (provides 290 kcal, 9 g protein) to supplement diet, in additional to Glucerna (which provides 220 kcal, 10 g protein/serving).     Enteral/Parenteral Nutrition: N/A    Current Weight: 150.1 lbs (4/5)  Recommend obtaining new weight    Pertinent Medications: MEDICATIONS  (STANDING):  atorvastatin 80 milliGRAM(s) Oral at bedtime  Biotene Dry Mouth Oral Rinse 5 milliLiter(s) Swish and Spit daily  calcitriol   Capsule 0.25 MICROGram(s) Oral daily  cyanocobalamin 1000 MICROGram(s) Oral daily  dextrose 5%. 1000 milliLiter(s) (50 mL/Hr) IV Continuous <Continuous>  dextrose 5%. 1000 milliLiter(s) (100 mL/Hr) IV Continuous <Continuous>  dextrose 50% Injectable 25 Gram(s) IV Push once  dextrose 50% Injectable 12.5 Gram(s) IV Push once  dextrose 50% Injectable 25 Gram(s) IV Push once  dipyridamole 200 mG/aspirin 25 mG 1 Capsule(s) Oral two times a day  diVALproex  milliGRAM(s) Oral two times a day  ferrous    sulfate 325 milliGRAM(s) Oral daily  glucagon  Injectable 1 milliGRAM(s) IntraMuscular once  heparin   Injectable 5000 Unit(s) SubCutaneous every 8 hours  insulin lispro (ADMELOG) corrective regimen sliding scale   SubCutaneous two times a day with meals  levothyroxine 75 MICROGram(s) Oral daily  melatonin 3 milliGRAM(s) Oral at bedtime  sodium chloride 0.9%. 1000 milliLiter(s) (50 mL/Hr) IV Continuous <Continuous>  tamsulosin 0.4 milliGRAM(s) Oral at bedtime    MEDICATIONS  (PRN):  ALBUTerol    90 MICROgram(s) HFA Inhaler 2 Puff(s) Inhalation every 6 hours PRN Shortness of Breath and/or Wheezing  dextrose Oral Gel 15 Gram(s) Oral once PRN Blood Glucose LESS THAN 70 milliGRAM(s)/deciliter      Pertinent Labs:  04-11 Na144 mmol/L Glu 93 mg/dL K+ 4.0 mmol/L Cr  1.96 mg/dL<H> BUN 30 mg/dL<H> 04-05 Phos 3.4 mg/dL 04-11 Alb 2.1 g/dL<L> 03-31 Chol 103 mg/dL LDL --    HDL 30 mg/dL<L> Trig 76 mg/dL        Skin: Bruised Per nursing flowsheets     Edema: No edema per nursing flow sheets     Last BM: on 4/10 Per nursing flowsheets     Estimated Needs:   [X] No Change since Previous Assessment  [ ] Recalculated:     Previous Nutrition Diagnosis:   Severe malnutrition      Nutrition Diagnosis is [X] Ongoing  - addressed with Glucerna TID, Magic Cup BID, food preferences obtained.       New Nutrition Diagnosis: [X] Not Applicable  [ ] Inadequate Protein Energy Intake   [ ] Inadequate Oral Intake   [ ] Excessive Energy Intake   [ ] Increased Nutrient Needs   [ ] Obesity   [ ] Altered GI Function   [ ] Unintended Weight Loss   [ ] Food & Nutrition Related Knowledge Deficit  [ ] Limited Adherence to nutrition related recommendations   [ ] Malnutrition      Interventions:   1. Recommend continuing with current plan of care  2. Follow SLP recommendations  3. Encourage PO intake    Monitoring & Evaluation:   [X] Weights   [X] PO Intake   [X] Follow Up (Per Protocol)  [X] Tolerance to Diet Prescription   [X] Other: Labs    RD Remains Available.  Leslie Liriano RD
REHABILITATION DIAGNOSIS/IMPAIRMENT GROUP CODE:    Stroke    COMORBIDITIES/COMPLICATING CONDITIONS IMPACTING REHABILITATION:  HEALTH ISSUES - PROBLEM Dx:    NSTEMI  CHF    PAST MEDICAL & SURGICAL HISTORY:  MI (myocardial infarction)  1992    HTN (hypertension)    HLD (hyperlipidemia)    Throat cancer  2011, treated with chemo, RT    Ventricular arrhythmia  s/p AICD    Diabetes  Type2, not on any meds since weight loss after throat Ca    Renal insufficiency  s/p chemo    CAD (coronary artery disease)    Inguinal hernia, left    H/O prior ablation treatment  VT, 2009    Cardiac defibrillator in place  - 2009, battery change 2017    S/P left inguinal hernia repair  2018 at [facility]        Based upon consideration of the patient's impairments, functional status, complicating conditions and any other contributing factors and after information garnered from the assessments of all therapy disciplines involved in treating the patient and other pertinent clinicians:    INTERDISCIPLINARY REHABILITATION INTERVENTIONS:    [ X  ] Transfer Training  [X   ] Bed Mobility  [ X  ] Therapeutic Exercise  [ X  ] Balance/Coordination Exercises  [ X  ] Locomotion retraining  [ X  ] Stairs  [ X  ] Functional Transfer Training  [ X  ] Bowel/Bladder program  [  X ] Pain Management  [ X  ] Skin/Wound Care  [X   ] Visual/Perceptual Training  [X   ] Therapeutic Recreation Activities  [  X ] Neuromuscular Re-education  [  X ] Activities of Daily Living  [ X  ] Speech Exercise  [  X ] Swallowing Exercises  [   ] Vital Stim  [  X ] Dietary Supplements  [   ] Calorie Count  [X   ] Cognitive Exercises  [  X ] Cognitive-linguistic Treatment  [   ] Behavior Program  [  X ] Neuropsych Therapy  [ X  ] Patient/Family Counseling  [ X  ] Family Training  [ X  ] Community Re-entry  [   ] Orthotic Evaluation  [   ] Prosthetic Eval/Training    MEDICAL PROGNOSIS:  Good     REHAB POTENTIAL:  Good     EXPECTED DAILY THERAPY:         PT: 1hr/day       OT: 1hr/day       ST: 1hr/day        EXPECTED INTENSITY OF PROGRAM:  3 hrs/day    EXPECTED FREQUENCY OF PROGRAM:  5 days/week    ESTIMATED LOS:  10-14 days    ESTIMATED DISPOSITION:  home vs. DOMINIQUE    INTERDISCIPLINARY FUNCTIONAL OUTCOMES/GOALS:           Gait/Mobility: Supervision        Transfers: Supervision       ADLs: Supervision       Functional Transfers: Min Assist       Medication Management: Supervision        Communication: Supervision       Cognitive: Min Assist       Dysphagia: Supervision       Bladder: Supervision       Bowel: Supervision

## 2022-04-14 NOTE — PROGRESS NOTE ADULT - SUBJECTIVE AND OBJECTIVE BOX
SUBJECTIVE/ROS: He was noted to have tea colored urine in mcclellan bag this afternoon. He is extremely nervous/anxious regarding having mcclellan removed. Spent about 45 minutes reviewed all medical concerns with patient and family.   Denies chest pain, fever, chills, nausea, vomiting, abdominal pain, headache, or BLE pain.     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 As per patient, he was watching a movie when he felt very short of breath suddenly and dropped to the floor, denies LOC and denies any trauma to his head, was on the floor for ~2 hours. Patient was unable to rise from the floor by himself due to his left arm weakness. When first brought to the ED, patient was hypoxic and hypertensive o2 saturation in EMS was >80%. While in the ED, patient had episode of seizure-like activity in his b/l upper extremities which was controlled w/ ativan. Patient was seen and examined at bedside, BiPAP still in place, patient was still mildly lethargic from ativan and SOB with bipap mask on. Patient able to open eyes and nod/shake head to questions. He was admitted for pulmonary edema and found to have leukocytosis, JARRED on CKD, elevated troponin/NSTEMI. He was started on depakote for seizures and heparin gtt and aggrenox. Repeat CTH 3/31 demonstrated infarct in R perirolandic cortex. Hospital course further complicated by RUL PNA on CT chest; however leukocytosis resolved and ID recommended monitoring off abx. Patient was evaluated by Palliative for GOC discussion and pt was made DNR with trial of intubation. He clinically improved over the course of his hospitalization. Patient was evaluated by therapy for functional deficits and gait/ ADL impairments and recommended acute rehabilitation. Patient was medically optimized for discharge to [facility] on . (2022 16:18)      PAST MEDICAL & SURGICAL HISTORY:  MI (myocardial infarction)      HTN (hypertension)    HLD (hyperlipidemia)    Throat cancer  , treated with chemo, RT    Ventricular arrhythmia  s/p AICD    Diabetes  Type2, not on any meds since weight loss after throat Ca    Renal insufficiency  s/p chemo    CAD (coronary artery disease)    Inguinal hernia, left    H/O prior ablation treatment  VT,     Cardiac defibrillator in place  - , battery change     S/P left inguinal hernia repair  2018 at [facility]        Allergies    No Known Allergies    Intolerances          PHYSICAL EXAM    Vital Signs Last 24 Hrs  T(C): 36.8 (2022 07:48), Max: 36.8 (2022 07:48)  T(F): 98.3 (2022 07:48), Max: 98.3 (2022 07:48)  HR: 82 (2022 07:48) (79 - 82)  BP: 106/54 (2022 07:48) (106/54 - 153/86)  BP(mean): --  RR: 15 (2022 07:48) (15 - 16)  SpO2: 94% (2022 07:48) (94% - 96%)  Daily     Daily       PHYSICAL EXAM  Constitutional - NAD, Comfortable  HEENT - NCAT, EOMI  Neck - Supple, No limited ROM  Chest - CTA bilaterally  Cardiovascular - RRR, S1S2  Abdomen -BS+, Soft, NTND  Extremities - No C/C/E, No calf tenderness   Neurologic Exam - no new focal deficits    RECENT LABS:                          9.7    7.68  )-----------( 196      ( 2022 06:00 )             30.6     04-14    141  |  106  |  25<H>  ----------------------------<  90  3.9   |  25  |  2.01<H>    Ca    9.1      2022 06:00    TPro  5.7<L>  /  Alb  2.1<L>  /  TBili  0.3  /  DBili  x   /  AST  30  /  ALT  35  /  AlkPhos  63  04-14    LIVER FUNCTIONS - ( 2022 06:00 )  Alb: 2.1 g/dL / Pro: 5.7 g/dL / ALK PHOS: 63 U/L / ALT: 35 U/L / AST: 30 U/L / GGT: x             Urinalysis Basic - ( 2022 18:10 )    Color: Yellow / Appearance: Clear / S.015 / pH: x  Gluc: x / Ketone: Negative  / Bili: Negative / Urobili: Negative   Blood: x / Protein: 100 / Nitrite: Positive   Leuk Esterase: Small / RBC: 11-25 /HPF / WBC 11-25 /HPF   Sq Epi: x / Non Sq Epi: Neg.-Few / Bacteria: Moderate /HPF          CAPILLARY BLOOD GLUCOSE      POCT Blood Glucose.: 97 mg/dL (2022 07:39)  POCT Blood Glucose.: 119 mg/dL (2022 16:58)      MEDICATIONS  (STANDING):  atorvastatin 80 milliGRAM(s) Oral at bedtime  Biotene Dry Mouth Oral Rinse 5 milliLiter(s) Swish and Spit daily  cyanocobalamin 1000 MICROGram(s) Oral daily  dextrose 5%. 1000 milliLiter(s) (50 mL/Hr) IV Continuous <Continuous>  dextrose 5%. 1000 milliLiter(s) (100 mL/Hr) IV Continuous <Continuous>  dextrose 50% Injectable 25 Gram(s) IV Push once  dextrose 50% Injectable 12.5 Gram(s) IV Push once  dextrose 50% Injectable 25 Gram(s) IV Push once  dipyridamole 200 mG/aspirin 25 mG 1 Capsule(s) Oral two times a day  diVALproex  milliGRAM(s) Oral two times a day  escitalopram 5 milliGRAM(s) Oral daily  ferrous    sulfate 325 milliGRAM(s) Oral daily  finasteride 5 milliGRAM(s) Oral daily  glucagon  Injectable 1 milliGRAM(s) IntraMuscular once  insulin lispro (ADMELOG) corrective regimen sliding scale   SubCutaneous two times a day with meals  levothyroxine 75 MICROGram(s) Oral daily  melatonin 3 milliGRAM(s) Oral at bedtime  tamsulosin 0.8 milliGRAM(s) Oral at bedtime    MEDICATIONS  (PRN):  ALBUTerol    90 MICROgram(s) HFA Inhaler 2 Puff(s) Inhalation every 6 hours PRN Shortness of Breath and/or Wheezing  dextrose Oral Gel 15 Gram(s) Oral once PRN Blood Glucose LESS THAN 70 milliGRAM(s)/deciliter

## 2022-04-14 NOTE — PROGRESS NOTE ADULT - ASSESSMENT
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 Found to have R central sulcus MCA territory infarct on repeat CTH. Hospital course complicated by leukocytosis, JARRED on CKD. Admitted to [facility] acute inpatient rehab on 4/5 for ADL, gait, and functional impairments.     # s/p R central Sulcus CVA  - Comprehensive Multidisciplinary Rehab Program: 3 hours a day, 5 days a week with PT/OT/SLP  - Unable to obtain MRI due to ICD  - Continue aggrenox  - Continue atorvastatin  - Seizure management as below  - Start Lexapro 5mg daily (4/14)    # Seizures  - I/s/o R central sulcus infarct  - Continue Depakote 500mg BID for 6 months per neuro    # NSTEMI  # HFrEF  # HTN  - S/p heparin gtt, continue aggrenox  - Course c/b acute pulmonary edema s/p lasix x2 3/30. Diurese PRN  - TTE with severe systolic dysfunction 30-35%  - Continue statin  - Holding home imdur and coreg. Hold ACEI i/s/o JARRED on CKD  - Hold Metroprolol ER 50mg and Hydralazine 5mg TID due to hypotension  -Encourage oral intake  -Given fluid bolus and now on continues IVF to promote hydration  -Cardiology consult appreciated     # JARRED    - Cr near baseline of 2.0-2.1  -Cr 1.96 (4/11) --> 1.86 (4/12)   -Received total of 2L of fluids IV  - Avoid nephrotoxic medications; holding ACEI  - Nephrology is following  - Spoke with  wife- she was able convince patient to accept IV hydration as recommended  - Psychiatry evaluation - for capacity and Depression -case discussed  - Medicine is following, case discussed      #Type 2 Diabetes Mellitus  - A1c 8.3  - Continue SSI  -Not on medication at home  - Hospitalist consult appreciated    #Tonsillar Cancer #Dysphagia  - Seen by ENT recently- reconsult pending  - puree/mildly thick - upgraded to minced/moist and mild thick   - Residual L hearing loss    #Hypothyroid  -Continue synthroid 75mcg daily    #Urinary retention  -Continue flomax 0.8mg daily  -Continue finasteride 5mg daily   -urology consult appreciated and recommended continue with mcclellan due to hematuria   -TOV on 4/6 at midnight - able to void with small residual but Mcclellan reinserted due to continued retention  -Monitor UO    # Sleep:  - Melatonin qHS    # Pain Management:  - Tylenol PRN    # GI/Bowel:  - Senna QHS    # Diet:   - Diet Consistency/Modifications: Puree (patient's preference) and mild thick  - Aspiration Precautions  - SLP consult for swallow function evaluation and treatment    # DVT ppx:  - HSQ    # Restrictions/Precautions:  - Precautions: Falls, aspiration, cardiac    ---------------  Outpatient Follow-up:    CHANCE FRANCO  Internal Medicine  [address] [place] [address], [facility] [address]  Phone: ([phone]  Fax: [phone]  Follow Up Time: 1-3 days    Chet Garcia)  Cardiovascular Disease; Internal Medicine  [facility], [address], [address]  [place] [address]  Phone: [phone]  Fax: [phone]  Follow Up Time: 1-3 days    Brandon Harrison)  Urology  [address] [place] [address], [address]  [place] [address]  Phone: [phone]  Fax: [phone]  Follow Up Time: 1-3 days    Yenifer Faulkner)  Neurology  [address] [place] [address]  Phone: [phone]  Fax: [phone]    TEAM MEETING 4/11/22  SW:  Lives in lifted home with 11 stairs to enter, wife works, children unable to help  OT: SV for eat/groom, min for uBD, mod for transfers and LBD  PT: SV for amb and transfers, walks with cane 150', 12 steps with SV  SLP: MBS 4/11, weak swallow, upgrade to mild thick can use compensatory methods   barriers: left inattention, dec strength  goals: mod I for adls, sv for transfers and ambulation  EDOD: Home with assistance 4/19

## 2022-04-15 LAB
GLUCOSE BLDC GLUCOMTR-MCNC: 115 MG/DL — HIGH (ref 70–99)
GLUCOSE BLDC GLUCOMTR-MCNC: 128 MG/DL — HIGH (ref 70–99)

## 2022-04-15 PROCEDURE — 99232 SBSQ HOSP IP/OBS MODERATE 35: CPT

## 2022-04-15 RX ADMIN — ATORVASTATIN CALCIUM 80 MILLIGRAM(S): 80 TABLET, FILM COATED ORAL at 21:55

## 2022-04-15 RX ADMIN — ESCITALOPRAM OXALATE 5 MILLIGRAM(S): 10 TABLET, FILM COATED ORAL at 12:15

## 2022-04-15 RX ADMIN — Medication 5 MILLILITER(S): at 12:14

## 2022-04-15 RX ADMIN — TAMSULOSIN HYDROCHLORIDE 0.8 MILLIGRAM(S): 0.4 CAPSULE ORAL at 21:55

## 2022-04-15 RX ADMIN — FINASTERIDE 5 MILLIGRAM(S): 5 TABLET, FILM COATED ORAL at 12:15

## 2022-04-15 RX ADMIN — DIVALPROEX SODIUM 500 MILLIGRAM(S): 500 TABLET, DELAYED RELEASE ORAL at 05:07

## 2022-04-15 RX ADMIN — Medication 325 MILLIGRAM(S): at 12:15

## 2022-04-15 RX ADMIN — DIVALPROEX SODIUM 500 MILLIGRAM(S): 500 TABLET, DELAYED RELEASE ORAL at 17:34

## 2022-04-15 RX ADMIN — ASPIRIN AND DIPYRIDAMOLE 1 CAPSULE(S): 25; 200 CAPSULE, EXTENDED RELEASE ORAL at 17:34

## 2022-04-15 RX ADMIN — Medication 3 MILLIGRAM(S): at 21:55

## 2022-04-15 RX ADMIN — ASPIRIN AND DIPYRIDAMOLE 1 CAPSULE(S): 25; 200 CAPSULE, EXTENDED RELEASE ORAL at 05:07

## 2022-04-15 RX ADMIN — Medication 75 MICROGRAM(S): at 05:07

## 2022-04-15 RX ADMIN — PREGABALIN 1000 MICROGRAM(S): 225 CAPSULE ORAL at 12:14

## 2022-04-15 NOTE — PROGRESS NOTE ADULT - ASSESSMENT
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 Found to have R central sulcus MCA territory infarct on repeat CTH. Hospital course complicated by leukocytosis, JARRED on CKD. Admitted to [facility] acute inpatient rehab on 4/5 for ADL, gait, and functional impairments.     # s/p R central Sulcus CVA  - Comprehensive Multidisciplinary Rehab Program: 3 hours a day, 5 days a week with PT/OT/SLP  - Unable to obtain MRI due to ICD  - Continue aggrenox  - Continue atorvastatin  - Seizure management as below  - Start Lexapro 5mg daily (4/14)    # Seizures  - I/s/o R central sulcus infarct  - Continue Depakote 500mg BID for 6 months per neuro    # NSTEMI  # HFrEF  # HTN  - S/p heparin gtt, continue aggrenox  - Course c/b acute pulmonary edema s/p lasix x 2 3/30. Diurese PRN  - TTE with severe systolic dysfunction 30-35%  - Continue statin  - Holding home imdur and coreg. Hold ACEI i/s/o JARRED on CKD  - Hold Metroprolol ER 50mg and Hydralazine 5mg TID due to hypotension  -Encourage oral intake  -Given fluid bolus and now on continues IVF to promote hydration  -Cardiology consult appreciated     # JARRED    - Cr near baseline of 2.0-2.1  - Cr 1.96 (4/11) --> 1.86 (4/12)   - Received total of 2L of fluids IV  - Avoid nephrotoxic medications; holding ACEI  - Nephrology is following  - Psychiatry evaluation - for capacity and Depression -case discussed  - Medicine is following, case discussed      #Type 2 Diabetes Mellitus  - A1c 8.3  - Continue SSI  - Not on medication at home  - Hospitalist consult appreciated    #Tonsillar Cancer #Dysphagia  - Seen by ENT recently- reconsult pending  - puree/mildly thick - upgraded to minced/moist and mild thick   - Residual L hearing loss    #Hypothyroid  -Continue synthroid 75mcg daily    #Urinary retention  -Continue flomax 0.8mg daily  -Continue finasteride 5mg daily   -urology consult appreciated and recommended continue with mcclellan due to hematuria   -TOV on 4/6 at midnight - able to void with small residual but Mcclellan reinserted due to continued retention  -Monitor UO    # Sleep:  - Melatonin qHS    # Pain Management:  - Tylenol PRN    # GI/Bowel:  - Senna QHS    # Diet:   - Diet Consistency/Modifications: Puree (patient's preference) and mild thick  - Aspiration Precautions  - SLP consult for swallow function evaluation and treatment    # DVT ppx:  - HSQ    # Restrictions/Precautions:  - Precautions: Falls, aspiration, cardiac  ---------------  Outpatient Follow-up:    CHANCE FRANCO  Internal Medicine  [address] [place] [address], [facility] [address]  Phone: ([phone]  Fax: [phone]  Follow Up Time: 1-3 days    Chet Garcia)  Cardiovascular Disease; Internal Medicine  [facility], [address] [place] [address], [address] [place] [address]  Phone: [phone]  Fax: [phone]  Follow Up Time: 1-3 days    Brandon Harrison)  Urology  [address] [place] [address], [address]  [place] [address]  Phone: [phone]  Fax: [phone]  Follow Up Time: 1-3 days    Yenifer Faulkner)  Neurology  [address] [place] [address]  Phone: [phone]  Fax: [phone]    TEAM MEETING 4/11/22  SW:  Lives in lifted home with 11 stairs to enter, wife works, children unable to help  OT: SV for eat/groom, min for uBD, mod for transfers and LBD  PT: SV for amb and transfers, walks with cane 150', 12 steps with SV  SLP: MBS 4/11, weak swallow, upgrade to mild thick can use compensatory methods   barriers: left inattention, dec strength  goals: mod I for adls, sv for transfers and ambulation  EDOD: Home with assistance 4/19

## 2022-04-15 NOTE — PROGRESS NOTE ADULT - SUBJECTIVE AND OBJECTIVE BOX
Comfortable this AM  Hillman in place and urine clear  On BPH meds      ROS  General: no fever or chills    VITAL SIGNS  Vital Signs Last 24 Hrs  T(C): 36.9 (15 Apr 2022 07:35), Max: 36.9 (15 Apr 2022 07:35)  T(F): 98.4 (15 Apr 2022 07:35), Max: 98.4 (15 Apr 2022 07:35)  HR: 85 (15 Apr 2022 14:19) (64 - 92)  BP: 102/64         PHYSICAL EXAM:  General: awake and alert  Abdomen: soft, NT  : Hillman clear      LABS:                        9.7    7.68  )-----------( 196      ( 2022 06:00 )             30.6     04-14    141  |  106  |  25<H>  ----------------------------<  90  3.9   |  25  |  2.01<H>    Ca    9.1      2022 06:00    TPro  5.7<L>  /  Alb  2.1<L>  /  TBili  0.3  /  DBili  x   /  AST  30  /  ALT  35  /  AlkPhos  63  04-14    Urinalysis Basic - ( 2022 18:10 )    Color: Yellow / Appearance: Clear / S.015 / pH: x  Gluc: x / Ketone: Negative  / Bili: Negative / Urobili: Negative   Blood: x / Protein: 100 / Nitrite: Positive   Leuk Esterase: Small / RBC: 11-25 /HPF / WBC 11-25 /HPF   Sq Epi: x / Non Sq Epi: Neg.-Few / Bacteria: Moderate /HPF        Prior notes/chart reviewed.

## 2022-04-15 NOTE — PROGRESS NOTE ADULT - SUBJECTIVE AND OBJECTIVE BOX
SUBJECTIVE/ROS: He was examined at bedside this morning. He slept well. Feels like he is having loose stools. His urine was lighter in color today than previous day. Hillman connected to leg bag.  Denies chest pain, fever, chills, nausea, vomiting, abdominal pain, headache, or BLE pain.     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 As per patient, he was watching a movie when he felt very short of breath suddenly and dropped to the floor, denies LOC and denies any trauma to his head, was on the floor for ~2 hours. Patient was unable to rise from the floor by himself due to his left arm weakness. When first brought to the ED, patient was hypoxic and hypertensive o2 saturation in EMS was >80%. While in the ED, patient had episode of seizure-like activity in his b/l upper extremities which was controlled w/ ativan. Patient was seen and examined at bedside, BiPAP still in place, patient was still mildly lethargic from ativan and SOB with bipap mask on. Patient able to open eyes and nod/shake head to questions. He was admitted for pulmonary edema and found to have leukocytosis, JARRED on CKD, elevated troponin/NSTEMI. He was started on depakote for seizures and heparin gtt and aggrenox. Repeat CTH 3/31 demonstrated infarct in R perirolandic cortex. Hospital course further complicated by RUL PNA on CT chest; however leukocytosis resolved and ID recommended monitoring off abx. Patient was evaluated by Palliative for GOC discussion and pt was made DNR with trial of intubation. He clinically improved over the course of his hospitalization. Patient was evaluated by therapy for functional deficits and gait/ ADL impairments and recommended acute rehabilitation. Patient was medically optimized for discharge to [facility] on . (2022 16:18)      PAST MEDICAL & SURGICAL HISTORY:  MI (myocardial infarction)      HTN (hypertension)    HLD (hyperlipidemia)    Throat cancer  , treated with chemo, RT    Ventricular arrhythmia  s/p AICD    Diabetes  Type2, not on any meds since weight loss after throat Ca    Renal insufficiency  s/p chemo    CAD (coronary artery disease)    Inguinal hernia, left    H/O prior ablation treatment  VT,     Cardiac defibrillator in place  - , battery change     S/P left inguinal hernia repair  2018 at [place]        Allergies    No Known Allergies    Intolerances    PHYSICAL EXAM  Vital Signs Last 24 Hrs  T(C): 36.9 (15 Apr 2022 07:35), Max: 36.9 (15 Apr 2022 07:35)  T(F): 98.4 (15 Apr 2022 07:35), Max: 98.4 (15 Apr 2022 07:35)  HR: 64 (15 Apr 2022 07:35) (64 - 92)  BP: 112/65 (15 Apr 2022 07:35) (112/65 - 155/72)  BP(mean): --  RR: 16 (15 Apr 2022 07:35) (16 - 16)  SpO2: 94% (15 Apr 2022 07:35) (94% - 97%)         Daily       PHYSICAL EXAM  Constitutional - NAD, Comfortable  HEENT - NCAT, EOMI  Neck - Supple, No limited ROM  Chest - CTA bilaterally  Cardiovascular - RRR, S1S2  Abdomen -BS+, Soft, NTND  - Hillman  Extremities - No C/C/E, No calf tenderness   Neurologic Exam - no new focal deficits    RECENT LABS:                      9.7    7.68  )-----------( 196      ( 2022 06:00 )             30.6     04-14    141  |  106  |  25<H>  ----------------------------<  90  3.9   |  25  |  2.01<H>    Ca    9.1      2022 06:00    TPro  5.7<L>  /  Alb  2.1<L>  /  TBili  0.3  /  DBili  x   /  AST  30  /  ALT  35  /  AlkPhos  63  04-14    LIVER FUNCTIONS - ( 2022 06:00 )  Alb: 2.1 g/dL / Pro: 5.7 g/dL / ALK PHOS: 63 U/L / ALT: 35 U/L / AST: 30 U/L / GGT: x             Urinalysis Basic - ( 2022 18:10 )    Color: Yellow / Appearance: Clear / S.015 / pH: x  Gluc: x / Ketone: Negative  / Bili: Negative / Urobili: Negative   Blood: x / Protein: 100 / Nitrite: Positive   Leuk Esterase: Small / RBC: 11-25 /HPF / WBC 11-25 /HPF   Sq Epi: x / Non Sq Epi: Neg.-Few / Bacteria: Moderate /HPF    CAPILLARY BLOOD GLUCOSE      POCT Blood Glucose.: 97 mg/dL (2022 07:39)  POCT Blood Glucose.: 119 mg/dL (2022 16:58)    MEDICATIONS  (STANDING):  atorvastatin 80 milliGRAM(s) Oral at bedtime  Biotene Dry Mouth Oral Rinse 5 milliLiter(s) Swish and Spit daily  cyanocobalamin 1000 MICROGram(s) Oral daily  dextrose 5%. 1000 milliLiter(s) (50 mL/Hr) IV Continuous <Continuous>  dextrose 5%. 1000 milliLiter(s) (100 mL/Hr) IV Continuous <Continuous>  dextrose 50% Injectable 25 Gram(s) IV Push once  dextrose 50% Injectable 12.5 Gram(s) IV Push once  dextrose 50% Injectable 25 Gram(s) IV Push once  dipyridamole 200 mG/aspirin 25 mG 1 Capsule(s) Oral two times a day  diVALproex  milliGRAM(s) Oral two times a day  escitalopram 5 milliGRAM(s) Oral daily  ferrous    sulfate 325 milliGRAM(s) Oral daily  finasteride 5 milliGRAM(s) Oral daily  glucagon  Injectable 1 milliGRAM(s) IntraMuscular once  insulin lispro (ADMELOG) corrective regimen sliding scale   SubCutaneous two times a day with meals  levothyroxine 75 MICROGram(s) Oral daily  melatonin 3 milliGRAM(s) Oral at bedtime  tamsulosin 0.8 milliGRAM(s) Oral at bedtime    MEDICATIONS  (PRN):  ALBUTerol    90 MICROgram(s) HFA Inhaler 2 Puff(s) Inhalation every 6 hours PRN Shortness of Breath and/or Wheezing  dextrose Oral Gel 15 Gram(s) Oral once PRN Blood Glucose LESS THAN 70 milliGRAM(s)/deciliter       SUBJECTIVE/ROS: He was examined at bedside this morning. He slept well. Feels like he is having loose stools. His urine is yellow in color today. Hillman connected to leg bag.  Denies chest pain, fever, chills, nausea, vomiting, abdominal pain, headache, or BLE pain.     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 As per patient, he was watching a movie when he felt very short of breath suddenly and dropped to the floor, denies LOC and denies any trauma to his head, was on the floor for ~2 hours. Patient was unable to rise from the floor by himself due to his left arm weakness. When first brought to the ED, patient was hypoxic and hypertensive o2 saturation in EMS was >80%. While in the ED, patient had episode of seizure-like activity in his b/l upper extremities which was controlled w/ ativan. Patient was seen and examined at bedside, BiPAP still in place, patient was still mildly lethargic from ativan and SOB with bipap mask on. Patient able to open eyes and nod/shake head to questions. He was admitted for pulmonary edema and found to have leukocytosis, JARRED on CKD, elevated troponin/NSTEMI. He was started on depakote for seizures and heparin gtt and aggrenox. Repeat CTH 3/31 demonstrated infarct in R perirolandic cortex. Hospital course further complicated by RUL PNA on CT chest; however leukocytosis resolved and ID recommended monitoring off abx. Patient was evaluated by Palliative for GOC discussion and pt was made DNR with trial of intubation. He clinically improved over the course of his hospitalization. Patient was evaluated by therapy for functional deficits and gait/ ADL impairments and recommended acute rehabilitation. Patient was medically optimized for discharge to [facility] on . (2022 16:18)      PHYSICAL EXAM  Vital Signs Last 24 Hrs  T(C): 36.9 (15 Apr 2022 07:35), Max: 36.9 (15 Apr 2022 07:35)  T(F): 98.4 (15 Apr 2022 07:35), Max: 98.4 (15 Apr 2022 07:35)  HR: 64 (15 Apr 2022 07:35) (64 - 92)  BP: 112/65 (15 Apr 2022 07:35) (112/65 - 155/72)  BP(mean): --  RR: 16 (15 Apr 2022 07:35) (16 - 16)  SpO2: 94% (15 Apr 2022 07:35) (94% - 97%)         Daily       PHYSICAL EXAM  Constitutional - NAD, Comfortable  HEENT - NCAT, EOMI  Neck - Supple, No limited ROM  Chest - CTA bilaterally  Cardiovascular - RRR, S1S2  Abdomen -BS+, Soft, NTND  - Hillman  Extremities - No C/C/E, No calf tenderness   Neurologic Exam - no new focal deficits    RECENT LABS:                      9.7    7.68  )-----------( 196      ( 2022 06:00 )             30.6     04-14    141  |  106  |  25<H>  ----------------------------<  90  3.9   |  25  |  2.01<H>    Ca    9.1      2022 06:00    TPro  5.7<L>  /  Alb  2.1<L>  /  TBili  0.3  /  DBili  x   /  AST  30  /  ALT  35  /  AlkPhos  63  04-14    LIVER FUNCTIONS - ( 2022 06:00 )  Alb: 2.1 g/dL / Pro: 5.7 g/dL / ALK PHOS: 63 U/L / ALT: 35 U/L / AST: 30 U/L / GGT: x             Urinalysis Basic - ( 2022 18:10 )    Color: Yellow / Appearance: Clear / S.015 / pH: x  Gluc: x / Ketone: Negative  / Bili: Negative / Urobili: Negative   Blood: x / Protein: 100 / Nitrite: Positive   Leuk Esterase: Small / RBC: 11-25 /HPF / WBC 11-25 /HPF   Sq Epi: x / Non Sq Epi: Neg.-Few / Bacteria: Moderate /HPF    CAPILLARY BLOOD GLUCOSE      POCT Blood Glucose.: 97 mg/dL (2022 07:39)  POCT Blood Glucose.: 119 mg/dL (2022 16:58)    MEDICATIONS  (STANDING):  atorvastatin 80 milliGRAM(s) Oral at bedtime  Biotene Dry Mouth Oral Rinse 5 milliLiter(s) Swish and Spit daily  cyanocobalamin 1000 MICROGram(s) Oral daily  dextrose 5%. 1000 milliLiter(s) (50 mL/Hr) IV Continuous <Continuous>  dextrose 5%. 1000 milliLiter(s) (100 mL/Hr) IV Continuous <Continuous>  dextrose 50% Injectable 25 Gram(s) IV Push once  dextrose 50% Injectable 12.5 Gram(s) IV Push once  dextrose 50% Injectable 25 Gram(s) IV Push once  dipyridamole 200 mG/aspirin 25 mG 1 Capsule(s) Oral two times a day  diVALproex  milliGRAM(s) Oral two times a day  escitalopram 5 milliGRAM(s) Oral daily  ferrous    sulfate 325 milliGRAM(s) Oral daily  finasteride 5 milliGRAM(s) Oral daily  glucagon  Injectable 1 milliGRAM(s) IntraMuscular once  insulin lispro (ADMELOG) corrective regimen sliding scale   SubCutaneous two times a day with meals  levothyroxine 75 MICROGram(s) Oral daily  melatonin 3 milliGRAM(s) Oral at bedtime  tamsulosin 0.8 milliGRAM(s) Oral at bedtime    MEDICATIONS  (PRN):  ALBUTerol    90 MICROgram(s) HFA Inhaler 2 Puff(s) Inhalation every 6 hours PRN Shortness of Breath and/or Wheezing  dextrose Oral Gel 15 Gram(s) Oral once PRN Blood Glucose LESS THAN 70 milliGRAM(s)/deciliter

## 2022-04-15 NOTE — PROGRESS NOTE ADULT - ASSESSMENT
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 Found to have R central sulcus MCA territory infarct on repeat CTH. Hospital course complicated by leukocytosis, JARRED on CKD. Admitted to [facility] on 4/5 for ADL, gait, and functional impairments.     #s/p R central Sulcus CVA  #Seizure  #Debility  - Unable to obtain MRI due to ICD  - Continue aggrenox bid  - Continue atorvastatin 80mg qd  - Continue Depakote  - Continue comprehensive rehab program - PT/OT/SLP per rehab team  - Seizure, Fall precaution    # NSTEMI  # HFrEF  # HTN  - TTE LVEF 30% - 35% w/ severe systolic dysfunction  - Continue statin  - Continue aggrenox  - HF medications held in the setting of hypotension. Will need to restart for mortality benefit  - Cardiology consult    #JARRED on probable CKD Stage III - improving  #Urinary retention possibly due to neurogenic bladder from CVA vs. BPH  - Hillman per rehab, cont flomax - dc if hypotension persists  - Renal sono - CKD and a distended bladder  - Avoid nephrotoxic medications; holding ACEI, lasix prn    #Type 2 Diabetes Mellitus  - HbA1c 8.3  - Continue FS, ISS    #Tonsillar Cancer #Dysphagia  - Seen by ENT recent  - Continue puree/mildly thick. diet/nutrition follow up  - Residual L hearing loss    #Hypothyroidism  -Synthroid 75mcg qd    #DVT ppx - Aggrenox

## 2022-04-15 NOTE — PROGRESS NOTE ADULT - SUBJECTIVE AND OBJECTIVE BOX
Patient is a 76y old  Male who presents with a chief complaint of stroke (2022 14:34)      SUBJECTIVE / OVERNIGHT EVENTS:  Pt seen and examined at bedside. No acute events overnight.    Allergies    No Known Allergies    Intolerances        MEDICATIONS  (STANDING):  atorvastatin 80 milliGRAM(s) Oral at bedtime  Biotene Dry Mouth Oral Rinse 5 milliLiter(s) Swish and Spit daily  cyanocobalamin 1000 MICROGram(s) Oral daily  dextrose 5%. 1000 milliLiter(s) (50 mL/Hr) IV Continuous <Continuous>  dextrose 5%. 1000 milliLiter(s) (100 mL/Hr) IV Continuous <Continuous>  dextrose 50% Injectable 25 Gram(s) IV Push once  dextrose 50% Injectable 12.5 Gram(s) IV Push once  dextrose 50% Injectable 25 Gram(s) IV Push once  dipyridamole 200 mG/aspirin 25 mG 1 Capsule(s) Oral two times a day  diVALproex  milliGRAM(s) Oral two times a day  escitalopram 5 milliGRAM(s) Oral daily  ferrous    sulfate 325 milliGRAM(s) Oral daily  finasteride 5 milliGRAM(s) Oral daily  glucagon  Injectable 1 milliGRAM(s) IntraMuscular once  insulin lispro (ADMELOG) corrective regimen sliding scale   SubCutaneous two times a day with meals  levothyroxine 75 MICROGram(s) Oral daily  melatonin 3 milliGRAM(s) Oral at bedtime  tamsulosin 0.8 milliGRAM(s) Oral at bedtime    MEDICATIONS  (PRN):  ALBUTerol    90 MICROgram(s) HFA Inhaler 2 Puff(s) Inhalation every 6 hours PRN Shortness of Breath and/or Wheezing  dextrose Oral Gel 15 Gram(s) Oral once PRN Blood Glucose LESS THAN 70 milliGRAM(s)/deciliter      Vital Signs Last 24 Hrs  T(C): 36.9 (15 Apr 2022 07:35), Max: 36.9 (15 Apr 2022 07:35)  T(F): 98.4 (15 Apr 2022 07:35), Max: 98.4 (15 Apr 2022 07:35)  HR: 64 (15 Apr 2022 07:35) (64 - 92)  BP: 112/65 (15 Apr 2022 07:35) (112/65 - 155/72)  BP(mean): --  RR: 16 (15 Apr 2022 07:35) (16 - 16)  SpO2: 94% (15 Apr 2022 07:35) (94% - 97%)  CAPILLARY BLOOD GLUCOSE      POCT Blood Glucose.: 115 mg/dL (15 Apr 2022 07:37)  POCT Blood Glucose.: 117 mg/dL (2022 16:53)    I&O's Summary    2022 07:01  -  15 Apr 2022 07:00  --------------------------------------------------------  IN: 1255 mL / OUT: 1650 mL / NET: -395 mL    15 Apr 2022 07:01  -  15 Apr 2022 08:55  --------------------------------------------------------  IN: 0 mL / OUT: 200 mL / NET: -200 mL        PHYSICAL EXAM:  GENERAL: NAD, elderly male  HEAD:  Atraumatic, Normocephalic  NECK: Supple, No JVD  CHEST/LUNG: Clear to auscultation bilaterally; No wheeze, nonlabored breathing  HEART: Regular rate and rhythm, + systolic murmur  ABDOMEN: Soft, Nontender, Nondistended; Bowel sounds present  EXTREMITIES:  2+ Peripheral Pulses, No clubbing, cyanosis, or edema    LABS:                        9.7    7.68  )-----------( 196      ( 2022 06:00 )             30.6     04-14    141  |  106  |  25<H>  ----------------------------<  90  3.9   |  25  |  2.01<H>    Ca    9.1      2022 06:00    TPro  5.7<L>  /  Alb  2.1<L>  /  TBili  0.3  /  DBili  x   /  AST  30  /  ALT  35  /  AlkPhos  63  04-14          Urinalysis Basic - ( 2022 18:10 )    Color: Yellow / Appearance: Clear / S.015 / pH: x  Gluc: x / Ketone: Negative  / Bili: Negative / Urobili: Negative   Blood: x / Protein: 100 / Nitrite: Positive   Leuk Esterase: Small / RBC: 11-25 /HPF / WBC 11-25 /HPF   Sq Epi: x / Non Sq Epi: Neg.-Few / Bacteria: Moderate /HPF        RADIOLOGY & ADDITIONAL TESTS:  Results Reviewed:   Imaging Personally Reviewed:  Electrocardiogram Personally Reviewed:    COORDINATION OF CARE:  Care Discussed with Consultants/Other Providers [Y/N]:  Prior or Outpatient Records Reviewed [Y/N]:

## 2022-04-15 NOTE — PROGRESS NOTE ADULT - SUBJECTIVE AND OBJECTIVE BOX
No distress    Vital Signs Last 24 Hrs  T(C): 36.9 (04-15-22 @ 07:35), Max: 36.9 (04-15-22 @ 07:35)  T(F): 98.4 (04-15-22 @ 07:35), Max: 98.4 (04-15-22 @ 07:35)  HR: 64 (04-15-22 @ 07:35) (64 - 92)  BP: 112/65 (04-15-22 @ 07:35) (112/65 - 155/72)  RR: 16 (04-15-22 @ 07:35) (16 - 16)  SpO2: 94% (04-15-22 @ 07:35) (94% - 97%)    I&O's Detail    2022 07:01  -  15 Apr 2022 07:00  --------------------------------------------------------  IN:    Oral Fluid: 1255 mL  Total IN: 1255 mL    OUT:    Indwelling Catheter - Urethral (mL): 1650 mL  Total OUT: 1650 mL    Total NET: -395 mL    15 Apr 2022 07:01  -  15 Apr 2022 11:53  --------------------------------------------------------  OUT:    Indwelling Catheter - Urethral (mL): 200 mL  Total OUT: 200 mL    s1s2  b/l air entry  soft, ND  no edema                                                                        9.7    7.68  )-----------( 196      ( 2022 06:00 )             30.6     2022 06:00    141    |  106    |  25     ----------------------------<  90     3.9     |  25     |  2.01     Ca    9.1        2022 06:00    TPro  5.7    /  Alb  2.1    /  TBili  0.3    /  DBili  x      /  AST  30     /  ALT  35     /  AlkPhos  63     2022 06:00    LIVER FUNCTIONS - ( 2022 06:00 )  Alb: 2.1 g/dL / Pro: 5.7 g/dL / ALK PHOS: 63 U/L / ALT: 35 U/L / AST: 30 U/L / GGT: x           Urinalysis Basic - ( 2022 18:10 )    Color: Yellow / Appearance: Clear / S.015 / pH: x  Gluc: x / Ketone: Negative  / Bili: Negative / Urobili: Negative   Blood: x / Protein: 100 / Nitrite: Positive   Leuk Esterase: Small / RBC: 11-25 /HPF / WBC 11-25 /HPF   Sq Epi: x / Non Sq Epi: Neg.-Few / Bacteria: Moderate /HPF    ALBUTerol    90 MICROgram(s) HFA Inhaler 2 Puff(s) Inhalation every 6 hours PRN  atorvastatin 80 milliGRAM(s) Oral at bedtime  Biotene Dry Mouth Oral Rinse 5 milliLiter(s) Swish and Spit daily  cyanocobalamin 1000 MICROGram(s) Oral daily  dextrose 5%. 1000 milliLiter(s) IV Continuous <Continuous>  dextrose 5%. 1000 milliLiter(s) IV Continuous <Continuous>  dextrose 50% Injectable 25 Gram(s) IV Push once  dextrose 50% Injectable 12.5 Gram(s) IV Push once  dextrose 50% Injectable 25 Gram(s) IV Push once  dextrose Oral Gel 15 Gram(s) Oral once PRN  dipyridamole 200 mG/aspirin 25 mG 1 Capsule(s) Oral two times a day  diVALproex  milliGRAM(s) Oral two times a day  escitalopram 5 milliGRAM(s) Oral daily  ferrous    sulfate 325 milliGRAM(s) Oral daily  finasteride 5 milliGRAM(s) Oral daily  glucagon  Injectable 1 milliGRAM(s) IntraMuscular once  insulin lispro (ADMELOG) corrective regimen sliding scale   SubCutaneous two times a day with meals  levothyroxine 75 MICROGram(s) Oral daily  melatonin 3 milliGRAM(s) Oral at bedtime  tamsulosin 0.8 milliGRAM(s) Oral at bedtime    A/P:    JARRED/CKD 3 in setting of hypotension, urinary retention   Avoid nephrotoxins  SONO: small R kidney, distended bladder, was unable to void  Continue mcclellan  Cr has improved and is presently close to baseline  F/u BMP   eval appr    [phone]

## 2022-04-15 NOTE — PROGRESS NOTE ADULT - ASSESSMENT
Rehab s/p YRN Hillman for retention - now on Flomax / Proscar. Urine clear.    - continue BPH meds  - avoid constipation  - OK for void trial when progressing on rehab

## 2022-04-16 LAB
ANION GAP SERPL CALC-SCNC: 9 MMOL/L — SIGNIFICANT CHANGE UP (ref 5–17)
BUN SERPL-MCNC: 31 MG/DL — HIGH (ref 7–23)
CALCIUM SERPL-MCNC: 8.9 MG/DL — SIGNIFICANT CHANGE UP (ref 8.4–10.5)
CHLORIDE SERPL-SCNC: 103 MMOL/L — SIGNIFICANT CHANGE UP (ref 96–108)
CO2 SERPL-SCNC: 26 MMOL/L — SIGNIFICANT CHANGE UP (ref 22–31)
CREAT SERPL-MCNC: 2.25 MG/DL — HIGH (ref 0.5–1.3)
EGFR: 30 ML/MIN/1.73M2 — LOW
GLUCOSE BLDC GLUCOMTR-MCNC: 102 MG/DL — HIGH (ref 70–99)
GLUCOSE BLDC GLUCOMTR-MCNC: 124 MG/DL — HIGH (ref 70–99)
GLUCOSE SERPL-MCNC: 98 MG/DL — SIGNIFICANT CHANGE UP (ref 70–99)
POTASSIUM SERPL-MCNC: 4.2 MMOL/L — SIGNIFICANT CHANGE UP (ref 3.5–5.3)
POTASSIUM SERPL-SCNC: 4.2 MMOL/L — SIGNIFICANT CHANGE UP (ref 3.5–5.3)
SODIUM SERPL-SCNC: 138 MMOL/L — SIGNIFICANT CHANGE UP (ref 135–145)

## 2022-04-16 PROCEDURE — 99232 SBSQ HOSP IP/OBS MODERATE 35: CPT

## 2022-04-16 RX ADMIN — ASPIRIN AND DIPYRIDAMOLE 1 CAPSULE(S): 25; 200 CAPSULE, EXTENDED RELEASE ORAL at 05:46

## 2022-04-16 RX ADMIN — ESCITALOPRAM OXALATE 5 MILLIGRAM(S): 10 TABLET, FILM COATED ORAL at 11:26

## 2022-04-16 RX ADMIN — ASPIRIN AND DIPYRIDAMOLE 1 CAPSULE(S): 25; 200 CAPSULE, EXTENDED RELEASE ORAL at 18:06

## 2022-04-16 RX ADMIN — PREGABALIN 1000 MICROGRAM(S): 225 CAPSULE ORAL at 11:26

## 2022-04-16 RX ADMIN — FINASTERIDE 5 MILLIGRAM(S): 5 TABLET, FILM COATED ORAL at 11:26

## 2022-04-16 RX ADMIN — DIVALPROEX SODIUM 500 MILLIGRAM(S): 500 TABLET, DELAYED RELEASE ORAL at 18:06

## 2022-04-16 RX ADMIN — Medication 3 MILLIGRAM(S): at 21:38

## 2022-04-16 RX ADMIN — Medication 5 MILLILITER(S): at 12:48

## 2022-04-16 RX ADMIN — DIVALPROEX SODIUM 500 MILLIGRAM(S): 500 TABLET, DELAYED RELEASE ORAL at 05:46

## 2022-04-16 RX ADMIN — TAMSULOSIN HYDROCHLORIDE 0.8 MILLIGRAM(S): 0.4 CAPSULE ORAL at 21:38

## 2022-04-16 RX ADMIN — Medication 325 MILLIGRAM(S): at 11:26

## 2022-04-16 RX ADMIN — Medication 75 MICROGRAM(S): at 05:46

## 2022-04-16 RX ADMIN — ATORVASTATIN CALCIUM 80 MILLIGRAM(S): 80 TABLET, FILM COATED ORAL at 21:38

## 2022-04-16 NOTE — PROGRESS NOTE ADULT - SUBJECTIVE AND OBJECTIVE BOX
Patient is a 76y old  Male who presents with a chief complaint of rehab (15 Apr 2022 15:18)      HPI:  Patient is a 75 yo male w/ PMHx of HTN, HLD, HFrEF (EF 30% 2009), 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 As per patient, he was watching a movie when he felt very short of breath suddenly and dropped to the floor, denies LOC and denies any trauma to his head, was on the floor for ~2 hours. Patient was unable to rise from the floor by himself due to his left arm weakness. When first brought to the ED, patient was hypoxic and hypertensive o2 saturation in EMS was >80%. While in the ED, patient had episode of seizure-like activity in his b/l upper extremities which was controlled w/ ativan. Patient was seen and examined at bedside, BiPAP still in place, patient was still mildly lethargic from ativan and SOB with bipap mask on. Patient able to open eyes and nod/shake head to questions. He was admitted for pulmonary edema and found to have leukocytosis, JARRED on CKD, elevated troponin/NSTEMI. He was started on depakote for seizures and heparin gtt and aggrenox. Repeat CTH 3/31 demonstrated infarct in R perirolandic cortex. Hospital course further complicated by RUL PNA on CT chest; however leukocytosis resolved and ID recommended monitoring off abx. Patient was evaluated by Palliative for GOC discussion and pt was made DNR with trial of intubation. He clinically improved over the course of his hospitalization. Patient was evaluated by therapy for functional deficits and gait/ ADL impairments and recommended acute rehabilitation. Patient was medically optimized for discharge to [facility] on 4/5. (05 Apr 2022 16:18)      PAST MEDICAL & SURGICAL HISTORY:  MI (myocardial infarction)  1992    HTN (hypertension)    HLD (hyperlipidemia)    Throat cancer  2011, treated with chemo, RT    Ventricular arrhythmia  s/p AICD    Diabetes  Type2, not on any meds since weight loss after throat Ca    Renal insufficiency  s/p chemo    CAD (coronary artery disease)    Inguinal hernia, left    H/O prior ablation treatment  VT, 2009    Cardiac defibrillator in place  - 2009, battery change 2017    S/P left inguinal hernia repair  2018 at [place]        MEDICATIONS  (STANDING):  atorvastatin 80 milliGRAM(s) Oral at bedtime  Biotene Dry Mouth Oral Rinse 5 milliLiter(s) Swish and Spit daily  cyanocobalamin 1000 MICROGram(s) Oral daily  dextrose 5%. 1000 milliLiter(s) (100 mL/Hr) IV Continuous <Continuous>  dextrose 5%. 1000 milliLiter(s) (50 mL/Hr) IV Continuous <Continuous>  dextrose 50% Injectable 25 Gram(s) IV Push once  dextrose 50% Injectable 12.5 Gram(s) IV Push once  dextrose 50% Injectable 25 Gram(s) IV Push once  dipyridamole 200 mG/aspirin 25 mG 1 Capsule(s) Oral two times a day  diVALproex  milliGRAM(s) Oral two times a day  escitalopram 5 milliGRAM(s) Oral daily  ferrous    sulfate 325 milliGRAM(s) Oral daily  finasteride 5 milliGRAM(s) Oral daily  glucagon  Injectable 1 milliGRAM(s) IntraMuscular once  insulin lispro (ADMELOG) corrective regimen sliding scale   SubCutaneous two times a day with meals  levothyroxine 75 MICROGram(s) Oral daily  melatonin 3 milliGRAM(s) Oral at bedtime  tamsulosin 0.8 milliGRAM(s) Oral at bedtime    MEDICATIONS  (PRN):  ALBUTerol    90 MICROgram(s) HFA Inhaler 2 Puff(s) Inhalation every 6 hours PRN Shortness of Breath and/or Wheezing  dextrose Oral Gel 15 Gram(s) Oral once PRN Blood Glucose LESS THAN 70 milliGRAM(s)/deciliter      Allergies    No Known Allergies    Intolerances          VITALS  76y  Vital Signs Last 24 Hrs  T(C): 36.8 (16 Apr 2022 07:18), Max: 37.1 (15 Apr 2022 19:50)  T(F): 98.3 (16 Apr 2022 07:18), Max: 98.8 (15 Apr 2022 19:50)  HR: 81 (16 Apr 2022 07:18) (72 - 85)  BP: 101/63 (16 Apr 2022 07:18) (101/63 - 110/69)  BP(mean): --  RR: 15 (16 Apr 2022 07:18) (15 - 16)  SpO2: 94% (16 Apr 2022 07:18) (93% - 95%)  Daily     Daily         RECENT LABS:      04-16    138  |  103  |  31<H>  ----------------------------<  98  4.2   |  26  |  2.25<H>    Ca    8.9      16 Apr 2022 06:10                CAPILLARY BLOOD GLUCOSE      POCT Blood Glucose.: 102 mg/dL (16 Apr 2022 07:41)  POCT Blood Glucose.: 128 mg/dL (15 Apr 2022 16:14)

## 2022-04-16 NOTE — PROGRESS NOTE ADULT - COMMENTS
Patient with mcclellan connected to leg bag. Urine clear yellow. Afebrile, would like to start TOV as frustrated with mcclellan. No N/V no H.A, mood fair

## 2022-04-16 NOTE — PROGRESS NOTE ADULT - ASSESSMENT
75 yo male w/ PMHx HTN, HLD, HFrEF , DM2, hypothyroid, CAD s/p AICD, right tonsillar cancer 2011, carotid stenosis s/p right CEA 2020, who presented 3/30 with acute pulmonary edema, seizures, NSTEMI. and R central sulcus MCA territory infarct. Hospital course complicated by leukocytosis, JARRED on CKD.     # s/p R central Sulcus CVA  - Continue aggrenox, atorvastatin  - Start Lexapro 5mg daily (4/14)  - Comprehensive Multidisciplinary Rehab Program: 3 hours a day, 5 days a week with PT/OT/SLP    # Seizures  - I/s/o R central sulcus infarct  - Continue Depakote 500mg BID for 6 months per neuro    # NSTEMI/HFrEF/ HTN  - TTE with severe systolic dysfunction 30-35%  - continue aggrenox  - Continue statin  - Holding home imdur and coreg. Hold ACEI i/s/o JARRED on CKD  - Hold Metroprolol ER 50mg and Hydralazine 5mg TID due to hypotension  -Cardiology consult appreciated   - BP  (101/63 - 110/69) HR 72-85 4/16    # JARRED    - Cr near baseline of 2.0-2.1  - Avoid nephrotoxic medications; holding ACEI  - Nephrology is following  - monitor BMP    #Urinary retention  -  flomax 0.8mg daily  - finasteride 5mg daily   -  appreciated 4/15. DC mcclellan and TOV, bladder scan q 8 with SC for  PVR >350 ml 4/16    # Type 2 Diabetes Mellitus  - A1c 8.3  - Continue SSI  - hospitalist following    # Tonsillar Cancer, Dysphagia  - puree/mildly thick - upgraded to minced/moist and mild thick   - ENT follow up    # Hypothyroid  - synthroid 75mcg daily    # Sleep:  - Melatonin qHS    # Pain Management:  - Tylenol PRN    # GI/Bowel:  - Senna QHS    # DVT ppx:  - HSQ      ---------------  Outpatient Follow-up:    CHANCE FRANCO  Internal Medicine  [address] [place] [address], [address]  [place] [id number]  Phone: [phone]  Fax: [phone]  Follow Up Time: 1-3 days    Chet Garcia)  Cardiovascular Disease; Internal Medicine  [facility], [address] [place] [address], [place] [address]  Phone: [phone]  Fax: [phone]  Follow Up Time: 1-3 days    Brandon Harrison)  Urology  [address] [place] [address], [place] [address]  Phone: [phone]  Fax: [phone]  Follow Up Time: 1-3 days    Yenifer Faulkner)  Neurology  [address] [place] [address]  Phone: [phone]  Fax: [phone]

## 2022-04-16 NOTE — PROGRESS NOTE ADULT - SUBJECTIVE AND OBJECTIVE BOX
Hospitalist: Braxton Curran DO    CHIEF COMPLAINT: Patient is a 76y old  male who presents with a chief complaint of rehab (15 Apr 2022 15:18)      SUBJECTIVE / OVERNIGHT EVENTS: Patient seen and examined. No acute events overnight. Pain well controlled and patient without any complaints.    MEDICATIONS  (STANDING):  atorvastatin 80 milliGRAM(s) Oral at bedtime  Biotene Dry Mouth Oral Rinse 5 milliLiter(s) Swish and Spit daily  cyanocobalamin 1000 MICROGram(s) Oral daily  dextrose 5%. 1000 milliLiter(s) (50 mL/Hr) IV Continuous <Continuous>  dextrose 5%. 1000 milliLiter(s) (100 mL/Hr) IV Continuous <Continuous>  dextrose 50% Injectable 25 Gram(s) IV Push once  dextrose 50% Injectable 12.5 Gram(s) IV Push once  dextrose 50% Injectable 25 Gram(s) IV Push once  dipyridamole 200 mG/aspirin 25 mG 1 Capsule(s) Oral two times a day  diVALproex  milliGRAM(s) Oral two times a day  escitalopram 5 milliGRAM(s) Oral daily  ferrous    sulfate 325 milliGRAM(s) Oral daily  finasteride 5 milliGRAM(s) Oral daily  glucagon  Injectable 1 milliGRAM(s) IntraMuscular once  insulin lispro (ADMELOG) corrective regimen sliding scale   SubCutaneous two times a day with meals  levothyroxine 75 MICROGram(s) Oral daily  melatonin 3 milliGRAM(s) Oral at bedtime  tamsulosin 0.8 milliGRAM(s) Oral at bedtime    MEDICATIONS  (PRN):  ALBUTerol    90 MICROgram(s) HFA Inhaler 2 Puff(s) Inhalation every 6 hours PRN Shortness of Breath and/or Wheezing  dextrose Oral Gel 15 Gram(s) Oral once PRN Blood Glucose LESS THAN 70 milliGRAM(s)/deciliter      VITALS:  T(F): 98.3 (04-16-22 @ 07:18), Max: 98.8 (04-15-22 @ 19:50)  HR: 81 (04-16-22 @ 07:18) (72 - 81)  BP: 101/63 (04-16-22 @ 07:18) (101/63 - 110/69)  RR: 15 (04-16-22 @ 07:18) (15 - 16)  SpO2: 94% (04-16-22 @ 07:18)      REVIEW OF SYSTEMS:  For ROV please refer back to H&P     PHYSICAL EXAM:  GENERAL: NAD, elderly male  HEAD:  Atraumatic, Normocephalic  NECK: Supple, No JVD  CHEST/LUNG: Clear to auscultation bilaterally; No wheeze, nonlabored breathing  HEART: Regular rate and rhythm, + systolic murmur  ABDOMEN: Soft, Nontender, Nondistended; Bowel sounds present  EXTREMITIES:  2+ Peripheral Pulses, No clubbing, cyanosis, or edema        LABS:              x                    138  | 26   | 31           x     >-----------< x       ------------------------< 98                    x                    4.2  | 103  | 2.25                                         Ca 8.9   Mg x     Ph x         CAPILLARY BLOOD GLUCOSE  POCT Blood Glucose.: 102 mg/dL (16 Apr 2022 07:41)  POCT Blood Glucose.: 128 mg/dL (15 Apr 2022 16:14)        MICROBIOLOGY:          RADIOLOGY & ADDITIONAL TESTS:    Imaging Personally Reviewed:    [X] Consultant(s) Notes Reviewed:  [X] Care Discussed with Consultants/Other Providers:

## 2022-04-16 NOTE — PROGRESS NOTE ADULT - ASSESSMENT
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 Found to have R central sulcus MCA territory infarct on repeat CTH. Hospital course complicated by leukocytosis, JARRED on CKD. Admitted to [facility] acute inpatient rehab on 4/5 for ADL, gait, and functional impairments.     #s/p R central Sulcus CVA  #Seizure  #Debility  - Unable to obtain MRI due to ICD  - Continue aggrenox bid  - Continue atorvastatin 80mg qd  - Continue Depakote  - Continue comprehensive rehab program - PT/OT/SLP per rehab team  - Seizure, Fall precaution    # NSTEMI  # HFrEF  # HTN  - TTE LVEF 30% - 35% w/ severe systolic dysfunction  - Continue statin  - Continue aggrenox  - HF medications held in the setting of hypotension. Will need to restart for mortality benefit  - Cardiology consult    #JARRED on probable CKD Stage III - improving  #Urinary retention possibly due to neurogenic bladder from CVA vs. BPH  - Hillman per rehab, cont flomax - dc if hypotension persists  - Renal sono - CKD and a distended bladder  - Avoid nephrotoxic medications; holding ACEI, lasix prn    #Type 2 Diabetes Mellitus  - HbA1c 8.3  - Continue FS, ISS    #Tonsillar Cancer #Dysphagia  - Seen by ENT recent  - Continue puree/mildly thick. diet/nutrition follow up  - Residual L hearing loss    #Hypothyroidism  -Synthroid 75mcg qd    #DVT ppx - Aggrenox

## 2022-04-17 LAB
ANION GAP SERPL CALC-SCNC: 7 MMOL/L — SIGNIFICANT CHANGE UP (ref 5–17)
BUN SERPL-MCNC: 35 MG/DL — HIGH (ref 7–23)
CALCIUM SERPL-MCNC: 8.7 MG/DL — SIGNIFICANT CHANGE UP (ref 8.4–10.5)
CHLORIDE SERPL-SCNC: 100 MMOL/L — SIGNIFICANT CHANGE UP (ref 96–108)
CO2 SERPL-SCNC: 29 MMOL/L — SIGNIFICANT CHANGE UP (ref 22–31)
CREAT SERPL-MCNC: 2.16 MG/DL — HIGH (ref 0.5–1.3)
EGFR: 31 ML/MIN/1.73M2 — LOW
GLUCOSE BLDC GLUCOMTR-MCNC: 116 MG/DL — HIGH (ref 70–99)
GLUCOSE BLDC GLUCOMTR-MCNC: 136 MG/DL — HIGH (ref 70–99)
GLUCOSE SERPL-MCNC: 140 MG/DL — HIGH (ref 70–99)
POTASSIUM SERPL-MCNC: 4.2 MMOL/L — SIGNIFICANT CHANGE UP (ref 3.5–5.3)
POTASSIUM SERPL-SCNC: 4.2 MMOL/L — SIGNIFICANT CHANGE UP (ref 3.5–5.3)
SODIUM SERPL-SCNC: 136 MMOL/L — SIGNIFICANT CHANGE UP (ref 135–145)

## 2022-04-17 PROCEDURE — 99232 SBSQ HOSP IP/OBS MODERATE 35: CPT

## 2022-04-17 RX ADMIN — PREGABALIN 1000 MICROGRAM(S): 225 CAPSULE ORAL at 11:38

## 2022-04-17 RX ADMIN — Medication 3 MILLIGRAM(S): at 21:31

## 2022-04-17 RX ADMIN — TAMSULOSIN HYDROCHLORIDE 0.8 MILLIGRAM(S): 0.4 CAPSULE ORAL at 21:31

## 2022-04-17 RX ADMIN — DIVALPROEX SODIUM 500 MILLIGRAM(S): 500 TABLET, DELAYED RELEASE ORAL at 05:37

## 2022-04-17 RX ADMIN — FINASTERIDE 5 MILLIGRAM(S): 5 TABLET, FILM COATED ORAL at 11:38

## 2022-04-17 RX ADMIN — ASPIRIN AND DIPYRIDAMOLE 1 CAPSULE(S): 25; 200 CAPSULE, EXTENDED RELEASE ORAL at 05:37

## 2022-04-17 RX ADMIN — Medication 5 MILLILITER(S): at 11:38

## 2022-04-17 RX ADMIN — Medication 75 MICROGRAM(S): at 05:37

## 2022-04-17 RX ADMIN — ATORVASTATIN CALCIUM 80 MILLIGRAM(S): 80 TABLET, FILM COATED ORAL at 21:31

## 2022-04-17 RX ADMIN — DIVALPROEX SODIUM 500 MILLIGRAM(S): 500 TABLET, DELAYED RELEASE ORAL at 18:00

## 2022-04-17 RX ADMIN — Medication 325 MILLIGRAM(S): at 11:38

## 2022-04-17 RX ADMIN — ESCITALOPRAM OXALATE 5 MILLIGRAM(S): 10 TABLET, FILM COATED ORAL at 11:38

## 2022-04-17 RX ADMIN — ASPIRIN AND DIPYRIDAMOLE 1 CAPSULE(S): 25; 200 CAPSULE, EXTENDED RELEASE ORAL at 18:01

## 2022-04-17 NOTE — PROGRESS NOTE ADULT - ASSESSMENT
75 yo male w/ PMHx HTN, HLD, HFrEF , DM2, hypothyroid, CAD s/p AICD, right tonsillar cancer 2011, carotid stenosis s/p right CEA 2020, who presented 3/30 with acute pulmonary edema, seizures, NSTEMI. and R central sulcus MCA territory infarct. Hospital course complicated by leukocytosis, JARRED on CKD.     # s/p R central Sulcus CVA  - Continue aggrenox, atorvastatin  - continue Lexapro 5mg daily  - Continue comprehensive Multidisciplinary Rehab Program: 3 hours a day, 5 days a week with PT/OT/SLP    # Seizures  -  Depakote 500mg BID for 6 months per neuro    # NSTEMI/HFrEF/ HTN  - TTE with severe systolic dysfunction 30-35%  - continue aggrenox, statin  - Holding home imdur and coreg. Hold ACEI i/s/o JARRED on CKD  - Hold Metroprolol ER 50mg and Hydralazine 5mg TID due to hypotension  -Cardiology consult appreciated   - (111/61 - 138/80) HR 71-83 4/17    # JARRED    - Cr near baseline of 2.0-2.1  - Avoid nephrotoxic medications; holding ACEI  - encourage compliance with SC for high residuals  - Nephrology is following  - BMp 4/18    #Urinary retention  -  flomax 0.8mg daily  - finasteride 5mg daily   - TOV in progress, vy galvan 4/16    # Type 2 Diabetes Mellitus, A1c 8.3  - Continue SSI  - hospitalist following    # Tonsillar Cancer, Dysphagia  - minced/moist and mild thick   - ENT follow up    # Hypothyroid  - synthroid 75mcg daily    # Sleep:  - Melatonin qHS    # Pain Management:  - Tylenol PRN    # GI/Bowel:  - Senna QHS    # DVT ppx:  - HSQ      ---------------  Outpatient Follow-up:    CHANCE FRANCO  Internal Medicine  [address] [place] [address], [place] [address]  Phone: [phone]  Fax: [phone]  Follow Up Time: 1-3 days    Chet Garcia)  Cardiovascular Disease; Internal Medicine  [facility], [address] [facility] [address] [facility], [address] [place] [address]  Phone: [phone]  Fax: [phone]  Follow Up Time: 1-3 days    Brandon Harrison)  Urology  [address] [place] [address], [address]  [place] [address]  Phone: [phone]  Fax: [phone]  Follow Up Time: 1-3 days    Yenifer Faulkner)  Neurology  [address] [place] [address]  Phone: [phone]  Fax: [phone]

## 2022-04-17 NOTE — PROGRESS NOTE ADULT - SUBJECTIVE AND OBJECTIVE BOX
Patient is a 76y old  Male who presents with a chief complaint of rehab (15 Apr 2022 15:18)      HPI:  Patient is a 77 yo male w/ PMHx of HTN, HLD, HFrEF (EF 30% 2009), 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 As per patient, he was watching a movie when he felt very short of breath suddenly and dropped to the floor, denies LOC and denies any trauma to his head, was on the floor for ~2 hours. Patient was unable to rise from the floor by himself due to his left arm weakness. When first brought to the ED, patient was hypoxic and hypertensive o2 saturation in EMS was >80%. While in the ED, patient had episode of seizure-like activity in his b/l upper extremities which was controlled w/ ativan. Patient was seen and examined at bedside, BiPAP still in place, patient was still mildly lethargic from ativan and SOB with bipap mask on. Patient able to open eyes and nod/shake head to questions. He was admitted for pulmonary edema and found to have leukocytosis, JARRED on CKD, elevated troponin/NSTEMI. He was started on depakote for seizures and heparin gtt and aggrenox. Repeat CTH 3/31 demonstrated infarct in R perirolandic cortex. Hospital course further complicated by RUL PNA on CT chest; however leukocytosis resolved and ID recommended monitoring off abx. Patient was evaluated by Palliative for GOC discussion and pt was made DNR with trial of intubation. He clinically improved over the course of his hospitalization. Patient was evaluated by therapy for functional deficits and gait/ ADL impairments and recommended acute rehabilitation. Patient was medically optimized for discharge to [facility] on 4/5. (05 Apr 2022 16:18)      PAST MEDICAL & SURGICAL HISTORY:  MI (myocardial infarction)  1992    HTN (hypertension)    HLD (hyperlipidemia)    Throat cancer  2011, treated with chemo, RT    Ventricular arrhythmia  s/p AICD    Diabetes  Type2, not on any meds since weight loss after throat Ca    Renal insufficiency  s/p chemo    CAD (coronary artery disease)    Inguinal hernia, left    H/O prior ablation treatment  VT, 2009    Cardiac defibrillator in place  - 2009, battery change 2017    S/P left inguinal hernia repair  2018 at [facility]        MEDICATIONS  (STANDING):  atorvastatin 80 milliGRAM(s) Oral at bedtime  Biotene Dry Mouth Oral Rinse 5 milliLiter(s) Swish and Spit daily  cyanocobalamin 1000 MICROGram(s) Oral daily  dextrose 5%. 1000 milliLiter(s) (50 mL/Hr) IV Continuous <Continuous>  dextrose 5%. 1000 milliLiter(s) (100 mL/Hr) IV Continuous <Continuous>  dextrose 50% Injectable 25 Gram(s) IV Push once  dextrose 50% Injectable 12.5 Gram(s) IV Push once  dextrose 50% Injectable 25 Gram(s) IV Push once  dipyridamole 200 mG/aspirin 25 mG 1 Capsule(s) Oral two times a day  diVALproex  milliGRAM(s) Oral two times a day  escitalopram 5 milliGRAM(s) Oral daily  ferrous    sulfate 325 milliGRAM(s) Oral daily  finasteride 5 milliGRAM(s) Oral daily  glucagon  Injectable 1 milliGRAM(s) IntraMuscular once  insulin lispro (ADMELOG) corrective regimen sliding scale   SubCutaneous two times a day with meals  levothyroxine 75 MICROGram(s) Oral daily  melatonin 3 milliGRAM(s) Oral at bedtime  tamsulosin 0.8 milliGRAM(s) Oral at bedtime    MEDICATIONS  (PRN):  ALBUTerol    90 MICROgram(s) HFA Inhaler 2 Puff(s) Inhalation every 6 hours PRN Shortness of Breath and/or Wheezing  dextrose Oral Gel 15 Gram(s) Oral once PRN Blood Glucose LESS THAN 70 milliGRAM(s)/deciliter      Allergies    No Known Allergies    Intolerances          VITALS  76y  Vital Signs Last 24 Hrs  T(C): 36.7 (17 Apr 2022 08:08), Max: 36.8 (16 Apr 2022 19:59)  T(F): 98 (17 Apr 2022 08:08), Max: 98.2 (16 Apr 2022 19:59)  HR: 71 (17 Apr 2022 08:08) (71 - 83)  BP: 111/61 (17 Apr 2022 08:08) (111/61 - 138/80)  BP(mean): --  RR: 16 (17 Apr 2022 08:08) (16 - 16)  SpO2: 95% (17 Apr 2022 08:08) (94% - 95%)  Daily     Daily         RECENT LABS:      04-16    138  |  103  |  31<H>  ----------------------------<  98  4.2   |  26  |  2.25<H>    Ca    8.9      16 Apr 2022 06:10                CAPILLARY BLOOD GLUCOSE      POCT Blood Glucose.: 116 mg/dL (17 Apr 2022 08:07)  POCT Blood Glucose.: 124 mg/dL (16 Apr 2022 17:00)            POCT Blood Glucose.: 102 mg/dL (16 Apr 2022 07:41)  POCT Blood Glucose.: 128 mg/dL (15 Apr 2022 16:14)                Review of Systems:   · Additional ROS	Patient had mcclellan removed yesterday. Urinal at bedside with abou 75 ml urine, clear yellow. Patient states that he did not require any SC last night since mcclellan removal, however staff reports that he has been refusing SC. Parameters for SC were readjusted last night from 350 ml to 400 ml    no fever, no bowel complaints    Physical Exam:   · Constitutional	detailed exam  · Constitutional Comments	mood fair, less irritable, no new complaints NAD  alert   · Respiratory	detailed exam  · Respiratory Details	breath sounds equal; respirations non-labored; clear to auscultation bilaterally  · Cardiovascular	detailed exam  · Cardiovascular Details	regular rate and rhythm  · Gastrointestinal	detailed exam  · GI Normal	soft; nontender; bowel sounds normal  no suprapubic TTP  · Extremities	detailed exam  · Extremities Comments	no calf swelling +soft, NT no erythema or warmth

## 2022-04-17 NOTE — PROGRESS NOTE ADULT - SUBJECTIVE AND OBJECTIVE BOX
Hospitalist: Braxton Curran DO    CHIEF COMPLAINT: Patient is a 76y old  male who presents with a chief complaint of rehab (15 Apr 2022 15:18)    SUBJECTIVE / OVERNIGHT EVENTS: Patient seen and examined. No acute events overnight. Pain well controlled and patient without any complaints.    MEDICATIONS  (STANDING):  atorvastatin 80 milliGRAM(s) Oral at bedtime  Biotene Dry Mouth Oral Rinse 5 milliLiter(s) Swish and Spit daily  cyanocobalamin 1000 MICROGram(s) Oral daily  dextrose 5%. 1000 milliLiter(s) (100 mL/Hr) IV Continuous <Continuous>  dextrose 5%. 1000 milliLiter(s) (50 mL/Hr) IV Continuous <Continuous>  dextrose 50% Injectable 25 Gram(s) IV Push once  dextrose 50% Injectable 12.5 Gram(s) IV Push once  dextrose 50% Injectable 25 Gram(s) IV Push once  dipyridamole 200 mG/aspirin 25 mG 1 Capsule(s) Oral two times a day  diVALproex  milliGRAM(s) Oral two times a day  escitalopram 5 milliGRAM(s) Oral daily  ferrous    sulfate 325 milliGRAM(s) Oral daily  finasteride 5 milliGRAM(s) Oral daily  glucagon  Injectable 1 milliGRAM(s) IntraMuscular once  insulin lispro (ADMELOG) corrective regimen sliding scale   SubCutaneous two times a day with meals  levothyroxine 75 MICROGram(s) Oral daily  melatonin 3 milliGRAM(s) Oral at bedtime  tamsulosin 0.8 milliGRAM(s) Oral at bedtime    MEDICATIONS  (PRN):  ALBUTerol    90 MICROgram(s) HFA Inhaler 2 Puff(s) Inhalation every 6 hours PRN Shortness of Breath and/or Wheezing  dextrose Oral Gel 15 Gram(s) Oral once PRN Blood Glucose LESS THAN 70 milliGRAM(s)/deciliter      VITALS:  T(F): 98 (04-17-22 @ 08:08), Max: 98.2 (04-16-22 @ 19:59)  HR: 71 (04-17-22 @ 08:08) (71 - 83)  BP: 111/61 (04-17-22 @ 08:08) (111/61 - 138/80)  RR: 16 (04-17-22 @ 08:08) (16 - 16)  SpO2: 95% (04-17-22 @ 08:08)      REVIEW OF SYSTEMS:  For ROV please refer back to H&P     PHYSICAL EXAM:  GENERAL: NAD, elderly male  HEAD:  Atraumatic, Normocephalic  NECK: Supple, No JVD  CHEST/LUNG: Clear to auscultation bilaterally; No wheeze, nonlabored breathing  HEART: Regular rate and rhythm, + systolic murmur  ABDOMEN: Soft, Nontender, Nondistended; Bowel sounds present  EXTREMITIES:  2+ Peripheral Pulses, No clubbing, cyanosis, or edema        LABS:              x                    138  | 26   | 31           x     >-----------< x       ------------------------< 98                    x                    4.2  | 103  | 2.25                                         Ca 8.9   Mg x     Ph x           CAPILLARY BLOOD GLUCOSE  POCT Blood Glucose.: 116 mg/dL (17 Apr 2022 08:07)  POCT Blood Glucose.: 124 mg/dL (16 Apr 2022 17:00)      RADIOLOGY & ADDITIONAL TESTS:    Imaging Personally Reviewed:    [X] Consultant(s) Notes Reviewed:  [X] Care Discussed with Consultants/Other Providers:

## 2022-04-17 NOTE — PROGRESS NOTE ADULT - SUBJECTIVE AND OBJECTIVE BOX
[facility] NEPHROLOGY SERVICES, [facility]  NEPHROLOGY AND HYPERTENSION  [address]  [address]  [place] [address]  [phone]    MD ULYSSES GRAY MD ANDREY GONCHARUK, MD MADHU KORRAPATI, MD YELENA ROSENBERG, MD BINNY KOSHY, MD CHRISTOPHER CAPUTO, MD EDWARD BOVER, MD          Patient events noted    MEDICATIONS  (STANDING):  atorvastatin 80 milliGRAM(s) Oral at bedtime  Biotene Dry Mouth Oral Rinse 5 milliLiter(s) Swish and Spit daily  cyanocobalamin 1000 MICROGram(s) Oral daily  dextrose 5%. 1000 milliLiter(s) (100 mL/Hr) IV Continuous <Continuous>  dextrose 5%. 1000 milliLiter(s) (50 mL/Hr) IV Continuous <Continuous>  dextrose 50% Injectable 25 Gram(s) IV Push once  dextrose 50% Injectable 12.5 Gram(s) IV Push once  dextrose 50% Injectable 25 Gram(s) IV Push once  dipyridamole 200 mG/aspirin 25 mG 1 Capsule(s) Oral two times a day  diVALproex  milliGRAM(s) Oral two times a day  escitalopram 5 milliGRAM(s) Oral daily  ferrous    sulfate 325 milliGRAM(s) Oral daily  finasteride 5 milliGRAM(s) Oral daily  glucagon  Injectable 1 milliGRAM(s) IntraMuscular once  insulin lispro (ADMELOG) corrective regimen sliding scale   SubCutaneous two times a day with meals  levothyroxine 75 MICROGram(s) Oral daily  melatonin 3 milliGRAM(s) Oral at bedtime  tamsulosin 0.8 milliGRAM(s) Oral at bedtime    MEDICATIONS  (PRN):  ALBUTerol    90 MICROgram(s) HFA Inhaler 2 Puff(s) Inhalation every 6 hours PRN Shortness of Breath and/or Wheezing  dextrose Oral Gel 15 Gram(s) Oral once PRN Blood Glucose LESS THAN 70 milliGRAM(s)/deciliter      04-16-22 @ 07:01  -  04-17-22 @ 07:00  --------------------------------------------------------  IN: 236 mL / OUT: 352 mL / NET: -116 mL    04-17-22 @ 07:01  -  04-18-22 @ 00:58  --------------------------------------------------------  IN: 0 mL / OUT: 1725 mL / NET: -1725 mL      PHYSICAL EXAM:      T(C): 36.7 (04-17-22 @ 20:09), Max: 36.7 (04-17-22 @ 08:08)  HR: 68 (04-17-22 @ 20:09) (64 - 71)  BP: 125/69 (04-17-22 @ 20:09) (100/64 - 125/69)  RR: 16 (04-17-22 @ 20:09) (15 - 16)  SpO2: 95% (04-17-22 @ 20:09) (95% - 96%)  Wt(kg): --  Lungs clear  Heart S1S2  Abd soft NT ND  Extremities:   tr edema                04-17    136  |  100  |  35<H>  ----------------------------<  140<H>  4.2   |  29  |  2.16<H>    Ca    8.7      17 Apr 2022 13:30          Creatinine Trend: 2.16<--, 2.25<--, 2.01<--, 2.17<--, 2.02<--, 1.87<--    A/P:    JARRED/CKD 3 in setting of hypotension, urinary retention   Avoid nephrotoxins  SONO: small R kidney, distended bladder, was unable to void  Continue mcclellan  Cr has improved and is presently close to baseline  F/u BMP      Sen Gannon MD

## 2022-04-17 NOTE — PROGRESS NOTE ADULT - ASSESSMENT
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 Found to have R central sulcus MCA territory infarct on repeat CTH. Hospital course complicated by leukocytosis, JARRED on CKD. Admitted to [facility] acute inpatient rehab on 4/5 for ADL, gait, and functional impairments.     #s/p R central Sulcus CVA  #Seizure  #Debility  - Unable to obtain MRI due to ICD  - Continue aggrenox bid  - Continue atorvastatin 80mg qd  - Continue Depakote  - Continue comprehensive rehab program - PT/OT/SLP per rehab team  - Seizure, Fall precaution    # NSTEMI  # HFrEF  # HTN  - TTE LVEF 30% - 35% w/ severe systolic dysfunction  - Continue statin  - Continue aggrenox  - HF medications held in the setting of hypotension. Will need to restart for mortality benefit  - Cardiology consult    #JARRED on probable CKD Stage III - improving  #Urinary retention possibly due to neurogenic bladder from CVA vs. BPH  - Hillman per rehab, cont flomax - dc if hypotension persists  - Renal sono - CKD and a distended bladder  - Avoid nephrotoxic medications; holding ACEI, lasix prn    #Type 2 Diabetes Mellitus  - HbA1c 8.3  - Continue FS, ISS    #Tonsillar Cancer #Dysphagia  - Seen by ENT recent  - Continue puree/mildly thick. diet/nutrition follow up  - Residual L hearing loss    #Hypothyroidism  -Synthroid 75mcg qd    #DVT ppx - Aggrenox

## 2022-04-18 ENCOUNTER — TRANSCRIPTION ENCOUNTER (OUTPATIENT)
Age: 77
End: 2022-04-18

## 2022-04-18 LAB
ALBUMIN SERPL ELPH-MCNC: 2.1 G/DL — LOW (ref 3.3–5)
ALP SERPL-CCNC: 56 U/L — SIGNIFICANT CHANGE UP (ref 40–120)
ALT FLD-CCNC: 28 U/L — SIGNIFICANT CHANGE UP (ref 10–45)
ANION GAP SERPL CALC-SCNC: 7 MMOL/L — SIGNIFICANT CHANGE UP (ref 5–17)
AST SERPL-CCNC: 28 U/L — SIGNIFICANT CHANGE UP (ref 10–40)
BASOPHILS # BLD AUTO: 0.05 K/UL — SIGNIFICANT CHANGE UP (ref 0–0.2)
BASOPHILS NFR BLD AUTO: 0.6 % — SIGNIFICANT CHANGE UP (ref 0–2)
BILIRUB SERPL-MCNC: 0.3 MG/DL — SIGNIFICANT CHANGE UP (ref 0.2–1.2)
BUN SERPL-MCNC: 33 MG/DL — HIGH (ref 7–23)
CALCIUM SERPL-MCNC: 8.9 MG/DL — SIGNIFICANT CHANGE UP (ref 8.4–10.5)
CHLORIDE SERPL-SCNC: 103 MMOL/L — SIGNIFICANT CHANGE UP (ref 96–108)
CO2 SERPL-SCNC: 29 MMOL/L — SIGNIFICANT CHANGE UP (ref 22–31)
CREAT SERPL-MCNC: 2.1 MG/DL — HIGH (ref 0.5–1.3)
EGFR: 33 ML/MIN/1.73M2 — LOW
EOSINOPHIL # BLD AUTO: 0.24 K/UL — SIGNIFICANT CHANGE UP (ref 0–0.5)
EOSINOPHIL NFR BLD AUTO: 2.7 % — SIGNIFICANT CHANGE UP (ref 0–6)
GLUCOSE BLDC GLUCOMTR-MCNC: 114 MG/DL — HIGH (ref 70–99)
GLUCOSE BLDC GLUCOMTR-MCNC: 133 MG/DL — HIGH (ref 70–99)
GLUCOSE SERPL-MCNC: 101 MG/DL — HIGH (ref 70–99)
HCT VFR BLD CALC: 30.2 % — LOW (ref 39–50)
HGB BLD-MCNC: 9.6 G/DL — LOW (ref 13–17)
IMM GRANULOCYTES NFR BLD AUTO: 1.2 % — SIGNIFICANT CHANGE UP (ref 0–1.5)
LYMPHOCYTES # BLD AUTO: 0.77 K/UL — LOW (ref 1–3.3)
LYMPHOCYTES # BLD AUTO: 8.7 % — LOW (ref 13–44)
MCHC RBC-ENTMCNC: 24.2 PG — LOW (ref 27–34)
MCHC RBC-ENTMCNC: 31.8 GM/DL — LOW (ref 32–36)
MCV RBC AUTO: 76.3 FL — LOW (ref 80–100)
MONOCYTES # BLD AUTO: 1.44 K/UL — HIGH (ref 0–0.9)
MONOCYTES NFR BLD AUTO: 16.3 % — HIGH (ref 2–14)
NEUTROPHILS # BLD AUTO: 6.25 K/UL — SIGNIFICANT CHANGE UP (ref 1.8–7.4)
NEUTROPHILS NFR BLD AUTO: 70.5 % — SIGNIFICANT CHANGE UP (ref 43–77)
NRBC # BLD: 0 /100 WBCS — SIGNIFICANT CHANGE UP (ref 0–0)
PLATELET # BLD AUTO: 198 K/UL — SIGNIFICANT CHANGE UP (ref 150–400)
POTASSIUM SERPL-MCNC: 4.4 MMOL/L — SIGNIFICANT CHANGE UP (ref 3.5–5.3)
POTASSIUM SERPL-SCNC: 4.4 MMOL/L — SIGNIFICANT CHANGE UP (ref 3.5–5.3)
PROT SERPL-MCNC: 5.7 G/DL — LOW (ref 6–8.3)
RBC # BLD: 3.96 M/UL — LOW (ref 4.2–5.8)
RBC # FLD: 17.6 % — HIGH (ref 10.3–14.5)
SARS-COV-2 RNA SPEC QL NAA+PROBE: SIGNIFICANT CHANGE UP
SODIUM SERPL-SCNC: 139 MMOL/L — SIGNIFICANT CHANGE UP (ref 135–145)
WBC # BLD: 8.86 K/UL — SIGNIFICANT CHANGE UP (ref 3.8–10.5)
WBC # FLD AUTO: 8.86 K/UL — SIGNIFICANT CHANGE UP (ref 3.8–10.5)

## 2022-04-18 PROCEDURE — 99232 SBSQ HOSP IP/OBS MODERATE 35: CPT

## 2022-04-18 RX ORDER — ESCITALOPRAM OXALATE 10 MG/1
1 TABLET, FILM COATED ORAL
Qty: 30 | Refills: 0
Start: 2022-04-18 | End: 2022-05-17

## 2022-04-18 RX ORDER — ATORVASTATIN CALCIUM 80 MG/1
1 TABLET, FILM COATED ORAL
Qty: 30 | Refills: 0
Start: 2022-04-18 | End: 2022-05-17

## 2022-04-18 RX ORDER — DIVALPROEX SODIUM 500 MG/1
1 TABLET, DELAYED RELEASE ORAL
Qty: 60 | Refills: 0
Start: 2022-04-18 | End: 2022-05-17

## 2022-04-18 RX ORDER — ALBUTEROL 90 UG/1
2 AEROSOL, METERED ORAL
Qty: 1 | Refills: 0
Start: 2022-04-18 | End: 2022-05-17

## 2022-04-18 RX ORDER — SALIVA SUBSTITUTE COMB NO.11 351 MG
1 POWDER IN PACKET (EA) MUCOUS MEMBRANE
Qty: 0 | Refills: 0 | DISCHARGE
Start: 2022-04-18

## 2022-04-18 RX ORDER — PREGABALIN 225 MG/1
1 CAPSULE ORAL
Qty: 0 | Refills: 0 | DISCHARGE
Start: 2022-04-18

## 2022-04-18 RX ORDER — LEVOTHYROXINE SODIUM 125 MCG
1 TABLET ORAL
Qty: 30 | Refills: 0
Start: 2022-04-18 | End: 2022-05-17

## 2022-04-18 RX ORDER — ASPIRIN AND DIPYRIDAMOLE 25; 200 MG/1; MG/1
1 CAPSULE, EXTENDED RELEASE ORAL
Qty: 60 | Refills: 0
Start: 2022-04-18 | End: 2022-05-17

## 2022-04-18 RX ORDER — FINASTERIDE 5 MG/1
1 TABLET, FILM COATED ORAL
Qty: 30 | Refills: 0
Start: 2022-04-18 | End: 2022-05-17

## 2022-04-18 RX ORDER — TAMSULOSIN HYDROCHLORIDE 0.4 MG/1
2 CAPSULE ORAL
Qty: 60 | Refills: 0
Start: 2022-04-18 | End: 2022-05-17

## 2022-04-18 RX ORDER — LEVOTHYROXINE SODIUM 125 MCG
1 TABLET ORAL
Qty: 0 | Refills: 0 | DISCHARGE

## 2022-04-18 RX ORDER — FERROUS SULFATE 325(65) MG
1 TABLET ORAL
Qty: 30 | Refills: 0
Start: 2022-04-18 | End: 2022-05-17

## 2022-04-18 RX ORDER — ALBUTEROL 90 UG/1
2 AEROSOL, METERED ORAL
Qty: 0 | Refills: 0 | DISCHARGE
Start: 2022-04-18

## 2022-04-18 RX ORDER — HYDRALAZINE HCL 50 MG
0.5 TABLET ORAL
Qty: 0 | Refills: 0 | DISCHARGE

## 2022-04-18 RX ORDER — LANOLIN ALCOHOL/MO/W.PET/CERES
1 CREAM (GRAM) TOPICAL
Qty: 0 | Refills: 0 | DISCHARGE
Start: 2022-04-18

## 2022-04-18 RX ADMIN — FINASTERIDE 5 MILLIGRAM(S): 5 TABLET, FILM COATED ORAL at 11:29

## 2022-04-18 RX ADMIN — ASPIRIN AND DIPYRIDAMOLE 1 CAPSULE(S): 25; 200 CAPSULE, EXTENDED RELEASE ORAL at 05:27

## 2022-04-18 RX ADMIN — Medication 75 MICROGRAM(S): at 05:27

## 2022-04-18 RX ADMIN — Medication 325 MILLIGRAM(S): at 11:29

## 2022-04-18 RX ADMIN — PREGABALIN 1000 MICROGRAM(S): 225 CAPSULE ORAL at 11:29

## 2022-04-18 RX ADMIN — ASPIRIN AND DIPYRIDAMOLE 1 CAPSULE(S): 25; 200 CAPSULE, EXTENDED RELEASE ORAL at 17:10

## 2022-04-18 RX ADMIN — ESCITALOPRAM OXALATE 5 MILLIGRAM(S): 10 TABLET, FILM COATED ORAL at 11:29

## 2022-04-18 RX ADMIN — DIVALPROEX SODIUM 500 MILLIGRAM(S): 500 TABLET, DELAYED RELEASE ORAL at 05:27

## 2022-04-18 RX ADMIN — Medication 3 MILLIGRAM(S): at 21:27

## 2022-04-18 RX ADMIN — ATORVASTATIN CALCIUM 80 MILLIGRAM(S): 80 TABLET, FILM COATED ORAL at 21:27

## 2022-04-18 RX ADMIN — TAMSULOSIN HYDROCHLORIDE 0.8 MILLIGRAM(S): 0.4 CAPSULE ORAL at 21:27

## 2022-04-18 RX ADMIN — DIVALPROEX SODIUM 500 MILLIGRAM(S): 500 TABLET, DELAYED RELEASE ORAL at 17:10

## 2022-04-18 RX ADMIN — Medication 5 MILLILITER(S): at 11:29

## 2022-04-18 NOTE — DISCHARGE NOTE PROVIDER - NSDCCAREPROVSEEN_GEN_ALL_CORE_FT
Jose G, Alvin Batres, Trell Vallejo, Merle Jurado, Sue Alarcon Jose G, Alvin Batres, Trell Vallejo, Merle Jurado, Eugenie Rodriguez, Sue Porter, Zaynab

## 2022-04-18 NOTE — DISCHARGE NOTE NURSING/CASE MANAGEMENT/SOCIAL WORK - PATIENT PORTAL LINK FT
You can access the FollowMyHealth Patient Portal offered by [facility] by registering at the following website: [email or web address]. By joining [employer]’s FollowMyHealth portal, you will also be able to view your health information using other applications (apps) compatible with our system.

## 2022-04-18 NOTE — PROGRESS NOTE ADULT - SUBJECTIVE AND OBJECTIVE BOX
Hillman out and voiding with high PVR.    Cath this AM for  ml    Comfortable - urine now clear      ROS  General: no fever or chills      VITAL SIGNS  Vital Signs Last 24 Hrs  T(C): 36.7 (18 Apr 2022 07:51), Max: 36.7 (17 Apr 2022 20:09)  T(F): 98.1 (18 Apr 2022 07:51), Max: 98.1 (17 Apr 2022 20:09)  HR: 77 (18 Apr 2022 07:51) (68 - 77)  BP: 85/63     PHYSICAL EXAM:    Abdomen: bladder NT        LABS:                        9.6    8.86  )-----------( 198      ( 18 Apr 2022 06:50 )             30.2     04-18    139  |  103  |  33<H>  ----------------------------<  101<H>  4.4   |  29  |  2.10<H>    Ca    8.9      18 Apr 2022 06:50    TPro  5.7<L>  /  Alb  2.1<L>  /  TBili  0.3  /  DBili  x   /  AST  28  /  ALT  28  /  AlkPhos  56  04-18          Prior notes/chart reviewed.

## 2022-04-18 NOTE — PROGRESS NOTE ADULT - ASSESSMENT
Rehab after MI    Hillman out and voiding with high PVR    - cont BPH meds  - monitor voiding  - cath as needed

## 2022-04-18 NOTE — DISCHARGE NOTE PROVIDER - CARE PROVIDER_API CALL
CHANCE FRANCO  Internal Medicine  [address] [address], [place] [address]  Phone: [phone]  Fax: [phone]  Follow Up Time: 1 week    Chet Garcia)  Cardiovascular Disease; Internal Medicine  [facility], [address], [address]  [place] [address]  Phone: [phone]  Fax: [phone]  Follow Up Time: 1 week    Brandon Harrison)  Urology  [address] [place] [address], [address]  [place] [address]  Phone: [phone]  Fax: [phone]  Follow Up Time: 1 week    Yenifer Faulkner)  Neurology  [address] [place] [address]  Phone: [phone]  Fax: [phone]  Follow Up Time: 2 weeks    Merle Vallejo)  Medicine  [address] [place] [address], [address] [place] [id number]  Phone: [phone]  Fax: [phone]  Follow Up Time: 2 weeks

## 2022-04-18 NOTE — DISCHARGE NOTE PROVIDER - PROVIDER TOKENS
PROVIDER:[TOKEN:[[id number]],FOLLOWUP:[1 week]],PROVIDER:[TOKEN:[[id number]],FOLLOWUP:[1 week]],PROVIDER:[TOKEN:[[id number]],FOLLOWUP:[1 week]],PROVIDER:[TOKEN:[[id number]],FOLLOWUP:[2 weeks]],PROVIDER:[TOKEN:[[id number]],FOLLOWUP:[2 weeks]]

## 2022-04-18 NOTE — DISCHARGE NOTE PROVIDER - HOSPITAL COURSE
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 As per patient, he was watching a movie when he felt very short of breath suddenly and dropped to the floor, denies LOC and denies any trauma to his head, was on the floor for ~2 hours. Patient was unable to rise from the floor by himself due to his left arm weakness. When first brought to the ED, patient was hypoxic and hypertensive o2 saturation in EMS was >80%. While in the ED, patient had episode of seizure-like activity in his b/l upper extremities which was controlled w/ ativan. Patient was seen and examined at bedside, BiPAP still in place, patient was still mildly lethargic from ativan and SOB with bipap mask on. Patient able to open eyes and nod/shake head to questions. He was admitted for pulmonary edema and found to have leukocytosis, JARRED on CKD, elevated troponin/NSTEMI. He was started on depakote for seizures and heparin gtt and aggrenox. Repeat CTH 3/31 demonstrated infarct in R perirolandic cortex. Hospital course further complicated by RUL PNA on CT chest; however leukocytosis resolved and ID recommended monitoring off abx. Patient was evaluated by Palliative for GOC discussion and pt was made DNR with trial of intubation. He clinically improved over the course of his hospitalization. Patient was evaluated by therapy for functional deficits and gait/ ADL impairments and recommended acute rehabilitation. Patient was medically optimized for discharge to [facility] on 4/5. (05 Apr 2022 16:18)      Rehab course significant for urinary retention requiring replacement of mcclellan catheter. He was able to pass second trial of void and is voiding freely. Will require follow up with   urology. Adjustments made to BP meds as well due to hypotension secondary to dehydration and JARRED. He was hydrated with IV fluids and JARRED improved to baseline.     All other medical co-morbidites were stable.    Pt tolerated course of inpatient pt/ot/slp rehab with significant functional improvements and met rehab goals prior to discharge.     Pt was medically cleared on 4/19/22 for discharge to Home with family.

## 2022-04-18 NOTE — PROGRESS NOTE ADULT - SUBJECTIVE AND OBJECTIVE BOX
SUBJECTIVE/ROS: No acute events overnight. He has been voiding well with some residuals noted. Denies chest pain, fever, chills, nausea, vomiting, abdominal pain, headache, or BLE pain.     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 As per patient, he was watching a movie when he felt very short of breath suddenly and dropped to the floor, denies LOC and denies any trauma to his head, was on the floor for ~2 hours. Patient was unable to rise from the floor by himself due to his left arm weakness. When first brought to the ED, patient was hypoxic and hypertensive o2 saturation in EMS was >80%. While in the ED, patient had episode of seizure-like activity in his b/l upper extremities which was controlled w/ ativan. Patient was seen and examined at bedside, BiPAP still in place, patient was still mildly lethargic from ativan and SOB with bipap mask on. Patient able to open eyes and nod/shake head to questions. He was admitted for pulmonary edema and found to have leukocytosis, JARRED on CKD, elevated troponin/NSTEMI. He was started on depakote for seizures and heparin gtt and aggrenox. Repeat CTH 3/31 demonstrated infarct in R perirolandic cortex. Hospital course further complicated by RUL PNA on CT chest; however leukocytosis resolved and ID recommended monitoring off abx. Patient was evaluated by Palliative for GOC discussion and pt was made DNR with trial of intubation. He clinically improved over the course of his hospitalization. Patient was evaluated by therapy for functional deficits and gait/ ADL impairments and recommended acute rehabilitation. Patient was medically optimized for discharge to [facility] on 4/5. (05 Apr 2022 16:18)      PAST MEDICAL & SURGICAL HISTORY:  MI (myocardial infarction)  1992    HTN (hypertension)    HLD (hyperlipidemia)    Throat cancer  2011, treated with chemo, RT    Ventricular arrhythmia  s/p AICD    Diabetes  Type2, not on any meds since weight loss after throat Ca    Renal insufficiency  s/p chemo    CAD (coronary artery disease)    Inguinal hernia, left    H/O prior ablation treatment  VT, 2009    Cardiac defibrillator in place  - 2009, battery change 2017    S/P left inguinal hernia repair  2018 at [place]        Allergies    No Known Allergies    Intolerances          PHYSICAL EXAM    Vital Signs Last 24 Hrs  T(C): 36.7 (18 Apr 2022 07:51), Max: 36.7 (17 Apr 2022 20:09)  T(F): 98.1 (18 Apr 2022 07:51), Max: 98.1 (17 Apr 2022 20:09)  HR: 77 (18 Apr 2022 07:51) (64 - 77)  BP: 85/63 (18 Apr 2022 07:51) (85/63 - 125/69)  BP(mean): --  RR: 16 (18 Apr 2022 07:51) (15 - 16)  SpO2: 94% (18 Apr 2022 07:51) (94% - 96%)        PHYSICAL EXAM  Constitutional - NAD, Comfortable  HEENT - NCAT, EOMI  Neck - Supple, No limited ROM  Chest - CTA bilaterally  Cardiovascular - RRR, S1S2  Abdomen -BS+, Soft, NTND  Extremities - No C/C/E, No calf tenderness   Neurologic Exam - aox3, robles 4+/5, mild dysarthria     RECENT LABS:                          9.6    8.86  )-----------( 198      ( 18 Apr 2022 06:50 )             30.2     04-18    139  |  103  |  33<H>  ----------------------------<  101<H>  4.4   |  29  |  2.10<H>    Ca    8.9      18 Apr 2022 06:50    TPro  5.7<L>  /  Alb  2.1<L>  /  TBili  0.3  /  DBili  x   /  AST  28  /  ALT  28  /  AlkPhos  56  04-18    LIVER FUNCTIONS - ( 18 Apr 2022 06:50 )  Alb: 2.1 g/dL / Pro: 5.7 g/dL / ALK PHOS: 56 U/L / ALT: 28 U/L / AST: 28 U/L / GGT: x                   CAPILLARY BLOOD GLUCOSE      POCT Blood Glucose.: 114 mg/dL (18 Apr 2022 08:00)  POCT Blood Glucose.: 136 mg/dL (17 Apr 2022 16:21)      MEDICATIONS  (STANDING):  atorvastatin 80 milliGRAM(s) Oral at bedtime  Biotene Dry Mouth Oral Rinse 5 milliLiter(s) Swish and Spit daily  cyanocobalamin 1000 MICROGram(s) Oral daily  dextrose 5%. 1000 milliLiter(s) (50 mL/Hr) IV Continuous <Continuous>  dextrose 5%. 1000 milliLiter(s) (100 mL/Hr) IV Continuous <Continuous>  dextrose 50% Injectable 25 Gram(s) IV Push once  dextrose 50% Injectable 12.5 Gram(s) IV Push once  dextrose 50% Injectable 25 Gram(s) IV Push once  dipyridamole 200 mG/aspirin 25 mG 1 Capsule(s) Oral two times a day  diVALproex  milliGRAM(s) Oral two times a day  escitalopram 5 milliGRAM(s) Oral daily  ferrous    sulfate 325 milliGRAM(s) Oral daily  finasteride 5 milliGRAM(s) Oral daily  glucagon  Injectable 1 milliGRAM(s) IntraMuscular once  insulin lispro (ADMELOG) corrective regimen sliding scale   SubCutaneous two times a day with meals  levothyroxine 75 MICROGram(s) Oral daily  melatonin 3 milliGRAM(s) Oral at bedtime  tamsulosin 0.8 milliGRAM(s) Oral at bedtime    MEDICATIONS  (PRN):  ALBUTerol    90 MICROgram(s) HFA Inhaler 2 Puff(s) Inhalation every 6 hours PRN Shortness of Breath and/or Wheezing  dextrose Oral Gel 15 Gram(s) Oral once PRN Blood Glucose LESS THAN 70 milliGRAM(s)/deciliter

## 2022-04-18 NOTE — DISCHARGE NOTE NURSING/CASE MANAGEMENT/SOCIAL WORK - NSDCPEFALRISK_GEN_ALL_CORE
For information on Fall & Injury Prevention, visit: [email or web address] OR  [email or web address] OR  https://www.cdc.gov/steadi/patient.html

## 2022-04-18 NOTE — DISCHARGE NOTE PROVIDER - NSDCCPCAREPLAN_GEN_ALL_CORE_FT
PRINCIPAL DISCHARGE DIAGNOSIS  Diagnosis: Stroke  Assessment and Plan of Treatment: - Unable to obtain MRI due to ICD  - Continue aggrenox  - Continue atorvastatin  - Continue Depakote 500mg BID for 6 months-  must follow up with neurology first   - Continue Lexapro 5mg daily   -Follow up with neurology         SECONDARY DISCHARGE DIAGNOSES  Diagnosis: Chronic systolic heart failure  Assessment and Plan of Treatment: - Course c/b acute pulmonary edema s/p lasix x2 3/30.   - TTE with severe systolic dysfunction 30-35%  - Continue atorvastatin  - Holding home imdur and coreg. Hold ACEI i/s/o JARRED on CKD  - Hold Metroprolol ER 50mg and Hydralazine 5mg TID due to hypotension  -Follow up with cardiology      Diagnosis: Tonsillar cancer  Assessment and Plan of Treatment: - Seen by ENT recently- reconsult pending  - puree/mildly thick - upgraded to minced/moist and mild thick   - Residual L hearing loss    Diagnosis: CAD (coronary artery disease)  Assessment and Plan of Treatment:     Diagnosis: Type 2 diabetes mellitus  Assessment and Plan of Treatment: - A1c 8.3  -Not on medication at home  - Follow up with primary MD for [facility] management    Diagnosis: Dysphagia  Assessment and Plan of Treatment: current diet is pureed with mildly thickened liquid  -Must thicken liquid with thickener which can be found in any pharmacy    Diagnosis: JARRED (acute kidney injury)  Assessment and Plan of Treatment: - Cr near baseline of 2.0-2.1  -Cr 1.96 (4/11) --> 1.86 (4/12) --> 2.1 (4/18)  - Avoid nephrotoxic medications; holding ACEI  - Follow up with nephrology       Diagnosis: Urinary retention  Assessment and Plan of Treatment: -Continue flomax 0.8mg daily  -Continue finasteride 5mg daily   -Repeat trial of void wtih mcclellan removal on 4/16 and passed with larger volumes voided when toileted   -Continue to monitor urine output and follow up with urology    Diagnosis: Hypothyroid  Assessment and Plan of Treatment: Continue synthroid 75mcg daily

## 2022-04-18 NOTE — PROGRESS NOTE ADULT - ASSESSMENT
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 Found to have R central sulcus MCA territory infarct on repeat CTH. Hospital course complicated by leukocytosis, JARRED on CKD. Admitted to [facility] on 4/5 for ADL, gait, and functional impairments.     # s/p R central Sulcus CVA  - Comprehensive Multidisciplinary Rehab Program: 3 hours a day, 5 days a week with PT/OT/SLP  - Unable to obtain MRI due to ICD  - Continue aggrenox  - Continue atorvastatin  - Seizure management as below  - Continue Lexapro 5mg daily (4/14)    # Seizures  - I/s/o R central sulcus infarct  - Continue Depakote 500mg BID for 6 months per neuro    # NSTEMI  # HFrEF  # HTN  - S/p heparin gtt, continue aggrenox  - Course c/b acute pulmonary edema s/p lasix x2 3/30. Diurese PRN  - TTE with severe systolic dysfunction 30-35%  - Continue statin  - Holding home imdur and coreg. Hold ACEI i/s/o JARRED on CKD  - Hold Metroprolol ER 50mg and Hydralazine 5mg TID due to hypotension  -Encourage oral intake  -Given fluid bolus and and continueous IVF to promote hydration  -Cardiology consult appreciated     # JARRED    - Cr near baseline of 2.0-2.1  -Cr 1.96 (4/11) --> 1.86 (4/12) --> 2.1 (4/18)  - Avoid nephrotoxic medications; holding ACEI  - Nephrology is following  - Psychiatry evaluation - for capacity and Depression -case discussed  - Medicine is following, case discussed      #Type 2 Diabetes Mellitus  - A1c 8.3  - Continue SSI  -Not on medication at home  - Hospitalist consult appreciated    #Tonsillar Cancer #Dysphagia  - Seen by ENT recently- reconsult pending  - puree/mildly thick - upgraded to minced/moist and mild thick   - Residual L hearing loss    #Hypothyroid  -Continue synthroid 75mcg daily    #Urinary retention  -Continue flomax 0.8mg daily  -Continue finasteride 5mg daily   -urology consult appreciated  -TOV on 4/6 at midnight - able to void with small residual but Hillman reinserted due to continued retention  -Repeat TOV 4/16 and passed with larger volumes voided when toileted   -Monitor UO    # Sleep:  - Melatonin qHS    # GI/Bowel:  - Senna QHS - discontinued due to loose stool     # Diet:   - Diet Consistency/Modifications: Puree (patient's preference) and mild thick  - Aspiration Precautions  - SLP consult for swallow function evaluation and treatment    # DVT ppx:  - HSQ -discontinued due to patient refusal     # Restrictions/Precautions:  - Precautions: Falls, aspiration, cardiac    ---------------  Outpatient Follow-up:    CHANCE FRANCO  Internal Medicine  [address] [place] [address], [place] [address]  Phone: [phone]  Fax: [phone]  Follow Up Time: 1-3 days    Chet Garcia)  Cardiovascular Disease; Internal Medicine  [facility], [address], [address]  [place] [address]  Phone: [phone]  Fax: [phone]  Follow Up Time: 1-3 days    Brandon Harrison)  Urology  [address] [place] [address], [address]  [place] [address]  Phone: [phone]  Fax: [phone]  Follow Up Time: 1-3 days    Yenifer Faulkner)  Neurology  [address] [place] [address]  Phone: [phone]  Fax: [phone]    TEAM MEETING 4/18/22  SW:  Lives in lifted home with 11 stairs to enter, wife works, children unable to help  OT: mod I for eat/groom/UBD, SV for groom/UBD/transfers CG for toileting   PT: mod I for amb household distances and transfers, walks with cane 150', 12 steps with SV  SLP: ANGELINA 4/11, weak swallow, upgrade to mild thick can use compensatory methods, must follow up with ENT as outpatient    barriers: left inattention, dec strength  goals: mod I for adls, sv for transfers and ambulation  EDOD: Home with assistance 4/19

## 2022-04-18 NOTE — DISCHARGE NOTE PROVIDER - NSDCMRMEDTOKEN_GEN_ALL_CORE_FT
Aggrenox 25 mg-200 mg oral capsule, extended release: 1 cap(s) orally 2 times a day  albuterol 90 mcg/inh inhalation aerosol: 2 puff(s) inhaled every 6 hours, As needed, Shortness of Breath and/or Wheezing  albuterol 90 mcg/inh inhalation aerosol: 2 puff(s) inhaled every 6 hours, As needed, Shortness of Breath and/or Wheezing  atorvastatin 80 mg oral tablet: 1 tab(s) orally once a day (at bedtime)  bisacodyl 10 mg rectal suppository: 1 suppository(ies) rectal once a day  calcitriol 0.25 mcg oral capsule: 1 cap(s) orally once a day  cyanocobalamin 1000 mcg oral tablet: 1 tab(s) orally once a day  divalproex sodium 500 mg oral delayed release tablet: 1 tab(s) orally 2 times a day  escitalopram 5 mg oral tablet: 1 tab(s) orally once a day  ferrous sulfate 325 mg (65 mg elemental iron) oral tablet: 1 tab(s) orally once a day  finasteride 5 mg oral tablet: 1 tab(s) orally once a day  levothyroxine 75 mcg (0.075 mg) oral tablet: 1 tab(s) orally once a day  melatonin 3 mg oral tablet: 1 tab(s) orally once a day (at bedtime)  saliva substitutes oral solution: 1 spray(s) orally 3 times a day, As Needed  senna oral tablet: 2 tab(s) orally once a day (at bedtime)  tamsulosin 0.4 mg oral capsule: 2 cap(s) orally once a day (at bedtime)

## 2022-04-18 NOTE — DISCHARGE NOTE PROVIDER - CARE PROVIDERS DIRECT ADDRESSES
,DirectAddress_Unknown,DirectAddress_Unknown,[email or web address],DirectAddress_Unknown,[email or web address]

## 2022-04-19 VITALS
OXYGEN SATURATION: 94 % | RESPIRATION RATE: 16 BRPM | HEART RATE: 89 BPM | DIASTOLIC BLOOD PRESSURE: 85 MMHG | TEMPERATURE: 98 F | SYSTOLIC BLOOD PRESSURE: 133 MMHG

## 2022-04-19 LAB
GLUCOSE BLDC GLUCOMTR-MCNC: 105 MG/DL — HIGH (ref 70–99)
GLUCOSE BLDC GLUCOMTR-MCNC: 139 MG/DL — HIGH (ref 70–99)

## 2022-04-19 PROCEDURE — 87635 SARS-COV-2 COVID-19 AMP PRB: CPT

## 2022-04-19 PROCEDURE — 99232 SBSQ HOSP IP/OBS MODERATE 35: CPT

## 2022-04-19 PROCEDURE — 92610 EVALUATE SWALLOWING FUNCTION: CPT

## 2022-04-19 PROCEDURE — 85025 COMPLETE CBC W/AUTO DIFF WBC: CPT

## 2022-04-19 PROCEDURE — U0005: CPT

## 2022-04-19 PROCEDURE — 36415 COLL VENOUS BLD VENIPUNCTURE: CPT

## 2022-04-19 PROCEDURE — 97167 OT EVAL HIGH COMPLEX 60 MIN: CPT

## 2022-04-19 PROCEDURE — 97530 THERAPEUTIC ACTIVITIES: CPT

## 2022-04-19 PROCEDURE — 76775 US EXAM ABDO BACK WALL LIM: CPT

## 2022-04-19 PROCEDURE — 74230 X-RAY XM SWLNG FUNCJ C+: CPT

## 2022-04-19 PROCEDURE — 92611 MOTION FLUOROSCOPY/SWALLOW: CPT

## 2022-04-19 PROCEDURE — 99238 HOSP IP/OBS DSCHRG MGMT 30/<: CPT

## 2022-04-19 PROCEDURE — 97535 SELF CARE MNGMENT TRAINING: CPT

## 2022-04-19 PROCEDURE — 97112 NEUROMUSCULAR REEDUCATION: CPT

## 2022-04-19 PROCEDURE — 82533 TOTAL CORTISOL: CPT

## 2022-04-19 PROCEDURE — 80053 COMPREHEN METABOLIC PANEL: CPT

## 2022-04-19 PROCEDURE — 92507 TX SP LANG VOICE COMM INDIV: CPT

## 2022-04-19 PROCEDURE — 92523 SPEECH SOUND LANG COMPREHEN: CPT

## 2022-04-19 PROCEDURE — 80048 BASIC METABOLIC PNL TOTAL CA: CPT

## 2022-04-19 PROCEDURE — 81001 URINALYSIS AUTO W/SCOPE: CPT

## 2022-04-19 PROCEDURE — 97110 THERAPEUTIC EXERCISES: CPT

## 2022-04-19 PROCEDURE — U0003: CPT

## 2022-04-19 PROCEDURE — 92526 ORAL FUNCTION THERAPY: CPT

## 2022-04-19 PROCEDURE — 84443 ASSAY THYROID STIM HORMONE: CPT

## 2022-04-19 PROCEDURE — 97116 GAIT TRAINING THERAPY: CPT

## 2022-04-19 PROCEDURE — 86901 BLOOD TYPING SEROLOGIC RH(D): CPT

## 2022-04-19 PROCEDURE — 86900 BLOOD TYPING SEROLOGIC ABO: CPT

## 2022-04-19 PROCEDURE — 85027 COMPLETE CBC AUTOMATED: CPT

## 2022-04-19 PROCEDURE — 97163 PT EVAL HIGH COMPLEX 45 MIN: CPT

## 2022-04-19 PROCEDURE — 82962 GLUCOSE BLOOD TEST: CPT

## 2022-04-19 PROCEDURE — 97140 MANUAL THERAPY 1/> REGIONS: CPT

## 2022-04-19 PROCEDURE — 86850 RBC ANTIBODY SCREEN: CPT

## 2022-04-19 RX ADMIN — ASPIRIN AND DIPYRIDAMOLE 1 CAPSULE(S): 25; 200 CAPSULE, EXTENDED RELEASE ORAL at 05:06

## 2022-04-19 RX ADMIN — FINASTERIDE 5 MILLIGRAM(S): 5 TABLET, FILM COATED ORAL at 11:59

## 2022-04-19 RX ADMIN — DIVALPROEX SODIUM 500 MILLIGRAM(S): 500 TABLET, DELAYED RELEASE ORAL at 05:06

## 2022-04-19 RX ADMIN — Medication 75 MICROGRAM(S): at 05:06

## 2022-04-19 RX ADMIN — PREGABALIN 1000 MICROGRAM(S): 225 CAPSULE ORAL at 11:59

## 2022-04-19 RX ADMIN — Medication 325 MILLIGRAM(S): at 11:59

## 2022-04-19 RX ADMIN — ESCITALOPRAM OXALATE 5 MILLIGRAM(S): 10 TABLET, FILM COATED ORAL at 11:59

## 2022-04-19 NOTE — PROGRESS NOTE ADULT - PROVIDER SPECIALTY LIST ADULT
Hospitalist
Hospitalist
Nephrology
Neurology
Rehab Medicine
Rehab Medicine
Urology
Hospitalist
Nephrology
Nephrology
Neurology
Physiatry
Rehab Medicine
Hospitalist
Hospitalist
Neurology
Neurology
Rehab Medicine
Rehab Medicine
Hospitalist
Neurology
Urology
Neurology
Physiatry
Rehab Medicine

## 2022-04-19 NOTE — PROGRESS NOTE ADULT - NUTRITIONAL ASSESSMENT
This patient has been assessed with a concern for Malnutrition and has been determined to have a diagnosis/diagnoses of Severe protein-calorie malnutrition.    This patient is being managed with:   Diet Pureed-  Consistent Carbohydrate {No Snacks}  Moderately Thick Liquids (MODTHICKLIQS)  Supplement Feeding Modality:  Oral  Glucerna Shake Cans or Servings Per Day:  1       Frequency:  Three Times a day  Entered: Apr 8 2022 10:38AM    
This patient has been assessed with a concern for Malnutrition and has been determined to have a diagnosis/diagnoses of Severe protein-calorie malnutrition.    This patient is being managed with:   Diet Pureed-  Consistent Carbohydrate {No Snacks}  DASH/TLC {Sodium & Cholesterol Restricted}  Mildly Thick Liquids (MILDTHICKLIQS)  Supplement Feeding Modality:  Oral  Glucerna Shake Cans or Servings Per Day:  1       Frequency:  Three Times a day  Entered: Apr 14 2022  9:19AM    
This patient has been assessed with a concern for Malnutrition and has been determined to have a diagnosis/diagnoses of Severe protein-calorie malnutrition.    This patient is being managed with:   Diet Pureed-  Consistent Carbohydrate {No Snacks}  DASH/TLC {Sodium & Cholesterol Restricted}  Moderately Thick Liquids (MODTHICKLIQS)  Supplement Feeding Modality:  Oral  Glucerna Shake Cans or Servings Per Day:  1       Frequency:  Three Times a day  Entered: Apr 5 2022  8:45PM    
This patient has been assessed with a concern for Malnutrition and has been determined to have a diagnosis/diagnoses of Severe protein-calorie malnutrition.    This patient is being managed with:   Diet Pureed-  Consistent Carbohydrate {No Snacks}  Moderately Thick Liquids (MODTHICKLIQS)  Supplement Feeding Modality:  Oral  Glucerna Shake Cans or Servings Per Day:  1       Frequency:  Three Times a day  Entered: Apr 8 2022 10:38AM    
This patient has been assessed with a concern for Malnutrition and has been determined to have a diagnosis/diagnoses of Severe protein-calorie malnutrition.    This patient is being managed with:   Diet Pureed-  Consistent Carbohydrate {No Snacks}  Moderately Thick Liquids (MODTHICKLIQS)  Supplement Feeding Modality:  Oral  Glucerna Shake Cans or Servings Per Day:  1       Frequency:  Three Times a day  Entered: Apr 8 2022 10:38AM    
This patient has been assessed with a concern for Malnutrition and has been determined to have a diagnosis/diagnoses of Severe protein-calorie malnutrition.    This patient is being managed with:   Diet Minced and Moist-  Consistent Carbohydrate {No Snacks}  Mildly Thick Liquids (MILDTHICKLIQS)  Supplement Feeding Modality:  Oral  Glucerna Shake Cans or Servings Per Day:  1       Frequency:  Three Times a day  Entered: Apr 11 2022  2:53PM    
This patient has been assessed with a concern for Malnutrition and has been determined to have a diagnosis/diagnoses of Severe protein-calorie malnutrition.    This patient is being managed with:   Diet Pureed-  Consistent Carbohydrate {No Snacks}  DASH/TLC {Sodium & Cholesterol Restricted}  Mildly Thick Liquids (MILDTHICKLIQS)  Supplement Feeding Modality:  Oral  Glucerna Shake Cans or Servings Per Day:  1       Frequency:  Three Times a day  Entered: Apr 14 2022  9:19AM    
This patient has been assessed with a concern for Malnutrition and has been determined to have a diagnosis/diagnoses of Severe protein-calorie malnutrition.    This patient is being managed with:   Diet Pureed-  Consistent Carbohydrate {No Snacks}  DASH/TLC {Sodium & Cholesterol Restricted}  Mildly Thick Liquids (MILDTHICKLIQS)  Supplement Feeding Modality:  Oral  Glucerna Shake Cans or Servings Per Day:  1       Frequency:  Three Times a day  Entered: Apr 14 2022  9:19AM    
This patient has been assessed with a concern for Malnutrition and has been determined to have a diagnosis/diagnoses of Severe protein-calorie malnutrition.    This patient is being managed with:   Diet Pureed-  Consistent Carbohydrate {No Snacks}  DASH/TLC {Sodium & Cholesterol Restricted}  Moderately Thick Liquids (MODTHICKLIQS)  Supplement Feeding Modality:  Oral  Glucerna Shake Cans or Servings Per Day:  1       Frequency:  Three Times a day  Entered: Apr 5 2022  8:45PM    
This patient has been assessed with a concern for Malnutrition and has been determined to have a diagnosis/diagnoses of Severe protein-calorie malnutrition.    This patient is being managed with:   Diet Pureed-  Consistent Carbohydrate {No Snacks}  Moderately Thick Liquids (MODTHICKLIQS)  Supplement Feeding Modality:  Oral  Glucerna Shake Cans or Servings Per Day:  1       Frequency:  Three Times a day  Entered: Apr 8 2022 10:38AM    
This patient has been assessed with a concern for Malnutrition and has been determined to have a diagnosis/diagnoses of Severe protein-calorie malnutrition.    This patient is being managed with:   Diet Minced and Moist-  Consistent Carbohydrate {No Snacks}  DASH/TLC {Sodium & Cholesterol Restricted}  Mildly Thick Liquids (MILDTHICKLIQS)  Supplement Feeding Modality:  Oral  Glucerna Shake Cans or Servings Per Day:  1       Frequency:  Three Times a day  Entered: Apr 12 2022 12:17PM    
This patient has been assessed with a concern for Malnutrition and has been determined to have a diagnosis/diagnoses of Severe protein-calorie malnutrition.    This patient is being managed with:   Diet Minced and Moist-  Consistent Carbohydrate {No Snacks}  DASH/TLC {Sodium & Cholesterol Restricted}  Mildly Thick Liquids (MILDTHICKLIQS)  Supplement Feeding Modality:  Oral  Glucerna Shake Cans or Servings Per Day:  1       Frequency:  Three Times a day  Entered: Apr 12 2022 12:17PM    
This patient has been assessed with a concern for Malnutrition and has been determined to have a diagnosis/diagnoses of Severe protein-calorie malnutrition.    This patient is being managed with:   Diet Pureed-  Consistent Carbohydrate {No Snacks}  DASH/TLC {Sodium & Cholesterol Restricted}  Mildly Thick Liquids (MILDTHICKLIQS)  Supplement Feeding Modality:  Oral  Glucerna Shake Cans or Servings Per Day:  1       Frequency:  Three Times a day  Entered: Apr 14 2022  9:19AM    

## 2022-04-19 NOTE — PROGRESS NOTE ADULT - SUBJECTIVE AND OBJECTIVE BOX
SUBJECTIVE/ROS: His voiding has improved and he is stable and ready for discharge. All discharge instructions reviewed with wife and patient. All questions answered. Denies chest pain, fever, chills, nausea, vomiting, abdominal pain, headache, or BLE pain.     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 As per patient, he was watching a movie when he felt very short of breath suddenly and dropped to the floor, denies LOC and denies any trauma to his head, was on the floor for ~2 hours. Patient was unable to rise from the floor by himself due to his left arm weakness. When first brought to the ED, patient was hypoxic and hypertensive o2 saturation in EMS was >80%. While in the ED, patient had episode of seizure-like activity in his b/l upper extremities which was controlled w/ ativan. Patient was seen and examined at bedside, BiPAP still in place, patient was still mildly lethargic from ativan and SOB with bipap mask on. Patient able to open eyes and nod/shake head to questions. He was admitted for pulmonary edema and found to have leukocytosis, JARRED on CKD, elevated troponin/NSTEMI. He was started on depakote for seizures and heparin gtt and aggrenox. Repeat CTH 3/31 demonstrated infarct in R perirolandic cortex. Hospital course further complicated by RUL PNA on CT chest; however leukocytosis resolved and ID recommended monitoring off abx. Patient was evaluated by Palliative for GOC discussion and pt was made DNR with trial of intubation. He clinically improved over the course of his hospitalization. Patient was evaluated by therapy for functional deficits and gait/ ADL impairments and recommended acute rehabilitation. Patient was medically optimized for discharge to [facility] on 4/5. (05 Apr 2022 16:18)      PAST MEDICAL & SURGICAL HISTORY:  MI (myocardial infarction)  1992    HTN (hypertension)    HLD (hyperlipidemia)    Throat cancer  2011, treated with chemo, RT    Ventricular arrhythmia  s/p AICD    Diabetes  Type2, not on any meds since weight loss after throat Ca    Renal insufficiency  s/p chemo    CAD (coronary artery disease)    Inguinal hernia, left    H/O prior ablation treatment  VT, 2009    Cardiac defibrillator in place  - 2009, battery change 2017    S/P left inguinal hernia repair  2018 at [facility]        Allergies    No Known Allergies    Intolerances        Vital Signs Last 24 Hrs  T(C): 36.5 (19 Apr 2022 05:04), Max: 37.1 (18 Apr 2022 19:50)  T(F): 97.7 (19 Apr 2022 05:04), Max: 98.8 (18 Apr 2022 19:50)  HR: 89 (19 Apr 2022 05:04) (79 - 89)  BP: 133/85 (19 Apr 2022 05:04) (131/73 - 133/85)  BP(mean): --  RR: 16 (19 Apr 2022 05:04) (16 - 16)  SpO2: 94% (19 Apr 2022 05:04) (94% - 94%)      PHYSICAL EXAM  Constitutional - NAD, Comfortable  HEENT - NCAT, EOMI  Neck - Supple, No limited ROM  Chest - CTA bilaterally  Cardiovascular - RRR, S1S2  Abdomen -BS+, Soft, NTND  Extremities - No C/C/E, No calf tenderness   Neurologic Exam - aox3, robles 4+/5, mild dysarthria     RECENT LABS:                          9.6    8.86  )-----------( 198      ( 18 Apr 2022 06:50 )             30.2     04-18    139  |  103  |  33<H>  ----------------------------<  101<H>  4.4   |  29  |  2.10<H>    Ca    8.9      18 Apr 2022 06:50    TPro  5.7<L>  /  Alb  2.1<L>  /  TBili  0.3  /  DBili  x   /  AST  28  /  ALT  28  /  AlkPhos  56  04-18    LIVER FUNCTIONS - ( 18 Apr 2022 06:50 )  Alb: 2.1 g/dL / Pro: 5.7 g/dL / ALK PHOS: 56 U/L / ALT: 28 U/L / AST: 28 U/L / GGT: x                   CAPILLARY BLOOD GLUCOSE      POCT Blood Glucose.: 105 mg/dL (19 Apr 2022 08:08)  POCT Blood Glucose.: 133 mg/dL (18 Apr 2022 16:48)      MEDICATIONS  (STANDING):  atorvastatin 80 milliGRAM(s) Oral at bedtime  Biotene Dry Mouth Oral Rinse 5 milliLiter(s) Swish and Spit daily  cyanocobalamin 1000 MICROGram(s) Oral daily  dextrose 5%. 1000 milliLiter(s) (50 mL/Hr) IV Continuous <Continuous>  dextrose 5%. 1000 milliLiter(s) (100 mL/Hr) IV Continuous <Continuous>  dextrose 50% Injectable 25 Gram(s) IV Push once  dextrose 50% Injectable 12.5 Gram(s) IV Push once  dextrose 50% Injectable 25 Gram(s) IV Push once  dipyridamole 200 mG/aspirin 25 mG 1 Capsule(s) Oral two times a day  diVALproex  milliGRAM(s) Oral two times a day  escitalopram 5 milliGRAM(s) Oral daily  ferrous    sulfate 325 milliGRAM(s) Oral daily  finasteride 5 milliGRAM(s) Oral daily  glucagon  Injectable 1 milliGRAM(s) IntraMuscular once  insulin lispro (ADMELOG) corrective regimen sliding scale   SubCutaneous two times a day with meals  levothyroxine 75 MICROGram(s) Oral daily  melatonin 3 milliGRAM(s) Oral at bedtime  tamsulosin 0.8 milliGRAM(s) Oral at bedtime    MEDICATIONS  (PRN):  ALBUTerol    90 MICROgram(s) HFA Inhaler 2 Puff(s) Inhalation every 6 hours PRN Shortness of Breath and/or Wheezing  dextrose Oral Gel 15 Gram(s) Oral once PRN Blood Glucose LESS THAN 70 milliGRAM(s)/deciliter

## 2022-04-19 NOTE — PROGRESS NOTE ADULT - ASSESSMENT
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 Found to have R central sulcus MCA territory infarct on repeat CTH. Hospital course complicated by leukocytosis, JARRED on CKD. Admitted to [place] acute inpatient rehab on 4/5 for ADL, gait, and functional impairments.     # s/p R central Sulcus CVA  - Unable to obtain MRI due to ICD  - Continue aggrenox  - Continue atorvastatin  - Continue Lexapro 5mg daily (4/14)    # Seizures  - I/s/o R central sulcus infarct  - Continue Depakote 500mg BID for 6 months per neuro    # NSTEMI  # HFrEF  # HTN  - TTE with severe systolic dysfunction 30-35%  - Continue statin  - Holding home imdur and coreg. Hold ACEI i/s/o JARRED on CKD  - Hold Metroprolol ER 50mg and Hydralazine 5mg TID due to hypotension    # JARRED    - Cr near baseline of 2.0-2.1  -Cr 1.96 (4/11) --> 1.86 (4/12) --> 2.1 (4/18)  - Avoid nephrotoxic medications; holding ACEI    #Type 2 Diabetes Mellitus  - A1c 8.3  - Continue SSI  -Not on medication at home  - Hospitalist consult appreciated    #Tonsillar Cancer #Dysphagia  - Seen by ENT recently  - puree/mildly thick - upgraded to minced/moist and mild thick but prefers puree   - Residual L hearing loss    #Hypothyroid  -Continue synthroid 75mcg daily    #Urinary retention  -Continue flomax 0.8mg daily  -Continue finasteride 5mg daily   -TOV on 4/6 at midnight - able to void with small residual but Hillman reinserted due to continued retention  -Repeat TOV 4/16 and passed with larger volumes voided when toileted     # Sleep:  - Melatonin qHS    # Diet:   - Diet Consistency/Modifications: Puree (patient's preference) and mild thick    ---------------  Outpatient Follow-up:    CHANCE FRANCO  Internal Medicine  [address], [place] [address]  Phone: [phone]  Fax: [phone]  Follow Up Time: 1-3 days    Chet Garcia)  Cardiovascular Disease; Internal Medicine  [facility], [address], [address]  [place] [address]  Phone: [phone]  Fax: [phone]  Follow Up Time: 1-3 days    Brandon Harrison)  Urology  [address] [place] [address], [address]  [place] [address]  Phone: [phone]  Fax: [phone]  Follow Up Time: 1-3 days    Yenifer Faulkner)  Neurology  [address] [place] [address]  Phone: [phone]  Fax: [phone]    TEAM MEETING 4/18/22  SW:  Lives in lifted home with 11 stairs to enter, wife works, children unable to help  OT: mod I for eat/groom/UBD, SV for groom/UBD/transfers CG for toileting   PT: mod I for amb household distances and transfers, walks with cane 150', 12 steps with SV  SLP: MBS 4/11, weak swallow, upgrade to mild thick can use compensatory methods, must follow up with ENT as outpatient    barriers: left inattention, dec strength  goals: mod I for adls, sv for transfers and ambulation  EDOD: Home with assistance 4/19     STABLE AND READY FOR DISCHARGE

## 2022-04-19 NOTE — PROGRESS NOTE ADULT - NS ATTEND AMEND GEN_ALL_CORE FT
No acute events overnight  Observed in ST  - downgraded diet/ fluids to thickened, aspiration precautions  Stable exam  Labs reviewed   Full program
Pt. seen this AM.  Agree with documentation above as per NP. Patient medically stable. Voiding with LCX=382 as per nursing.  will need to f/u with urology on discharge.     Medically stable for discharge home
Pt. seen with NP.  Agree with documentation above as per NP. Patient medically stable. Making progress towards rehab goals.     CVA  --Voiding after mcclellan removal-- incontinent so challenging to get true PVR-- asked nursing to try.  monitor  d/c planning to home tomorrow if no issues with voiding-- will need outpatient urology f/u
Severe hypotension - 70/50 mmHg, asymptomatic, and CKD/JARRED with poor oral fluid intake ( thickened)   Refusing IVF   Can't have thin liquids ( dysphagia) with high risk of aspiration   Strongly encouraged PO hydration   Medicine, Renal and Cardiology input requested
Pt. seen with NP.  Agree with documentation above as per NP. Patient medically stable. Making progress towards rehab goals.    stopped ceftriaxone as no concern for infection
Case reviewed with Renal - Improved JARRED - discontinue IVF, encourage PO liquids. Will ask  ro evaluate for urinary retention ( high PVRs).  Neurologically stable  ENT input appreciated  Full program   Discharge plan is in progress ( SW input appreciated).
Pt. seen with NP.  Agree with documentation above as per NP with amendments made as appropriate. Patient medically stable. Making progress towards rehab goals.
Responding to IVF - resolving JARRED  Medicine and Nephrology are following, case discussed  Multidisciplinary team meeting today:  patient's functional goals and needs, functional and clinical  progress were discussed, barriers to discharge were identified. Anticipate discharge home with home care, 24/7 supervision for safety.     EDOD 4/19/22    > 35 min spent, >50% coordinating care
Include Location In Plan?: No
Detail Level: Generalized

## 2022-04-19 NOTE — PROGRESS NOTE ADULT - ASSESSMENT
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 Found to have R central sulcus MCA territory infarct on repeat CTH. Hospital course complicated by leukocytosis, JARRED on CKD. Admitted to [place] acute inpatient rehab on 4/5 for ADL, gait, and functional impairments.     #s/p R central Sulcus CVA  #Seizure  #Debility  - Unable to obtain MRI due to ICD  - Continue aggrenox bid  - Continue atorvastatin 80mg qd  - Continue Depakote  - Continue comprehensive rehab program - PT/OT/SLP per rehab team  - Seizure, Fall precaution    # NSTEMI  # HFrEF  # HTN  - TTE LVEF 30% - 35% w/ severe systolic dysfunction  - Continue statin  - Continue aggrenox  - HF medications held in the setting of hypotension. Will need to restart for mortality benefit  - Cardiology consult    #JARRED on probable CKD Stage III - improving  #Urinary retention possibly due to neurogenic bladder from CVA vs. BPH  - Hillman per rehab, cont flomax - dc if hypotension persists  - Renal sono - CKD and a distended bladder  - Avoid nephrotoxic medications; holding ACEI, lasix prn    #Type 2 Diabetes Mellitus  - HbA1c 8.3  - Continue FS, ISS    #Tonsillar Cancer #Dysphagia  - Seen by ENT recent  - Continue puree/mildly thick. diet/nutrition follow up  - Residual L hearing loss    #Hypothyroidism  -Synthroid 75mcg qd    #DVT ppx - Aggrenox

## 2022-04-19 NOTE — PROGRESS NOTE ADULT - SUBJECTIVE AND OBJECTIVE BOX
SUBJECTIVE / OVERNIGHT EVENTS: Patient seen and examined. No acute events overnight. Pain well controlled and patient without any complaints.    MEDICATIONS  (STANDING):  atorvastatin 80 milliGRAM(s) Oral at bedtime  Biotene Dry Mouth Oral Rinse 5 milliLiter(s) Swish and Spit daily  cyanocobalamin 1000 MICROGram(s) Oral daily  dextrose 5%. 1000 milliLiter(s) (100 mL/Hr) IV Continuous <Continuous>  dextrose 5%. 1000 milliLiter(s) (50 mL/Hr) IV Continuous <Continuous>  dextrose 50% Injectable 25 Gram(s) IV Push once  dextrose 50% Injectable 12.5 Gram(s) IV Push once  dextrose 50% Injectable 25 Gram(s) IV Push once  dipyridamole 200 mG/aspirin 25 mG 1 Capsule(s) Oral two times a day  diVALproex  milliGRAM(s) Oral two times a day  escitalopram 5 milliGRAM(s) Oral daily  ferrous    sulfate 325 milliGRAM(s) Oral daily  finasteride 5 milliGRAM(s) Oral daily  glucagon  Injectable 1 milliGRAM(s) IntraMuscular once  insulin lispro (ADMELOG) corrective regimen sliding scale   SubCutaneous two times a day with meals  levothyroxine 75 MICROGram(s) Oral daily  melatonin 3 milliGRAM(s) Oral at bedtime  tamsulosin 0.8 milliGRAM(s) Oral at bedtime    MEDICATIONS  (PRN):  ALBUTerol    90 MICROgram(s) HFA Inhaler 2 Puff(s) Inhalation every 6 hours PRN Shortness of Breath and/or Wheezing  dextrose Oral Gel 15 Gram(s) Oral once PRN Blood Glucose LESS THAN 70 milliGRAM(s)/deciliter      Vital Signs Last 24 Hrs  T(C): 36.5 (19 Apr 2022 05:04), Max: 37.1 (18 Apr 2022 19:50)  T(F): 97.7 (19 Apr 2022 05:04), Max: 98.8 (18 Apr 2022 19:50)  HR: 89 (19 Apr 2022 05:04) (79 - 89)  BP: 133/85 (19 Apr 2022 05:04) (131/73 - 133/85)  BP(mean): --  RR: 16 (19 Apr 2022 05:04) (16 - 16)  SpO2: 94% (19 Apr 2022 05:04) (94% - 94%)      REVIEW OF SYSTEMS:  For ROV please refer back to H&P     PHYSICAL EXAM:  GENERAL: NAD, elderly male  HEAD:  Atraumatic, Normocephalic  NECK: Supple, No JVD  CHEST/LUNG: Clear to auscultation bilaterally; No wheeze, nonlabored breathing  HEART: Regular rate and rhythm, + systolic murmur  ABDOMEN: Soft, Nontender, Nondistended; Bowel sounds present  EXTREMITIES:  2+ Peripheral Pulses, No clubbing, cyanosis, or edema        LABS:              x                    138  | 26   | 31           x     >-----------< x       ------------------------< 98                    x                    4.2  | 103  | 2.25                                         Ca 8.9   Mg x     Ph x           CAPILLARY BLOOD GLUCOSE  POCT Blood Glucose.: 116 mg/dL (17 Apr 2022 08:07)  POCT Blood Glucose.: 124 mg/dL (16 Apr 2022 17:00)      RADIOLOGY & ADDITIONAL TESTS:    Imaging Personally Reviewed:    [X] Consultant(s) Notes Reviewed:  [X] Care Discussed with Consultants/Other Providers:

## 2022-04-25 NOTE — H&P PST ADULT - CARDIOVASCULAR COMMENTS
[FreeTextEntry1] : EKG and labs reviewed.\par Pt is cleared for surgery.\par No further w/u needed. 
AICD

## 2022-04-26 ENCOUNTER — INPATIENT (INPATIENT)
Facility: HOSPITAL | Age: 77
LOS: 0 days | Discharge: HOME | DRG: 315 | End: 2022-04-27
Attending: INTERNAL MEDICINE | Admitting: INTERNAL MEDICINE
Payer: MEDICARE

## 2022-04-26 VITALS
DIASTOLIC BLOOD PRESSURE: 50 MMHG | RESPIRATION RATE: 16 BRPM | TEMPERATURE: 97 F | SYSTOLIC BLOOD PRESSURE: 70 MMHG | OXYGEN SATURATION: 97 % | HEIGHT: 69 IN | HEART RATE: 84 BPM

## 2022-04-26 DIAGNOSIS — I95.9 HYPOTENSION, UNSPECIFIED: ICD-10-CM

## 2022-04-26 DIAGNOSIS — R42 DIZZINESS AND GIDDINESS: ICD-10-CM

## 2022-04-26 DIAGNOSIS — D63.8 ANEMIA IN OTHER CHRONIC DISEASES CLASSIFIED ELSEWHERE: ICD-10-CM

## 2022-04-26 DIAGNOSIS — I50.22 CHRONIC SYSTOLIC (CONGESTIVE) HEART FAILURE: ICD-10-CM

## 2022-04-26 DIAGNOSIS — Z95.810 PRESENCE OF AUTOMATIC (IMPLANTABLE) CARDIAC DEFIBRILLATOR: Chronic | ICD-10-CM

## 2022-04-26 DIAGNOSIS — Z29.9 ENCOUNTER FOR PROPHYLACTIC MEASURES, UNSPECIFIED: ICD-10-CM

## 2022-04-26 DIAGNOSIS — Z86.73 PERSONAL HISTORY OF TRANSIENT ISCHEMIC ATTACK (TIA), AND CEREBRAL INFARCTION WITHOUT RESIDUAL DEFICITS: ICD-10-CM

## 2022-04-26 DIAGNOSIS — I25.10 ATHEROSCLEROTIC HEART DISEASE OF NATIVE CORONARY ARTERY WITHOUT ANGINA PECTORIS: ICD-10-CM

## 2022-04-26 DIAGNOSIS — E11.9 TYPE 2 DIABETES MELLITUS WITHOUT COMPLICATIONS: ICD-10-CM

## 2022-04-26 DIAGNOSIS — C09.9 MALIGNANT NEOPLASM OF TONSIL, UNSPECIFIED: ICD-10-CM

## 2022-04-26 DIAGNOSIS — I21.4 NON-ST ELEVATION (NSTEMI) MYOCARDIAL INFARCTION: ICD-10-CM

## 2022-04-26 DIAGNOSIS — Z98.890 OTHER SPECIFIED POSTPROCEDURAL STATES: Chronic | ICD-10-CM

## 2022-04-26 DIAGNOSIS — N17.9 ACUTE KIDNEY FAILURE, UNSPECIFIED: ICD-10-CM

## 2022-04-26 DIAGNOSIS — E78.5 HYPERLIPIDEMIA, UNSPECIFIED: ICD-10-CM

## 2022-04-26 DIAGNOSIS — E03.9 HYPOTHYROIDISM, UNSPECIFIED: ICD-10-CM

## 2022-04-26 DIAGNOSIS — I10 ESSENTIAL (PRIMARY) HYPERTENSION: ICD-10-CM

## 2022-04-26 DIAGNOSIS — Z86.79 PERSONAL HISTORY OF OTHER DISEASES OF THE CIRCULATORY SYSTEM: ICD-10-CM

## 2022-04-26 DIAGNOSIS — C09.9 MALIGNANT NEOPLASM OF TONSIL, UNSPECIFIED: Chronic | ICD-10-CM

## 2022-04-26 LAB
ALBUMIN SERPL ELPH-MCNC: 2.2 G/DL — LOW (ref 3.3–5)
ALP SERPL-CCNC: 56 U/L — SIGNIFICANT CHANGE UP (ref 40–120)
ALT FLD-CCNC: 19 U/L — SIGNIFICANT CHANGE UP (ref 12–78)
ANION GAP SERPL CALC-SCNC: 9 MMOL/L — SIGNIFICANT CHANGE UP (ref 5–17)
ANISOCYTOSIS BLD QL: SIGNIFICANT CHANGE UP
APPEARANCE UR: CLEAR — SIGNIFICANT CHANGE UP
APTT BLD: 32.1 SEC — SIGNIFICANT CHANGE UP (ref 27.5–35.5)
AST SERPL-CCNC: 19 U/L — SIGNIFICANT CHANGE UP (ref 15–37)
BASOPHILS # BLD AUTO: 0 K/UL — SIGNIFICANT CHANGE UP (ref 0–0.2)
BASOPHILS NFR BLD AUTO: 0 % — SIGNIFICANT CHANGE UP (ref 0–2)
BILIRUB SERPL-MCNC: 0.4 MG/DL — SIGNIFICANT CHANGE UP (ref 0.2–1.2)
BILIRUB UR-MCNC: NEGATIVE — SIGNIFICANT CHANGE UP
BUN SERPL-MCNC: 39 MG/DL — HIGH (ref 7–23)
BURR CELLS BLD QL SMEAR: PRESENT — SIGNIFICANT CHANGE UP
CALCIUM SERPL-MCNC: 8.6 MG/DL — SIGNIFICANT CHANGE UP (ref 8.5–10.1)
CHLORIDE SERPL-SCNC: 104 MMOL/L — SIGNIFICANT CHANGE UP (ref 96–108)
CK MB BLD-MCNC: 3.2 % — SIGNIFICANT CHANGE UP (ref 0–3.5)
CK MB CFR SERPL CALC: 2.2 NG/ML — SIGNIFICANT CHANGE UP (ref 0–3.6)
CK SERPL-CCNC: 69 U/L — SIGNIFICANT CHANGE UP (ref 26–308)
CO2 SERPL-SCNC: 26 MMOL/L — SIGNIFICANT CHANGE UP (ref 22–31)
COLOR SPEC: YELLOW — SIGNIFICANT CHANGE UP
CREAT SERPL-MCNC: 2.8 MG/DL — HIGH (ref 0.5–1.3)
DIFF PNL FLD: NEGATIVE — SIGNIFICANT CHANGE UP
EGFR: 23 ML/MIN/1.73M2 — LOW
ELLIPTOCYTES BLD QL SMEAR: SLIGHT — SIGNIFICANT CHANGE UP
EOSINOPHIL # BLD AUTO: 0.09 K/UL — SIGNIFICANT CHANGE UP (ref 0–0.5)
EOSINOPHIL NFR BLD AUTO: 1 % — SIGNIFICANT CHANGE UP (ref 0–6)
EPI CELLS # UR: SIGNIFICANT CHANGE UP
FLUAV AG NPH QL: SIGNIFICANT CHANGE UP
FLUBV AG NPH QL: SIGNIFICANT CHANGE UP
GLUCOSE SERPL-MCNC: 181 MG/DL — HIGH (ref 70–99)
GLUCOSE UR QL: NEGATIVE — SIGNIFICANT CHANGE UP
HCT VFR BLD CALC: 31.3 % — LOW (ref 39–50)
HGB BLD-MCNC: 9.8 G/DL — LOW (ref 13–17)
INR BLD: 1.29 RATIO — HIGH (ref 0.88–1.16)
KETONES UR-MCNC: NEGATIVE — SIGNIFICANT CHANGE UP
LACTATE SERPL-SCNC: 1.7 MMOL/L — SIGNIFICANT CHANGE UP (ref 0.7–2)
LEUKOCYTE ESTERASE UR-ACNC: ABNORMAL
LYMPHOCYTES # BLD AUTO: 0.28 K/UL — LOW (ref 1–3.3)
LYMPHOCYTES # BLD AUTO: 3 % — LOW (ref 13–44)
MANUAL SMEAR VERIFICATION: SIGNIFICANT CHANGE UP
MCHC RBC-ENTMCNC: 24.3 PG — LOW (ref 27–34)
MCHC RBC-ENTMCNC: 31.3 GM/DL — LOW (ref 32–36)
MCV RBC AUTO: 77.7 FL — LOW (ref 80–100)
MICROCYTES BLD QL: SIGNIFICANT CHANGE UP
MONOCYTES # BLD AUTO: 1.1 K/UL — HIGH (ref 0–0.9)
MONOCYTES NFR BLD AUTO: 12 % — SIGNIFICANT CHANGE UP (ref 2–14)
NEUTROPHILS # BLD AUTO: 7.53 K/UL — HIGH (ref 1.8–7.4)
NEUTROPHILS NFR BLD AUTO: 81 % — HIGH (ref 43–77)
NEUTS BAND # BLD: 1 % — SIGNIFICANT CHANGE UP (ref 0–8)
NITRITE UR-MCNC: NEGATIVE — SIGNIFICANT CHANGE UP
NRBC # BLD: 0 — SIGNIFICANT CHANGE UP
NRBC # BLD: SIGNIFICANT CHANGE UP /100 WBCS (ref 0–0)
PH UR: 5 — SIGNIFICANT CHANGE UP (ref 5–8)
PLAT MORPH BLD: NORMAL — SIGNIFICANT CHANGE UP
PLATELET # BLD AUTO: 177 K/UL — SIGNIFICANT CHANGE UP (ref 150–400)
POIKILOCYTOSIS BLD QL AUTO: SLIGHT — SIGNIFICANT CHANGE UP
POTASSIUM SERPL-MCNC: 4.2 MMOL/L — SIGNIFICANT CHANGE UP (ref 3.5–5.3)
POTASSIUM SERPL-SCNC: 4.2 MMOL/L — SIGNIFICANT CHANGE UP (ref 3.5–5.3)
PROT SERPL-MCNC: 6.1 G/DL — SIGNIFICANT CHANGE UP (ref 6–8.3)
PROT UR-MCNC: 30 MG/DL
PROTHROM AB SERPL-ACNC: 15.1 SEC — HIGH (ref 10.5–13.4)
RBC # BLD: 4.03 M/UL — LOW (ref 4.2–5.8)
RBC # FLD: 20.5 % — HIGH (ref 10.3–14.5)
RBC BLD AUTO: ABNORMAL
RBC CASTS # UR COMP ASSIST: SIGNIFICANT CHANGE UP /HPF (ref 0–4)
ROULEAUX BLD QL SMEAR: PRESENT
RSV RNA NPH QL NAA+NON-PROBE: SIGNIFICANT CHANGE UP
SARS-COV-2 RNA SPEC QL NAA+PROBE: SIGNIFICANT CHANGE UP
SCHISTOCYTES BLD QL AUTO: SLIGHT — SIGNIFICANT CHANGE UP
SODIUM SERPL-SCNC: 139 MMOL/L — SIGNIFICANT CHANGE UP (ref 135–145)
SP GR SPEC: 1.02 — SIGNIFICANT CHANGE UP (ref 1.01–1.02)
TROPONIN I, HIGH SENSITIVITY RESULT: 559.2 NG/L — HIGH
UROBILINOGEN FLD QL: NEGATIVE — SIGNIFICANT CHANGE UP
VARIANT LYMPHS # BLD: 2 % — SIGNIFICANT CHANGE UP (ref 0–6)
WBC # BLD: 9.18 K/UL — SIGNIFICANT CHANGE UP (ref 3.8–10.5)
WBC # FLD AUTO: 9.18 K/UL — SIGNIFICANT CHANGE UP (ref 3.8–10.5)
WBC UR QL: SIGNIFICANT CHANGE UP

## 2022-04-26 PROCEDURE — 99285 EMERGENCY DEPT VISIT HI MDM: CPT

## 2022-04-26 PROCEDURE — 71045 X-RAY EXAM CHEST 1 VIEW: CPT | Mod: 26

## 2022-04-26 PROCEDURE — 93010 ELECTROCARDIOGRAM REPORT: CPT

## 2022-04-26 RX ORDER — DEXTROSE 50 % IN WATER 50 %
25 SYRINGE (ML) INTRAVENOUS ONCE
Refills: 0 | Status: DISCONTINUED | OUTPATIENT
Start: 2022-04-26 | End: 2022-04-27

## 2022-04-26 RX ORDER — ESCITALOPRAM OXALATE 10 MG/1
5 TABLET, FILM COATED ORAL DAILY
Refills: 0 | Status: DISCONTINUED | OUTPATIENT
Start: 2022-04-26 | End: 2022-04-27

## 2022-04-26 RX ORDER — LEVOTHYROXINE SODIUM 125 MCG
75 TABLET ORAL DAILY
Refills: 0 | Status: DISCONTINUED | OUTPATIENT
Start: 2022-04-26 | End: 2022-04-27

## 2022-04-26 RX ORDER — HEPARIN SODIUM 5000 [USP'U]/ML
5000 INJECTION INTRAVENOUS; SUBCUTANEOUS EVERY 8 HOURS
Refills: 0 | Status: DISCONTINUED | OUTPATIENT
Start: 2022-04-26 | End: 2022-04-27

## 2022-04-26 RX ORDER — INSULIN LISPRO 100/ML
VIAL (ML) SUBCUTANEOUS AT BEDTIME
Refills: 0 | Status: DISCONTINUED | OUTPATIENT
Start: 2022-04-26 | End: 2022-04-27

## 2022-04-26 RX ORDER — ASPIRIN AND DIPYRIDAMOLE 25; 200 MG/1; MG/1
1 CAPSULE, EXTENDED RELEASE ORAL
Refills: 0 | Status: DISCONTINUED | OUTPATIENT
Start: 2022-04-26 | End: 2022-04-27

## 2022-04-26 RX ORDER — DEXTROSE 50 % IN WATER 50 %
12.5 SYRINGE (ML) INTRAVENOUS ONCE
Refills: 0 | Status: DISCONTINUED | OUTPATIENT
Start: 2022-04-26 | End: 2022-04-27

## 2022-04-26 RX ORDER — GLUCAGON INJECTION, SOLUTION 0.5 MG/.1ML
1 INJECTION, SOLUTION SUBCUTANEOUS ONCE
Refills: 0 | Status: DISCONTINUED | OUTPATIENT
Start: 2022-04-26 | End: 2022-04-27

## 2022-04-26 RX ORDER — SODIUM CHLORIDE 9 MG/ML
1000 INJECTION, SOLUTION INTRAVENOUS
Refills: 0 | Status: DISCONTINUED | OUTPATIENT
Start: 2022-04-26 | End: 2022-04-27

## 2022-04-26 RX ORDER — SENNA PLUS 8.6 MG/1
2 TABLET ORAL AT BEDTIME
Refills: 0 | Status: DISCONTINUED | OUTPATIENT
Start: 2022-04-26 | End: 2022-04-27

## 2022-04-26 RX ORDER — ATORVASTATIN CALCIUM 80 MG/1
80 TABLET, FILM COATED ORAL AT BEDTIME
Refills: 0 | Status: DISCONTINUED | OUTPATIENT
Start: 2022-04-26 | End: 2022-04-27

## 2022-04-26 RX ORDER — TAMSULOSIN HYDROCHLORIDE 0.4 MG/1
0.8 CAPSULE ORAL AT BEDTIME
Refills: 0 | Status: DISCONTINUED | OUTPATIENT
Start: 2022-04-26 | End: 2022-04-27

## 2022-04-26 RX ORDER — SODIUM CHLORIDE 9 MG/ML
1000 INJECTION INTRAMUSCULAR; INTRAVENOUS; SUBCUTANEOUS
Refills: 0 | Status: DISCONTINUED | OUTPATIENT
Start: 2022-04-26 | End: 2022-04-26

## 2022-04-26 RX ORDER — SODIUM CHLORIDE 9 MG/ML
1000 INJECTION INTRAMUSCULAR; INTRAVENOUS; SUBCUTANEOUS ONCE
Refills: 0 | Status: COMPLETED | OUTPATIENT
Start: 2022-04-26 | End: 2022-04-26

## 2022-04-26 RX ORDER — INSULIN LISPRO 100/ML
VIAL (ML) SUBCUTANEOUS
Refills: 0 | Status: DISCONTINUED | OUTPATIENT
Start: 2022-04-26 | End: 2022-04-27

## 2022-04-26 RX ORDER — DIVALPROEX SODIUM 500 MG/1
500 TABLET, DELAYED RELEASE ORAL
Refills: 0 | Status: DISCONTINUED | OUTPATIENT
Start: 2022-04-26 | End: 2022-04-27

## 2022-04-26 RX ORDER — LANOLIN ALCOHOL/MO/W.PET/CERES
3 CREAM (GRAM) TOPICAL AT BEDTIME
Refills: 0 | Status: DISCONTINUED | OUTPATIENT
Start: 2022-04-26 | End: 2022-04-27

## 2022-04-26 RX ORDER — FINASTERIDE 5 MG/1
5 TABLET, FILM COATED ORAL DAILY
Refills: 0 | Status: DISCONTINUED | OUTPATIENT
Start: 2022-04-26 | End: 2022-04-27

## 2022-04-26 RX ORDER — ALBUTEROL 90 UG/1
2 AEROSOL, METERED ORAL EVERY 6 HOURS
Refills: 0 | Status: DISCONTINUED | OUTPATIENT
Start: 2022-04-26 | End: 2022-04-27

## 2022-04-26 RX ORDER — FERROUS SULFATE 325(65) MG
325 TABLET ORAL DAILY
Refills: 0 | Status: DISCONTINUED | OUTPATIENT
Start: 2022-04-26 | End: 2022-04-27

## 2022-04-26 RX ORDER — DEXTROSE 50 % IN WATER 50 %
15 SYRINGE (ML) INTRAVENOUS ONCE
Refills: 0 | Status: DISCONTINUED | OUTPATIENT
Start: 2022-04-26 | End: 2022-04-27

## 2022-04-26 RX ORDER — SALIVA SUBSTITUTE COMB NO.11 351 MG
5 POWDER IN PACKET (EA) MUCOUS MEMBRANE THREE TIMES A DAY
Refills: 0 | Status: DISCONTINUED | OUTPATIENT
Start: 2022-04-26 | End: 2022-04-27

## 2022-04-26 RX ORDER — SODIUM CHLORIDE 9 MG/ML
1000 INJECTION, SOLUTION INTRAVENOUS
Refills: 0 | Status: DISCONTINUED | OUTPATIENT
Start: 2022-04-26 | End: 2022-04-26

## 2022-04-26 RX ORDER — ACETAMINOPHEN 500 MG
650 TABLET ORAL EVERY 6 HOURS
Refills: 0 | Status: DISCONTINUED | OUTPATIENT
Start: 2022-04-26 | End: 2022-04-27

## 2022-04-26 RX ORDER — ONDANSETRON 8 MG/1
4 TABLET, FILM COATED ORAL EVERY 8 HOURS
Refills: 0 | Status: DISCONTINUED | OUTPATIENT
Start: 2022-04-26 | End: 2022-04-27

## 2022-04-26 RX ADMIN — SODIUM CHLORIDE 1000 MILLILITER(S): 9 INJECTION INTRAMUSCULAR; INTRAVENOUS; SUBCUTANEOUS at 19:25

## 2022-04-26 RX ADMIN — SODIUM CHLORIDE 1000 MILLILITER(S): 9 INJECTION INTRAMUSCULAR; INTRAVENOUS; SUBCUTANEOUS at 20:20

## 2022-04-26 NOTE — H&P ADULT - NSICDXPASTMEDICALHX_GEN_ALL_CORE_FT
PAST MEDICAL HISTORY:  CAD (coronary artery disease)     Diabetes Type2, not on any meds since weight loss after throat Ca    HLD (hyperlipidemia)     HTN (hypertension)     Inguinal hernia, left     MI (myocardial infarction) 1992    Renal insufficiency s/p chemo    Throat cancer 2011, treated with chemo, RT    Ventricular arrhythmia s/p AICD     PAST MEDICAL HISTORY:  Anemia of chronic disease     CAD (coronary artery disease)     Diabetes Type2, not on any meds since weight loss after throat Ca    H/O urinary retention pt has large PVR    History of dysphagia     HLD (hyperlipidemia)     HTN (hypertension)     Inguinal hernia, left     MI (myocardial infarction) 1992    Recent cerebrovascular accident (CVA) R central sulcus MCA infarct, with left Upper EXT weakness, April 2022    Renal insufficiency s/p chemo    Stage 3 chronic kidney disease     Throat cancer 2011, treated with chemo, RT    Ventricular arrhythmia s/p AICD

## 2022-04-26 NOTE — H&P ADULT - PROBLEM SELECTOR PLAN 12
chronic  - continue home synthroid    #urinary retention  - pt w/ urinary retention during previous admission, discharged on flomax and finasteride, continue  - monitor ins/outs    #need for prophylactic measure  - dvt prophylaxis w/ lovenox chronic  - continue home synthroid    #urinary retention  - pt w/ urinary retention during previous admission, discharged on flomax and finasteride, continue  - monitor ins/outs    #anemia  Patient w/ admission Hgb of 9.8, baseline appears 12  - continue to monitor    - transfusion goal hgb>8   - Iron panel was previously consistent w/ iron deficiency anemia, continue home ferrous sulfate  - trend daily CBCs    #need for prophylactic measure  - dvt prophylaxis w/ lovenox chronic  - continue home synthroid    #urinary retention  - pt w/ urinary retention during previous admission, discharged on flomax and finasteride, continue  - monitor ins/outs    #anemia  Patient w/ admission Hgb of 9.8, baseline appears 12  - continue to monitor    - transfusion goal hgb>8   - Iron panel was previously consistent w/ iron deficiency anemia, continue home ferrous sulfate  - trend daily CBCs    #need for prophylactic measure  - dvt prophylaxis w/ heparin continue SC Heparin continue SC Heparin q 8 hrs     BPH- pt with Urinary retention & large PVR  -On Flomax 0.8 mg qHS  Proscar daily

## 2022-04-26 NOTE — H&P ADULT - NSHPSOCIALHISTORY_GEN_ALL_CORE
Smoking - Former smoker quit 40 years ago, 20-pack-years  EtOH - denies  Illicit drugs - drugs    FUNCTIONAL HISTORY  Patient lives at home with his wife. He is retired; formerly worked in construction    Previous Functional Status:  Independent in ambulation, ADL's, transfers prior to hospitalization    Current Functional Status:  has walker at home however mostly ambulates without assistance. Wife helps minimally w/ some ADLs, although he is mostly independent.

## 2022-04-26 NOTE — ED ADULT NURSE NOTE - NSIMPLEMENTINTERV_GEN_ALL_ED
Implemented All Universal Safety Interventions:  [place] to call system. Call bell, personal items and telephone within reach. Instruct patient to call for assistance. Room bathroom lighting operational. Non-slip footwear when patient is off stretcher. Physically safe environment: no spills, clutter or unnecessary equipment. Stretcher in lowest position, wheels locked, appropriate side rails in place.

## 2022-04-26 NOTE — H&P ADULT - PROBLEM SELECTOR PLAN 6
TTE 2009 showed LVEF 30% w/ severely reduced ejection fraction Chronic Systolic CHF  - TTE during last admission: LVEF 30% - 35% w/ severe systolic dysfunction  - was previously on Imdur and carvedilol, has not been on since previous discharge.   - hold home BB 2/2 hypotension  - [facility]io [facility] consulted, f/u recs  - Does not appear volume overloaded on physical exam, continue to monitor volume status. - Admit to telemetry  -chronic, stable s/p AICD for ventricular arrhythmia (2009 w/ generator change in 2017)   - On Aggrenox, high dose statin, continue  - elevated troponin Likely 2/2 demand ischemia in the setting of CVA &  JARRED CKD with decrease clearance  , improving   - initial troponin on presentation 559.2, f/u repeat stat troponin.   - Pt was admitted March 30th with NSTEMI w/ peak troponin of 24,000, likely current troponin elevation is continuing resolution of known prior insult.  - Continue home Aggrenox  - Low fat DASH diet  - hold ACE/BB 2/2 hypotension  - Cardio ([facility]) Consulted, f/u recs

## 2022-04-26 NOTE — H&P ADULT - PROBLEM SELECTOR PLAN 8
Chronic, stable   - LDSS   - Carb consistent soft and bite sized diet   - hypoglycemia protocol   - A1c 8.3 last admission  - f/u am a1c

## 2022-04-26 NOTE — ED ADULT TRIAGE NOTE - ESI TRIAGE ACUITY LEVEL, MLM
The patient is likely suffering from a combination of a small fiber polyneuropathy as well as lumbosacral radiculopathy.  At this point I do not think any diagnostics are indicated.  From a treatment perspective the patient would like to stay on her gabapentin 600 mg nightly.  She was too worried about potential side effects to consider initiating treatment with another neuropathic pain medication.    I did arrange for the patient to attend physical therapy.  We will focus not just on strength and conditioning but also her gait and balance.  In the future, additional diagnostics and treatments will depend on her clinical course.   2

## 2022-04-26 NOTE — ED ADULT NURSE NOTE - OBJECTIVE STATEMENT
pt Aox4 stating he was tired and dizzy earlier today. As per EMS pt's BP was 70/30 in the field. Pt arrived with IV to L AC #18. 2nd IV placed to R AC #20, IV patent. speech is clear. no facial droop observed. faces is symmetrical. pt denies headache, dizziness, changes in vision. Pupils are equal and reactive to light. Pt moves all extremities. denies numbness and tingling. pt has weakness to L side of body from previous stroke. pt awaiting eval. CS, RN

## 2022-04-26 NOTE — H&P ADULT - NEGATIVE NEUROLOGICAL SYMPTOMS
no paresthesias/no syncope/no tremors/no loss of sensation/no difficulty walking/no headache no paresthesias/no syncope/no tremors/no vertigo/no loss of sensation/no difficulty walking/no headache

## 2022-04-26 NOTE — ED PROVIDER NOTE - LAB INTERPRETATION
Flu With COVID-19 By [facility] (04.26.22 @ 20:11)   SARS-CoV-2 Result: NotDetec: This Respiratory Panel uses polymerase chain reaction (PCR) to detect for   influenza A; influenza B; respiratory syncytial virus; and SARS-CoV-2.   Influenza A Result: NotDetec   Influenza B Result: NotDetec   Resp Syn Virus Result: NotDetec

## 2022-04-26 NOTE — H&P ADULT - PROBLEM SELECTOR PLAN 5
Patient w/ recent admission for CVA, currently does not appear to have any deficits on exam  - Continue Depakote 500mg BID  - On Aggrenox, high dose statin, continue   - PT consult  - MBS during last admission showed dysphagia, will reorder same diet as last admission: Puree with Moderately thick liquids, all food and liquids via teaspoon presentation, utilizing chin tuck and 2 swallows after each bolus, glucerna. Patient w/ recent admission for CVA, Rt Central sulcus MCA CVA 4/2022  - currently does not appear to have any deficits on exam  - Continue Depakote 500mg BID  - On Aggrenox 2x day , high dose statin, continue   - PT consult  - MBS during last admission showed dysphagia, will reorder same diet as last admission: Puree with Moderately thick liquids, all food and liquids via teaspoon presentation, utilizing chin tuck and 2 swallows after each bolus, Glucerna.

## 2022-04-26 NOTE — H&P ADULT - NEGATIVE GENERAL GENITOURINARY SYMPTOMS
no urine discoloration/no incontinence/no dysuria/no urinary hesitancy/normal urinary frequency/no nocturia

## 2022-04-26 NOTE — H&P ADULT - PROBLEM SELECTOR PLAN 3
Likely 2/2 demand ischemia in the setting of hypotension, dehydration  - Admit to telemetry  - Monitor vital signs to monitor hemodynamic stability  - Continuous cardiac monitoring to monitor for arrhythmias  - initial troponin on presentation 559.2, f/u repeat stat troponin.   - Pt was admitted March 30th with NSTEMI w/ peak troponin of 24,000, likely current troponin elevation is continuing resolution of known prior insult.  - Continue home Aggrenox  - Low fat DASH diet  - hold ACE/BB 2/2 hypotension  - Cardio ([facility]) Consulted, f/u recs Dizziness is likely 2/2 hypotension, patient has had decreased PO intake 2/2 dysphagia & , diarrhea.  - BP Low on presentation 70/50, improved to 112/57 w/ 1L NS IV bolus  - Continue NS at 50cc/hr for 12 hours, patient has very fragile volume status 2/2  EF 30% ef, caution w/ ivf  - orthostatic vitals  - Patient used to take midodrine 10mg however was stopped by his pcp, can consider resuming if orthostatics are positive  - fall risk precautions  - Cardio ([facility]) consulted, f/u recs

## 2022-04-26 NOTE — H&P ADULT - ATTENDING COMMENTS
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  Pt seen, Examined, case & care plan d/w pt, residents at detail.  AM Labs   Consults:  Cardiology-DR [facility]  Renal-DR Snow/ DR ROCKWELL called  PO diet .  PT Eval.  Social service eval.

## 2022-04-26 NOTE — H&P ADULT - PROBLEM SELECTOR PLAN 9
S/P SX & RT in past  Pt seen by DR Lynn -ENT as out pt - saw him within the past two months  - S+S and MBS: Puree with Moderately thick liquids, all food and liquids via teaspoon presentation, utilizing chin tuck and 2 swallows after each bolus S/P SX & RT in past with  chronic Dysphagia   Pt seen by DR Lynn -ENT as out pt - saw him within the past two months  - S+S and MBS: Puree with Moderately thick liquids, all food and liquids via teaspoon presentation, utilizing chin tuck and 2 swallows after each bolus  As per pt at [facility] they change to mildly thickened liquid

## 2022-04-26 NOTE — H&P ADULT - NEUROLOGICAL DETAILS
alert and oriented x 3/responds to pain/responds to verbal commands/sensation intact/normal strength alert and oriented x 3/responds to pain/responds to verbal commands/sensation intact/cranial nerves intact/normal strength

## 2022-04-26 NOTE — ED ADULT NURSE NOTE - NSICDXPASTSURGICALHX_GEN_ALL_CORE_FT
PAST SURGICAL HISTORY:  Cardiac defibrillator in place - 2009, battery change 2017    H/O prior ablation treatment VT, 2009    S/P left inguinal hernia repair 2018 at [place]

## 2022-04-26 NOTE — H&P ADULT - PROBLEM SELECTOR PLAN 11
Chronic, continue holding antihypertensives in the setting of hypotension chronic, stable  - continue home statin

## 2022-04-26 NOTE — ED PROVIDER NOTE - NSICDXPASTSURGICALHX_GEN_ALL_CORE_FT
PAST SURGICAL HISTORY:  Cardiac defibrillator in place - 2009, battery change 2017    H/O prior ablation treatment VT, 2009    S/P left inguinal hernia repair 2018 at [place]

## 2022-04-26 NOTE — ED PROVIDER NOTE - OBJECTIVE STATEMENT
pt with recent cva, d/c from rehab 1 week ago, bib ems for hypotension and dizziness. per wife, pt very tired since yest, decreased po intake, bp was low, so called 911. pt had diarrhea last week, now resolved. no fever, ha, cp, sob, cough, abd pain, n/v.  pmd - carlos a leon pt with recent cva and MI, d/c from rehab 1 week ago, bib ems for hypotension and dizziness. per wife, pt very tired since yest, decreased po intake, bp was low, so called 911. pt had diarrhea last week, now resolved. no fever, ha, cp, sob, cough, abd pain, n/v.  pmd - carlos a gottliebuld

## 2022-04-26 NOTE — H&P ADULT - HISTORY OF PRESENT ILLNESS
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 Per patient and pt's wife at bedside, patient has became increasingly dizzy and fatigued at home since being discharged. Pt has been eating and drinking significantly less than previous, pt's wife states likely less than 1L of fluid over the past day and he has been more tremulous on his feet than normal. He also appeared to be more short of breat than normal while walking around. Pt's wife took his blood pressure earlier today and noted that it was 75/54. Physical therapy visited patient at home today and also took his BP, was similarly low. Pt's wife then took his bp and noted 52/37 so decided to call the ambulance. Patient states he feels well, much less dizzy than before he arrived at the hospital. Has no other acute complaints at this time. Of note, patient had intermittent diarrhea for ~2 weeks between his admission to [facility] and [place] rehab. Denies current diarrhea. Denies fever, chills, CP, SOB, weakness, dizziness, n/v/d.    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 Per patient and pt's wife at bedside, patient has became increasingly dizzy and fatigued at home since being discharged. Pt has been eating and drinking significantly less than previous, pt's wife states likely less than 1L of fluid over the past day and he has been more tremulous on his feet than normal. He also appeared to be more short of breat than normal while walking around. Pt's wife took his blood pressure earlier today and noted that it was 75/54. Physical therapy visited patient at home today and also took his BP, was similarly low. Pt's wife then took his bp and noted 52/37 so decided to call the ambulance. Patient states he feels well, much less dizzy than before he arrived at the hospital. Has no other acute complaints at this time. Of note, patient had intermittent diarrhea for ~2 weeks between his admission to [facility] and [place] rehab. Denies current diarrhea. Denies fever, chills, CP, SOB, weakness, dizziness, n/v/d.    ED Course: hr 90, bp 112/57, rr 15, t 98.5, spo2 98 on ra  labs: hgb 9.8, rouloux present on diferential, bun/cr 39/2.8, alb 2.2, trop 559.2  imaging: CXR bilateral diffuse infiltrates on wet read  EKG: NSR 85, possible prior anterolateral infarct  Given: NS bolus 1L x1

## 2022-04-26 NOTE — H&P ADULT - ASSESSMENT
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 3/30 for R central sulcus MCA infarct, transferred to [place] for rehab and discharged home ~3 days ago being admitted for dizziness, hypotension and elevated troponin.

## 2022-04-26 NOTE — ED ADULT NURSE NOTE - NSSEPSISSUSPECTED_ED_A_ED
Writer spoke with patient for the [facility] clinic, Dr. Aguirre,regarding your upcoming procedure on 03/22/2021.  We do require a COVID test 48-72 hours prior to that procedure. I am calling to make that appointment for you.   Covid screening appointment date of 03/19/2021.  You are to go to the [place] laboratory for you screening. You check in to a lab .  Once this nasal swab is completed we do ask that you self-quarantine to your home until the date and time of your procedure.   This means no working, no stores, no social gatherings, etc.   Patient understood and had no questions.    
No

## 2022-04-26 NOTE — H&P ADULT - PROBLEM SELECTOR PLAN 1
Dizziness is likely 2/2 hypotension, patient has had decreased PO intake, diarrhea.  - bp on presentation 70/50, improved to 112/57 w/ 1L NS IV bolus  - Continue NS at 50cc/hr for 12 hours, patient has very fragile volume status 2/2 30% ef, caution w/ ivf  - orthostatic vitals  - Patient used to take midodrine 10mg however was stopped by his pcp, can consider resuming if orthostatics are positive  - fall risk precautions  - Cardio ([facility]) consulted, f/u recs Pt initially 70/50 on presentation, improved to 112/57 w/ iv fluids, likely 2/2 decreased volume status.  - Continue NS at 50cc/hr for 12 hours, patient has very fragile volume status 2/2 30% ef, caution w/ ivf  - orthostatic vitals  - Patient used to take midodrine 10mg however was stopped by his pcp, can consider resuming if orthostatics are positive  - monitor off antihypertensives  - Cardio ([facility]) consulted, f/u recs

## 2022-04-26 NOTE — H&P ADULT - PROBLEM SELECTOR PLAN 4
JARRED (on CKD) likely 2/2 pre-renal azotemia in the setting of dehydration, poor PO intake.  - Baseline creatinine appears around 2.1  - s/p NS 1L bolus  - Continue IVF at 50cc/hr, caution IVF w/ HFrEF  - Monitor BMP  - Avoid nephrotoxic medications  - Monitor renal indices  - Nephro (Dr. Guadalupe) consulted, f/u recs TTE 2009 showed LVEF 30% w/ severely reduced ejection fraction Chronic Systolic CHF  - TTE during last admission: LVEF 30% - 35% w/ severe systolic dysfunction  - was previously on Imdur and carvedilol, has not been on since previous discharge.   - hold home BB 2/2 hypotension  - [facility] consulted, f/u recs  - Does not appear volume overloaded on physical exam, continue to monitor volume status.

## 2022-04-26 NOTE — PROGRESS NOTE ADULT - ASSESSMENT
JARRED on CKD  Hypotension  IVF  Check blood and urine studies  D/W primary  Full consult to follow     JARRED on CKD  Hypotension  CHF EF30-35%   Gentle IVF  Check blood and urine studies  D/W primary  Full consult to follow

## 2022-04-26 NOTE — H&P ADULT - NSICDXPASTSURGICALHX_GEN_ALL_CORE_FT
PAST SURGICAL HISTORY:  Cardiac defibrillator in place - 2009, battery change 2017    H/O prior ablation treatment VT, 2009    S/P left inguinal hernia repair 2018 at [place]     PAST SURGICAL HISTORY:  Cardiac defibrillator in place - 2009, battery change 2017    H/O prior ablation treatment VT, 2009    S/P left inguinal hernia repair 2018 at [place]    Tonsillar cancer s/p resection,  partial tongue resection

## 2022-04-26 NOTE — H&P ADULT - PROBLEM SELECTOR PLAN 7
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

## 2022-04-26 NOTE — ED ADULT TRIAGE NOTE - CHIEF COMPLAINT QUOTE
PT BIB ems for near syncopal episode and hypotension today. Per EMS pt feeling lethargic today. per ems systolic 50s on arrival

## 2022-04-26 NOTE — H&P ADULT - PROBLEM SELECTOR PLAN 2
Pt initially 70/50 on presentation, improved to 112/57 w/ iv fluids, likely 2/2 decreased volume status.  - Continue NS at 50cc/hr for 12 hours, patient has very fragile volume status 2/2 30% ef, caution w/ ivf  - orthostatic vitals  - Patient used to take midodrine 10mg however was stopped by his pcp, can consider resuming if orthostatics are positive  - monitor off antihypertensives  - Cardio ([facility]) consulted, f/u recs JARRED (on CKD) likely 2/2 pre-renal azotemia in the setting of dehydration, poor PO intake.  - Baseline creatinine appears around 2.1  - s/p NS 1L bolus  - Continue IVF at 50cc/hr, caution IVF w/ HFrEF  - Monitor BMP  - Avoid nephrotoxic medications  - Monitor renal indices  - Nephro (Dr. Guadalupe) consulted, f/u recs JARRED (on CKD 3 ) likely 2/2 pre-renal azotemia in the setting of dehydration, poor PO intake.  - Baseline creatinine appears around 2.1  - s/p NS 1L bolus  - Continue IVF at 50cc/hr, caution IVF w/ HFrEF  - Monitor BMP  - Avoid nephrotoxic medications ACEi/ARB/Diuretics   - Monitor renal indices  - Nephro (Dr. Guadalupe) consulted, f/u recs

## 2022-04-27 ENCOUNTER — TRANSCRIPTION ENCOUNTER (OUTPATIENT)
Age: 77
End: 2022-04-27

## 2022-04-27 VITALS — DIASTOLIC BLOOD PRESSURE: 83 MMHG | HEART RATE: 82 BPM | SYSTOLIC BLOOD PRESSURE: 150 MMHG

## 2022-04-27 DIAGNOSIS — I48.91 UNSPECIFIED ATRIAL FIBRILLATION: ICD-10-CM

## 2022-04-27 DIAGNOSIS — Z86.73 PERSONAL HISTORY OF TRANSIENT ISCHEMIC ATTACK (TIA), AND CEREBRAL INFARCTION WITHOUT RESIDUAL DEFICITS: ICD-10-CM

## 2022-04-27 LAB
ALBUMIN SERPL ELPH-MCNC: 1.9 G/DL — LOW (ref 3.3–5)
ALP SERPL-CCNC: 48 U/L — SIGNIFICANT CHANGE UP (ref 40–120)
ALT FLD-CCNC: 16 U/L — SIGNIFICANT CHANGE UP (ref 12–78)
ANION GAP SERPL CALC-SCNC: 8 MMOL/L — SIGNIFICANT CHANGE UP (ref 5–17)
AST SERPL-CCNC: 18 U/L — SIGNIFICANT CHANGE UP (ref 15–37)
BASOPHILS # BLD AUTO: 0.07 K/UL — SIGNIFICANT CHANGE UP (ref 0–0.2)
BASOPHILS NFR BLD AUTO: 0.8 % — SIGNIFICANT CHANGE UP (ref 0–2)
BILIRUB SERPL-MCNC: 0.3 MG/DL — SIGNIFICANT CHANGE UP (ref 0.2–1.2)
BUN SERPL-MCNC: 32 MG/DL — HIGH (ref 7–23)
CALCIUM SERPL-MCNC: 8 MG/DL — LOW (ref 8.5–10.1)
CHLORIDE SERPL-SCNC: 110 MMOL/L — HIGH (ref 96–108)
CK MB BLD-MCNC: 3.2 % — SIGNIFICANT CHANGE UP (ref 0–3.5)
CK MB CFR SERPL CALC: 2 NG/ML — SIGNIFICANT CHANGE UP (ref 0–3.6)
CK SERPL-CCNC: 63 U/L — SIGNIFICANT CHANGE UP (ref 26–308)
CO2 SERPL-SCNC: 24 MMOL/L — SIGNIFICANT CHANGE UP (ref 22–31)
CREAT SERPL-MCNC: 2.1 MG/DL — HIGH (ref 0.5–1.3)
EGFR: 32 ML/MIN/1.73M2 — LOW
EOSINOPHIL # BLD AUTO: 0.25 K/UL — SIGNIFICANT CHANGE UP (ref 0–0.5)
EOSINOPHIL NFR BLD AUTO: 2.9 % — SIGNIFICANT CHANGE UP (ref 0–6)
GLUCOSE SERPL-MCNC: 122 MG/DL — HIGH (ref 70–99)
HCT VFR BLD CALC: 26.9 % — LOW (ref 39–50)
HCT VFR BLD CALC: 28 % — LOW (ref 39–50)
HGB BLD-MCNC: 8.5 G/DL — LOW (ref 13–17)
HGB BLD-MCNC: 8.9 G/DL — LOW (ref 13–17)
IMM GRANULOCYTES NFR BLD AUTO: 0.5 % — SIGNIFICANT CHANGE UP (ref 0–1.5)
LYMPHOCYTES # BLD AUTO: 0.77 K/UL — LOW (ref 1–3.3)
LYMPHOCYTES # BLD AUTO: 9 % — LOW (ref 13–44)
MAGNESIUM SERPL-MCNC: 2 MG/DL — SIGNIFICANT CHANGE UP (ref 1.6–2.6)
MCHC RBC-ENTMCNC: 24.5 PG — LOW (ref 27–34)
MCHC RBC-ENTMCNC: 31.6 GM/DL — LOW (ref 32–36)
MCV RBC AUTO: 77.5 FL — LOW (ref 80–100)
MONOCYTES # BLD AUTO: 1.29 K/UL — HIGH (ref 0–0.9)
MONOCYTES NFR BLD AUTO: 15.1 % — HIGH (ref 2–14)
NEUTROPHILS # BLD AUTO: 6.14 K/UL — SIGNIFICANT CHANGE UP (ref 1.8–7.4)
NEUTROPHILS NFR BLD AUTO: 71.7 % — SIGNIFICANT CHANGE UP (ref 43–77)
NRBC # BLD: 0 /100 WBCS — SIGNIFICANT CHANGE UP (ref 0–0)
PHOSPHATE SERPL-MCNC: 2.5 MG/DL — SIGNIFICANT CHANGE UP (ref 2.5–4.5)
PLATELET # BLD AUTO: 150 K/UL — SIGNIFICANT CHANGE UP (ref 150–400)
POTASSIUM SERPL-MCNC: 4.2 MMOL/L — SIGNIFICANT CHANGE UP (ref 3.5–5.3)
POTASSIUM SERPL-SCNC: 4.2 MMOL/L — SIGNIFICANT CHANGE UP (ref 3.5–5.3)
PROT SERPL-MCNC: 5.2 G/DL — LOW (ref 6–8.3)
RBC # BLD: 3.47 M/UL — LOW (ref 4.2–5.8)
RBC # FLD: 20.4 % — HIGH (ref 10.3–14.5)
SODIUM SERPL-SCNC: 142 MMOL/L — SIGNIFICANT CHANGE UP (ref 135–145)
TROPONIN I, HIGH SENSITIVITY RESULT: 598.4 NG/L — HIGH
TROPONIN I, HIGH SENSITIVITY RESULT: 603.3 NG/L — HIGH
WBC # BLD: 8.56 K/UL — SIGNIFICANT CHANGE UP (ref 3.8–10.5)
WBC # FLD AUTO: 8.56 K/UL — SIGNIFICANT CHANGE UP (ref 3.8–10.5)

## 2022-04-27 PROCEDURE — 70450 CT HEAD/BRAIN W/O DYE: CPT

## 2022-04-27 PROCEDURE — 87086 URINE CULTURE/COLONY COUNT: CPT

## 2022-04-27 PROCEDURE — 85730 THROMBOPLASTIN TIME PARTIAL: CPT

## 2022-04-27 PROCEDURE — 85025 COMPLETE CBC W/AUTO DIFF WBC: CPT

## 2022-04-27 PROCEDURE — 71045 X-RAY EXAM CHEST 1 VIEW: CPT

## 2022-04-27 PROCEDURE — 87040 BLOOD CULTURE FOR BACTERIA: CPT

## 2022-04-27 PROCEDURE — 96360 HYDRATION IV INFUSION INIT: CPT

## 2022-04-27 PROCEDURE — 99223 1ST HOSP IP/OBS HIGH 75: CPT

## 2022-04-27 PROCEDURE — 85610 PROTHROMBIN TIME: CPT

## 2022-04-27 PROCEDURE — 82962 GLUCOSE BLOOD TEST: CPT

## 2022-04-27 PROCEDURE — 70450 CT HEAD/BRAIN W/O DYE: CPT | Mod: 26

## 2022-04-27 PROCEDURE — 85014 HEMATOCRIT: CPT

## 2022-04-27 PROCEDURE — 82553 CREATINE MB FRACTION: CPT

## 2022-04-27 PROCEDURE — 97161 PT EVAL LOW COMPLEX 20 MIN: CPT

## 2022-04-27 PROCEDURE — 36415 COLL VENOUS BLD VENIPUNCTURE: CPT

## 2022-04-27 PROCEDURE — 84484 ASSAY OF TROPONIN QUANT: CPT

## 2022-04-27 PROCEDURE — 83735 ASSAY OF MAGNESIUM: CPT

## 2022-04-27 PROCEDURE — 84100 ASSAY OF PHOSPHORUS: CPT

## 2022-04-27 PROCEDURE — 99285 EMERGENCY DEPT VISIT HI MDM: CPT | Mod: 25

## 2022-04-27 PROCEDURE — 80053 COMPREHEN METABOLIC PANEL: CPT

## 2022-04-27 PROCEDURE — 82550 ASSAY OF CK (CPK): CPT

## 2022-04-27 PROCEDURE — 83605 ASSAY OF LACTIC ACID: CPT

## 2022-04-27 PROCEDURE — 87637 SARSCOV2&INF A&B&RSV AMP PRB: CPT

## 2022-04-27 PROCEDURE — 93005 ELECTROCARDIOGRAM TRACING: CPT

## 2022-04-27 PROCEDURE — 93010 ELECTROCARDIOGRAM REPORT: CPT

## 2022-04-27 PROCEDURE — 85018 HEMOGLOBIN: CPT

## 2022-04-27 PROCEDURE — 81001 URINALYSIS AUTO W/SCOPE: CPT

## 2022-04-27 RX ORDER — APIXABAN 2.5 MG/1
5 TABLET, FILM COATED ORAL
Refills: 0 | Status: DISCONTINUED | OUTPATIENT
Start: 2022-04-27 | End: 2022-04-27

## 2022-04-27 RX ORDER — ASPIRIN/CALCIUM CARB/MAGNESIUM 324 MG
81 TABLET ORAL DAILY
Refills: 0 | Status: DISCONTINUED | OUTPATIENT
Start: 2022-04-28 | End: 2022-04-27

## 2022-04-27 RX ORDER — APIXABAN 2.5 MG/1
1 TABLET, FILM COATED ORAL
Qty: 60 | Refills: 0
Start: 2022-04-27 | End: 2022-05-26

## 2022-04-27 RX ORDER — SITAGLIPTIN 50 MG/1
1 TABLET, FILM COATED ORAL
Qty: 30 | Refills: 0
Start: 2022-04-27 | End: 2022-05-26

## 2022-04-27 RX ORDER — ASPIRIN/CALCIUM CARB/MAGNESIUM 324 MG
1 TABLET ORAL
Qty: 0 | Refills: 0 | DISCHARGE
Start: 2022-04-27

## 2022-04-27 RX ORDER — MIDODRINE HYDROCHLORIDE 2.5 MG/1
1 TABLET ORAL
Qty: 90 | Refills: 0
Start: 2022-04-27 | End: 2022-05-26

## 2022-04-27 RX ORDER — MIDODRINE HYDROCHLORIDE 2.5 MG/1
5 TABLET ORAL THREE TIMES A DAY
Refills: 0 | Status: DISCONTINUED | OUTPATIENT
Start: 2022-04-27 | End: 2022-04-27

## 2022-04-27 RX ADMIN — Medication 75 MICROGRAM(S): at 06:29

## 2022-04-27 RX ADMIN — ASPIRIN AND DIPYRIDAMOLE 1 CAPSULE(S): 25; 200 CAPSULE, EXTENDED RELEASE ORAL at 06:29

## 2022-04-27 RX ADMIN — SODIUM CHLORIDE 60 MILLILITER(S): 9 INJECTION, SOLUTION INTRAVENOUS at 06:29

## 2022-04-27 RX ADMIN — FINASTERIDE 5 MILLIGRAM(S): 5 TABLET, FILM COATED ORAL at 13:02

## 2022-04-27 RX ADMIN — MIDODRINE HYDROCHLORIDE 5 MILLIGRAM(S): 2.5 TABLET ORAL at 18:07

## 2022-04-27 RX ADMIN — HEPARIN SODIUM 5000 UNIT(S): 5000 INJECTION INTRAVENOUS; SUBCUTANEOUS at 13:02

## 2022-04-27 RX ADMIN — ESCITALOPRAM OXALATE 5 MILLIGRAM(S): 10 TABLET, FILM COATED ORAL at 13:02

## 2022-04-27 RX ADMIN — DIVALPROEX SODIUM 500 MILLIGRAM(S): 500 TABLET, DELAYED RELEASE ORAL at 06:30

## 2022-04-27 RX ADMIN — APIXABAN 5 MILLIGRAM(S): 2.5 TABLET, FILM COATED ORAL at 18:07

## 2022-04-27 RX ADMIN — DIVALPROEX SODIUM 500 MILLIGRAM(S): 500 TABLET, DELAYED RELEASE ORAL at 18:07

## 2022-04-27 RX ADMIN — HEPARIN SODIUM 5000 UNIT(S): 5000 INJECTION INTRAVENOUS; SUBCUTANEOUS at 06:29

## 2022-04-27 RX ADMIN — Medication 325 MILLIGRAM(S): at 13:02

## 2022-04-27 NOTE — PROGRESS NOTE ADULT - PROBLEM SELECTOR PLAN 6
- Admit to telemetry  -chronic, stable s/p AICD for ventricular arrhythmia (2009 w/ generator change in 2017)   - On Aggrenox, high dose statin, continue  - elevated troponin Likely 2/2 demand ischemia in the setting of CVA &  JARRED CKD with decrease clearance  , improving   - initial troponin on presentation 559.2, now trended down   - Pt was admitted March 30th with NSTEMI w/ peak troponin of 24,000, likely current troponin elevation is 2/2 known prior insult  - Continue home Aggrenox  - Low fat DASH diet  - hold ACE/BB 2/2 hypotension  - Cardio ([facility]) Consulted, f/u recs Patient w/ recent admission for CVA, Rt Central sulcus MCA CVA 4/2022  - currently does not appear to have any deficits on exam  - Continue Depakote 500mg BID  - On Aggrenox 2x day , high dose statin, continue   - PT consult-No Need  - MBS during last admission showed dysphagia, will reorder same diet as last admission: Puree with Moderately thick liquids, all food and liquids via teaspoon presentation, utilizing chin tuck and 2 swallows after each bolus, Glucerna.

## 2022-04-27 NOTE — PATIENT PROFILE ADULT - FALL HARM RISK - HARM RISK INTERVENTIONS
Communicate Risk of Fall with Harm to all staff/Monitor gait and stability/Reinforce activity limits and safety measures with patient and family/Review medications for side effects contributing to fall risk/Sit up slowly, dangle for a short time, stand at bedside before walking/Tailored Fall Risk Interventions/Use of alarms - bed, chair and/or voice tab/Visual Cue: Yellow wristband and red socks/Bed in lowest position, wheels locked, appropriate side rails in place/Call bell, personal items and telephone in reach/Instruct patient to call for assistance before getting out of bed or chair/Non-slip footwear when patient is out of bed/[place] to call system/Physically safe environment - no spills, clutter or unnecessary equipment/Purposeful Proactive Rounding/Room/bathroom lighting operational, light cord in reach

## 2022-04-27 NOTE — DISCHARGE NOTE PROVIDER - DISCHARGE DIET
DASH Diet/Mildly Thick Liquids DASH Diet/Consistent Carbohydrate Diabetic Diets/Mildly Thick Liquids

## 2022-04-27 NOTE — PROGRESS NOTE ADULT - ASSESSMENT
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

## 2022-04-27 NOTE — PROGRESS NOTE ADULT - PROBLEM SELECTOR PLAN 5
Patient w/ recent admission for CVA, Rt Central sulcus MCA CVA 4/2022  - currently does not appear to have any deficits on exam  - Continue Depakote 500mg BID  - On Aggrenox 2x day , high dose statin, continue   - PT consult  - MBS during last admission showed dysphagia, will reorder same diet as last admission: Puree with Moderately thick liquids, all food and liquids via teaspoon presentation, utilizing chin tuck and 2 swallows after each bolus, Glucerna. TTE 2009 showed LVEF 30% w/ severely reduced ejection fraction Chronic Systolic CHF  - TTE during last admission: LVEF 30% - 35% w/ severe systolic dysfunction  - was previously on Imdur and carvedilol, has not been on since previous discharge.   - hold home BB 2/2 hypotension- NO Lasix 2/2 Low BP & JARRED  - Does not appear volume overloaded on physical exam, continue to monitor volume status.

## 2022-04-27 NOTE — DISCHARGE NOTE NURSING/CASE MANAGEMENT/SOCIAL WORK - PATIENT PORTAL LINK FT
You can access the FollowMyHealth Patient Portal offered by [facility] by registering at the following website: [email or web address]. By joining [employer]’s FollowMyHealth portal, you will also be able to view your health information using other applications (apps) compatible with our system.

## 2022-04-27 NOTE — DISCHARGE NOTE PROVIDER - NSDCMRMEDTOKEN_GEN_ALL_CORE_FT
Aggrenox 25 mg-200 mg oral capsule, extended release: 1 cap(s) orally 2 times a day  albuterol 90 mcg/inh inhalation aerosol: 2 puff(s) inhaled every 6 hours, As needed, Shortness of Breath and/or Wheezing  albuterol 90 mcg/inh inhalation aerosol: 2 puff(s) inhaled every 6 hours, As needed, Shortness of Breath and/or Wheezing  atorvastatin 80 mg oral tablet: 1 tab(s) orally once a day (at bedtime)  bisacodyl 10 mg rectal suppository: 1 suppository(ies) rectal once a day  calcitriol 0.25 mcg oral capsule: 1 cap(s) orally once a day  cyanocobalamin 1000 mcg oral tablet: 1 tab(s) orally once a day  divalproex sodium 500 mg oral delayed release tablet: 1 tab(s) orally 2 times a day  escitalopram 5 mg oral tablet: 1 tab(s) orally once a day  ferrous sulfate 325 mg (65 mg elemental iron) oral tablet: 1 tab(s) orally once a day  finasteride 5 mg oral tablet: 1 tab(s) orally once a day  levothyroxine 75 mcg (0.075 mg) oral tablet: 1 tab(s) orally once a day  melatonin 3 mg oral tablet: 1 tab(s) orally once a day (at bedtime)  saliva substitutes oral solution: 1 spray(s) orally 3 times a day, As Needed  senna oral tablet: 2 tab(s) orally once a day (at bedtime)  tamsulosin 0.4 mg oral capsule: 2 cap(s) orally once a day (at bedtime)   Aggrenox 25 mg-200 mg oral capsule, extended release: 1 cap(s) orally 2 times a day  albuterol 90 mcg/inh inhalation aerosol: 2 puff(s) inhaled every 6 hours, As needed, Shortness of Breath and/or Wheezing  albuterol 90 mcg/inh inhalation aerosol: 2 puff(s) inhaled every 6 hours, As needed, Shortness of Breath and/or Wheezing  atorvastatin 80 mg oral tablet: 1 tab(s) orally once a day (at bedtime)  bisacodyl 10 mg rectal suppository: 1 suppository(ies) rectal once a day  calcitriol 0.25 mcg oral capsule: 1 cap(s) orally once a day  cyanocobalamin 1000 mcg oral tablet: 1 tab(s) orally once a day  divalproex sodium 500 mg oral delayed release tablet: 1 tab(s) orally 2 times a day  Eliquis 2.5 mg oral tablet: 1 tab(s) orally 2 times a day   escitalopram 5 mg oral tablet: 1 tab(s) orally once a day  ferrous sulfate 325 mg (65 mg elemental iron) oral tablet: 1 tab(s) orally once a day  finasteride 5 mg oral tablet: 1 tab(s) orally once a day  levothyroxine 75 mcg (0.075 mg) oral tablet: 1 tab(s) orally once a day  melatonin 3 mg oral tablet: 1 tab(s) orally once a day (at bedtime)  saliva substitutes oral solution: 1 spray(s) orally 3 times a day, As Needed  senna oral tablet: 2 tab(s) orally once a day (at bedtime)  tamsulosin 0.4 mg oral capsule: 2 cap(s) orally once a day (at bedtime)   albuterol 90 mcg/inh inhalation aerosol: 2 puff(s) inhaled every 6 hours, As needed, Shortness of Breath and/or Wheezing  aspirin 81 mg oral delayed release tablet: 1 tab(s) orally once a day  atorvastatin 80 mg oral tablet: 1 tab(s) orally once a day (at bedtime)  bisacodyl 10 mg rectal suppository: 1 suppository(ies) rectal once a day  calcitriol 0.25 mcg oral capsule: 1 cap(s) orally once a day  cyanocobalamin 1000 mcg oral tablet: 1 tab(s) orally once a day  divalproex sodium 500 mg oral delayed release tablet: 1 tab(s) orally 2 times a day  Eliquis 5 mg oral tablet: 1 tab(s) orally 2 times a day   escitalopram 5 mg oral tablet: 1 tab(s) orally once a day  ferrous sulfate 325 mg (65 mg elemental iron) oral tablet: 1 tab(s) orally once a day  finasteride 5 mg oral tablet: 1 tab(s) orally once a day  levothyroxine 75 mcg (0.075 mg) oral tablet: 1 tab(s) orally once a day  melatonin 3 mg oral tablet: 1 tab(s) orally once a day (at bedtime)  midodrine 5 mg oral tablet: 1 tab(s) orally 3 times a day   saliva substitutes oral solution: 1 spray(s) orally 3 times a day, As Needed  senna oral tablet: 2 tab(s) orally once a day (at bedtime)  tamsulosin 0.4 mg oral capsule: 2 cap(s) orally once a day (at bedtime)   albuterol 90 mcg/inh inhalation aerosol: 2 puff(s) inhaled every 6 hours, As needed, Shortness of Breath and/or Wheezing  aspirin 81 mg oral delayed release tablet: 1 tab(s) orally once a day  atorvastatin 80 mg oral tablet: 1 tab(s) orally once a day (at bedtime)  bisacodyl 10 mg rectal suppository: 1 suppository(ies) rectal once a day  calcitriol 0.25 mcg oral capsule: 1 cap(s) orally once a day  cyanocobalamin 1000 mcg oral tablet: 1 tab(s) orally once a day  divalproex sodium 500 mg oral delayed release tablet: 1 tab(s) orally 2 times a day  Eliquis 5 mg oral tablet: 1 tab(s) orally 2 times a day   escitalopram 5 mg oral tablet: 1 tab(s) orally once a day  ferrous sulfate 325 mg (65 mg elemental iron) oral tablet: 1 tab(s) orally once a day  finasteride 5 mg oral tablet: 1 tab(s) orally once a day  Januvia 25 mg oral tablet: 1 tab(s) orally once a day   levothyroxine 75 mcg (0.075 mg) oral tablet: 1 tab(s) orally once a day  melatonin 3 mg oral tablet: 1 tab(s) orally once a day (at bedtime)  midodrine 5 mg oral tablet: 1 tab(s) orally 3 times a day   saliva substitutes oral solution: 1 spray(s) orally 3 times a day, As Needed  senna oral tablet: 2 tab(s) orally once a day (at bedtime)  tamsulosin 0.4 mg oral capsule: 2 cap(s) orally once a day (at bedtime)

## 2022-04-27 NOTE — PROGRESS NOTE ADULT - PROBLEM SELECTOR PLAN 10
on synthroid daily S/P SX & RT in past with  chronic Dysphagia   Pt seen by DR Leah YEPEZ as out pt - saw him within the past two months  - S+S and MBS: Puree with Moderately thick liquids, all food and liquids via teaspoon presentation, utilizing chin tuck and 2 swallows after each bolus  As per pt at [facility] they change to mildly thickened liquid  -Follow up with DR Leah YEPEZ as out pt.

## 2022-04-27 NOTE — CONSULT NOTE ADULT - SUBJECTIVE AND OBJECTIVE BOX
[facility]         Rianna Horn, Selma Terrell, Sdy, Rebeca Chu        [phone] (office)    Reason for Consult: hypotension, CHF, Afib.     Cardiology / Interval HPI: Patient's wife at the bedside providing most of the information. After the discharge pt was stable for some days. For the last 3 days, pt was sleeping more, less active, mild SOB on mild physical activity, poor oral intake, and yesterday with low BP down to the 50's and dizziness. Pt denies previous or current chest pain or tightness, palpitations, abdominal pain, nausea, vomiting, diarrhea.  Patient seen and examined at bedside. Telemetry with no events, with AFib and now sinus.        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 Per patient and pt's wife at bedside, patient has became increasingly dizzy and fatigued at home since being discharged. Pt has been eating and drinking significantly less than previous, pt's wife states likely less than 1L of fluid over the past day and he has been more tremulous on his feet than normal. He also appeared to be more short of breat than normal while walking around. Pt's wife took his blood pressure earlier today and noted that it was 75/54. Physical therapy visited patient at home today and also took his BP, was similarly low. Pt's wife then took his bp and noted 52/37 so decided to call the ambulance. Patient states he feels well, much less dizzy than before he arrived at the hospital. Has no other acute complaints at this time. Of note, patient had intermittent diarrhea for ~2 weeks between his admission to [facility] and [place] rehab. Denies current diarrhea. Denies fever, chills, CP, SOB, weakness, dizziness, n/v/d.    ED Course: hr 90, bp 112/57, rr 15, t 98.5, spo2 98 on ra  labs: hgb 9.8, rouloux present on diferential, bun/cr 39/2.8, alb 2.2, trop 559.2  imaging: CXR bilateral diffuse infiltrates on wet read  EKG: NSR 85, possible prior anterolateral infarct  Given: NS bolus 1L x1 (26 Apr 2022 22:24)      PAST MEDICAL & SURGICAL HISTORY:  MI (myocardial infarction)  1992    HTN (hypertension)    HLD (hyperlipidemia)    Throat cancer  2011, treated with chemo, RT    Ventricular arrhythmia  s/p AICD    Diabetes  Type2, not on any meds since weight loss after throat Ca    Renal insufficiency  s/p chemo    CAD (coronary artery disease)    Inguinal hernia, left    Recent cerebrovascular accident (CVA)  R central sulcus MCA infarct, with left Upper EXT weakness, April 2022    History of dysphagia    Stage 3 chronic kidney disease    Anemia of chronic disease    H/O urinary retention  pt has large PVR    H/O prior ablation treatment  VT, 2009    Cardiac defibrillator in place  - 2009, battery change 2017    S/P left inguinal hernia repair  2018 at [place]    Tonsillar cancer  s/p resection,  partial tongue resection      SOCIAL HISTORY: No active tobacco, alcohol or illicit drug use    FAMILY HISTORY:  Family history of cancer (Sibling)      Home Medications:  albuterol 90 mcg/inh inhalation aerosol: 2 puff(s) inhaled every 6 hours, As needed, Shortness of Breath and/or Wheezing (26 Apr 2022 22:24)  bisacodyl 10 mg rectal suppository: 1 suppository(ies) rectal once a day (26 Apr 2022 22:24)  calcitriol 0.25 mcg oral capsule: 1 cap(s) orally once a day (26 Apr 2022 22:24)  cyanocobalamin 1000 mcg oral tablet: 1 tab(s) orally once a day (26 Apr 2022 22:24)  melatonin 3 mg oral tablet: 1 tab(s) orally once a day (at bedtime) (26 Apr 2022 22:24)  saliva substitutes oral solution: 1 spray(s) orally 3 times a day, As Needed (26 Apr 2022 22:24)  senna oral tablet: 2 tab(s) orally once a day (at bedtime) (26 Apr 2022 22:24)      MEDICATIONS  (STANDING):  atorvastatin 80 milliGRAM(s) Oral at bedtime  dextrose 5%. 1000 milliLiter(s) (50 mL/Hr) IV Continuous <Continuous>  dextrose 5%. 1000 milliLiter(s) (100 mL/Hr) IV Continuous <Continuous>  dextrose 50% Injectable 25 Gram(s) IV Push once  dextrose 50% Injectable 12.5 Gram(s) IV Push once  dextrose 50% Injectable 25 Gram(s) IV Push once  dipyridamole 200 mG/aspirin 25 mG 1 Capsule(s) Oral two times a day  diVALproex  milliGRAM(s) Oral two times a day  escitalopram 5 milliGRAM(s) Oral daily  ferrous    sulfate 325 milliGRAM(s) Oral daily  finasteride 5 milliGRAM(s) Oral daily  glucagon  Injectable 1 milliGRAM(s) IntraMuscular once  heparin   Injectable 5000 Unit(s) SubCutaneous every 8 hours  insulin lispro (ADMELOG) corrective regimen sliding scale   SubCutaneous three times a day before meals  insulin lispro (ADMELOG) corrective regimen sliding scale   SubCutaneous at bedtime  lactated ringers. 1000 milliLiter(s) (60 mL/Hr) IV Continuous <Continuous>  levothyroxine 75 MICROGram(s) Oral daily  senna 2 Tablet(s) Oral at bedtime  tamsulosin 0.8 milliGRAM(s) Oral at bedtime    MEDICATIONS  (PRN):  acetaminophen     Tablet .. 650 milliGRAM(s) Oral every 6 hours PRN Temp greater or equal to 38C (100.4F), Mild Pain (1 - 3)  ALBUTerol    90 MICROgram(s) HFA Inhaler 2 Puff(s) Inhalation every 6 hours PRN Shortness of Breath and/or Wheezing  aluminum hydroxide/magnesium hydroxide/simethicone Suspension 30 milliLiter(s) Oral every 4 hours PRN Dyspepsia  Biotene Dry Mouth Oral Rinse 5 milliLiter(s) Swish and Spit three times a day PRN Mouth Care  bisacodyl Suppository 10 milliGRAM(s) Rectal daily PRN Constipation  dextrose Oral Gel 15 Gram(s) Oral once PRN Blood Glucose LESS THAN 70 milliGRAM(s)/deciliter  melatonin 3 milliGRAM(s) Oral at bedtime PRN Insomnia  ondansetron Injectable 4 milliGRAM(s) IV Push every 8 hours PRN Nausea and/or Vomiting      Allergies  No Known Allergies    Intolerances      REVIEW OF SYSTEMS: Negative except as per HPI.    VITAL SIGNS:   Vital Signs Last 24 Hrs  T(C): 36.6 (27 Apr 2022 11:27), Max: 36.9 (26 Apr 2022 20:17)  T(F): 97.9 (27 Apr 2022 11:27), Max: 98.5 (26 Apr 2022 20:17)  HR: 70 (27 Apr 2022 11:27) (70 - 90)  BP: 121/69 (27 Apr 2022 11:27) (70/50 - 152/73)  RR: 18 (27 Apr 2022 11:27) (15 - 18)  SpO2: 93% (27 Apr 2022 11:27) (93% - 99%)    PHYSICAL EXAM:  Constitutional: NAD, well-developed  HEENT NC/AT, moist mucous membranes  Pulmonary: Non-labored, breath sounds are clear bilaterally, no wheezing, rales or rhonchi  Cardiovascular: +S1, S2, RRR, no murmur  Gastrointestinal: Soft, nontender, nondistended, normoactive bowel sounds  Extremities: No peripheral edema   Neurological: Alert, strength and sensitivity are grossly intact  Skin: No obvious lesions/rashes  Psych: Mood & affect appropriate      LABS: All Labs Reviewed:                        8.9    x     )-----------( x        ( 27 Apr 2022 14:05 )             28.0                         8.5    8.56  )-----------( 150      ( 27 Apr 2022 08:51 )             26.9                         9.8    9.18  )-----------( 177      ( 26 Apr 2022 19:50 )             31.3     27 Apr 2022 08:51    142    |  110    |  32     ----------------------------<  122    4.2     |  24     |  2.10   26 Apr 2022 19:50    139    |  104    |  39     ----------------------------<  181    4.2     |  26     |  2.80     Ca    8.0        27 Apr 2022 08:51  Ca    8.6        26 Apr 2022 19:50  Phos  2.5       27 Apr 2022 08:51  Mg     2.0       27 Apr 2022 08:51    TPro  5.2    /  Alb  1.9    /  TBili  0.3    /  DBili  x      /  AST  18     /  ALT  16     /  AlkPhos  48     27 Apr 2022 08:51  TPro  6.1    /  Alb  2.2    /  TBili  0.4    /  DBili  x      /  AST  19     /  ALT  19     /  AlkPhos  56     26 Apr 2022 19:50    PT/INR - ( 26 Apr 2022 19:50 )   PT: 15.1 sec;   INR: 1.29 ratio         PTT - ( 26 Apr 2022 19:50 )  PTT:32.1 sec  CARDIAC MARKERS ( 27 Apr 2022 08:52 )  x     / x     / x     / x     / 2.0 ng/mL  CARDIAC MARKERS ( 27 Apr 2022 08:51 )  x     / x     / 63 U/L / x     / x      CARDIAC MARKERS ( 26 Apr 2022 20:01 )  x     / x     / 69 U/L / x     / 2.2 ng/mL        EKG:  Line Atrial fibrillation.  Nonspecific intraventricular block  Cannot rule out Anteroseptal infarct (cited on or before 01-AUG-2018)  T wave abnormality, consider lateral ischemia.   Abnormal ECG  When compared with ECG of 01-APR-2022 14:50,  Atrial fibrillation has replaced Sinus rhythm.  T wave inversion now evident in Anterior leads      RADIOLOGY:    ACC: [id number] EXAM:  XR CHEST PORTABLE IMMED 1V                          PROCEDURE DATE:  04/26/2022      INTERPRETATION:  XR CHEST IMMEDIATE    Clinical information: Sepsis    COMPARISON: Exam is compared to prior study of 3/30/2022.    A dual lead permanent pacemaker is seen. Overlying leads are present.   Heart size is magnified. Mild perihilar infiltrates are seen with   probable trace left pleural effusion. Mild CHF is favored. No   pneumothorax. Findings have improved as compared to prior study.    IMPRESSION:  Improving infiltrates.    --- End of Report ---      CAMILLE ACUNA MD; Attending Radiologist  This document has been electronically signed. Apr 27 2022 11:21AM       [facility]         Rianna Horn, Selma Terrell, Syd, Rebeca Chu        [phone] (office)    Reason for Consult: hypotension, CHF, Afib.     Cardiology / Interval HPI: Patient's wife at the bedside providing most of the information. After the discharge pt was stable for some days. For the last 3 days, pt was sleeping more, less active, mild SOB on mild physical activity, poor oral intake, and yesterday with low BP down to the 50's and dizziness. Pt denies previous or current chest pain or tightness, palpitations, abdominal pain, nausea, vomiting, diarrhea.  Patient seen and examined at bedside. Telemetry with no events, with AFib and now sinus.        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 Per patient and pt's wife at bedside, patient has became increasingly dizzy and fatigued at home since being discharged. Pt has been eating and drinking significantly less than previous, pt's wife states likely less than 1L of fluid over the past day and he has been more tremulous on his feet than normal. He also appeared to be more short of breat than normal while walking around. Pt's wife took his blood pressure earlier today and noted that it was 75/54. Physical therapy visited patient at home today and also took his BP, was similarly low. Pt's wife then took his bp and noted 52/37 so decided to call the ambulance. Patient states he feels well, much less dizzy than before he arrived at the hospital. Has no other acute complaints at this time. Of note, patient had intermittent diarrhea for ~2 weeks between his admission to [facility] and [place] rehab. Denies current diarrhea. Denies fever, chills, CP, SOB, weakness, dizziness, n/v/d.    ED Course: hr 90, bp 112/57, rr 15, t 98.5, spo2 98 on ra  labs: hgb 9.8, rouloux present on diferential, bun/cr 39/2.8, alb 2.2, trop 559.2  imaging: CXR bilateral diffuse infiltrates on wet read  EKG: NSR 85, possible prior anterolateral infarct  Given: NS bolus 1L x1 (26 Apr 2022 22:24)      PAST MEDICAL & SURGICAL HISTORY:  MI (myocardial infarction)  1992    HTN (hypertension)    HLD (hyperlipidemia)    Throat cancer  2011, treated with chemo, RT    Ventricular arrhythmia  s/p AICD    Diabetes  Type2, not on any meds since weight loss after throat Ca    Renal insufficiency  s/p chemo    CAD (coronary artery disease)    Inguinal hernia, left    Recent cerebrovascular accident (CVA)  R central sulcus MCA infarct, with left Upper EXT weakness, April 2022    History of dysphagia    Stage 3 chronic kidney disease    Anemia of chronic disease    H/O urinary retention  pt has large PVR    H/O prior ablation treatment  VT, 2009    Cardiac defibrillator in place  - 2009, battery change 2017    S/P left inguinal hernia repair  2018 at [place]    Tonsillar cancer  s/p resection,  partial tongue resection      SOCIAL HISTORY: No active tobacco, alcohol or illicit drug use    FAMILY HISTORY:  Family history of cancer (Sibling)      Home Medications:  albuterol 90 mcg/inh inhalation aerosol: 2 puff(s) inhaled every 6 hours, As needed, Shortness of Breath and/or Wheezing (26 Apr 2022 22:24)  bisacodyl 10 mg rectal suppository: 1 suppository(ies) rectal once a day (26 Apr 2022 22:24)  calcitriol 0.25 mcg oral capsule: 1 cap(s) orally once a day (26 Apr 2022 22:24)  cyanocobalamin 1000 mcg oral tablet: 1 tab(s) orally once a day (26 Apr 2022 22:24)  melatonin 3 mg oral tablet: 1 tab(s) orally once a day (at bedtime) (26 Apr 2022 22:24)  saliva substitutes oral solution: 1 spray(s) orally 3 times a day, As Needed (26 Apr 2022 22:24)  senna oral tablet: 2 tab(s) orally once a day (at bedtime) (26 Apr 2022 22:24)      MEDICATIONS  (STANDING):  atorvastatin 80 milliGRAM(s) Oral at bedtime  dextrose 5%. 1000 milliLiter(s) (50 mL/Hr) IV Continuous <Continuous>  dextrose 5%. 1000 milliLiter(s) (100 mL/Hr) IV Continuous <Continuous>  dextrose 50% Injectable 25 Gram(s) IV Push once  dextrose 50% Injectable 12.5 Gram(s) IV Push once  dextrose 50% Injectable 25 Gram(s) IV Push once  dipyridamole 200 mG/aspirin 25 mG 1 Capsule(s) Oral two times a day  diVALproex  milliGRAM(s) Oral two times a day  escitalopram 5 milliGRAM(s) Oral daily  ferrous    sulfate 325 milliGRAM(s) Oral daily  finasteride 5 milliGRAM(s) Oral daily  glucagon  Injectable 1 milliGRAM(s) IntraMuscular once  heparin   Injectable 5000 Unit(s) SubCutaneous every 8 hours  insulin lispro (ADMELOG) corrective regimen sliding scale   SubCutaneous three times a day before meals  insulin lispro (ADMELOG) corrective regimen sliding scale   SubCutaneous at bedtime  lactated ringers. 1000 milliLiter(s) (60 mL/Hr) IV Continuous <Continuous>  levothyroxine 75 MICROGram(s) Oral daily  senna 2 Tablet(s) Oral at bedtime  tamsulosin 0.8 milliGRAM(s) Oral at bedtime    MEDICATIONS  (PRN):  acetaminophen     Tablet .. 650 milliGRAM(s) Oral every 6 hours PRN Temp greater or equal to 38C (100.4F), Mild Pain (1 - 3)  ALBUTerol    90 MICROgram(s) HFA Inhaler 2 Puff(s) Inhalation every 6 hours PRN Shortness of Breath and/or Wheezing  aluminum hydroxide/magnesium hydroxide/simethicone Suspension 30 milliLiter(s) Oral every 4 hours PRN Dyspepsia  Biotene Dry Mouth Oral Rinse 5 milliLiter(s) Swish and Spit three times a day PRN Mouth Care  bisacodyl Suppository 10 milliGRAM(s) Rectal daily PRN Constipation  dextrose Oral Gel 15 Gram(s) Oral once PRN Blood Glucose LESS THAN 70 milliGRAM(s)/deciliter  melatonin 3 milliGRAM(s) Oral at bedtime PRN Insomnia  ondansetron Injectable 4 milliGRAM(s) IV Push every 8 hours PRN Nausea and/or Vomiting      Allergies  No Known Allergies    Intolerances      REVIEW OF SYSTEMS: Negative except as per HPI.    VITAL SIGNS:   Vital Signs Last 24 Hrs  T(C): 36.6 (27 Apr 2022 11:27), Max: 36.9 (26 Apr 2022 20:17)  T(F): 97.9 (27 Apr 2022 11:27), Max: 98.5 (26 Apr 2022 20:17)  HR: 70 (27 Apr 2022 11:27) (70 - 90)  BP: 121/69 (27 Apr 2022 11:27) (70/50 - 152/73)  RR: 18 (27 Apr 2022 11:27) (15 - 18)  SpO2: 93% (27 Apr 2022 11:27) (93% - 99%)    PHYSICAL EXAM:  Constitutional: NAD, well-developed, breathing well at RA  HEENT NC/AT, moist mucous membranes  Pulmonary: Non-labored.  Mild rales on right base.   Cardiovascular: +S1,S2, RRR, no murmur  Gastrointestinal: Soft, nontender, nondistended, normoactive bowel sounds  Extremities: No LE edema   Neurological: Alert, strength and sensitivity are grossly symmetric  Skin: No obvious lesions/rashes      LABS: All Labs Reviewed:                        8.9    x     )-----------( x        ( 27 Apr 2022 14:05 )             28.0                         8.5    8.56  )-----------( 150      ( 27 Apr 2022 08:51 )             26.9                         9.8    9.18  )-----------( 177      ( 26 Apr 2022 19:50 )             31.3     27 Apr 2022 08:51    142    |  110    |  32     ----------------------------<  122    4.2     |  24     |  2.10   26 Apr 2022 19:50    139    |  104    |  39     ----------------------------<  181    4.2     |  26     |  2.80     Ca    8.0        27 Apr 2022 08:51  Ca    8.6        26 Apr 2022 19:50  Phos  2.5       27 Apr 2022 08:51  Mg     2.0       27 Apr 2022 08:51    TPro  5.2    /  Alb  1.9    /  TBili  0.3    /  DBili  x      /  AST  18     /  ALT  16     /  AlkPhos  48     27 Apr 2022 08:51  TPro  6.1    /  Alb  2.2    /  TBili  0.4    /  DBili  x      /  AST  19     /  ALT  19     /  AlkPhos  56     26 Apr 2022 19:50    PT/INR - ( 26 Apr 2022 19:50 )   PT: 15.1 sec;   INR: 1.29 ratio         PTT - ( 26 Apr 2022 19:50 )  PTT:32.1 sec  CARDIAC MARKERS ( 27 Apr 2022 08:52 )  x     / x     / x     / x     / 2.0 ng/mL  CARDIAC MARKERS ( 27 Apr 2022 08:51 )  x     / x     / 63 U/L / x     / x      CARDIAC MARKERS ( 26 Apr 2022 20:01 )  x     / x     / 69 U/L / x     / 2.2 ng/mL        EKG:  Line Atrial fibrillation.  Nonspecific intraventricular block  Cannot rule out Anteroseptal infarct (cited on or before 01-AUG-2018)  T wave abnormality, consider lateral ischemia.   Abnormal ECG  When compared with ECG of 01-APR-2022 14:50,  Atrial fibrillation has replaced Sinus rhythm.  T wave inversion now evident in Anterior leads      RADIOLOGY:    ACC: [id number] EXAM:  XR CHEST PORTABLE IMMED 1V                          PROCEDURE DATE:  04/26/2022      INTERPRETATION:  XR CHEST IMMEDIATE    Clinical information: Sepsis    COMPARISON: Exam is compared to prior study of 3/30/2022.    A dual lead permanent pacemaker is seen. Overlying leads are present.   Heart size is magnified. Mild perihilar infiltrates are seen with   probable trace left pleural effusion. Mild CHF is favored. No   pneumothorax. Findings have improved as compared to prior study.    IMPRESSION:  Improving infiltrates.    --- End of Report ---      CAMILLE ACUNA MD; Attending Radiologist  This document has been electronically signed. Apr 27 2022 11:21AM

## 2022-04-27 NOTE — PROGRESS NOTE ADULT - PROBLEM SELECTOR PLAN 8
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

## 2022-04-27 NOTE — DISCHARGE NOTE PROVIDER - NPI NUMBER (FOR SYSADMIN USE ONLY) :
[id number] [[id number] [[id number] [[id number]]

## 2022-04-27 NOTE — DISCHARGE NOTE PROVIDER - NSDCACTIVITY_GEN_ALL_CORE
No heavy lifting/straining Bathing allowed/Showering allowed/Walking - Indoors allowed/No heavy lifting/straining

## 2022-04-27 NOTE — DISCHARGE NOTE PROVIDER - CARE PROVIDERS DIRECT ADDRESSES
,DirectAddress_Unknown ,DirectAddress_Unknown,DirectAddress_Unknown ,DirectAddress_Unknown,DirectAddress_Unknown,DirectAddress_Unknown,DirectAddress_Unknown ,DirectAddress_Unknown,DirectAddress_Unknown,DirectAddress_Unknown,DirectAddress_Unknown,DirectAddress_Unknown

## 2022-04-27 NOTE — PHARMACOTHERAPY INTERVENTION NOTE - COMMENTS
Prescriptions filled at [facility].     Prescriptions:  Eliquis 5 mg oral tablet: 1 tab(s) orally 2 times a day   midodrine 5 mg oral tablet: 1 tab(s) orally 3 times a day    Brought to patient and spouse at bedside and counseled on indication, directions and side effects of medications.  Patient and spouse verbalized understanding and had no further questions.

## 2022-04-27 NOTE — DISCHARGE NOTE PROVIDER - PROVIDER TOKENS
PROVIDER:[TOKEN:[[id number]],FOLLOWUP:[1 week]] PROVIDER:[TOKEN:[[id number]],FOLLOWUP:[1 week]],PROVIDER:[TOKEN:[[id number]],FOLLOWUP:[1-3 days]] PROVIDER:[TOKEN:[[id number]],FOLLOWUP:[1 week]],PROVIDER:[TOKEN:[[id number]],FOLLOWUP:[1-3 days]],PROVIDER:[TOKEN:[[id number]]],PROVIDER:[TOKEN:[[id number]]] PROVIDER:[TOKEN:[[id number]],FOLLOWUP:[1 week]],PROVIDER:[TOKEN:[[id number]],FOLLOWUP:[1-3 days]],PROVIDER:[TOKEN:[[id number]]],PROVIDER:[TOKEN:[[id number]]],PROVIDER:[TOKEN:[[id number]]]

## 2022-04-27 NOTE — PROGRESS NOTE ADULT - PROBLEM SELECTOR PLAN 7
Anemia 2/2 ACD with CKD 3   Pt was worked up with DR Pandya -Hematology last admission  will follow CBC -pt has Recent NSTEMI on last admission  -chronic, stable s/p AICD for ventricular arrhythmia (2009 w/ generator change in 2017)   - On Aggrenox, high dose statin, continue  - elevated troponin Likely 2/2 demand ischemia in the setting of CVA &  JARRED CKD with decrease clearance  , improving   - initial troponin on presentation 559.2, now trended down   - Pt was admitted March 30th with NSTEMI w/ peak troponin of 24,000, likely current troponin elevation is 2/2 known prior insult  - Continue home Aggrenox--> Change to PO EC ASA 81 mg daily as d/w Dr Garcia  - Low fat DASH diet  - hold ACE/BB 2/2 hypotension  - Cardio Dr Terrell- follow up

## 2022-04-27 NOTE — CONSULT NOTE ADULT - ASSESSMENT
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     On arrival BP 70/50 and improved after IVF in the ED to 112/57. BP has remained normal, and has been on IVF.  Orthostatics with  - 111 - 114.   EKG on admission with AFib rate controlled, intraventricular block , ?old MI (previously seen on EKG) and T wave abnormality.  PT has been on telemetry with AFib rate controlled most of the time and currently on sinus rhythm.  No known h/o Afib. Troponin elevated on admission, peaked and downtrend (559 < 603 > 598), negative cardiac enzymes x2 sets and no chest pain.  On exam with no signs of volume overload an hydrated.  Last TTE 03/31/22 with enlarged LV, moderate to severe LV systolic dysfunction, estimated LVEF of 30-35%. The apical cap, apical septum and apical lateral walls are akinetic with hypokinesis of the remaining walls. No evidence of left ventricular thrombus.  RV is not well-visualized, device wire within the right heart.  Mild MR.  Trace TR.  JARRED on admission, improved and Cr at baseline today.       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     On arrival BP 70/50 and improved after IVF in the ED to 112/57. BP has remained normal, and has been on IVF.  Orthostatics positive with  - 111 - 114 and stable HR.     EKG on admission with AFib rate controlled, intraventricular block , ?old MI (previously seen on EKG) and T wave abnormality.  Pt has been on telemetry with AFib rate controlled most of the time and currently on sinus rhythm.  No known h/o Afib. Unclear if the new onset of Afib is related with the acute hypotension and symptoms.  Pt denies current or previous chest pain/palpitations. Negative cardiac enzymes x2 sets. Troponin elevated on admission, peaked and downtrend (559 < 603 > 598) likely 2/2 demand in the setting of hypotension, hypovolemia, and new onset Afib. On exam with no signs of volume overload an hydrated now.  Last TTE 03/31/22 with enlarged LV, moderate to severe LV systolic dysfunction, estimated LVEF of 30-35%. The apical cap, apical septum and apical lateral walls are akinetic with hypokinesis of the remaining walls. No evidence of left ventricular thrombus.  RV is not well-visualized, device wire within the right heart.  Mild MR.  Trace TR.  JARRED on admission, improved and Cr at baseline today.     - For new onset of pAFib with rate controlled, start full dose anticoagulation with Eliquis 5 mg q12h (pt qualifies for 5 mg q12h despite CKD: he is 77 yo, weight >60 kg)  - Can restart home midodrine, encourage ambulation and compressive stocking    - No need to continue troponin trend.   - DC IVF, encourage PO intake and monitor BP  - Monitor electrolytes and renal function  - Continues telemetry monitor   - will f/u

## 2022-04-27 NOTE — CONSULT NOTE ADULT - ATTENDING COMMENTS
Chart reviewed    Patient seen and examined    Agree with plan as outlined above    - For new onset of pAFib with rate controlled, start full dose anticoagulation with Eliquis 5 mg q12h (pt qualifies for 5 mg q12h despite CKD: he is 75 yo, weight >60 kg)  - Can restart home midodrine, encourage ambulation and compressive stocking    - No need to continue troponin trend.   - DC IVF, encourage PO intake and monitor BP  - Monitor electrolytes and renal function

## 2022-04-27 NOTE — PATIENT PROFILE ADULT - NSPROSPHOSPCHAPLAINYN_GEN_A_NUR
Script(s) were written out based on previous script.  Sent to  To be signed.    Last Visit-03/04/2019  Next Visit- 09/04/2019  Last prescription- 04/02/2019,#28, no refills  Trazodone -prescribed by an outside provider        
no

## 2022-04-27 NOTE — PROGRESS NOTE ADULT - PROBLEM SELECTOR PLAN 9
S/P SX & RT in past with  chronic Dysphagia   Pt seen by DR Lynn -ENT as out pt - saw him within the past two months  - S+S and MBS: Puree with Moderately thick liquids, all food and liquids via teaspoon presentation, utilizing chin tuck and 2 swallows after each bolus  As per pt at [facility] they change to mildly thickened liquid Chronic, stable -as per  d/w wife -Pt  has h/o DM , pt does NOT take DM meds 2/2 weight loss after his Tonsillar Cancer Rx & Dysphagia,   - LDSS   - Carb consistent soft and bite sized diet   - hypoglycemia protocol   - A1c 8.3 last admission, same this AM with a1c 8.3  start Januvia 25 mg 1 tab daily d/w wife at detail. out pt follow up with Endocrinologist & PMD

## 2022-04-27 NOTE — PHYSICAL THERAPY INITIAL EVALUATION ADULT - PERTINENT HX OF CURRENT PROBLEM, REHAB EVAL
Admitted with weakness, dizziness and hypotension just a few days after being discharged from rehab; Increased troponin; Recent CVA

## 2022-04-27 NOTE — DISCHARGE NOTE PROVIDER - CARE PROVIDER_API CALL
CHANCE FRANCO A  Internal Medicine  [address] [place] [address], [place] [address]  Phone: ([phone]  Fax: [phone]  Follow Up Time: 1 week   CHANCE FRANCO  Internal Medicine  [address] [place] [address], [place] [address]  Phone: ([phone]  Fax: [phone]  Follow Up Time: 1 week    Chet Garcia)  Cardiovascular Disease; Internal Medicine  [facility], [address] [place] [address], [address] [place] [address]  Phone: [phone]  Fax: [phone]  Follow Up Time: 1-3 days   CHANCE FRANCO  Internal Medicine  [address] [facility], [place] [address]  Phone: ()-  Fax: [phone]  Follow Up Time: 1 week    Chet Garcia)  Cardiovascular Disease; Internal Medicine  [facility], [address] [facility], [address]  [place] [address]  Phone: [phone]  Fax: [phone]  Follow Up Time: 1-3 days    Darren Pandya)  Hematology; Internal Medicine; Medical Oncology  [address] [facility], [address]  [place] [address]  Phone: [phone]  Fax: [phone]  Follow Up Time:     José Miguel Lynn)  Otolaryngology  [address] [facility], [address]  [place] [address]  Phone: [phone]  Fax: [phone]  Follow Up Time:    CHANCE FRANCO  Internal Medicine  [address] [facility], [place] [address]  Phone: ([phone]  Fax: [phone]  Follow Up Time: 1 week    Chet Garcia)  Cardiovascular Disease; Internal Medicine  [facility], [address] [facility], [address]  [place] [address]  Phone: [phone]  Fax: [phone]  Follow Up Time: 1-3 days    Darren Pandya)  Hematology; Internal Medicine; Medical Oncology  [address] [facility] [address], [address]  [place] [address]  Phone: [phone]  Fax: [phone]  Follow Up Time:     José Miguel Lynn)  Otolaryngology  [address] [facility], [address]  [place] [address]  Phone: [phone]  Fax: [phone]  Follow Up Time:     Yenifer Faulkner)  Neurology  [address] [place] [address]  Phone: [phone]  Fax: [phone]  Follow Up Time:

## 2022-04-27 NOTE — PROGRESS NOTE ADULT - PROBLEM SELECTOR PLAN 2
JARRED (on CKD 3 ) likely 2/2 pre-renal azotemia in the setting of dehydration, poor PO intake.  - Baseline creatinine appears around 2.1  - Renal indices improved this AM  - s/p 1.75L ~ of NS  - Monitor BMP  - Avoid nephrotoxic medications ACEi/ARB/Diuretics   - Monitor renal indices  - Nephro (Dr. Guadalupe) consulted, f/u recs Pt initially 70/50 on presentation, improved to 112/57 w/ iv fluids, likely 2/2 decreased volume status .RESOLVED   - fragile volume status 2/2 30% ef, caution w/ ivf  - IVF prn, s/p 1.75L ~ of NS  - orthostatic vitals,- NEG   - Patient used to take midodrine 10mg however was stopped   -D/W Dr Terrell -Restart Midodrine 5 mg 1 tab 3x day d/w Dr Garcia as well,  - Cardio (Dr Terrell  consulted, f/u recs  -NO BP Meds on d/c , follow up with Dr Garcia

## 2022-04-27 NOTE — PROGRESS NOTE ADULT - PROBLEM SELECTOR PLAN 3
Dizziness is likely 2/2 hypotension, patient has had decreased PO intake 2/2 dysphagia & , diarrhea.  - BP Low on admission 70/50, improved to 112/57 w/ 1L NS IV bolus  - orthostatic vitals  - Patient used to take midodrine 10mg however was stopped by his pcp, can consider resuming if orthostatics are positive  - fall risk precautions  - Cardio ([facility]) consulted, f/u recs JARRED (on CKD 3 ) likely 2/2 pre-renal azotemia in the setting of dehydration, poor PO intake.  - Baseline creatinine appears around 2.1  - Renal indices improved this AM  - s/p 1.75L ~ of NS  - Monitor BMP as out pt   - Avoid nephrotoxic medications ACEi /ARB/ Diuretics   - Monitor renal indices  - Nephro (Dr. Guadalupe) consulted, f/u recs

## 2022-04-27 NOTE — DISCHARGE NOTE PROVIDER - NSDCQMAMI_CARD_ALL_CORE
No No Repair - Repaired With Adjacent Surgical Defect Text (Leave Blank If You Do Not Want): After the excision the defect was repaired concurrently with another surgical defect which was in close approximation.

## 2022-04-27 NOTE — CONSULT NOTE ADULT - ASSESSMENT
Acute on CKD Stage 3  Hypotension  Dehydration  HFrEF        -JARRED in the setting of hypotension leading to renal hypoperfusion. Along with poor intake. Likely prerenal disease, but at risk of ischemic ATN  -Check urine indices  -Defer renal imaging for now  -Trial of IVF given  -Consider restarting midodrine if warranted  -Cardio eval  -Monitor chemistries; repeat pending, will follow up. If the creatinine is worsening, recommend to extend gentle IVF for another 12 hrs. No evidence of volume overload on exam at this time    Thank you

## 2022-04-27 NOTE — PROGRESS NOTE ADULT - ATTENDING COMMENTS
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  Pt seen, Examined, case & care plan d/w pt, residents at detail.  AM Labs   Consults:  Cardiology-DR Terrell d/w -zeferino for d/c on AC & Midodrine   Renal- DR ROCKWELL -Cr stable to baseline   PO diet .  PT Eval.---> No Need   Case & Care plan d/w out pt  Cardiology-Dr Garcia at detail.-agrees with Eliquis with New A Fib & Recent CVA, change to ASA 81 mg daily, Stop Aggrenox.  D/C Home with Meds to beds d/w pharmacy .  Total d/c care time is 70 minutes.  D/W Wife at detail.

## 2022-04-27 NOTE — DISCHARGE NOTE PROVIDER - NSDCCPCAREPLAN_GEN_ALL_CORE_FT
PRINCIPAL DISCHARGE DIAGNOSIS  Diagnosis: Hypotension  Assessment and Plan of Treatment: You were admitted to the hospital with low blood pressure. This was likely caused by your decreased appetite and fluid intake.   Please continue taking ****  Please follow up with your Primary Care Doctor within 1 week from    You or your family member are responsible for making all follow up appointments.        SECONDARY DISCHARGE DIAGNOSES  Diagnosis: JARRED (acute kidney injury)  Assessment and Plan of Treatment: You were admitted with elevated Creatinine, a marker in your blood that measures your kidney function. This was due to your low blood pressure. Your Creatinine improved while hospitalized with fluids and improved BP.  Please follow up with your Primary Care Doctor within 1 week from    You or your family member are responsible for making all follow up appointments.       PRINCIPAL DISCHARGE DIAGNOSIS  Diagnosis: Hypotension  Assessment and Plan of Treatment: You were admitted to the hospital with low blood pressure. This was likely caused by your decreased appetite and fluid intake. Your midodrine was restarted.  Please follow up with your Primary Care Doctor within 1 week from    You or your family member are responsible for making all follow up appointments.        SECONDARY DISCHARGE DIAGNOSES  Diagnosis: JARRED (acute kidney injury)  Assessment and Plan of Treatment: You were admitted with elevated Creatinine, a marker in your blood that measures your kidney function. This was due to your low blood pressure. Your Creatinine improved while hospitalized with fluids and improved BP.  Please follow up with your Primary Care Doctor within 1 week from    You or your family member are responsible for making all follow up appointments.       PRINCIPAL DISCHARGE DIAGNOSIS  Diagnosis: Hypotension  Assessment and Plan of Treatment: You were admitted to the hospital with low blood pressure. This was likely caused by your decreased appetite and fluid intake. Your midodrine was restarted.  Please follow up with your Primary Care Doctor within 1 week from    You or your family member are responsible for making all follow up appointments.        SECONDARY DISCHARGE DIAGNOSES  Diagnosis: JARRED (acute kidney injury)  Assessment and Plan of Treatment: You were admitted with elevated Creatinine, a marker in your blood that measures your kidney function. This was due to your low blood pressure. Your Creatinine improved while hospitalized with fluids and improved BP.  Please follow up with your Primary Care Doctor within 1 week from    You or your family member are responsible for making all follow up appointments.      Diagnosis: Dizziness  Assessment and Plan of Treatment: You were admitted with dizziness. This was due to your low blood pressure. Please follow up with your Primary Care Doctor within 1 week from    You or your family member are responsible for making all follow up appointments.      Diagnosis: Afib  Assessment and Plan of Treatment: You were found to have new onset atrial fibrillation on admission, which is an abnormal heart rhythm. You were seen by a cardiologist and started on a blood thinner.   Upon discharge, please:  -START Eliquis 2.5mg twice daily  It is VERY IMPORTANT you follow with your cardiologist tomorrow.     PRINCIPAL DISCHARGE DIAGNOSIS  Diagnosis: Hypotension  Assessment and Plan of Treatment: You were admitted to the hospital with low blood pressure.   -This was likely caused by your decreased appetite and  poor fluid intake.   -YOu have h/o hypotension in past   -You restarted on  midodrine  5 mg 1 tab 3x day .  -Follow up with Cardiologist Dr Garcia tomorrow   Please follow up with your Primary Care Doctor within 1 week from    You or your family member are responsible for making all follow up appointments.        SECONDARY DISCHARGE DIAGNOSES  Diagnosis: Afib  Assessment and Plan of Treatment: You were found to have new onset atrial fibrillation on admission, which is an  Irrregular & abnormal heart rhythm.   -You were seen by a cardiologist and started on a blood thinner. Heart Rate is stable   Upon discharge, please:  -START Eliquis  5mg 1 tab every 12 hrs  daily  -It is VERY IMPORTANT you follow with your cardiologist tomorrow.    Diagnosis: JARRED (acute kidney injury)  Assessment and Plan of Treatment: JARRED/CKD 3  -You were admitted with elevated Creatinine, Likely 2/2 poor oral intake   -. Your Creatinine improved while hospitalized with fluids and improved BP.  -Cr is back to baseline 2.1  -Follow up with Your Nephrologist in 1-2 weeks repeat BMP   -Please follow up with your Primary Care Doctor within 1 week from discharge.   .      Diagnosis: Chronic HFrEF (heart failure with reduced ejection fraction)  Assessment and Plan of Treatment: Your EF is 30%-35 % on Last ECHO  -Fluid restriction 1200 ml-1300 ml  -Follow up with Cardiologist -DR Garcia  tomorrow    Diagnosis: Recent cerebrovascular accident (CVA)  Assessment and Plan of Treatment: R central sulcus MCA infarct, with left Upper EXT weakness, April 2022  -STOP Aggerenox as we started on Eliquis for  new onset A Fib  -CT Scan Head done- shows recent CVA  -Start EC ASA 81 mg 1tab daily with food   -AtorvaStatin 80 mg 1 tab daily at bed time  -On Depakote 2x day  -Follow up your neurologist in 1week    Diagnosis: Anemia of chronic disease  Assessment and Plan of Treatment: Anemia of chronic disease 2/2 CKD   -On Vit B12 1 tab daily  On Iron 1 tab daily  -Follow up with Hematology DR Pandya for anemia follow up    Diagnosis: CAD (coronary artery disease)  Assessment and Plan of Treatment: Cardiomyopathy  -Ec ASA 81 mg 1 tab daily      Diagnosis: Dizziness  Assessment and Plan of Treatment: You were admitted with dizziness. This was due to your low blood pressure.   -RESOLVED   -Please follow up with your Primary Care Doctor within 1 week from discharge.   .      Diagnosis: Tonsillar cancer  Assessment and Plan of Treatment: S/P Surgery, Chemo & Radiation -  -You have chronic Dysphagia   -On Pureed food with Mildly thickened Liquids  -Follow up with DR Lynn       Diagnosis: HLD (hyperlipidemia)  Assessment and Plan of Treatment: On Statin daily    Diagnosis: Urinary retention  Assessment and Plan of Treatment: H/O Urinary Retention with large PVR  -Flomax 0.8 mg q HS   -Proscar daily    Diagnosis: H/O carotid stenosis  Assessment and Plan of Treatment: on ASA EC 1 tab daily    Diagnosis: DM2 (diabetes mellitus, type 2)  Assessment and Plan of Treatment: Diet control    Diagnosis: Hypothyroid  Assessment and Plan of Treatment: on Synthroid daily    Diagnosis: Mood disorder  Assessment and Plan of Treatment: on Citalopram daily     PRINCIPAL DISCHARGE DIAGNOSIS  Diagnosis: Hypotension  Assessment and Plan of Treatment: You were admitted to the hospital with low blood pressure.   -This was likely caused by your decreased appetite and  poor fluid intake.   -You have h/o hypotension in past   -You restarted on  midodrine  5 mg 1 tab 3x day .  -Follow up with Cardiologist Dr Garcia tomorrow   Please follow up with your Primary Care Doctor within 1 week from    You or your family member are responsible for making all follow up appointments.        SECONDARY DISCHARGE DIAGNOSES  Diagnosis: Afib  Assessment and Plan of Treatment: You were found to have new onset atrial fibrillation on admission, which is an  Irrregular & abnormal heart rhythm.   -You were seen by a cardiologist and started on a blood thinner. Heart Rate is stable   Upon discharge, please:  -START Eliquis  5mg 1 tab every 12 hrs  daily  -It is VERY IMPORTANT you follow with your cardiologist tomorrow.    Diagnosis: JARRED (acute kidney injury)  Assessment and Plan of Treatment: JARRED/CKD 3  -You were admitted with elevated Creatinine, Likely 2/2 poor oral intake   -. Your Creatinine improved while hospitalized with fluids and improved BP.  -Cr is back to baseline 2.1  -Follow up with Your Nephrologist in 1-2 weeks repeat BMP   -Please follow up with your Primary Care Doctor within 1 week from discharge.   .      Diagnosis: Chronic HFrEF (heart failure with reduced ejection fraction)  Assessment and Plan of Treatment: Your EF is 30%-35 % on Last ECHO  -Fluid restriction 1200 ml-1300 ml  -Follow up with Cardiologist -DR Garcia  tomorrow    Diagnosis: Recent cerebrovascular accident (CVA)  Assessment and Plan of Treatment: R central sulcus MCA infarct, with left Upper EXT weakness, March 2022  -STOP Aggerenox as we started on Eliquis for  new onset A Fib  -CT Scan Head done- Aging small posterior right frontal infarcts.  -START  EC ASA 81 mg 1tab daily with food   -AtorvaStatin 80 mg 1 tab daily at bed time  -On Depakote 2x day  -Follow up your neurologist in 1week    Diagnosis: Anemia of chronic disease  Assessment and Plan of Treatment: Anemia of chronic disease 2/2 CKD   -On Vit B12 1 tab daily  On Iron 1 tab daily  -Follow up with Hematology DR Pandya for anemia follow up, Repeat CBC in 1 week    Diagnosis: DM2 (diabetes mellitus, type 2)  Assessment and Plan of Treatment: Your HbA1C is 8.3  -You were taking medication in past & stooped 2/2 your Tonsillar cancer & weight loss & Dysphagia  -Start Januvia  25  mg 1 tab Daily  -Follow up with PMD in 1 week  -Follow up with Endocrinologist DR C Perlman   -Check FSBS at home.  - Follow Diabetic diet    Diagnosis: CAD (coronary artery disease)  Assessment and Plan of Treatment: H/O Cardiomyopathy  -You had High troponin while in hospital last admission -NSEMI   -Ec ASA 81 mg 1 tab daily  -Atorvastatin 80 mg daily      Diagnosis: Dizziness  Assessment and Plan of Treatment: You were admitted with dizziness. This was due to your low blood pressure.   -RESOLVED , Negative Orthostasis  -Please follow up with your Primary Care Doctor within 1 week from discharge.   .      Diagnosis: Tonsillar cancer  Assessment and Plan of Treatment: S/P Surgery, Chemo & Radiation -  -You have chronic Dysphagia   -On Pureed food with Mildly thickened Liquids  -Follow up with DR Lynn       Diagnosis: HLD (hyperlipidemia)  Assessment and Plan of Treatment: On Statin daily    Diagnosis: Urinary retention  Assessment and Plan of Treatment: H/O Urinary Retention with large PVR  -Flomax 0.8 mg q HS   -Proscar daily    Diagnosis: H/O carotid stenosis  Assessment and Plan of Treatment: on ASA EC 1 tab daily    Diagnosis: Hypothyroid  Assessment and Plan of Treatment: on Synthroid daily    Diagnosis: Mood disorder  Assessment and Plan of Treatment: on Citalopram daily     PRINCIPAL DISCHARGE DIAGNOSIS  Diagnosis: Hypotension  Assessment and Plan of Treatment: You were admitted to the hospital with low blood pressure.   -This was likely caused by your decreased appetite and  poor fluid intake.   -You have h/o hypotension in past   -You restarted on  midodrine  5 mg 1 tab 3x day .  -Follow up with Cardiologist Dr Garcia tomorrow   Please follow up with your Primary Care Doctor within 1 week from    -DO NOT take any Blood pressure meds unless advised by Cardiology DR Garcia  You or your family member are responsible for making all follow up appointments.        SECONDARY DISCHARGE DIAGNOSES  Diagnosis: Afib  Assessment and Plan of Treatment: You were found to have new onset atrial fibrillation on admission, which is an  Irrregular & abnormal heart rhythm.   -You were seen by a cardiologist and started on a blood thinner. Heart Rate is stable   Upon discharge, please:  -START Eliquis  5mg 1 tab every 12 hrs  daily  -It is VERY IMPORTANT you follow with your cardiologist tomorrow.    Diagnosis: JARRED (acute kidney injury)  Assessment and Plan of Treatment: JARRED/CKD 3  -You were admitted with elevated Creatinine, Likely 2/2 poor oral intake   -. Your Creatinine improved while hospitalized with fluids and improved BP.  -Cr is back to baseline 2.1  --Do NOT Take ACEi/ARB -Blood pressure medications,  -Follow up with Your Nephrologist in 1-2 weeks repeat BMP   -Please follow up with your Primary Care Doctor within 1 week from discharge.   .      Diagnosis: Chronic HFrEF (heart failure with reduced ejection fraction)  Assessment and Plan of Treatment: Your EF is 30%-35 % on Last ECHO, You Have AICD in place.  -Fluid restriction 1200 ml-1300 ml  -Follow up with Cardiologist -DR Garcia  tomorrow    Diagnosis: Recent cerebrovascular accident (CVA)  Assessment and Plan of Treatment: R central sulcus MCA infarct, with left Upper EXT weakness, March 2022  -STOP Aggerenox as we started on Eliquis for  new onset A Fib  -CT Scan Head done- Aging small posterior right frontal infarcts.  -START  EC ASA 81 mg 1tab daily with food   -AtorvaStatin 80 mg 1 tab daily at bed time  -On Depakote 2x day  -Follow up your neurologist in 1week    Diagnosis: Anemia of chronic disease  Assessment and Plan of Treatment: Anemia of chronic disease 2/2 CKD   -On Vit B12 1 tab daily  On Iron 1 tab daily  -Follow up with Hematology DR Pandya for anemia follow up, Repeat CBC in 1 week    Diagnosis: DM2 (diabetes mellitus, type 2)  Assessment and Plan of Treatment: Your HbA1C is 8.3  -You were taking medication in past & stooped 2/2 your Tonsillar cancer & weight loss & Dysphagia  -Start Januvia  25  mg 1 tab Daily  -Follow up with PMD in 1 week  -Follow up with Endocrinologist DR C Perlman   -Check FSBS at home.  - Follow Diabetic diet    Diagnosis: CAD (coronary artery disease)  Assessment and Plan of Treatment: H/O Cardiomyopathy  -You had High troponin while in hospital last admission -NSEMI   -Ec ASA 81 mg 1 tab daily  -Atorvastatin 80 mg daily      Diagnosis: Dizziness  Assessment and Plan of Treatment: You were admitted with dizziness. This was due to your low blood pressure.   -RESOLVED , Negative Orthostasis  -Please follow up with your Primary Care Doctor within 1 week from discharge.   .      Diagnosis: Tonsillar cancer  Assessment and Plan of Treatment: S/P Surgery, Chemo & Radiation -  -You have chronic Dysphagia   -On Pureed food with Mildly thickened Liquids  -Follow up with DR Lynn       Diagnosis: HLD (hyperlipidemia)  Assessment and Plan of Treatment: On Statin daily    Diagnosis: Urinary retention  Assessment and Plan of Treatment: H/O Urinary Retention with large PVR  -Flomax 0.8 mg q HS   -Proscar daily  Follow up with Urologist as schedule    Diagnosis: H/O carotid stenosis  Assessment and Plan of Treatment: on ASA EC 1 tab daily    Diagnosis: Hypothyroid  Assessment and Plan of Treatment: on Synthroid daily    Diagnosis: Mood disorder  Assessment and Plan of Treatment: on Citalopram daily

## 2022-04-27 NOTE — PHYSICAL THERAPY INITIAL EVALUATION ADULT - GENERAL OBSERVATIONS, REHAB EVAL
Patient was received and left supine with head of bed elevated, (deferred sitting in chair due to lack of space in room and wife to be visiting soon) with IV in place, bed alarm activated and call bell in reach.

## 2022-04-27 NOTE — DISCHARGE NOTE PROVIDER - NSDCFUADDINST_GEN_ALL_CORE_FT
Please follow up with your primary care doctor AND CARDIOLOGIST within 1 week of discharge from the hospital.  You or your family member are responsible for making all follow up appointments.  If you develop worsening dizziness, or low blood pressure, please return to the ED immediately.  Please follow up with your primary in 1 week care doctor AND CARDIOLOGIST DR Garcia tomorrow of discharge from the hospital to discuss about Eliquis & Midodrine .  -Repeat CBC for anemia & BMP for Cr level,  - follow up with your Nephrologist in 1-2 weeks   -Follow up with Hematologist in 2 weeks   You or your family member are responsible for making all follow up appointments.  If you develop worsening dizziness, or low blood pressure, please return to the ED immediately.

## 2022-04-27 NOTE — DISCHARGE NOTE PROVIDER - HOSPITAL COURSE
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 Per patient and pt's wife at bedside, patient has became increasingly dizzy and fatigued at home since being discharged. Pt has been eating and drinking significantly less than previous, pt's wife states likely less than 1L of fluid over the past day and he has been more tremulous on his feet than normal. He also appeared to be more short of breat than normal while walking around. Pt's wife took his blood pressure earlier today and noted that it was 75/54. Physical therapy visited patient at home today and also took his BP, was similarly low. Pt's wife then took his bp and noted 52/37 so decided to call the ambulance. Patient states he feels well, much less dizzy than before he arrived at the hospital. Has no other acute complaints at this time. Of note, patient had intermittent diarrhea for ~2 weeks between his admission to [facility] and [place] rehab. Denies current diarrhea. Denies fever, chills, CP, SOB, weakness, dizziness, n/v/d.    ED Course: hr 90, bp 112/57, rr 15, t 98.5, spo2 98 on ra  labs: hgb 9.8, rouloux present on diferential, bun/cr 39/2.8, alb 2.2, trop 559.2  imaging: CXR bilateral diffuse infiltrates on wet read  EKG: NSR 85, possible prior anterolateral infarct  Given: NS bolus 1L x1 (26 Apr 2022 22:24)      ---  HOSPITAL COURSE:   Patient admitted to telemetry for dizziness, hypotension, and elevated troponin. Cardiology consulted. Patient given IVF and BP, symptoms improved. Symptoms likely 2/2 poor PO intake and recent episodes of diarrhea. UA neg, RVP, Influenza swab neg. Trop elevated but downtrended, likely demand ischemia in setting of hypoperfusion. Patient also with JARRED, nephrology consult. Urine studies showed *****. Likely prerenal in setting of hypotension and renal hypoperfusion. Renal indices improved with IVF. Given hx of recent CVA, patient continued on home depakote 500mg BID. SS evaluated patient during last admission and MBS showed dysphagia, patient continued on same diet (puree with mod thick liquis via tsp).       ---  CONSULTANTS:       ---  TIME SPENT:  I, the attending physician, was physically present for the key portions of the evaluation and management (E/M) service provided. 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 Per patient and pt's wife at bedside, patient has became increasingly dizzy and fatigued at home since being discharged. Pt has been eating and drinking significantly less than previous, pt's wife states likely less than 1L of fluid over the past day and he has been more tremulous on his feet than normal. He also appeared to be more short of breat than normal while walking around. Pt's wife took his blood pressure earlier today and noted that it was 75/54. Physical therapy visited patient at home today and also took his BP, was similarly low. Pt's wife then took his bp and noted 52/37 so decided to call the ambulance. Patient states he feels well, much less dizzy than before he arrived at the hospital. Has no other acute complaints at this time. Of note, patient had intermittent diarrhea for ~2 weeks between his admission to [facility] and [place] rehab. Denies current diarrhea. Denies fever, chills, CP, SOB, weakness, dizziness, n/v/d.    ED Course: hr 90, bp 112/57, rr 15, t 98.5, spo2 98 on ra  labs: hgb 9.8, rouloux present on diferential, bun/cr 39/2.8, alb 2.2, trop 559.2  imaging: CXR bilateral diffuse infiltrates on wet read  EKG: NSR 85, possible prior anterolateral infarct  Given: NS bolus 1L x1 (26 Apr 2022 22:24)      ---  HOSPITAL COURSE:   Patient admitted to telemetry for dizziness, hypotension, and elevated troponin. Cardiology consulted. Patient given IVF and BP, symptoms improved. Symptoms likely 2/2 poor PO intake and recent episodes of diarrhea. UA neg, RVP, Influenza swab neg. Trop downtrended, likely demand ischemia in setting of hypoperfusion. Patient also with JARRED, nephrology consult. Urine studies showed *****. Likely prerenal in setting of hypotension and renal hypoperfusion. Renal indices improved with IVF. Given hx of recent CVA, patient continued on home depakote 500mg BID. SS evaluated patient during last admission and MBS showed dysphagia, patient continued on same diet (puree with mod thick liquis via tsp).       ---  CONSULTANTS:       ---  TIME SPENT:  I, the attending physician, was physically present for the key portions of the evaluation and management (E/M) service provided. 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 Per patient and pt's wife at bedside, patient has became increasingly dizzy and fatigued at home since being discharged. Pt has been eating and drinking significantly less than previous, pt's wife states likely less than 1L of fluid over the past day and he has been more tremulous on his feet than normal. He also appeared to be more short of breat than normal while walking around. Pt's wife took his blood pressure earlier today and noted that it was 75/54. Physical therapy visited patient at home today and also took his BP, was similarly low. Pt's wife then took his bp and noted 52/37 so decided to call the ambulance. Patient states he feels well, much less dizzy than before he arrived at the hospital. Has no other acute complaints at this time. Of note, patient had intermittent diarrhea for ~2 weeks between his admission to [facility] and [place] rehab. Denies current diarrhea. Denies fever, chills, CP, SOB, weakness, dizziness, n/v/d.    ED Course: hr 90, bp 112/57, rr 15, t 98.5, spo2 98 on ra  labs: hgb 9.8, rouloux present on diferential, bun/cr 39/2.8, alb 2.2, trop 559.2  imaging: CXR bilateral diffuse infiltrates on wet read  EKG: NSR 85, possible prior anterolateral infarct  Given: NS bolus 1L x1 (26 Apr 2022 22:24)      ---  HOSPITAL COURSE:   Patient admitted to telemetry for dizziness, hypotension, and elevated troponin. Cardiology consulted. Patient given IVF and BP, symptoms improved. Symptoms likely 2/2 poor PO intake and recent episodes of diarrhea. UA neg, RVP, Influenza swab neg. Trop downtrended, likely demand ischemia in setting of hypoperfusion. Orthostatis showed *******Patient also with JARRED, nephrology consult. Urine studies showed *****. Likely prerenal in setting of hypotension and renal hypoperfusion. Renal indices improved with IVF. Given hx of recent CVA, patient continued on home depakote 500mg BID. SS evaluated patient during last admission and MBS showed dysphagia, patient continued on same diet (puree with mod thick liquis via tsp).       ---  CONSULTANTS:   [facility]  Nephro Dr. Porter      ---  TIME SPENT:  I, the attending physician, was physically present for the key portions of the evaluation and management (E/M) service provided. 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 Per patient and pt's wife at bedside, patient has became increasingly dizzy and fatigued at home since being discharged. Pt has been eating and drinking significantly less than previous, pt's wife states likely less than 1L of fluid over the past day and he has been more tremulous on his feet than normal. He also appeared to be more short of breat than normal while walking around. Pt's wife took his blood pressure earlier today and noted that it was 75/54. Physical therapy visited patient at home today and also took his BP, was similarly low. Pt's wife then took his bp and noted 52/37 so decided to call the ambulance. Patient states he feels well, much less dizzy than before he arrived at the hospital. Has no other acute complaints at this time. Of note, patient had intermittent diarrhea for ~2 weeks between his admission to [facility] and [place] rehab. Denies current diarrhea. Denies fever, chills, CP, SOB, weakness, dizziness, n/v/d.    ED Course: hr 90, bp 112/57, rr 15, t 98.5, spo2 98 on ra  labs: hgb 9.8, rouloux present on diferential, bun/cr 39/2.8, alb 2.2, trop 559.2  imaging: CXR bilateral diffuse infiltrates on wet read  EKG: NSR 85, possible prior anterolateral infarct  Given: NS bolus 1L x1 (26 Apr 2022 22:24)      ---  HOSPITAL COURSE:   Patient admitted to telemetry for dizziness, hypotension, and elevated troponin. Cardiology consulted. Patient given IVF and BP, symptoms improved. Symptoms likely 2/2 poor PO intake and recent episodes of diarrhea. UA neg, RVP, Influenza swab neg. Trop downtrended, likely demand ischemia in setting of hypoperfusion. Orthostatics were negative. Patient was restarted on midodrine 5mg TID (previous home dose was 10mg TID). Patient also with JARRED, nephrology consult. Urine studies were pending time of discharge. Likely prerenal in setting of hypotension and renal hypoperfusion. Renal indices improved with IVF. Given hx of recent CVA, patient continued on home depakote 500mg BID. SS evaluated patient during last admission and MBS showed dysphagia, patient continued on same diet (puree with mod thick liquis via tsp).       ---  CONSULTANTS:   [facility]  Nephro Dr. Porter      ---  TIME SPENT:  I, the attending physician, was physically present for the key portions of the evaluation and management (E/M) service provided. 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 Per patient and pt's wife at bedside, patient has became increasingly dizzy and fatigued at home since being discharged. Pt has been eating and drinking significantly less than previous, pt's wife states likely less than 1L of fluid over the past day and he has been more tremulous on his feet than normal. He also appeared to be more short of breat than normal while walking around. Pt's wife took his blood pressure earlier today and noted that it was 75/54. Physical therapy visited patient at home today and also took his BP, was similarly low. Pt's wife then took his bp and noted 52/37 so decided to call the ambulance. Patient states he feels well, much less dizzy than before he arrived at the hospital. Has no other acute complaints at this time. Of note, patient had intermittent diarrhea for ~2 weeks between his admission to [facility] and [place] rehab. Denies current diarrhea. Denies fever, chills, CP, SOB, weakness, dizziness, n/v/d.    ED Course: hr 90, bp 112/57, rr 15, t 98.5, spo2 98 on ra  labs: hgb 9.8, rouloux present on diferential, bun/cr 39/2.8, alb 2.2, trop 559.2  imaging: CXR bilateral diffuse infiltrates on wet read  EKG: NSR 85, possible prior anterolateral infarct  Given: NS bolus 1L x1 (26 Apr 2022 22:24)      ---  HOSPITAL COURSE:   Patient admitted to telemetry for dizziness, hypotension, and elevated troponin. Cardiology consulted. Patient given IVF and BP, symptoms improved. Symptoms likely 2/2 poor PO intake and recent episodes of diarrhea. UA neg, RVP, Influenza swab neg. Trop downtrended, likely demand ischemia in setting of hypoperfusion. Orthostatics were negative. Patient was restarted on midodrine 5mg TID (previous home dose was 10mg TID). Patient found to have new onset afib on admission. Patient was started on eliquis 2.5mg BID. Patient also with JARRED, nephrology consult. Urine studies were pending time of discharge. Likely prerenal in setting of hypotension and renal hypoperfusion. Renal indices improved with IVF. Given hx of recent CVA, patient continued on home depakote 500mg BID. SS evaluated patient during last admission and MBS showed dysphagia, patient continued on same diet (puree with mod thick liquis via tsp). PT evaluated pt, rec no PT needs.      See progress note for further details on day of discharge.    ---  CONSULTANTS:   [facility]  Nephro Dr. Porter      ---  TIME SPENT:  I, the attending physician, was physically present for the key portions of the evaluation and management (E/M) service provided. 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 Per patient and pt's wife at bedside, patient has became increasingly dizzy and fatigued at home since being discharged. Pt has been eating and drinking significantly less than previous, pt's wife states likely less than 1L of fluid over the past day and he has been more tremulous on his feet than normal. He also appeared to be more short of breat than normal while walking around. Pt's wife took his blood pressure earlier today and noted that it was 75/54. Physical therapy visited patient at home today and also took his BP, was similarly low. Pt's wife then took his bp and noted 52/37 so decided to call the ambulance. Patient states he feels well, much less dizzy than before he arrived at the hospital. Has no other acute complaints at this time. Of note, patient had intermittent diarrhea for ~2 weeks between his admission to [facility] and [place] rehab. Denies current diarrhea. Denies fever, chills, CP, SOB, weakness, dizziness, n/v/d.    ED Course: hr 90, bp 112/57, rr 15, t 98.5, spo2 98 on ra  labs: hgb 9.8, rouloux present on diferential, bun/cr 39/2.8, alb 2.2, trop 559.2  imaging: CXR bilateral diffuse infiltrates on wet read  EKG: NSR 85, possible prior anterolateral infarct  Given: NS bolus 1L x1 (26 Apr 2022 22:24)      ---  HOSPITAL COURSE:   Patient admitted to telemetry for dizziness, hypotension, and elevated troponin. Cardiology consulted. Patient given IVF and BP, symptoms improved. Symptoms likely 2/2 poor PO intake and recent episodes of diarrhea. UA neg, RVP, Influenza swab neg. Trop downtrended, likely demand ischemia in setting of hypoperfusion. Orthostatics were negative. Patient was restarted on midodrine 5mg TID (previous home dose was 10mg TID). Patient found to have new onset afib on admission. Patient was started on eliquis 2.5mg BID. Patient also with JARRED, nephrology consult. Urine studies were pending time of discharge. Likely prerenal in setting of hypotension and renal hypoperfusion. Renal indices improved with IVF. Given hx of recent CVA, patient continued on home depakote 500mg BID. SS evaluated patient during last admission and MBS showed dysphagia, patient continued on same diet (puree with mod thick liquis via tsp). PT evaluated pt, rec no PT needs.      See progress note for further details on day of discharge.    ---  CONSULTANTS:   [facility]  Nephro Dr. Porter      ---  TIME SPENT:  I, the attending physician, was physically present for the key portions of the evaluation and management (E/M) service provided. 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 Per patient and pt's wife at bedside, patient has became increasingly dizzy and fatigued at home since being discharged. Pt has been eating and drinking significantly less than previous, pt's wife states likely less than 1L of fluid over the past day and he has been more tremulous on his feet than normal. He also appeared to be more short of breat than normal while walking around. Pt's wife took his blood pressure earlier today and noted that it was 75/54. Physical therapy visited patient at home today and also took his BP, was similarly low. Pt's wife then took his bp and noted 52/37 so decided to call the ambulance. Patient states he feels well, much less dizzy than before he arrived at the hospital. Has no other acute complaints at this time. Of note, patient had intermittent diarrhea for ~2 weeks between his admission to [facility] and [place] rehab. Denies current diarrhea. Denies fever, chills, CP, SOB, weakness, dizziness, n/v/d.    ED Course: hr 90, bp 112/57, rr 15, t 98.5, spo2 98 on ra  labs: hgb 9.8, rouloux present on diferential, bun/cr 39/2.8, alb 2.2, trop 559.2  imaging: CXR bilateral diffuse infiltrates on wet read  EKG: NSR 85, possible prior anterolateral infarct  Given: NS bolus 1L x1 (26 Apr 2022 22:24)      ---  HOSPITAL COURSE:   Patient admitted to telemetry for dizziness, hypotension, and elevated troponin. Cardiology consulted. Patient given IVF and BP, symptoms improved. Symptoms likely 2/2 poor PO intake and recent episodes of diarrhea. UA neg, RVP, Influenza swab neg. Trop downtrended, likely demand ischemia in setting of hypoperfusion. Orthostatics were negative. Patient was restarted on midodrine 5mg TID (previous home dose was 10mg TID). Patient found to have new onset afib on admission. Patient was started on eliquis 5mg q 12 hrs  Patient also with JARRED,CKD 3  nephrology consult. Urine studies were pending time of discharge. Likely prerenal in setting of hypotension and renal hypoperfusion. Renal indices improved with IVF. Given hx of recent CVA, patient continued on home depakote 500mg BID. SS evaluated patient during last admission and MBS showed dysphagia, patient continued on same diet (puree with mod thick liquis via tsp). PT evaluated pt, rec no PT needs. Pt has Known H/O DM 2 - was on Meds  in past & pt stopped medication 2/2 weight loss from Tonsillar cancer & Dysphagia. d/w wife HB A1c is 8.3 , with recent CVA pt MUST follow diabetic diet & check FSBS, started on Januvia 25 mg 1 tab daily-RENAL DOSE adjustment,  Aggrenox was stopped as pt started on Eliquis & as d/w DR Garcia will start ON EC ASA 81 mg 1 tab daily. As per Dr Terrell pt can go home with AC for New onset of A Fib & Recent CVA, restart Midodrine,  Pt MUST see endocrinologist as out pt. pt & wife both made aware of AC with Eliquis, Januvia & other meds changes.      See progress note for further details on day of discharge.    CT Head :  IMPRESSION:  No evidence of an acute intracranial hemorrhage, midline shift, or   hydrocephalus. Aging small posterior right frontal infarcts. Mild   ischemic changes left frontal white matter somewhat better seen than   prior exam.    ---  CONSULTANTS:   [facility]  Nephro Dr. Porter      ---  TIME SPENT:  I, the attending physician, was physically present for the key portions of the evaluation and management (E/M) service provided. The total amount of time spent reviewing the hospital notes, laboratory values, imaging findings, assessing/counseling the patient, discussing with consultant physicians, social work, nursing staff was  70 minutes    ---

## 2022-04-27 NOTE — PROGRESS NOTE ADULT - PROBLEM SELECTOR PLAN 1
Pt initially 70/50 on presentation, improved to 112/57 w/ iv fluids, likely 2/2 decreased volume status.  - fragile volume status 2/2 30% ef, caution w/ ivf  - IVF prn, s/p 1.75L ~ of NS  - orthostatic vitals, f/u  - Patient used to take midodrine 10mg however was stopped by his pcp, can consider resuming if orthostatics are positive  - monitor off antihypertensives  - Cardio ([facility]) consulted, f/u recs New Onset A Fib   D/W Dr Terrell-start Eliquis 5 mg 2x day (as d/w pharmacy-pt qualifies for 5 mg dose) with recent CVA   -Meds to beds arranged  HR Stable.  D/W Out pt cardiology-Dr Garcia -agrees for AC with Eliquis , STOP Aggrenox ___> Start EC ASA 81 mg 1 tab daily.  -follow up with DR Garcia on 4/28 /22

## 2022-04-27 NOTE — PROGRESS NOTE ADULT - SUBJECTIVE AND OBJECTIVE BOX
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 Per patient and pt's wife at bedside, patient has became increasingly dizzy and fatigued at home since being discharged. Pt has been eating and drinking significantly less than previous, pt's wife states likely less than 1L of fluid over the past day and he has been more tremulous on his feet than normal. He also appeared to be more short of breat than normal while walking around. Pt's wife took his blood pressure earlier today and noted that it was 75/54. Physical therapy visited patient at home today and also took his BP, was similarly low. Pt's wife then took his bp and noted 52/37 so decided to call the ambulance. Patient states he feels well, much less dizzy than before he arrived at the hospital. Has no other acute complaints at this time. Of note, patient had intermittent diarrhea for ~2 weeks between his admission to [facility] and [facility] re[facility]. Denies current diarrhea. Denies fever, chills, CP, SOB, weakness, dizziness, n/v/d.    ED Course: hr 90, bp 112/57, rr 15, t 98.5, spo2 98 on ra  labs: hgb 9.8, rouloux present on diferential, bun/cr 39/2.8, alb 2.2, trop 559.2  imaging: CXR bilateral diffuse infiltrates on wet read  EKG: NSR 85, possible prior anterolateral infarct  Given: NS bolus 1L x1 (2022 22:24)    INTERVAL HPI:  22: Pt seen and examined at the bedside. Reports he has had diarrhea, semi-formed stools recently because of stool softeners given at Rehab. States he has had dark tarry stools however. Repeat H/H ordered for 2PM today as well as occult blood. Pt denies lightheadedness, dizziness, weakness. Denies fevers, chills, cough, sore throat, SOB, chest pain, abd pain, nausea, vomiting, constipation, hematochezia, dysuria.        OVERNIGHT EVENTS: hypotensive on admission in the ED, responsive to fluids.     Home Medications:  albuterol 90 mcg/inh inhalation aerosol: 2 puff(s) inhaled every 6 hours, As needed, Shortness of Breath and/or Wheezing (2022 22:24)  bisacodyl 10 mg rectal suppository: 1 suppository(ies) rectal once a day (2022 22:24)  calcitriol 0.25 mcg oral capsule: 1 cap(s) orally once a day (2022 22:24)  cyanocobalamin 1000 mcg oral tablet: 1 tab(s) orally once a day (2022 22:24)  melatonin 3 mg oral tablet: 1 tab(s) orally once a day (at bedtime) (2022 22:24)  saliva substitutes oral solution: 1 spray(s) orally 3 times a day, As Needed (2022 22:24)  senna oral tablet: 2 tab(s) orally once a day (at bedtime) (2022 22:24)      MEDICATIONS  (STANDING):  atorvastatin 80 milliGRAM(s) Oral at bedtime  dextrose 5%. 1000 milliLiter(s) (50 mL/Hr) IV Continuous <Continuous>  dextrose 5%. 1000 milliLiter(s) (100 mL/Hr) IV Continuous <Continuous>  dextrose 50% Injectable 25 Gram(s) IV Push once  dextrose 50% Injectable 12.5 Gram(s) IV Push once  dextrose 50% Injectable 25 Gram(s) IV Push once  dipyridamole 200 mG/aspirin 25 mG 1 Capsule(s) Oral two times a day  diVALproex  milliGRAM(s) Oral two times a day  escitalopram 5 milliGRAM(s) Oral daily  ferrous    sulfate 325 milliGRAM(s) Oral daily  finasteride 5 milliGRAM(s) Oral daily  glucagon  Injectable 1 milliGRAM(s) IntraMuscular once  heparin   Injectable 5000 Unit(s) SubCutaneous every 8 hours  insulin lispro (ADMELOG) corrective regimen sliding scale   SubCutaneous three times a day before meals  insulin lispro (ADMELOG) corrective regimen sliding scale   SubCutaneous at bedtime  lactated ringers. 1000 milliLiter(s) (60 mL/Hr) IV Continuous <Continuous>  levothyroxine 75 MICROGram(s) Oral daily  senna 2 Tablet(s) Oral at bedtime  tamsulosin 0.8 milliGRAM(s) Oral at bedtime    MEDICATIONS  (PRN):  acetaminophen     Tablet .. 650 milliGRAM(s) Oral every 6 hours PRN Temp greater or equal to 38C (100.4F), Mild Pain (1 - 3)  ALBUTerol    90 MICROgram(s) HFA Inhaler 2 Puff(s) Inhalation every 6 hours PRN Shortness of Breath and/or Wheezing  aluminum hydroxide/magnesium hydroxide/simethicone Suspension 30 milliLiter(s) Oral every 4 hours PRN Dyspepsia  Biotene Dry Mouth Oral Rinse 5 milliLiter(s) Swish and Spit three times a day PRN Mouth Care  bisacodyl Suppository 10 milliGRAM(s) Rectal daily PRN Constipation  dextrose Oral Gel 15 Gram(s) Oral once PRN Blood Glucose LESS THAN 70 milliGRAM(s)/deciliter  melatonin 3 milliGRAM(s) Oral at bedtime PRN Insomnia  ondansetron Injectable 4 milliGRAM(s) IV Push every 8 hours PRN Nausea and/or Vomiting      Allergies    No Known Allergies    Intolerances        Social History:  Smoking - Former smoker quit 40 years ago, 20-pack-years  EtOH - denies  Illicit drugs - drugs    FUNCTIONAL HISTORY  Patient lives at home with his wife. He is retired; formerly worked in construction    Previous Functional Status:  Independent in ambulation, ADL's, transfers prior to hospitalization    Current Functional Status:  has walker at home however mostly ambulates without assistance. Wife helps minimally w/ some ADLs, although he is mostly independent. (2022 22:24)      REVIEW OF SYSTEMS:  CONSTITUTIONAL: No fever, No chills, No fatigue, No myalgia, No Body ache, No Weakness  EYES: No eye pain,  No visual disturbances, No discharge, NO Redness  ENMT:  No ear pain, No nose bleed, No vertigo; No sinus pain, NO throat pain, No Congestion  NECK: No pain, No stiffness  RESPIRATORY: No cough, NO wheezing, No  hemoptysis, NO  shortness of breath  CARDIOVASCULAR: No chest pain, palpitations  GASTROINTESTINAL: No abdominal pain, NO epigastric pain. No nausea, No vomiting; No diarrhea, No constipation. [  ] BM  GENITOURINARY: No dysuria, No frequency, No urgency, No hematuria, NO incontinence  NEUROLOGICAL: No headaches, No dizziness, No numbness, No tingling, No tremors, No weakness  EXT: No Swelling, No Pain, No Edema  SKIN:  [  ] No itching, burning, rashes, or lesions   MUSCULOSKELETAL: No joint pain ,No Jt swelling; No muscle pain, No back pain, No extremity pain  PSYCHIATRIC: No depression,  No anxiety,  No mood swings ,No difficulty sleeping at night  PAIN SCALE: [  ] None  [  ] Other-  ROS Unable to obtain due to - [  ] Dementia  [  ] Lethargy [  ] Drowsy [  ] Sedated [  ] non verbal  REST OF REVIEW Of SYSTEM - [  ] Normal     Vital Signs Last 24 Hrs  T(C): 36.6 (2022 11:27), Max: 36.9 (2022 20:17)  T(F): 97.9 (2022 11:27), Max: 98.5 (2022 20:17)  HR: 70 (2022 11:27) (70 - 90)  BP: 121/69 (2022 11:27) (70/50 - 152/73)  BP(mean): --  RR: 18 (2022 11:27) (15 - 18)  SpO2: 93% (2022 11:27) (93% - 99%)  Finger Stick          PHYSICAL EXAM:  GENERAL:  [ x ] NAD , [ x ] well appearing, [  ] Agitated, [  ] Drowsy,  [  ] Lethargy, [  ] confused   HEAD:  [x ] Normal, [  ] Other  EYES:  [ x ] EOMI, [  ] PERRLA, [ x ] conjunctiva and sclera clear normal, [  ] Other,  [  ] Pallor,[  ] Discharge  ENMT:  [ x ] Normal, [ x ] Moist mucous membranes, [  ] Good dentition, [  ] No Thrush  NECK:  [ x ] Supple, [ x ] No JVD, [  ] Normal thyroid, [  ] Lymphadenopathy [  ] Other  CHEST/LUNG:  [ x ] Clear to auscultation bilaterally, [  ] Breath Sounds equal B/L / Decrease, [  ] poor effort  [  ] No rales, [  ] No rhonchi  [  ]  No wheezing,   HEART:  [ x ] Regular rate and rhythm, [  ] tachycardia, [  ] Bradycardia,  [  ] irregular  [  ] No murmurs, No rubs, No gallops, [  ] PPM in place (Mfr:  )  ABDOMEN:  [ x ] Soft, [ x ] Nontender, [ x ] Nondistended, [  ] No mass, [  ] Bowel sounds present, [  ] obese  NERVOUS SYSTEM:  [ x ] Alert & Oriented X3, [ x ] Nonfocal  [  ] Confusion  [  ] Encephalopathic [  ] Sedated [  ] Unable to assess, [  ] Dementia [  ] Other-  EXTREMITIES: [ x ] 2+ Peripheral Pulses, No clubbing, No cyanosis,  [  ] edema B/L lower EXT. [  ] PVD stasis skin changes B/L Lower EXT, [  ] wound  LYMPH: No lymphadenopathy noted  SKIN:  [ x ] No rashes or lesions, [  ] Pressure Ulcers, [  ] ecchymosis, [  ] Skin Tears, [  ] Other    DIET: Diet, Pureed:   Consistent Carbohydrate No Snacks  DASH/TLC Sodium & Cholesterol Restricted  Mildly Thick Liquids (MILDTHICKLIQS)  Supplement Feeding Modality:  Oral  Glucerna Shake Cans or Servings Per Day:  1       Frequency:  Three Times a day (22 @ 23:20)      LABS:                        8.5    8.56  )-----------( 150      ( 2022 08:51 )             26.9     2022 08:51    142    |  110    |  32     ----------------------------<  122    4.2     |  24     |  2.10     Ca    8.0        2022 08:51  Phos  2.5       2022 08:51  Mg     2.0       2022 08:51    TPro  5.2    /  Alb  1.9    /  TBili  0.3    /  DBili  x      /  AST  18     /  ALT  16     /  AlkPhos  48     2022 08:51    PT/INR - ( 2022 19:50 )   PT: 15.1 sec;   INR: 1.29 ratio         PTT - ( 2022 19:50 )  PTT:32.1 sec  Urinalysis Basic - ( 2022 21:27 )    Color: Yellow / Appearance: Clear / S.020 / pH: x  Gluc: x / Ketone: Negative  / Bili: Negative / Urobili: Negative   Blood: x / Protein: 30 mg/dL / Nitrite: Negative   Leuk Esterase: Trace / RBC: 0-2 /HPF / WBC 0-2   Sq Epi: x / Non Sq Epi: Few / Bacteria: x                CARDIAC MARKERS ( 2022 08:52 )  x     / x     / x     / x     / 2.0 ng/mL  CARDIAC MARKERS ( 2022 08:51 )  x     / x     / 63 U/L / x     / x      CARDIAC MARKERS ( 2022 20:01 )  x     / x     / 69 U/L / x     / 2.2 ng/mL             Anemia Panel:      Thyroid Panel:                RADIOLOGY & ADDITIONAL TESTS:      HEALTH ISSUES - PROBLEM Dx:  Dizziness    Hypotension    Tonsillar cancer    Chronic HFrEF (heart failure with reduced ejection fraction)    HLD (hyperlipidemia)    H/O carotid stenosis    DM2 (diabetes mellitus, type 2)    CAD (coronary artery disease)    Hypothyroid    Need for prophylactic measure    JARRED (acute kidney injury)    Anemia of chronic disease    Recent cerebrovascular accident (CVA)  R central sulcus MCA infarct, with left Upper EXT weakness, 2022            Consultant(s) Notes Reviewed:  [  ] YES     Care Discussed with [X] Consultants  [  ] Patient  [  ] Family [  ] HCP [  ]   [  ] Social Service  [  ] RN, [  ] Physical Therapy,[  ] Palliative care team  DVT PPX: [  ] Lovenox, [  ] S C Heparin, [  ] Coumadin, [  ] Xarelto, [  ] Eliquis, [  ] Pradaxa, [  ] IV Heparin drip, [  ] SCD [  ] Contraindication 2 to GI Bleed,[  ] Ambulation [  ] Contraindicated 2 to  bleed [  ] Contraindicated 2 to Brain Bleed  Advanced directive: [  ] None, [  ] DNR/DNI Patient is a 76y old  Male who presents with a chief complaint of weakness, hypotension (2022 10:02)    HPI:  77 y/o M w/ PMHx of HTN, HLD, HFrEF (EF 30%), 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 Per patient and pt's wife at bedside, patient has became increasingly dizzy and fatigued at home since being discharged. Pt has been eating and drinking significantly less than previous, pt's wife states likely less than 1L of fluid over the past day and he has been more tremulous on his feet than normal. He also appeared to be more short of breat than normal while walking around. Pt's wife took his blood pressure earlier today and noted that it was 75/54. Physical therapy visited patient at home today and also took his BP, was similarly low. Pt's wife then took his bp and noted 52/37 so decided to call the ambulance. Patient states he feels well, much less dizzy than before he arrived at the hospital. Has no other acute complaints at this time. Of note, patient had intermittent diarrhea for ~2 weeks between his admission to [facility] and [facility]. Denies current diarrhea. Denies fever, chills, CP, SOB, weakness, dizziness, n/v/d.    ED Course: hr 90, bp 112/57, rr 15, t 98.5, spo2 98 on ra  labs: hgb 9.8, rouloux present on diferential, bun/cr 39/2.8, alb 2.2, trop 559.2  imaging: CXR bilateral diffuse infiltrates on wet read  EKG: NSR 85, possible prior anterolateral infarct  Given: NS bolus 1L x1 (2022 22:24)    INTERVAL HPI:  22: Pt seen and examined at the bedside. Reports he has had diarrhea, semi-formed stools recently because of stool softeners given at Rehab. States he has had dark tarry stools however. Repeat H/H ordered for 2PM today as well as occult blood. Pt denies lightheadedness, dizziness, weakness. Denies fevers, chills, cough, sore throat, SOB, chest pain, abd pain, nausea, vomiting, constipation, hematochezia, dysuria. want to go home CR Improved, H/H Low stable.        OVERNIGHT EVENTS: hypotensive on admission in the ED, responsive to fluids.     Home Medications:  albuterol 90 mcg/inh inhalation aerosol: 2 puff(s) inhaled every 6 hours, As needed, Shortness of Breath and/or Wheezing (2022 22:24)  bisacodyl 10 mg rectal suppository: 1 suppository(ies) rectal once a day (2022 22:24)  calcitriol 0.25 mcg oral capsule: 1 cap(s) orally once a day (2022 22:24)  cyanocobalamin 1000 mcg oral tablet: 1 tab(s) orally once a day (2022 22:24)  melatonin 3 mg oral tablet: 1 tab(s) orally once a day (at bedtime) (2022 22:24)  saliva substitutes oral solution: 1 spray(s) orally 3 times a day, As Needed (2022 22:24)  senna oral tablet: 2 tab(s) orally once a day (at bedtime) (2022 22:24)      MEDICATIONS  (STANDING):  atorvastatin 80 milliGRAM(s) Oral at bedtime  dextrose 5%. 1000 milliLiter(s) (50 mL/Hr) IV Continuous <Continuous>  dextrose 5%. 1000 milliLiter(s) (100 mL/Hr) IV Continuous <Continuous>  dextrose 50% Injectable 25 Gram(s) IV Push once  dextrose 50% Injectable 12.5 Gram(s) IV Push once  dextrose 50% Injectable 25 Gram(s) IV Push once  dipyridamole 200 mG/aspirin 25 mG 1 Capsule(s) Oral two times a day  diVALproex  milliGRAM(s) Oral two times a day  escitalopram 5 milliGRAM(s) Oral daily  ferrous    sulfate 325 milliGRAM(s) Oral daily  finasteride 5 milliGRAM(s) Oral daily  glucagon  Injectable 1 milliGRAM(s) IntraMuscular once  heparin   Injectable 5000 Unit(s) SubCutaneous every 8 hours  insulin lispro (ADMELOG) corrective regimen sliding scale   SubCutaneous three times a day before meals  insulin lispro (ADMELOG) corrective regimen sliding scale   SubCutaneous at bedtime  lactated ringers. 1000 milliLiter(s) (60 mL/Hr) IV Continuous <Continuous>  levothyroxine 75 MICROGram(s) Oral daily  senna 2 Tablet(s) Oral at bedtime  tamsulosin 0.8 milliGRAM(s) Oral at bedtime    MEDICATIONS  (PRN):  acetaminophen     Tablet .. 650 milliGRAM(s) Oral every 6 hours PRN Temp greater or equal to 38C (100.4F), Mild Pain (1 - 3)  ALBUTerol    90 MICROgram(s) HFA Inhaler 2 Puff(s) Inhalation every 6 hours PRN Shortness of Breath and/or Wheezing  aluminum hydroxide/magnesium hydroxide/simethicone Suspension 30 milliLiter(s) Oral every 4 hours PRN Dyspepsia  Biotene Dry Mouth Oral Rinse 5 milliLiter(s) Swish and Spit three times a day PRN Mouth Care  bisacodyl Suppository 10 milliGRAM(s) Rectal daily PRN Constipation  dextrose Oral Gel 15 Gram(s) Oral once PRN Blood Glucose LESS THAN 70 milliGRAM(s)/deciliter  melatonin 3 milliGRAM(s) Oral at bedtime PRN Insomnia  ondansetron Injectable 4 milliGRAM(s) IV Push every 8 hours PRN Nausea and/or Vomiting      Allergies    No Known Allergies    Intolerances        Social History:  Smoking - Former smoker quit 40 years ago, 20-pack-years  EtOH - denies  Illicit drugs - drugs    FUNCTIONAL HISTORY  Patient lives at home with his wife. He is retired; formerly worked in construction    Previous Functional Status:  Independent in ambulation, ADL's, transfers prior to hospitalization    Current Functional Status:  has walker at home however mostly ambulates without assistance. Wife helps minimally w/ some ADLs, although he is mostly independent. (2022 22:24)      REVIEW OF SYSTEMS: i feel fine.  CONSTITUTIONAL: No fever, No chills, No fatigue, No myalgia, No Body ache, No Weakness  EYES: No eye pain,  No visual disturbances, No discharge, NO Redness  ENMT:  No ear pain, No nose bleed, No vertigo; No sinus pain, NO throat pain, No Congestion  NECK: No pain, No stiffness  RESPIRATORY: No cough, NO wheezing, No  hemoptysis, NO  shortness of breath  CARDIOVASCULAR: No chest pain, palpitations  GASTROINTESTINAL: No abdominal pain, NO epigastric pain. No nausea, No vomiting; No diarrhea, No constipation. [  ] BM  GENITOURINARY: No dysuria, No frequency, No urgency, No hematuria, NO incontinence  NEUROLOGICAL: No headaches, No dizziness, No numbness, No tingling, No tremors, No weakness  EXT: No Swelling, No Pain, No Edema  SKIN:  [x  ] No itching, burning, rashes, or lesions   MUSCULOSKELETAL: No joint pain ,No Jt swelling; No muscle pain, No back pain, No extremity pain  PSYCHIATRIC: No depression,  No anxiety,  No mood swings ,No difficulty sleeping at night  PAIN SCALE: [ x ] None  [  ] Other-  ROS Unable to obtain due to - [  ] Dementia  [  ] Lethargy [  ] Drowsy [  ] Sedated [  ] non verbal  REST OF REVIEW Of SYSTEM - [ x] Normal     Vital Signs Last 24 Hrs  T(C): 36.6 (2022 11:27), Max: 36.9 (2022 20:17)  T(F): 97.9 (2022 11:27), Max: 98.5 (2022 20:17)  HR: 70 (2022 11:27) (70 - 90)  BP: 121/69 (2022 11:27) (70/50 - 152/73)  BP(mean): --  RR: 18 (2022 11:27) (15 - 18)  SpO2: 93% (2022 11:27) (93% - 99%)  Finger Stick          PHYSICAL EXAM:  GENERAL:  [ x ] NAD , [ x ] well appearing, [  ] Agitated, [  ] Drowsy,  [  ] Lethargy, [  ] confused   HEAD:  [x ] Normal, [  ] Other  EYES:  [ x ] EOMI, [  ] PERRLA, [ x ] conjunctiva and sclera clear normal, [  ] Other,  [  ] Pallor,[  ] Discharge  ENMT:  [ x ] Normal, [ x ] Moist mucous membranes, [  ] Good dentition, [x ] No Thrush  NECK:  [ x ] Supple, [ x ] No JVD, [  x] Normal thyroid, [  ] Lymphadenopathy [  ] Other  CHEST/LUNG:  [ x ] Clear to auscultation bilaterally, [ x ] Breath Sounds equal B/L  [  ] poor effort  [ x ] No rales, [ x ] No rhonchi  [x  ]  No wheezing,   HEART:  [ x ] Regular rate and rhythm, [  ] tachycardia, [  ] Bradycardia,  [  ] irregular  [  ] No murmurs, No rubs, No gallops, [ x ] AICD in place  PPM in place (Mfr:  )  ABDOMEN:  [ x ] Soft, [ x ] Nontender, [ x ] Nondistended, [ x ] No mass, [ x ] Bowel sounds present, [  ] obese  NERVOUS SYSTEM:  [ x ] Alert & Oriented X3, [ x ] Nonfocal  [  ] Confusion  [  ] Encephalopathic [  ] Sedated [  ] Unable to assess, [  ] Dementia [  ] Other-  EXTREMITIES: [ x ] 2+ Peripheral Pulses, No clubbing, No cyanosis,  [  ] edema B/L lower EXT. [  ] PVD stasis skin changes B/L Lower EXT, [  ] wound  LYMPH: No lymphadenopathy noted  SKIN:  [ x ] No rashes or lesions, [  ] Pressure Ulcers, [  ] ecchymosis, [  ] Skin Tears, [  ] Other    DIET: Diet, Pureed:   Consistent Carbohydrate No Snacks  DASH/TLC Sodium & Cholesterol Restricted  Mildly Thick Liquids (MILDTHICKLIQS)  Supplement Feeding Modality:  Oral  Glucerna Shake Cans or Servings Per Day:  1       Frequency:  Three Times a day (22 @ 23:20)      LABS:                        8.5    8.56  )-----------( 150      ( 2022 08:51 )             26.9     2022 08:51    142    |  110    |  32     ----------------------------<  122    4.2     |  24     |  2.10     Ca    8.0        2022 08:51  Phos  2.5       2022 08:51  Mg     2.0       2022 08:51    TPro  5.2    /  Alb  1.9    /  TBili  0.3    /  DBili  x      /  AST  18     /  ALT  16     /  AlkPhos  48     2022 08:51    PT/INR - ( 2022 19:50 )   PT: 15.1 sec;   INR: 1.29 ratio         PTT - ( 2022 19:50 )  PTT:32.1 sec  Urinalysis Basic - ( 2022 21:27 )    Color: Yellow / Appearance: Clear / S.020 / pH: x  Gluc: x / Ketone: Negative  / Bili: Negative / Urobili: Negative   Blood: x / Protein: 30 mg/dL / Nitrite: Negative   Leuk Esterase: Trace / RBC: 0-2 /HPF / WBC 0-2   Sq Epi: x / Non Sq Epi: Few / Bacteria: x    CARDIAC MARKERS ( 2022 08:52 )  x     / x     / x     / x     / 2.0 ng/mL  CARDIAC MARKERS ( 2022 08:51 )  x     / x     / 63 U/L / x     / x      CARDIAC MARKERS ( 2022 20:01 )  x     / x     / 69 U/L / x     / 2.2 ng/mL      RADIOLOGY & ADDITIONAL TESTS:  < from: CT Head No Cont (22 @ 16:50) >  INTERPRETATION:  CT HEAD    CLINICAL HISTORY: recent CVA    TECHNIQUE:  Noncontrast CT.  Axial Acquisition.  Sagittal and coronal reformations.    COMPARISON:  Compared to study dated 3/31/2022    FINDINGS:  *  HEMORRHAGE:  No evidence of intracranial hemorrhage.  *  BRAIN PARENCHYMA:  Aging late subacute to chronic right posterior   frontal infarct with decreased mass effect. Mild ischemic changes left   frontal white matter somewhat better seen than prior exam. No parenchymal   mass or mass effect. Mild brain atrophy. Minimal periventricular white   matter ischemic changes. Stable chronic small right cerebellar infarcts  *  VENTRICLES / SHIFT:  No hydrocephalus. No midline shift.  *  EXTRA-AXIAL / BASAL CISTERNS:  No extra-axial mass. Basal cisterns   preserved.  Atherosclerotic calcifications of the cavernous internal   carotid arteries.  *  CALVARIUM AND EXTRACRANIAL SOFT TISSUES:  No depressed calvarial   fracture.  *  SINUSES, ORBITS, MASTOIDS:  There is mild mucosal thickening within   the paranasal sinuses.    IMPRESSION:  No evidence of an acute intracranial hemorrhage, midline shift, or   hydrocephalus. Aging small posterior right frontal infarcts. Mild   ischemic changes left frontal white matter somewhat better seen than   prior exam.      < end of copied text >        HEALTH ISSUES - PROBLEM Dx:  Dizziness    Hypotension    Tonsillar cancer    Chronic HFrEF (heart failure with reduced ejection fraction)    HLD (hyperlipidemia)    H/O carotid stenosis    DM2 (diabetes mellitus, type 2)    CAD (coronary artery disease)    Hypothyroid    Need for prophylactic measure    JARRED (acute kidney injury)    Anemia of chronic disease    Recent cerebrovascular accident (CVA)  R central sulcus MCA infarct, with left Upper EXT weakness, 2022        Consultant(s) Notes Reviewed:  [ x ] YES     Care Discussed with [X] Consultants  [ x ] Patient  [ x ] Family -wife [  ] HCP [  ]   [  ] Social Service  [ x ] RN, [  ] Physical Therapy,[  ] Palliative care team  DVT PPX: [  ] Lovenox, [ x ] S C Heparin, [  ] Coumadin, [  ] Xarelto, [  ] Eliquis, [  ] Pradaxa, [  ] IV Heparin drip, [  ] SCD [  ] Contraindication 2 to GI Bleed,[  ] Ambulation [  ] Contraindicated 2 to  bleed [  ] Contraindicated 2 to Brain Bleed  Advanced directive: [x  ] None, [  ] DNR/DNI

## 2022-04-27 NOTE — PATIENT PROFILE ADULT - BRAND OF COVID-19 VACCINATION
patient states he received 2 doses of Pfizer and a pfizer booster but doesn't remember dates, wife has information/Pfizer dose 1, 2, and 3

## 2022-04-27 NOTE — CONSULT NOTE ADULT - SUBJECTIVE AND OBJECTIVE BOX
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 Per patient and pt's wife at bedside, patient has became increasingly dizzy and fatigued at home since being discharged. Pt has been eating and drinking significantly less than previous, pt's wife states likely less than 1L of fluid over the past day and he has been more tremulous on his feet than normal. He also appeared to be more short of breat than normal while walking around. Pt's wife took his blood pressure earlier today and noted that it was 75/54. Physical therapy visited patient at home today and also took his BP, was similarly low. Pt's wife then took his bp and noted 52/37 so decided to call the ambulance. Patient states he feels well, much less dizzy than before he arrived at the hospital. Has no other acute complaints at this time. Of note, patient had intermittent diarrhea for ~2 weeks between his admission to \[facility]\"" and [place] rehab. Denies current diarrhea. Denies fever, chills, CP, SOB, weakness, dizziness, n/v/d.    ED Course: hr 90, bp 112/57, rr 15, t 98.5, spo2 98 on ra  labs: hgb 9.8, rouloux present on diferential, bun/cr 39/2.8, alb 2.2, trop 559.2  imaging: CXR bilateral diffuse infiltrates on wet read  EKG: NSR 85, possible prior anterolateral infarct  Given: NS bolus 1L x1 (2022 22:24)    Consulted for Acute on CKD. Chart reviewed. No urinary complaints. Breathing better than prior.        PAST MEDICAL & SURGICAL HISTORY:  MI (myocardial infarction)      HTN (hypertension)    HLD (hyperlipidemia)    Throat cancer  , treated with chemo, RT    Ventricular arrhythmia  s/p AICD    Diabetes  Type2, not on any meds since weight loss after throat Ca    Renal insufficiency  s/p chemo    CAD (coronary artery disease)    Inguinal hernia, left    Recent cerebrovascular accident (CVA)  R central sulcus MCA infarct, with left Upper EXT weakness, 2022    History of dysphagia    Stage 3 chronic kidney disease    Anemia of chronic disease    H/O urinary retention  pt has large PVR    H/O prior ablation treatment  VT,     Cardiac defibrillator in place  - , battery change     S/P left inguinal hernia repair  2018 at [place]    Tonsillar cancer  s/p resection,  partial tongue resection         FAMILY HISTORY:  Family history of cancer (Sibling)    NC    Social History:Non smoker    MEDICATIONS  (STANDING):  atorvastatin 80 milliGRAM(s) Oral at bedtime  dextrose 5%. 1000 milliLiter(s) (50 mL/Hr) IV Continuous <Continuous>  dextrose 5%. 1000 milliLiter(s) (100 mL/Hr) IV Continuous <Continuous>  dextrose 50% Injectable 25 Gram(s) IV Push once  dextrose 50% Injectable 12.5 Gram(s) IV Push once  dextrose 50% Injectable 25 Gram(s) IV Push once  dipyridamole 200 mG/aspirin 25 mG 1 Capsule(s) Oral two times a day  diVALproex  milliGRAM(s) Oral two times a day  escitalopram 5 milliGRAM(s) Oral daily  ferrous    sulfate 325 milliGRAM(s) Oral daily  finasteride 5 milliGRAM(s) Oral daily  glucagon  Injectable 1 milliGRAM(s) IntraMuscular once  heparin   Injectable 5000 Unit(s) SubCutaneous every 8 hours  insulin lispro (ADMELOG) corrective regimen sliding scale   SubCutaneous three times a day before meals  insulin lispro (ADMELOG) corrective regimen sliding scale   SubCutaneous at bedtime  lactated ringers. 1000 milliLiter(s) (60 mL/Hr) IV Continuous <Continuous>  levothyroxine 75 MICROGram(s) Oral daily  senna 2 Tablet(s) Oral at bedtime  tamsulosin 0.8 milliGRAM(s) Oral at bedtime    MEDICATIONS  (PRN):  acetaminophen     Tablet .. 650 milliGRAM(s) Oral every 6 hours PRN Temp greater or equal to 38C (100.4F), Mild Pain (1 - 3)  ALBUTerol    90 MICROgram(s) HFA Inhaler 2 Puff(s) Inhalation every 6 hours PRN Shortness of Breath and/or Wheezing  aluminum hydroxide/magnesium hydroxide/simethicone Suspension 30 milliLiter(s) Oral every 4 hours PRN Dyspepsia  Biotene Dry Mouth Oral Rinse 5 milliLiter(s) Swish and Spit three times a day PRN Mouth Care  bisacodyl Suppository 10 milliGRAM(s) Rectal daily PRN Constipation  dextrose Oral Gel 15 Gram(s) Oral once PRN Blood Glucose LESS THAN 70 milliGRAM(s)/deciliter  melatonin 3 milliGRAM(s) Oral at bedtime PRN Insomnia  ondansetron Injectable 4 milliGRAM(s) IV Push every 8 hours PRN Nausea and/or Vomiting   Meds reviewed    Allergies    No Known Allergies    Intolerances         REVIEW OF SYSTEMS:    Review of Systems:   Constitutional: Denies fatigue, +eating less, +drinking less liquid  HEENT: +dizziness, lightheadedness  Respiratory: denies SOB, cough, or wheezing  Cardiovascular: denies CP, palpitations  Gastrointestinal: Denies nausea, denies vomiting, diarrhea, constipation, abdominal pain, or bloody stools  Genitourinary: denies painful urination, increased frequency, urgency, or bloody urine  Skin: denies rashes or itching  Musculoskeletal: denies muscle aches, joint swelling  Neurologic: Denies generalized weakness, denies loss of sensation, numbness, or tingling      Vital Signs Last 24 Hrs  T(C): 36.8 (2022 05:41), Max: 36.9 (2022 20:17)  T(F): 98.3 (2022 05:41), Max: 98.5 (2022 20:17)  HR: 78 (2022 05:41) (74 - 90)  BP: 152/73 (2022 05:41) (70/50 - 152/73)  BP(mean): --  RR: 17 (2022 05:41) (15 - 17)  SpO2: 98% (2022 05:41) (97% - 99%)  Daily Height in cm: 175.26 (2022 19:11)    Daily Weight in k.4 (2022 06:32)    PHYSICAL EXAM:    GENERAL: No distress  HEAD:  Atraumatic, Normocephalic  EYES: Conjunctiva and sclera clear  ENMT: No Drainage from nares, No drainage from ears; poor dentition  NECK: Supple  NERVOUS SYSTEM:  Awake and Alert  CHEST/LUNG: Clear to percussion bilaterally; No rales, rhonchi, wheezing, or rubs  HEART: Regular rate and rhythm; No murmurs, rubs, or gallops  ABDOMEN: Soft, Nontender, Nondistended; Bowel sounds present  EXTREMITIES:  No Edema  SKIN: dry      LABS:                        9.8    9.18  )-----------( 177      ( 2022 19:50 )             31.3         139  |  104  |  39<H>  ----------------------------<  181<H>  4.2   |  26  |  2.80<H>    Ca    8.6      2022 19:50    TPro  6.1  /  Alb  2.2<L>  /  TBili  0.4  /  DBili  x   /  AST  19  /  ALT  19  /  AlkPhos  56      PT/INR - ( 2022 19:50 )   PT: 15.1 sec;   INR: 1.29 ratio         PTT - ( 2022 19:50 )  PTT:32.1 sec  Urinalysis Basic - ( 2022 21:27 )    Color: Yellow / Appearance: Clear / S.020 / pH: x  Gluc: x / Ketone: Negative  / Bili: Negative / Urobili: Negative   Blood: x / Protein: 30 mg/dL / Nitrite: Negative   Leuk Esterase: Trace / RBC: 0-2 /HPF / WBC 0-2   Sq Epi: x / Non Sq Epi: Few / Bacteria: x              RADIOLOGY & ADDITIONAL TESTS:

## 2022-04-27 NOTE — PROGRESS NOTE ADULT - PROBLEM SELECTOR PLAN 4
TTE 2009 showed LVEF 30% w/ severely reduced ejection fraction Chronic Systolic CHF  - TTE during last admission: LVEF 30% - 35% w/ severe systolic dysfunction  - was previously on Imdur and carvedilol, has not been on since previous discharge.   - hold home BB 2/2 hypotension  - [facility] consulted, f/u recs  - Does not appear volume overloaded on physical exam, continue to monitor volume status. Dizziness is likely 2/2 hypotension, patient has had decreased PO intake 2/2 dysphagia & , diarrhea.  - BP Low on admission 70/50, improved to 112/57 w/ 1L NS IV bolus  - orthostatic vitals-Nl   - Patient used to take midodrine 10mg however was stopped by his pcp, can consider resuming if orthostatics are positive  - fall risk precautions  - Cardio Dr Nidhi alfaro for d/c

## 2022-04-27 NOTE — PATIENT PROFILE ADULT - NSPROIMPLANTSMEDDEV_GEN_A_NUR
pt states his AICD was interrogated a couple weeks ago. His wife has his wallet that has that information/Automatic Implantable Cardioverter Defibrillator

## 2022-04-28 LAB
CULTURE RESULTS: NO GROWTH — SIGNIFICANT CHANGE UP
SPECIMEN SOURCE: SIGNIFICANT CHANGE UP

## 2022-05-02 ENCOUNTER — INPATIENT (INPATIENT)
Facility: HOSPITAL | Age: 77
LOS: 16 days | Discharge: HOSPICE | DRG: 871 | End: 2022-05-19
Attending: INTERNAL MEDICINE | Admitting: INTERNAL MEDICINE
Payer: MEDICARE

## 2022-05-02 VITALS
HEIGHT: 69 IN | SYSTOLIC BLOOD PRESSURE: 91 MMHG | DIASTOLIC BLOOD PRESSURE: 45 MMHG | TEMPERATURE: 99 F | OXYGEN SATURATION: 93 % | HEART RATE: 86 BPM | RESPIRATION RATE: 16 BRPM | WEIGHT: 154.98 LBS

## 2022-05-02 DIAGNOSIS — J69.0 PNEUMONITIS DUE TO INHALATION OF FOOD AND VOMIT: ICD-10-CM

## 2022-05-02 DIAGNOSIS — Z86.73 PERSONAL HISTORY OF TRANSIENT ISCHEMIC ATTACK (TIA), AND CEREBRAL INFARCTION WITHOUT RESIDUAL DEFICITS: ICD-10-CM

## 2022-05-02 DIAGNOSIS — E78.5 HYPERLIPIDEMIA, UNSPECIFIED: ICD-10-CM

## 2022-05-02 DIAGNOSIS — Z98.890 OTHER SPECIFIED POSTPROCEDURAL STATES: Chronic | ICD-10-CM

## 2022-05-02 DIAGNOSIS — N17.9 ACUTE KIDNEY FAILURE, UNSPECIFIED: ICD-10-CM

## 2022-05-02 DIAGNOSIS — E11.9 TYPE 2 DIABETES MELLITUS WITHOUT COMPLICATIONS: ICD-10-CM

## 2022-05-02 DIAGNOSIS — I25.10 ATHEROSCLEROTIC HEART DISEASE OF NATIVE CORONARY ARTERY WITHOUT ANGINA PECTORIS: ICD-10-CM

## 2022-05-02 DIAGNOSIS — Z95.810 PRESENCE OF AUTOMATIC (IMPLANTABLE) CARDIAC DEFIBRILLATOR: Chronic | ICD-10-CM

## 2022-05-02 DIAGNOSIS — A41.9 SEPSIS, UNSPECIFIED ORGANISM: ICD-10-CM

## 2022-05-02 DIAGNOSIS — I50.22 CHRONIC SYSTOLIC (CONGESTIVE) HEART FAILURE: ICD-10-CM

## 2022-05-02 DIAGNOSIS — C09.9 MALIGNANT NEOPLASM OF TONSIL, UNSPECIFIED: Chronic | ICD-10-CM

## 2022-05-02 DIAGNOSIS — I21.3 ST ELEVATION (STEMI) MYOCARDIAL INFARCTION OF UNSPECIFIED SITE: ICD-10-CM

## 2022-05-02 DIAGNOSIS — D63.8 ANEMIA IN OTHER CHRONIC DISEASES CLASSIFIED ELSEWHERE: ICD-10-CM

## 2022-05-02 DIAGNOSIS — Z87.19 PERSONAL HISTORY OF OTHER DISEASES OF THE DIGESTIVE SYSTEM: ICD-10-CM

## 2022-05-02 PROBLEM — Z87.898 PERSONAL HISTORY OF OTHER SPECIFIED CONDITIONS: Chronic | Status: ACTIVE | Noted: 2022-04-26

## 2022-05-02 PROBLEM — N18.30 CHRONIC KIDNEY DISEASE, STAGE 3 UNSPECIFIED: Chronic | Status: ACTIVE | Noted: 2022-04-26

## 2022-05-02 LAB
ACANTHOCYTES BLD QL SMEAR: SLIGHT — SIGNIFICANT CHANGE UP
ALBUMIN SERPL ELPH-MCNC: 3.1 G/DL — LOW (ref 3.3–5)
ALP SERPL-CCNC: 59 U/L — SIGNIFICANT CHANGE UP (ref 40–120)
ALT FLD-CCNC: 16 U/L — SIGNIFICANT CHANGE UP (ref 10–45)
ANION GAP SERPL CALC-SCNC: 14 MMOL/L — SIGNIFICANT CHANGE UP (ref 5–17)
ANISOCYTOSIS BLD QL: SLIGHT — SIGNIFICANT CHANGE UP
APTT BLD: 40.7 SEC — HIGH (ref 27.5–35.5)
AST SERPL-CCNC: 32 U/L — SIGNIFICANT CHANGE UP (ref 10–40)
BASOPHILS # BLD AUTO: 0.12 K/UL — SIGNIFICANT CHANGE UP (ref 0–0.2)
BASOPHILS NFR BLD AUTO: 0.9 % — SIGNIFICANT CHANGE UP (ref 0–2)
BILIRUB SERPL-MCNC: 0.4 MG/DL — SIGNIFICANT CHANGE UP (ref 0.2–1.2)
BUN SERPL-MCNC: 40 MG/DL — HIGH (ref 7–23)
BURR CELLS BLD QL SMEAR: PRESENT — SIGNIFICANT CHANGE UP
CALCIUM SERPL-MCNC: 8.8 MG/DL — SIGNIFICANT CHANGE UP (ref 8.4–10.5)
CHLORIDE SERPL-SCNC: 101 MMOL/L — SIGNIFICANT CHANGE UP (ref 96–108)
CO2 SERPL-SCNC: 20 MMOL/L — LOW (ref 22–31)
CREAT SERPL-MCNC: 2.66 MG/DL — HIGH (ref 0.5–1.3)
CULTURE RESULTS: SIGNIFICANT CHANGE UP
CULTURE RESULTS: SIGNIFICANT CHANGE UP
EGFR: 24 ML/MIN/1.73M2 — LOW
ELLIPTOCYTES BLD QL SMEAR: SIGNIFICANT CHANGE UP
EOSINOPHIL # BLD AUTO: 0 K/UL — SIGNIFICANT CHANGE UP (ref 0–0.5)
EOSINOPHIL NFR BLD AUTO: 0 % — SIGNIFICANT CHANGE UP (ref 0–6)
GLUCOSE BLDC GLUCOMTR-MCNC: 120 MG/DL — HIGH (ref 70–99)
GLUCOSE SERPL-MCNC: 138 MG/DL — HIGH (ref 70–99)
HCT VFR BLD CALC: 29.2 % — LOW (ref 39–50)
HGB BLD-MCNC: 9.1 G/DL — LOW (ref 13–17)
INR BLD: 1.68 RATIO — HIGH (ref 0.88–1.16)
LACTATE SERPL-SCNC: 1.1 MMOL/L — SIGNIFICANT CHANGE UP (ref 0.7–2)
LYMPHOCYTES # BLD AUTO: 0.58 K/UL — LOW (ref 1–3.3)
LYMPHOCYTES # BLD AUTO: 4.4 % — LOW (ref 13–44)
MANUAL SMEAR VERIFICATION: SIGNIFICANT CHANGE UP
MCHC RBC-ENTMCNC: 24.1 PG — LOW (ref 27–34)
MCHC RBC-ENTMCNC: 31.2 GM/DL — LOW (ref 32–36)
MCV RBC AUTO: 77.5 FL — LOW (ref 80–100)
MICROCYTES BLD QL: SIGNIFICANT CHANGE UP
MONOCYTES # BLD AUTO: 0.34 K/UL — SIGNIFICANT CHANGE UP (ref 0–0.9)
MONOCYTES NFR BLD AUTO: 2.6 % — SIGNIFICANT CHANGE UP (ref 2–14)
NEUTROPHILS # BLD AUTO: 12.12 K/UL — HIGH (ref 1.8–7.4)
NEUTROPHILS NFR BLD AUTO: 75.4 % — SIGNIFICANT CHANGE UP (ref 43–77)
NEUTS BAND # BLD: 16.7 % — HIGH (ref 0–8)
NT-PROBNP SERPL-SCNC: HIGH PG/ML (ref 0–300)
PLAT MORPH BLD: NORMAL — SIGNIFICANT CHANGE UP
PLATELET # BLD AUTO: 111 K/UL — LOW (ref 150–400)
POIKILOCYTOSIS BLD QL AUTO: SIGNIFICANT CHANGE UP
POTASSIUM SERPL-MCNC: 4.7 MMOL/L — SIGNIFICANT CHANGE UP (ref 3.5–5.3)
POTASSIUM SERPL-SCNC: 4.7 MMOL/L — SIGNIFICANT CHANGE UP (ref 3.5–5.3)
PROT SERPL-MCNC: 6.4 G/DL — SIGNIFICANT CHANGE UP (ref 6–8.3)
PROTHROM AB SERPL-ACNC: 19.4 SEC — HIGH (ref 10.5–13.4)
RBC # BLD: 3.77 M/UL — LOW (ref 4.2–5.8)
RBC # FLD: 22.3 % — HIGH (ref 10.3–14.5)
RBC BLD AUTO: ABNORMAL
SARS-COV-2 RNA SPEC QL NAA+PROBE: SIGNIFICANT CHANGE UP
SCHISTOCYTES BLD QL AUTO: SIGNIFICANT CHANGE UP
SODIUM SERPL-SCNC: 135 MMOL/L — SIGNIFICANT CHANGE UP (ref 135–145)
SPECIMEN SOURCE: SIGNIFICANT CHANGE UP
SPECIMEN SOURCE: SIGNIFICANT CHANGE UP
TROPONIN T, HIGH SENSITIVITY RESULT: 90 NG/L — HIGH (ref 0–51)
WBC # BLD: 13.16 K/UL — HIGH (ref 3.8–10.5)
WBC # FLD AUTO: 13.16 K/UL — HIGH (ref 3.8–10.5)

## 2022-05-02 PROCEDURE — 71045 X-RAY EXAM CHEST 1 VIEW: CPT | Mod: 26

## 2022-05-02 PROCEDURE — 99291 CRITICAL CARE FIRST HOUR: CPT

## 2022-05-02 PROCEDURE — 99223 1ST HOSP IP/OBS HIGH 75: CPT

## 2022-05-02 PROCEDURE — 71250 CT THORAX DX C-: CPT | Mod: 26

## 2022-05-02 RX ORDER — SODIUM CHLORIDE 9 MG/ML
1000 INJECTION, SOLUTION INTRAVENOUS
Refills: 0 | Status: DISCONTINUED | OUTPATIENT
Start: 2022-05-02 | End: 2022-05-19

## 2022-05-02 RX ORDER — ASPIRIN/CALCIUM CARB/MAGNESIUM 324 MG
81 TABLET ORAL DAILY
Refills: 0 | Status: DISCONTINUED | OUTPATIENT
Start: 2022-05-02 | End: 2022-05-19

## 2022-05-02 RX ORDER — PIPERACILLIN AND TAZOBACTAM 4; .5 G/20ML; G/20ML
3.38 INJECTION, POWDER, LYOPHILIZED, FOR SOLUTION INTRAVENOUS EVERY 8 HOURS
Refills: 0 | Status: DISCONTINUED | OUTPATIENT
Start: 2022-05-03 | End: 2022-05-07

## 2022-05-02 RX ORDER — LANOLIN ALCOHOL/MO/W.PET/CERES
3 CREAM (GRAM) TOPICAL AT BEDTIME
Refills: 0 | Status: DISCONTINUED | OUTPATIENT
Start: 2022-05-02 | End: 2022-05-19

## 2022-05-02 RX ORDER — MIDODRINE HYDROCHLORIDE 2.5 MG/1
5 TABLET ORAL ONCE
Refills: 0 | Status: COMPLETED | OUTPATIENT
Start: 2022-05-02 | End: 2022-05-02

## 2022-05-02 RX ORDER — GLUCAGON INJECTION, SOLUTION 0.5 MG/.1ML
1 INJECTION, SOLUTION SUBCUTANEOUS ONCE
Refills: 0 | Status: DISCONTINUED | OUTPATIENT
Start: 2022-05-02 | End: 2022-05-19

## 2022-05-02 RX ORDER — INSULIN LISPRO 100/ML
VIAL (ML) SUBCUTANEOUS
Refills: 0 | Status: DISCONTINUED | OUTPATIENT
Start: 2022-05-02 | End: 2022-05-12

## 2022-05-02 RX ORDER — DEXTROSE 50 % IN WATER 50 %
15 SYRINGE (ML) INTRAVENOUS ONCE
Refills: 0 | Status: DISCONTINUED | OUTPATIENT
Start: 2022-05-02 | End: 2022-05-19

## 2022-05-02 RX ORDER — PIPERACILLIN AND TAZOBACTAM 4; .5 G/20ML; G/20ML
3.38 INJECTION, POWDER, LYOPHILIZED, FOR SOLUTION INTRAVENOUS ONCE
Refills: 0 | Status: COMPLETED | OUTPATIENT
Start: 2022-05-02 | End: 2022-05-02

## 2022-05-02 RX ORDER — TAMSULOSIN HYDROCHLORIDE 0.4 MG/1
0.8 CAPSULE ORAL AT BEDTIME
Refills: 0 | Status: DISCONTINUED | OUTPATIENT
Start: 2022-05-02 | End: 2022-05-12

## 2022-05-02 RX ORDER — FINASTERIDE 5 MG/1
5 TABLET, FILM COATED ORAL DAILY
Refills: 0 | Status: DISCONTINUED | OUTPATIENT
Start: 2022-05-02 | End: 2022-05-19

## 2022-05-02 RX ORDER — ALBUTEROL 90 UG/1
2 AEROSOL, METERED ORAL EVERY 6 HOURS
Refills: 0 | Status: DISCONTINUED | OUTPATIENT
Start: 2022-05-02 | End: 2022-05-19

## 2022-05-02 RX ORDER — MIDODRINE HYDROCHLORIDE 2.5 MG/1
10 TABLET ORAL ONCE
Refills: 0 | Status: COMPLETED | OUTPATIENT
Start: 2022-05-02 | End: 2022-05-02

## 2022-05-02 RX ORDER — PREGABALIN 225 MG/1
1000 CAPSULE ORAL DAILY
Refills: 0 | Status: DISCONTINUED | OUTPATIENT
Start: 2022-05-02 | End: 2022-05-19

## 2022-05-02 RX ORDER — APIXABAN 2.5 MG/1
2.5 TABLET, FILM COATED ORAL EVERY 12 HOURS
Refills: 0 | Status: DISCONTINUED | OUTPATIENT
Start: 2022-05-02 | End: 2022-05-19

## 2022-05-02 RX ORDER — CALCITRIOL 0.5 UG/1
0.25 CAPSULE ORAL DAILY
Refills: 0 | Status: DISCONTINUED | OUTPATIENT
Start: 2022-05-02 | End: 2022-05-19

## 2022-05-02 RX ORDER — MIDODRINE HYDROCHLORIDE 2.5 MG/1
5 TABLET ORAL THREE TIMES A DAY
Refills: 0 | Status: DISCONTINUED | OUTPATIENT
Start: 2022-05-02 | End: 2022-05-03

## 2022-05-02 RX ORDER — DEXTROSE 50 % IN WATER 50 %
25 SYRINGE (ML) INTRAVENOUS ONCE
Refills: 0 | Status: DISCONTINUED | OUTPATIENT
Start: 2022-05-02 | End: 2022-05-19

## 2022-05-02 RX ORDER — FERROUS SULFATE 325(65) MG
325 TABLET ORAL DAILY
Refills: 0 | Status: DISCONTINUED | OUTPATIENT
Start: 2022-05-02 | End: 2022-05-19

## 2022-05-02 RX ORDER — APIXABAN 2.5 MG/1
2.5 TABLET, FILM COATED ORAL EVERY 12 HOURS
Refills: 0 | Status: DISCONTINUED | OUTPATIENT
Start: 2022-05-02 | End: 2022-05-02

## 2022-05-02 RX ORDER — DEXTROSE 50 % IN WATER 50 %
12.5 SYRINGE (ML) INTRAVENOUS ONCE
Refills: 0 | Status: DISCONTINUED | OUTPATIENT
Start: 2022-05-02 | End: 2022-05-19

## 2022-05-02 RX ORDER — LEVOTHYROXINE SODIUM 125 MCG
75 TABLET ORAL DAILY
Refills: 0 | Status: DISCONTINUED | OUTPATIENT
Start: 2022-05-02 | End: 2022-05-18

## 2022-05-02 RX ORDER — SODIUM CHLORIDE 9 MG/ML
500 INJECTION INTRAMUSCULAR; INTRAVENOUS; SUBCUTANEOUS ONCE
Refills: 0 | Status: COMPLETED | OUTPATIENT
Start: 2022-05-02 | End: 2022-05-02

## 2022-05-02 RX ORDER — DIVALPROEX SODIUM 500 MG/1
500 TABLET, DELAYED RELEASE ORAL
Refills: 0 | Status: DISCONTINUED | OUTPATIENT
Start: 2022-05-02 | End: 2022-05-19

## 2022-05-02 RX ORDER — SALIVA SUBSTITUTE COMB NO.11 351 MG
5 POWDER IN PACKET (EA) MUCOUS MEMBRANE THREE TIMES A DAY
Refills: 0 | Status: DISCONTINUED | OUTPATIENT
Start: 2022-05-02 | End: 2022-05-19

## 2022-05-02 RX ORDER — ESCITALOPRAM OXALATE 10 MG/1
5 TABLET, FILM COATED ORAL DAILY
Refills: 0 | Status: DISCONTINUED | OUTPATIENT
Start: 2022-05-02 | End: 2022-05-19

## 2022-05-02 RX ORDER — VANCOMYCIN HCL 1 G
1000 VIAL (EA) INTRAVENOUS ONCE
Refills: 0 | Status: COMPLETED | OUTPATIENT
Start: 2022-05-02 | End: 2022-05-02

## 2022-05-02 RX ORDER — IPRATROPIUM/ALBUTEROL SULFATE 18-103MCG
3 AEROSOL WITH ADAPTER (GRAM) INHALATION EVERY 6 HOURS
Refills: 0 | Status: DISCONTINUED | OUTPATIENT
Start: 2022-05-02 | End: 2022-05-19

## 2022-05-02 RX ADMIN — SODIUM CHLORIDE 2000 MILLILITER(S): 9 INJECTION INTRAMUSCULAR; INTRAVENOUS; SUBCUTANEOUS at 17:36

## 2022-05-02 RX ADMIN — Medication 250 MILLIGRAM(S): at 14:46

## 2022-05-02 RX ADMIN — PIPERACILLIN AND TAZOBACTAM 200 GRAM(S): 4; .5 INJECTION, POWDER, LYOPHILIZED, FOR SOLUTION INTRAVENOUS at 20:41

## 2022-05-02 RX ADMIN — TAMSULOSIN HYDROCHLORIDE 0.8 MILLIGRAM(S): 0.4 CAPSULE ORAL at 23:08

## 2022-05-02 RX ADMIN — MIDODRINE HYDROCHLORIDE 5 MILLIGRAM(S): 2.5 TABLET ORAL at 20:42

## 2022-05-02 RX ADMIN — MIDODRINE HYDROCHLORIDE 5 MILLIGRAM(S): 2.5 TABLET ORAL at 15:00

## 2022-05-02 RX ADMIN — PIPERACILLIN AND TAZOBACTAM 200 GRAM(S): 4; .5 INJECTION, POWDER, LYOPHILIZED, FOR SOLUTION INTRAVENOUS at 12:31

## 2022-05-02 RX ADMIN — Medication 3 MILLILITER(S): at 18:42

## 2022-05-02 RX ADMIN — DIVALPROEX SODIUM 500 MILLIGRAM(S): 500 TABLET, DELAYED RELEASE ORAL at 20:38

## 2022-05-02 RX ADMIN — MIDODRINE HYDROCHLORIDE 10 MILLIGRAM(S): 2.5 TABLET ORAL at 17:36

## 2022-05-02 RX ADMIN — SODIUM CHLORIDE 500 MILLILITER(S): 9 INJECTION INTRAMUSCULAR; INTRAVENOUS; SUBCUTANEOUS at 12:31

## 2022-05-02 RX ADMIN — APIXABAN 2.5 MILLIGRAM(S): 2.5 TABLET, FILM COATED ORAL at 20:38

## 2022-05-02 RX ADMIN — Medication 5 MILLILITER(S): at 23:12

## 2022-05-02 NOTE — ED PROVIDER NOTE - CLINICAL SUMMARY MEDICAL DECISION MAKING FREE TEXT BOX
Dr. Hopson Note: pt with signs of sepsis with likely aspiration pneumonitis and hyopvolemic, distributive shock, will need gentle fluid resuscitation (would avoid aggressive fluid resuscitation due to known severe HF, last EF 30%), fluids, antibiotics for anaerobe and pneumonia coverage, sepsis workup

## 2022-05-02 NOTE — CONSULT NOTE ADULT - ASSESSMENT
Echo 3/31/22: The left ventricle is  enlarged with moderate to severe left ventricular systolic dysfunction,   estimated LVEF of 30-35%. The apical cap, apical septum and apical  lateral walls are akinetic with hypokinesis of the remaining walls. There  is no evidence of left ventricular thrombus  The right ventricle is not well-visualized, device wire is noted within  the right heart Sclerotic trileaflet aortic valve with grossly normal opening, without AI.  Mild MR  Trace TR.  Cardiac Cath Lab (09.15.08 @ 13:41) >  Summary:  Hemodynamics: Hemodynamic assessment demonstrates moderate systemic  hypertension and moderately elevated LVEDP.  Recommendations:  Continue current medical therapy.  Echocardiogram with echo contrast agent to evaluate for LV apical thrombus.  Further arrhythmia management per the EP team.  Impressions: Angiography reveals nonobstructive coronary atherosclerosis  with LV dysfunction as described on the prior cath in 2005.    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     Echo 3/31/22: The left ventricle is  enlarged with moderate to severe left ventricular systolic dysfunction,  estimated LVEF of 30-35%. The apical cap, apical septum and apical  lateral walls are akinetic with hypokinesis of the remaining walls. There  is no evidence of left ventricular thrombus The right ventricle is not well-visualized, device wire is noted within  the right heart Sclerotic trileaflet aortic valve with grossly normal opening, without AI.  Mild MR; Trace TR.  Cardiac Cath Lab (09.15.08 @ 13:41) >  Summary:  Hemodynamics: Hemodynamic assessment demonstrates moderate systemic  hypertension and moderately elevated LVEDP.  Recommendations:  Continue current medical therapy.  Echocardiogram with echo contrast agent to evaluate for LV apical thrombus.  Further arrhythmia management per the EP team.  Impressions: Angiography reveals nonobstructive coronary atherosclerosis  with LV dysfunction as described on the prior cath in 2005.    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    # PNA   -CT chest noted New groundglass opacities (predominantly left-sided) and left upper  lobe opacities..  No change in the bilateral pleural effusions.  -management per pulm   -fu Bcx results   -iv abx     #Acute on chronic systolic CHF , sp AICD   -rales on exam CT chest noted. with bl pleural eff  -creat noted- give lasix 40 mg IVP x1 now   -reassess tomorrow for daily diuretic need   -check ECHO to re-eval LV fx   -bb on hold given hypotension  -c/w mido to augment BP     #Newly dx Afib   -dx about week ago on last hospital admission  -Call EP to interrogate device  -fu ecg   -c/w eliquis   -bb on hold for now     # carotid stenosis s/p right CEA 2020,  -asa , resume statin     #chronic cva, c/w asa, resume statin     #Hypotension   -f/u bcxs  -cw mido to augement bp     #candelaria on CKD   -renal eval          Echo 3/31/22: The left ventricle is  enlarged with moderate to severe left ventricular systolic dysfunction,  estimated LVEF of 30-35%. The apical cap, apical septum and apical  lateral walls are akinetic with hypokinesis of the remaining walls. There  is no evidence of left ventricular thrombus The right ventricle is not well-visualized, device wire is noted within  the right heart Sclerotic trileaflet aortic valve with grossly normal opening, without AI.  Mild MR; Trace TR.  Cardiac Cath Lab (09.15.08 @ 13:41) >  Summary:  Hemodynamics: Hemodynamic assessment demonstrates moderate systemic  hypertension and moderately elevated LVEDP.  Recommendations:  Continue current medical therapy.  Echocardiogram with echo contrast agent to evaluate for LV apical thrombus.  Further arrhythmia management per the EP team.  Impressions: Angiography reveals nonobstructive coronary atherosclerosis  with LV dysfunction as described on the prior cath in 2005.    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    # PNA   -CT chest noted New groundglass opacities (predominantly left-sided) and left upper  lobe opacities..  No change in the bilateral pleural effusions.  -management per pulm   -fu Bcx results   -iv abx     #Acute on chronic systolic CHF , sp AICD   -rales on exam CT chest noted. with bl pleural eff  -creat noted- give lasix 20 mg IVP x1 now   -reassess tomorrow for daily diuretic need   -check ECHO to re-eval LV fx   -bb on hold given hypotension  -Increase mido to 15 mg TID  augment BP     #Newly dx Afib   -dx about week ago on last hospital admission  -Call EP to interrogate device  -fu ecg   -c/w eliquis   -bb on hold for now     # carotid stenosis s/p right CEA 2020,  -asa , resume statin     #chronic cva, c/w asa, resume statin     #Hypotension   -f/u bcxs  -cw mido to augment bp     #candelaria on CKD   -renal eval

## 2022-05-02 NOTE — H&P ADULT - PROBLEM SELECTOR PLAN 1
with associated sepsis  temp > 102 at home with WBC > 12  CT chest done awaiting result  continue empiric antibiotics for now  ID consultation appreciated   pureed diet   aspiration precautions

## 2022-05-02 NOTE — ED ADULT NURSE NOTE - OBJECTIVE STATEMENT
Pt presented to ed with c/o fever, lethargy that started 2 days ago, possible episode of aspiration while eating today morning, and near syncopal episode yesterday as per EMS. pt denies nausea, vomiting, sob, chest pain, headache, dizziness. Will continue to monitor.

## 2022-05-02 NOTE — H&P ADULT - HISTORY OF PRESENT ILLNESS
77 y/o M with a PMH of HTN, HLD, HFrEF (EF 30%), 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 The patient's wife at bedside stated she checked his temp and found it to be > 102 last night. Wife noticed he appears weaker. Was drinking ensure and noticed that he aspirated. Was concerned that he did not improve today so called EMS. + cough, loose stools, one episode of incontinence No chills, vomiting, diarrhea. Pt denies chest pain, SOB, abd pain. No recent falls, head trauma. Took all meds this AM.

## 2022-05-02 NOTE — ED ADULT NURSE REASSESSMENT NOTE - NS ED NURSE REASSESS COMMENT FT1
AMILCAR Lanier bedside in ER, made aware of current vitals signs and O2 requirement, midodrine administered as per order following successful dysphagia screen

## 2022-05-02 NOTE — PROVIDER CONTACT NOTE (OTHER) - ASSESSMENT
BP BP upon receiving from ED 81/47 done electronic, pt remains asymptomatic, A/Ox4 , manual BP 84/46

## 2022-05-02 NOTE — ED PROVIDER NOTE - NSICDXPASTSURGICALHX_GEN_ALL_CORE_FT
PAST SURGICAL HISTORY:  Cardiac defibrillator in place - 2009, battery change 2017    H/O prior ablation treatment VT, 2009    S/P left inguinal hernia repair 2018 at [place]    Tonsillar cancer s/p resection,  partial tongue resection

## 2022-05-02 NOTE — CONSULT NOTE ADULT - SUBJECTIVE AND OBJECTIVE BOX
CARDIOLOGY CONSULT - Dr. 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 The patient's wife at bedside stated she checked his temp and found it to be > 102 last night. Wife noticed he appears weaker. Was drinking ensure and noticed that he aspirated. Was concerned that he did not improve today so called EMS. + cough, loose stools, one episode of incontinence No chills, vomiting, diarrhea. Pt denies chest pain, SOB, abd pain. No recent falls, head trauma. Took all meds this AM. (02 May 2022 13:34)      PAST MEDICAL & SURGICAL HISTORY:  MI (myocardial infarction)  1992    HTN (hypertension)    HLD (hyperlipidemia)    Throat cancer  2011, treated with chemo, RT    Ventricular arrhythmia  s/p AICD    Diabetes  Type2, not on any meds since weight loss after throat Ca    Renal insufficiency  s/p chemo    CAD (coronary artery disease)    Inguinal hernia, left    Recent cerebrovascular accident (CVA)  R central sulcus MCA infarct, with left Upper EXT weakness, April 2022    History of dysphagia    Stage 3 chronic kidney disease    Anemia of chronic disease    H/O urinary retention  pt has large PVR    H/O prior ablation treatment  VT, 2009    Cardiac defibrillator in place  - 2009, battery change 2017    S/P left inguinal hernia repair  2018 at [place]    Tonsillar cancer  s/p resection,  partial tongue resection            PREVIOUS DIAGNOSTIC TESTING:    [ ] Echocardiogram:  [ ]  Catheterization:  [ ] Stress Test:  	    MEDICATIONS:  Home Medications:  albuterol 90 mcg/inh inhalation aerosol: 2 puff(s) inhaled every 6 hours, As needed, Shortness of Breath and/or Wheezing (02 May 2022 14:19)  aspirin 81 mg oral delayed release tablet: 1 tab(s) orally once a day (02 May 2022 14:19)  atorvastatin 80 mg oral tablet: 1 tab(s) orally once a day (02 May 2022 14:19)  calcitriol 0.25 mcg oral capsule: 1 cap(s) orally once a day (02 May 2022 14:19)  Coreg 6.25 mg oral tablet: 1 tab(s) orally 2 times a day (02 May 2022 14:19)  cyanocobalamin 1000 mcg oral tablet: 1 tab(s) orally once a day (02 May 2022 14:19)  melatonin 3 mg oral tablet: 1 tab(s) orally once a day (at bedtime) (02 May 2022 14:19)  saliva substitutes oral solution: 1 spray(s) orally 3 times a day, As Needed (02 May 2022 14:18)      MEDICATIONS  (STANDING):  albuterol/ipratropium for Nebulization 3 milliLiter(s) Nebulizer every 6 hours  apixaban 2.5 milliGRAM(s) Oral every 12 hours  aspirin enteric coated 81 milliGRAM(s) Oral daily  Biotene Dry Mouth Oral Rinse 5 milliLiter(s) Swish and Spit three times a day  bisacodyl Suppository 10 milliGRAM(s) Rectal daily  calcitriol   Capsule 0.25 MICROGram(s) Oral daily  cyanocobalamin 1000 MICROGram(s) Oral daily  dextrose 5%. 1000 milliLiter(s) (50 mL/Hr) IV Continuous <Continuous>  dextrose 5%. 1000 milliLiter(s) (100 mL/Hr) IV Continuous <Continuous>  dextrose 50% Injectable 25 Gram(s) IV Push once  dextrose 50% Injectable 12.5 Gram(s) IV Push once  dextrose 50% Injectable 25 Gram(s) IV Push once  diVALproex  milliGRAM(s) Oral two times a day  escitalopram 5 milliGRAM(s) Oral daily  ferrous    sulfate 325 milliGRAM(s) Oral daily  finasteride 5 milliGRAM(s) Oral daily  glucagon  Injectable 1 milliGRAM(s) IntraMuscular once  insulin lispro (ADMELOG) corrective regimen sliding scale   SubCutaneous three times a day before meals  levothyroxine 75 MICROGram(s) Oral daily  melatonin 3 milliGRAM(s) Oral at bedtime  midodrine. 5 milliGRAM(s) Oral three times a day  tamsulosin 0.8 milliGRAM(s) Oral at bedtime      FAMILY HISTORY:  Family history of cancer (Sibling)        SOCIAL HISTORY:    [ ] Non-smoker  [ ] Smoker  [ ] Alcohol    Allergies    No Known Allergies    Intolerances    	    REVIEW OF SYSTEMS:  CONSTITUTIONAL: No fever, weight loss, or fatigue  EYES: No eye pain, visual disturbances, or discharge  ENMT:  No difficulty hearing, tinnitus, vertigo; No sinus or throat pain  NECK: No pain or stiffness  RESPIRATORY: No cough, wheezing, chills or hemoptysis; No Shortness of Breath  CARDIOVASCULAR: No chest pain, palpitations, passing out, dizziness, or leg swelling  GASTROINTESTINAL: No abdominal or epigastric pain. No nausea, vomiting, or hematemesis; No diarrhea or constipation. No melena or hematochezia.  GENITOURINARY: No dysuria, frequency, hematuria, or incontinence  NEUROLOGICAL: No headaches, memory loss, loss of strength, numbness, or tremors  SKIN: No itching, burning, rashes, or lesions   	    [ ] All others negative	  [ ] Unable to obtain    PHYSICAL EXAM:  T(C): 37 (05-02-22 @ 12:08), Max: 37.1 (05-02-22 @ 11:38)  HR: 67 (05-02-22 @ 14:52) (67 - 86)  BP: 89/51 (05-02-22 @ 14:52) (89/51 - 93/59)  RR: 20 (05-02-22 @ 14:52) (16 - 20)  SpO2: 97% (05-02-22 @ 14:53) (89% - 100%)  Wt(kg): --  I&O's Summary      Appearance: Normal	  Psychiatry: A & O x 3, Mood & affect appropriate  HEENT:   Normal oral mucosa, PERRL, EOMI	  Lymphatic: No lymphadenopathy  Cardiovascular: Normal S1 S2,RRR, No JVD, No murmurs  Respiratory: Lungs clear to auscultation	  Gastrointestinal:  Soft, Non-tender, + BS	  Skin: No rashes, No ecchymoses, No cyanosis	  Neurologic: Non-focal  Extremities: Normal range of motion, No clubbing, cyanosis or edema  Vascular: Peripheral pulses palpable 2+ bilaterally    TELEMETRY: 	    ECG:  	  RADIOLOGY:  < from: CT Chest No Cont (05.02.22 @ 13:41) >    IMPRESSION:    1.  New groundglass opacities (predominantly left-sided) and left upper   lobe opacities.  2.  No change in the bilateral pleural effusions.  3.  No change in the calcified and noncalcified pleural plaques can be a   marker of prior asbestos exposure.    --- End of Report ---      < end of copied text >    OTHER: 	  	  LABS:	 	    CARDIAC MARKERS:  Troponin T, High Sensitivity Result: 90 ng/L (05-02 @ 12:06)                                  9.1    13.16 )-----------( 111      ( 02 May 2022 12:06 )             29.2     05-02    135  |  101  |  40<H>  ----------------------------<  138<H>  4.7   |  20<L>  |  2.66<H>    Ca    8.8      02 May 2022 12:06    TPro  6.4  /  Alb  3.1<L>  /  TBili  0.4  /  DBili  x   /  AST  32  /  ALT  16  /  AlkPhos  59  05-02    PT/INR - ( 02 May 2022 12:06 )   PT: 19.4 sec;   INR: 1.68 ratio         PTT - ( 02 May 2022 12:06 )  PTT:40.7 sec  proBNP:   Lipid Profile:   HgA1c:   TSH:        CARDIOLOGY CONSULT - Dr. 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 Daughter at bedside  stated she checked his temp and found it to be > 102 last night. Wife noticed he appears weaker. Was drinking ensure and noticed that he aspirated. Was concerned that he did not improve today so called EMS. + cough, loose stools, one episode of incontinence No chills, vomiting, diarrhea. Pt denies chest pain, SOB, abd pain. No recent falls, head trauma. Took all meds this AM. Last TTE 03/31/22 with enlarged LV, moderate to severe LV systolic dysfunction, estimated LVEF of 30-35%. The apical cap, apical septum and apical lateral walls are akinetic with hypokinesis of the remaining walls. No evidence of left ventricular thrombus.  RV is not well-visualized, device wire within the right heart.  Mild MR.  Trace TR.  Pt is not on diuretics per daughter. He was recent started on eliquis for new dx of afib about a week ago. Daughter also reports he was dx with a mi and cva about a month ago   ROS otherwise negative           PAST MEDICAL & SURGICAL HISTORY:  MI (myocardial infarction)  1992    HTN (hypertension)    HLD (hyperlipidemia)    Throat cancer  2011, treated with chemo, RT    Ventricular arrhythmia  s/p AICD    Diabetes  Type2, not on any meds since weight loss after throat Ca    Renal insufficiency  s/p chemo    CAD (coronary artery disease)    Inguinal hernia, left    Recent cerebrovascular accident (CVA)  R central sulcus MCA infarct, with left Upper EXT weakness, April 2022    History of dysphagia    Stage 3 chronic kidney disease    Anemia of chronic disease    H/O urinary retention  pt has large PVR    H/O prior ablation treatment  VT, 2009    Cardiac defibrillator in place  - 2009, battery change 2017    S/P left inguinal hernia repair  2018 at [place]    Tonsillar cancer  s/p resection,  partial tongue resection            PREVIOUS DIAGNOSTIC TESTING:    Echo 3/31/22: The left ventricle is  enlarged with moderate to severe left ventricular systolic dysfunction,   estimated LVEF of 30-35%. The apical cap, apical septum and apical  lateral walls are akinetic with hypokinesis of the remaining walls. There  is no evidence of left ventricular thrombus  The right ventricle is not well-visualized, device wire is noted within  the right heart Sclerotic trileaflet aortic valve with grossly normal opening, without AI.  Mild MR  Trace TR.  Cardiac Cath Lab (09.15.08 @ 13:41) >  Summary:  Hemodynamics: Hemodynamic assessment demonstrates moderate systemic  hypertension and moderately elevated LVEDP.  Recommendations:  Continue current medical therapy.  Echocardiogram with echo contrast agent to evaluate for LV apical thrombus.  Further arrhythmia management per the EP team.  Impressions: Angiography reveals nonobstructive coronary atherosclerosis  with LV dysfunction as described on the prior cath in 2005.        MEDICATIONS:  Home Medications:  albuterol 90 mcg/inh inhalation aerosol: 2 puff(s) inhaled every 6 hours, As needed, Shortness of Breath and/or Wheezing (02 May 2022 14:19)  aspirin 81 mg oral delayed release tablet: 1 tab(s) orally once a day (02 May 2022 14:19)  atorvastatin 80 mg oral tablet: 1 tab(s) orally once a day (02 May 2022 14:19)  calcitriol 0.25 mcg oral capsule: 1 cap(s) orally once a day (02 May 2022 14:19)  Coreg 6.25 mg oral tablet: 1 tab(s) orally 2 times a day (02 May 2022 14:19)  cyanocobalamin 1000 mcg oral tablet: 1 tab(s) orally once a day (02 May 2022 14:19)  melatonin 3 mg oral tablet: 1 tab(s) orally once a day (at bedtime) (02 May 2022 14:19)  saliva substitutes oral solution: 1 spray(s) orally 3 times a day, As Needed (02 May 2022 14:18)      MEDICATIONS  (STANDING):  albuterol/ipratropium for Nebulization 3 milliLiter(s) Nebulizer every 6 hours  apixaban 2.5 milliGRAM(s) Oral every 12 hours  aspirin enteric coated 81 milliGRAM(s) Oral daily  Biotene Dry Mouth Oral Rinse 5 milliLiter(s) Swish and Spit three times a day  bisacodyl Suppository 10 milliGRAM(s) Rectal daily  calcitriol   Capsule 0.25 MICROGram(s) Oral daily  cyanocobalamin 1000 MICROGram(s) Oral daily  dextrose 5%. 1000 milliLiter(s) (50 mL/Hr) IV Continuous <Continuous>  dextrose 5%. 1000 milliLiter(s) (100 mL/Hr) IV Continuous <Continuous>  dextrose 50% Injectable 25 Gram(s) IV Push once  dextrose 50% Injectable 12.5 Gram(s) IV Push once  dextrose 50% Injectable 25 Gram(s) IV Push once  diVALproex  milliGRAM(s) Oral two times a day  escitalopram 5 milliGRAM(s) Oral daily  ferrous    sulfate 325 milliGRAM(s) Oral daily  finasteride 5 milliGRAM(s) Oral daily  glucagon  Injectable 1 milliGRAM(s) IntraMuscular once  insulin lispro (ADMELOG) corrective regimen sliding scale   SubCutaneous three times a day before meals  levothyroxine 75 MICROGram(s) Oral daily  melatonin 3 milliGRAM(s) Oral at bedtime  midodrine. 5 milliGRAM(s) Oral three times a day  tamsulosin 0.8 milliGRAM(s) Oral at bedtime      FAMILY HISTORY:  Family history of cancer (Sibling)        SOCIAL HISTORY:    [ ] Non-smoker  [ ] Smoker  [ ] Alcohol    Allergies    No Known Allergies    Intolerances    	    REVIEW OF SYSTEMS:  CONSTITUTIONAL: No fever, weight loss, or fatigue  EYES: No eye pain, visual disturbances, or discharge  ENMT:  No difficulty hearing, tinnitus, vertigo; No sinus or throat pain  NECK: No pain or stiffness  RESPIRATORY: No cough, wheezing, chills or hemoptysis; No Shortness of Breath  CARDIOVASCULAR: No chest pain, palpitations, passing out, dizziness, or leg swelling  GASTROINTESTINAL: No abdominal or epigastric pain. No nausea, vomiting, or hematemesis; No diarrhea or constipation. No melena or hematochezia.  GENITOURINARY: No dysuria, frequency, hematuria, or incontinence  NEUROLOGICAL: No headaches, memory loss, loss of strength, numbness, or tremors  SKIN: No itching, burning, rashes, or lesions   	    [ ] All others negative	  [ ] Unable to obtain    PHYSICAL EXAM:  T(C): 37 (05-02-22 @ 12:08), Max: 37.1 (05-02-22 @ 11:38)  HR: 67 (05-02-22 @ 14:52) (67 - 86)  BP: 89/51 (05-02-22 @ 14:52) (89/51 - 93/59)  RR: 20 (05-02-22 @ 14:52) (16 - 20)  SpO2: 97% (05-02-22 @ 14:53) (89% - 100%)  Wt(kg): --  I&O's Summary      Appearance: Normal	  Psychiatry: A & O x 3, Mood & affect appropriate  HEENT:   Normal oral mucosa, PERRL, EOMI	  Lymphatic: No lymphadenopathy  Cardiovascular: Normal S1 S2,RRR, No JVD, No murmurs  Respiratory: Lungs clear to auscultation	  Gastrointestinal:  Soft, Non-tender, + BS	  Skin: No rashes, No ecchymoses, No cyanosis	  Neurologic: Non-focal  Extremities: Normal range of motion, No clubbing, cyanosis or edema  Vascular: Peripheral pulses palpable 2+ bilaterally    TELEMETRY: 	    ECG:  	  RADIOLOGY:  < from: CT Chest No Cont (05.02.22 @ 13:41) >    IMPRESSION:    1.  New groundglass opacities (predominantly left-sided) and left upper   lobe opacities.  2.  No change in the bilateral pleural effusions.  3.  No change in the calcified and noncalcified pleural plaques can be a   marker of prior asbestos exposure.    --- End of Report ---      < end of copied text >    OTHER: 	  	  LABS:	 	    CARDIAC MARKERS:  Troponin T, High Sensitivity Result: 90 ng/L (05-02 @ 12:06)                                  9.1    13.16 )-----------( 111      ( 02 May 2022 12:06 )             29.2     05-02    135  |  101  |  40<H>  ----------------------------<  138<H>  4.7   |  20<L>  |  2.66<H>    Ca    8.8      02 May 2022 12:06    TPro  6.4  /  Alb  3.1<L>  /  TBili  0.4  /  DBili  x   /  AST  32  /  ALT  16  /  AlkPhos  59  05-02    PT/INR - ( 02 May 2022 12:06 )   PT: 19.4 sec;   INR: 1.68 ratio         PTT - ( 02 May 2022 12:06 )  PTT:40.7 sec  proBNP:   Lipid Profile:   HgA1c:   TSH:        CARDIOLOGY CONSULT - Dr. 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 Daughter at bedside  stated she checked his temp and found it to be > 102 last night. Wife noticed he appears weaker. Was drinking ensure and noticed that he aspirated. Was concerned that he did not improve today so called EMS. + cough, loose stools, one episode of incontinence No chills, vomiting, diarrhea. Pt denies chest pain, SOB, abd pain. No recent falls, head trauma. Took all meds this AM. Last TTE 03/31/22 with enlarged LV, moderate to severe LV systolic dysfunction, estimated LVEF of 30-35%. The apical cap, apical septum and apical lateral walls are akinetic with hypokinesis of the remaining walls. No evidence of left ventricular thrombus.  RV is not well-visualized, device wire within the right heart.  Mild MR.  Trace TR.  Pt is not on diuretics per daughter. He was recent started on eliquis for new dx of afib about a week ago. Daughter also reports he was dx with a mi and cva about a month ago   ROS otherwise negative           PAST MEDICAL & SURGICAL HISTORY:  MI (myocardial infarction)  1992    HTN (hypertension)    HLD (hyperlipidemia)    Throat cancer  2011, treated with chemo, RT    Ventricular arrhythmia  s/p AICD    Diabetes  Type2, not on any meds since weight loss after throat Ca    Renal insufficiency  s/p chemo    CAD (coronary artery disease)    Inguinal hernia, left    Recent cerebrovascular accident (CVA)  R central sulcus MCA infarct, with left Upper EXT weakness, April 2022    History of dysphagia    Stage 3 chronic kidney disease    Anemia of chronic disease    H/O urinary retention  pt has large PVR    H/O prior ablation treatment  VT, 2009    Cardiac defibrillator in place  - 2009, battery change 2017    S/P left inguinal hernia repair  2018 at [place]    Tonsillar cancer  s/p resection,  partial tongue resection            PREVIOUS DIAGNOSTIC TESTING:    Echo 3/31/22: The left ventricle is  enlarged with moderate to severe left ventricular systolic dysfunction,   estimated LVEF of 30-35%. The apical cap, apical septum and apical  lateral walls are akinetic with hypokinesis of the remaining walls. There  is no evidence of left ventricular thrombus  The right ventricle is not well-visualized, device wire is noted within  the right heart Sclerotic trileaflet aortic valve with grossly normal opening, without AI.  Mild MR  Trace TR.  Cardiac Cath Lab (09.15.08 @ 13:41) >  Summary:  Hemodynamics: Hemodynamic assessment demonstrates moderate systemic  hypertension and moderately elevated LVEDP.  Recommendations:  Continue current medical therapy.  Echocardiogram with echo contrast agent to evaluate for LV apical thrombus.  Further arrhythmia management per the EP team.  Impressions: Angiography reveals nonobstructive coronary atherosclerosis  with LV dysfunction as described on the prior cath in 2005.        MEDICATIONS:  Home Medications:  albuterol 90 mcg/inh inhalation aerosol: 2 puff(s) inhaled every 6 hours, As needed, Shortness of Breath and/or Wheezing (02 May 2022 14:19)  aspirin 81 mg oral delayed release tablet: 1 tab(s) orally once a day (02 May 2022 14:19)  atorvastatin 80 mg oral tablet: 1 tab(s) orally once a day (02 May 2022 14:19)  calcitriol 0.25 mcg oral capsule: 1 cap(s) orally once a day (02 May 2022 14:19)  Coreg 6.25 mg oral tablet: 1 tab(s) orally 2 times a day (02 May 2022 14:19)  cyanocobalamin 1000 mcg oral tablet: 1 tab(s) orally once a day (02 May 2022 14:19)  melatonin 3 mg oral tablet: 1 tab(s) orally once a day (at bedtime) (02 May 2022 14:19)  saliva substitutes oral solution: 1 spray(s) orally 3 times a day, As Needed (02 May 2022 14:18)      MEDICATIONS  (STANDING):  albuterol/ipratropium for Nebulization 3 milliLiter(s) Nebulizer every 6 hours  apixaban 2.5 milliGRAM(s) Oral every 12 hours  aspirin enteric coated 81 milliGRAM(s) Oral daily  Biotene Dry Mouth Oral Rinse 5 milliLiter(s) Swish and Spit three times a day  bisacodyl Suppository 10 milliGRAM(s) Rectal daily  calcitriol   Capsule 0.25 MICROGram(s) Oral daily  cyanocobalamin 1000 MICROGram(s) Oral daily  dextrose 5%. 1000 milliLiter(s) (50 mL/Hr) IV Continuous <Continuous>  dextrose 5%. 1000 milliLiter(s) (100 mL/Hr) IV Continuous <Continuous>  dextrose 50% Injectable 25 Gram(s) IV Push once  dextrose 50% Injectable 12.5 Gram(s) IV Push once  dextrose 50% Injectable 25 Gram(s) IV Push once  diVALproex  milliGRAM(s) Oral two times a day  escitalopram 5 milliGRAM(s) Oral daily  ferrous    sulfate 325 milliGRAM(s) Oral daily  finasteride 5 milliGRAM(s) Oral daily  glucagon  Injectable 1 milliGRAM(s) IntraMuscular once  insulin lispro (ADMELOG) corrective regimen sliding scale   SubCutaneous three times a day before meals  levothyroxine 75 MICROGram(s) Oral daily  melatonin 3 milliGRAM(s) Oral at bedtime  midodrine. 5 milliGRAM(s) Oral three times a day  tamsulosin 0.8 milliGRAM(s) Oral at bedtime      FAMILY HISTORY:  Family history of cancer (Sibling)        SOCIAL HISTORY:    [ x] former smoker    Allergies    No Known Allergies    Intolerances    	    REVIEW OF SYSTEMS:  CONSTITUTIONAL: No fever, weight loss, or fatigue  EYES: No eye pain, visual disturbances, or discharge  ENMT:  No difficulty hearing, tinnitus, vertigo; No sinus or throat pain  NECK: No pain or stiffness  RESPIRATORY:  see hpi   CARDIOVASCULAR: No chest pain, palpitations, passing out, dizziness, or leg swelling  GASTROINTESTINAL: No abdominal or epigastric pain. No nausea, vomiting, or hematemesis; No diarrhea or constipation. No melena or hematochezia.  GENITOURINARY: No dysuria, frequency, hematuria, or incontinence  NEUROLOGICAL: No headaches, memory loss, loss of strength, numbness, or tremors  SKIN: No itching, burning, rashes, or lesions   	    [x ] All others negative	  [ ] Unable to obtain    PHYSICAL EXAM:  T(C): 37 (05-02-22 @ 12:08), Max: 37.1 (05-02-22 @ 11:38)  HR: 67 (05-02-22 @ 14:52) (67 - 86)  BP: 89/51 (05-02-22 @ 14:52) (89/51 - 93/59)  RR: 20 (05-02-22 @ 14:52) (16 - 20)  SpO2: 97% (05-02-22 @ 14:53) (89% - 100%)  Wt(kg): --  I&O's Summary      Appearance: NAD 	  Psychiatry: A & O x 3  HEENT:   Normal oral mucosa, PERRL, EOMI	  Lymphatic: No lymphadenopathy  Cardiovascular: Normal S1 S2,RR  Respiratory: bl rales   Gastrointestinal:  Soft, Non-tender, + BS	  Skin: No rashes, No ecchymoses, No cyanosis	  Neurologic: Non-focal  Extremities: Normal range of motion, No clubbing, cyanosis or edema  Vascular: Peripheral pulses palpable 2+ bilaterally    TELEMETRY: 	    ECG:  	  RADIOLOGY:  < from: CT Chest No Cont (05.02.22 @ 13:41) >    IMPRESSION:    1.  New groundglass opacities (predominantly left-sided) and left upper   lobe opacities.  2.  No change in the bilateral pleural effusions.  3.  No change in the calcified and noncalcified pleural plaques can be a   marker of prior asbestos exposure.    --- End of Report ---      < end of copied text >    OTHER: 	  	  LABS:	 	    CARDIAC MARKERS:  Troponin T, High Sensitivity Result: 90 ng/L (05-02 @ 12:06)                                  9.1    13.16 )-----------( 111      ( 02 May 2022 12:06 )             29.2     05-02    135  |  101  |  40<H>  ----------------------------<  138<H>  4.7   |  20<L>  |  2.66<H>    Ca    8.8      02 May 2022 12:06    TPro  6.4  /  Alb  3.1<L>  /  TBili  0.4  /  DBili  x   /  AST  32  /  ALT  16  /  AlkPhos  59  05-02    PT/INR - ( 02 May 2022 12:06 )   PT: 19.4 sec;   INR: 1.68 ratio         PTT - ( 02 May 2022 12:06 )  PTT:40.7 sec  proBNP:   Lipid Profile:   HgA1c:   TSH:

## 2022-05-02 NOTE — CONSULT NOTE ADULT - ASSESSMENT
76M DM, ischemic cardiomyopathy s/p ICD 2009, tonsillar cancer s/p resection 2019.   Here 5/2 with 102F at home, malaise and cough.   Looks well on room air and CXR not very impressive but can cover for pneumonia for now.   Possible aspiration.   Recent hospitalization in April for cardiac issues may pose risk for resistant pathogens.     Suggest  -empiric Zosyn for now  -would hold off on Vanc - MRSA PCR negative from a month ago and creatinine elevated from baseline   -f/u chest imaging   -f/u blood cultures     Discussed with medicine     Basilio Tam MD   Infectious Disease   Available on TEAMS. After 5PM and on weekends please page fellow on call or call [phone]

## 2022-05-02 NOTE — ED PROVIDER NOTE - OBJECTIVE STATEMENT
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 Pt had Tmax of 102 last night. Wife noticed he appears weaker. Was drinking ensure and noticed that he aspirated. Was concerned that he did not improve today so called EMS. +cough, loose stools. No chills, vomiting, diarrhea. Pt denies chest pain, SOB, abd pain. No recent falls, head trauma. Took all meds this AM.  Per EMS, pt noted to be hypotensive in route, BPs 90/50's, given rhonchii on exam pt was given small 250cc bolus. Was hypoxic to 90% on RA

## 2022-05-02 NOTE — CONSULT NOTE ADULT - SUBJECTIVE AND OBJECTIVE BOX
HPI:  76M DM, ischemic cardiomyopathy s/p ICD 2009, tonsillar cancer 2011 s/p partial glossectomy and [id number], hypothyroidism.   Hospitalized twice in April - first for CHF exacerbation, again for hypotension.   Returned today 5/2 for malaise and 102F at home, chills noted.   Onset a few days ago.   Gurgling sounds in his throat. Chronic cough. May have aspirated.   He feels fine now. No pain, dyspnea, fevers or chills.       PAST MEDICAL & SURGICAL HISTORY:  MI (myocardial infarction)  1992    HTN (hypertension)    HLD (hyperlipidemia)    Throat cancer  2011, treated with chemo, RT    Ventricular arrhythmia  s/p AICD    Diabetes  Type2, not on any meds since weight loss after throat Ca    Renal insufficiency  s/p chemo    CAD (coronary artery disease)    Inguinal hernia, left    Recent cerebrovascular accident (CVA)  R central sulcus MCA infarct, with left Upper EXT weakness, April 2022    History of dysphagia    Stage 3 chronic kidney disease    Anemia of chronic disease    H/O urinary retention  pt has large PVR    H/O prior ablation treatment  VT, 2009    Cardiac defibrillator in place  - 2009, battery change 2017    S/P left inguinal hernia repair  2018 at [place]    Tonsillar cancer  s/p resection,  partial tongue resection        Allergies    No Known Allergies    Intolerances        ANTIMICROBIALS:  vancomycin  IVPB. 1000 once      OTHER MEDS:  ALBUTerol    90 MICROgram(s) HFA Inhaler 2 Puff(s) Inhalation every 6 hours PRN  apixaban 2.5 milliGRAM(s) Oral every 12 hours  aspirin enteric coated 81 milliGRAM(s) Oral daily  Biotene Dry Mouth Oral Rinse 5 milliLiter(s) Swish and Spit three times a day  bisacodyl Suppository 10 milliGRAM(s) Rectal daily  calcitriol   Capsule 0.25 MICROGram(s) Oral daily  cyanocobalamin 1000 MICROGram(s) Oral daily  diVALproex  milliGRAM(s) Oral two times a day  escitalopram 5 milliGRAM(s) Oral daily  ferrous    sulfate 325 milliGRAM(s) Oral daily  finasteride 5 milliGRAM(s) Oral daily  levothyroxine 75 MICROGram(s) Oral daily  melatonin 3 milliGRAM(s) Oral at bedtime  midodrine. 5 milliGRAM(s) Oral three times a day  tamsulosin 0.8 milliGRAM(s) Oral at bedtime      SOCIAL HISTORY:      FAMILY HISTORY:  Family history of cancer (Sibling)        ROS:  All other systems negative   Constitutional: per HPI   Eye: no vision changes  ENT: no sore throat, no rhinorrhea  Cardiovascular: no chest pain, no palpitation  Respiratory: no SOB, no cough  GI:  no abd pain, no vomiting, no diarrhea  urinary: no dysuria, no hematuria, no flank pain  musculoskeletal: no joint pain, no joint swelling  skin: no rash  neurology: no headache  psych: no anxiety    Physical Exam:  General: awake, alert, non toxic  Head: atraumatic, normocephalic  Eyes: normal sclera and conjunctiva  ENT: no gross oropharyngeal lesions, no LAD, neck supple  Cardio: regular rate and rhythm   Respiratory: nonlabored on room air, grossly clear bilaterally, no wheezing  abd: soft, bowel sounds present, not tender  : no suprapubic tenderness, no mcclellan  Musculoskeletal: no joint swelling, no edema. feet look fine   Skin: no rash  vascular: no phlebitis  Neurologic: hard of hearing   psych: normal affect       Drug Dosing Weight  Height (cm): 175.3 (02 May 2022 11:38)  Weight (kg): 70.3 (02 May 2022 11:38)  BMI (kg/m2): 22.9 (02 May 2022 11:38)  BSA (m2): 1.85 (02 May 2022 11:38)    Vital Signs Last 24 Hrs  T(F): 98.6 (05-02-22 @ 12:08), Max: 98.8 (05-02-22 @ 11:38)    Vital Signs Last 24 Hrs  HR: 83 (05-02-22 @ 12:04) (83 - 86)  BP: 93/59 (05-02-22 @ 12:04) (91/45 - 93/59)  RR: 17 (05-02-22 @ 12:04)  SpO2: 100% (05-02-22 @ 12:04) (93% - 100%)  Wt(kg): --                          9.1    13.16 )-----------( 111      ( 02 May 2022 12:06 )             29.2       05-02    135  |  101  |  40<H>  ----------------------------<  138<H>  4.7   |  20<L>  |  2.66<H>    Ca    8.8      02 May 2022 12:06    TPro  6.4  /  Alb  3.1<L>  /  TBili  0.4  /  DBili  x   /  AST  32  /  ALT  16  /  AlkPhos  59  05-02          MICROBIOLOGY:  Culture - Urine (collected 04-27-22 @ 00:52)  Source: Clean Catch Clean Catch (Midstream)  Final Report (04-28-22 @ 20:45):    No growth    Culture - Blood (collected 04-27-22 @ 00:38)  Source: .Blood Blood-Peripheral  Final Report (05-02-22 @ 01:00):    No Growth Final    Culture - Blood (collected 04-27-22 @ 00:38)  Source: .Blood Blood-Peripheral  Final Report (05-02-22 @ 01:00):    No Growth Final      RADIOLOGY:  Images below reviewed personally  CT Head No Cont (04.27.22 @ 16:50)   No evidence of an acute intracranial hemorrhage, midline shift, or   hydrocephalus. Aging small posterior right frontal infarcts. Mild   ischemic changes left frontal white matter somewhat better seen than   prior exam.    Xray Chest 1 View-PORTABLE IMMEDIATE (04.26.22 @ 20:09)   A duallead permanent pacemaker is seen. Overlying leads are present.   Heart size is magnified. Mild perihilar infiltrates are seen with   probable trace left pleural effusion. Mild CHF is favored. No   pneumothorax. Findings have improved as compared to prior study.  IMPRESSION:  Improving infiltrates.

## 2022-05-02 NOTE — H&P ADULT - ASSESSMENT
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 Clinical presentation consistent with pneumonia possibly aspiration pneumonia

## 2022-05-02 NOTE — ED PROVIDER NOTE - PHYSICAL EXAMINATION
CONSTITUTIONAL: Chronically ill appearing, in no apparent distress.  ENT: Airway patent, dry mucous membranes.   EYES: Pupils equal, round and reactive to light. EOMI. Conjunctiva pale appearing.   CARDIAC: Normal rate, regular rhythm.  Heart sounds S1, S2.  No JVD on exam.   RESPIRATORY: Diffuse rhonchi. No tachypnea or accessory muscle use. O2 sat 93% on RA.   GASTROINTESTINAL: Abdomen soft, non-tender, not distended.  MUSCULOSKELETAL: Spine appears normal. No LE swelling or edema.   NEUROLOGICAL: Alert and oriented x3, no focal deficits, no motor or sensory deficits. 5/5 muscle strength throughout.  SKIN: Skin pale in color, warm, dry and intact. No cyanosis noted.   PSYCHIATRIC: Normal mood and affect.

## 2022-05-02 NOTE — CONSULT NOTE ADULT - SUBJECTIVE AND OBJECTIVE BOX
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 No vomiting, chest pain, SOB, abd pain. No recent falls, head trauma. Noted to have JARRED/CKD 3.    ROS:  as above    Allergies:  No Known Allergies    PAST MEDICAL & SURGICAL HISTORY:  MI (myocardial infarction)  1992  HTN (hypertension)  HLD (hyperlipidemia)  Throat cancer  2011, treated with chemo, RT  Ventricular arrhythmia  s/p AICD  Diabetes  Type2, not on any meds since weight loss after throat Ca  Renal insufficiency  s/p chemo  CAD (coronary artery disease)  Inguinal hernia, left  Recent cerebrovascular accident (CVA)  R central sulcus MCA infarct, with left Upper EXT weakness, April 2022  History of dysphagia  Stage 3 chronic kidney disease  Anemia of chronic disease  H/O urinary retention  pt has large PVR  H/O prior ablation treatment  VT, 2009  Cardiac defibrillator in place  - 2009, battery change 2017  S/P left inguinal hernia repair  2018 at [place]  Tonsillar cancer  s/p resection,  partial tongue resection    SOCIAL HISTORY:  negative    FAMILY HISTORY:  Family history of cancer (Sibling)    MEDICATIONS  (STANDING):  apixaban 2.5 milliGRAM(s) Oral every 12 hours  aspirin enteric coated 81 milliGRAM(s) Oral daily  Biotene Dry Mouth Oral Rinse 5 milliLiter(s) Swish and Spit three times a day  bisacodyl Suppository 10 milliGRAM(s) Rectal daily  calcitriol   Capsule 0.25 MICROGram(s) Oral daily  cyanocobalamin 1000 MICROGram(s) Oral daily  dextrose 5%. 1000 milliLiter(s) (50 mL/Hr) IV Continuous <Continuous>  dextrose 5%. 1000 milliLiter(s) (100 mL/Hr) IV Continuous <Continuous>  dextrose 50% Injectable 25 Gram(s) IV Push once  dextrose 50% Injectable 12.5 Gram(s) IV Push once  dextrose 50% Injectable 25 Gram(s) IV Push once  diVALproex  milliGRAM(s) Oral two times a day  escitalopram 5 milliGRAM(s) Oral daily  ferrous    sulfate 325 milliGRAM(s) Oral daily  finasteride 5 milliGRAM(s) Oral daily  glucagon  Injectable 1 milliGRAM(s) IntraMuscular once  insulin lispro (ADMELOG) corrective regimen sliding scale   SubCutaneous three times a day before meals  levothyroxine 75 MICROGram(s) Oral daily  melatonin 3 milliGRAM(s) Oral at bedtime  midodrine. 5 milliGRAM(s) Oral three times a day  tamsulosin 0.8 milliGRAM(s) Oral at bedtime  vancomycin  IVPB. 1000 milliGRAM(s) IV Intermittent once    Home Medications:  albuterol 90 mcg/inh inhalation aerosol: 2 puff(s) inhaled every 6 hours, As needed, Shortness of Breath and/or Wheezing (02 May 2022 14:19)  aspirin 81 mg oral delayed release tablet: 1 tab(s) orally once a day (02 May 2022 14:19)  atorvastatin 80 mg oral tablet: 1 tab(s) orally once a day (02 May 2022 14:19)  calcitriol 0.25 mcg oral capsule: 1 cap(s) orally once a day (02 May 2022 14:19)  Coreg 6.25 mg oral tablet: 1 tab(s) orally 2 times a day (02 May 2022 14:19)  cyanocobalamin 1000 mcg oral tablet: 1 tab(s) orally once a day (02 May 2022 14:19)  melatonin 3 mg oral tablet: 1 tab(s) orally once a day (at bedtime) (02 May 2022 14:19)  saliva substitutes oral solution: 1 spray(s) orally 3 times a day, As Needed (02 May 2022 14:18)    Vital Signs Last 24 Hrs  T(C): 37 (05-02-22 @ 12:08), Max: 37.1 (05-02-22 @ 11:38)  T(F): 98.6 (05-02-22 @ 12:08), Max: 98.8 (05-02-22 @ 11:38)  HR: 83 (05-02-22 @ 12:04) (83 - 86)  BP: 93/59 (05-02-22 @ 12:04) (91/45 - 93/59)  RR: 17 (05-02-22 @ 12:04) (16 - 17)  SpO2: 100% (05-02-22 @ 12:04) (93% - 100%)    LABS:                        9.1    13.16 )-----------( 111      ( 02 May 2022 12:06 )             29.2     05-02    135  |  101  |  40<H>  ----------------------------<  138<H>  4.7   |  20<L>  |  2.66<H>    Ca    8.8      02 May 2022 12:06    TPro  6.4  /  Alb  3.1<L>  /  TBili  0.4  /  DBili  x   /  AST  32  /  ALT  16  /  AlkPhos  59  05-02    LIVER FUNCTIONS - ( 02 May 2022 12:06 )  Alb: 3.1 g/dL / Pro: 6.4 g/dL / ALK PHOS: 59 U/L / ALT: 16 U/L / AST: 32 U/L / GGT: x           PT/INR - ( 02 May 2022 12:06 )   PT: 19.4 sec;   INR: 1.68 ratio      PTT - ( 02 May 2022 12:06 )  PTT:40.7 sec    A/P:    full consult to follow    [phone] NEPHROLOGY CONSULTATION    CHIEF COMPLAINT: weak    HPI:  Pt is 75 yo M with a PMH of HTN, HLD, HFrEF (EF 30-35%), 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 No vomiting, chest pain, abd pain. No recent falls, head trauma. Noted to have JARRED/CKD 3. Poor PO intake per family last few days. Seen in ED.    ROS:  as above    Allergies:  No Known Allergies    PAST MEDICAL & SURGICAL HISTORY:  MI (myocardial infarction)  1992  HTN (hypertension)  HLD (hyperlipidemia)  Throat cancer  2011, treated with chemo, RT  Ventricular arrhythmia  s/p AICD  Diabetes  Type2, not on any meds since weight loss after throat Ca  Renal insufficiency  s/p chemo  CAD (coronary artery disease)  Inguinal hernia, left  Recent cerebrovascular accident (CVA)  R central sulcus MCA infarct, with left Upper EXT weakness, April 2022  History of dysphagia  Stage 3 chronic kidney disease  Anemia of chronic disease  H/O urinary retention  pt has large PVR  H/O prior ablation treatment  VT, 2009  Cardiac defibrillator in place  - 2009, battery change 2017  S/P left inguinal hernia repair  2018 at [place]  Tonsillar cancer  s/p resection,  partial tongue resection    SOCIAL HISTORY:  negative    FAMILY HISTORY:  Family history of cancer (Sibling)    MEDICATIONS  (STANDING):  apixaban 2.5 milliGRAM(s) Oral every 12 hours  aspirin enteric coated 81 milliGRAM(s) Oral daily  Biotene Dry Mouth Oral Rinse 5 milliLiter(s) Swish and Spit three times a day  bisacodyl Suppository 10 milliGRAM(s) Rectal daily  calcitriol   Capsule 0.25 MICROGram(s) Oral daily  cyanocobalamin 1000 MICROGram(s) Oral daily  dextrose 5%. 1000 milliLiter(s) (50 mL/Hr) IV Continuous <Continuous>  dextrose 5%. 1000 milliLiter(s) (100 mL/Hr) IV Continuous <Continuous>  dextrose 50% Injectable 25 Gram(s) IV Push once  dextrose 50% Injectable 12.5 Gram(s) IV Push once  dextrose 50% Injectable 25 Gram(s) IV Push once  diVALproex  milliGRAM(s) Oral two times a day  escitalopram 5 milliGRAM(s) Oral daily  ferrous    sulfate 325 milliGRAM(s) Oral daily  finasteride 5 milliGRAM(s) Oral daily  glucagon  Injectable 1 milliGRAM(s) IntraMuscular once  insulin lispro (ADMELOG) corrective regimen sliding scale   SubCutaneous three times a day before meals  levothyroxine 75 MICROGram(s) Oral daily  melatonin 3 milliGRAM(s) Oral at bedtime  midodrine. 5 milliGRAM(s) Oral three times a day  tamsulosin 0.8 milliGRAM(s) Oral at bedtime  vancomycin  IVPB. 1000 milliGRAM(s) IV Intermittent once    Home Medications:  albuterol 90 mcg/inh inhalation aerosol: 2 puff(s) inhaled every 6 hours, As needed, Shortness of Breath and/or Wheezing (02 May 2022 14:19)  aspirin 81 mg oral delayed release tablet: 1 tab(s) orally once a day (02 May 2022 14:19)  atorvastatin 80 mg oral tablet: 1 tab(s) orally once a day (02 May 2022 14:19)  calcitriol 0.25 mcg oral capsule: 1 cap(s) orally once a day (02 May 2022 14:19)  Coreg 6.25 mg oral tablet: 1 tab(s) orally 2 times a day (02 May 2022 14:19)  cyanocobalamin 1000 mcg oral tablet: 1 tab(s) orally once a day (02 May 2022 14:19)  melatonin 3 mg oral tablet: 1 tab(s) orally once a day (at bedtime) (02 May 2022 14:19)  saliva substitutes oral solution: 1 spray(s) orally 3 times a day, As Needed (02 May 2022 14:18)    Vital Signs Last 24 Hrs  T(C): 37 (05-02-22 @ 12:08), Max: 37.1 (05-02-22 @ 11:38)  T(F): 98.6 (05-02-22 @ 12:08), Max: 98.8 (05-02-22 @ 11:38)  HR: 83 (05-02-22 @ 12:04) (83 - 86)  BP: 93/59 (05-02-22 @ 12:04) (91/45 - 93/59)  RR: 17 (05-02-22 @ 12:04) (16 - 17)  SpO2: 100% (05-02-22 @ 12:04) (93% - 100%)    s1s2  decr BS b/l  soft, ND  no edema    LABS:                        9.1    13.16 )-----------( 111      ( 02 May 2022 12:06 )             29.2     05-02    135  |  101  |  40<H>  ----------------------------<  138<H>  4.7   |  20<L>  |  2.66<H>    Ca    8.8      02 May 2022 12:06    TPro  6.4  /  Alb  3.1<L>  /  TBili  0.4  /  DBili  x   /  AST  32  /  ALT  16  /  AlkPhos  59  05-02    LIVER FUNCTIONS - ( 02 May 2022 12:06 )  Alb: 3.1 g/dL / Pro: 6.4 g/dL / ALK PHOS: 59 U/L / ALT: 16 U/L / AST: 32 U/L / GGT: x           PT/INR - ( 02 May 2022 12:06 )   PT: 19.4 sec;   INR: 1.68 ratio      PTT - ( 02 May 2022 12:06 )  PTT:40.7 sec    A/P:    CM, EF 30-35%  Concern for CHF, asp PNA  On Abx per ID  Borderline BP, on midodrine  Hemodynamic/hypotensive JARRED/CKD 3 (Cr 2.1 - 4/27/22)  Unable to give IVF given concern for CHF  Avoid BP lowering meds  F/u BMP, UO  Prognosis is guarded  D/w daughter at bedside    [phone]

## 2022-05-02 NOTE — CONSULT NOTE ADULT - SUBJECTIVE AND OBJECTIVE BOX
05-02-22 @ 14:38    Patient is a 76y old  Male who presents with a chief complaint of fever and cough. 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 The patient's wife at bedside stated she checked his temp and found it to be > 102 last night. Wife noticed he appears weaker. Was drinking ensure and noticed that he aspirated. Was concerned that he did not improve today so called EMS. + cough, loose stools, one episode of incontinence No chills, vomiting, diarrhea. Pt denies chest pain, SOB, abd pain. No recent falls, head trauma. Took all meds this AM. (02 May 2022 13:34)    above noted and conformed with the wife at bedside:  he eats purred diet:  he recently had a stroke and subsequently was adm at [facility] for hypotension:   now he presented with letharginess, weakness, fever for two days and cough :  he has meir having cxough for a while and he seems to have lot of phlegm in his chest :       ?FOLLOWING PRESENT  [x ] Hx of PE/DVT, [ x] Hx COPD,x [ ] Hx of Asthma, [ y] Hx of Hospitalization, [x ]  Hx of BiPAP/CPAP use, x[ ] Hx of NADYA    Allergies    No Known Allergies    Intolerances        PAST MEDICAL & SURGICAL HISTORY:  MI (myocardial infarction)  1992    HTN (hypertension)    HLD (hyperlipidemia)    Throat cancer  2011, treated with chemo, RT    Ventricular arrhythmia  s/p AICD    Diabetes  Type2, not on any meds since weight loss after throat Ca    Renal insufficiency  s/p chemo    CAD (coronary artery disease)    Inguinal hernia, left    Recent cerebrovascular accident (CVA)  R central sulcus MCA infarct, with left Upper EXT weakness, April 2022    History of dysphagia    Stage 3 chronic kidney disease    Anemia of chronic disease    H/O urinary retention  pt has large PVR    H/O prior ablation treatment  VT, 2009    Cardiac defibrillator in place  - 2009, battery change 2017    S/P left inguinal hernia repair  2018 at [place]    Tonsillar cancer  s/p resection,  partial tongue resection        FAMILY HISTORY:  Family history of cancer (Sibling)        Social History: [ 20 pk years ] TOBACCO                  [  x] ETOH                                 [x  ] IVDA/DRUGS    REVIEW OF SYSTEMS      General:	fever: weakness    Skin/Breast:x  	  Ophthalmologic:x  	  ENMT:	x    Respiratory and Thorax: cough, phlegm , unable ti cough it out:   	  Cardiovascular:	x    Gastrointestinal:	x    Genitourinary:	x    Musculoskeletal:	x    Neurological:	x    Psychiatric:	x    Hematology/Lymphatics:	x    Endocrine:	x    Allergic/Immunologic:	x    MEDICATIONS  (STANDING):  apixaban 2.5 milliGRAM(s) Oral every 12 hours  aspirin enteric coated 81 milliGRAM(s) Oral daily  Biotene Dry Mouth Oral Rinse 5 milliLiter(s) Swish and Spit three times a day  bisacodyl Suppository 10 milliGRAM(s) Rectal daily  calcitriol   Capsule 0.25 MICROGram(s) Oral daily  cyanocobalamin 1000 MICROGram(s) Oral daily  dextrose 5%. 1000 milliLiter(s) (50 mL/Hr) IV Continuous <Continuous>  dextrose 5%. 1000 milliLiter(s) (100 mL/Hr) IV Continuous <Continuous>  dextrose 50% Injectable 25 Gram(s) IV Push once  dextrose 50% Injectable 12.5 Gram(s) IV Push once  dextrose 50% Injectable 25 Gram(s) IV Push once  diVALproex  milliGRAM(s) Oral two times a day  escitalopram 5 milliGRAM(s) Oral daily  ferrous    sulfate 325 milliGRAM(s) Oral daily  finasteride 5 milliGRAM(s) Oral daily  glucagon  Injectable 1 milliGRAM(s) IntraMuscular once  insulin lispro (ADMELOG) corrective regimen sliding scale   SubCutaneous three times a day before meals  levothyroxine 75 MICROGram(s) Oral daily  melatonin 3 milliGRAM(s) Oral at bedtime  midodrine. 5 milliGRAM(s) Oral three times a day  tamsulosin 0.8 milliGRAM(s) Oral at bedtime  vancomycin  IVPB. 1000 milliGRAM(s) IV Intermittent once    MEDICATIONS  (PRN):  ALBUTerol    90 MICROgram(s) HFA Inhaler 2 Puff(s) Inhalation every 6 hours PRN Shortness of Breath and/or Wheezing  dextrose Oral Gel 15 Gram(s) Oral once PRN Blood Glucose LESS THAN 70 milliGRAM(s)/deciliter       Vital Signs Last 24 Hrs  T(C): 37 (02 May 2022 12:08), Max: 37.1 (02 May 2022 11:38)  T(F): 98.6 (02 May 2022 12:08), Max: 98.8 (02 May 2022 11:38)  HR: 83 (02 May 2022 12:04) (83 - 86)  BP: 93/59 (02 May 2022 12:04) (91/45 - 93/59)  BP(mean): --  RR: 17 (02 May 2022 12:04) (16 - 17)  SpO2: 100% (02 May 2022 12:04) (93% - 100%)Orthostatic VS          I&O's Summary      Physical Exam:   GENERAL: NAD, well-groomed, well-developed  HEENT: LUCA/   Atraumatic, Normocephalic  ENMT: No tonsillar erythema, exudates, or enlargement; Moist mucous membranes, Good dentition, No lesions  NECK: Supple, No JVD, Normal thyroid  CHEST/LUNG: Crackles bilaterally;  wheeze +  CVS: Regular rate and rhythm; No murmurs, rubs, or gallops  GI: : Soft, Nontender, Nondistended; Bowel sounds present  NERVOUS SYSTEM:  letahrgy : but open hs eyes and tries to respond  EXTREMITIES: - edema  LYMPH: No lymphadenopathy noted  SKIN: No rashes or lesions  ENDOCRINOLOGY: No Thyromegaly  PSYCH: Appropriate    Labs:  COVID-19 PCR: NotDetec (18 Apr 2022 05:00)  COVID-19 PCR: NotDetec (12 Apr 2022 05:50)  COVID-19 PCR: NotDetec (05 Apr 2022 22:00)  COVID-19 PCR: NotDetec (04 Apr 2022 08:18)                              9.1    13.16 )-----------( 111      ( 02 May 2022 12:06 )             29.2     05-02    135  |  101  |  40<H>  ----------------------------<  138<H>  4.7   |  20<L>  |  2.66<H>    Ca    8.8      02 May 2022 12:06    TPro  6.4  /  Alb  3.1<L>  /  TBili  0.4  /  DBili  x   /  AST  32  /  ALT  16  /  AlkPhos  59  05-02    CAPILLARY BLOOD GLUCOSE      POCT Blood Glucose.: 143 mg/dL (02 May 2022 11:50)    LIVER FUNCTIONS - ( 02 May 2022 12:06 )  Alb: 3.1 g/dL / Pro: 6.4 g/dL / ALK PHOS: 59 U/L / ALT: 16 U/L / AST: 32 U/L / GGT: x           PT/INR - ( 02 May 2022 12:06 )   PT: 19.4 sec;   INR: 1.68 ratio         PTT - ( 02 May 2022 12:06 )  PTT:40.7 sec    D DImer      Studies  Chest X-RAY  CT SCAN Chest   CT Abdomen  Venous Dopplers: LE:   Others

## 2022-05-02 NOTE — ED PROVIDER NOTE - NSICDXPASTMEDICALHX_GEN_ALL_CORE_FT
PAST MEDICAL HISTORY:  Anemia of chronic disease     CAD (coronary artery disease)     Diabetes Type2, not on any meds since weight loss after throat Ca    H/O urinary retention pt has large PVR    History of dysphagia     HLD (hyperlipidemia)     HTN (hypertension)     Inguinal hernia, left     MI (myocardial infarction) 1992    Recent cerebrovascular accident (CVA) R central sulcus MCA infarct, with left Upper EXT weakness, April 2022    Renal insufficiency s/p chemo    Stage 3 chronic kidney disease     Throat cancer 2011, treated with chemo, RT    Ventricular arrhythmia s/p AICD

## 2022-05-02 NOTE — CONSULT NOTE ADULT - ASSESSMENT
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 Clinical presentation consistent with pneumonia possibly aspiration pneumonia

## 2022-05-02 NOTE — H&P ADULT - PROBLEM SELECTOR PLAN 2
likely secondary to dehydration secondary to poor po   s/p IV hydration in Emergency Department  will hold off on further IVF as EF is low  renal consultation requested by me

## 2022-05-02 NOTE — H&P ADULT - NSHPPHYSICALEXAM_GEN_ALL_CORE
PHYSICAL EXAM: vital signs noted on [place]  in no apparent distress  HEENT: LUCA EOMI  Neck: Supple, no JVD, no thyromegaly  Lungs: no wheeze, no crackles  CVS: S1 S2 ejection systolic murmur +   Abdomen: no tenderness, no organomegaly, BS present  Neuro: AO x 2 -3  mild left UE weakness no sensory abnormalities  lethargic but fully arousable  Skin: warm, dry  Ext: no edema  Msk: no joint swelling or deformities  Back: no CVA tenderness, no kyphosis/scoliosis

## 2022-05-02 NOTE — H&P ADULT - NSHPADDITIONALINFOADULT_GEN_ALL_CORE
discussed with patient's wife at bedside in detail  remains FULL CODE for now as per wife until she talks to her  and daughter

## 2022-05-02 NOTE — ED ADULT TRIAGE NOTE - CHIEF COMPLAINT QUOTE
c/o fever, lethargy for 2 days; possible aspiration while eating this morning; near syncope yesterday

## 2022-05-02 NOTE — ED ADULT NURSE REASSESSMENT NOTE - NS ED NURSE REASSESS COMMENT FT1
Np shiny at bedside to assess patient, pt ordered additional 5mg of midodrine for hypotension- pt BP 75/63 HR 58. pt mentating, following commands. as per patients daughter at bedside, pt appears more flushed. Np at bedside. pt denies chest pain, worsening shortness of breath, dizziness at this time.

## 2022-05-02 NOTE — CHART NOTE - NSCHARTNOTEFT_GEN_A_CORE
Medicine Np note    CC; Hypotension  Notified by RN that patient hypotensive with BP 82/64 MM OF HG. Evaluated the pt at bedside,     Patient Alert, active, sitting in bed and eating apple  answering to questions appropriately  Denies HA, dizziness, CP, SOB, fever, chills, abdominal pain    Vital Signs Last 24 Hrs  T(C): 36.4 (02 May 2022 22:17), Max: 37.1 (02 May 2022 11:38)  T(F): 97.5 (02 May 2022 22:17), Max: 98.8 (02 May 2022 11:38)  HR: 60 (02 May 2022 22:17) (58 - 86)  BP: 94/74 (02 May 2022 23:39) (81/47 - 95/58)  BP(mean): 73 (02 May 2022 20:53) (73 - 73)  RR: 19 (02 May 2022 22:17) (16 - 22)  SpO2: 100% (02 May 2022 22:17) (89% - 100%)    < from: CT Chest No Cont (05.02.22 @ 13:41) >    1.  New groundglass opacities (predominantly left-sided) and left upper   lobe opacities.  2.  No change in the bilateral pleural effusions.  3.  No change in the calcified and noncalcified pleural plaques can be a   marker of prior asbestos exposure.    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  Admitted for PNA, CHF  Now with hypotension    Hypotension /PNA/ CHF  pT ASYMPTOMATIC, ACTIVE  Midodrine 5mg PO received few minutes ago  Repeat BP 92/84 mm of hg, HR: 60 bpm, BP labile fluctuating between 80;s and 90;s  Pt with CHF, pleural eff, no room for IVF  Continue mididrine  Vitals q4 hourly  MICU consult called , Waiting recommendations  Will continue to monitor  Will sign out to day team    Michael Yen [facility]  [id number] Medicine Np note    CC; Hypotension  Notified by RN that patient hypotensive with BP 82/64 MM OF HG. Evaluated the pt at bedside,     Patient Alert, active, sitting in bed and eating apple  answering to questions appropriately  Denies HA, dizziness, CP, SOB, fever, chills, abdominal pain    Vital Signs Last 24 Hrs  T(C): 36.4 (02 May 2022 22:17), Max: 37.1 (02 May 2022 11:38)  T(F): 97.5 (02 May 2022 22:17), Max: 98.8 (02 May 2022 11:38)  HR: 60 (02 May 2022 22:17) (58 - 86)  BP: 94/74 (02 May 2022 23:39) (81/47 - 95/58)  BP(mean): 73 (02 May 2022 20:53) (73 - 73)  RR: 19 (02 May 2022 22:17) (16 - 22)  SpO2: 100% (02 May 2022 22:17) (89% - 100%)    < from: CT Chest No Cont (05.02.22 @ 13:41) >    1.  New groundglass opacities (predominantly left-sided) and left upper   lobe opacities.  2.  No change in the bilateral pleural effusions.  3.  No change in the calcified and noncalcified pleural plaques can be a   marker of prior asbestos exposure.    A/P    77 y/o M with a PMH of HTN, HLD, HFrEF (EF 30%), right tonsillar cancer 2011 s/p chemo and radiation, partial left tonsillectomy and s/p left partial tongue resection 2019, dysphagia, carotid stenosis s/p right CEA 2020, DM2, CAD s/p AICD for ventricular arrhythmia (2009 w/ generator change in 2017) and hypothyroidism, R central sulcus MCA infarct, NSTEMI, recent admission 04/2022 for hypotension dx afib now presenting with  increased lethargy x last few days.  Admitted for PNA, CHF  Now with hypotension    Hypotension /PNA/ CHF  pT ASYMPTOMATIC, ACTIVE  Midodrine 5mg PO received few minutes ago  Repeat BP 92/84 mm of hg, HR: 60 bpm, BP labile fluctuating between 80;s and 90;s  Pt with CHF, pleural eff, no room for IVF  Continue Midodrine  Vitals q4 hourly  C/W Zosyn, patient recd lasty camacho of zosyn at 12N, according to Pharmacist, need another loading dose , ordered  MICU consult called , Waiting recommendations  Will continue to monitor  Will sign out to day team    Michael Yen [facility]  [id number]

## 2022-05-02 NOTE — H&P ADULT - NSHPREVIEWOFSYSTEMS_GEN_ALL_CORE
Gen: no loss of wt + loss of appetite  ENT: no dizziness + bilateral [place]   Ophth: no blurring of vision no loss of vision  Resp: see above HPI   CVS: No chest pain no palpitations no orthopnea  GI: no nausea, vomiting see above HPI   : no dysuria, hematuria  Endo: no polyuria no excessive sweating  Neuro: generalized weakness chronic lsft sided peresis since CVA  Heme: No petechiae no easy bruising  Msk: No joint pain no swelling  Skin: No rash no itching

## 2022-05-02 NOTE — CONSULT NOTE ADULT - TIME BILLING
Patient seen and examined.  Agree with above NP note.  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    #PNA   -abx per pulmonary  -f/u Bcx results     #Acute on chronic systolic CHF, sp AICD   -iv lasix today   -reassess daily for further iv diuretic need   -check ECHO to re-eval LV fx   -bb on hold given hypotension  -mido as needed    #New AF  -EPS to interrogate device  -c/w eliquis   -bb on hold for now     #carotid stenosis s/p right CEA 2020,  -asa, statin     #chronic cva  -c/w asa, statin

## 2022-05-02 NOTE — CONSULT NOTE ADULT - PROBLEM SELECTOR RECOMMENDATION 9
likely has aspiration pneumonia: ct scan chest dw rad:  has some med adenopathy : :  has new ggo bilaterally: left side: > right side:  has small pleural effusion:   RODRIGUE infiltrate:  ? some element of chf too: send probnp: echo on march 31: showed mod to severe LV dysfunction:  agree for treating for aspiration pneumonia: check legionella:   RVP is still pending though  he also has pleural plaques ? asbestos exposure

## 2022-05-02 NOTE — ED PROVIDER NOTE - CRITICAL CARE ATTENDING CONTRIBUTION TO CARE
I, Dr. Danny Hopson, saw and examined this patient and discussed the plan of care with the PA.     Pt via EMS, story from family, reviewed previous medical records, pt with decreased alertness associated with temp 102F last night, trouble swallowing ensure, and coarse bs.  Pt with flat neck veins, coarse bs more on R side, dry mm, no peripheral edema, nontender abdomen.        Suspect aspiration pneumonitis with dehydration causing toxic encephalopathy and hypotensive episodes.  See MDM.

## 2022-05-02 NOTE — H&P ADULT - NSICDXPASTSURGICALHX_GEN_ALL_CORE_FT
PAST SURGICAL HISTORY:  Cardiac defibrillator in place - 2009, battery change 2017    H/O prior ablation treatment VT, 2009    S/P left inguinal hernia repair 2018 at [place]    Tonsillar cancer s/p resection,  partial tongue resection

## 2022-05-02 NOTE — H&P ADULT - NSHPLABSRESULTS_GEN_ALL_CORE
noted on Fontanet, including lab results and radiology studies  creatinine of note increased 2.6 from baseline of 2

## 2022-05-02 NOTE — ED PROVIDER NOTE - PROGRESS NOTE DETAILS
Spoke with Dr. Larson who accepted pt for admission  Recommending telemetry and CT chest non con to further eval pts sxs and clinical presentation  Felicia Meeks PA-C Dr. Hopson Note: reviewed labs, troponin likely due to demand ischemia from sepsis, no emergent intervention, treat underlying sepsis, continue curent treatment, stable for inpt treatment.  Reassessement, pt awake, alert, no respiratory distress, skin intact and warm, no diaphoresis, no tachycardia, neuro unchanged.

## 2022-05-03 LAB
-  STAPHYLOCOCCUS EPIDERMIDIS: SIGNIFICANT CHANGE UP
ANION GAP SERPL CALC-SCNC: 11 MMOL/L — SIGNIFICANT CHANGE UP (ref 5–17)
APPEARANCE UR: ABNORMAL
BACTERIA # UR AUTO: ABNORMAL
BILIRUB UR-MCNC: NEGATIVE — SIGNIFICANT CHANGE UP
BUN SERPL-MCNC: 39 MG/DL — HIGH (ref 7–23)
CALCIUM SERPL-MCNC: 8 MG/DL — LOW (ref 8.4–10.5)
CHLORIDE SERPL-SCNC: 103 MMOL/L — SIGNIFICANT CHANGE UP (ref 96–108)
CO2 SERPL-SCNC: 22 MMOL/L — SIGNIFICANT CHANGE UP (ref 22–31)
COLOR SPEC: YELLOW — SIGNIFICANT CHANGE UP
CREAT SERPL-MCNC: 2.38 MG/DL — HIGH (ref 0.5–1.3)
DIFF PNL FLD: NEGATIVE — SIGNIFICANT CHANGE UP
EGFR: 28 ML/MIN/1.73M2 — LOW
EPI CELLS # UR: 2 /HPF — SIGNIFICANT CHANGE UP
GLUCOSE BLDC GLUCOMTR-MCNC: 107 MG/DL — HIGH (ref 70–99)
GLUCOSE BLDC GLUCOMTR-MCNC: 126 MG/DL — HIGH (ref 70–99)
GLUCOSE BLDC GLUCOMTR-MCNC: 88 MG/DL — SIGNIFICANT CHANGE UP (ref 70–99)
GLUCOSE SERPL-MCNC: 87 MG/DL — SIGNIFICANT CHANGE UP (ref 70–99)
GLUCOSE UR QL: NEGATIVE — SIGNIFICANT CHANGE UP
GRAM STN FLD: SIGNIFICANT CHANGE UP
HCT VFR BLD CALC: 24 % — LOW (ref 39–50)
HCT VFR BLD CALC: 25.9 % — LOW (ref 39–50)
HGB BLD-MCNC: 7.6 G/DL — LOW (ref 13–17)
HGB BLD-MCNC: 8.2 G/DL — LOW (ref 13–17)
HYALINE CASTS # UR AUTO: 10 /LPF — HIGH (ref 0–2)
KETONES UR-MCNC: NEGATIVE — SIGNIFICANT CHANGE UP
LEUKOCYTE ESTERASE UR-ACNC: ABNORMAL
MCHC RBC-ENTMCNC: 24 PG — LOW (ref 27–34)
MCHC RBC-ENTMCNC: 24.1 PG — LOW (ref 27–34)
MCHC RBC-ENTMCNC: 31.7 GM/DL — LOW (ref 32–36)
MCHC RBC-ENTMCNC: 31.7 GM/DL — LOW (ref 32–36)
MCV RBC AUTO: 75.7 FL — LOW (ref 80–100)
MCV RBC AUTO: 76.2 FL — LOW (ref 80–100)
METHOD TYPE: SIGNIFICANT CHANGE UP
NITRITE UR-MCNC: POSITIVE
NRBC # BLD: 0 /100 WBCS — SIGNIFICANT CHANGE UP (ref 0–0)
NRBC # BLD: 0 /100 WBCS — SIGNIFICANT CHANGE UP (ref 0–0)
PH UR: 6 — SIGNIFICANT CHANGE UP (ref 5–8)
PLATELET # BLD AUTO: 82 K/UL — LOW (ref 150–400)
PLATELET # BLD AUTO: 87 K/UL — LOW (ref 150–400)
POTASSIUM SERPL-MCNC: 4.1 MMOL/L — SIGNIFICANT CHANGE UP (ref 3.5–5.3)
POTASSIUM SERPL-SCNC: 4.1 MMOL/L — SIGNIFICANT CHANGE UP (ref 3.5–5.3)
PROT UR-MCNC: 100 — SIGNIFICANT CHANGE UP
RBC # BLD: 3.15 M/UL — LOW (ref 4.2–5.8)
RBC # BLD: 3.42 M/UL — LOW (ref 4.2–5.8)
RBC # FLD: 22.3 % — HIGH (ref 10.3–14.5)
RBC # FLD: 22.5 % — HIGH (ref 10.3–14.5)
RBC CASTS # UR COMP ASSIST: 2 /HPF — SIGNIFICANT CHANGE UP (ref 0–4)
SODIUM SERPL-SCNC: 136 MMOL/L — SIGNIFICANT CHANGE UP (ref 135–145)
SP GR SPEC: 1.02 — SIGNIFICANT CHANGE UP (ref 1.01–1.02)
SPECIMEN SOURCE: SIGNIFICANT CHANGE UP
T4 FREE SERPL-MCNC: 0.9 NG/DL — SIGNIFICANT CHANGE UP (ref 0.9–1.8)
TSH SERPL-MCNC: 2.18 UIU/ML — SIGNIFICANT CHANGE UP (ref 0.27–4.2)
UROBILINOGEN FLD QL: NEGATIVE — SIGNIFICANT CHANGE UP
WBC # BLD: 6.96 K/UL — SIGNIFICANT CHANGE UP (ref 3.8–10.5)
WBC # BLD: 8.52 K/UL — SIGNIFICANT CHANGE UP (ref 3.8–10.5)
WBC # FLD AUTO: 6.96 K/UL — SIGNIFICANT CHANGE UP (ref 3.8–10.5)
WBC # FLD AUTO: 8.52 K/UL — SIGNIFICANT CHANGE UP (ref 3.8–10.5)
WBC UR QL: 121 /HPF — HIGH (ref 0–5)

## 2022-05-03 PROCEDURE — 93283 PRGRMG EVAL IMPLANTABLE DFB: CPT | Mod: 26

## 2022-05-03 PROCEDURE — 93306 TTE W/DOPPLER COMPLETE: CPT | Mod: 26

## 2022-05-03 PROCEDURE — 99232 SBSQ HOSP IP/OBS MODERATE 35: CPT

## 2022-05-03 PROCEDURE — 99223 1ST HOSP IP/OBS HIGH 75: CPT | Mod: GC

## 2022-05-03 RX ORDER — FUROSEMIDE 40 MG
20 TABLET ORAL ONCE
Refills: 0 | Status: COMPLETED | OUTPATIENT
Start: 2022-05-03 | End: 2022-05-03

## 2022-05-03 RX ORDER — VANCOMYCIN HCL 1 G
1000 VIAL (EA) INTRAVENOUS ONCE
Refills: 0 | Status: COMPLETED | OUTPATIENT
Start: 2022-05-03 | End: 2022-05-03

## 2022-05-03 RX ORDER — MIDODRINE HYDROCHLORIDE 2.5 MG/1
10 TABLET ORAL THREE TIMES A DAY
Refills: 0 | Status: DISCONTINUED | OUTPATIENT
Start: 2022-05-03 | End: 2022-05-11

## 2022-05-03 RX ADMIN — PIPERACILLIN AND TAZOBACTAM 25 GRAM(S): 4; .5 INJECTION, POWDER, LYOPHILIZED, FOR SOLUTION INTRAVENOUS at 18:45

## 2022-05-03 RX ADMIN — Medication 5 MILLILITER(S): at 21:50

## 2022-05-03 RX ADMIN — Medication 5 MILLILITER(S): at 13:50

## 2022-05-03 RX ADMIN — ESCITALOPRAM OXALATE 5 MILLIGRAM(S): 10 TABLET, FILM COATED ORAL at 18:43

## 2022-05-03 RX ADMIN — Medication 20 MILLIGRAM(S): at 20:16

## 2022-05-03 RX ADMIN — MIDODRINE HYDROCHLORIDE 10 MILLIGRAM(S): 2.5 TABLET ORAL at 13:49

## 2022-05-03 RX ADMIN — FINASTERIDE 5 MILLIGRAM(S): 5 TABLET, FILM COATED ORAL at 13:49

## 2022-05-03 RX ADMIN — Medication 3 MILLILITER(S): at 06:20

## 2022-05-03 RX ADMIN — PIPERACILLIN AND TAZOBACTAM 25 GRAM(S): 4; .5 INJECTION, POWDER, LYOPHILIZED, FOR SOLUTION INTRAVENOUS at 10:06

## 2022-05-03 RX ADMIN — Medication 3 MILLILITER(S): at 13:50

## 2022-05-03 RX ADMIN — APIXABAN 2.5 MILLIGRAM(S): 2.5 TABLET, FILM COATED ORAL at 18:44

## 2022-05-03 RX ADMIN — Medication 3 MILLILITER(S): at 18:45

## 2022-05-03 RX ADMIN — DIVALPROEX SODIUM 500 MILLIGRAM(S): 500 TABLET, DELAYED RELEASE ORAL at 06:20

## 2022-05-03 RX ADMIN — Medication 3 MILLILITER(S): at 02:02

## 2022-05-03 RX ADMIN — Medication 81 MILLIGRAM(S): at 13:48

## 2022-05-03 RX ADMIN — DIVALPROEX SODIUM 500 MILLIGRAM(S): 500 TABLET, DELAYED RELEASE ORAL at 18:44

## 2022-05-03 RX ADMIN — Medication 325 MILLIGRAM(S): at 13:49

## 2022-05-03 RX ADMIN — PREGABALIN 1000 MICROGRAM(S): 225 CAPSULE ORAL at 13:49

## 2022-05-03 RX ADMIN — Medication 3 MILLIGRAM(S): at 21:50

## 2022-05-03 RX ADMIN — MIDODRINE HYDROCHLORIDE 10 MILLIGRAM(S): 2.5 TABLET ORAL at 18:44

## 2022-05-03 RX ADMIN — APIXABAN 2.5 MILLIGRAM(S): 2.5 TABLET, FILM COATED ORAL at 06:20

## 2022-05-03 RX ADMIN — Medication 75 MICROGRAM(S): at 06:19

## 2022-05-03 RX ADMIN — Medication 5 MILLILITER(S): at 06:20

## 2022-05-03 RX ADMIN — TAMSULOSIN HYDROCHLORIDE 0.8 MILLIGRAM(S): 0.4 CAPSULE ORAL at 21:50

## 2022-05-03 RX ADMIN — MIDODRINE HYDROCHLORIDE 10 MILLIGRAM(S): 2.5 TABLET ORAL at 06:19

## 2022-05-03 RX ADMIN — PIPERACILLIN AND TAZOBACTAM 25 GRAM(S): 4; .5 INJECTION, POWDER, LYOPHILIZED, FOR SOLUTION INTRAVENOUS at 02:02

## 2022-05-03 RX ADMIN — Medication 250 MILLIGRAM(S): at 21:50

## 2022-05-03 NOTE — CONSULT NOTE ADULT - ASSESSMENT
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     Not a candidate for MICU at this time    Reccomendations  -POCUS with B lines likely overloaded would not give further fluid resuscitation  -Midodrine 10 q8h  -Continue antibiotics as  per ID for underlying sepsis  -Re-consult MICU PRN  75 y/o M with a PMH of HTN, HLD, HFrEF (EF 30%), right tonsillar cancer 2011 s/p chemo and radiation, partial left tonsillectomy and s/p left partial tongue resection 2019, dysphagia, carotid stenosis s/p right CEA 2020, DM2, CAD s/p AICD for ventricular arrhythmia (2009 w/ generator change in 2017) and hypothyroidism, R central sulcus MCA infarct, NSTEMI with MICU consulted for hypotension in setting of sepsis most likely pulmonary source and acute on chronic HF.     Not a candidate for MICU at this time    Reccomendations  -POCUS with B lines likely overloaded would not give further fluid resuscitation; however given JARRED/hypoTN/sepsis would not pursue further diuresis at this time; attempt I=O  -Midodrine 10 q8h  -Continue antibiotics as  per ID for underlying sepsis; follow cx  -Re-consult MICU PRN

## 2022-05-03 NOTE — PHYSICAL THERAPY INITIAL EVALUATION ADULT - LONG TERM MEMORY, REHAB EVAL
Occupational Therapy  Visit Type: initial evaluation  Precautions:  Medical precautions:  fall risk; standard precautions.    Patient admitted with c/o SOB and chest pain. Treatment initiated for acute on chronic diastolic CHF. Dopplers on 9/18/2020 unremarkable. PT/OT ordered.     PMHx: DIONICIO STODDARD fib with RVR, CHF    Lines:     Basic: telemetry      Lines in chart and on patient reviewed, cautions maintained throughout session.  Hearing: no hearing deficits  Vision:     Current vision: wears glasses all the time  Safety Measures: bed alarm, chair alarm and bed rails    SUBJECTIVE                                                                                                            Patient agreed to participate in therapy this date.      \"I just need to focus on if they're doing a pacemaker or not.\"      Patient / Family Goal: return to previous functional status, maximize function and return home  Pain   RN informed on pain level      OBJECTIVE                                                                                                              Level of consciousness: alert (Patient anxious with orientation questions, yet appears WFL)    Oriented to person, place, time and situation     Arousal alertness: appropriate responses to stimuli    Affect/Behavior: cooperative, pleasant and anxious  Functional Communication/Cognition    Overall status:  Within functional limits  Hand Dominance: right  Upper Extremity Function: Left: accurate  Right: accurate  Range of Motion (measured in degrees unless otherwise indicated)  WFL: LUE RUE  Strength (out of 5 unless otherwise indicated)  WFL: LUE, RUE  Balance  Sitting:    Static:  supervision     Dynamic:  supervision  Standing - Firm Surface - Eyes Open:     Static: supervision (SBA) double upper extremity support    Dynamic:  supervision double upper extremity support    Tone    Upper Extremity:  Left:  Within functional limits  Right:  Within functional  limits  Sensation - Upper Extremity  C4 (shoulder, clavicular and upper scapular areas):     Light Touch: Left: intact Right: intact  C5 (deltoid area, anterior aspect of entire arm to base of thumb):     Light Touch: Left: intact Right: intact  C6 (anterior upper extremity, radial side of hand to 1st and 2nd digit):     Light Touch: Left: intact Right: intact  C7 (lateral upper extremity and forearm to 2nd through 4th digit):     Light Touch: Left: intact Right: intact  C8 (medial upper extremity and forearm to 3rd through 5th):     Light Touch: Left: intact Right: intact  T1 (medial side of forearm to base of 5th digit):    Light Touch: Left: intact Right: intact  Coordination    Upper Extremity: Left: intact Right: intact    Bed mobility:      Supine to sit: supervision  Transfers:    Assistive devices: gait belt and 2-wheeled walker    Sit to stand: supervision (SBA)    Stand to sit: supervision (SBA)   Bed to chair: supervision, type:  Training completed:    Tasks: sit to stand, stand to sit and toilet    Education details: body mechanics and patient safety  Functional Ambulation:    Assistance:supervision (SBA)   Assistive device:2-wheeled walker  Activities of Daily Living (ADLs):  Eating:     Assist: set up    Position: chair  Grooming/Oral Hygiene:     Grooming assist: supervision (SBA)    Position: standing at sink  Upper Body Dressing:    Assist: set up    Position: chair  Lower Body Dressing:     Assist: supervision (SBA)    Position: chair  Toilet transfer:     Assist: supervision (SBA)    Device: gait belt and 2-wheeled walker  Toileting:     Assist: supervision      Interventions                                                                                                                 Training provided: ADL training, activity tolerance, balance retraining, bed mobility training, community reintegration, compensatory techniques, functional ambulation, HEP training, positioning, neuromuscular  reeducation, safety training and transfer training  Skilled input: verbal instruction/cues, tactile instruction/cues and posture correction  Verbal Consent: Writer verbally educated and received verbal consent for hand placement, positioning of patient, and techniques to be performed today from patient for clothing adjustments for techniques, therapist position for techniques and hand placement and palpation for techniques as described above and how they are pertinent to the patient's plan of care.        ASSESSMENT                                                                                                                Impairments: activity tolerance, balance, strength, safety awareness and bed mobility  Functional Limitations: bed mobility, functional mobility, grooming, eating, bathing, toileting, participating in meaningful/purposeful activities, functional transfers, IADLs, community reintegration, dressing and showering  Personal Occupations Profile Affected: bathing/showering, functional mobility/transfers, personal hygiene/grooming, lower body dressing, upper body dressing, toileting/toilet hygiene, medication managment, safety/emergency maintenance, care of others, communication managment, financial managment, health management/maintenance, social participation community       Patient tolerated OT evaluation well. Anxious with orientation questions. Perseverating on daughter's pending visit. Supine to EOB with S. Patient completed LE dressing with SBA. Sit to stand with SBA. Patient completed simulated toilet transfer with SBA, RW, and vc's for safety. Patient completed brief endurance task with SBA. Patient completed chair transfer with SBA.     Discussed discharge options with patient reporting he was modified independent PTA, yet reported it was difficult to manage a house. At this time, recommend a supportive living environment with increased caregiver support.     Thank you for the referral.  Continue OT.    -Simin Childs MS, OTR/L           Discharge Recommendations:   Recommendations for Discharge: OT IL: Patient requires 24 hour nonskilled assistance to perform mobility and/or ADLs safely     PT/OT Mobility Equipment for Discharge: RW (owns)  PT/OT ADL Equipment for Discharge: Shower chair (owns)      OT Frequency: 3 days/week  Frequency Comments: 1/3, Home with 24 hour?, Eval 9.19    Therapy Diagnosis:   Decreased ability to perform self care tasks and functional transfers.            Skilled therapy is required to address these limitations in attempt to maximize the patient's independence.    End of Session:   Location: in chair  Safety measures: alarm system in place/re-engaged and call light within reach  Handoff to: nurse and nurse assistant    PLAN                                                                                                                            Interventions: activity tolerance training, ADL retraining, balance, bed mobility training, community reintegration, compensatory techniques, continued evaluation, equipment eval/education, functional transfer training, HEP training, manual techniques, neuromuscular reeducation, patient education, patient/family training, positioning, safety training, therapeutic activity, therapeutic exercise, transfer training, upper extremity strengthening/ROM and visual perceptual retraining  Agreement to plan and goals: patient agrees with goals and treatment plan      GOALS:  Long Term Goals: (to be met by time of discharge from hospital)  Grooming: Patient will complete grooming tasks in standing modified independent.  Lower body dressing: Patient will complete lower body dressing in sitting modified independent.  Toilet transfer: Patient will complete toilet transfer with gait belt and 2-wheeled walker, modified independent.         Documented in the chart in the following areas: Prior Level of Function. Pain. Assessment. Plan.  Patient Education.       intact

## 2022-05-03 NOTE — PHYSICAL THERAPY INITIAL EVALUATION ADULT - ADDITIONAL COMMENTS
pt lives with spouse SARATH Stoner , wears glasses pt lives with spouse Georgiana Gonzalez [phone] id as emergency contact no caregiver ID  in house with 12 steps to enter w/ HR and a flight of steps inside with HR to bedroom level ; pt has a tub shower with shower chair and grab bars , [place] , wears glasses; pt has a HHA a few days a week for a few hrs each time to assist; pt is L handed recent R CVA went to [facility] for rehab came home x 1 day then low BPs return to hospital per pt

## 2022-05-03 NOTE — PHYSICAL THERAPY INITIAL EVALUATION ADULT - PERTINENT HX OF CURRENT PROBLEM, REHAB EVAL
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 pt not a candidate for MICU ;

## 2022-05-03 NOTE — PHYSICAL THERAPY INITIAL EVALUATION ADULT - PLANNED THERAPY INTERVENTIONS, PT EVAL
GOAL : stair train ; pt ill negotiate a flight of steps w/ HR in 3 weeks close supervision/balance training/bed mobility training/gait training/strengthening/transfer training

## 2022-05-03 NOTE — PHYSICAL THERAPY INITIAL EVALUATION ADULT - IMPAIRED TRANSFERS: SIT/STAND, REHAB EVAL
decreased endurance ; sit-stand at RW cg of1 x 6 min pt very tired unable to get BP stand reurn to bed in bed now 151/76 HR 80 RR 18/impaired balance/impaired postural control/decreased strength

## 2022-05-03 NOTE — PROGRESS NOTE ADULT - ASSESSMENT
76M DM, ischemic cardiomyopathy s/p ICD, tonsillar cancer s/p resection.   Here 5/2 with 102F at home, malaise and cough.   Bacterial pneumonia, viral pneumonia and aspiration pneumonitis are on the differential.   Looks well and feels better on Zosyn.     Suggest  -check a full RVP   -empiric Zosyn for now, anticipate a 5 day course of antibiotics at most   -f/u blood cultures     Discussed with medicine     Basilio Tam MD   Infectious Disease   Available on TEAMS. After 5PM and on weekends please page fellow on call or call [phone]

## 2022-05-03 NOTE — PROGRESS NOTE ADULT - ASSESSMENT
Echo 3/31/22: The left ventricle is  enlarged with moderate to severe left ventricular systolic dysfunction,  estimated LVEF of 30-35%. The apical cap, apical septum and apical  lateral walls are akinetic with hypokinesis of the remaining walls. There  is no evidence of left ventricular thrombus The right ventricle is not well-visualized, device wire is noted within  the right heart Sclerotic trileaflet aortic valve with grossly normal opening, without AI.  Mild MR; Trace TR.  Cardiac Cath Lab (09.15.08 @ 13:41) >  Summary:  Hemodynamics: Hemodynamic assessment demonstrates moderate systemic  hypertension and moderately elevated LVEDP.  Recommendations:  Continue current medical therapy.  Echocardiogram with echo contrast agent to evaluate for LV apical thrombus.  Further arrhythmia management per the EP team.  Impressions: Angiography reveals nonobstructive coronary atherosclerosis  with LV dysfunction as described on the prior cath in 2005.    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    # PNA   -CT chest noted New groundglass opacities (predominantly left-sided) and left upper  lobe opacities..  No change in the bilateral pleural effusions.  -management per pulm   -fu Bcx results   -iv abx     #Acute on chronic systolic CHF , sp AICD   -CT chest noted. with bl pleural eff  -ECHO with ef 25%<  mod to sev MR,  Moderate diastolic dysfunction (Stage II). Severe  left ventricular systolic dysfunction.  The apex is akinetic/dyskinetic.  -bb on hold given hypotension-- likely resume tomorrow if bp remains stable   -Increase mido to 15 mg TID if needed to augment BP   -given lasix 20 mg IVP x 1  sys bp has improved      #Newly dx Afib   -dx about week ago on last hospital admission  -Call EP to interrogate device  -c/w eliquis   -bb on hold for now     # carotid stenosis s/p right CEA 2020,  -asa , resume statin     #chronic cva, c/w asa, resume statin     #Hypotension   -f/u bcxs  -cw mido to augment bp     #candelaria on CKD   -renal fu

## 2022-05-03 NOTE — PHYSICAL THERAPY INITIAL EVALUATION ADULT - PRECAUTIONS/LIMITATIONS, REHAB EVAL
cont 'd : pt now with fever 102 , lethargy, hypoxia ,loose stools , aspirate x 1 (speech and swallow following ) h/o dysphagia and hypotension/aspiration precautions/fall precautions cont 'd : pt now with fever 102 , lethargy, hypoxia ,loose stools , aspirate x 1 (speech and swallow following ) h/o dysphagia and hypotension;  CT CHEST 5/2/22 New groundglass opacities (predominantly left-sided) and left upper lobe opacities.2.  No change in the bilateral pleural effusions.3.  No change in the calcified and noncalcified pleural plaques can be a marker of prior asbestos exposure.HCT 4/27/22No evidence of an acute intracranial hemorrhage, midline shift, or hydrocephalus. Aging small posterior right frontal infarcts. Mild ischemic changes left frontal white matter somewhat better seen than prior exam/aspiration precautions/fall precautions cont 'd : pt now with fever 102 , lethargy, hypoxia ,loose stools , aspirate x 1 (speech and swallow following ) h/o dysphagia and hypotension;  CT CHEST 5/2/22 New groundglass opacities (predominantly left-sided) and left upper lobe opacities.2.  No change in the bilateral pleural effusions.3.  No change in the calcified and noncalcified pleural plaques can be a marker of prior asbestos exposure.HCT 4/27/22No evidence of an acute intracranial hemorrhage, midline shift, or hydrocephalus. Aging small posterior right frontal infarcts. Mild ischemic changes left frontal white matter somewhat better seen than prior exam/aspiration precautions/fall precautions/isolation precautions

## 2022-05-03 NOTE — PHYSICAL THERAPY INITIAL EVALUATION ADULT - ASR EQUIP NEEDS DISCH PT EVAL
pt owns a rolling walker , shower chair , grab bars in shower ; if goes home need w/c for longer distances , currently on 4 L nc o2 not on Home O2 per pt

## 2022-05-03 NOTE — PROGRESS NOTE ADULT - NSPROGADDITIONALINFOA_GEN_ALL_CORE
discussed with patient in detail, expresses understanding of treatment plans.  discussed with patient's wife at bedside in detail discussed with patient in detail, expresses understanding of treatment plans.  discussed with patient's wife over the phone in detail [phone]  remains FULL CODE

## 2022-05-03 NOTE — PATIENT PROFILE ADULT - HOW PATIENT ADDRESSED, PROFILE
"Patient calling stating she has been treated for carpel tunnel during pregnancy. 24 weeks gestation. Reporting increased tingling in hands, and fingers today. Patient reporting new onset of \"blueness\" in hands, fingers, and palms of both hands starting yesterday. Tingling in both feet, and hip area. Patient is unable to resolve symptoms with elevation. Stating the color change is intermittent with palm fingers turning blue or purple intermittently and is not dependent on position. Pain in hands increased today.     Stating she had spoken to the birth place and was advised to contact her clinic. Fetal movement is positive.     Patient agrees to go into [facility] now.    Caller verbalized understanding. Denies further questions.    Courtney Ackerman RN  [facility]        Reason for Disposition    Numbness (i.e., loss of sensation) in hand or fingers (Exception: just tingling; numbness present > 2 weeks)    Additional Information    Negative: [1] SEVERE weakness (i.e., unable to walk or barely able to walk, requires support) AND [2] new onset or worsening    Negative: [1] Weakness (i.e., paralysis, loss of muscle strength) of the face, arm / hand, or leg / foot on one side of the body AND [2] sudden onset AND [3] present now    Negative: [1] Numbness (i.e., loss of sensation) of the face, arm / hand, or leg / foot on one side of the body AND [2] sudden onset AND [3] present now    Negative: [1] Loss of speech or garbled speech AND [2] sudden onset AND [3] present now    Negative: Difficult to awaken or acting confused (e.g., disoriented, slurred speech)    Negative: Sounds like a life-threatening emergency to the triager    Negative: Confusion, disorientation, or hallucinations is main symptom    Negative: Neck pain is main symptom (and having weakness, numbness, or tingling in arm / hand because of neck pain)    Negative: Back pain is main symptom (and having weakness, numbness, or tingling in leg because " Bill "of back pain)    Hand pain is main symptom (and having mild weakness, numbness, or tingling in hand related to hand pain)    Negative: [1] Similar pain previously AND [2] it was from \"heart attack\"    Negative: [1] Similar pain previously AND [2] it was from \"angina\" AND [3] not relieved by nitroglycerin    Negative: Sounds like a life-threatening emergency to the triager    Negative: [1] Swollen joint AND [2] fever    Negative: [1] Red area or streak AND [2] fever    Negative: Patient sounds very sick or weak to the triager    Negative: [1] SEVERE pain (e.g., excruciating, unable to use hand at all) AND [2] not improved after 2 hours of pain medicine    Negative: [1] Looks infected (spreading redness, pus) AND [2] large red area (> 2 in. or 5 cm)    Negative: Weakness (i.e., loss of strength) of new onset in hand or fingers  (Exceptions: not truly weak, hand feels weak because of pain; weakness present > 2 weeks)    Protocols used: HAND AND WRIST PAIN-A-AH, NEUROLOGIC DEFICIT-A-AH      "

## 2022-05-03 NOTE — PHYSICAL THERAPY INITIAL EVALUATION ADULT - BED MOBILITY LIMITATIONS, REHAB EVAL
sat x 8 min BP drop 86/49 sat additional few min 97/61 pt asx except tired kingsley John present and inform/decreased ability to use arms for pushing/pulling

## 2022-05-03 NOTE — PHYSICAL THERAPY INITIAL EVALUATION ADULT - GENERAL OBSERVATIONS, REHAB EVAL
pt received in bed nad top rails up and 1 siderail HOB 35 degrees call bell,phone and table in reach + nc o2 4 L + iv ue intact L , temp 100.2 now kingsley Lay inform

## 2022-05-03 NOTE — PHYSICAL THERAPY INITIAL EVALUATION ADULT - REFERRING PHYSICIAN, REHAB EVAL
Vincent Larson MD ORDER PT EVAL and TREAT , OOb with assist Vincent Larson MD ORDER PT EVAL and TREAT , OOb with assist  { pt on Droplet PRecautions for + FLU A as of 5/3/22 and + Blood Cx Gram + cocci in clusters 5/3/22}

## 2022-05-03 NOTE — PHYSICAL THERAPY INITIAL EVALUATION ADULT - FINE MOTOR COORDINATION, FINE MOTOR COORDINATION TESTS, OT EVAL
finger opposition to thumb intact R , decreased L can do but very slowly , pt report difficulty manip objects L

## 2022-05-03 NOTE — PATIENT PROFILE ADULT - FALL HARM RISK - HARM RISK INTERVENTIONS

## 2022-05-03 NOTE — PHYSICAL THERAPY INITIAL EVALUATION ADULT - GAIT DEVIATIONS NOTED, PT EVAL
decreased jeramie/increased time in double stance/decreased step length/decreased stride length/decreased weight-shifting ability

## 2022-05-03 NOTE — CONSULT NOTE ADULT - ATTENDING COMMENTS
Patient seen and examined.  Agree with resident note as above.  Patient with hx as noted including systolic CHF, recent admits for JARRED/NSTEMI/hypoTN/PNA who presented to [facility] with aspiration/fever/phlegm/diarrhea.  In the ER patient had hypoTN responsive to IVF, and was admitted for treatment of sepsis due to aspiration PNA and acute on chronic systolic heart failure.  Tonight, patient has recurrent hypoTN and MICU is consulted in that setting.      On exam, patient is awake and alert, BP improved to 90s systolic after midodrine 10mg.  Breathing comfortably (with phlegm) while mostly flat.  POCUS at that time showed diffuse B lines, reduced biventricular function, IVC 2cm.    Recs as above and I have edited as appropriate.  Currently BP is adequate.  -continue mido 10po q8h  -follow cx, continue abx for PNA  -would not give further volume resuscitation given diffuse B lines; would also not actively diurese during sepsis/JARRED/hypoTN; keep I=O  -no need for MICU at this time

## 2022-05-03 NOTE — PHYSICAL THERAPY INITIAL EVALUATION ADULT - IMPAIRMENTS FOUND, PT EVAL
Bp drop supine - sit , stand unable to obtain BP return to bed 151 /77/aerobic capacity/endurance/fine motor/gait, locomotion, and balance/muscle strength

## 2022-05-03 NOTE — CONSULT NOTE ADULT - SUBJECTIVE AND OBJECTIVE BOX
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 The patient's wife at bedside stated she checked his temp and found it to be > 102 last night. Wife noticed he appears weaker. Was drinking ensure and noticed that he aspirated. Was concerned that he did not improve today so called EMS. + cough, loose stools, one episode of incontinence No chills, vomiting, diarrhea. Pt denies chest pain, SOB, abd pain. No recent falls, head trauma. Took all meds this AM.    In the ED, started on broad spectrum antibiotics fluid resuscitated with 500cc x2, given midodrine initially 5, then 10mg, and then an additional 5mg. Remaining hypotension and MICU consulted for pressor support. Bedside POCUS conducted with IVC 2cm, reduced EF with apical akinesis. Upon recycling BP with pressures 95/57 MAP 69 and 99/59 (MAP 72). Patient stating that he has had previous low blood pressures and that he has been told this by his cardiologist. Denies current diuretics for his heart failure. With poor po intake over past days unsure about total fluid intake. Multiple diarrheal bowel movements three in past day. Otherwise denies shortness of breath and states no new lower extremity swelling.        PAST MEDICAL & SURGICAL HISTORY:  MI (myocardial infarction)  1992    HTN (hypertension)    HLD (hyperlipidemia)    Throat cancer  2011, treated with chemo, RT    Ventricular arrhythmia  s/p AICD    Diabetes  Type2, not on any meds since weight loss after throat Ca    Renal insufficiency  s/p chemo    CAD (coronary artery disease)    Inguinal hernia, left    Recent cerebrovascular accident (CVA)  R central sulcus MCA infarct, with left Upper EXT weakness, April 2022    History of dysphagia    Stage 3 chronic kidney disease    Anemia of chronic disease    H/O urinary retention  pt has large PVR    H/O prior ablation treatment  VT, 2009    Cardiac defibrillator in place  - 2009, battery change 2017    S/P left inguinal hernia repair  2018 at [place]    Tonsillar cancer  s/p resection,  partial tongue resection        FAMILY HISTORY:  Family history of cancer (Sibling)        SOCIAL HISTORY:  Smoking: [ ] Never Smoked [ ] Former Smoker (__ packs x ___ years) [ ] Current Smoker  (__ packs x ___ years)  Substance Use: [ ] Never Used [ ] Used ____  EtOH Use:  Marital Status: [ ] Single [ ]  [ ]  [ ]   Sexual History:   Occupation:  Recent Travel:  Country of Birth:  Advance Directives:    Allergies    No Known Allergies    Intolerances        HOME MEDICATIONS:    REVIEW OF SYSTEMS:  Constitutional: [ ] negative [ ] fevers [ ] chills [ ] weight loss [ ] weight gain  HEENT: [ ] negative [ ] dry eyes [ ] eye irritation [ ] postnasal drip [ ] nasal congestion  CV: [ ] negative  [ ] chest pain [ ] orthopnea [ ] palpitations [ ] murmur  Resp: [ ] negative [ ] cough [ ] shortness of breath [ ] dyspnea [ ] wheezing [ ] sputum [ ] hemoptysis  GI: [ ] negative [ ] nausea [ ] vomiting [ ] diarrhea [ ] constipation [ ] abd pain [ ] dysphagia   : [ ] negative [ ] dysuria [ ] nocturia [ ] hematuria [ ] increased urinary frequency  Musculoskeletal: [ ] negative [ ] back pain [ ] myalgias [ ] arthralgias [ ] fracture  Skin: [ ] negative [ ] rash [ ] itch  Neurological: [ ] negative [ ] headache [ ] dizziness [ ] syncope [ ] weakness [ ] numbness  Psychiatric: [ ] negative [ ] anxiety [ ] depression  Endocrine: [ ] negative [ ] diabetes [ ] thyroid problem  Hematologic/Lymphatic: [ ] negative [ ] anemia [ ] bleeding problem  Allergic/Immunologic: [ ] negative [ ] itchy eyes [ ] nasal discharge [ ] hives [ ] angioedema  [ ] All other systems negative  [ ] Unable to assess ROS because ________    OBJECTIVE:  ICU Vital Signs Last 24 Hrs  T(C): 36.6 (03 May 2022 02:20), Max: 37.1 (02 May 2022 11:38)  T(F): 97.9 (03 May 2022 02:20), Max: 98.8 (02 May 2022 11:38)  HR: 60 (03 May 2022 02:20) (58 - 86)  BP: 102/61 (03 May 2022 02:20) (81/47 - 102/61)  BP(mean): 73 (02 May 2022 20:53) (73 - 73)  ABP: --  ABP(mean): --  RR: 18 (03 May 2022 02:20) (16 - 22)  SpO2: 99% (03 May 2022 02:20) (89% - 100%)        CAPILLARY BLOOD GLUCOSE      POCT Blood Glucose.: 120 mg/dL (02 May 2022 18:41)      PHYSICAL EXAM:  General:   HEENT:   Lymph Nodes:  Neck:   Respiratory:   Cardiovascular:   Abdomen:   Extremities:   Skin:   Neurological:  Psychiatry:    LINES:     HOSPITAL MEDICATIONS:  apixaban 2.5 milliGRAM(s) Oral every 12 hours  aspirin enteric coated 81 milliGRAM(s) Oral daily    piperacillin/tazobactam IVPB.. 3.375 Gram(s) IV Intermittent every 8 hours    midodrine. 10 milliGRAM(s) Oral three times a day  tamsulosin 0.8 milliGRAM(s) Oral at bedtime    dextrose 50% Injectable 25 Gram(s) IV Push once  dextrose 50% Injectable 12.5 Gram(s) IV Push once  dextrose 50% Injectable 25 Gram(s) IV Push once  dextrose Oral Gel 15 Gram(s) Oral once PRN  finasteride 5 milliGRAM(s) Oral daily  glucagon  Injectable 1 milliGRAM(s) IntraMuscular once  insulin lispro (ADMELOG) corrective regimen sliding scale   SubCutaneous three times a day before meals  levothyroxine 75 MICROGram(s) Oral daily    ALBUTerol    90 MICROgram(s) HFA Inhaler 2 Puff(s) Inhalation every 6 hours PRN  albuterol/ipratropium for Nebulization 3 milliLiter(s) Nebulizer every 6 hours    diVALproex  milliGRAM(s) Oral two times a day  escitalopram 5 milliGRAM(s) Oral daily  melatonin 3 milliGRAM(s) Oral at bedtime    bisacodyl Suppository 10 milliGRAM(s) Rectal daily        calcitriol   Capsule 0.25 MICROGram(s) Oral daily  cyanocobalamin 1000 MICROGram(s) Oral daily  dextrose 5%. 1000 milliLiter(s) IV Continuous <Continuous>  dextrose 5%. 1000 milliLiter(s) IV Continuous <Continuous>  ferrous    sulfate 325 milliGRAM(s) Oral daily      Biotene Dry Mouth Oral Rinse 5 milliLiter(s) Swish and Spit three times a day        LABS:                        9.1    13.16 )-----------( 111      ( 02 May 2022 12:06 )             29.2     Hgb Trend: 9.1<--, 8.9<--, 8.5<--, 9.8<--  05-02    135  |  101  |  40<H>  ----------------------------<  138<H>  4.7   |  20<L>  |  2.66<H>    Ca    8.8      02 May 2022 12:06    TPro  6.4  /  Alb  3.1<L>  /  TBili  0.4  /  DBili  x   /  AST  32  /  ALT  16  /  AlkPhos  59  05-02    Creatinine Trend: 2.66<--, 2.10<--, 2.80<--, 2.10<--, 2.16<--, 2.25<--  PT/INR - ( 02 May 2022 12:06 )   PT: 19.4 sec;   INR: 1.68 ratio         PTT - ( 02 May 2022 12:06 )  PTT:40.7 sec          MICROBIOLOGY:     RADIOLOGY:  [ ] Reviewed and interpreted by me    EKG:           CHIEF COMPLAINT:    HPI:    77 y/o M with a PMH of HTN, HLD, HFrEF (EF 30%), 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 The patient's wife at bedside stated she checked his temp and found it to be > 102 last night. Wife noticed he appears weaker. Was drinking ensure and noticed that he aspirated. Was concerned that he did not improve today so called EMS. + cough, loose stools, one episode of incontinence No chills, vomiting, diarrhea. Pt denies chest pain, SOB, abd pain. No recent falls, head trauma. Took all meds this AM.    In the ED, started on broad spectrum antibiotics fluid resuscitated with 500cc x2, given midodrine initially 5, then 10mg, and then an additional 5mg. Remaining hypotension and MICU consulted for pressor support. Bedside POCUS conducted with IVC 2cm, reduced EF with apical akinesis. Upon recycling BP with pressures 95/57 MAP 69 and 99/59 (MAP 72). Patient stating that he has had previous low blood pressures and that he has been told this by his cardiologist. Denies current diuretics for his heart failure. With poor po intake over past days unsure about total fluid intake. Multiple diarrheal bowel movements three in past day. Otherwise denies shortness of breath and states no new lower extremity swelling.  With productive cough.       PAST MEDICAL & SURGICAL HISTORY:  MI (myocardial infarction)  1992    HTN (hypertension)    HLD (hyperlipidemia)    Throat cancer  2011, treated with chemo, RT    Ventricular arrhythmia  s/p AICD    Diabetes  Type2, not on any meds since weight loss after throat Ca    Renal insufficiency  s/p chemo    CAD (coronary artery disease)    Inguinal hernia, left    Recent cerebrovascular accident (CVA)  R central sulcus MCA infarct, with left Upper EXT weakness, April 2022    History of dysphagia    Stage 3 chronic kidney disease    Anemia of chronic disease    H/O urinary retention  pt has large PVR    H/O prior ablation treatment  VT, 2009    Cardiac defibrillator in place  - 2009, battery change 2017    S/P left inguinal hernia repair  2018 at [place]    Tonsillar cancer  s/p resection,  partial tongue resection        FAMILY HISTORY:  Family history of cancer (Sibling)        SOCIAL HISTORY:  Smoking: [ ] Never Smoked [ ] Former Smoker (__ packs x ___ years) [ ] Current Smoker  (__ packs x ___ years)  Substance Use: [ ] Never Used [ ] Used ____  EtOH Use:  Marital Status: [ ] Single [ ]  [ ]  [ ]   Sexual History:   Occupation:  Recent Travel:  Country of Birth:  Advance Directives:    Allergies    No Known Allergies    Intolerances        HOME MEDICATIONS:    REVIEW OF SYSTEMS:  Constitutional: [ ] negative [ ] fevers [ ] chills [ ] weight loss [ ] weight gain  HEENT: [ ] negative [ ] dry eyes [ ] eye irritation [ ] postnasal drip [ ] nasal congestion  CV: [ ] negative  [ ] chest pain [ ] orthopnea [ ] palpitations [ ] murmur  Resp: [ ] negative [ ] cough [ ] shortness of breath [ ] dyspnea [ ] wheezing [ ] sputum [ ] hemoptysis  GI: [ ] negative [ ] nausea [ ] vomiting [ ] diarrhea [ ] constipation [ ] abd pain [ ] dysphagia   : [ ] negative [ ] dysuria [ ] nocturia [ ] hematuria [ ] increased urinary frequency  Musculoskeletal: [ ] negative [ ] back pain [ ] myalgias [ ] arthralgias [ ] fracture  Skin: [ ] negative [ ] rash [ ] itch  Neurological: [ ] negative [ ] headache [ ] dizziness [ ] syncope [ ] weakness [ ] numbness  Psychiatric: [ ] negative [ ] anxiety [ ] depression  Endocrine: [ ] negative [ ] diabetes [ ] thyroid problem  Hematologic/Lymphatic: [ ] negative [ ] anemia [ ] bleeding problem  Allergic/Immunologic: [ ] negative [ ] itchy eyes [ ] nasal discharge [ ] hives [ ] angioedema  [ ] All other systems negative  [ ] Unable to assess ROS because ________    OBJECTIVE:  ICU Vital Signs Last 24 Hrs  T(C): 36.6 (03 May 2022 02:20), Max: 37.1 (02 May 2022 11:38)  T(F): 97.9 (03 May 2022 02:20), Max: 98.8 (02 May 2022 11:38)  HR: 60 (03 May 2022 02:20) (58 - 86)  BP: 102/61 (03 May 2022 02:20) (81/47 - 102/61)  BP(mean): 73 (02 May 2022 20:53) (73 - 73)  ABP: --  ABP(mean): --  RR: 18 (03 May 2022 02:20) (16 - 22)  SpO2: 99% (03 May 2022 02:20) (89% - 100%)        CAPILLARY BLOOD GLUCOSE      POCT Blood Glucose.: 120 mg/dL (02 May 2022 18:41)      PHYSICAL EXAM:  General:   HEENT:   Lymph Nodes:  Neck:   Respiratory:   Cardiovascular:   Abdomen:   Extremities:   Skin:   Neurological:  Psychiatry:    LINES:     HOSPITAL MEDICATIONS:  apixaban 2.5 milliGRAM(s) Oral every 12 hours  aspirin enteric coated 81 milliGRAM(s) Oral daily    piperacillin/tazobactam IVPB.. 3.375 Gram(s) IV Intermittent every 8 hours    midodrine. 10 milliGRAM(s) Oral three times a day  tamsulosin 0.8 milliGRAM(s) Oral at bedtime    dextrose 50% Injectable 25 Gram(s) IV Push once  dextrose 50% Injectable 12.5 Gram(s) IV Push once  dextrose 50% Injectable 25 Gram(s) IV Push once  dextrose Oral Gel 15 Gram(s) Oral once PRN  finasteride 5 milliGRAM(s) Oral daily  glucagon  Injectable 1 milliGRAM(s) IntraMuscular once  insulin lispro (ADMELOG) corrective regimen sliding scale   SubCutaneous three times a day before meals  levothyroxine 75 MICROGram(s) Oral daily    ALBUTerol    90 MICROgram(s) HFA Inhaler 2 Puff(s) Inhalation every 6 hours PRN  albuterol/ipratropium for Nebulization 3 milliLiter(s) Nebulizer every 6 hours    diVALproex  milliGRAM(s) Oral two times a day  escitalopram 5 milliGRAM(s) Oral daily  melatonin 3 milliGRAM(s) Oral at bedtime    bisacodyl Suppository 10 milliGRAM(s) Rectal daily        calcitriol   Capsule 0.25 MICROGram(s) Oral daily  cyanocobalamin 1000 MICROGram(s) Oral daily  dextrose 5%. 1000 milliLiter(s) IV Continuous <Continuous>  dextrose 5%. 1000 milliLiter(s) IV Continuous <Continuous>  ferrous    sulfate 325 milliGRAM(s) Oral daily      Biotene Dry Mouth Oral Rinse 5 milliLiter(s) Swish and Spit three times a day        LABS:                        9.1    13.16 )-----------( 111      ( 02 May 2022 12:06 )             29.2     Hgb Trend: 9.1<--, 8.9<--, 8.5<--, 9.8<--  05-02    135  |  101  |  40<H>  ----------------------------<  138<H>  4.7   |  20<L>  |  2.66<H>    Ca    8.8      02 May 2022 12:06    TPro  6.4  /  Alb  3.1<L>  /  TBili  0.4  /  DBili  x   /  AST  32  /  ALT  16  /  AlkPhos  59  05-02    Creatinine Trend: 2.66<--, 2.10<--, 2.80<--, 2.10<--, 2.16<--, 2.25<--  PT/INR - ( 02 May 2022 12:06 )   PT: 19.4 sec;   INR: 1.68 ratio         PTT - ( 02 May 2022 12:06 )  PTT:40.7 sec          MICROBIOLOGY:     RADIOLOGY:  [ ] Reviewed and interpreted by me    EKG:           CHIEF COMPLAINT:    HPI:    77 y/o M with a PMH of HTN, HLD, HFrEF (EF 30%), 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 The patient's wife at bedside stated she checked his temp and found it to be > 102 last night. Wife noticed he appears weaker. Was drinking ensure and noticed that he aspirated. Was concerned that he did not improve today so called EMS. + cough, loose stools, one episode of incontinence No chills, vomiting, diarrhea. Pt denies chest pain, SOB, abd pain. No recent falls, head trauma. Took all meds this AM.    Additional history obtained by MICU     Patient stating that he has had previous low blood pressures and that he has been told this by his cardiologist. Denies current diuretics for his heart failure. With poor po intake over past days unsure about total fluid intake. Multiple diarrheal bowel movements three in past day. Otherwise denies shortness of breath and states no new lower extremity swelling. Without chest pain or palpitations. No dizziness no blurriness of vision no headache. With productive cough. States he has a medtronic AICD in place wife has further information on his device and was placed in [facility].     In the ED, started on broad spectrum antibiotics fluid resuscitated with 500cc x2, given midodrine initially 5, then 10mg, and then an additional 5mg. Remaining hypotension and MICU consulted for pressor support. Bedside POCUS conducted with IVC 2cm, reduced EF with apical akinesis, bilateral b lines. Upon recycling BP with pressures 95/57 MAP 69 and 99/59 (MAP 72).        PAST MEDICAL & SURGICAL HISTORY:  MI (myocardial infarction)  1992    HTN (hypertension)    HLD (hyperlipidemia)    Throat cancer  2011, treated with chemo, RT    Ventricular arrhythmia  s/p AICD    Diabetes  Type2, not on any meds since weight loss after throat Ca    Renal insufficiency  s/p chemo    CAD (coronary artery disease)    Inguinal hernia, left    Recent cerebrovascular accident (CVA)  R central sulcus MCA infarct, with left Upper EXT weakness, April 2022    History of dysphagia    Stage 3 chronic kidney disease    Anemia of chronic disease    H/O urinary retention  pt has large PVR    H/O prior ablation treatment  VT, 2009    Cardiac defibrillator in place  - 2009, battery change 2017    S/P left inguinal hernia repair  2018 at [place]    Tonsillar cancer  s/p resection,  partial tongue resection        FAMILY HISTORY:  Family history of cancer (Sibling)        SOCIAL HISTORY:    No smoking or alcohol     Allergies    No Known Allergies    Intolerances        HOME MEDICATIONS:    REVIEW OF SYSTEMS:    HEENT no dizziness, no blurry vision no headache  CV No chest pain or palpitation  Pulm +cough, no shortness of breath  Abd no abdominal pain  Extremities no lower extremity swelling         OBJECTIVE:  ICU Vital Signs Last 24 Hrs  T(C): 36.6 (03 May 2022 02:20), Max: 37.1 (02 May 2022 11:38)  T(F): 97.9 (03 May 2022 02:20), Max: 98.8 (02 May 2022 11:38)  HR: 60 (03 May 2022 02:20) (58 - 86)  BP: 102/61 (03 May 2022 02:20) (81/47 - 102/61)  BP(mean): 73 (02 May 2022 20:53) (73 - 73)  ABP: --  ABP(mean): --  RR: 18 (03 May 2022 02:20) (16 - 22)  SpO2: 99% (03 May 2022 02:20) (89% - 100%)        CAPILLARY BLOOD GLUCOSE      POCT Blood Glucose.: 120 mg/dL (02 May 2022 18:41)    GENERAL: elderly in no acute distress  EYES: sclera clear, no exudates  ENMT: oropharynx clear without erythema, no exudates, moist mucous membranes  NECK: supple, soft, no thyromegaly noted  LUNGS: decreased breath sounds left side  HEART:  regular rate and rhythm, without lower extremity edema or jvd   GASTROINTESTINAL: abdomen is soft, nontender, nondistended, normoactive bowel sounds, no palpable masses  INTEGUMENT: no lesions   MUSCULOSKELETAL: no clubbing or cyanosis, no obvious deformity  NEUROLOGIC: awake, alert, oriented x3, good muscle tone in 4 extremities, no obvious sensory deficits  PSYCHIATRIC: mood is good, affect is congruent, linear and logical thought process  HEME/LYMPH: no palpable supraclavicular nodules, no obvious ecchymosis or petechiae     LINES:     HOSPITAL MEDICATIONS:  apixaban 2.5 milliGRAM(s) Oral every 12 hours  aspirin enteric coated 81 milliGRAM(s) Oral daily    piperacillin/tazobactam IVPB.. 3.375 Gram(s) IV Intermittent every 8 hours    midodrine. 10 milliGRAM(s) Oral three times a day  tamsulosin 0.8 milliGRAM(s) Oral at bedtime    dextrose 50% Injectable 25 Gram(s) IV Push once  dextrose 50% Injectable 12.5 Gram(s) IV Push once  dextrose 50% Injectable 25 Gram(s) IV Push once  dextrose Oral Gel 15 Gram(s) Oral once PRN  finasteride 5 milliGRAM(s) Oral daily  glucagon  Injectable 1 milliGRAM(s) IntraMuscular once  insulin lispro (ADMELOG) corrective regimen sliding scale   SubCutaneous three times a day before meals  levothyroxine 75 MICROGram(s) Oral daily    ALBUTerol    90 MICROgram(s) HFA Inhaler 2 Puff(s) Inhalation every 6 hours PRN  albuterol/ipratropium for Nebulization 3 milliLiter(s) Nebulizer every 6 hours    diVALproex  milliGRAM(s) Oral two times a day  escitalopram 5 milliGRAM(s) Oral daily  melatonin 3 milliGRAM(s) Oral at bedtime    bisacodyl Suppository 10 milliGRAM(s) Rectal daily        calcitriol   Capsule 0.25 MICROGram(s) Oral daily  cyanocobalamin 1000 MICROGram(s) Oral daily  dextrose 5%. 1000 milliLiter(s) IV Continuous <Continuous>  dextrose 5%. 1000 milliLiter(s) IV Continuous <Continuous>  ferrous    sulfate 325 milliGRAM(s) Oral daily      Biotene Dry Mouth Oral Rinse 5 milliLiter(s) Swish and Spit three times a day        LABS:                        9.1    13.16 )-----------( 111      ( 02 May 2022 12:06 )             29.2     Hgb Trend: 9.1<--, 8.9<--, 8.5<--, 9.8<--  05-02    135  |  101  |  40<H>  ----------------------------<  138<H>  4.7   |  20<L>  |  2.66<H>    Ca    8.8      02 May 2022 12:06    TPro  6.4  /  Alb  3.1<L>  /  TBili  0.4  /  DBili  x   /  AST  32  /  ALT  16  /  AlkPhos  59  05-02    Creatinine Trend: 2.66<--, 2.10<--, 2.80<--, 2.10<--, 2.16<--, 2.25<--  PT/INR - ( 02 May 2022 12:06 )   PT: 19.4 sec;   INR: 1.68 ratio         PTT - ( 02 May 2022 12:06 )  PTT:40.7 sec          MICROBIOLOGY:     RADIOLOGY:  [ ] Reviewed and interpreted by me    Telemetry paced, 60s           CHIEF COMPLAINT:    HPI:    77 y/o M with a PMH of HTN, HLD, HFrEF (EF 30%), 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 The patient's wife at bedside stated she checked his temp and found it to be > 102 last night. Wife noticed he appears weaker. Was drinking ensure and noticed that he aspirated. Was concerned that he did not improve today so called EMS. + cough, loose stools, one episode of incontinence No chills, vomiting, diarrhea. Pt denies chest pain, SOB, abd pain. No recent falls, head trauma. Took all meds this AM.    Additional history obtained by MICU     Patient stating that he has had previous low blood pressures and that he has been told this by his cardiologist. Denies current diuretics for his heart failure. With poor po intake over past days unsure about total fluid intake. Multiple diarrheal bowel movements three in past day. Otherwise denies shortness of breath and states no new lower extremity swelling. Without chest pain or palpitations. No dizziness no blurriness of vision no headache. With productive cough. States he has a medtronic AICD in place wife has further information on his device and was placed in [facility].     In the ED, started on broad spectrum antibiotics fluid resuscitated with 500cc x2, given midodrine initially 5, then 10mg, and then an additional 5mg. Remaining hypotension and MICU consulted for pressor support. Bedside POCUS conducted with IVC 2cm, reduced EF with apical akinesis, bilateral b lines. Upon recycling BP with pressures 95/57 MAP 69 and 99/59 (MAP 72).        PAST MEDICAL & SURGICAL HISTORY:  MI (myocardial infarction)  1992    HTN (hypertension)    HLD (hyperlipidemia)    Throat cancer  2011, treated with chemo, RT    Ventricular arrhythmia  s/p AICD    Diabetes  Type2, not on any meds since weight loss after throat Ca    Renal insufficiency  s/p chemo    CAD (coronary artery disease)    Inguinal hernia, left    Recent cerebrovascular accident (CVA)  R central sulcus MCA infarct, with left Upper EXT weakness, April 2022    History of dysphagia    Stage 3 chronic kidney disease    Anemia of chronic disease    H/O urinary retention  pt has large PVR    H/O prior ablation treatment  VT, 2009    Cardiac defibrillator in place  - 2009, battery change 2017    S/P left inguinal hernia repair  2018 at [place]    Tonsillar cancer  s/p resection,  partial tongue resection        FAMILY HISTORY:  Family history of cancer (Sibling)        SOCIAL HISTORY:    No smoking or alcohol     Allergies    No Known Allergies    Intolerances        HOME MEDICATIONS:    REVIEW OF SYSTEMS:    HEENT no dizziness, no blurry vision no headache  CV No chest pain or palpitation  Pulm +cough, no shortness of breath  Abd no abdominal pain  Extremities no lower extremity swelling         OBJECTIVE:  ICU Vital Signs Last 24 Hrs  T(C): 36.6 (03 May 2022 02:20), Max: 37.1 (02 May 2022 11:38)  T(F): 97.9 (03 May 2022 02:20), Max: 98.8 (02 May 2022 11:38)  HR: 60 (03 May 2022 02:20) (58 - 86)  BP: 102/61 (03 May 2022 02:20) (81/47 - 102/61)  BP(mean): 73 (02 May 2022 20:53) (73 - 73)  ABP: --  ABP(mean): --  RR: 18 (03 May 2022 02:20) (16 - 22)  SpO2: 99% (03 May 2022 02:20) (89% - 100%)        CAPILLARY BLOOD GLUCOSE      POCT Blood Glucose.: 120 mg/dL (02 May 2022 18:41)    GENERAL: elderly in no acute distress  EYES: sclera clear, no exudates  ENMT: oropharynx clear without erythema, no exudates, moist mucous membranes  NECK: supple, soft, no thyromegaly noted  LUNGS: decreased breath sounds left side  HEART:  regular rate and rhythm, without lower extremity edema or jvd   GASTROINTESTINAL: abdomen is soft, nontender, nondistended, normoactive bowel sounds, no palpable masses  INTEGUMENT: no lesions   MUSCULOSKELETAL: no clubbing or cyanosis, no obvious deformity  NEUROLOGIC: awake, alert, oriented x3, good muscle tone in 4 extremities, no obvious sensory deficits  PSYCHIATRIC: mood is good, affect is congruent, linear and logical thought process  HEME/LYMPH: no palpable supraclavicular nodules, no obvious ecchymosis or petechiae     LINES:     HOSPITAL MEDICATIONS:  apixaban 2.5 milliGRAM(s) Oral every 12 hours  aspirin enteric coated 81 milliGRAM(s) Oral daily    piperacillin/tazobactam IVPB.. 3.375 Gram(s) IV Intermittent every 8 hours    midodrine. 10 milliGRAM(s) Oral three times a day  tamsulosin 0.8 milliGRAM(s) Oral at bedtime    dextrose 50% Injectable 25 Gram(s) IV Push once  dextrose 50% Injectable 12.5 Gram(s) IV Push once  dextrose 50% Injectable 25 Gram(s) IV Push once  dextrose Oral Gel 15 Gram(s) Oral once PRN  finasteride 5 milliGRAM(s) Oral daily  glucagon  Injectable 1 milliGRAM(s) IntraMuscular once  insulin lispro (ADMELOG) corrective regimen sliding scale   SubCutaneous three times a day before meals  levothyroxine 75 MICROGram(s) Oral daily    ALBUTerol    90 MICROgram(s) HFA Inhaler 2 Puff(s) Inhalation every 6 hours PRN  albuterol/ipratropium for Nebulization 3 milliLiter(s) Nebulizer every 6 hours    diVALproex  milliGRAM(s) Oral two times a day  escitalopram 5 milliGRAM(s) Oral daily  melatonin 3 milliGRAM(s) Oral at bedtime    bisacodyl Suppository 10 milliGRAM(s) Rectal daily        calcitriol   Capsule 0.25 MICROGram(s) Oral daily  cyanocobalamin 1000 MICROGram(s) Oral daily  dextrose 5%. 1000 milliLiter(s) IV Continuous <Continuous>  dextrose 5%. 1000 milliLiter(s) IV Continuous <Continuous>  ferrous    sulfate 325 milliGRAM(s) Oral daily      Biotene Dry Mouth Oral Rinse 5 milliLiter(s) Swish and Spit three times a day        LABS:                        9.1    13.16 )-----------( 111      ( 02 May 2022 12:06 )             29.2     Hgb Trend: 9.1<--, 8.9<--, 8.5<--, 9.8<--  05-02    135  |  101  |  40<H>  ----------------------------<  138<H>  4.7   |  20<L>  |  2.66<H>    Ca    8.8      02 May 2022 12:06    TPro  6.4  /  Alb  3.1<L>  /  TBili  0.4  /  DBili  x   /  AST  32  /  ALT  16  /  AlkPhos  59  05-02    Creatinine Trend: 2.66<--, 2.10<--, 2.80<--, 2.10<--, 2.16<--, 2.25<--  PT/INR - ( 02 May 2022 12:06 )   PT: 19.4 sec;   INR: 1.68 ratio         PTT - ( 02 May 2022 12:06 )  PTT:40.7 sec          MICROBIOLOGY:     RADIOLOGY:  [x ] Reviewed and interpreted by me  < from: Xray Chest 1 View-PORTABLE IMMEDIATE (05.02.22 @ 12:03) >    IMPRESSION:    Unchanged bilateral opacities when compared with 4/26/2022.    < end of copied text >  < from: CT Chest No Cont (05.02.22 @ 13:41) >  IMPRESSION:    1.  New groundglass opacities (predominantly left-sided) and left upper   lobe opacities.  2.  No change in the bilateral pleural effusions.  3.  No change in the calcified and noncalcified pleural plaques can be a   marker of prior asbestos exposure.    < end of copied text >    Telemetry paced, 60s

## 2022-05-03 NOTE — PROCEDURE NOTE - ADDITIONAL PROCEDURE DETAILS
Indication for interrogation: Assess AF burden  Presenting rhythm: SR 70s  Measured data WNL, normal ICD function, Pt is NOT pacemaker dependent  Stored data revealed episodes of AF lasting up to approximately 19 hours on 4/26/22 correlating with patient being in [facility] per pt/family  One episode of brief NSVT noted  No AF noted since 4/26/22  AF burden 3.1% since 4/8/22  Optivol elevated since 4/10/22 suggesting fluid overload

## 2022-05-03 NOTE — PHYSICAL THERAPY INITIAL EVALUATION ADULT - IMPAIRMENTS CONTRIBUTING TO GAIT DEVIATIONS, PT EVAL
decreased endurance , held further as unabe to obtain BP in stand/impaired balance/impaired postural control/decreased strength

## 2022-05-03 NOTE — PHYSICAL THERAPY INITIAL EVALUATION ADULT - DISCHARGE DISPOSITION, PT EVAL
PT recommend Subacute rehab to increase fxl mobility , strength, balance, endurance, fall prevention education continued ; if goes home needs assist all fxl activity and adl's and Home PT pt understood and can benefit from w/c for longer distances/rehabilitation facility

## 2022-05-04 LAB
ANION GAP SERPL CALC-SCNC: 15 MMOL/L — SIGNIFICANT CHANGE UP (ref 5–17)
BILIRUB SERPL-MCNC: 0.3 MG/DL — SIGNIFICANT CHANGE UP (ref 0.2–1.2)
BUN SERPL-MCNC: 34 MG/DL — HIGH (ref 7–23)
CALCIUM SERPL-MCNC: 7.7 MG/DL — LOW (ref 8.4–10.5)
CHLORIDE SERPL-SCNC: 100 MMOL/L — SIGNIFICANT CHANGE UP (ref 96–108)
CO2 SERPL-SCNC: 21 MMOL/L — LOW (ref 22–31)
CREAT SERPL-MCNC: 2.35 MG/DL — HIGH (ref 0.5–1.3)
CULTURE RESULTS: SIGNIFICANT CHANGE UP
CULTURE RESULTS: SIGNIFICANT CHANGE UP
EGFR: 28 ML/MIN/1.73M2 — LOW
FLUAV H1 2009 PAND RNA SPEC QL NAA+PROBE: DETECTED
GLUCOSE BLDC GLUCOMTR-MCNC: 109 MG/DL — HIGH (ref 70–99)
GLUCOSE BLDC GLUCOMTR-MCNC: 125 MG/DL — HIGH (ref 70–99)
GLUCOSE BLDC GLUCOMTR-MCNC: 127 MG/DL — HIGH (ref 70–99)
GLUCOSE BLDC GLUCOMTR-MCNC: 149 MG/DL — HIGH (ref 70–99)
GLUCOSE BLDC GLUCOMTR-MCNC: 222 MG/DL — HIGH (ref 70–99)
GLUCOSE SERPL-MCNC: 103 MG/DL — HIGH (ref 70–99)
HCOV PNL SPEC NAA+PROBE: DETECTED
HCT VFR BLD CALC: 25.7 % — LOW (ref 39–50)
HGB BLD-MCNC: 8.2 G/DL — LOW (ref 13–17)
INR BLD: 1.42 RATIO — HIGH (ref 0.88–1.16)
MCHC RBC-ENTMCNC: 24.3 PG — LOW (ref 27–34)
MCHC RBC-ENTMCNC: 31.9 GM/DL — LOW (ref 32–36)
MCV RBC AUTO: 76 FL — LOW (ref 80–100)
MELD SCORE WITH DIALYSIS: 25 POINTS — SIGNIFICANT CHANGE UP
MELD SCORE WITHOUT DIALYSIS: 20 POINTS — SIGNIFICANT CHANGE UP
NRBC # BLD: 0 /100 WBCS — SIGNIFICANT CHANGE UP (ref 0–0)
ORGANISM # SPEC MICROSCOPIC CNT: SIGNIFICANT CHANGE UP
ORGANISM # SPEC MICROSCOPIC CNT: SIGNIFICANT CHANGE UP
PLATELET # BLD AUTO: 96 K/UL — LOW (ref 150–400)
POTASSIUM SERPL-MCNC: 4.2 MMOL/L — SIGNIFICANT CHANGE UP (ref 3.5–5.3)
POTASSIUM SERPL-SCNC: 4.2 MMOL/L — SIGNIFICANT CHANGE UP (ref 3.5–5.3)
PROTHROM AB SERPL-ACNC: 16.5 SEC — HIGH (ref 10.5–13.4)
RAPID RVP RESULT: DETECTED
RBC # BLD: 3.38 M/UL — LOW (ref 4.2–5.8)
RBC # FLD: 22.5 % — HIGH (ref 10.3–14.5)
SARS-COV-2 RNA SPEC QL NAA+PROBE: SIGNIFICANT CHANGE UP
SODIUM SERPL-SCNC: 136 MMOL/L — SIGNIFICANT CHANGE UP (ref 135–145)
SPECIMEN SOURCE: SIGNIFICANT CHANGE UP
SPECIMEN SOURCE: SIGNIFICANT CHANGE UP
WBC # BLD: 6.33 K/UL — SIGNIFICANT CHANGE UP (ref 3.8–10.5)
WBC # FLD AUTO: 6.33 K/UL — SIGNIFICANT CHANGE UP (ref 3.8–10.5)

## 2022-05-04 PROCEDURE — 99232 SBSQ HOSP IP/OBS MODERATE 35: CPT

## 2022-05-04 RX ORDER — ERYTHROPOIETIN 10000 [IU]/ML
10000 INJECTION, SOLUTION INTRAVENOUS; SUBCUTANEOUS
Refills: 0 | Status: DISCONTINUED | OUTPATIENT
Start: 2022-05-04 | End: 2022-05-19

## 2022-05-04 RX ORDER — IPRATROPIUM/ALBUTEROL SULFATE 18-103MCG
3 AEROSOL WITH ADAPTER (GRAM) INHALATION ONCE
Refills: 0 | Status: COMPLETED | OUTPATIENT
Start: 2022-05-04 | End: 2022-05-06

## 2022-05-04 RX ORDER — ACETAMINOPHEN 500 MG
650 TABLET ORAL ONCE
Refills: 0 | Status: COMPLETED | OUTPATIENT
Start: 2022-05-04 | End: 2022-05-04

## 2022-05-04 RX ORDER — FLUTICASONE PROPIONATE 50 MCG
2 SPRAY, SUSPENSION NASAL
Refills: 0 | Status: DISCONTINUED | OUTPATIENT
Start: 2022-05-04 | End: 2022-05-19

## 2022-05-04 RX ADMIN — Medication 650 MILLIGRAM(S): at 22:42

## 2022-05-04 RX ADMIN — Medication 3 MILLILITER(S): at 18:01

## 2022-05-04 RX ADMIN — DIVALPROEX SODIUM 500 MILLIGRAM(S): 500 TABLET, DELAYED RELEASE ORAL at 18:02

## 2022-05-04 RX ADMIN — PREGABALIN 1000 MICROGRAM(S): 225 CAPSULE ORAL at 11:39

## 2022-05-04 RX ADMIN — Medication 75 MICROGRAM(S): at 05:53

## 2022-05-04 RX ADMIN — Medication 5 MILLILITER(S): at 22:42

## 2022-05-04 RX ADMIN — Medication 325 MILLIGRAM(S): at 11:38

## 2022-05-04 RX ADMIN — MIDODRINE HYDROCHLORIDE 10 MILLIGRAM(S): 2.5 TABLET ORAL at 11:38

## 2022-05-04 RX ADMIN — PIPERACILLIN AND TAZOBACTAM 25 GRAM(S): 4; .5 INJECTION, POWDER, LYOPHILIZED, FOR SOLUTION INTRAVENOUS at 01:15

## 2022-05-04 RX ADMIN — Medication 2 SPRAY(S): at 22:43

## 2022-05-04 RX ADMIN — Medication 5 MILLILITER(S): at 11:39

## 2022-05-04 RX ADMIN — Medication 650 MILLIGRAM(S): at 23:30

## 2022-05-04 RX ADMIN — MIDODRINE HYDROCHLORIDE 10 MILLIGRAM(S): 2.5 TABLET ORAL at 06:36

## 2022-05-04 RX ADMIN — Medication 30 MILLIGRAM(S): at 11:38

## 2022-05-04 RX ADMIN — Medication 3 MILLILITER(S): at 13:46

## 2022-05-04 RX ADMIN — DIVALPROEX SODIUM 500 MILLIGRAM(S): 500 TABLET, DELAYED RELEASE ORAL at 06:36

## 2022-05-04 RX ADMIN — Medication 81 MILLIGRAM(S): at 11:38

## 2022-05-04 RX ADMIN — APIXABAN 2.5 MILLIGRAM(S): 2.5 TABLET, FILM COATED ORAL at 18:02

## 2022-05-04 RX ADMIN — TAMSULOSIN HYDROCHLORIDE 0.8 MILLIGRAM(S): 0.4 CAPSULE ORAL at 22:42

## 2022-05-04 RX ADMIN — Medication 3 MILLIGRAM(S): at 22:43

## 2022-05-04 RX ADMIN — Medication 30 MILLIGRAM(S): at 14:26

## 2022-05-04 RX ADMIN — Medication 3 MILLILITER(S): at 01:15

## 2022-05-04 RX ADMIN — Medication 3 MILLILITER(S): at 05:54

## 2022-05-04 RX ADMIN — PIPERACILLIN AND TAZOBACTAM 25 GRAM(S): 4; .5 INJECTION, POWDER, LYOPHILIZED, FOR SOLUTION INTRAVENOUS at 08:26

## 2022-05-04 RX ADMIN — MIDODRINE HYDROCHLORIDE 10 MILLIGRAM(S): 2.5 TABLET ORAL at 18:02

## 2022-05-04 RX ADMIN — FINASTERIDE 5 MILLIGRAM(S): 5 TABLET, FILM COATED ORAL at 11:38

## 2022-05-04 RX ADMIN — Medication 5 MILLILITER(S): at 06:37

## 2022-05-04 RX ADMIN — PIPERACILLIN AND TAZOBACTAM 25 GRAM(S): 4; .5 INJECTION, POWDER, LYOPHILIZED, FOR SOLUTION INTRAVENOUS at 18:01

## 2022-05-04 RX ADMIN — ESCITALOPRAM OXALATE 5 MILLIGRAM(S): 10 TABLET, FILM COATED ORAL at 11:38

## 2022-05-04 RX ADMIN — APIXABAN 2.5 MILLIGRAM(S): 2.5 TABLET, FILM COATED ORAL at 06:36

## 2022-05-04 RX ADMIN — CALCITRIOL 0.25 MICROGRAM(S): 0.5 CAPSULE ORAL at 11:37

## 2022-05-04 NOTE — SWALLOW BEDSIDE ASSESSMENT ADULT - ASR SWALLOW RECOMMEND DIAG
Repeat MBS not indicated as pt recently had MBS 3/31/22 at [place] and did not comply with dysphagia diet/compensatory strategies recommended. In addition, suspect pt would have difficulty consistently and accurately carrying over necessary compensatory strategies.

## 2022-05-04 NOTE — SWALLOW BEDSIDE ASSESSMENT ADULT - SLP GENERAL OBSERVATIONS
Pt was received at bedside sleeping. Roused with verbal stimuli. +NC (4L). He was AAOx3 to self, date, and location. Able to follow basic commands. Questionable historian. Vocal quality deemed hoarse with hyponasality.

## 2022-05-04 NOTE — SWALLOW BEDSIDE ASSESSMENT ADULT - SLP PRECAUTIONS/LIMITATIONS: HEARING
Mildly to Moderately Impaired: difficulty hearing in some environments or speaker may need to increase volume or speak distinctly.

## 2022-05-04 NOTE — DIETITIAN INITIAL EVALUATION ADULT - FUNCTIONAL SCREEN CURRENT LEVEL: SWALLOWING (IF SCORE 2 OR MORE FOR ANY ITEM, CONSULT REHAB SERVICES), MLM)
pt dislikes Pureed which was recommended on a previous admission/0 = swallows foods/liquids without difficulty

## 2022-05-04 NOTE — SWALLOW BEDSIDE ASSESSMENT ADULT - DIET PRIOR TO ADMI
Per Dietician Initial Eval 5/3/22: "oatmeal for breakfast, skips lunch, macaroni and cheese for dinner or whatever else he prepare." Soft diet and Thin Liquids per pt

## 2022-05-04 NOTE — DIETITIAN NUTRITION RISK NOTIFICATION - TREATMENT: THE FOLLOWING DIET HAS BEEN RECOMMENDED
Diet, Consistent Carbohydrate/No Snacks:   Pureed (PUREED)  Mildly Thick Liquids (MILDTHICKLIQS)  Supplement Feeding Modality:  Oral  Suplena Cans or Servings Per Day:  3       Frequency:  Daily  Probiotic Yogurt/Smoothie Cans or Servings Per Day:  2       Frequency:  Daily (05-04-22 @ 08:43) [Pending Verification By Attending]  Diet, Pureed:   Consistent Carbohydrate {No Snacks} (CSTCHO)  Mildly Thick Liquids (MILDTHICKLIQS)  Supplement Feeding Modality:  Oral  Glucerna Shake Cans or Servings Per Day:  1       Frequency:  Three Times a day (05-03-22 @ 17:24) [Active]

## 2022-05-04 NOTE — DIETITIAN INITIAL EVALUATION ADULT - PERTINENT LABORATORY DATA
05-04    136  |  100  |  34<H>  ----------------------------<  103<H>  4.2   |  21<L>  |  2.35<H>    Ca    7.7<L>      04 May 2022 06:25    TPro  x   /  Alb  x   /  TBili  0.3  /  DBili  x   /  AST  x   /  ALT  x   /  AlkPhos  x   05-04  POCT Blood Glucose.: 107 mg/dL (05-03-22 @ 17:38)  A1C with Estimated Average Glucose Result: 8.3 % (03-31-22 @ 09:37)  A1C with Estimated Average Glucose Result: 8.3 % (03-30-22 @ 06:12)

## 2022-05-04 NOTE — SWALLOW BEDSIDE ASSESSMENT ADULT - SWALLOW EVAL: RECOMMENDED FEEDING/EATING TECHNIQUES
PER RECENT MBS 3/31/22 at [place]: "Allow for swallow between intakes; check mouth frequently for oral residue/pocketing; crush medication (when feasible); maintain upright posture during/after eating for 30 mins; no straws; oral hygiene; position upright (90 degrees); small sips/bites; tuck chin; ALL PUREE AND MODERATELY THICK LIQUIDS VIA TEASPOON UTILIZING CHIN TUCK AND 2 SWALLOWS AFTER EACH BOLUS."

## 2022-05-04 NOTE — DIETITIAN INITIAL EVALUATION ADULT - SIGNS/SYMPTOMS
nutrition focused physical exam  pt verbalization of believing that he does not have diabetes and has not had it since he had cancer

## 2022-05-04 NOTE — DIETITIAN INITIAL EVALUATION ADULT - ORAL NUTRITION SUPPLEMENTS
change Glucerna to Suplena x 3 daily change Glucerna to Suplena x 3 daily, add Probiotic Smoothie x 2 daily

## 2022-05-04 NOTE — SWALLOW BEDSIDE ASSESSMENT ADULT - SLP PERTINENT HISTORY OF CURRENT PROBLEM
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 Wife noticed pt was drinking ensure and he aspirated. Was concerned that he did not improve today so called EMS. +cough. Admitted with SEPSIS likely iso aspiration PNA. ID consulted. Can cover for PNA, possible aspiration. Empiric Zosyn for now. Nephrology consulted for JARRED/CKD. Pulm consulted. CT chest noted new groundglass opacities (predominantly left-sided) and RODRIGUE opacities. No change in the bilateral pleural effusions. Cardiology following for acute on chronic systolic CHF.

## 2022-05-04 NOTE — DIETITIAN INITIAL EVALUATION ADULT - ORAL INTAKE PTA/DIET HISTORY
oatmeal for breakfast, skips lunch, macaroni and cheese for dinner or whatever else he prepare. he lives with his wife and he prepares the meals.

## 2022-05-04 NOTE — SWALLOW BEDSIDE ASSESSMENT ADULT - SWALLOW EVAL: RECOMMENDED DIET
NPO, with consideration for long term means of non-oral nutrition/hydration/medications given nonadherence to dysphagia diet/compensatory strategies and poor prognosis for recovery (H&N cancer with h/o chemo/XRT). HOWEVER, pt requesting to continue PO diet despite risks of aspiration (rx  Puree with Moderately thick liquids, all food and liquids via teaspoon presentation, utilizing chin tuck and 2 swallows after each bolus) which may reduce though not fully eliminate aspiration.

## 2022-05-04 NOTE — DIETITIAN INITIAL EVALUATION ADULT - NSFNSGIIOFT_GEN_A_CORE
Opioid Pregnancy And Lactation Text: These medications can lead to premature delivery and should be avoided during pregnancy. These medications are also present in breast milk in small amounts. last BM yesterday

## 2022-05-04 NOTE — DIETITIAN INITIAL EVALUATION ADULT - PERTINENT MEDS FT
MEDICATIONS  (STANDING):  albuterol/ipratropium for Nebulization 3 milliLiter(s) Nebulizer every 6 hours  apixaban 2.5 milliGRAM(s) Oral every 12 hours  aspirin enteric coated 81 milliGRAM(s) Oral daily  Biotene Dry Mouth Oral Rinse 5 milliLiter(s) Swish and Spit three times a day  calcitriol   Capsule 0.25 MICROGram(s) Oral daily  cyanocobalamin 1000 MICROGram(s) Oral daily  dextrose 5%. 1000 milliLiter(s) (50 mL/Hr) IV Continuous <Continuous>  dextrose 5%. 1000 milliLiter(s) (100 mL/Hr) IV Continuous <Continuous>  dextrose 50% Injectable 25 Gram(s) IV Push once  dextrose 50% Injectable 12.5 Gram(s) IV Push once  dextrose 50% Injectable 25 Gram(s) IV Push once  diVALproex  milliGRAM(s) Oral two times a day  escitalopram 5 milliGRAM(s) Oral daily  ferrous    sulfate 325 milliGRAM(s) Oral daily  finasteride 5 milliGRAM(s) Oral daily  glucagon  Injectable 1 milliGRAM(s) IntraMuscular once  insulin lispro (ADMELOG) corrective regimen sliding scale   SubCutaneous three times a day before meals  levothyroxine 75 MICROGram(s) Oral daily  melatonin 3 milliGRAM(s) Oral at bedtime  midodrine. 10 milliGRAM(s) Oral three times a day  piperacillin/tazobactam IVPB.. 3.375 Gram(s) IV Intermittent every 8 hours  tamsulosin 0.8 milliGRAM(s) Oral at bedtime    MEDICATIONS  (PRN):  ALBUTerol    90 MICROgram(s) HFA Inhaler 2 Puff(s) Inhalation every 6 hours PRN Shortness of Breath and/or Wheezing  dextrose Oral Gel 15 Gram(s) Oral once PRN Blood Glucose LESS THAN 70 milliGRAM(s)/deciliter

## 2022-05-04 NOTE — DIETITIAN INITIAL EVALUATION ADULT - NS FNS DIET ORDER
Diet, Pureed:   Consistent Carbohydrate {No Snacks} (CSTCHO)  Mildly Thick Liquids (MILDTHICKLIQS)  Supplement Feeding Modality:  Oral  Glucerna Shake Cans or Servings Per Day:  1       Frequency:  Three Times a day (05-03-22 @ 17:24)

## 2022-05-04 NOTE — PROGRESS NOTE ADULT - ASSESSMENT
76M DM, ischemic cardiomyopathy s/p ICD, tonsillar cancer s/p resection.   Here 5/2 with 102F at home, malaise and cough, left sided ground-glass opacities.   Suspect Flu A is most responsible although also positive for non-COVID coronavirus.   Less likely bacterial but felt better after antibiotics and may have aspirated at home.     Suspect Staph epidermidis on blood cultures is a contaminant. Blood cultures were repeated last night and a dose of Vancomycin 1GM given.     Plesiomonas on stool PCR does not seem clinically significant.     Nontoxic, asking to go home.     Suggest  -agree with Tamiflu   -finish Zosyn on Friday for a 5-day course, if discharged before then use Augmentin 500mg PO BID   -monitor blood cultures     Discussed with medicine     Basilio Tam MD   Infectious Disease   Available on TEAMS. After 5PM and on weekends please page fellow on call or call [phone]

## 2022-05-04 NOTE — SWALLOW BEDSIDE ASSESSMENT ADULT - SWALLOW EVAL: DIAGNOSIS
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 Pt was seen today for bedside swallow evaluation. Pt with known chronic karyn-pharyngeal dysphagia s/p recent MBS 3/31/22 at [place] with rx for puree and moderately thick liquids via tsp only with chin tuck and 2 swallows after each bolus. Pt now p/w increased lethargy, febrile, and s/p witnessed aspiration episode by spouse, found to have pneumonia (suspect aspiration in the setting on diet nonadherence). Pt reported he was consuming soft foods and thin liquids PTA. He demonstrated overt clinical s/s of aspiration or penetration (wet congested coughing) despite conservative PO textures and use of safe swallowing strategies recommended from recent MBS. Difficulty consistently achieving accurate chin tuck posture with two dry swallows per bolus.

## 2022-05-04 NOTE — SWALLOW BEDSIDE ASSESSMENT ADULT - ADDITIONAL RECOMMENDATIONS
GOALS:  1. Pt/family/caregiver will demonstrate understanding and carryover of dysphagia management (safe swallow guidelines, compensatory strategies, dysphagia diet).  2. Pt will complete dysphagia exercises to maintain current swallow function.

## 2022-05-04 NOTE — DIETITIAN INITIAL EVALUATION ADULT - CALCULATED TO (CAL/KG)
Let Patient know it takes 2 weeks to get to full effect.  Can also increase dose to 40 mg.  If no response by then Endoscopy is ok.  Even if patient is found to have a hiatal hernia or ulcer on scope this would be the recommended course of treatment.  Cesario ARENAS    [id number]

## 2022-05-04 NOTE — DIETITIAN INITIAL EVALUATION ADULT - NSICDXPASTSURGICALHX_GEN_ALL_CORE_FT
PAST SURGICAL HISTORY:  Cardiac defibrillator in place - 2009, battery change 2017    H/O prior ablation treatment VT, 2009    S/P left inguinal hernia repair 2018 at [place]    Tonsillar cancer s/p resection,  partial tongue resection

## 2022-05-04 NOTE — SWALLOW BEDSIDE ASSESSMENT ADULT - COMMENTS
Hx cont: MICU consulted for hypotension iso sepsis most likely pulmonary source and acute on chronic HF. Not a candidate for MICU at this time. +Blood culture pos for gram pos cocci in clusters.    IMAGING:  CT CHEST: 5/2/22  IMPRESSION:  1.  New groundglass opacities (predominantly left-sided) and left upper lobe opacities.  2.  No change in the bilateral pleural effusions.  3.  No change in the calcified and noncalcified pleural plaques can be a marker of prior asbestos exposure.    Pt is known to the speech pathology service from recent MBS completed 3/31/22 at [place] which revealed moderate oral dysphagia and mild-moderate pharyngeal dysphagia. Rx at that time: Puree with Moderately thick liquids, all food and liquids via teaspoon presentation, utilizing chin tuck and 2 swallows after each bolus. See addendum for full report.

## 2022-05-04 NOTE — PROGRESS NOTE ADULT - ASSESSMENT
Echo 3/31/22: The left ventricle is  enlarged with moderate to severe left ventricular systolic dysfunction,  estimated LVEF of 30-35%. The apical cap, apical septum and apical  lateral walls are akinetic with hypokinesis of the remaining walls. There  is no evidence of left ventricular thrombus The right ventricle is not well-visualized, device wire is noted within  the right heart Sclerotic trileaflet aortic valve with grossly normal opening, without AI.  Mild MR; Trace TR.  Cardiac Cath Lab (09.15.08 @ 13:41) >  Summary:  Hemodynamics: Hemodynamic assessment demonstrates moderate systemic  hypertension and moderately elevated LVEDP.  Recommendations:  Continue current medical therapy.  Echocardiogram with echo contrast agent to evaluate for LV apical thrombus.  Further arrhythmia management per the EP team.  Impressions: Angiography reveals nonobstructive coronary atherosclerosis  with LV dysfunction as described on the prior cath in 2005.    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    # PNA / RVP +  -CT chest noted New groundglass opacities (predominantly left-sided) and left upper  lobe opacities..  No change in the bilateral pleural effusions.  -management per pulm  + RVP/ influenza   -Bcx + gram + coccid in clusters 5/2  -iv abx     #Acute on chronic systolic CHF , sp AICD   -CT chest noted. with bl pleural eff  -ECHO with ef 25%<  mod to sev MR,  Moderate diastolic dysfunction (Stage II). Severe  left ventricular systolic dysfunction.  The apex is akinetic/dyskinetic.  -bp stable, start toprol 12.5 mg daily   -start lasix 20 mg IVP daily    -Increase mido to 15 mg TID if needed to augment BP     #Newly dx Afib   -dx recently  on last hospital admission  -sp device interrogation : Measured data WNL, normal ICD function, Pt is NOT pacemaker dependent; Stored data revealed episodes of AF lasting up to approximately 19 hours on 4/26/22 correlating with patient being in [facility] per pt/family; One episode of brief NSVT noted; No AF noted since 4/26/22; Optivol elevated sugg fluid overload   -c/w eliquis   -resume BB      # carotid stenosis s/p right CEA 2020,  -asa , resume statin     #chronic cva, c/w asa, resume statin     #Hypotension   -bcxs +   -cw mido to augment bp - sys bp improving      #candelaria on CKD   -renal fu

## 2022-05-05 ENCOUNTER — TRANSCRIPTION ENCOUNTER (OUTPATIENT)
Age: 77
End: 2022-05-05

## 2022-05-05 LAB
ANION GAP SERPL CALC-SCNC: 15 MMOL/L — SIGNIFICANT CHANGE UP (ref 5–17)
BUN SERPL-MCNC: 34 MG/DL — HIGH (ref 7–23)
CALCIUM SERPL-MCNC: 8.6 MG/DL — SIGNIFICANT CHANGE UP (ref 8.4–10.5)
CHLORIDE SERPL-SCNC: 99 MMOL/L — SIGNIFICANT CHANGE UP (ref 96–108)
CO2 SERPL-SCNC: 21 MMOL/L — LOW (ref 22–31)
CREAT SERPL-MCNC: 2.17 MG/DL — HIGH (ref 0.5–1.3)
EGFR: 31 ML/MIN/1.73M2 — LOW
GLUCOSE BLDC GLUCOMTR-MCNC: 169 MG/DL — HIGH (ref 70–99)
GLUCOSE BLDC GLUCOMTR-MCNC: 198 MG/DL — HIGH (ref 70–99)
GLUCOSE BLDC GLUCOMTR-MCNC: 221 MG/DL — HIGH (ref 70–99)
GLUCOSE BLDC GLUCOMTR-MCNC: 274 MG/DL — HIGH (ref 70–99)
GLUCOSE SERPL-MCNC: 192 MG/DL — HIGH (ref 70–99)
HCT VFR BLD CALC: 32.1 % — LOW (ref 39–50)
HGB BLD-MCNC: 9.9 G/DL — LOW (ref 13–17)
MCHC RBC-ENTMCNC: 23.9 PG — LOW (ref 27–34)
MCHC RBC-ENTMCNC: 30.8 GM/DL — LOW (ref 32–36)
MCV RBC AUTO: 77.3 FL — LOW (ref 80–100)
NRBC # BLD: 0 /100 WBCS — SIGNIFICANT CHANGE UP (ref 0–0)
PLATELET # BLD AUTO: 98 K/UL — LOW (ref 150–400)
POTASSIUM SERPL-MCNC: 4.5 MMOL/L — SIGNIFICANT CHANGE UP (ref 3.5–5.3)
POTASSIUM SERPL-SCNC: 4.5 MMOL/L — SIGNIFICANT CHANGE UP (ref 3.5–5.3)
RBC # BLD: 4.15 M/UL — LOW (ref 4.2–5.8)
RBC # FLD: 23 % — HIGH (ref 10.3–14.5)
SODIUM SERPL-SCNC: 135 MMOL/L — SIGNIFICANT CHANGE UP (ref 135–145)
WBC # BLD: 4.73 K/UL — SIGNIFICANT CHANGE UP (ref 3.8–10.5)
WBC # FLD AUTO: 4.73 K/UL — SIGNIFICANT CHANGE UP (ref 3.8–10.5)

## 2022-05-05 RX ORDER — FUROSEMIDE 40 MG
20 TABLET ORAL ONCE
Refills: 0 | Status: COMPLETED | OUTPATIENT
Start: 2022-05-05 | End: 2022-05-05

## 2022-05-05 RX ADMIN — Medication 3 MILLIGRAM(S): at 13:00

## 2022-05-05 RX ADMIN — CALCITRIOL 0.25 MICROGRAM(S): 0.5 CAPSULE ORAL at 13:30

## 2022-05-05 RX ADMIN — Medication 3 MILLILITER(S): at 12:15

## 2022-05-05 RX ADMIN — TAMSULOSIN HYDROCHLORIDE 0.8 MILLIGRAM(S): 0.4 CAPSULE ORAL at 20:00

## 2022-05-05 RX ADMIN — PIPERACILLIN AND TAZOBACTAM 25 GRAM(S): 4; .5 INJECTION, POWDER, LYOPHILIZED, FOR SOLUTION INTRAVENOUS at 09:33

## 2022-05-05 RX ADMIN — Medication 3 MILLILITER(S): at 01:58

## 2022-05-05 RX ADMIN — MIDODRINE HYDROCHLORIDE 10 MILLIGRAM(S): 2.5 TABLET ORAL at 13:32

## 2022-05-05 RX ADMIN — Medication 5 MILLILITER(S): at 13:33

## 2022-05-05 RX ADMIN — Medication 3 MILLILITER(S): at 17:52

## 2022-05-05 RX ADMIN — Medication 325 MILLIGRAM(S): at 13:31

## 2022-05-05 RX ADMIN — DIVALPROEX SODIUM 500 MILLIGRAM(S): 500 TABLET, DELAYED RELEASE ORAL at 06:33

## 2022-05-05 RX ADMIN — Medication 2: at 08:33

## 2022-05-05 RX ADMIN — APIXABAN 2.5 MILLIGRAM(S): 2.5 TABLET, FILM COATED ORAL at 06:33

## 2022-05-05 RX ADMIN — MIDODRINE HYDROCHLORIDE 10 MILLIGRAM(S): 2.5 TABLET ORAL at 06:33

## 2022-05-05 RX ADMIN — Medication 1: at 17:17

## 2022-05-05 RX ADMIN — ESCITALOPRAM OXALATE 5 MILLIGRAM(S): 10 TABLET, FILM COATED ORAL at 13:31

## 2022-05-05 RX ADMIN — Medication 30 MILLIGRAM(S): at 17:53

## 2022-05-05 RX ADMIN — FINASTERIDE 5 MILLIGRAM(S): 5 TABLET, FILM COATED ORAL at 13:31

## 2022-05-05 RX ADMIN — Medication 20 MILLIGRAM(S): at 13:00

## 2022-05-05 RX ADMIN — Medication 30 MILLIGRAM(S): at 17:54

## 2022-05-05 RX ADMIN — PREGABALIN 1000 MICROGRAM(S): 225 CAPSULE ORAL at 13:30

## 2022-05-05 RX ADMIN — Medication 1: at 12:13

## 2022-05-05 RX ADMIN — APIXABAN 2.5 MILLIGRAM(S): 2.5 TABLET, FILM COATED ORAL at 17:52

## 2022-05-05 RX ADMIN — Medication 5 MILLILITER(S): at 22:40

## 2022-05-05 RX ADMIN — Medication 3 MILLIGRAM(S): at 22:41

## 2022-05-05 RX ADMIN — MIDODRINE HYDROCHLORIDE 10 MILLIGRAM(S): 2.5 TABLET ORAL at 17:54

## 2022-05-05 RX ADMIN — ERYTHROPOIETIN 10000 UNIT(S): 10000 INJECTION, SOLUTION INTRAVENOUS; SUBCUTANEOUS at 18:00

## 2022-05-05 RX ADMIN — PIPERACILLIN AND TAZOBACTAM 25 GRAM(S): 4; .5 INJECTION, POWDER, LYOPHILIZED, FOR SOLUTION INTRAVENOUS at 01:58

## 2022-05-05 RX ADMIN — Medication 81 MILLIGRAM(S): at 13:30

## 2022-05-05 RX ADMIN — Medication 75 MICROGRAM(S): at 06:33

## 2022-05-05 RX ADMIN — DIVALPROEX SODIUM 500 MILLIGRAM(S): 500 TABLET, DELAYED RELEASE ORAL at 17:53

## 2022-05-05 RX ADMIN — PIPERACILLIN AND TAZOBACTAM 25 GRAM(S): 4; .5 INJECTION, POWDER, LYOPHILIZED, FOR SOLUTION INTRAVENOUS at 17:54

## 2022-05-05 RX ADMIN — Medication 5 MILLILITER(S): at 06:33

## 2022-05-05 NOTE — DISCHARGE NOTE PROVIDER - NSDCCPCAREPLAN_GEN_ALL_CORE_FT
PRINCIPAL DISCHARGE DIAGNOSIS  Diagnosis: Pneumonia, aspiration  Assessment and Plan of Treatment: s/p 5 days of IV Zosyn   s/p Solumedrol taper      SECONDARY DISCHARGE DIAGNOSES  Diagnosis: Recent cerebrovascular accident (CVA)  Assessment and Plan of Treatment: R central sulcus MCA infarct  Continue Aspirin and statin as directed    Diagnosis: JARRED (acute kidney injury)  Assessment and Plan of Treatment: JARRED amber due to dehydration 2/2 poor oral intake   Now renal funtion improved    Diagnosis: Chronic HFrEF (heart failure with reduced ejection fraction)  Assessment and Plan of Treatment: Weigh yourself daily.  If you gain 3lbs in 3 days, or 5lbs in a week call your Health Care Provider.  Do not eat or drink foods containing more than 2000mg of salt (sodium) in your diet every day.  Call your Health Care Provider if you have any swelling or increased swelling in your feet, ankles, and/or stomach.  The Pt was provided with CHF diet instruction (low sodium diet, daily weights, label reading, Heart Healthy Cooking Tips & Heart Healthy shopping Tips).  Take all of your medication as directed.  If you become dizzy call your Health Care Provider.    Diagnosis: CAD (coronary artery disease)  Assessment and Plan of Treatment:     Diagnosis: Diabetes  Assessment and Plan of Treatment: Type2, not on any meds since weight loss after throat Ca    Diagnosis: Sepsis  Assessment and Plan of Treatment: sepsis secondary to pneumonia   Now resolved    Diagnosis: Influenza A  Assessment and Plan of Treatment: s/p Tamiflu   Continue supporitve care     PRINCIPAL DISCHARGE DIAGNOSIS  Diagnosis: Pneumonia, aspiration  Assessment and Plan of Treatment: s/p 5 days of IV Zosyn   s/p Solumedrol taper      SECONDARY DISCHARGE DIAGNOSES  Diagnosis: Orthostatic hypotension  Assessment and Plan of Treatment: Orthostatic hypotension is a sudden drop in blood pressure that happens when a person stands up. This drop in blood pressure can make you feel dizzy, lightheaded, blurry or dim vision, weakness, or pass out.  The symptoms all happen when you stand up after sitting or lying down. Causes of orthostatic hypotension are dehydration which means you do not have enough fluids due to reasons such as severe diarrhea or vomiting, decreased fluid intake, increased sweating due to exercise or extreme hot temperatures, or certain medications. Older people are more likely than younger people to have it. Seek medical help if you feel dizzy or like you might pass out when you stand up. The simplest test is to take your blood pressure and pulse while you are sitting or lying down and then again after you stand up.   Few things you can do to reduce the problems caused by orthostatic hypotension:  -Stand up slowly and give your body time to adapt. This is especially important when you get out of bed in the morning. Start by sitting up and waiting a moment. Then swing your legs over the side of the bed and wait some more. When you do stand up, make sure you have something to hold onto in case you start to feel dizzy.  -Stay hydrated  -Put blocks under the posts at the head of your bed. This will raise your head above your heart slightly.  -Wear "compression" stockings and abdominal binder - The ones that go to your waist are most helpful, but they are hard to wear.  -Avoid drinking.      Diagnosis: Sepsis  Assessment and Plan of Treatment: sepsis secondary to pneumonia   Now resolved    Diagnosis: Influenza A  Assessment and Plan of Treatment: s/p Tamiflu   Continue supporitve care    Diagnosis: JARRED (acute kidney injury)  Assessment and Plan of Treatment: JARRED likley due to dehydration 2/2 poor oral intake   Now renal funtion improved    Diagnosis: Chronic HFrEF (heart failure with reduced ejection fraction)  Assessment and Plan of Treatment: Weigh yourself daily.  If you gain 3lbs in 3 days, or 5lbs in a week call your Health Care Provider.  Do not eat or drink foods containing more than 2000mg of salt (sodium) in your diet every day.  Call your Health Care Provider if you have any swelling or increased swelling in your feet, ankles, and/or stomach.  The Pt was provided with CHF diet instruction (low sodium diet, daily weights, label reading, Heart Healthy Cooking Tips & Heart Healthy shopping Tips).  Take all of your medication as directed.  If you become dizzy call your Health Care Provider.    Diagnosis: Diabetes  Assessment and Plan of Treatment: Type2, not on any meds since weight loss after throat Ca    Diagnosis: Recent cerebrovascular accident (CVA)  Assessment and Plan of Treatment: R central sulcus MCA infarct  Continue Aspirin and statin as directed    Diagnosis: CAD (coronary artery disease)  Assessment and Plan of Treatment:     Diagnosis: Orthostatic hypotension  Assessment and Plan of Treatment:      PRINCIPAL DISCHARGE DIAGNOSIS  Diagnosis: Pneumonia, aspiration  Assessment and Plan of Treatment: s/p 5 days of IV Zosyn   s/p Solumedrol taper      SECONDARY DISCHARGE DIAGNOSES  Diagnosis: Orthostatic hypotension  Assessment and Plan of Treatment: Orthostatic hypotension is a sudden drop in blood pressure that happens when a person stands up. This drop in blood pressure can make you feel dizzy, lightheaded, blurry or dim vision, weakness, or pass out.  The symptoms all happen when you stand up after sitting or lying down. Causes of orthostatic hypotension are dehydration which means you do not have enough fluids due to reasons such as severe diarrhea or vomiting, decreased fluid intake, increased sweating due to exercise or extreme hot temperatures, or certain medications. Older people are more likely than younger people to have it. Seek medical help if you feel dizzy or like you might pass out when you stand up. The simplest test is to take your blood pressure and pulse while you are sitting or lying down and then again after you stand up.   Few things you can do to reduce the problems caused by orthostatic hypotension:  -Stand up slowly and give your body time to adapt. This is especially important when you get out of bed in the morning. Start by sitting up and waiting a moment. Then swing your legs over the side of the bed and wait some more. When you do stand up, make sure you have something to hold onto in case you start to feel dizzy.  -Stay hydrated  -Put blocks under the posts at the head of your bed. This will raise your head above your heart slightly.  -Wear "compression" stockings and abdominal binder - The ones that go to your waist are most helpful, but they are hard to wear.  -Avoid drinking.      Diagnosis: Sepsis  Assessment and Plan of Treatment: sepsis secondary to pneumonia   Now resolved    Diagnosis: Influenza A  Assessment and Plan of Treatment: s/p Tamiflu   Continue supporitve care    Diagnosis: JARRED (acute kidney injury)  Assessment and Plan of Treatment: JARRED likley due to dehydration 2/2 poor oral intake   Now renal funtion improved    Diagnosis: Chronic HFrEF (heart failure with reduced ejection fraction)  Assessment and Plan of Treatment: Weigh yourself daily.  If you gain 3lbs in 3 days, or 5lbs in a week call your Health Care Provider.  Do not eat or drink foods containing more than 2000mg of salt (sodium) in your diet every day.  Call your Health Care Provider if you have any swelling or increased swelling in your feet, ankles, and/or stomach.  The Pt was provided with CHF diet instruction (low sodium diet, daily weights, label reading, Heart Healthy Cooking Tips & Heart Healthy shopping Tips).  Take all of your medication as directed.  If you become dizzy call your Health Care Provider.    Diagnosis: Diabetes  Assessment and Plan of Treatment: Type2, not on any meds since weight loss after throat Ca    Diagnosis: Recent cerebrovascular accident (CVA)  Assessment and Plan of Treatment: R central sulcus MCA infarct  Continue Aspirin and statin as directed    Diagnosis: Dysphagia  Assessment and Plan of Treatment: Puree diet with moderately thickened liquids     PRINCIPAL DISCHARGE DIAGNOSIS  Diagnosis: Pneumonia, aspiration  Assessment and Plan of Treatment: s/p 5 days of IV Zosyn   s/p Solumedrol taper      SECONDARY DISCHARGE DIAGNOSES  Diagnosis: Orthostatic hypotension  Assessment and Plan of Treatment: Orthostatic hypotension is a sudden drop in blood pressure that happens when a person stands up. This drop in blood pressure can make you feel dizzy, lightheaded, blurry or dim vision, weakness, or pass out.  The symptoms all happen when you stand up after sitting or lying down. Causes of orthostatic hypotension are dehydration which means you do not have enough fluids due to reasons such as severe diarrhea or vomiting, decreased fluid intake, increased sweating due to exercise or extreme hot temperatures, or certain medications. Older people are more likely than younger people to have it. Seek medical help if you feel dizzy or like you might pass out when you stand up. The simplest test is to take your blood pressure and pulse while you are sitting or lying down and then again after you stand up.   Few things you can do to reduce the problems caused by orthostatic hypotension:  -Stand up slowly and give your body time to adapt. This is especially important when you get out of bed in the morning. Start by sitting up and waiting a moment. Then swing your legs over the side of the bed and wait some more. When you do stand up, make sure you have something to hold onto in case you start to feel dizzy.  -Stay hydrated  -Put blocks under the posts at the head of your bed. This will raise your head above your heart slightly.  -Wear "compression" stockings and abdominal binder - The ones that go to your waist are most helpful, but they are hard to wear.  -Avoid drinking.      Diagnosis: Sepsis  Assessment and Plan of Treatment: sepsis secondary to pneumonia   Now resolved    Diagnosis: Influenza A  Assessment and Plan of Treatment: s/p Tamiflu   Continue supporitve care    Diagnosis: JARRED (acute kidney injury)  Assessment and Plan of Treatment: JARRED likley due to dehydration 2/2 poor oral intake   Now renal funtion improved    Diagnosis: Chronic HFrEF (heart failure with reduced ejection fraction)  Assessment and Plan of Treatment: Weigh yourself daily.  If you gain 3lbs in 3 days, or 5lbs in a week call your Health Care Provider.  Do not eat or drink foods containing more than 2000mg of salt (sodium) in your diet every day.  Call your Health Care Provider if you have any swelling or increased swelling in your feet, ankles, and/or stomach.  The Pt was provided with CHF diet instruction (low sodium diet, daily weights, label reading, Heart Healthy Cooking Tips & Heart Healthy shopping Tips).  Take all of your medication as directed.  If you become dizzy call your Health Care Provider.    Diagnosis: Diabetes  Assessment and Plan of Treatment: Type2, not on any meds since weight loss after throat Ca    Diagnosis: Recent cerebrovascular accident (CVA)  Assessment and Plan of Treatment: R central sulcus MCA infarct  Continue Aspirin and statin as directed    Diagnosis: Dysphagia  Assessment and Plan of Treatment: Puree diet with moderately thickened liquids    Diagnosis: Pneumothorax, right  Assessment and Plan of Treatment: Chest xray  on 5/17/22 showed small right pneumothorax - repeat on 5/19/22 shows no change - pt is asymptomatic - no resp distress or hypoxia     PRINCIPAL DISCHARGE DIAGNOSIS  Diagnosis: Pneumonia, aspiration  Assessment and Plan of Treatment: s/p 5 days of IV Zosyn   s/p Solumedrol taper      SECONDARY DISCHARGE DIAGNOSES  Diagnosis: Orthostatic hypotension  Assessment and Plan of Treatment: Orthostatic hypotension is a sudden drop in blood pressure that happens when a person stands up. This drop in blood pressure can make you feel dizzy, lightheaded, blurry or dim vision, weakness, or pass out.  The symptoms all happen when you stand up after sitting or lying down. Causes of orthostatic hypotension are dehydration which means you do not have enough fluids due to reasons such as severe diarrhea or vomiting, decreased fluid intake, increased sweating due to exercise or extreme hot temperatures, or certain medications. Older people are more likely than younger people to have it. Seek medical help if you feel dizzy or like you might pass out when you stand up. The simplest test is to take your blood pressure and pulse while you are sitting or lying down and then again after you stand up.   Few things you can do to reduce the problems caused by orthostatic hypotension:  -Stand up slowly and give your body time to adapt. This is especially important when you get out of bed in the morning. Start by sitting up and waiting a moment. Then swing your legs over the side of the bed and wait some more. When you do stand up, make sure you have something to hold onto in case you start to feel dizzy.  -Stay hydrated  -Put blocks under the posts at the head of your bed. This will raise your head above your heart slightly.  -Wear "compression" stockings and abdominal binder - The ones that go to your waist are most helpful, but they are hard to wear.  -Avoid drinking.      Diagnosis: Sepsis  Assessment and Plan of Treatment: sepsis secondary to pneumonia   Now resolved    Diagnosis: Influenza A  Assessment and Plan of Treatment: s/p Tamiflu   Continue supporitve care    Diagnosis: JARRED (acute kidney injury)  Assessment and Plan of Treatment: JARRED likley due to dehydration 2/2 poor oral intake   Now renal funtion improved    Diagnosis: Chronic HFrEF (heart failure with reduced ejection fraction)  Assessment and Plan of Treatment: Weigh yourself daily.  If you gain 3lbs in 3 days, or 5lbs in a week call your Health Care Provider.  Do not eat or drink foods containing more than 2000mg of salt (sodium) in your diet every day.  Call your Health Care Provider if you have any swelling or increased swelling in your feet, ankles, and/or stomach.  The Pt was provided with CHF diet instruction (low sodium diet, daily weights, label reading, Heart Healthy Cooking Tips & Heart Healthy shopping Tips).  Take all of your medication as directed.  If you become dizzy call your Health Care Provider.    Diagnosis: Diabetes  Assessment and Plan of Treatment: Type2, not on any meds since weight loss after throat Ca    Diagnosis: Recent cerebrovascular accident (CVA)  Assessment and Plan of Treatment: R central sulcus MCA infarct  Continue Aspirin and statin as directed    Diagnosis: Dysphagia  Assessment and Plan of Treatment: Puree diet with moderately thickened liquids    Diagnosis: Pneumothorax, right  Assessment and Plan of Treatment: Chest xray  on 5/17/22 showed small right pneumothorax - repeat on 5/19/22 shows no change - pt is asymptomatic - no resp distress or hypoxia    Diagnosis: Atrial fibrillation  Assessment and Plan of Treatment: Pt with recently dx Afib during last hospital admission  Continue Eliquis at lowered dose due to risk for falls   Hold heart rate control medication secondary to orthostatic Hypotension.  Atrial fibrillation is the most common heart rhythm problem.  The condition puts you at risk for has stroke and heart attack  Continue blood thinner - Eliquis to prevent possible clot and stroke complications.   Monitor for any signs of bleeding and avoid injury while on this medication  If any bleeding persistent and symptomatic stop the medication and notify your doctor immediately.  It helps if you control your blood pressure, not drink more than 1-2 alcohol drinks per day, cut down on caffeine, getting treatment for over active thyroid gland, and get regular exercise  Call your doctor if you feel your heart racing or beating unusually, chest tightness or pain, lightheaded, faint, shortness of breath especially with exercise

## 2022-05-05 NOTE — DISCHARGE NOTE PROVIDER - CARE PROVIDER_API CALL
CHANCE FRANCO A  Internal Medicine  [address] [place] [address], [place] [address]  Phone: [phone]  Fax: [phone]  Follow Up Time: 1 week

## 2022-05-05 NOTE — DISCHARGE NOTE PROVIDER - DETAILS OF MALNUTRITION DIAGNOSIS/DIAGNOSES
This patient has been assessed with a concern for Malnutrition and was treated during this hospitalization for the following Nutrition diagnosis/diagnoses:     -  05/04/2022: Severe protein-calorie malnutrition

## 2022-05-05 NOTE — DISCHARGE NOTE PROVIDER - NSDCFUSCHEDAPPT_GEN_ALL_CORE_FT
José Miguel Lynn  [facility]  Otolaryng [address]  Scheduled Appointment: 08/08/2022

## 2022-05-05 NOTE — DISCHARGE NOTE PROVIDER - NSDCMRMEDTOKEN_GEN_ALL_CORE_FT
albuterol 90 mcg/inh inhalation aerosol: 2 puff(s) inhaled every 6 hours, As needed, Shortness of Breath and/or Wheezing  aspirin 81 mg oral delayed release tablet: 1 tab(s) orally once a day  atorvastatin 80 mg oral tablet: 1 tab(s) orally once a day  calcitriol 0.25 mcg oral capsule: 1 cap(s) orally once a day  Coreg 6.25 mg oral tablet: 1 tab(s) orally 2 times a day  cyanocobalamin 1000 mcg oral tablet: 1 tab(s) orally once a day  divalproex sodium 500 mg oral delayed release tablet: 1 tab(s) orally 2 times a day  Eliquis 5 mg oral tablet: 1 tab(s) orally 2 times a day   escitalopram 5 mg oral tablet: 1 tab(s) orally once a day  ferrous sulfate 325 mg (65 mg elemental iron) oral tablet: 1 tab(s) orally once a day  finasteride 5 mg oral tablet: 1 tab(s) orally once a day  Januvia 25 mg oral tablet: 1 tab(s) orally once a day   levothyroxine 75 mcg (0.075 mg) oral tablet: 1 tab(s) orally once a day  melatonin 3 mg oral tablet: 1 tab(s) orally once a day (at bedtime)  midodrine 5 mg oral tablet: 1 tab(s) orally 3 times a day   saliva substitutes oral solution: 1 spray(s) orally 3 times a day, As Needed  tamsulosin 0.4 mg oral capsule: 2 cap(s) orally once a day (at bedtime)   albuterol 90 mcg/inh inhalation aerosol: 2 puff(s) inhaled every 6 hours, As needed, Shortness of Breath and/or Wheezing  alcohol swabs : Apply topically to affected area 4 times a day   apixaban 2.5 mg oral tablet: 1 tab(s) orally every 12 hours  aspirin 81 mg oral delayed release tablet: 1 tab(s) orally once a day  atorvastatin 80 mg oral tablet: 1 tab(s) orally once a day  calcitriol 0.25 mcg oral capsule: 1 cap(s) orally once a day  cyanocobalamin 1000 mcg oral tablet: 1 tab(s) orally once a day  divalproex sodium 500 mg oral delayed release tablet: 1 tab(s) orally 2 times a day  escitalopram 5 mg oral tablet: 1 tab(s) orally once a day  ferrous sulfate 325 mg (65 mg elemental iron) oral tablet: 1 tab(s) orally once a day  finasteride 5 mg oral tablet: 1 tab(s) orally once a day  fludrocortisone 0.1 mg oral tablet: 2 tab(s) orally once a day at 6PM  fluticasone 50 mcg/inh nasal spray: 2 spray(s) nasal once a day (at bedtime)  glucometer (per patient&#x27;s insurance): Test blood sugars four times a day. Dispense #1 glucometer.  guaiFENesin 1200 mg oral tablet, extended release: 1 tab(s) orally every 12 hours  Januvia 25 mg oral tablet: 1 tab(s) orally once a day   lancets: 1 application subcutaneously 4 times a day   levothyroxine 100 mcg (0.1 mg) oral tablet: 1 tab(s) orally once a day  melatonin 3 mg oral tablet: 1 tab(s) orally once a day (at bedtime)  midodrine 10 mg oral tablet: 3 tab(s) orally 3 times a day  saliva substitutes oral solution: 1 spray(s) orally 3 times a day, As Needed  test strips (per patient&#x27;s insurance): 1 application subcutaneously 4 times a day. ** Compatible with patient&#x27;s glucometer **

## 2022-05-05 NOTE — PROGRESS NOTE ADULT - CONVERSATION DETAILS
detailed discussed with wife re all of above options  She is quite clear in stated wishes of not wanting intubation or chest compression    does want other non-heroic measures including IVF, antibiotics, feeding tubes etc  does want feeding tubes temporary or permanent  does not want hemodialysis     MOLST filled and placed in chart  copy given to wife     DNR ordered in [place]

## 2022-05-05 NOTE — PROGRESS NOTE ADULT - ASSESSMENT
Echo 3/31/22: The left ventricle is  enlarged with moderate to severe left ventricular systolic dysfunction,  estimated LVEF of 30-35%. The apical cap, apical septum and apical  lateral walls are akinetic with hypokinesis of the remaining walls. There  is no evidence of left ventricular thrombus The right ventricle is not well-visualized, device wire is noted within  the right heart Sclerotic trileaflet aortic valve with grossly normal opening, without AI.  Mild MR; Trace TR.  Cardiac Cath Lab (09.15.08 @ 13:41) >  Summary:  Hemodynamics: Hemodynamic assessment demonstrates moderate systemic  hypertension and moderately elevated LVEDP.  Recommendations:  Continue current medical therapy.  Echocardiogram with echo contrast agent to evaluate for LV apical thrombus.  Further arrhythmia management per the EP team.  Impressions: Angiography reveals nonobstructive coronary atherosclerosis  with LV dysfunction as described on the prior cath in 2005.    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    # PNA / RVP +  -CT chest noted New groundglass opacities (predominantly left-sided) and left upper  lobe opacities..  No change in the bilateral pleural effusions.  -management per pulm  + RVP/ influenza   -Bcx + gram + coccid in clusters 5/2  -iv abx     #Acute on chronic systolic CHF , sp AICD   -CT chest noted. with bl pleural eff  -ECHO with ef 25%<  mod to sev MR,  Moderate diastolic dysfunction (Stage II). Severe  left ventricular systolic dysfunction.  The apex is akinetic/dyskinetic.  -bp stable, start toprol 12.5 mg daily   -c/w mido to augment bp   -creat improving, lasix prn for now.. give  20 mg IVP x 1 today     #Newly dx Afib   -dx recently  on last hospital admission  -sp device interrogation : Measured data WNL, normal ICD function, Pt is NOT pacemaker dependent; Stored data revealed episodes of AF lasting up to approximately 19 hours on 4/26/22 correlating with patient being in [facility] per pt/family; One episode of brief NSVT noted; No AF noted since 4/26/22; Optivol elevated sugg fluid overload   -c/w eliquis   -resume BB      # carotid stenosis s/p right CEA 2020,  -asa , resume statin     #chronic cva, c/w asa, resume statin     #Hypotension   -bcxs + - repeat bcx NGTD  -cw mido to augment bp - sys bp improving      #candelaria on CKD   -renal fu          Echo 3/31/22: The left ventricle is  enlarged with moderate to severe left ventricular systolic dysfunction,  estimated LVEF of 30-35%. The apical cap, apical septum and apical  lateral walls are akinetic with hypokinesis of the remaining walls. There  is no evidence of left ventricular thrombus The right ventricle is not well-visualized, device wire is noted within  the right heart Sclerotic trileaflet aortic valve with grossly normal opening, without AI.  Mild MR; Trace TR.  Cardiac Cath Lab (09.15.08 @ 13:41) >  Summary:  Hemodynamics: Hemodynamic assessment demonstrates moderate systemic  hypertension and moderately elevated LVEDP.  Recommendations:  Continue current medical therapy.  Echocardiogram with echo contrast agent to evaluate for LV apical thrombus.  Further arrhythmia management per the EP team.  Impressions: Angiography reveals nonobstructive coronary atherosclerosis  with LV dysfunction as described on the prior cath in 2005.    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    # PNA / RVP +  -CT chest noted New groundglass opacities (predominantly left-sided) and left upper  lobe opacities..  No change in the bilateral pleural effusions.  -management per pulm  + RVP/ influenza   -Bcx + gram + coccid in clusters 5/2  -iv abx     #Acute on chronic systolic CHF , sp AICD   -CT chest noted. with bl pleural eff  -ECHO with ef 25%<  mod to sev MR,  Moderate diastolic dysfunction (Stage II). Severe  left ventricular systolic dysfunction.  The apex is akinetic/dyskinetic.  -bp improving . eventually start toprol 12.5 mg daily   -c/w mido to augment bp   -creat improving, lasix prn for now.. give  20 mg IVP x 1 today     #Newly dx Afib   -dx recently  on last hospital admission  -sp device interrogation : Measured data WNL, normal ICD function, Pt is NOT pacemaker dependent; Stored data revealed episodes of AF lasting up to approximately 19 hours on 4/26/22 correlating with patient being in [facility] per pt/family; One episode of brief NSVT noted; No AF noted since 4/26/22; Optivol elevated sugg fluid overload   -c/w eliquis   -eventually resume BB  once bp improves     # carotid stenosis s/p right CEA 2020,  -asa , resume statin     #chronic cva, c/w asa, resume statin     #Hypotension   -bcxs + - repeat bcx NGTD  -cw mido to augment bp - sys bp improving      #candelaria on CKD   -renal fu          Echo 3/31/22: The left ventricle is  enlarged with moderate to severe left ventricular systolic dysfunction,  estimated LVEF of 30-35%. The apical cap, apical septum and apical  lateral walls are akinetic with hypokinesis of the remaining walls. There  is no evidence of left ventricular thrombus The right ventricle is not well-visualized, device wire is noted within  the right heart Sclerotic trileaflet aortic valve with grossly normal opening, without AI.  Mild MR; Trace TR.  Cardiac Cath Lab (09.15.08 @ 13:41) >  Summary:  Hemodynamics: Hemodynamic assessment demonstrates moderate systemic  hypertension and moderately elevated LVEDP.  Recommendations:  Continue current medical therapy.  Echocardiogram with echo contrast agent to evaluate for LV apical thrombus.  Further arrhythmia management per the EP team.  Impressions: Angiography reveals nonobstructive coronary atherosclerosis  with LV dysfunction as described on the prior cath in 2005.    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    # PNA / RVP +  -CT chest noted New groundglass opacities (predominantly left-sided) and left upper  lobe opacities..  No change in the bilateral pleural effusions.  -management per pulm  + RVP/ influenza   -Bcx + gram + cocci in clusters 5/2  -iv abx     #Acute on chronic systolic CHF , sp AICD   -CT chest noted. with bl pleural eff  -ECHO with ef 25%<  mod to sev MR,  Moderate diastolic dysfunction (Stage II). Severe  left ventricular systolic dysfunction.  The apex is akinetic/dyskinetic.  -bp improving . eventually start toprol 12.5 mg daily   -c/w mido to augment bp   -creat improving, lasix prn for now.. give  20 mg IVP x 1 today     #Newly dx Afib   -dx recently  on last hospital admission  -sp device interrogation : Measured data WNL, normal ICD function, Pt is NOT pacemaker dependent; Stored data revealed episodes of AF lasting up to approximately 19 hours on 4/26/22 correlating with patient being in [facility] per pt/family; One episode of brief NSVT noted; No AF noted since 4/26/22; Optivol elevated sugg fluid overload   -c/w eliquis   -eventually resume BB  once bp improves     # carotid stenosis s/p right CEA 2020,  -asa , resume statin     #chronic cva, c/w asa, resume statin     #Hypotension   -bcxs + - repeat bcx NGTD  -cw mido to augment bp - sys bp improving      #candelaria on CKD   -renal fu

## 2022-05-05 NOTE — DISCHARGE NOTE PROVIDER - HOSPITAL COURSE
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 Clinical presentation consistent with pneumonia possibly aspiration pneumonia. Pneumonia confirmed with CT. PVR was posirive for influenza as well as non- COVID coronavirus   s/p 5 days of Zosyn and s/p Tamiflu.   Solumedrol given for bronchospasm   Also he was in [place], likely due to dehydration secondary to poor oral intake. Creatinine improved  For recent CVA, aspirin was continued   For chronic HF, evaluated by cardiology, IV lasix continued as needed    75 y/o M with a PMHx of HTN, HLD, HFrEF (EF 30%), 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 Clinical presentation consistent with pneumonia possibly aspiration pneumonia. Pneumonia confirmed with CT. PVR was positive for Influenza A as well as non- COVID coronavirus   s/p 5 days of Zosyn and s/p Tamiflu 4/4-4/8. No Hypoxia  Solumedrol given for bronchospasm.   Uncontrolled DM Type 2 with hyperglycemia secondary to solumedrol IV - now improved  Also he was in [place], likely due to dehydration secondary to poor oral intake. Creatinine improved  For recent CVA, aspirin was continued   For chronic HF, evaluated by cardiology, s/p IV lasix continued as needed.  Hospital stay prolonged due to orthostatic Hypotension - Orthostatic hypertension.   S/p IVF, started Midodrine 30mg tid, added Florinef uptitrated dose to 0.2mg daily and added Droxidopa (discontinued Droxidopa - showed no effect on orthostatic Hypotension). Continue Comp stockings and Abd binder, fall precautions.  Patient continued to have symptomatic severe Orthostatic Hypotension unable to tolerate Physical therapy   Discharge home with home hospice           75 y/o M with a PMHx of HTN, HLD, HFrEF (EF 30%), 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 Clinical presentation consistent with pneumonia possibly aspiration pneumonia. Pneumonia confirmed with CT. PVR was positive for Influenza A as well as non- COVID coronavirus   s/p 5 days of Zosyn and s/p Tamiflu 4/4-4/8. No Hypoxia  Solumedrol given for bronchospasm.   Uncontrolled DM Type 2 with hyperglycemia secondary to solumedrol IV - now improved  Also he was in [place], likely due to dehydration secondary to poor oral intake. Creatinine improved  For recent CVA, aspirin was continued   For chronic HF, evaluated by cardiology, s/p IV lasix continued as needed.  Hospital stay prolonged due to orthostatic Hypotension - Orthostatic hypertension.   S/p IVF, started Midodrine 30mg tid, added Florinef uptitrated dose to 0.2mg daily and added Droxidopa (discontinued Droxidopa - showed no effect on orthostatic Hypotension). Continue Comp stockings and Abd binder, fall precautions.  Patient continued to have symptomatic severe Orthostatic Hypotension unable to tolerate Physical therapy   Discharge home with home hospice  Chest xray  on 5/17/22 showed small right pneumothorax - repeat on 5/19/22 shows no change - pt is asymptomatic - no resp distress or hypoxia - Ok to be discharged per pulmonary Dr. Skinner who discussed with Dr. Larson         75 y/o M with a PMHx of HTN, HLD, HFrEF (EF 30%), 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 Clinical presentation consistent with pneumonia possibly aspiration pneumonia. Pneumonia confirmed with CT. PVR was positive for Influenza A as well as non- COVID coronavirus   s/p 5 days of Zosyn and s/p Tamiflu 4/4-4/8. No Hypoxia  Solumedrol given for bronchospasm.     Uncontrolled DM Type 2 with hyperglycemia secondary to solumedrol IV - now improved    Also he was in [place], likely due to dehydration secondary to poor oral intake. Creatinine improved    Pt with recently dx Afib during last hospital admission  -sp device interrogation : Measured data WNL, normal ICD function, Pt is NOT pacemaker dependent; Stored data revealed episodes of AF lasting up to approximately 19 hours on 4/26/22 correlating with patient being in [facility] per pt/family; One episode of brief NSVT noted; No AF noted since 4/26/22; Optivol elevated sugg fluid overload. Continue Eliquis, Hold BB secondary to orthostatic Hypotension.    For Acute on chronic systolic CHF , sp AICD  evaluated by cardiology, CT chest noted. with bl pleural eff - s/p lasix 20 mg IVP x 1 5/5   ECHO with ef 25%<  mod to sev MR,  Moderate diastolic dysfunction (Stage II). Severe  left ventricular systolic dysfunction.  The apex is akinetic/dyskinetic.  Lasix and BB held for symptomatic orthostatic hypotension     Hospital stay prolonged due to orthostatic Hypotension - Orthostatic hypertension.   S/p IVF, started Midodrine 30mg tid, added Florinef uptitrated dose to 0.2mg daily and added Droxidopa (discontinued Droxidopa - showed no effect on orthostatic Hypotension). Continue Comp stockings and Abd binder, fall precautions.  Patient continued to have symptomatic severe Orthostatic Hypotension unable to tolerate Physical therapy   Discharge home with home hospice.    Chest xray  on 5/17/22 showed small right pneumothorax - repeat on 5/19/22 shows no change - pt is asymptomatic - no resp distress or hypoxia - Ok to be discharged per pulmonary Dr. Skinner who discussed with Dr. Larson

## 2022-05-06 LAB
-  AMIKACIN: SIGNIFICANT CHANGE UP
-  AMOXICILLIN/CLAVULANIC ACID: SIGNIFICANT CHANGE UP
-  AMPICILLIN/SULBACTAM: SIGNIFICANT CHANGE UP
-  AMPICILLIN: SIGNIFICANT CHANGE UP
-  AZTREONAM: SIGNIFICANT CHANGE UP
-  CEFAZOLIN: SIGNIFICANT CHANGE UP
-  CEFEPIME: SIGNIFICANT CHANGE UP
-  CEFOXITIN: SIGNIFICANT CHANGE UP
-  CEFTRIAXONE: SIGNIFICANT CHANGE UP
-  CIPROFLOXACIN: SIGNIFICANT CHANGE UP
-  ERTAPENEM: SIGNIFICANT CHANGE UP
-  GENTAMICIN: SIGNIFICANT CHANGE UP
-  IMIPENEM: SIGNIFICANT CHANGE UP
-  LEVOFLOXACIN: SIGNIFICANT CHANGE UP
-  MEROPENEM: SIGNIFICANT CHANGE UP
-  NITROFURANTOIN: SIGNIFICANT CHANGE UP
-  PIPERACILLIN/TAZOBACTAM: SIGNIFICANT CHANGE UP
-  TIGECYCLINE: SIGNIFICANT CHANGE UP
-  TOBRAMYCIN: SIGNIFICANT CHANGE UP
-  TRIMETHOPRIM/SULFAMETHOXAZOLE: SIGNIFICANT CHANGE UP
ANION GAP SERPL CALC-SCNC: 16 MMOL/L — SIGNIFICANT CHANGE UP (ref 5–17)
BUN SERPL-MCNC: 38 MG/DL — HIGH (ref 7–23)
CALCIUM SERPL-MCNC: 8.7 MG/DL — SIGNIFICANT CHANGE UP (ref 8.4–10.5)
CHLORIDE SERPL-SCNC: 97 MMOL/L — SIGNIFICANT CHANGE UP (ref 96–108)
CO2 SERPL-SCNC: 23 MMOL/L — SIGNIFICANT CHANGE UP (ref 22–31)
CREAT SERPL-MCNC: 1.93 MG/DL — HIGH (ref 0.5–1.3)
CULTURE RESULTS: SIGNIFICANT CHANGE UP
EGFR: 36 ML/MIN/1.73M2 — LOW
GLUCOSE BLDC GLUCOMTR-MCNC: 224 MG/DL — HIGH (ref 70–99)
GLUCOSE BLDC GLUCOMTR-MCNC: 231 MG/DL — HIGH (ref 70–99)
GLUCOSE BLDC GLUCOMTR-MCNC: 280 MG/DL — HIGH (ref 70–99)
GLUCOSE BLDC GLUCOMTR-MCNC: 358 MG/DL — HIGH (ref 70–99)
GLUCOSE SERPL-MCNC: 206 MG/DL — HIGH (ref 70–99)
HCT VFR BLD CALC: 29.9 % — LOW (ref 39–50)
HGB BLD-MCNC: 9.7 G/DL — LOW (ref 13–17)
MCHC RBC-ENTMCNC: 24.1 PG — LOW (ref 27–34)
MCHC RBC-ENTMCNC: 32.4 GM/DL — SIGNIFICANT CHANGE UP (ref 32–36)
MCV RBC AUTO: 74.4 FL — LOW (ref 80–100)
METHOD TYPE: SIGNIFICANT CHANGE UP
NRBC # BLD: 0 /100 WBCS — SIGNIFICANT CHANGE UP (ref 0–0)
ORGANISM # SPEC MICROSCOPIC CNT: SIGNIFICANT CHANGE UP
ORGANISM # SPEC MICROSCOPIC CNT: SIGNIFICANT CHANGE UP
PLATELET # BLD AUTO: 107 K/UL — LOW (ref 150–400)
POTASSIUM SERPL-MCNC: 4.3 MMOL/L — SIGNIFICANT CHANGE UP (ref 3.5–5.3)
POTASSIUM SERPL-SCNC: 4.3 MMOL/L — SIGNIFICANT CHANGE UP (ref 3.5–5.3)
RBC # BLD: 4.02 M/UL — LOW (ref 4.2–5.8)
RBC # FLD: 22.6 % — HIGH (ref 10.3–14.5)
SODIUM SERPL-SCNC: 136 MMOL/L — SIGNIFICANT CHANGE UP (ref 135–145)
SPECIMEN SOURCE: SIGNIFICANT CHANGE UP
WBC # BLD: 8.71 K/UL — SIGNIFICANT CHANGE UP (ref 3.8–10.5)
WBC # FLD AUTO: 8.71 K/UL — SIGNIFICANT CHANGE UP (ref 3.8–10.5)

## 2022-05-06 RX ORDER — METOPROLOL TARTRATE 50 MG
12.5 TABLET ORAL DAILY
Refills: 0 | Status: DISCONTINUED | OUTPATIENT
Start: 2022-05-06 | End: 2022-05-09

## 2022-05-06 RX ORDER — FUROSEMIDE 40 MG
20 TABLET ORAL DAILY
Refills: 0 | Status: DISCONTINUED | OUTPATIENT
Start: 2022-05-06 | End: 2022-05-12

## 2022-05-06 RX ADMIN — Medication 3 MILLILITER(S): at 18:14

## 2022-05-06 RX ADMIN — MIDODRINE HYDROCHLORIDE 10 MILLIGRAM(S): 2.5 TABLET ORAL at 12:43

## 2022-05-06 RX ADMIN — Medication 3 MILLILITER(S): at 00:55

## 2022-05-06 RX ADMIN — Medication 5 MILLILITER(S): at 05:38

## 2022-05-06 RX ADMIN — Medication 2: at 08:31

## 2022-05-06 RX ADMIN — Medication 325 MILLIGRAM(S): at 12:44

## 2022-05-06 RX ADMIN — Medication 12.5 MILLIGRAM(S): at 18:39

## 2022-05-06 RX ADMIN — CALCITRIOL 0.25 MICROGRAM(S): 0.5 CAPSULE ORAL at 12:45

## 2022-05-06 RX ADMIN — Medication 30 MILLIGRAM(S): at 12:46

## 2022-05-06 RX ADMIN — FINASTERIDE 5 MILLIGRAM(S): 5 TABLET, FILM COATED ORAL at 12:45

## 2022-05-06 RX ADMIN — Medication 5 MILLILITER(S): at 22:01

## 2022-05-06 RX ADMIN — Medication 2: at 12:37

## 2022-05-06 RX ADMIN — PIPERACILLIN AND TAZOBACTAM 25 GRAM(S): 4; .5 INJECTION, POWDER, LYOPHILIZED, FOR SOLUTION INTRAVENOUS at 02:00

## 2022-05-06 RX ADMIN — Medication 3: at 18:03

## 2022-05-06 RX ADMIN — PREGABALIN 1000 MICROGRAM(S): 225 CAPSULE ORAL at 12:46

## 2022-05-06 RX ADMIN — Medication 30 MILLIGRAM(S): at 12:59

## 2022-05-06 RX ADMIN — APIXABAN 2.5 MILLIGRAM(S): 2.5 TABLET, FILM COATED ORAL at 18:11

## 2022-05-06 RX ADMIN — PIPERACILLIN AND TAZOBACTAM 25 GRAM(S): 4; .5 INJECTION, POWDER, LYOPHILIZED, FOR SOLUTION INTRAVENOUS at 18:11

## 2022-05-06 RX ADMIN — ESCITALOPRAM OXALATE 5 MILLIGRAM(S): 10 TABLET, FILM COATED ORAL at 12:44

## 2022-05-06 RX ADMIN — Medication 20 MILLIGRAM(S): at 12:59

## 2022-05-06 RX ADMIN — DIVALPROEX SODIUM 500 MILLIGRAM(S): 500 TABLET, DELAYED RELEASE ORAL at 18:12

## 2022-05-06 RX ADMIN — DIVALPROEX SODIUM 500 MILLIGRAM(S): 500 TABLET, DELAYED RELEASE ORAL at 05:36

## 2022-05-06 RX ADMIN — Medication 3 MILLIGRAM(S): at 22:01

## 2022-05-06 RX ADMIN — Medication 81 MILLIGRAM(S): at 12:42

## 2022-05-06 RX ADMIN — Medication 3 MILLILITER(S): at 22:02

## 2022-05-06 RX ADMIN — PIPERACILLIN AND TAZOBACTAM 25 GRAM(S): 4; .5 INJECTION, POWDER, LYOPHILIZED, FOR SOLUTION INTRAVENOUS at 09:26

## 2022-05-06 RX ADMIN — Medication 2 SPRAY(S): at 00:54

## 2022-05-06 RX ADMIN — Medication 5 MILLILITER(S): at 12:56

## 2022-05-06 RX ADMIN — Medication 75 MICROGRAM(S): at 05:37

## 2022-05-06 RX ADMIN — Medication 2 SPRAY(S): at 22:04

## 2022-05-06 RX ADMIN — Medication 3 MILLILITER(S): at 14:54

## 2022-05-06 RX ADMIN — APIXABAN 2.5 MILLIGRAM(S): 2.5 TABLET, FILM COATED ORAL at 05:36

## 2022-05-06 RX ADMIN — Medication 3 MILLILITER(S): at 05:39

## 2022-05-06 RX ADMIN — TAMSULOSIN HYDROCHLORIDE 0.8 MILLIGRAM(S): 0.4 CAPSULE ORAL at 22:01

## 2022-05-06 NOTE — PROGRESS NOTE ADULT - ASSESSMENT
Echo 3/31/22: The left ventricle is  enlarged with moderate to severe left ventricular systolic dysfunction,  estimated LVEF of 30-35%. The apical cap, apical septum and apical  lateral walls are akinetic with hypokinesis of the remaining walls. There  is no evidence of left ventricular thrombus The right ventricle is not well-visualized, device wire is noted within  the right heart Sclerotic trileaflet aortic valve with grossly normal opening, without AI.  Mild MR; Trace TR.  Cardiac Cath Lab (09.15.08 @ 13:41) >  Summary:  Hemodynamics: Hemodynamic assessment demonstrates moderate systemic  hypertension and moderately elevated LVEDP.  Recommendations:  Continue current medical therapy.  Echocardiogram with echo contrast agent to evaluate for LV apical thrombus.  Further arrhythmia management per the EP team.  Impressions: Angiography reveals nonobstructive coronary atherosclerosis  with LV dysfunction as described on the prior cath in 2005.    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    # PNA / RVP +  -CT chest noted New groundglass opacities (predominantly left-sided) and left upper  lobe opacities..  No change in the bilateral pleural effusions.  -management per pulm  + RVP/ influenza   -Bcx + gram + cocci in clusters 5/2- repeat NGTD   -iv abx     #Acute on chronic systolic CHF , sp AICD   -CT chest noted. with bl pleural eff  -ECHO with ef 25%<  mod to sev MR,  Moderate diastolic dysfunction (Stage II). Severe  left ventricular systolic dysfunction.  The apex is akinetic/dyskinetic.  -bp improving . start toprol 12.5 mg daily   -c/w mido to augment bp   -sp lasix 20 mg IVP x 1 5/5  -creat improving,  start 20 mg lasix po daily     #Newly dx Afib   -dx recently  on last hospital admission  -sp device interrogation : Measured data WNL, normal ICD function, Pt is NOT pacemaker dependent; Stored data revealed episodes of AF lasting up to approximately 19 hours on 4/26/22 correlating with patient being in [facility] per pt/family; One episode of brief NSVT noted; No AF noted since 4/26/22; Optivol elevated sugg fluid overload   -c/w eliquis   -start BB     # carotid stenosis s/p right CEA 2020,  -asa , resume statin     #chronic cva, c/w asa, resume statin     #Hypotension   -bcxs + - repeat bcx NGTD  -cw mido to augment bp - sys bp improving      #candelaria on CKD   -renal fu

## 2022-05-07 LAB
ANION GAP SERPL CALC-SCNC: 15 MMOL/L — SIGNIFICANT CHANGE UP (ref 5–17)
BUN SERPL-MCNC: 37 MG/DL — HIGH (ref 7–23)
CALCIUM SERPL-MCNC: 8.7 MG/DL — SIGNIFICANT CHANGE UP (ref 8.4–10.5)
CHLORIDE SERPL-SCNC: 96 MMOL/L — SIGNIFICANT CHANGE UP (ref 96–108)
CO2 SERPL-SCNC: 24 MMOL/L — SIGNIFICANT CHANGE UP (ref 22–31)
CREAT SERPL-MCNC: 1.92 MG/DL — HIGH (ref 0.5–1.3)
CULTURE RESULTS: SIGNIFICANT CHANGE UP
EGFR: 36 ML/MIN/1.73M2 — LOW
GLUCOSE BLDC GLUCOMTR-MCNC: 192 MG/DL — HIGH (ref 70–99)
GLUCOSE BLDC GLUCOMTR-MCNC: 214 MG/DL — HIGH (ref 70–99)
GLUCOSE BLDC GLUCOMTR-MCNC: 254 MG/DL — HIGH (ref 70–99)
GLUCOSE BLDC GLUCOMTR-MCNC: 282 MG/DL — HIGH (ref 70–99)
GLUCOSE SERPL-MCNC: 237 MG/DL — HIGH (ref 70–99)
HCT VFR BLD CALC: 28.6 % — LOW (ref 39–50)
HGB BLD-MCNC: 9.3 G/DL — LOW (ref 13–17)
MCHC RBC-ENTMCNC: 23.9 PG — LOW (ref 27–34)
MCHC RBC-ENTMCNC: 32.5 GM/DL — SIGNIFICANT CHANGE UP (ref 32–36)
MCV RBC AUTO: 73.5 FL — LOW (ref 80–100)
NRBC # BLD: 0 /100 WBCS — SIGNIFICANT CHANGE UP (ref 0–0)
PLATELET # BLD AUTO: 131 K/UL — LOW (ref 150–400)
POTASSIUM SERPL-MCNC: 4.1 MMOL/L — SIGNIFICANT CHANGE UP (ref 3.5–5.3)
POTASSIUM SERPL-SCNC: 4.1 MMOL/L — SIGNIFICANT CHANGE UP (ref 3.5–5.3)
RBC # BLD: 3.89 M/UL — LOW (ref 4.2–5.8)
RBC # FLD: 22.8 % — HIGH (ref 10.3–14.5)
SODIUM SERPL-SCNC: 135 MMOL/L — SIGNIFICANT CHANGE UP (ref 135–145)
SPECIMEN SOURCE: SIGNIFICANT CHANGE UP
WBC # BLD: 9.63 K/UL — SIGNIFICANT CHANGE UP (ref 3.8–10.5)
WBC # FLD AUTO: 9.63 K/UL — SIGNIFICANT CHANGE UP (ref 3.8–10.5)

## 2022-05-07 RX ORDER — BACITRACIN ZINC 500 UNIT/G
1 OINTMENT IN PACKET (EA) TOPICAL
Refills: 0 | Status: COMPLETED | OUTPATIENT
Start: 2022-05-07 | End: 2022-05-12

## 2022-05-07 RX ADMIN — Medication 3 MILLILITER(S): at 05:53

## 2022-05-07 RX ADMIN — Medication 3 MILLIGRAM(S): at 22:01

## 2022-05-07 RX ADMIN — Medication 1 APPLICATION(S): at 17:54

## 2022-05-07 RX ADMIN — MIDODRINE HYDROCHLORIDE 10 MILLIGRAM(S): 2.5 TABLET ORAL at 12:57

## 2022-05-07 RX ADMIN — Medication 3 MILLILITER(S): at 12:56

## 2022-05-07 RX ADMIN — Medication 1: at 17:13

## 2022-05-07 RX ADMIN — Medication 5 MILLILITER(S): at 12:56

## 2022-05-07 RX ADMIN — Medication 30 MILLIGRAM(S): at 12:57

## 2022-05-07 RX ADMIN — Medication 2 SPRAY(S): at 22:50

## 2022-05-07 RX ADMIN — DIVALPROEX SODIUM 500 MILLIGRAM(S): 500 TABLET, DELAYED RELEASE ORAL at 05:51

## 2022-05-07 RX ADMIN — Medication 5 MILLILITER(S): at 05:52

## 2022-05-07 RX ADMIN — APIXABAN 2.5 MILLIGRAM(S): 2.5 TABLET, FILM COATED ORAL at 17:11

## 2022-05-07 RX ADMIN — Medication 3 MILLILITER(S): at 17:11

## 2022-05-07 RX ADMIN — Medication 325 MILLIGRAM(S): at 12:57

## 2022-05-07 RX ADMIN — Medication 5 MILLILITER(S): at 22:01

## 2022-05-07 RX ADMIN — APIXABAN 2.5 MILLIGRAM(S): 2.5 TABLET, FILM COATED ORAL at 05:51

## 2022-05-07 RX ADMIN — Medication 20 MILLIGRAM(S): at 06:04

## 2022-05-07 RX ADMIN — DIVALPROEX SODIUM 500 MILLIGRAM(S): 500 TABLET, DELAYED RELEASE ORAL at 17:12

## 2022-05-07 RX ADMIN — Medication 12.5 MILLIGRAM(S): at 06:01

## 2022-05-07 RX ADMIN — PREGABALIN 1000 MICROGRAM(S): 225 CAPSULE ORAL at 12:57

## 2022-05-07 RX ADMIN — Medication 3 MILLILITER(S): at 01:00

## 2022-05-07 RX ADMIN — TAMSULOSIN HYDROCHLORIDE 0.8 MILLIGRAM(S): 0.4 CAPSULE ORAL at 22:01

## 2022-05-07 RX ADMIN — CALCITRIOL 0.25 MICROGRAM(S): 0.5 CAPSULE ORAL at 12:58

## 2022-05-07 RX ADMIN — PIPERACILLIN AND TAZOBACTAM 25 GRAM(S): 4; .5 INJECTION, POWDER, LYOPHILIZED, FOR SOLUTION INTRAVENOUS at 02:09

## 2022-05-07 RX ADMIN — Medication 2: at 12:56

## 2022-05-07 RX ADMIN — Medication 81 MILLIGRAM(S): at 12:56

## 2022-05-07 RX ADMIN — FINASTERIDE 5 MILLIGRAM(S): 5 TABLET, FILM COATED ORAL at 12:57

## 2022-05-07 RX ADMIN — Medication 75 MICROGRAM(S): at 05:51

## 2022-05-07 RX ADMIN — MIDODRINE HYDROCHLORIDE 10 MILLIGRAM(S): 2.5 TABLET ORAL at 17:12

## 2022-05-07 RX ADMIN — Medication 30 MILLIGRAM(S): at 17:11

## 2022-05-07 RX ADMIN — ESCITALOPRAM OXALATE 5 MILLIGRAM(S): 10 TABLET, FILM COATED ORAL at 12:58

## 2022-05-07 NOTE — PROGRESS NOTE ADULT - ASSESSMENT
Echo 3/31/22: The left ventricle is  enlarged with moderate to severe left ventricular systolic dysfunction,  estimated LVEF of 30-35%. The apical cap, apical septum and apical  lateral walls are akinetic with hypokinesis of the remaining walls. There  is no evidence of left ventricular thrombus The right ventricle is not well-visualized, device wire is noted within  the right heart Sclerotic trileaflet aortic valve with grossly normal opening, without AI.  Mild MR; Trace TR.  Cardiac Cath Lab (09.15.08 @ 13:41) >  Summary:  Hemodynamics: Hemodynamic assessment demonstrates moderate systemic  hypertension and moderately elevated LVEDP.  Recommendations:  Continue current medical therapy.  Echocardiogram with echo contrast agent to evaluate for LV apical thrombus.  Further arrhythmia management per the EP team.  Impressions: Angiography reveals nonobstructive coronary atherosclerosis  with LV dysfunction as described on the prior cath in 2005.    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    # PNA / RVP +  -CT chest noted New groundglass opacities (predominantly left-sided) and left upper  lobe opacities..  No change in the bilateral pleural effusions.  -management per pulm  + RVP/ influenza   -Bcx + gram + cocci in clusters 5/2- repeat NGTD   -iv abx     #Acute on chronic systolic CHF , sp AICD   -CT chest noted. with bl pleural eff  -ECHO with ef 25%<  mod to sev MR,  Moderate diastolic dysfunction (Stage II). Severe  left ventricular systolic dysfunction.  The apex is akinetic/dyskinetic.  -bp improving . start toprol 12.5 mg daily   -c/w mido to augment bp   -sp lasix 20 mg IVP x 1 5/5  -creat improving,  start 20 mg lasix po daily     #Newly dx Afib   -dx recently  on last hospital admission  -sp device interrogation : Measured data WNL, normal ICD function, Pt is NOT pacemaker dependent; Stored data revealed episodes of AF lasting up to approximately 19 hours on 4/26/22 correlating with patient being in [facility] per pt/family; One episode of brief NSVT noted; No AF noted since 4/26/22; Optivol elevated sugg fluid overload   -c/w eliquis   -start BB     # carotid stenosis s/p right CEA 2020,  -asa , resume statin     #chronic cva, c/w asa, resume statin     #Hypotension   -bcxs + - repeat bcx NGTD  -cw mido to augment bp - sys bp improving      #candelaria on CKD   -renal fu

## 2022-05-08 LAB
CULTURE RESULTS: SIGNIFICANT CHANGE UP
GLUCOSE BLDC GLUCOMTR-MCNC: 202 MG/DL — HIGH (ref 70–99)
GLUCOSE BLDC GLUCOMTR-MCNC: 222 MG/DL — HIGH (ref 70–99)
GLUCOSE BLDC GLUCOMTR-MCNC: 230 MG/DL — HIGH (ref 70–99)
GLUCOSE BLDC GLUCOMTR-MCNC: 256 MG/DL — HIGH (ref 70–99)
SARS-COV-2 RNA SPEC QL NAA+PROBE: SIGNIFICANT CHANGE UP
SPECIMEN SOURCE: SIGNIFICANT CHANGE UP

## 2022-05-08 RX ADMIN — MIDODRINE HYDROCHLORIDE 10 MILLIGRAM(S): 2.5 TABLET ORAL at 05:51

## 2022-05-08 RX ADMIN — Medication 2: at 17:04

## 2022-05-08 RX ADMIN — PREGABALIN 1000 MICROGRAM(S): 225 CAPSULE ORAL at 11:47

## 2022-05-08 RX ADMIN — Medication 2: at 08:29

## 2022-05-08 RX ADMIN — Medication 3 MILLIGRAM(S): at 21:48

## 2022-05-08 RX ADMIN — DIVALPROEX SODIUM 500 MILLIGRAM(S): 500 TABLET, DELAYED RELEASE ORAL at 05:50

## 2022-05-08 RX ADMIN — APIXABAN 2.5 MILLIGRAM(S): 2.5 TABLET, FILM COATED ORAL at 05:51

## 2022-05-08 RX ADMIN — Medication 5 MILLILITER(S): at 21:47

## 2022-05-08 RX ADMIN — Medication 75 MICROGRAM(S): at 05:51

## 2022-05-08 RX ADMIN — APIXABAN 2.5 MILLIGRAM(S): 2.5 TABLET, FILM COATED ORAL at 17:05

## 2022-05-08 RX ADMIN — ESCITALOPRAM OXALATE 5 MILLIGRAM(S): 10 TABLET, FILM COATED ORAL at 11:47

## 2022-05-08 RX ADMIN — CALCITRIOL 0.25 MICROGRAM(S): 0.5 CAPSULE ORAL at 11:48

## 2022-05-08 RX ADMIN — Medication 1 APPLICATION(S): at 18:49

## 2022-05-08 RX ADMIN — FINASTERIDE 5 MILLIGRAM(S): 5 TABLET, FILM COATED ORAL at 11:48

## 2022-05-08 RX ADMIN — Medication 5 MILLILITER(S): at 13:10

## 2022-05-08 RX ADMIN — Medication 325 MILLIGRAM(S): at 11:48

## 2022-05-08 RX ADMIN — TAMSULOSIN HYDROCHLORIDE 0.8 MILLIGRAM(S): 0.4 CAPSULE ORAL at 21:47

## 2022-05-08 RX ADMIN — Medication 1 APPLICATION(S): at 05:51

## 2022-05-08 RX ADMIN — Medication 3 MILLILITER(S): at 11:47

## 2022-05-08 RX ADMIN — MIDODRINE HYDROCHLORIDE 10 MILLIGRAM(S): 2.5 TABLET ORAL at 17:04

## 2022-05-08 RX ADMIN — Medication 12.5 MILLIGRAM(S): at 05:50

## 2022-05-08 RX ADMIN — Medication 2: at 11:48

## 2022-05-08 RX ADMIN — Medication 30 MILLIGRAM(S): at 11:47

## 2022-05-08 RX ADMIN — Medication 3 MILLILITER(S): at 05:49

## 2022-05-08 RX ADMIN — Medication 5 MILLILITER(S): at 05:50

## 2022-05-08 RX ADMIN — Medication 3 MILLILITER(S): at 00:08

## 2022-05-08 RX ADMIN — Medication 3 MILLILITER(S): at 17:05

## 2022-05-08 RX ADMIN — MIDODRINE HYDROCHLORIDE 10 MILLIGRAM(S): 2.5 TABLET ORAL at 11:47

## 2022-05-08 RX ADMIN — Medication 2 SPRAY(S): at 22:12

## 2022-05-08 RX ADMIN — DIVALPROEX SODIUM 500 MILLIGRAM(S): 500 TABLET, DELAYED RELEASE ORAL at 17:04

## 2022-05-08 RX ADMIN — Medication 81 MILLIGRAM(S): at 11:47

## 2022-05-08 RX ADMIN — Medication 20 MILLIGRAM(S): at 05:50

## 2022-05-08 NOTE — PROGRESS NOTE ADULT - ASSESSMENT
Echo 3/31/22: The left ventricle is  enlarged with moderate to severe left ventricular systolic dysfunction,  estimated LVEF of 30-35%. The apical cap, apical septum and apical  lateral walls are akinetic with hypokinesis of the remaining walls. There  is no evidence of left ventricular thrombus The right ventricle is not well-visualized, device wire is noted within  the right heart Sclerotic trileaflet aortic valve with grossly normal opening, without AI.  Mild MR; Trace TR.  Cardiac Cath Lab (09.15.08 @ 13:41) >  Summary:  Hemodynamics: Hemodynamic assessment demonstrates moderate systemic  hypertension and moderately elevated LVEDP.  Recommendations:  Continue current medical therapy.  Echocardiogram with echo contrast agent to evaluate for LV apical thrombus.  Further arrhythmia management per the EP team.  Impressions: Angiography reveals nonobstructive coronary atherosclerosis  with LV dysfunction as described on the prior cath in 2005.    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    # PNA / RVP +  -CT chest noted New groundglass opacities (predominantly left-sided) and left upper  lobe opacities..  No change in the bilateral pleural effusions.  -management per pulm  + RVP/ influenza   -Bcx + gram + cocci in clusters 5/2- repeat NGTD   -iv abx     #Acute on chronic systolic CHF , sp AICD   -CT chest noted. with bl pleural eff  -ECHO with ef 25%<  mod to sev MR,  Moderate diastolic dysfunction (Stage II). Severe  left ventricular systolic dysfunction.  The apex is akinetic/dyskinetic.  -bp improving . start toprol 12.5 mg daily   -c/w mido to augment bp   -sp lasix 20 mg IVP x 1 5/5  -cont 20 mg lasix po daily     #Newly dx Afib   -dx recently  on last hospital admission  -sp device interrogation : Measured data WNL, normal ICD function, Pt is NOT pacemaker dependent; Stored data revealed episodes of AF lasting up to approximately 19 hours on 4/26/22 correlating with patient being in [facility] per pt/family; One episode of brief NSVT noted; No AF noted since 4/26/22; Optivol elevated sugg fluid overload   -c/w eliquis   -cont BB     # carotid stenosis s/p right CEA 2020,  -asa , resume statin     #chronic cva, c/w asa, resume statin     #Hypotension   -bcxs + - repeat bcx NGTD  -cw mido to augment bp - sys bp improving      #candelaria on CKD   -renal fu

## 2022-05-09 LAB
ANION GAP SERPL CALC-SCNC: 15 MMOL/L — SIGNIFICANT CHANGE UP (ref 5–17)
ANION GAP SERPL CALC-SCNC: 20 MMOL/L — HIGH (ref 5–17)
BUN SERPL-MCNC: 38 MG/DL — HIGH (ref 7–23)
BUN SERPL-MCNC: 42 MG/DL — HIGH (ref 7–23)
CALCIUM SERPL-MCNC: 9 MG/DL — SIGNIFICANT CHANGE UP (ref 8.4–10.5)
CALCIUM SERPL-MCNC: 9.1 MG/DL — SIGNIFICANT CHANGE UP (ref 8.4–10.5)
CHLORIDE SERPL-SCNC: 94 MMOL/L — LOW (ref 96–108)
CHLORIDE SERPL-SCNC: 98 MMOL/L — SIGNIFICANT CHANGE UP (ref 96–108)
CO2 SERPL-SCNC: 23 MMOL/L — SIGNIFICANT CHANGE UP (ref 22–31)
CO2 SERPL-SCNC: 24 MMOL/L — SIGNIFICANT CHANGE UP (ref 22–31)
CREAT SERPL-MCNC: 1.81 MG/DL — HIGH (ref 0.5–1.3)
CREAT SERPL-MCNC: 2 MG/DL — HIGH (ref 0.5–1.3)
EGFR: 34 ML/MIN/1.73M2 — LOW
EGFR: 39 ML/MIN/1.73M2 — LOW
GLUCOSE BLDC GLUCOMTR-MCNC: 195 MG/DL — HIGH (ref 70–99)
GLUCOSE BLDC GLUCOMTR-MCNC: 216 MG/DL — HIGH (ref 70–99)
GLUCOSE BLDC GLUCOMTR-MCNC: 239 MG/DL — HIGH (ref 70–99)
GLUCOSE BLDC GLUCOMTR-MCNC: 248 MG/DL — HIGH (ref 70–99)
GLUCOSE SERPL-MCNC: 152 MG/DL — HIGH (ref 70–99)
GLUCOSE SERPL-MCNC: 220 MG/DL — HIGH (ref 70–99)
HCT VFR BLD CALC: 29.4 % — LOW (ref 39–50)
HCT VFR BLD CALC: 33.6 % — LOW (ref 39–50)
HGB BLD-MCNC: 10.2 G/DL — LOW (ref 13–17)
HGB BLD-MCNC: 9.5 G/DL — LOW (ref 13–17)
MAGNESIUM SERPL-MCNC: 2 MG/DL — SIGNIFICANT CHANGE UP (ref 1.6–2.6)
MCHC RBC-ENTMCNC: 24 PG — LOW (ref 27–34)
MCHC RBC-ENTMCNC: 24.2 PG — LOW (ref 27–34)
MCHC RBC-ENTMCNC: 30.4 GM/DL — LOW (ref 32–36)
MCHC RBC-ENTMCNC: 32.3 GM/DL — SIGNIFICANT CHANGE UP (ref 32–36)
MCV RBC AUTO: 75 FL — LOW (ref 80–100)
MCV RBC AUTO: 79.1 FL — LOW (ref 80–100)
NRBC # BLD: 0 /100 WBCS — SIGNIFICANT CHANGE UP (ref 0–0)
NRBC # BLD: 0 /100 WBCS — SIGNIFICANT CHANGE UP (ref 0–0)
PLATELET # BLD AUTO: 147 K/UL — LOW (ref 150–400)
PLATELET # BLD AUTO: 157 K/UL — SIGNIFICANT CHANGE UP (ref 150–400)
POTASSIUM SERPL-MCNC: 4 MMOL/L — SIGNIFICANT CHANGE UP (ref 3.5–5.3)
POTASSIUM SERPL-MCNC: 4.7 MMOL/L — SIGNIFICANT CHANGE UP (ref 3.5–5.3)
POTASSIUM SERPL-SCNC: 4 MMOL/L — SIGNIFICANT CHANGE UP (ref 3.5–5.3)
POTASSIUM SERPL-SCNC: 4.7 MMOL/L — SIGNIFICANT CHANGE UP (ref 3.5–5.3)
RBC # BLD: 3.92 M/UL — LOW (ref 4.2–5.8)
RBC # BLD: 4.25 M/UL — SIGNIFICANT CHANGE UP (ref 4.2–5.8)
RBC # FLD: 23.4 % — HIGH (ref 10.3–14.5)
RBC # FLD: 23.7 % — HIGH (ref 10.3–14.5)
SARS-COV-2 RNA SPEC QL NAA+PROBE: SIGNIFICANT CHANGE UP
SODIUM SERPL-SCNC: 137 MMOL/L — SIGNIFICANT CHANGE UP (ref 135–145)
SODIUM SERPL-SCNC: 137 MMOL/L — SIGNIFICANT CHANGE UP (ref 135–145)
WBC # BLD: 10.29 K/UL — SIGNIFICANT CHANGE UP (ref 3.8–10.5)
WBC # BLD: 13.98 K/UL — HIGH (ref 3.8–10.5)
WBC # FLD AUTO: 10.29 K/UL — SIGNIFICANT CHANGE UP (ref 3.8–10.5)
WBC # FLD AUTO: 13.98 K/UL — HIGH (ref 3.8–10.5)

## 2022-05-09 PROCEDURE — 71045 X-RAY EXAM CHEST 1 VIEW: CPT | Mod: 26

## 2022-05-09 RX ORDER — MIDODRINE HYDROCHLORIDE 2.5 MG/1
10 TABLET ORAL ONCE
Refills: 0 | Status: COMPLETED | OUTPATIENT
Start: 2022-05-09 | End: 2022-05-09

## 2022-05-09 RX ORDER — METOPROLOL TARTRATE 50 MG
12.5 TABLET ORAL AT BEDTIME
Refills: 0 | Status: DISCONTINUED | OUTPATIENT
Start: 2022-05-10 | End: 2022-05-12

## 2022-05-09 RX ORDER — SODIUM CHLORIDE 9 MG/ML
3 INJECTION INTRAMUSCULAR; INTRAVENOUS; SUBCUTANEOUS
Refills: 0 | Status: DISCONTINUED | OUTPATIENT
Start: 2022-05-09 | End: 2022-05-19

## 2022-05-09 RX ADMIN — SODIUM CHLORIDE 3 MILLILITER(S): 9 INJECTION INTRAMUSCULAR; INTRAVENOUS; SUBCUTANEOUS at 15:20

## 2022-05-09 RX ADMIN — SODIUM CHLORIDE 3 MILLILITER(S): 9 INJECTION INTRAMUSCULAR; INTRAVENOUS; SUBCUTANEOUS at 18:53

## 2022-05-09 RX ADMIN — TAMSULOSIN HYDROCHLORIDE 0.8 MILLIGRAM(S): 0.4 CAPSULE ORAL at 21:46

## 2022-05-09 RX ADMIN — FINASTERIDE 5 MILLIGRAM(S): 5 TABLET, FILM COATED ORAL at 12:08

## 2022-05-09 RX ADMIN — Medication 3 MILLIGRAM(S): at 21:46

## 2022-05-09 RX ADMIN — Medication 1: at 09:18

## 2022-05-09 RX ADMIN — Medication 2: at 17:16

## 2022-05-09 RX ADMIN — CALCITRIOL 0.25 MICROGRAM(S): 0.5 CAPSULE ORAL at 12:08

## 2022-05-09 RX ADMIN — Medication 3 MILLILITER(S): at 12:16

## 2022-05-09 RX ADMIN — ESCITALOPRAM OXALATE 5 MILLIGRAM(S): 10 TABLET, FILM COATED ORAL at 12:09

## 2022-05-09 RX ADMIN — Medication 2: at 12:13

## 2022-05-09 RX ADMIN — Medication 3 MILLILITER(S): at 19:18

## 2022-05-09 RX ADMIN — Medication 1 APPLICATION(S): at 18:34

## 2022-05-09 RX ADMIN — Medication 20 MILLIGRAM(S): at 05:58

## 2022-05-09 RX ADMIN — APIXABAN 2.5 MILLIGRAM(S): 2.5 TABLET, FILM COATED ORAL at 05:57

## 2022-05-09 RX ADMIN — APIXABAN 2.5 MILLIGRAM(S): 2.5 TABLET, FILM COATED ORAL at 17:18

## 2022-05-09 RX ADMIN — DIVALPROEX SODIUM 500 MILLIGRAM(S): 500 TABLET, DELAYED RELEASE ORAL at 17:18

## 2022-05-09 RX ADMIN — Medication 5 MILLILITER(S): at 06:00

## 2022-05-09 RX ADMIN — MIDODRINE HYDROCHLORIDE 10 MILLIGRAM(S): 2.5 TABLET ORAL at 19:20

## 2022-05-09 RX ADMIN — MIDODRINE HYDROCHLORIDE 10 MILLIGRAM(S): 2.5 TABLET ORAL at 12:06

## 2022-05-09 RX ADMIN — MIDODRINE HYDROCHLORIDE 10 MILLIGRAM(S): 2.5 TABLET ORAL at 17:18

## 2022-05-09 RX ADMIN — Medication 1 APPLICATION(S): at 06:06

## 2022-05-09 RX ADMIN — Medication 325 MILLIGRAM(S): at 12:09

## 2022-05-09 RX ADMIN — Medication 81 MILLIGRAM(S): at 12:07

## 2022-05-09 RX ADMIN — Medication 75 MICROGRAM(S): at 05:57

## 2022-05-09 RX ADMIN — Medication 12.5 MILLIGRAM(S): at 05:57

## 2022-05-09 RX ADMIN — PREGABALIN 1000 MICROGRAM(S): 225 CAPSULE ORAL at 12:10

## 2022-05-09 RX ADMIN — Medication 5 MILLILITER(S): at 21:46

## 2022-05-09 RX ADMIN — Medication 2 SPRAY(S): at 21:55

## 2022-05-09 RX ADMIN — Medication 5 MILLILITER(S): at 15:10

## 2022-05-09 RX ADMIN — DIVALPROEX SODIUM 500 MILLIGRAM(S): 500 TABLET, DELAYED RELEASE ORAL at 05:56

## 2022-05-09 RX ADMIN — Medication 3 MILLILITER(S): at 04:32

## 2022-05-09 RX ADMIN — SODIUM CHLORIDE 3 MILLILITER(S): 9 INJECTION INTRAMUSCULAR; INTRAVENOUS; SUBCUTANEOUS at 06:50

## 2022-05-09 NOTE — PROGRESS NOTE ADULT - ASSESSMENT
Echo 3/31/22: The left ventricle is  enlarged with moderate to severe left ventricular systolic dysfunction,  estimated LVEF of 30-35%. The apical cap, apical septum and apical  lateral walls are akinetic with hypokinesis of the remaining walls. There  is no evidence of left ventricular thrombus The right ventricle is not well-visualized, device wire is noted within  the right heart Sclerotic trileaflet aortic valve with grossly normal opening, without AI.  Mild MR; Trace TR.  Cardiac Cath Lab (09.15.08 @ 13:41) >  Summary:  Hemodynamics: Hemodynamic assessment demonstrates moderate systemic  hypertension and moderately elevated LVEDP.  Recommendations:  Continue current medical therapy.  Echocardiogram with echo contrast agent to evaluate for LV apical thrombus.  Further arrhythmia management per the EP team.  Impressions: Angiography reveals nonobstructive coronary atherosclerosis  with LV dysfunction as described on the prior cath in 2005.    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    # PNA / RVP +  -CT chest noted New groundglass opacities (predominantly left-sided) and left upper  lobe opacities..  No change in the bilateral pleural effusions.  -management per pulm  + RVP/ influenza   -Bcx + gram + cocci in clusters 5/2- repeat NGTD   -sp iv abx - increase leukocytosis noted 5/9-- med fu      #Acute on chronic systolic CHF , sp AICD   -CT chest noted. with bl pleural eff  -ECHO with ef 25%<  mod to sev MR,  Moderate diastolic dysfunction (Stage II). Severe  left ventricular systolic dysfunction.  The apex is akinetic/dyskinetic.  -c/w mido to augment bp   -sp lasix 20 mg IVP x 1 5/5  -monitor creat- cont 20 mg lasix po daily  -c/w   toprol 12.5 mg daily     #Newly dx Afib   -dx recently  on last hospital admission  -sp device interrogation : Measured data WNL, normal ICD function, Pt is NOT pacemaker dependent; Stored data revealed episodes of AF lasting up to approximately 19 hours on 4/26/22 correlating with patient being in [facility] per pt/family; One episode of brief NSVT noted; No AF noted since 4/26/22; Optivol elevated sugg fluid overload   -c/w eliquis   -cont BB     # carotid stenosis s/p right CEA 2020,  -asa , resume statin     #chronic cva, c/w asa, resume statin     #Hypotension   -bcxs + - repeat bcx NGTD  -cw mido to augment bp     #candelaria on CKD   -renal fu

## 2022-05-09 NOTE — PROVIDER CONTACT NOTE (OTHER) - ASSESSMENT
Pt is in no respiratory distress. Pt on room air saturating at 88%. I placed him on 4 LNC and bumped up to 93%

## 2022-05-10 LAB
ALBUMIN SERPL ELPH-MCNC: 2.5 G/DL — LOW (ref 3.3–5)
ALP SERPL-CCNC: 56 U/L — SIGNIFICANT CHANGE UP (ref 40–120)
ALT FLD-CCNC: 9 U/L — LOW (ref 10–45)
ANION GAP SERPL CALC-SCNC: 12 MMOL/L — SIGNIFICANT CHANGE UP (ref 5–17)
AST SERPL-CCNC: 13 U/L — SIGNIFICANT CHANGE UP (ref 10–40)
BILIRUB SERPL-MCNC: 0.5 MG/DL — SIGNIFICANT CHANGE UP (ref 0.2–1.2)
BUN SERPL-MCNC: 45 MG/DL — HIGH (ref 7–23)
CALCIUM SERPL-MCNC: 9 MG/DL — SIGNIFICANT CHANGE UP (ref 8.4–10.5)
CHLORIDE SERPL-SCNC: 100 MMOL/L — SIGNIFICANT CHANGE UP (ref 96–108)
CO2 SERPL-SCNC: 26 MMOL/L — SIGNIFICANT CHANGE UP (ref 22–31)
CREAT SERPL-MCNC: 2.06 MG/DL — HIGH (ref 0.5–1.3)
EGFR: 33 ML/MIN/1.73M2 — LOW
GLUCOSE BLDC GLUCOMTR-MCNC: 145 MG/DL — HIGH (ref 70–99)
GLUCOSE BLDC GLUCOMTR-MCNC: 214 MG/DL — HIGH (ref 70–99)
GLUCOSE BLDC GLUCOMTR-MCNC: 214 MG/DL — HIGH (ref 70–99)
GLUCOSE BLDC GLUCOMTR-MCNC: 227 MG/DL — HIGH (ref 70–99)
GLUCOSE SERPL-MCNC: 129 MG/DL — HIGH (ref 70–99)
HCT VFR BLD CALC: 27.9 % — LOW (ref 39–50)
HGB BLD-MCNC: 9 G/DL — LOW (ref 13–17)
MCHC RBC-ENTMCNC: 24.4 PG — LOW (ref 27–34)
MCHC RBC-ENTMCNC: 32.3 GM/DL — SIGNIFICANT CHANGE UP (ref 32–36)
MCV RBC AUTO: 75.6 FL — LOW (ref 80–100)
NRBC # BLD: 0 /100 WBCS — SIGNIFICANT CHANGE UP (ref 0–0)
PLATELET # BLD AUTO: 125 K/UL — LOW (ref 150–400)
POTASSIUM SERPL-MCNC: 4 MMOL/L — SIGNIFICANT CHANGE UP (ref 3.5–5.3)
POTASSIUM SERPL-SCNC: 4 MMOL/L — SIGNIFICANT CHANGE UP (ref 3.5–5.3)
PROT SERPL-MCNC: 5.5 G/DL — LOW (ref 6–8.3)
RBC # BLD: 3.69 M/UL — LOW (ref 4.2–5.8)
RBC # FLD: 23.9 % — HIGH (ref 10.3–14.5)
SODIUM SERPL-SCNC: 138 MMOL/L — SIGNIFICANT CHANGE UP (ref 135–145)
WBC # BLD: 11.35 K/UL — HIGH (ref 3.8–10.5)
WBC # FLD AUTO: 11.35 K/UL — HIGH (ref 3.8–10.5)

## 2022-05-10 PROCEDURE — 71045 X-RAY EXAM CHEST 1 VIEW: CPT | Mod: 26

## 2022-05-10 RX ORDER — OXYMETAZOLINE HYDROCHLORIDE 0.5 MG/ML
1 SPRAY NASAL EVERY 12 HOURS
Refills: 0 | Status: COMPLETED | OUTPATIENT
Start: 2022-05-10 | End: 2022-05-14

## 2022-05-10 RX ADMIN — MIDODRINE HYDROCHLORIDE 10 MILLIGRAM(S): 2.5 TABLET ORAL at 11:44

## 2022-05-10 RX ADMIN — TAMSULOSIN HYDROCHLORIDE 0.8 MILLIGRAM(S): 0.4 CAPSULE ORAL at 22:38

## 2022-05-10 RX ADMIN — PREGABALIN 1000 MICROGRAM(S): 225 CAPSULE ORAL at 11:46

## 2022-05-10 RX ADMIN — Medication 5 MILLILITER(S): at 13:26

## 2022-05-10 RX ADMIN — DIVALPROEX SODIUM 500 MILLIGRAM(S): 500 TABLET, DELAYED RELEASE ORAL at 18:21

## 2022-05-10 RX ADMIN — Medication 325 MILLIGRAM(S): at 11:45

## 2022-05-10 RX ADMIN — OXYMETAZOLINE HYDROCHLORIDE 1 SPRAY(S): 0.5 SPRAY NASAL at 18:21

## 2022-05-10 RX ADMIN — Medication 20 MILLIGRAM(S): at 18:34

## 2022-05-10 RX ADMIN — SODIUM CHLORIDE 3 MILLILITER(S): 9 INJECTION INTRAMUSCULAR; INTRAVENOUS; SUBCUTANEOUS at 18:23

## 2022-05-10 RX ADMIN — MIDODRINE HYDROCHLORIDE 10 MILLIGRAM(S): 2.5 TABLET ORAL at 06:46

## 2022-05-10 RX ADMIN — DIVALPROEX SODIUM 500 MILLIGRAM(S): 500 TABLET, DELAYED RELEASE ORAL at 06:38

## 2022-05-10 RX ADMIN — Medication 2 SPRAY(S): at 22:00

## 2022-05-10 RX ADMIN — Medication 3 MILLILITER(S): at 18:42

## 2022-05-10 RX ADMIN — APIXABAN 2.5 MILLIGRAM(S): 2.5 TABLET, FILM COATED ORAL at 18:21

## 2022-05-10 RX ADMIN — FINASTERIDE 5 MILLIGRAM(S): 5 TABLET, FILM COATED ORAL at 11:45

## 2022-05-10 RX ADMIN — SODIUM CHLORIDE 3 MILLILITER(S): 9 INJECTION INTRAMUSCULAR; INTRAVENOUS; SUBCUTANEOUS at 11:42

## 2022-05-10 RX ADMIN — CALCITRIOL 0.25 MICROGRAM(S): 0.5 CAPSULE ORAL at 11:45

## 2022-05-10 RX ADMIN — Medication 1 APPLICATION(S): at 06:43

## 2022-05-10 RX ADMIN — Medication 1 APPLICATION(S): at 18:21

## 2022-05-10 RX ADMIN — SODIUM CHLORIDE 3 MILLILITER(S): 9 INJECTION INTRAMUSCULAR; INTRAVENOUS; SUBCUTANEOUS at 23:18

## 2022-05-10 RX ADMIN — APIXABAN 2.5 MILLIGRAM(S): 2.5 TABLET, FILM COATED ORAL at 06:37

## 2022-05-10 RX ADMIN — Medication 3 MILLILITER(S): at 06:42

## 2022-05-10 RX ADMIN — Medication 3 MILLILITER(S): at 00:07

## 2022-05-10 RX ADMIN — Medication 3 MILLILITER(S): at 11:47

## 2022-05-10 RX ADMIN — Medication 2: at 12:27

## 2022-05-10 RX ADMIN — Medication 20 MILLIGRAM(S): at 06:37

## 2022-05-10 RX ADMIN — SODIUM CHLORIDE 3 MILLILITER(S): 9 INJECTION INTRAMUSCULAR; INTRAVENOUS; SUBCUTANEOUS at 06:39

## 2022-05-10 RX ADMIN — Medication 12.5 MILLIGRAM(S): at 22:38

## 2022-05-10 RX ADMIN — Medication 5 MILLILITER(S): at 06:38

## 2022-05-10 RX ADMIN — ESCITALOPRAM OXALATE 5 MILLIGRAM(S): 10 TABLET, FILM COATED ORAL at 11:45

## 2022-05-10 RX ADMIN — Medication 3 MILLIGRAM(S): at 22:38

## 2022-05-10 RX ADMIN — Medication 81 MILLIGRAM(S): at 11:45

## 2022-05-10 RX ADMIN — SODIUM CHLORIDE 3 MILLILITER(S): 9 INJECTION INTRAMUSCULAR; INTRAVENOUS; SUBCUTANEOUS at 00:07

## 2022-05-10 RX ADMIN — Medication 5 MILLILITER(S): at 22:39

## 2022-05-10 RX ADMIN — Medication 75 MICROGRAM(S): at 06:36

## 2022-05-10 RX ADMIN — Medication 3 MILLILITER(S): at 23:18

## 2022-05-10 RX ADMIN — MIDODRINE HYDROCHLORIDE 10 MILLIGRAM(S): 2.5 TABLET ORAL at 16:46

## 2022-05-10 RX ADMIN — Medication 2: at 17:03

## 2022-05-10 NOTE — PROGRESS NOTE ADULT - ASSESSMENT
Echo 3/31/22: The left ventricle is  enlarged with moderate to severe left ventricular systolic dysfunction,  estimated LVEF of 30-35%. The apical cap, apical septum and apical  lateral walls are akinetic with hypokinesis of the remaining walls. There  is no evidence of left ventricular thrombus The right ventricle is not well-visualized, device wire is noted within  the right heart Sclerotic trileaflet aortic valve with grossly normal opening, without AI.  Mild MR; Trace TR.  Cardiac Cath Lab (09.15.08 @ 13:41) >  Summary:  Hemodynamics: Hemodynamic assessment demonstrates moderate systemic  hypertension and moderately elevated LVEDP.  Recommendations:  Continue current medical therapy.  Echocardiogram with echo contrast agent to evaluate for LV apical thrombus.  Further arrhythmia management per the EP team.  Impressions: Angiography reveals nonobstructive coronary atherosclerosis  with LV dysfunction as described on the prior cath in 2005.    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    # PNA / RVP +  -CT chest noted New groundglass opacities (predominantly left-sided) and left upper  lobe opacities..  No change in the bilateral pleural effusions.  -management per pulm  + RVP/ influenza   -Bcx + gram + cocci in clusters 5/2- repeat NGTD   -sp iv abx - increase leukocytosis noted 5/9- now downtrending- med fu      #Acute on chronic systolic CHF , sp AICD   -CT chest noted. with bl pleural eff  -ECHO with ef 25%<  mod to sev MR,  Moderate diastolic dysfunction (Stage II). Severe  left ventricular systolic dysfunction.  The apex is akinetic/dyskinetic.  -c/w mido to augment bp   -sp lasix 20 mg IVP x 1 5/5  -monitor creat- cont 20 mg lasix po daily- if creat continues to rise- will decrease to every other day   -c/w   toprol 12.5 mg daily     #Newly dx Afib   -dx recently  on last hospital admission  -sp device interrogation : Measured data WNL, normal ICD function, Pt is NOT pacemaker dependent; Stored data revealed episodes of AF lasting up to approximately 19 hours on 4/26/22 correlating with patient being in [facility] per pt/family; One episode of brief NSVT noted; No AF noted since 4/26/22; Optivol elevated sugg fluid overload   -c/w eliquis   -cont BB     # carotid stenosis s/p right CEA 2020,  -asa , resume statin     #chronic cva, c/w asa, resume statin     #Hypotension   -bcxs + - repeat bcx NGTD  -cw mido to augment bp     #candelaria on CKD   -renal fu

## 2022-05-11 LAB
ANION GAP SERPL CALC-SCNC: 12 MMOL/L — SIGNIFICANT CHANGE UP (ref 5–17)
BUN SERPL-MCNC: 37 MG/DL — HIGH (ref 7–23)
CALCIUM SERPL-MCNC: 9 MG/DL — SIGNIFICANT CHANGE UP (ref 8.4–10.5)
CHLORIDE SERPL-SCNC: 98 MMOL/L — SIGNIFICANT CHANGE UP (ref 96–108)
CO2 SERPL-SCNC: 25 MMOL/L — SIGNIFICANT CHANGE UP (ref 22–31)
CREAT SERPL-MCNC: 1.78 MG/DL — HIGH (ref 0.5–1.3)
EGFR: 40 ML/MIN/1.73M2 — LOW
GLUCOSE BLDC GLUCOMTR-MCNC: 282 MG/DL — HIGH (ref 70–99)
GLUCOSE BLDC GLUCOMTR-MCNC: 293 MG/DL — HIGH (ref 70–99)
GLUCOSE BLDC GLUCOMTR-MCNC: 352 MG/DL — HIGH (ref 70–99)
GLUCOSE BLDC GLUCOMTR-MCNC: 376 MG/DL — HIGH (ref 70–99)
GLUCOSE SERPL-MCNC: 236 MG/DL — HIGH (ref 70–99)
POTASSIUM SERPL-MCNC: 4.5 MMOL/L — SIGNIFICANT CHANGE UP (ref 3.5–5.3)
POTASSIUM SERPL-SCNC: 4.5 MMOL/L — SIGNIFICANT CHANGE UP (ref 3.5–5.3)
SODIUM SERPL-SCNC: 135 MMOL/L — SIGNIFICANT CHANGE UP (ref 135–145)

## 2022-05-11 RX ORDER — SODIUM CHLORIDE 9 MG/ML
500 INJECTION INTRAMUSCULAR; INTRAVENOUS; SUBCUTANEOUS
Refills: 0 | Status: DISCONTINUED | OUTPATIENT
Start: 2022-05-11 | End: 2022-05-12

## 2022-05-11 RX ORDER — MIDODRINE HYDROCHLORIDE 2.5 MG/1
20 TABLET ORAL THREE TIMES A DAY
Refills: 0 | Status: DISCONTINUED | OUTPATIENT
Start: 2022-05-11 | End: 2022-05-13

## 2022-05-11 RX ADMIN — Medication 3 MILLILITER(S): at 05:49

## 2022-05-11 RX ADMIN — Medication 3 MILLIGRAM(S): at 22:06

## 2022-05-11 RX ADMIN — TAMSULOSIN HYDROCHLORIDE 0.8 MILLIGRAM(S): 0.4 CAPSULE ORAL at 22:06

## 2022-05-11 RX ADMIN — APIXABAN 2.5 MILLIGRAM(S): 2.5 TABLET, FILM COATED ORAL at 05:50

## 2022-05-11 RX ADMIN — FINASTERIDE 5 MILLIGRAM(S): 5 TABLET, FILM COATED ORAL at 12:46

## 2022-05-11 RX ADMIN — SODIUM CHLORIDE 3 MILLILITER(S): 9 INJECTION INTRAMUSCULAR; INTRAVENOUS; SUBCUTANEOUS at 17:25

## 2022-05-11 RX ADMIN — Medication 3: at 12:42

## 2022-05-11 RX ADMIN — Medication 3 MILLILITER(S): at 12:43

## 2022-05-11 RX ADMIN — Medication 1 APPLICATION(S): at 05:51

## 2022-05-11 RX ADMIN — Medication 5: at 17:18

## 2022-05-11 RX ADMIN — APIXABAN 2.5 MILLIGRAM(S): 2.5 TABLET, FILM COATED ORAL at 17:21

## 2022-05-11 RX ADMIN — MIDODRINE HYDROCHLORIDE 10 MILLIGRAM(S): 2.5 TABLET ORAL at 05:50

## 2022-05-11 RX ADMIN — CALCITRIOL 0.25 MICROGRAM(S): 0.5 CAPSULE ORAL at 12:45

## 2022-05-11 RX ADMIN — Medication 20 MILLIGRAM(S): at 05:50

## 2022-05-11 RX ADMIN — OXYMETAZOLINE HYDROCHLORIDE 1 SPRAY(S): 0.5 SPRAY NASAL at 17:24

## 2022-05-11 RX ADMIN — ESCITALOPRAM OXALATE 5 MILLIGRAM(S): 10 TABLET, FILM COATED ORAL at 12:45

## 2022-05-11 RX ADMIN — DIVALPROEX SODIUM 500 MILLIGRAM(S): 500 TABLET, DELAYED RELEASE ORAL at 05:49

## 2022-05-11 RX ADMIN — SODIUM CHLORIDE 500 MILLILITER(S): 9 INJECTION INTRAMUSCULAR; INTRAVENOUS; SUBCUTANEOUS at 12:52

## 2022-05-11 RX ADMIN — PREGABALIN 1000 MICROGRAM(S): 225 CAPSULE ORAL at 12:45

## 2022-05-11 RX ADMIN — DIVALPROEX SODIUM 500 MILLIGRAM(S): 500 TABLET, DELAYED RELEASE ORAL at 17:21

## 2022-05-11 RX ADMIN — OXYMETAZOLINE HYDROCHLORIDE 1 SPRAY(S): 0.5 SPRAY NASAL at 06:11

## 2022-05-11 RX ADMIN — Medication 5 MILLILITER(S): at 05:51

## 2022-05-11 RX ADMIN — Medication 1 APPLICATION(S): at 17:26

## 2022-05-11 RX ADMIN — Medication 3 MILLILITER(S): at 17:25

## 2022-05-11 RX ADMIN — SODIUM CHLORIDE 3 MILLILITER(S): 9 INJECTION INTRAMUSCULAR; INTRAVENOUS; SUBCUTANEOUS at 05:49

## 2022-05-11 RX ADMIN — SODIUM CHLORIDE 3 MILLILITER(S): 9 INJECTION INTRAMUSCULAR; INTRAVENOUS; SUBCUTANEOUS at 23:25

## 2022-05-11 RX ADMIN — Medication 325 MILLIGRAM(S): at 12:45

## 2022-05-11 RX ADMIN — SODIUM CHLORIDE 3 MILLILITER(S): 9 INJECTION INTRAMUSCULAR; INTRAVENOUS; SUBCUTANEOUS at 12:43

## 2022-05-11 RX ADMIN — Medication 3: at 08:25

## 2022-05-11 RX ADMIN — Medication 20 MILLIGRAM(S): at 17:23

## 2022-05-11 RX ADMIN — Medication 81 MILLIGRAM(S): at 12:45

## 2022-05-11 RX ADMIN — Medication 5 MILLILITER(S): at 22:13

## 2022-05-11 RX ADMIN — MIDODRINE HYDROCHLORIDE 10 MILLIGRAM(S): 2.5 TABLET ORAL at 12:44

## 2022-05-11 RX ADMIN — Medication 2 SPRAY(S): at 23:24

## 2022-05-11 RX ADMIN — MIDODRINE HYDROCHLORIDE 20 MILLIGRAM(S): 2.5 TABLET ORAL at 17:22

## 2022-05-11 RX ADMIN — Medication 3 MILLILITER(S): at 23:24

## 2022-05-11 RX ADMIN — Medication 75 MICROGRAM(S): at 05:50

## 2022-05-11 NOTE — PROGRESS NOTE ADULT - ASSESSMENT
Echo 3/31/22: The left ventricle is  enlarged with moderate to severe left ventricular systolic dysfunction,  estimated LVEF of 30-35%. The apical cap, apical septum and apical  lateral walls are akinetic with hypokinesis of the remaining walls. There  is no evidence of left ventricular thrombus The right ventricle is not well-visualized, device wire is noted within  the right heart Sclerotic trileaflet aortic valve with grossly normal opening, without AI.  Mild MR; Trace TR.  Cardiac Cath Lab (09.15.08 @ 13:41) >  Summary:  Hemodynamics: Hemodynamic assessment demonstrates moderate systemic  hypertension and moderately elevated LVEDP.  Recommendations:  Continue current medical therapy.  Echocardiogram with echo contrast agent to evaluate for LV apical thrombus.  Further arrhythmia management per the EP team.  Impressions: Angiography reveals nonobstructive coronary atherosclerosis  with LV dysfunction as described on the prior cath in 2005.    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    # PNA / RVP +  -CT chest noted New groundglass opacities (predominantly left-sided) and left upper  lobe opacities..  No change in the bilateral pleural effusions.  -management per pulm  + RVP/ influenza   -Bcx + gram + cocci in clusters 5/2- repeat NGTD   -sp iv abx;  med fu      #Acute on chronic systolic CHF , sp AICD   -CT chest noted. with bl pleural eff  -ECHO with ef 25%<  mod to sev MR,  Moderate diastolic dysfunction (Stage II). Severe  left ventricular systolic dysfunction.  The apex is akinetic/dyskinetic.  -c/w mido to augment bp   -sp lasix 20 mg IVP x 1 5/5  -monitor creat- cont 20 mg lasix po daily  -c/w   toprol 12.5 mg daily     #Newly dx Afib   -dx recently  on last hospital admission  -sp device interrogation : Measured data WNL, normal ICD function, Pt is NOT pacemaker dependent; Stored data revealed episodes of AF lasting up to approximately 19 hours on 4/26/22 correlating with patient being in [facility] per pt/family; One episode of brief NSVT noted; No AF noted since 4/26/22; Optivol elevated sugg fluid overload   -c/w eliquis   -cont BB     # carotid stenosis s/p right CEA 2020,  -asa , resume statin     #chronic cva, c/w asa, resume statin     #Hypotension   -bcxs + - repeat bcx NGTD  -cw mido to augment bp     #candelaria on CKD   -renal fu

## 2022-05-11 NOTE — PROGRESS NOTE ADULT - NSPROGADDITIONALINFOA_GEN_ALL_CORE
severe orthostasis likely secondary to autonomic neuropathy   IV  mls x 1  Midodrine increase to 20 TID    discussed with covering ACP

## 2022-05-12 DIAGNOSIS — I10 ESSENTIAL (PRIMARY) HYPERTENSION: ICD-10-CM

## 2022-05-12 LAB
ANION GAP SERPL CALC-SCNC: 13 MMOL/L — SIGNIFICANT CHANGE UP (ref 5–17)
BUN SERPL-MCNC: 43 MG/DL — HIGH (ref 7–23)
CALCIUM SERPL-MCNC: 9 MG/DL — SIGNIFICANT CHANGE UP (ref 8.4–10.5)
CHLORIDE SERPL-SCNC: 97 MMOL/L — SIGNIFICANT CHANGE UP (ref 96–108)
CO2 SERPL-SCNC: 24 MMOL/L — SIGNIFICANT CHANGE UP (ref 22–31)
CREAT SERPL-MCNC: 1.66 MG/DL — HIGH (ref 0.5–1.3)
EGFR: 43 ML/MIN/1.73M2 — LOW
GLUCOSE BLDC GLUCOMTR-MCNC: 274 MG/DL — HIGH (ref 70–99)
GLUCOSE BLDC GLUCOMTR-MCNC: 310 MG/DL — HIGH (ref 70–99)
GLUCOSE BLDC GLUCOMTR-MCNC: 318 MG/DL — HIGH (ref 70–99)
GLUCOSE BLDC GLUCOMTR-MCNC: 349 MG/DL — HIGH (ref 70–99)
GLUCOSE BLDC GLUCOMTR-MCNC: 351 MG/DL — HIGH (ref 70–99)
GLUCOSE BLDC GLUCOMTR-MCNC: 391 MG/DL — HIGH (ref 70–99)
GLUCOSE BLDC GLUCOMTR-MCNC: 409 MG/DL — HIGH (ref 70–99)
GLUCOSE SERPL-MCNC: 306 MG/DL — HIGH (ref 70–99)
POTASSIUM SERPL-MCNC: 4 MMOL/L — SIGNIFICANT CHANGE UP (ref 3.5–5.3)
POTASSIUM SERPL-SCNC: 4 MMOL/L — SIGNIFICANT CHANGE UP (ref 3.5–5.3)
SODIUM SERPL-SCNC: 134 MMOL/L — LOW (ref 135–145)

## 2022-05-12 RX ORDER — SODIUM CHLORIDE 9 MG/ML
1000 INJECTION INTRAMUSCULAR; INTRAVENOUS; SUBCUTANEOUS
Refills: 0 | Status: DISCONTINUED | OUTPATIENT
Start: 2022-05-12 | End: 2022-05-14

## 2022-05-12 RX ORDER — INSULIN LISPRO 100/ML
VIAL (ML) SUBCUTANEOUS
Refills: 0 | Status: DISCONTINUED | OUTPATIENT
Start: 2022-05-12 | End: 2022-05-19

## 2022-05-12 RX ORDER — FLUDROCORTISONE ACETATE 0.1 MG/1
0.1 TABLET ORAL
Refills: 0 | Status: DISCONTINUED | OUTPATIENT
Start: 2022-05-12 | End: 2022-05-16

## 2022-05-12 RX ADMIN — Medication 20 MILLIGRAM(S): at 05:02

## 2022-05-12 RX ADMIN — MIDODRINE HYDROCHLORIDE 20 MILLIGRAM(S): 2.5 TABLET ORAL at 05:05

## 2022-05-12 RX ADMIN — Medication 3 MILLIGRAM(S): at 21:42

## 2022-05-12 RX ADMIN — MIDODRINE HYDROCHLORIDE 20 MILLIGRAM(S): 2.5 TABLET ORAL at 11:45

## 2022-05-12 RX ADMIN — APIXABAN 2.5 MILLIGRAM(S): 2.5 TABLET, FILM COATED ORAL at 17:10

## 2022-05-12 RX ADMIN — MIDODRINE HYDROCHLORIDE 20 MILLIGRAM(S): 2.5 TABLET ORAL at 17:10

## 2022-05-12 RX ADMIN — Medication 2 SPRAY(S): at 21:07

## 2022-05-12 RX ADMIN — FINASTERIDE 5 MILLIGRAM(S): 5 TABLET, FILM COATED ORAL at 11:45

## 2022-05-12 RX ADMIN — Medication 3 MILLILITER(S): at 23:00

## 2022-05-12 RX ADMIN — Medication 3 MILLILITER(S): at 05:03

## 2022-05-12 RX ADMIN — Medication 1 APPLICATION(S): at 05:03

## 2022-05-12 RX ADMIN — ERYTHROPOIETIN 10000 UNIT(S): 10000 INJECTION, SOLUTION INTRAVENOUS; SUBCUTANEOUS at 17:10

## 2022-05-12 RX ADMIN — Medication 75 MICROGRAM(S): at 05:02

## 2022-05-12 RX ADMIN — FLUDROCORTISONE ACETATE 0.1 MILLIGRAM(S): 0.1 TABLET ORAL at 17:10

## 2022-05-12 RX ADMIN — OXYMETAZOLINE HYDROCHLORIDE 1 SPRAY(S): 0.5 SPRAY NASAL at 17:22

## 2022-05-12 RX ADMIN — SODIUM CHLORIDE 3 MILLILITER(S): 9 INJECTION INTRAMUSCULAR; INTRAVENOUS; SUBCUTANEOUS at 17:18

## 2022-05-12 RX ADMIN — DIVALPROEX SODIUM 500 MILLIGRAM(S): 500 TABLET, DELAYED RELEASE ORAL at 05:02

## 2022-05-12 RX ADMIN — Medication 5: at 17:19

## 2022-05-12 RX ADMIN — CALCITRIOL 0.25 MICROGRAM(S): 0.5 CAPSULE ORAL at 11:45

## 2022-05-12 RX ADMIN — APIXABAN 2.5 MILLIGRAM(S): 2.5 TABLET, FILM COATED ORAL at 05:02

## 2022-05-12 RX ADMIN — Medication 4: at 12:50

## 2022-05-12 RX ADMIN — Medication 3 MILLILITER(S): at 17:09

## 2022-05-12 RX ADMIN — Medication 325 MILLIGRAM(S): at 11:44

## 2022-05-12 RX ADMIN — Medication 4: at 08:08

## 2022-05-12 RX ADMIN — SODIUM CHLORIDE 3 MILLILITER(S): 9 INJECTION INTRAMUSCULAR; INTRAVENOUS; SUBCUTANEOUS at 23:00

## 2022-05-12 RX ADMIN — SODIUM CHLORIDE 3 MILLILITER(S): 9 INJECTION INTRAMUSCULAR; INTRAVENOUS; SUBCUTANEOUS at 05:03

## 2022-05-12 RX ADMIN — PREGABALIN 1000 MICROGRAM(S): 225 CAPSULE ORAL at 11:44

## 2022-05-12 RX ADMIN — SODIUM CHLORIDE 3 MILLILITER(S): 9 INJECTION INTRAMUSCULAR; INTRAVENOUS; SUBCUTANEOUS at 11:43

## 2022-05-12 RX ADMIN — ESCITALOPRAM OXALATE 5 MILLIGRAM(S): 10 TABLET, FILM COATED ORAL at 11:44

## 2022-05-12 RX ADMIN — Medication 5 MILLILITER(S): at 05:03

## 2022-05-12 RX ADMIN — Medication 4: at 23:23

## 2022-05-12 RX ADMIN — Medication 81 MILLIGRAM(S): at 11:44

## 2022-05-12 RX ADMIN — OXYMETAZOLINE HYDROCHLORIDE 1 SPRAY(S): 0.5 SPRAY NASAL at 05:04

## 2022-05-12 RX ADMIN — DIVALPROEX SODIUM 500 MILLIGRAM(S): 500 TABLET, DELAYED RELEASE ORAL at 17:22

## 2022-05-12 RX ADMIN — Medication 3 MILLILITER(S): at 11:43

## 2022-05-12 RX ADMIN — Medication 5 MILLILITER(S): at 21:42

## 2022-05-12 RX ADMIN — Medication 20 MILLIGRAM(S): at 17:10

## 2022-05-12 RX ADMIN — SODIUM CHLORIDE 50 MILLILITER(S): 9 INJECTION INTRAMUSCULAR; INTRAVENOUS; SUBCUTANEOUS at 14:32

## 2022-05-12 NOTE — CHART NOTE - NSCHARTNOTEFT_GEN_A_CORE
Pt with symptomatic Orthostasis - c/o dizziness - unable to tolerate activity  Florinef 0.1mg daily at 6PM, normal saline 50cc/hr x 1 L and d/c'd Lasix per Dr. Larson    [id number]

## 2022-05-12 NOTE — PROGRESS NOTE ADULT - ASSESSMENT
Echo 3/31/22: The left ventricle is  enlarged with moderate to severe left ventricular systolic dysfunction,  estimated LVEF of 30-35%. The apical cap, apical septum and apical  lateral walls are akinetic with hypokinesis of the remaining walls. There  is no evidence of left ventricular thrombus The right ventricle is not well-visualized, device wire is noted within  the right heart Sclerotic trileaflet aortic valve with grossly normal opening, without AI.  Mild MR; Trace TR.  Cardiac Cath Lab (09.15.08 @ 13:41) >  Summary:  Hemodynamics: Hemodynamic assessment demonstrates moderate systemic  hypertension and moderately elevated LVEDP.  Recommendations:  Continue current medical therapy.  Echocardiogram with echo contrast agent to evaluate for LV apical thrombus.  Further arrhythmia management per the EP team.  Impressions: Angiography reveals nonobstructive coronary atherosclerosis  with LV dysfunction as described on the prior cath in 2005.    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    # PNA / RVP +  -CT chest noted New groundglass opacities (predominantly left-sided) and left upper  lobe opacities..  No change in the bilateral pleural effusions.  -management per pulm  + RVP/ influenza   -Bcx + gram + cocci in clusters 5/2- repeat NGTD   -sp iv abx;  med fu      #Acute on chronic systolic CHF , sp AICD   -CT chest noted. with bl pleural eff  -ECHO with ef 25%<  mod to sev MR,  Moderate diastolic dysfunction (Stage II). Severe  left ventricular systolic dysfunction.  The apex is akinetic/dyskinetic.  -c/w mido to augment bp -hypotension noted last night   -sp lasix 20 mg IVP x 1 5/5  -monitor creat- cont 20 mg lasix po daily  -c/w   toprol 12.5 mg daily     #Newly dx Afib   -dx recently  on last hospital admission  -sp device interrogation : Measured data WNL, normal ICD function, Pt is NOT pacemaker dependent; Stored data revealed episodes of AF lasting up to approximately 19 hours on 4/26/22 correlating with patient being in [facility] per pt/family; One episode of brief NSVT noted; No AF noted since 4/26/22; Optivol elevated sugg fluid overload   -c/w eliquis   -cont BB     # carotid stenosis s/p right CEA 2020,  -asa , resume statin     #chronic cva, c/w asa, resume statin     #Hypotension   -bcxs + - repeat bcx NGTD  -cw mido to augment bp     #candelaria on CKD   -renal fu

## 2022-05-12 NOTE — PROGRESS NOTE ADULT - NSPROGADDITIONALINFOA_GEN_ALL_CORE
discussed with covering ACP   left message with wife over the phone discussed with covering ACP   discussed with patient's wife at bedside in detail Lazara over the phone in detail

## 2022-05-12 NOTE — PROVIDER CONTACT NOTE (OTHER) - ASSESSMENT
pt converted to AFIb on tele for 17secs. pt is asymptomatic. pt has no c/o of chest pain,SOB,palpitation,n/v/d reported/noted.

## 2022-05-13 LAB
ANION GAP SERPL CALC-SCNC: 15 MMOL/L — SIGNIFICANT CHANGE UP (ref 5–17)
BUN SERPL-MCNC: 38 MG/DL — HIGH (ref 7–23)
CALCIUM SERPL-MCNC: 9 MG/DL — SIGNIFICANT CHANGE UP (ref 8.4–10.5)
CHLORIDE SERPL-SCNC: 98 MMOL/L — SIGNIFICANT CHANGE UP (ref 96–108)
CO2 SERPL-SCNC: 25 MMOL/L — SIGNIFICANT CHANGE UP (ref 22–31)
CREAT SERPL-MCNC: 1.7 MG/DL — HIGH (ref 0.5–1.3)
EGFR: 42 ML/MIN/1.73M2 — LOW
GLUCOSE BLDC GLUCOMTR-MCNC: 220 MG/DL — HIGH (ref 70–99)
GLUCOSE BLDC GLUCOMTR-MCNC: 272 MG/DL — HIGH (ref 70–99)
GLUCOSE BLDC GLUCOMTR-MCNC: 275 MG/DL — HIGH (ref 70–99)
GLUCOSE BLDC GLUCOMTR-MCNC: 290 MG/DL — HIGH (ref 70–99)
GLUCOSE SERPL-MCNC: 242 MG/DL — HIGH (ref 70–99)
HCT VFR BLD CALC: 26.3 % — LOW (ref 39–50)
HGB BLD-MCNC: 8.5 G/DL — LOW (ref 13–17)
MCHC RBC-ENTMCNC: 24.6 PG — LOW (ref 27–34)
MCHC RBC-ENTMCNC: 32.3 GM/DL — SIGNIFICANT CHANGE UP (ref 32–36)
MCV RBC AUTO: 76 FL — LOW (ref 80–100)
NRBC # BLD: 0 /100 WBCS — SIGNIFICANT CHANGE UP (ref 0–0)
PLATELET # BLD AUTO: 232 K/UL — SIGNIFICANT CHANGE UP (ref 150–400)
POTASSIUM SERPL-MCNC: 3.9 MMOL/L — SIGNIFICANT CHANGE UP (ref 3.5–5.3)
POTASSIUM SERPL-SCNC: 3.9 MMOL/L — SIGNIFICANT CHANGE UP (ref 3.5–5.3)
RBC # BLD: 3.46 M/UL — LOW (ref 4.2–5.8)
RBC # FLD: 24.8 % — HIGH (ref 10.3–14.5)
SODIUM SERPL-SCNC: 138 MMOL/L — SIGNIFICANT CHANGE UP (ref 135–145)
WBC # BLD: 11.08 K/UL — HIGH (ref 3.8–10.5)
WBC # FLD AUTO: 11.08 K/UL — HIGH (ref 3.8–10.5)

## 2022-05-13 RX ORDER — INSULIN GLARGINE 100 [IU]/ML
5 INJECTION, SOLUTION SUBCUTANEOUS AT BEDTIME
Refills: 0 | Status: DISCONTINUED | OUTPATIENT
Start: 2022-05-13 | End: 2022-05-19

## 2022-05-13 RX ORDER — GLUCAGON INJECTION, SOLUTION 0.5 MG/.1ML
1 INJECTION, SOLUTION SUBCUTANEOUS ONCE
Refills: 0 | Status: DISCONTINUED | OUTPATIENT
Start: 2022-05-13 | End: 2022-05-19

## 2022-05-13 RX ORDER — SODIUM CHLORIDE 9 MG/ML
1000 INJECTION, SOLUTION INTRAVENOUS
Refills: 0 | Status: DISCONTINUED | OUTPATIENT
Start: 2022-05-13 | End: 2022-05-19

## 2022-05-13 RX ORDER — MIDODRINE HYDROCHLORIDE 2.5 MG/1
20 TABLET ORAL THREE TIMES A DAY
Refills: 0 | Status: DISCONTINUED | OUTPATIENT
Start: 2022-05-13 | End: 2022-05-16

## 2022-05-13 RX ADMIN — MIDODRINE HYDROCHLORIDE 20 MILLIGRAM(S): 2.5 TABLET ORAL at 18:27

## 2022-05-13 RX ADMIN — FINASTERIDE 5 MILLIGRAM(S): 5 TABLET, FILM COATED ORAL at 12:39

## 2022-05-13 RX ADMIN — FLUDROCORTISONE ACETATE 0.1 MILLIGRAM(S): 0.1 TABLET ORAL at 18:27

## 2022-05-13 RX ADMIN — Medication 3 MILLILITER(S): at 18:27

## 2022-05-13 RX ADMIN — OXYMETAZOLINE HYDROCHLORIDE 1 SPRAY(S): 0.5 SPRAY NASAL at 18:27

## 2022-05-13 RX ADMIN — MIDODRINE HYDROCHLORIDE 20 MILLIGRAM(S): 2.5 TABLET ORAL at 05:43

## 2022-05-13 RX ADMIN — APIXABAN 2.5 MILLIGRAM(S): 2.5 TABLET, FILM COATED ORAL at 05:44

## 2022-05-13 RX ADMIN — Medication 3 MILLILITER(S): at 23:19

## 2022-05-13 RX ADMIN — Medication 3: at 08:29

## 2022-05-13 RX ADMIN — Medication 3: at 16:52

## 2022-05-13 RX ADMIN — CALCITRIOL 0.25 MICROGRAM(S): 0.5 CAPSULE ORAL at 12:39

## 2022-05-13 RX ADMIN — Medication 5 MILLILITER(S): at 05:46

## 2022-05-13 RX ADMIN — PREGABALIN 1000 MICROGRAM(S): 225 CAPSULE ORAL at 12:39

## 2022-05-13 RX ADMIN — Medication 5 MILLILITER(S): at 22:06

## 2022-05-13 RX ADMIN — Medication 2 SPRAY(S): at 22:06

## 2022-05-13 RX ADMIN — Medication 5 MILLILITER(S): at 12:39

## 2022-05-13 RX ADMIN — INSULIN GLARGINE 5 UNIT(S): 100 INJECTION, SOLUTION SUBCUTANEOUS at 22:05

## 2022-05-13 RX ADMIN — SODIUM CHLORIDE 3 MILLILITER(S): 9 INJECTION INTRAMUSCULAR; INTRAVENOUS; SUBCUTANEOUS at 23:19

## 2022-05-13 RX ADMIN — SODIUM CHLORIDE 3 MILLILITER(S): 9 INJECTION INTRAMUSCULAR; INTRAVENOUS; SUBCUTANEOUS at 18:27

## 2022-05-13 RX ADMIN — Medication 2: at 22:05

## 2022-05-13 RX ADMIN — DIVALPROEX SODIUM 500 MILLIGRAM(S): 500 TABLET, DELAYED RELEASE ORAL at 05:46

## 2022-05-13 RX ADMIN — OXYMETAZOLINE HYDROCHLORIDE 1 SPRAY(S): 0.5 SPRAY NASAL at 05:47

## 2022-05-13 RX ADMIN — Medication 75 MICROGRAM(S): at 05:44

## 2022-05-13 RX ADMIN — SODIUM CHLORIDE 3 MILLILITER(S): 9 INJECTION INTRAMUSCULAR; INTRAVENOUS; SUBCUTANEOUS at 05:47

## 2022-05-13 RX ADMIN — Medication 3 MILLIGRAM(S): at 22:05

## 2022-05-13 RX ADMIN — Medication 325 MILLIGRAM(S): at 12:39

## 2022-05-13 RX ADMIN — MIDODRINE HYDROCHLORIDE 20 MILLIGRAM(S): 2.5 TABLET ORAL at 12:40

## 2022-05-13 RX ADMIN — DIVALPROEX SODIUM 500 MILLIGRAM(S): 500 TABLET, DELAYED RELEASE ORAL at 18:27

## 2022-05-13 RX ADMIN — APIXABAN 2.5 MILLIGRAM(S): 2.5 TABLET, FILM COATED ORAL at 18:27

## 2022-05-13 RX ADMIN — Medication 3: at 12:39

## 2022-05-13 RX ADMIN — Medication 3 MILLILITER(S): at 05:47

## 2022-05-13 RX ADMIN — Medication 81 MILLIGRAM(S): at 12:39

## 2022-05-13 RX ADMIN — ESCITALOPRAM OXALATE 5 MILLIGRAM(S): 10 TABLET, FILM COATED ORAL at 12:39

## 2022-05-13 RX ADMIN — Medication 20 MILLIGRAM(S): at 05:44

## 2022-05-13 NOTE — PROVIDER CONTACT NOTE (OTHER) - ACTION/TREATMENT ORDERED:
Provider notified. Bedtime fs ordered & administered.
Give nebulizer treatment early. Also give NaCl treatment as well.
Will continue to monitor.
provider notified, retake BP manually on other arm, pending ICU consult

## 2022-05-13 NOTE — CHART NOTE - NSCHARTNOTEFT_GEN_A_CORE
Nutrition Follow Up Note  Patient seen for: malnutrition follow-up    Chart reviewed, events noted.    Source: [x] Patient       [x] EMR        [] RN        [] Family at bedside       [] Other:    -If unable to interview patient: [] Trach/Vent/BiPAP  [] Disoriented/confused/inappropriate to interview    Diet Order:   Diet, Pureed:   DASH/TLC {Sodium & Cholesterol Restricted} (DASH)  Moderately Thick Liquids (MODTHICKLIQS)  Low Sodium  Supplement Feeding Modality:  Oral  Suplena Cans or Servings Per Day:  3       Frequency:  Daily  Probiotic Yogurt/Smoothie Cans or Servings Per Day:  2       Frequency:  Daily (22)    - Is current order appropriate/adequate? [] Yes  [x]  No: Pt no longer requires Suplena, kidney fx at baseline, pt is malnourished     - PO intake :   [x] >75%  Adequate    [] 50-75%  Fair       [] <50%  Poor   - takes Suplena BID, eats most of pureed foods. Some requests: would like gravy with mashed potatoes and would like some pureed fruit. Will accommodate.     - Nutrition-related concerns:   - d/c on hold secondary to orthostatic hypotension    - SLP on  recommended NPO with non-oral means of nutrition, though pt chooses to eat despite aspiration risks --> conservative diet puree + mod thick liquids.   - Hyperglycemia while on steroids, lantus was added. Recommend Consistent Carbohydrate diet, pt will only receive diet beverages.   - Hba1c 8.3%    GI:  Last BM  Bowel Regimen? [] Yes   [x] No      Weights:   Daily Weight in k.3 (13), Weight in k.7 (), Weight in k.3 (), Weight in k (10), Weight in k.4 (), Weight in k.8 ()   - continue to monitor weights. Pt with possible wt loss.     Nutritionally Pertinent MEDICATIONS  (STANDING):  calcitriol   Capsule  cyanocobalamin  dextrose 5%.  dextrose 5%.  dextrose 5%.  dextrose 50% Injectable  dextrose 50% Injectable  dextrose 50% Injectable  ferrous    sulfate  finasteride  fludroCORTISONE  glucagon  Injectable  glucagon  Injectable  insulin glargine Injectable (LANTUS)  insulin lispro (ADMELOG) corrective regimen sliding scale  levothyroxine  midodrine.  sodium chloride 0.9%.    Pertinent Labs:  @ 06:22: Na 138, BUN 38<H>, Cr 1.70<H>, <H>, K+ 3.9, Phos --, Mg --, Alk Phos --, ALT/SGPT --, AST/SGOT --, HbA1c --    A1C with Estimated Average Glucose Result: 8.3 % (22 @ 09:37)  A1C with Estimated Average Glucose Result: 8.3 % (22 @ 06:12)    Finger Sticks:  POCT Blood Glucose.: 272 mg/dL ( @ 08:19)  POCT Blood Glucose.: 349 mg/dL ( @ 23:20)  POCT Blood Glucose.: 351 mg/dL ( @ 21:14)  POCT Blood Glucose.: 391 mg/dL ( @ 16:48)      Skin per nursing documentation: no pressure injuries   Edema: no edema     Estimated Needs:   [x] no change since previous assessment  [] recalculated:     Previous Nutrition Diagnosis: severe malnutrition, food and nutrition knowledge deficit  Nutrition Diagnosis is: [x] ongoing  [] resolved [] not applicable     New Nutrition Diagnosis: [] Not applicable    Nutrition Care Plan:  [x] In Progress  [] Achieved  [] Not applicable    Nutrition Interventions:     Education Provided:       [x] Yes:  DM education reinforced        Recommendations:         1) Change diet order to: Pureed, moderately thick liquids (per SLP/pt wishes), Consistent Carbohydrate + Glucerna shakes BID. Consider removal of low sodium restriction considering orthostatic hypotension.      2) Continue B12 and iron supplementation as per team      3) Monitor glucose, monitor need to adjust insulin regimen     Monitoring and Evaluation:   Continue to monitor nutritional intake, tolerance to diet prescription, weights, labs, skin integrity      RD remains available upon request and will follow up per protocol  Vinita Dietrich RD, CDN, [facility]. Pager: [phone]

## 2022-05-13 NOTE — PROVIDER CONTACT NOTE (OTHER) - REASON
Pt BP hypotensive
Pt O2 at 88%
pt converted to AFIb on tele for 17secs
Pt has order for fs ACHS. No sliding scale for bedtime fs.

## 2022-05-13 NOTE — PROGRESS NOTE ADULT - ASSESSMENT
Echo 3/31/22: The left ventricle is  enlarged with moderate to severe left ventricular systolic dysfunction,  estimated LVEF of 30-35%. The apical cap, apical septum and apical  lateral walls are akinetic with hypokinesis of the remaining walls. There  is no evidence of left ventricular thrombus The right ventricle is not well-visualized, device wire is noted within  the right heart Sclerotic trileaflet aortic valve with grossly normal opening, without AI.  Mild MR; Trace TR.  Cardiac Cath Lab (09.15.08 @ 13:41) >  Summary:  Hemodynamics: Hemodynamic assessment demonstrates moderate systemic  hypertension and moderately elevated LVEDP.  Recommendations:  Continue current medical therapy.  Echocardiogram with echo contrast agent to evaluate for LV apical thrombus.  Further arrhythmia management per the EP team.  Impressions: Angiography reveals nonobstructive coronary atherosclerosis  with LV dysfunction as described on the prior cath in 2005.    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    # PNA / RVP +  -CT chest noted New groundglass opacities (predominantly left-sided) and left upper  lobe opacities..  No change in the bilateral pleural effusions.  -management per pulm  + RVP/ influenza   -Bcx + gram + cocci in clusters 5/2- repeat NGTD   -sp iv abx;  med fu      #Acute on chronic systolic CHF , sp AICD   -CT chest noted. with bl pleural eff -sp lasix 20 mg IVP x 1 5/5   -ECHO with ef 25%<  mod to sev MR,  Moderate diastolic dysfunction (Stage II). Severe  left ventricular systolic dysfunction.  The apex is akinetic/dyskinetic.  -c/w mido/ florinef to augment bp ; lasix and BB held for symptomatic orthostatic hypotension     #Newly dx Afib   -dx recently  on last hospital admission  -sp device interrogation : Measured data WNL, normal ICD function, Pt is NOT pacemaker dependent; Stored data revealed episodes of AF lasting up to approximately 19 hours on 4/26/22 correlating with patient being in [facility] per pt/family; One episode of brief NSVT noted; No AF noted since 4/26/22; Optivol elevated sugg fluid overload   -c/w eliquis   -BB held as mentioned above    # carotid stenosis s/p right CEA 2020,  -asa , resume statin     #chronic cva, c/w asa, resume statin     #Hypotension   -bcxs + - repeat bcx NGTD  +orthostatic hypotension- started on florinef  -BB/ lasix on hold  -cw mido to augment bp   -med fu     #candelaria on CKD   -renal fu

## 2022-05-13 NOTE — PROVIDER CONTACT NOTE (OTHER) - BACKGROUND
76 year old M w/ PMH of HTN, HLD, DM2, CHF. Pt admitted due to sepsis & hypotension.
pt came in for sepsis. history of ventricular arrythmia.
Pt admitted to holding area from ED with sepsis
Pt admitted for sepsis

## 2022-05-14 LAB
ANION GAP SERPL CALC-SCNC: 11 MMOL/L — SIGNIFICANT CHANGE UP (ref 5–17)
BUN SERPL-MCNC: 38 MG/DL — HIGH (ref 7–23)
CALCIUM SERPL-MCNC: 8.7 MG/DL — SIGNIFICANT CHANGE UP (ref 8.4–10.5)
CHLORIDE SERPL-SCNC: 99 MMOL/L — SIGNIFICANT CHANGE UP (ref 96–108)
CO2 SERPL-SCNC: 26 MMOL/L — SIGNIFICANT CHANGE UP (ref 22–31)
CREAT SERPL-MCNC: 1.63 MG/DL — HIGH (ref 0.5–1.3)
EGFR: 44 ML/MIN/1.73M2 — LOW
GLUCOSE BLDC GLUCOMTR-MCNC: 169 MG/DL — HIGH (ref 70–99)
GLUCOSE BLDC GLUCOMTR-MCNC: 231 MG/DL — HIGH (ref 70–99)
GLUCOSE BLDC GLUCOMTR-MCNC: 272 MG/DL — HIGH (ref 70–99)
GLUCOSE BLDC GLUCOMTR-MCNC: 301 MG/DL — HIGH (ref 70–99)
GLUCOSE SERPL-MCNC: 169 MG/DL — HIGH (ref 70–99)
HCT VFR BLD CALC: 27.2 % — LOW (ref 39–50)
HGB BLD-MCNC: 8.5 G/DL — LOW (ref 13–17)
MCHC RBC-ENTMCNC: 23.9 PG — LOW (ref 27–34)
MCHC RBC-ENTMCNC: 31.3 GM/DL — LOW (ref 32–36)
MCV RBC AUTO: 76.6 FL — LOW (ref 80–100)
NRBC # BLD: 0 /100 WBCS — SIGNIFICANT CHANGE UP (ref 0–0)
PLATELET # BLD AUTO: 262 K/UL — SIGNIFICANT CHANGE UP (ref 150–400)
POTASSIUM SERPL-MCNC: 3.9 MMOL/L — SIGNIFICANT CHANGE UP (ref 3.5–5.3)
POTASSIUM SERPL-SCNC: 3.9 MMOL/L — SIGNIFICANT CHANGE UP (ref 3.5–5.3)
RBC # BLD: 3.55 M/UL — LOW (ref 4.2–5.8)
RBC # FLD: 25 % — HIGH (ref 10.3–14.5)
SODIUM SERPL-SCNC: 136 MMOL/L — SIGNIFICANT CHANGE UP (ref 135–145)
WBC # BLD: 11.01 K/UL — HIGH (ref 3.8–10.5)
WBC # FLD AUTO: 11.01 K/UL — HIGH (ref 3.8–10.5)

## 2022-05-14 RX ADMIN — FLUDROCORTISONE ACETATE 0.1 MILLIGRAM(S): 0.1 TABLET ORAL at 17:32

## 2022-05-14 RX ADMIN — CALCITRIOL 0.25 MICROGRAM(S): 0.5 CAPSULE ORAL at 11:30

## 2022-05-14 RX ADMIN — Medication 3 MILLILITER(S): at 05:40

## 2022-05-14 RX ADMIN — Medication 4: at 16:43

## 2022-05-14 RX ADMIN — Medication 325 MILLIGRAM(S): at 11:30

## 2022-05-14 RX ADMIN — OXYMETAZOLINE HYDROCHLORIDE 1 SPRAY(S): 0.5 SPRAY NASAL at 05:49

## 2022-05-14 RX ADMIN — ESCITALOPRAM OXALATE 5 MILLIGRAM(S): 10 TABLET, FILM COATED ORAL at 11:31

## 2022-05-14 RX ADMIN — APIXABAN 2.5 MILLIGRAM(S): 2.5 TABLET, FILM COATED ORAL at 17:32

## 2022-05-14 RX ADMIN — Medication 1: at 08:00

## 2022-05-14 RX ADMIN — Medication 3 MILLIGRAM(S): at 21:36

## 2022-05-14 RX ADMIN — SODIUM CHLORIDE 3 MILLILITER(S): 9 INJECTION INTRAMUSCULAR; INTRAVENOUS; SUBCUTANEOUS at 17:32

## 2022-05-14 RX ADMIN — Medication 5 MILLILITER(S): at 21:37

## 2022-05-14 RX ADMIN — Medication 75 MICROGRAM(S): at 05:40

## 2022-05-14 RX ADMIN — DIVALPROEX SODIUM 500 MILLIGRAM(S): 500 TABLET, DELAYED RELEASE ORAL at 17:32

## 2022-05-14 RX ADMIN — Medication 5 MILLILITER(S): at 13:38

## 2022-05-14 RX ADMIN — INSULIN GLARGINE 5 UNIT(S): 100 INJECTION, SOLUTION SUBCUTANEOUS at 21:37

## 2022-05-14 RX ADMIN — SODIUM CHLORIDE 3 MILLILITER(S): 9 INJECTION INTRAMUSCULAR; INTRAVENOUS; SUBCUTANEOUS at 05:40

## 2022-05-14 RX ADMIN — MIDODRINE HYDROCHLORIDE 20 MILLIGRAM(S): 2.5 TABLET ORAL at 11:30

## 2022-05-14 RX ADMIN — PREGABALIN 1000 MICROGRAM(S): 225 CAPSULE ORAL at 11:31

## 2022-05-14 RX ADMIN — Medication 3 MILLILITER(S): at 17:32

## 2022-05-14 RX ADMIN — Medication 2: at 21:37

## 2022-05-14 RX ADMIN — Medication 2 SPRAY(S): at 21:38

## 2022-05-14 RX ADMIN — Medication 3: at 12:05

## 2022-05-14 RX ADMIN — SODIUM CHLORIDE 3 MILLILITER(S): 9 INJECTION INTRAMUSCULAR; INTRAVENOUS; SUBCUTANEOUS at 11:30

## 2022-05-14 RX ADMIN — DIVALPROEX SODIUM 500 MILLIGRAM(S): 500 TABLET, DELAYED RELEASE ORAL at 05:40

## 2022-05-14 RX ADMIN — Medication 3 MILLILITER(S): at 11:30

## 2022-05-14 RX ADMIN — APIXABAN 2.5 MILLIGRAM(S): 2.5 TABLET, FILM COATED ORAL at 05:40

## 2022-05-14 RX ADMIN — MIDODRINE HYDROCHLORIDE 20 MILLIGRAM(S): 2.5 TABLET ORAL at 17:32

## 2022-05-14 RX ADMIN — FINASTERIDE 5 MILLIGRAM(S): 5 TABLET, FILM COATED ORAL at 11:31

## 2022-05-14 RX ADMIN — Medication 5 MILLILITER(S): at 05:40

## 2022-05-14 RX ADMIN — Medication 81 MILLIGRAM(S): at 11:31

## 2022-05-14 NOTE — PROGRESS NOTE ADULT - ASSESSMENT
Echo 3/31/22: The left ventricle is  enlarged with moderate to severe left ventricular systolic dysfunction,  estimated LVEF of 30-35%. The apical cap, apical septum and apical  lateral walls are akinetic with hypokinesis of the remaining walls. There  is no evidence of left ventricular thrombus The right ventricle is not well-visualized, device wire is noted within  the right heart Sclerotic trileaflet aortic valve with grossly normal opening, without AI.  Mild MR; Trace TR.  Cardiac Cath Lab (09.15.08 @ 13:41) >  Summary:  Hemodynamics: Hemodynamic assessment demonstrates moderate systemic  hypertension and moderately elevated LVEDP.  Recommendations:  Continue current medical therapy.  Echocardiogram with echo contrast agent to evaluate for LV apical thrombus.  Further arrhythmia management per the EP team.  Impressions: Angiography reveals nonobstructive coronary atherosclerosis  with LV dysfunction as described on the prior cath in 2005.    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    # PNA / RVP +  -CT chest noted New groundglass opacities (predominantly left-sided) and left upper  lobe opacities..  No change in the bilateral pleural effusions.  -management per pulm  + RVP/ influenza   -Bcx + gram + cocci in clusters 5/2- repeat NGTD   -sp iv abx;  med fu      #Acute on chronic systolic CHF , sp AICD   -CT chest noted. with bl pleural eff -sp lasix 20 mg IVP x 1 5/5   -ECHO with ef 25%<  mod to sev MR,  Moderate diastolic dysfunction (Stage II). Severe  left ventricular systolic dysfunction.  The apex is akinetic/dyskinetic.  -c/w mido/ florinef to augment bp   -lasix and BB held for symptomatic orthostatic hypotension     #Newly dx Afib   -dx recently  on last hospital admission  -sp device interrogation : Measured data WNL, normal ICD function, Pt is NOT pacemaker dependent; Stored data revealed episodes of AF lasting up to approximately 19 hours on 4/26/22 correlating with patient being in [facility] per pt/family; One episode of brief NSVT noted; No AF noted since 4/26/22; Optivol elevated sugg fluid overload   -c/w eliquis   -BB held as mentioned above    # carotid stenosis s/p right CEA 2020,  -asa , resume statin     #chronic cva, c/w asa, resume statin     #Orthostatic Hypotension   -cont florinef  -BB/lasix on hold  -cw mido to augment bp   -med fu   -compression stockings/abd binder    #candelaria on CKD   -renal fu       35 minutes spent on total encounter; more than 50% of the visit was spent counseling and/or coordinating care by the attending physician.

## 2022-05-15 LAB
ANION GAP SERPL CALC-SCNC: 11 MMOL/L — SIGNIFICANT CHANGE UP (ref 5–17)
BUN SERPL-MCNC: 40 MG/DL — HIGH (ref 7–23)
CALCIUM SERPL-MCNC: 8.9 MG/DL — SIGNIFICANT CHANGE UP (ref 8.4–10.5)
CHLORIDE SERPL-SCNC: 99 MMOL/L — SIGNIFICANT CHANGE UP (ref 96–108)
CO2 SERPL-SCNC: 25 MMOL/L — SIGNIFICANT CHANGE UP (ref 22–31)
CREAT SERPL-MCNC: 1.58 MG/DL — HIGH (ref 0.5–1.3)
EGFR: 46 ML/MIN/1.73M2 — LOW
GLUCOSE BLDC GLUCOMTR-MCNC: 148 MG/DL — HIGH (ref 70–99)
GLUCOSE BLDC GLUCOMTR-MCNC: 154 MG/DL — HIGH (ref 70–99)
GLUCOSE BLDC GLUCOMTR-MCNC: 187 MG/DL — HIGH (ref 70–99)
GLUCOSE BLDC GLUCOMTR-MCNC: 207 MG/DL — HIGH (ref 70–99)
GLUCOSE SERPL-MCNC: 141 MG/DL — HIGH (ref 70–99)
POTASSIUM SERPL-MCNC: 3.4 MMOL/L — LOW (ref 3.5–5.3)
POTASSIUM SERPL-SCNC: 3.4 MMOL/L — LOW (ref 3.5–5.3)
SARS-COV-2 RNA SPEC QL NAA+PROBE: SIGNIFICANT CHANGE UP
SODIUM SERPL-SCNC: 135 MMOL/L — SIGNIFICANT CHANGE UP (ref 135–145)

## 2022-05-15 RX ORDER — POTASSIUM CHLORIDE 20 MEQ
40 PACKET (EA) ORAL ONCE
Refills: 0 | Status: COMPLETED | OUTPATIENT
Start: 2022-05-15 | End: 2022-05-15

## 2022-05-15 RX ORDER — POTASSIUM CHLORIDE 20 MEQ
20 PACKET (EA) ORAL ONCE
Refills: 0 | Status: DISCONTINUED | OUTPATIENT
Start: 2022-05-15 | End: 2022-05-15

## 2022-05-15 RX ORDER — POTASSIUM CHLORIDE 20 MEQ
20 PACKET (EA) ORAL
Refills: 0 | Status: DISCONTINUED | OUTPATIENT
Start: 2022-05-15 | End: 2022-05-15

## 2022-05-15 RX ADMIN — APIXABAN 2.5 MILLIGRAM(S): 2.5 TABLET, FILM COATED ORAL at 17:03

## 2022-05-15 RX ADMIN — MIDODRINE HYDROCHLORIDE 20 MILLIGRAM(S): 2.5 TABLET ORAL at 11:14

## 2022-05-15 RX ADMIN — PREGABALIN 1000 MICROGRAM(S): 225 CAPSULE ORAL at 11:12

## 2022-05-15 RX ADMIN — Medication 5 MILLILITER(S): at 21:05

## 2022-05-15 RX ADMIN — MIDODRINE HYDROCHLORIDE 20 MILLIGRAM(S): 2.5 TABLET ORAL at 05:57

## 2022-05-15 RX ADMIN — SODIUM CHLORIDE 3 MILLILITER(S): 9 INJECTION INTRAMUSCULAR; INTRAVENOUS; SUBCUTANEOUS at 05:56

## 2022-05-15 RX ADMIN — DIVALPROEX SODIUM 500 MILLIGRAM(S): 500 TABLET, DELAYED RELEASE ORAL at 17:02

## 2022-05-15 RX ADMIN — Medication 1: at 08:08

## 2022-05-15 RX ADMIN — Medication 3 MILLILITER(S): at 11:14

## 2022-05-15 RX ADMIN — Medication 5 MILLILITER(S): at 05:55

## 2022-05-15 RX ADMIN — DIVALPROEX SODIUM 500 MILLIGRAM(S): 500 TABLET, DELAYED RELEASE ORAL at 05:56

## 2022-05-15 RX ADMIN — Medication 3 MILLILITER(S): at 17:03

## 2022-05-15 RX ADMIN — FINASTERIDE 5 MILLIGRAM(S): 5 TABLET, FILM COATED ORAL at 11:13

## 2022-05-15 RX ADMIN — Medication 1: at 21:06

## 2022-05-15 RX ADMIN — Medication 2 SPRAY(S): at 21:06

## 2022-05-15 RX ADMIN — ESCITALOPRAM OXALATE 5 MILLIGRAM(S): 10 TABLET, FILM COATED ORAL at 11:13

## 2022-05-15 RX ADMIN — MIDODRINE HYDROCHLORIDE 20 MILLIGRAM(S): 2.5 TABLET ORAL at 17:02

## 2022-05-15 RX ADMIN — Medication 325 MILLIGRAM(S): at 11:12

## 2022-05-15 RX ADMIN — APIXABAN 2.5 MILLIGRAM(S): 2.5 TABLET, FILM COATED ORAL at 05:56

## 2022-05-15 RX ADMIN — CALCITRIOL 0.25 MICROGRAM(S): 0.5 CAPSULE ORAL at 11:12

## 2022-05-15 RX ADMIN — SODIUM CHLORIDE 3 MILLILITER(S): 9 INJECTION INTRAMUSCULAR; INTRAVENOUS; SUBCUTANEOUS at 17:03

## 2022-05-15 RX ADMIN — SODIUM CHLORIDE 3 MILLILITER(S): 9 INJECTION INTRAMUSCULAR; INTRAVENOUS; SUBCUTANEOUS at 11:14

## 2022-05-15 RX ADMIN — FLUDROCORTISONE ACETATE 0.1 MILLIGRAM(S): 0.1 TABLET ORAL at 17:03

## 2022-05-15 RX ADMIN — Medication 3 MILLILITER(S): at 05:56

## 2022-05-15 RX ADMIN — Medication 3 MILLIGRAM(S): at 21:05

## 2022-05-15 RX ADMIN — INSULIN GLARGINE 5 UNIT(S): 100 INJECTION, SOLUTION SUBCUTANEOUS at 21:05

## 2022-05-15 RX ADMIN — Medication 40 MILLIEQUIVALENT(S): at 17:02

## 2022-05-15 RX ADMIN — Medication 2: at 11:58

## 2022-05-15 RX ADMIN — Medication 81 MILLIGRAM(S): at 11:12

## 2022-05-15 RX ADMIN — Medication 75 MICROGRAM(S): at 05:56

## 2022-05-15 NOTE — PROGRESS NOTE ADULT - ASSESSMENT
Echo 3/31/22: The left ventricle is  enlarged with moderate to severe left ventricular systolic dysfunction,  estimated LVEF of 30-35%. The apical cap, apical septum and apical  lateral walls are akinetic with hypokinesis of the remaining walls. There  is no evidence of left ventricular thrombus The right ventricle is not well-visualized, device wire is noted within  the right heart Sclerotic trileaflet aortic valve with grossly normal opening, without AI.  Mild MR; Trace TR.  Cardiac Cath Lab (09.15.08 @ 13:41) >  Summary:  Hemodynamics: Hemodynamic assessment demonstrates moderate systemic  hypertension and moderately elevated LVEDP.  Recommendations:  Continue current medical therapy.  Echocardiogram with echo contrast agent to evaluate for LV apical thrombus.  Further arrhythmia management per the EP team.  Impressions: Angiography reveals nonobstructive coronary atherosclerosis  with LV dysfunction as described on the prior cath in 2005.    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    # PNA / RVP +  -CT chest noted New groundglass opacities (predominantly left-sided) and left upper  lobe opacities..  No change in the bilateral pleural effusions.  -management per pulm  + RVP/ influenza   -Bcx + gram + cocci in clusters 5/2- repeat NGTD   -sp iv abx;  med fu      #Acute on chronic systolic CHF , sp AICD   -CT chest noted. with bl pleural eff -sp lasix 20 mg IVP x 1 5/5   -ECHO with ef 25%<  mod to sev MR,  Moderate diastolic dysfunction (Stage II). Severe  left ventricular systolic dysfunction.  The apex is akinetic/dyskinetic.  -c/w mido/ florinef to augment bp   -lasix and BB held for symptomatic orthostatic hypotension     #Newly dx Afib   -dx recently  on last hospital admission  -sp device interrogation : Measured data WNL, normal ICD function, Pt is NOT pacemaker dependent; Stored data revealed episodes of AF lasting up to approximately 19 hours on 4/26/22 correlating with patient being in [facility] per pt/family; One episode of brief NSVT noted; No AF noted since 4/26/22; Optivol elevated sugg fluid overload   -c/w eliquis   -BB held as mentioned above    # carotid stenosis s/p right CEA 2020,  -asa , resume statin     #chronic cva, c/w asa, resume statin     #Orthostatic Hypotension   -cont florinef  -BB/lasix on hold  -inc florinef to 0.2 qd  -inc mido to 30mg    -med fu   -compression stockings/abd binder    #candelaria on CKD   -renal fu       35 minutes spent on total encounter; more than 50% of the visit was spent counseling and/or coordinating care by the attending physician.

## 2022-05-16 DIAGNOSIS — I95.1 ORTHOSTATIC HYPOTENSION: ICD-10-CM

## 2022-05-16 LAB
ANION GAP SERPL CALC-SCNC: 11 MMOL/L — SIGNIFICANT CHANGE UP (ref 5–17)
BUN SERPL-MCNC: 38 MG/DL — HIGH (ref 7–23)
CALCIUM SERPL-MCNC: 8.8 MG/DL — SIGNIFICANT CHANGE UP (ref 8.4–10.5)
CHLORIDE SERPL-SCNC: 99 MMOL/L — SIGNIFICANT CHANGE UP (ref 96–108)
CO2 SERPL-SCNC: 27 MMOL/L — SIGNIFICANT CHANGE UP (ref 22–31)
CREAT SERPL-MCNC: 1.61 MG/DL — HIGH (ref 0.5–1.3)
EGFR: 45 ML/MIN/1.73M2 — LOW
GLUCOSE BLDC GLUCOMTR-MCNC: 110 MG/DL — HIGH (ref 70–99)
GLUCOSE BLDC GLUCOMTR-MCNC: 141 MG/DL — HIGH (ref 70–99)
GLUCOSE BLDC GLUCOMTR-MCNC: 170 MG/DL — HIGH (ref 70–99)
GLUCOSE BLDC GLUCOMTR-MCNC: 186 MG/DL — HIGH (ref 70–99)
GLUCOSE SERPL-MCNC: 102 MG/DL — HIGH (ref 70–99)
HCT VFR BLD CALC: 26.9 % — LOW (ref 39–50)
HGB BLD-MCNC: 8.8 G/DL — LOW (ref 13–17)
MCHC RBC-ENTMCNC: 25.2 PG — LOW (ref 27–34)
MCHC RBC-ENTMCNC: 32.7 GM/DL — SIGNIFICANT CHANGE UP (ref 32–36)
MCV RBC AUTO: 77.1 FL — LOW (ref 80–100)
NRBC # BLD: 0 /100 WBCS — SIGNIFICANT CHANGE UP (ref 0–0)
PLATELET # BLD AUTO: 263 K/UL — SIGNIFICANT CHANGE UP (ref 150–400)
POTASSIUM SERPL-MCNC: 3.5 MMOL/L — SIGNIFICANT CHANGE UP (ref 3.5–5.3)
POTASSIUM SERPL-SCNC: 3.5 MMOL/L — SIGNIFICANT CHANGE UP (ref 3.5–5.3)
RBC # BLD: 3.49 M/UL — LOW (ref 4.2–5.8)
RBC # FLD: 26.2 % — HIGH (ref 10.3–14.5)
SODIUM SERPL-SCNC: 137 MMOL/L — SIGNIFICANT CHANGE UP (ref 135–145)
WBC # BLD: 9.82 K/UL — SIGNIFICANT CHANGE UP (ref 3.8–10.5)
WBC # FLD AUTO: 9.82 K/UL — SIGNIFICANT CHANGE UP (ref 3.8–10.5)

## 2022-05-16 RX ORDER — DROXIDOPA 100 MG/1
100 CAPSULE ORAL THREE TIMES A DAY
Refills: 0 | Status: DISCONTINUED | OUTPATIENT
Start: 2022-05-16 | End: 2022-05-17

## 2022-05-16 RX ORDER — MIDODRINE HYDROCHLORIDE 2.5 MG/1
30 TABLET ORAL THREE TIMES A DAY
Refills: 0 | Status: DISCONTINUED | OUTPATIENT
Start: 2022-05-16 | End: 2022-05-19

## 2022-05-16 RX ORDER — CALCITRIOL 0.5 UG/1
1 CAPSULE ORAL
Qty: 0 | Refills: 0 | DISCHARGE

## 2022-05-16 RX ORDER — ATORVASTATIN CALCIUM 80 MG/1
1 TABLET, FILM COATED ORAL
Qty: 0 | Refills: 0 | DISCHARGE

## 2022-05-16 RX ORDER — FLUDROCORTISONE ACETATE 0.1 MG/1
0.2 TABLET ORAL
Refills: 0 | Status: DISCONTINUED | OUTPATIENT
Start: 2022-05-16 | End: 2022-05-19

## 2022-05-16 RX ADMIN — Medication 3 MILLILITER(S): at 12:01

## 2022-05-16 RX ADMIN — Medication 5 MILLILITER(S): at 21:18

## 2022-05-16 RX ADMIN — CALCITRIOL 0.25 MICROGRAM(S): 0.5 CAPSULE ORAL at 11:58

## 2022-05-16 RX ADMIN — SODIUM CHLORIDE 3 MILLILITER(S): 9 INJECTION INTRAMUSCULAR; INTRAVENOUS; SUBCUTANEOUS at 05:15

## 2022-05-16 RX ADMIN — Medication 1: at 11:58

## 2022-05-16 RX ADMIN — Medication 3 MILLILITER(S): at 05:15

## 2022-05-16 RX ADMIN — DIVALPROEX SODIUM 500 MILLIGRAM(S): 500 TABLET, DELAYED RELEASE ORAL at 05:14

## 2022-05-16 RX ADMIN — PREGABALIN 1000 MICROGRAM(S): 225 CAPSULE ORAL at 11:58

## 2022-05-16 RX ADMIN — SODIUM CHLORIDE 3 MILLILITER(S): 9 INJECTION INTRAMUSCULAR; INTRAVENOUS; SUBCUTANEOUS at 12:00

## 2022-05-16 RX ADMIN — Medication 3 MILLILITER(S): at 17:40

## 2022-05-16 RX ADMIN — MIDODRINE HYDROCHLORIDE 30 MILLIGRAM(S): 2.5 TABLET ORAL at 17:39

## 2022-05-16 RX ADMIN — FINASTERIDE 5 MILLIGRAM(S): 5 TABLET, FILM COATED ORAL at 11:59

## 2022-05-16 RX ADMIN — DROXIDOPA 100 MILLIGRAM(S): 100 CAPSULE ORAL at 17:39

## 2022-05-16 RX ADMIN — SODIUM CHLORIDE 3 MILLILITER(S): 9 INJECTION INTRAMUSCULAR; INTRAVENOUS; SUBCUTANEOUS at 17:40

## 2022-05-16 RX ADMIN — APIXABAN 2.5 MILLIGRAM(S): 2.5 TABLET, FILM COATED ORAL at 17:39

## 2022-05-16 RX ADMIN — ESCITALOPRAM OXALATE 5 MILLIGRAM(S): 10 TABLET, FILM COATED ORAL at 11:58

## 2022-05-16 RX ADMIN — APIXABAN 2.5 MILLIGRAM(S): 2.5 TABLET, FILM COATED ORAL at 05:15

## 2022-05-16 RX ADMIN — DIVALPROEX SODIUM 500 MILLIGRAM(S): 500 TABLET, DELAYED RELEASE ORAL at 17:38

## 2022-05-16 RX ADMIN — Medication 5 MILLILITER(S): at 05:14

## 2022-05-16 RX ADMIN — MIDODRINE HYDROCHLORIDE 20 MILLIGRAM(S): 2.5 TABLET ORAL at 05:15

## 2022-05-16 RX ADMIN — INSULIN GLARGINE 5 UNIT(S): 100 INJECTION, SOLUTION SUBCUTANEOUS at 21:24

## 2022-05-16 RX ADMIN — Medication 75 MICROGRAM(S): at 05:15

## 2022-05-16 RX ADMIN — FLUDROCORTISONE ACETATE 0.2 MILLIGRAM(S): 0.1 TABLET ORAL at 17:41

## 2022-05-16 RX ADMIN — MIDODRINE HYDROCHLORIDE 10 MILLIGRAM(S): 2.5 TABLET ORAL at 12:00

## 2022-05-16 RX ADMIN — Medication 81 MILLIGRAM(S): at 12:00

## 2022-05-16 RX ADMIN — Medication 325 MILLIGRAM(S): at 12:01

## 2022-05-16 RX ADMIN — Medication 2 SPRAY(S): at 21:19

## 2022-05-16 RX ADMIN — Medication 1: at 21:19

## 2022-05-16 RX ADMIN — Medication 3 MILLIGRAM(S): at 21:19

## 2022-05-16 NOTE — CONSULT NOTE ADULT - ASSESSMENT
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 2) chronic R Frontal infarct from DEWAYNE now  s/p R CEA  - c/w asa 81 for secondary stroek prevention.  should be on lipitor 40 with LDL goal < 70 if no contraindication  - getting florineff  and midodrine now 20 TID for orthostasis,  at this point would attempt Droxidopa/Northera 100 TID and titrate up as tolerated   - trend orthostatics   - telemetry  - PT/OT/SS/SLP, OOBC  - check FS, glucose control <180  - GI/DVT ppx  - Counseling on diet, exercise, and medication adherence was done  - Counseling on smoking cessation and alcohol consumption offered when appropriate.  - Pain assessed and judicious use of narcotics when appropriate was discussed.    - Stroke education given when appropriate.  - Importance of fall prevention discussed.   - Differential diagnosis and plan of care discussed with patient and/or family and primary team  - Thank you for allowing me to participate in the care of this patient. Call with questions.   - spoke with team.  would talk to pharmacy to see if norther/droxidopa covered   Redd Santos MD  Vascular Neurology  Office: [phone]

## 2022-05-16 NOTE — PROGRESS NOTE ADULT - ASSESSMENT
Echo 3/31/22: The left ventricle is  enlarged with moderate to severe left ventricular systolic dysfunction,  estimated LVEF of 30-35%. The apical cap, apical septum and apical  lateral walls are akinetic with hypokinesis of the remaining walls. There  is no evidence of left ventricular thrombus The right ventricle is not well-visualized, device wire is noted within  the right heart Sclerotic trileaflet aortic valve with grossly normal opening, without AI.  Mild MR; Trace TR.  Cardiac Cath Lab (09.15.08 @ 13:41) >  Summary:  Hemodynamics: Hemodynamic assessment demonstrates moderate systemic  hypertension and moderately elevated LVEDP.  Recommendations:  Continue current medical therapy.  Echocardiogram with echo contrast agent to evaluate for LV apical thrombus.  Further arrhythmia management per the EP team.  Impressions: Angiography reveals nonobstructive coronary atherosclerosis  with LV dysfunction as described on the prior cath in 2005.    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    # PNA / RVP +  -CT chest noted New groundglass opacities (predominantly left-sided) and left upper  lobe opacities..  No change in the bilateral pleural effusions.  -management per pulm  + RVP/ influenza   -Bcx + gram + cocci in clusters 5/2- repeat NGTD   -sp iv abx;  med fu      #Acute on chronic systolic CHF , sp AICD   -CT chest noted. with bl pleural eff -sp lasix 20 mg IVP x 1 5/5   -ECHO with ef 25%<  mod to sev MR,  Moderate diastolic dysfunction (Stage II). Severe  left ventricular systolic dysfunction.  The apex is akinetic/dyskinetic.  -c/w mido/ florinef to augment bp   -lasix and BB held for symptomatic orthostatic hypotension     #Newly dx Afib   -dx recently  on last hospital admission  -sp device interrogation : Measured data WNL, normal ICD function, Pt is NOT pacemaker dependent; Stored data revealed episodes of AF lasting up to approximately 19 hours on 4/26/22 correlating with patient being in [facility] per pt/family; One episode of brief NSVT noted; No AF noted since 4/26/22; Optivol elevated sugg fluid overload   -c/w eliquis   -BB held as mentioned above    # carotid stenosis s/p right CEA 2020,  -asa , resume statin     #chronic cva, c/w asa, resume statin     #Orthostatic Hypotension   -cont florinef  -BB/lasix on hold  -inc florinef to 0.2 qd  -inc mido to 30mg  TID  -med fu   -compression stockings/abd binder    #candelaria on CKD   -renal fu

## 2022-05-16 NOTE — CONSULT NOTE ADULT - CONSULT REQUESTED DATE/TIME
02-May-2022 15:06
02-May-2022 12:25
02-May-2022 14:03
03-May-2022 02:25
16-May-2022 11:32
02-May-2022 14:38

## 2022-05-16 NOTE — PROGRESS NOTE ADULT - NSPROGADDITIONALINFOA_GEN_ALL_CORE
discussed with daughter Isaias over the phone in detail  if Northera does not help family will consider home with home hospice

## 2022-05-16 NOTE — CONSULT NOTE ADULT - SUBJECTIVE AND OBJECTIVE BOX
Neurology Consult    Reason for Consult: Patient is a 76y old  Male who presents with a chief complaint of fever and cough. diarrhea and generalized weakness (16 May 2022 11:13)      HPI:  75 y/o  M with a HTN, HLD, HFrEF (EF 30%), 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 The patient's wife at bedside stated she checked his temp and found it to be > 102 last night. Wife noticed he appears weaker. Was drinking ensure and noticed that he aspirated. Was concerned that he did not improve today so called EMS. + cough, loose stools, one episode of incontinence No chills, vomiting, diarrhea. Pt denies chest pain, SOB, abd pain. No recent falls, head trauma. Took all meds this AM. (02 May 2022 13:34)       PAST MEDICAL & SURGICAL HISTORY:  MI (myocardial infarction)  1992      HTN (hypertension)      HLD (hyperlipidemia)      Throat cancer  2011, treated with chemo, RT      Ventricular arrhythmia  s/p AICD      Diabetes  Type2, not on any meds since weight loss after throat Ca      Renal insufficiency  s/p chemo      CAD (coronary artery disease)      Inguinal hernia, left      Recent cerebrovascular accident (CVA)  R central sulcus MCA infarct, with left Upper EXT weakness, April 2022      History of dysphagia      Stage 3 chronic kidney disease      Anemia of chronic disease      H/O urinary retention  pt has large PVR      H/O prior ablation treatment  VT, 2009      Cardiac defibrillator in place  - 2009, battery change 2017      S/P left inguinal hernia repair  2018 at [place]      Tonsillar cancer  s/p resection,  partial tongue resection          Allergies: Allergies    No Known Allergies    Intolerances        Social History: Denies toxic habits including tobacco, ETOH or illicit drugs.    Family History: FAMILY HISTORY:  Family history of cancer (Sibling)    . No family history of strokes    Medications: MEDICATIONS  (STANDING):  albuterol/ipratropium for Nebulization 3 milliLiter(s) Nebulizer every 6 hours  apixaban 2.5 milliGRAM(s) Oral every 12 hours  aspirin enteric coated 81 milliGRAM(s) Oral daily  Biotene Dry Mouth Oral Rinse 5 milliLiter(s) Swish and Spit three times a day  calcitriol   Capsule 0.25 MICROGram(s) Oral daily  cyanocobalamin 1000 MICROGram(s) Oral daily  dextrose 5%. 1000 milliLiter(s) (50 mL/Hr) IV Continuous <Continuous>  dextrose 5%. 1000 milliLiter(s) (100 mL/Hr) IV Continuous <Continuous>  dextrose 5%. 1000 milliLiter(s) (100 mL/Hr) IV Continuous <Continuous>  dextrose 50% Injectable 25 Gram(s) IV Push once  dextrose 50% Injectable 12.5 Gram(s) IV Push once  dextrose 50% Injectable 25 Gram(s) IV Push once  diVALproex  milliGRAM(s) Oral two times a day  epoetin baby-epbx (RETACRIT) Injectable 82958 Unit(s) SubCutaneous every 7 days  escitalopram 5 milliGRAM(s) Oral daily  ferrous    sulfate 325 milliGRAM(s) Oral daily  finasteride 5 milliGRAM(s) Oral daily  fludroCORTISONE 0.1 milliGRAM(s) Oral <User Schedule>  fluticasone propionate 50 MICROgram(s)/spray Nasal Spray 2 Spray(s) Both Nostrils <User Schedule>  glucagon  Injectable 1 milliGRAM(s) IntraMuscular once  glucagon  Injectable 1 milliGRAM(s) IntraMuscular once  insulin glargine Injectable (LANTUS) 5 Unit(s) SubCutaneous at bedtime  insulin lispro (ADMELOG) corrective regimen sliding scale   SubCutaneous Before meals and at bedtime  levothyroxine 75 MICROGram(s) Oral daily  melatonin 3 milliGRAM(s) Oral at bedtime  midodrine. 20 milliGRAM(s) Oral three times a day  sodium chloride 0.9% for Nebulization 3 milliLiter(s) Nebulizer four times a day    MEDICATIONS  (PRN):  ALBUTerol    90 MICROgram(s) HFA Inhaler 2 Puff(s) Inhalation every 6 hours PRN Shortness of Breath and/or Wheezing  dextrose Oral Gel 15 Gram(s) Oral once PRN Blood Glucose LESS THAN 70 milliGRAM(s)/deciliter      Review of Systems:  CONSTITUTIONAL:  No weight loss, fever, chills, weakness or fatigue.  HEENT:  Eyes:  No visual loss, blurred vision, double vision or yellow sclera. Ears, Nose, Throat:  No hearing loss, sneezing, congestion, runny nose or sore throat.  SKIN:  No rash or itching.  CARDIOVASCULAR:  No chest pain, chest pressure or chest discomfort. No palpitations or edema.  RESPIRATORY:  No shortness of breath, cough or sputum.  GASTROINTESTINAL:  No anorexia, nausea, vomiting or diarrhea. No abdominal pain or blood.  GENITOURINARY:  No burning on urination or incontinence   NEUROLOGICAL:  No headache, dizziness, syncope, paralysis, ataxia, numbness or tingling in the extremities. No change in bowel or bladder control. no limb weakness. no vision changes.   MUSCULOSKELETAL:  No muscle, back pain, joint pain or stiffness.  HEMATOLOGIC:  No anemia, bleeding or bruising.  LYMPHATICS:  No enlarged nodes. No history of splenectomy.  PSYCHIATRIC:  No history of depression or anxiety.  ENDOCRINOLOGIC:  No reports of sweating, cold or heat intolerance. No polyuria or polydipsia.      Vitals:  Vital Signs Last 24 Hrs  T(C): 36.5 (16 May 2022 10:54), Max: 36.5 (16 May 2022 10:54)  T(F): 97.7 (16 May 2022 10:54), Max: 97.7 (16 May 2022 10:54)  HR: 71 (16 May 2022 10:54) (68 - 80)  BP: 85/53 (16 May 2022 10:54) (85/53 - 153/80)  BP(mean): --  RR: 18 (16 May 2022 10:54) (18 - 18)  SpO2: 95% (16 May 2022 10:54) (93% - 95%)    Orthostatic VS    05-15-22 @ 11:09  Lying BP: 95/62 HR: 77   Sitting BP: 72/35 HR: 81  Standing BP: --/-- HR: --  Site: upper left arm   Mode: electronic      General Exam:   General Appearance: Appropriately dressed and in no acute distress       Head: Normocephalic, atraumatic and no dysmorphic features  Ear, Nose, and Throat: Moist mucous membranes  CVS: S1S2+  Resp: No SOB, no wheeze or rhonchi  GI: soft NT/ND  Extremities: No edema or cyanosis  Skin: No bruises or rashes     Neurological Exam:  Mental Status: Awake, alert and oriented x 2.  Able to follow simple and complex verbal commands. Able to name and repeat. fluent speech. No obvious aphasia +dysarthria noted.   Cranial Nerves: PERRL, EOMI, VFFC, sensation V1-V3 intact,  no obvious facial asymmetry, equal elevation of palate, scm/trap 5/5, tongue is midline on protrusion. no obvious papilledema on fundoscopic exam. hearing is grossly intact.   Motor: Normal bulk, tone and strength throughout. Fine finger movements were intact and symmetric. no tremors or drift noted.    Sensation: Intact to light touch and pinprick throughout. no right/left confusion. no extinction to tactile on DSS.    Reflexes: 1+ throughout at biceps, brachioradialis, triceps, patellars and ankles bilaterally and equal. No clonus. R toe and L toe were both downgoing.  Coordination: No dysmetria on FNF    Gait:  deferred     Data/Labs/Imaging which I personally reviewed.     Labs:     CBC Full  -  ( 16 May 2022 05:45 )  WBC Count : 9.82 K/uL  RBC Count : 3.49 M/uL  Hemoglobin : 8.8 g/dL  Hematocrit : 26.9 %  Platelet Count - Automated : 263 K/uL  Mean Cell Volume : 77.1 fl  Mean Cell Hemoglobin : 25.2 pg  Mean Cell Hemoglobin Concentration : 32.7 gm/dL  Auto Neutrophil # : x  Auto Lymphocyte # : x  Auto Monocyte # : x  Auto Eosinophil # : x  Auto Basophil # : x  Auto Neutrophil % : x  Auto Lymphocyte % : x  Auto Monocyte % : x  Auto Eosinophil % : x  Auto Basophil % : x    05-16    137  |  99  |  38<H>  ----------------------------<  102<H>  3.5   |  27  |  1.61<H>    Ca    8.8      16 May 2022 05:45      < from: CT Head No Cont (04.27.22 @ 16:50) >    ACC: [id number] EXAM:  CT BRAIN                          PROCEDURE DATE:  04/27/2022          INTERPRETATION:  CT HEAD    CLINICAL HISTORY: recent CVA    TECHNIQUE:  Noncontrast CT.  Axial Acquisition.  Sagittal and coronal reformations.    COMPARISON:  Compared to study dated 3/31/2022    FINDINGS:  *  HEMORRHAGE:  No evidence of intracranial hemorrhage.  *  BRAIN PARENCHYMA:  Aging late subacute to chronic right posterior   frontal infarct with decreased mass effect. Mild ischemic changes left   frontal white matter somewhat better seen than prior exam. No parenchymal   mass or mass effect. Mild brain atrophy. Minimal periventricular white   matter ischemic changes. Stable chronic small right cerebellar infarcts  *  VENTRICLES / SHIFT:  No hydrocephalus. No midline shift.  *  EXTRA-AXIAL / BASAL CISTERNS:  No extra-axial mass. Basal cisterns   preserved.  Atherosclerotic calcifications of the cavernous internal   carotid arteries.  *  CALVARIUM AND EXTRACRANIAL SOFT TISSUES:  No depressed calvarial   fracture.  *  SINUSES, ORBITS, MASTOIDS:  There is mild mucosal thickening within   the paranasal sinuses.    IMPRESSION:  No evidence of an acute intracranial hemorrhage, midline shift, or   hydrocephalus. Aging small posterior right frontal infarcts. Mild   ischemic changes left frontal white matter somewhat better seen than   prior exam.    --- End of Report ---            CAMILLE ACUNA MD; Attending Radiologist  This document has been electronically signed. Apr 27 2022  5:04PM    < end of copied text >

## 2022-05-16 NOTE — PROGRESS NOTE ADULT - NS ATTEND AMEND GEN_ALL_CORE FT
pulm side he feels OK:  on room air:  no sob: no wheezing: orthostaisis per PMD::  dc planing per PMD

## 2022-05-16 NOTE — CONSULT NOTE ADULT - REASON FOR ADMISSION
fever and cough. diarrhea and generalized weakness

## 2022-05-17 LAB
ANION GAP SERPL CALC-SCNC: 13 MMOL/L — SIGNIFICANT CHANGE UP (ref 5–17)
BUN SERPL-MCNC: 29 MG/DL — HIGH (ref 7–23)
CALCIUM SERPL-MCNC: 8.7 MG/DL — SIGNIFICANT CHANGE UP (ref 8.4–10.5)
CHLORIDE SERPL-SCNC: 98 MMOL/L — SIGNIFICANT CHANGE UP (ref 96–108)
CO2 SERPL-SCNC: 28 MMOL/L — SIGNIFICANT CHANGE UP (ref 22–31)
CORTIS AM PEAK SERPL-MCNC: 10.3 UG/DL — SIGNIFICANT CHANGE UP (ref 6–18.4)
CREAT SERPL-MCNC: 1.61 MG/DL — HIGH (ref 0.5–1.3)
EGFR: 45 ML/MIN/1.73M2 — LOW
GLUCOSE BLDC GLUCOMTR-MCNC: 103 MG/DL — HIGH (ref 70–99)
GLUCOSE BLDC GLUCOMTR-MCNC: 135 MG/DL — HIGH (ref 70–99)
GLUCOSE BLDC GLUCOMTR-MCNC: 142 MG/DL — HIGH (ref 70–99)
GLUCOSE BLDC GLUCOMTR-MCNC: 157 MG/DL — HIGH (ref 70–99)
GLUCOSE SERPL-MCNC: 92 MG/DL — SIGNIFICANT CHANGE UP (ref 70–99)
HCT VFR BLD CALC: 26.2 % — LOW (ref 39–50)
HGB BLD-MCNC: 8.2 G/DL — LOW (ref 13–17)
MCHC RBC-ENTMCNC: 24.3 PG — LOW (ref 27–34)
MCHC RBC-ENTMCNC: 31.3 GM/DL — LOW (ref 32–36)
MCV RBC AUTO: 77.7 FL — LOW (ref 80–100)
NRBC # BLD: 0 /100 WBCS — SIGNIFICANT CHANGE UP (ref 0–0)
PLATELET # BLD AUTO: 304 K/UL — SIGNIFICANT CHANGE UP (ref 150–400)
POTASSIUM SERPL-MCNC: 3.4 MMOL/L — LOW (ref 3.5–5.3)
POTASSIUM SERPL-SCNC: 3.4 MMOL/L — LOW (ref 3.5–5.3)
PROCALCITONIN SERPL-MCNC: 0.12 NG/ML — HIGH (ref 0.02–0.1)
RBC # BLD: 3.37 M/UL — LOW (ref 4.2–5.8)
RBC # FLD: 26.6 % — HIGH (ref 10.3–14.5)
SODIUM SERPL-SCNC: 139 MMOL/L — SIGNIFICANT CHANGE UP (ref 135–145)
TSH SERPL-MCNC: 13.2 UIU/ML — HIGH (ref 0.27–4.2)
WBC # BLD: 9.49 K/UL — SIGNIFICANT CHANGE UP (ref 3.8–10.5)
WBC # FLD AUTO: 9.49 K/UL — SIGNIFICANT CHANGE UP (ref 3.8–10.5)

## 2022-05-17 PROCEDURE — 71045 X-RAY EXAM CHEST 1 VIEW: CPT | Mod: 26

## 2022-05-17 RX ORDER — DROXIDOPA 100 MG/1
1 CAPSULE ORAL
Qty: 90 | Refills: 0
Start: 2022-05-17 | End: 2022-06-15

## 2022-05-17 RX ORDER — DROXIDOPA 100 MG/1
200 CAPSULE ORAL THREE TIMES A DAY
Refills: 0 | Status: DISCONTINUED | OUTPATIENT
Start: 2022-05-17 | End: 2022-05-19

## 2022-05-17 RX ORDER — POTASSIUM CHLORIDE 20 MEQ
40 PACKET (EA) ORAL ONCE
Refills: 0 | Status: COMPLETED | OUTPATIENT
Start: 2022-05-17 | End: 2022-05-17

## 2022-05-17 RX ADMIN — ESCITALOPRAM OXALATE 5 MILLIGRAM(S): 10 TABLET, FILM COATED ORAL at 12:28

## 2022-05-17 RX ADMIN — Medication 3 MILLIGRAM(S): at 22:20

## 2022-05-17 RX ADMIN — SODIUM CHLORIDE 3 MILLILITER(S): 9 INJECTION INTRAMUSCULAR; INTRAVENOUS; SUBCUTANEOUS at 12:28

## 2022-05-17 RX ADMIN — CALCITRIOL 0.25 MICROGRAM(S): 0.5 CAPSULE ORAL at 12:27

## 2022-05-17 RX ADMIN — Medication 1200 MILLIGRAM(S): at 17:44

## 2022-05-17 RX ADMIN — PREGABALIN 1000 MICROGRAM(S): 225 CAPSULE ORAL at 12:29

## 2022-05-17 RX ADMIN — Medication 325 MILLIGRAM(S): at 12:28

## 2022-05-17 RX ADMIN — Medication 3 MILLILITER(S): at 05:10

## 2022-05-17 RX ADMIN — DROXIDOPA 100 MILLIGRAM(S): 100 CAPSULE ORAL at 05:12

## 2022-05-17 RX ADMIN — Medication 40 MILLIEQUIVALENT(S): at 08:54

## 2022-05-17 RX ADMIN — DROXIDOPA 200 MILLIGRAM(S): 100 CAPSULE ORAL at 17:44

## 2022-05-17 RX ADMIN — DIVALPROEX SODIUM 500 MILLIGRAM(S): 500 TABLET, DELAYED RELEASE ORAL at 05:11

## 2022-05-17 RX ADMIN — FINASTERIDE 5 MILLIGRAM(S): 5 TABLET, FILM COATED ORAL at 12:28

## 2022-05-17 RX ADMIN — DIVALPROEX SODIUM 500 MILLIGRAM(S): 500 TABLET, DELAYED RELEASE ORAL at 17:44

## 2022-05-17 RX ADMIN — MIDODRINE HYDROCHLORIDE 30 MILLIGRAM(S): 2.5 TABLET ORAL at 05:12

## 2022-05-17 RX ADMIN — Medication 3 MILLILITER(S): at 12:27

## 2022-05-17 RX ADMIN — Medication 81 MILLIGRAM(S): at 12:28

## 2022-05-17 RX ADMIN — INSULIN GLARGINE 5 UNIT(S): 100 INJECTION, SOLUTION SUBCUTANEOUS at 22:19

## 2022-05-17 RX ADMIN — SODIUM CHLORIDE 3 MILLILITER(S): 9 INJECTION INTRAMUSCULAR; INTRAVENOUS; SUBCUTANEOUS at 17:45

## 2022-05-17 RX ADMIN — Medication 1200 MILLIGRAM(S): at 05:12

## 2022-05-17 RX ADMIN — Medication 1: at 17:09

## 2022-05-17 RX ADMIN — APIXABAN 2.5 MILLIGRAM(S): 2.5 TABLET, FILM COATED ORAL at 05:11

## 2022-05-17 RX ADMIN — MIDODRINE HYDROCHLORIDE 30 MILLIGRAM(S): 2.5 TABLET ORAL at 12:29

## 2022-05-17 RX ADMIN — FLUDROCORTISONE ACETATE 0.2 MILLIGRAM(S): 0.1 TABLET ORAL at 17:45

## 2022-05-17 RX ADMIN — DROXIDOPA 200 MILLIGRAM(S): 100 CAPSULE ORAL at 12:30

## 2022-05-17 RX ADMIN — APIXABAN 2.5 MILLIGRAM(S): 2.5 TABLET, FILM COATED ORAL at 17:44

## 2022-05-17 RX ADMIN — Medication 5 MILLILITER(S): at 14:30

## 2022-05-17 RX ADMIN — Medication 3 MILLILITER(S): at 17:45

## 2022-05-17 RX ADMIN — Medication 5 MILLILITER(S): at 05:11

## 2022-05-17 RX ADMIN — Medication 75 MICROGRAM(S): at 05:11

## 2022-05-17 RX ADMIN — SODIUM CHLORIDE 3 MILLILITER(S): 9 INJECTION INTRAMUSCULAR; INTRAVENOUS; SUBCUTANEOUS at 05:11

## 2022-05-17 NOTE — PROGRESS NOTE ADULT - NSPROGADDITIONALINFOA_GEN_ALL_CORE
discussed with daughter Radha over the phone in detail  the family has decided to take patient home with home hospice  he is appropriate for home hospice  discussed with Hospice liaison   discussed with case management  discussed with covering ACP

## 2022-05-17 NOTE — PROGRESS NOTE ADULT - ASSESSMENT
Echo 3/31/22: The left ventricle is  enlarged with moderate to severe left ventricular systolic dysfunction,  estimated LVEF of 30-35%. The apical cap, apical septum and apical  lateral walls are akinetic with hypokinesis of the remaining walls. There  is no evidence of left ventricular thrombus The right ventricle is not well-visualized, device wire is noted within  the right heart Sclerotic trileaflet aortic valve with grossly normal opening, without AI.  Mild MR; Trace TR.  Cardiac Cath Lab (09.15.08 @ 13:41) >  Summary:  Hemodynamics: Hemodynamic assessment demonstrates moderate systemic  hypertension and moderately elevated LVEDP.  Recommendations:  Continue current medical therapy.  Echocardiogram with echo contrast agent to evaluate for LV apical thrombus.  Further arrhythmia management per the EP team.  Impressions: Angiography reveals nonobstructive coronary atherosclerosis  with LV dysfunction as described on the prior cath in 2005.    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    # PNA / RVP +  -CT chest noted New groundglass opacities (predominantly left-sided) and left upper  lobe opacities..  No change in the bilateral pleural effusions.  -management per pulm  + RVP/ influenza   -Bcx + gram + cocci in clusters 5/2- repeat NGTD   -sp iv abx;  med fu      #Acute on chronic systolic CHF , sp AICD   -CT chest noted. with bl pleural eff -sp lasix 20 mg IVP x 1 5/5   -ECHO with ef 25%<  mod to sev MR,  Moderate diastolic dysfunction (Stage II). Severe  left ventricular systolic dysfunction.  The apex is akinetic/dyskinetic.  -c/w mido/ florinef to augment bp   -lasix and BB held for symptomatic orthostatic hypotension     #Newly dx Afib   -dx recently  on last hospital admission  -sp device interrogation : Measured data WNL, normal ICD function, Pt is NOT pacemaker dependent; Stored data revealed episodes of AF lasting up to approximately 19 hours on 4/26/22 correlating with patient being in [facility] per pt/family; One episode of brief NSVT noted; No AF noted since 4/26/22; Optivol elevated sugg fluid overload   -c/w eliquis   -BB held as mentioned above    # carotid stenosis s/p right CEA 2020,  -asa , resume statin     #chronic cva, c/w asa, resume statin     #Orthostatic Hypotension   -BB/lasix on hold  -florinef 0.2 qd  -mido 30mg  TID  -on Droxidopa/Northera per neuro   -med fu   -compression stockings/abd binder    #candelaria on CKD   -renal fu

## 2022-05-17 NOTE — PROGRESS NOTE ADULT - ASSESSMENT
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 2) chronic R Frontal infarct from DEWAYNE now  s/p R CEA  - c/w asa 81 for secondary stroek prevention.  should be on lipitor 40 with LDL goal < 70 if no contraindication  - getting florineff  and midodrine now 30 TID for orthostasis,  at this point would attempt Droxidopa/Northera 100 TID and titrate up as tolerated increase droxidopa to 200mg TID   - trend orthostatics  still +   - telemetry  - PT/OT/SS/SLP, OOBC  - check FS, glucose control <180  - GI/DVT ppx  - Thank you for allowing me to participate in the care of this patient. Call with questions.   - spoke with team.  would talk to pharmacy to see if norther/droxidopa covered   Redd Santos MD  Vascular Neurology  Office: [phone]

## 2022-05-18 LAB
ANION GAP SERPL CALC-SCNC: 11 MMOL/L — SIGNIFICANT CHANGE UP (ref 5–17)
BUN SERPL-MCNC: 25 MG/DL — HIGH (ref 7–23)
CALCIUM SERPL-MCNC: 8.5 MG/DL — SIGNIFICANT CHANGE UP (ref 8.4–10.5)
CHLORIDE SERPL-SCNC: 99 MMOL/L — SIGNIFICANT CHANGE UP (ref 96–108)
CO2 SERPL-SCNC: 27 MMOL/L — SIGNIFICANT CHANGE UP (ref 22–31)
CREAT SERPL-MCNC: 1.56 MG/DL — HIGH (ref 0.5–1.3)
EGFR: 46 ML/MIN/1.73M2 — LOW
GLUCOSE BLDC GLUCOMTR-MCNC: 106 MG/DL — HIGH (ref 70–99)
GLUCOSE BLDC GLUCOMTR-MCNC: 135 MG/DL — HIGH (ref 70–99)
GLUCOSE BLDC GLUCOMTR-MCNC: 142 MG/DL — HIGH (ref 70–99)
GLUCOSE BLDC GLUCOMTR-MCNC: 148 MG/DL — HIGH (ref 70–99)
GLUCOSE SERPL-MCNC: 96 MG/DL — SIGNIFICANT CHANGE UP (ref 70–99)
POTASSIUM SERPL-MCNC: 3.5 MMOL/L — SIGNIFICANT CHANGE UP (ref 3.5–5.3)
POTASSIUM SERPL-SCNC: 3.5 MMOL/L — SIGNIFICANT CHANGE UP (ref 3.5–5.3)
SODIUM SERPL-SCNC: 137 MMOL/L — SIGNIFICANT CHANGE UP (ref 135–145)

## 2022-05-18 RX ORDER — ISOPROPYL ALCOHOL, BENZOCAINE .7; .06 ML/ML; ML/ML
1 SWAB TOPICAL
Qty: 100 | Refills: 1
Start: 2022-05-18 | End: 2022-07-06

## 2022-05-18 RX ORDER — LEVOTHYROXINE SODIUM 125 MCG
100 TABLET ORAL DAILY
Refills: 0 | Status: DISCONTINUED | OUTPATIENT
Start: 2022-05-18 | End: 2022-05-19

## 2022-05-18 RX ORDER — POTASSIUM CHLORIDE 20 MEQ
40 PACKET (EA) ORAL ONCE
Refills: 0 | Status: COMPLETED | OUTPATIENT
Start: 2022-05-18 | End: 2022-05-18

## 2022-05-18 RX ADMIN — APIXABAN 2.5 MILLIGRAM(S): 2.5 TABLET, FILM COATED ORAL at 18:05

## 2022-05-18 RX ADMIN — APIXABAN 2.5 MILLIGRAM(S): 2.5 TABLET, FILM COATED ORAL at 05:59

## 2022-05-18 RX ADMIN — DROXIDOPA 200 MILLIGRAM(S): 100 CAPSULE ORAL at 18:05

## 2022-05-18 RX ADMIN — Medication 40 MILLIEQUIVALENT(S): at 12:17

## 2022-05-18 RX ADMIN — DROXIDOPA 200 MILLIGRAM(S): 100 CAPSULE ORAL at 09:54

## 2022-05-18 RX ADMIN — Medication 81 MILLIGRAM(S): at 12:17

## 2022-05-18 RX ADMIN — MIDODRINE HYDROCHLORIDE 30 MILLIGRAM(S): 2.5 TABLET ORAL at 12:19

## 2022-05-18 RX ADMIN — DIVALPROEX SODIUM 500 MILLIGRAM(S): 500 TABLET, DELAYED RELEASE ORAL at 06:00

## 2022-05-18 RX ADMIN — Medication 5 MILLILITER(S): at 21:22

## 2022-05-18 RX ADMIN — SODIUM CHLORIDE 3 MILLILITER(S): 9 INJECTION INTRAMUSCULAR; INTRAVENOUS; SUBCUTANEOUS at 05:57

## 2022-05-18 RX ADMIN — FINASTERIDE 5 MILLIGRAM(S): 5 TABLET, FILM COATED ORAL at 12:19

## 2022-05-18 RX ADMIN — DIVALPROEX SODIUM 500 MILLIGRAM(S): 500 TABLET, DELAYED RELEASE ORAL at 18:05

## 2022-05-18 RX ADMIN — Medication 1200 MILLIGRAM(S): at 18:05

## 2022-05-18 RX ADMIN — Medication 2 SPRAY(S): at 21:22

## 2022-05-18 RX ADMIN — SODIUM CHLORIDE 3 MILLILITER(S): 9 INJECTION INTRAMUSCULAR; INTRAVENOUS; SUBCUTANEOUS at 21:23

## 2022-05-18 RX ADMIN — DROXIDOPA 200 MILLIGRAM(S): 100 CAPSULE ORAL at 12:18

## 2022-05-18 RX ADMIN — FLUDROCORTISONE ACETATE 0.2 MILLIGRAM(S): 0.1 TABLET ORAL at 18:05

## 2022-05-18 RX ADMIN — Medication 5 MILLILITER(S): at 12:17

## 2022-05-18 RX ADMIN — ESCITALOPRAM OXALATE 5 MILLIGRAM(S): 10 TABLET, FILM COATED ORAL at 12:18

## 2022-05-18 RX ADMIN — Medication 3 MILLILITER(S): at 05:57

## 2022-05-18 RX ADMIN — Medication 325 MILLIGRAM(S): at 12:18

## 2022-05-18 RX ADMIN — CALCITRIOL 0.25 MICROGRAM(S): 0.5 CAPSULE ORAL at 12:18

## 2022-05-18 RX ADMIN — Medication 3 MILLIGRAM(S): at 21:23

## 2022-05-18 RX ADMIN — Medication 75 MICROGRAM(S): at 05:57

## 2022-05-18 RX ADMIN — Medication 1200 MILLIGRAM(S): at 05:57

## 2022-05-18 RX ADMIN — PREGABALIN 1000 MICROGRAM(S): 225 CAPSULE ORAL at 12:18

## 2022-05-18 RX ADMIN — MIDODRINE HYDROCHLORIDE 30 MILLIGRAM(S): 2.5 TABLET ORAL at 18:06

## 2022-05-18 RX ADMIN — Medication 5 MILLILITER(S): at 06:00

## 2022-05-18 RX ADMIN — Medication 3 MILLILITER(S): at 21:23

## 2022-05-18 NOTE — PROGRESS NOTE ADULT - ASSESSMENT
Echo 3/31/22: The left ventricle is  enlarged with moderate to severe left ventricular systolic dysfunction,  estimated LVEF of 30-35%. The apical cap, apical septum and apical  lateral walls are akinetic with hypokinesis of the remaining walls. There  is no evidence of left ventricular thrombus The right ventricle is not well-visualized, device wire is noted within  the right heart Sclerotic trileaflet aortic valve with grossly normal opening, without AI.  Mild MR; Trace TR.  Cardiac Cath Lab (09.15.08 @ 13:41) >  Summary:  Hemodynamics: Hemodynamic assessment demonstrates moderate systemic  hypertension and moderately elevated LVEDP.  Recommendations:  Continue current medical therapy.  Echocardiogram with echo contrast agent to evaluate for LV apical thrombus.  Further arrhythmia management per the EP team.  Impressions: Angiography reveals nonobstructive coronary atherosclerosis  with LV dysfunction as described on the prior cath in 2005.    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    # PNA / RVP +  -CT chest noted New groundglass opacities (predominantly left-sided) and left upper  lobe opacities..  No change in the bilateral pleural effusions.  -management per pulm  + RVP/ influenza   -Bcx + gram + cocci in clusters 5/2- repeat NGTD   -sp iv abx;  med fu      #Acute on chronic systolic CHF , sp AICD   -CT chest noted. with bl pleural eff -sp lasix 20 mg IVP x 1 5/5   -ECHO with ef 25%<  mod to sev MR,  Moderate diastolic dysfunction (Stage II). Severe  left ventricular systolic dysfunction.  The apex is akinetic/dyskinetic.  -c/w mido/ florinef to augment bp   -lasix and BB held for symptomatic orthostatic hypotension     #Newly dx Afib   -dx recently  on last hospital admission  -sp device interrogation : Measured data WNL, normal ICD function, Pt is NOT pacemaker dependent; Stored data revealed episodes of AF lasting up to approximately 19 hours on 4/26/22 correlating with patient being in [facility] per pt/family; One episode of brief NSVT noted; No AF noted since 4/26/22; Optivol elevated sugg fluid overload   -c/w eliquis   -BB held as mentioned above    # carotid stenosis s/p right CEA 2020,  -asa , resume statin     #chronic cva, c/w asa, resume statin     #Orthostatic Hypotension   -BB/lasix on hold  -florinef 0.2 qd  -mido 30mg  TID  -on Droxidopa/Northera per neuro   -med fu   -compression stockings/abd binder    #candelaria on CKD   -renal fu

## 2022-05-18 NOTE — PHARMACOTHERAPY INTERVENTION NOTE - COMMENTS
77 YO M, currently on midodrine, florinef, now on droxidopa, uptitrated from 100mg TID to 200mg TID.     PA submitted and approved for Droxidopa on 5/16/22. Droxidopa 200mg TID RX sent to [facility] specialty pharmacy on 5/27. Confirmed with pharmacy medication is covered under pt's insurance with $50 copay. Medication on ordered and may be delivered to patient's address or picked up.     Meseret Tam, PharmD, [facility]  Clinical Pharmacy Specialist  [phone] or Teams 
Patient is currently on a low correction scale. His blood glucose in the past 24 hours has ranged from 242-391. Morning blood glucose is high in the 200s. Patient required 16 units of correction insulin in the past 24 hours. Recommend adding Lantus 6 units at bedtime and monitor blood glucose to determine further insulin adjustments.    Lexus Leon, PharmD  PGY-1 Pharmacy Resident  [facility] #[id number]

## 2022-05-18 NOTE — PROGRESS NOTE ADULT - ASSESSMENT
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 2) chronic R Frontal infarct from DEWAYNE now  s/p R CEA  - c/w asa 81 for secondary stroek prevention.  should be on lipitor 40 with LDL goal < 70 if no contraindication  - getting florineff  and midodrine now 30 TID for orthostasis,  at this point would attempt Droxidopa/Northera 100 TID and titrate up as tolerated increased droxidopa to 200mg TID on 5/17 some improvement.  spoke with daughter.  considering hospice and northera not covered.  need to figure out if planning for hospice or not and if northera covered.  it seems to be helping but if not covered may need to stop titrating up.   - trend orthostatics  still +   - telemetry  - PT/OT/SS/SLP, OOBC  - check FS, glucose control <180  - GI/DVT ppx  - Thank you for allowing me to participate in the care of this patient. Call with questions.   - spoke with team.  would talk to pharmacy to see if norther/droxidopa covered   - spoke with daughter   Redd Santos MD  Vascular Neurology  Office: [phone]

## 2022-05-18 NOTE — PROGRESS NOTE ADULT - NSPROGADDITIONALINFOA_GEN_ALL_CORE
discussed with daughter and wife at bedside in comprehensive detail   they are amenable for home hospice   stable for discharge when arrangements made

## 2022-05-19 ENCOUNTER — TRANSCRIPTION ENCOUNTER (OUTPATIENT)
Age: 77
End: 2022-05-19

## 2022-05-19 VITALS
HEART RATE: 72 BPM | OXYGEN SATURATION: 95 % | SYSTOLIC BLOOD PRESSURE: 145 MMHG | DIASTOLIC BLOOD PRESSURE: 79 MMHG | RESPIRATION RATE: 18 BRPM | TEMPERATURE: 98 F

## 2022-05-19 LAB
ANION GAP SERPL CALC-SCNC: 14 MMOL/L — SIGNIFICANT CHANGE UP (ref 5–17)
BUN SERPL-MCNC: 23 MG/DL — SIGNIFICANT CHANGE UP (ref 7–23)
CALCIUM SERPL-MCNC: 8.5 MG/DL — SIGNIFICANT CHANGE UP (ref 8.4–10.5)
CHLORIDE SERPL-SCNC: 98 MMOL/L — SIGNIFICANT CHANGE UP (ref 96–108)
CO2 SERPL-SCNC: 27 MMOL/L — SIGNIFICANT CHANGE UP (ref 22–31)
CREAT SERPL-MCNC: 1.48 MG/DL — HIGH (ref 0.5–1.3)
EGFR: 49 ML/MIN/1.73M2 — LOW
GLUCOSE BLDC GLUCOMTR-MCNC: 115 MG/DL — HIGH (ref 70–99)
GLUCOSE BLDC GLUCOMTR-MCNC: 148 MG/DL — HIGH (ref 70–99)
GLUCOSE BLDC GLUCOMTR-MCNC: 97 MG/DL — SIGNIFICANT CHANGE UP (ref 70–99)
GLUCOSE SERPL-MCNC: 88 MG/DL — SIGNIFICANT CHANGE UP (ref 70–99)
POTASSIUM SERPL-MCNC: 3.5 MMOL/L — SIGNIFICANT CHANGE UP (ref 3.5–5.3)
POTASSIUM SERPL-SCNC: 3.5 MMOL/L — SIGNIFICANT CHANGE UP (ref 3.5–5.3)
SODIUM SERPL-SCNC: 139 MMOL/L — SIGNIFICANT CHANGE UP (ref 135–145)

## 2022-05-19 PROCEDURE — 87186 SC STD MICRODIL/AGAR DIL: CPT

## 2022-05-19 PROCEDURE — 84439 ASSAY OF FREE THYROXINE: CPT

## 2022-05-19 PROCEDURE — 36415 COLL VENOUS BLD VENIPUNCTURE: CPT

## 2022-05-19 PROCEDURE — 87086 URINE CULTURE/COLONY COUNT: CPT

## 2022-05-19 PROCEDURE — 83880 ASSAY OF NATRIURETIC PEPTIDE: CPT

## 2022-05-19 PROCEDURE — 85610 PROTHROMBIN TIME: CPT

## 2022-05-19 PROCEDURE — 71045 X-RAY EXAM CHEST 1 VIEW: CPT

## 2022-05-19 PROCEDURE — 85025 COMPLETE CBC W/AUTO DIFF WBC: CPT

## 2022-05-19 PROCEDURE — 87040 BLOOD CULTURE FOR BACTERIA: CPT

## 2022-05-19 PROCEDURE — 97530 THERAPEUTIC ACTIVITIES: CPT

## 2022-05-19 PROCEDURE — 82533 TOTAL CORTISOL: CPT

## 2022-05-19 PROCEDURE — 97162 PT EVAL MOD COMPLEX 30 MIN: CPT

## 2022-05-19 PROCEDURE — 81001 URINALYSIS AUTO W/SCOPE: CPT

## 2022-05-19 PROCEDURE — 84443 ASSAY THYROID STIM HORMONE: CPT

## 2022-05-19 PROCEDURE — 85027 COMPLETE CBC AUTOMATED: CPT

## 2022-05-19 PROCEDURE — 96374 THER/PROPH/DIAG INJ IV PUSH: CPT

## 2022-05-19 PROCEDURE — 87077 CULTURE AEROBIC IDENTIFY: CPT

## 2022-05-19 PROCEDURE — 94640 AIRWAY INHALATION TREATMENT: CPT

## 2022-05-19 PROCEDURE — 93306 TTE W/DOPPLER COMPLETE: CPT

## 2022-05-19 PROCEDURE — 83605 ASSAY OF LACTIC ACID: CPT

## 2022-05-19 PROCEDURE — 87507 IADNA-DNA/RNA PROBE TQ 12-25: CPT

## 2022-05-19 PROCEDURE — 0225U NFCT DS DNA&RNA 21 SARSCOV2: CPT

## 2022-05-19 PROCEDURE — 96375 TX/PRO/DX INJ NEW DRUG ADDON: CPT

## 2022-05-19 PROCEDURE — U0005: CPT

## 2022-05-19 PROCEDURE — 71250 CT THORAX DX C-: CPT | Mod: MA

## 2022-05-19 PROCEDURE — 92526 ORAL FUNCTION THERAPY: CPT

## 2022-05-19 PROCEDURE — 92610 EVALUATE SWALLOWING FUNCTION: CPT

## 2022-05-19 PROCEDURE — 80048 BASIC METABOLIC PNL TOTAL CA: CPT

## 2022-05-19 PROCEDURE — 87635 SARS-COV-2 COVID-19 AMP PRB: CPT

## 2022-05-19 PROCEDURE — 99285 EMERGENCY DEPT VISIT HI MDM: CPT | Mod: 25

## 2022-05-19 PROCEDURE — 87150 DNA/RNA AMPLIFIED PROBE: CPT

## 2022-05-19 PROCEDURE — 84484 ASSAY OF TROPONIN QUANT: CPT

## 2022-05-19 PROCEDURE — 83735 ASSAY OF MAGNESIUM: CPT

## 2022-05-19 PROCEDURE — 80053 COMPREHEN METABOLIC PANEL: CPT

## 2022-05-19 PROCEDURE — 97110 THERAPEUTIC EXERCISES: CPT

## 2022-05-19 PROCEDURE — 84145 PROCALCITONIN (PCT): CPT

## 2022-05-19 PROCEDURE — 71045 X-RAY EXAM CHEST 1 VIEW: CPT | Mod: 26

## 2022-05-19 PROCEDURE — 82962 GLUCOSE BLOOD TEST: CPT

## 2022-05-19 PROCEDURE — U0003: CPT

## 2022-05-19 PROCEDURE — 85730 THROMBOPLASTIN TIME PARTIAL: CPT

## 2022-05-19 RX ORDER — FLUTICASONE PROPIONATE 50 MCG
2 SPRAY, SUSPENSION NASAL
Qty: 0 | Refills: 0 | DISCHARGE
Start: 2022-05-19

## 2022-05-19 RX ORDER — LEVOTHYROXINE SODIUM 125 MCG
1 TABLET ORAL
Qty: 30 | Refills: 0
Start: 2022-05-19 | End: 2022-06-17

## 2022-05-19 RX ORDER — APIXABAN 2.5 MG/1
1 TABLET, FILM COATED ORAL
Qty: 0 | Refills: 0 | DISCHARGE
Start: 2022-05-19

## 2022-05-19 RX ORDER — MIDODRINE HYDROCHLORIDE 2.5 MG/1
3 TABLET ORAL
Qty: 0 | Refills: 0 | DISCHARGE
Start: 2022-05-19

## 2022-05-19 RX ORDER — FLUDROCORTISONE ACETATE 0.1 MG/1
2 TABLET ORAL
Qty: 0 | Refills: 0 | DISCHARGE
Start: 2022-05-19

## 2022-05-19 RX ORDER — MIDODRINE HYDROCHLORIDE 2.5 MG/1
3 TABLET ORAL
Qty: 270 | Refills: 0
Start: 2022-05-19 | End: 2022-06-17

## 2022-05-19 RX ORDER — ATORVASTATIN CALCIUM 80 MG/1
80 TABLET, FILM COATED ORAL AT BEDTIME
Refills: 0 | Status: DISCONTINUED | OUTPATIENT
Start: 2022-05-19 | End: 2022-05-19

## 2022-05-19 RX ORDER — FLUDROCORTISONE ACETATE 0.1 MG/1
2 TABLET ORAL
Qty: 60 | Refills: 0
Start: 2022-05-19 | End: 2022-06-17

## 2022-05-19 RX ORDER — CARVEDILOL PHOSPHATE 80 MG/1
1 CAPSULE, EXTENDED RELEASE ORAL
Qty: 0 | Refills: 0 | DISCHARGE

## 2022-05-19 RX ADMIN — Medication 81 MILLIGRAM(S): at 12:14

## 2022-05-19 RX ADMIN — Medication 3 MILLILITER(S): at 01:13

## 2022-05-19 RX ADMIN — MIDODRINE HYDROCHLORIDE 30 MILLIGRAM(S): 2.5 TABLET ORAL at 12:12

## 2022-05-19 RX ADMIN — CALCITRIOL 0.25 MICROGRAM(S): 0.5 CAPSULE ORAL at 12:14

## 2022-05-19 RX ADMIN — Medication 5 MILLILITER(S): at 13:34

## 2022-05-19 RX ADMIN — ERYTHROPOIETIN 10000 UNIT(S): 10000 INJECTION, SOLUTION INTRAVENOUS; SUBCUTANEOUS at 15:13

## 2022-05-19 RX ADMIN — SODIUM CHLORIDE 3 MILLILITER(S): 9 INJECTION INTRAMUSCULAR; INTRAVENOUS; SUBCUTANEOUS at 01:13

## 2022-05-19 RX ADMIN — FINASTERIDE 5 MILLIGRAM(S): 5 TABLET, FILM COATED ORAL at 12:13

## 2022-05-19 RX ADMIN — Medication 3 MILLILITER(S): at 12:11

## 2022-05-19 RX ADMIN — INSULIN GLARGINE 5 UNIT(S): 100 INJECTION, SOLUTION SUBCUTANEOUS at 01:24

## 2022-05-19 RX ADMIN — PREGABALIN 1000 MICROGRAM(S): 225 CAPSULE ORAL at 12:13

## 2022-05-19 RX ADMIN — DROXIDOPA 200 MILLIGRAM(S): 100 CAPSULE ORAL at 12:13

## 2022-05-19 RX ADMIN — DROXIDOPA 200 MILLIGRAM(S): 100 CAPSULE ORAL at 06:57

## 2022-05-19 RX ADMIN — Medication 3 MILLILITER(S): at 06:50

## 2022-05-19 RX ADMIN — DIVALPROEX SODIUM 500 MILLIGRAM(S): 500 TABLET, DELAYED RELEASE ORAL at 06:50

## 2022-05-19 RX ADMIN — SODIUM CHLORIDE 3 MILLILITER(S): 9 INJECTION INTRAMUSCULAR; INTRAVENOUS; SUBCUTANEOUS at 12:12

## 2022-05-19 RX ADMIN — ESCITALOPRAM OXALATE 5 MILLIGRAM(S): 10 TABLET, FILM COATED ORAL at 12:13

## 2022-05-19 RX ADMIN — Medication 5 MILLILITER(S): at 06:55

## 2022-05-19 RX ADMIN — Medication 325 MILLIGRAM(S): at 12:14

## 2022-05-19 RX ADMIN — SODIUM CHLORIDE 3 MILLILITER(S): 9 INJECTION INTRAMUSCULAR; INTRAVENOUS; SUBCUTANEOUS at 06:52

## 2022-05-19 RX ADMIN — Medication 1200 MILLIGRAM(S): at 06:51

## 2022-05-19 RX ADMIN — APIXABAN 2.5 MILLIGRAM(S): 2.5 TABLET, FILM COATED ORAL at 06:55

## 2022-05-19 RX ADMIN — Medication 100 MICROGRAM(S): at 06:56

## 2022-05-19 NOTE — PROGRESS NOTE ADULT - PROBLEM SELECTOR PLAN 9
continue to monitor

## 2022-05-19 NOTE — PROGRESS NOTE ADULT - PROBLEM SELECTOR PROBLEM 4
CAD (coronary artery disease)
Recent cerebrovascular accident (CVA)
CAD (coronary artery disease)
CAD (coronary artery disease)
Recent cerebrovascular accident (CVA)
CAD (coronary artery disease)
Recent cerebrovascular accident (CVA)
CAD (coronary artery disease)
Recent cerebrovascular accident (CVA)
CAD (coronary artery disease)
Recent cerebrovascular accident (CVA)
Recent cerebrovascular accident (CVA)
CAD (coronary artery disease)
CAD (coronary artery disease)
Recent cerebrovascular accident (CVA)
Recent cerebrovascular accident (CVA)

## 2022-05-19 NOTE — PROGRESS NOTE ADULT - NUTRITIONAL ASSESSMENT
This patient has been assessed with a concern for Malnutrition and has been determined to have a diagnosis/diagnoses of Severe protein-calorie malnutrition.    This patient is being managed with:   Diet Pureed-  Consistent Carbohydrate {No Snacks} (CSTCHO)  Moderately Thick Liquids (MODTHICKLIQS)  Supplement Feeding Modality:  Oral  Glucerna Shake Cans or Servings Per Day:  2       Frequency:  Daily  Entered: May 13 2022  2:18PM    Diet Pureed-  DASH/TLC {Sodium & Cholesterol Restricted} (DASH)  Moderately Thick Liquids (MODTHICKLIQS)  Low Sodium  Supplement Feeding Modality:  Oral  Suplena Cans or Servings Per Day:  3       Frequency:  Daily  Probiotic Yogurt/Smoothie Cans or Servings Per Day:  2       Frequency:  Daily  Entered: May  4 2022 11:18AM    The following pending diet order is being considered for treatment of Severe protein-calorie malnutrition:null
This patient has been assessed with a concern for Malnutrition and has been determined to have a diagnosis/diagnoses of Severe protein-calorie malnutrition.    This patient is being managed with:   Diet Pureed-  Consistent Carbohydrate {No Snacks} (CSTCHO)  Moderately Thick Liquids (MODTHICKLIQS)  Supplement Feeding Modality:  Oral  Glucerna Shake Cans or Servings Per Day:  2       Frequency:  Daily  Entered: May 13 2022  2:18PM    Diet Pureed-  DASH/TLC {Sodium & Cholesterol Restricted} (DASH)  Moderately Thick Liquids (MODTHICKLIQS)  Low Sodium  Supplement Feeding Modality:  Oral  Suplena Cans or Servings Per Day:  3       Frequency:  Daily  Probiotic Yogurt/Smoothie Cans or Servings Per Day:  2       Frequency:  Daily  Entered: May  4 2022 11:18AM    The following pending diet order is being considered for treatment of Severe protein-calorie malnutrition:null
This patient has been assessed with a concern for Malnutrition and has been determined to have a diagnosis/diagnoses of Severe protein-calorie malnutrition.    This patient is being managed with:   Diet Pureed-  DASH/TLC {Sodium & Cholesterol Restricted} (DASH)  Moderately Thick Liquids (MODTHICKLIQS)  Low Sodium  Supplement Feeding Modality:  Oral  Suplena Cans or Servings Per Day:  3       Frequency:  Daily  Probiotic Yogurt/Smoothie Cans or Servings Per Day:  2       Frequency:  Daily  Entered: May  4 2022 11:18AM    
This patient has been assessed with a concern for Malnutrition and has been determined to have a diagnosis/diagnoses of Severe protein-calorie malnutrition.    This patient is being managed with:   Diet Pureed-  DASH/TLC {Sodium & Cholesterol Restricted} (DASH)  Moderately Thick Liquids (MODTHICKLIQS)  Low Sodium  Supplement Feeding Modality:  Oral  Suplena Cans or Servings Per Day:  3       Frequency:  Daily  Probiotic Yogurt/Smoothie Cans or Servings Per Day:  2       Frequency:  Daily  Entered: May  4 2022 11:18AM      This patient has been assessed with a concern for Malnutrition and has been determined to have a diagnosis/diagnoses of Severe protein-calorie malnutrition.    This patient is being managed with:   Diet Pureed-  DASH/TLC {Sodium & Cholesterol Restricted} (DASH)  Moderately Thick Liquids (MODTHICKLIQS)  Low Sodium  Supplement Feeding Modality:  Oral  Suplena Cans or Servings Per Day:  3       Frequency:  Daily  Probiotic Yogurt/Smoothie Cans or Servings Per Day:  2       Frequency:  Daily  Entered: May  4 2022 11:18AM    
This patient has been assessed with a concern for Malnutrition and has been determined to have a diagnosis/diagnoses of Severe protein-calorie malnutrition.    This patient is being managed with:   Diet Pureed-  DASH/TLC {Sodium & Cholesterol Restricted} (DASH)  Moderately Thick Liquids (MODTHICKLIQS)  Low Sodium  Supplement Feeding Modality:  Oral  Suplena Cans or Servings Per Day:  3       Frequency:  Daily  Probiotic Yogurt/Smoothie Cans or Servings Per Day:  2       Frequency:  Daily  Entered: May  4 2022 11:18AM    
This patient has been assessed with a concern for Malnutrition and has been determined to have a diagnosis/diagnoses of Severe protein-calorie malnutrition.    This patient is being managed with:   Diet Pureed-  Consistent Carbohydrate {No Snacks} (CSTCHO)  Moderately Thick Liquids (MODTHICKLIQS)  Supplement Feeding Modality:  Oral  Glucerna Shake Cans or Servings Per Day:  2       Frequency:  Daily  Entered: May 13 2022  2:18PM    Diet Pureed-  DASH/TLC {Sodium & Cholesterol Restricted} (DASH)  Moderately Thick Liquids (MODTHICKLIQS)  Low Sodium  Supplement Feeding Modality:  Oral  Suplena Cans or Servings Per Day:  3       Frequency:  Daily  Probiotic Yogurt/Smoothie Cans or Servings Per Day:  2       Frequency:  Daily  Entered: May  4 2022 11:18AM    The following pending diet order is being considered for treatment of Severe protein-calorie malnutrition:null
This patient has been assessed with a concern for Malnutrition and has been determined to have a diagnosis/diagnoses of Severe protein-calorie malnutrition.    This patient is being managed with:   Diet Pureed-  DASH/TLC {Sodium & Cholesterol Restricted} (DASH)  Moderately Thick Liquids (MODTHICKLIQS)  Low Sodium  Supplement Feeding Modality:  Oral  Suplena Cans or Servings Per Day:  3       Frequency:  Daily  Probiotic Yogurt/Smoothie Cans or Servings Per Day:  2       Frequency:  Daily  Entered: May  4 2022 11:18AM    
This patient has been assessed with a concern for Malnutrition and has been determined to have a diagnosis/diagnoses of Severe protein-calorie malnutrition.    This patient is being managed with:   Diet Pureed-  Consistent Carbohydrate {No Snacks} (CSTCHO)  Moderately Thick Liquids (MODTHICKLIQS)  Supplement Feeding Modality:  Oral  Glucerna Shake Cans or Servings Per Day:  2       Frequency:  Daily  Entered: May 13 2022  2:18PM    Diet Pureed-  DASH/TLC {Sodium & Cholesterol Restricted} (DASH)  Moderately Thick Liquids (MODTHICKLIQS)  Low Sodium  Supplement Feeding Modality:  Oral  Suplena Cans or Servings Per Day:  3       Frequency:  Daily  Probiotic Yogurt/Smoothie Cans or Servings Per Day:  2       Frequency:  Daily  Entered: May  4 2022 11:18AM    The following pending diet order is being considered for treatment of Severe protein-calorie malnutrition:null
This patient has been assessed with a concern for Malnutrition and has been determined to have a diagnosis/diagnoses of Severe protein-calorie malnutrition.    This patient is being managed with:   Diet Pureed-  DASH/TLC {Sodium & Cholesterol Restricted} (DASH)  Moderately Thick Liquids (MODTHICKLIQS)  Low Sodium  Supplement Feeding Modality:  Oral  Suplena Cans or Servings Per Day:  3       Frequency:  Daily  Probiotic Yogurt/Smoothie Cans or Servings Per Day:  2       Frequency:  Daily  Entered: May  4 2022 11:18AM    
This patient has been assessed with a concern for Malnutrition and has been determined to have a diagnosis/diagnoses of Severe protein-calorie malnutrition.    This patient is being managed with:   Diet Pureed-  Consistent Carbohydrate {No Snacks} (CSTCHO)  Moderately Thick Liquids (MODTHICKLIQS)  Supplement Feeding Modality:  Oral  Glucerna Shake Cans or Servings Per Day:  2       Frequency:  Daily  Entered: May 13 2022  2:18PM    Diet Pureed-  DASH/TLC {Sodium & Cholesterol Restricted} (DASH)  Moderately Thick Liquids (MODTHICKLIQS)  Low Sodium  Supplement Feeding Modality:  Oral  Suplena Cans or Servings Per Day:  3       Frequency:  Daily  Probiotic Yogurt/Smoothie Cans or Servings Per Day:  2       Frequency:  Daily  Entered: May  4 2022 11:18AM    The following pending diet order is being considered for treatment of Severe protein-calorie malnutrition:null
This patient has been assessed with a concern for Malnutrition and has been determined to have a diagnosis/diagnoses of Severe protein-calorie malnutrition.    This patient is being managed with:   Diet Pureed-  DASH/TLC {Sodium & Cholesterol Restricted} (DASH)  Moderately Thick Liquids (MODTHICKLIQS)  Low Sodium  Supplement Feeding Modality:  Oral  Suplena Cans or Servings Per Day:  3       Frequency:  Daily  Probiotic Yogurt/Smoothie Cans or Servings Per Day:  2       Frequency:  Daily  Entered: May  4 2022 11:18AM    
This patient has been assessed with a concern for Malnutrition and has been determined to have a diagnosis/diagnoses of Severe protein-calorie malnutrition.    This patient is being managed with:   Diet Pureed-  DASH/TLC {Sodium & Cholesterol Restricted} (DASH)  Moderately Thick Liquids (MODTHICKLIQS)  Low Sodium  Supplement Feeding Modality:  Oral  Suplena Cans or Servings Per Day:  3       Frequency:  Daily  Probiotic Yogurt/Smoothie Cans or Servings Per Day:  2       Frequency:  Daily  Entered: May  4 2022 11:18AM

## 2022-05-19 NOTE — PROGRESS NOTE ADULT - PROBLEM SELECTOR PROBLEM 3
JARRED (acute kidney injury)
Recent cerebrovascular accident (CVA)
JARRED (acute kidney injury)
Recent cerebrovascular accident (CVA)
JARRED (acute kidney injury)
Recent cerebrovascular accident (CVA)
JARRED (acute kidney injury)
Recent cerebrovascular accident (CVA)
JARRED (acute kidney injury)
Recent cerebrovascular accident (CVA)
Recent cerebrovascular accident (CVA)
JARRED (acute kidney injury)
JARRED (acute kidney injury)
Recent cerebrovascular accident (CVA)
Recent cerebrovascular accident (CVA)
JARRED (acute kidney injury)

## 2022-05-19 NOTE — PROGRESS NOTE ADULT - PROBLEM SELECTOR PLAN 3
Per hx  -Neuro f/u.
likely secondary to dehydration secondary to poor po   creatinine trending down   at baseline now 1.5 to 2
supportive care  continue ASA
likely secondary to dehydration secondary to poor po   creatinine trending down   at baseline now 1.5 to 2
supportive care  continue ASA
supportive care  continue ASA
Cont current med
Per hx  -Neuro f/u.
supportive care  continue ASA
Cont current med
Cont current med
supportive care  continue ASA
Cont current med
Per hx  -Neuro f/u.
likely secondary to dehydration secondary to poor po   creatinine trending down   at baseline now 1.5 to 2
likely secondary to dehydration secondary to poor po   creatinine trending down   at baseline now 1.5
likely secondary to dehydration secondary to poor po   creatinine trending down   at baseline now 1.5 to 2
Cont current med
supportive care  continue ASA
Cont current med
Cont current med
likely secondary to dehydration secondary to poor po   creatinine trending down   at baseline now 1.5 to 2
supportive care  continue ASA
Cont current med
likely secondary to dehydration secondary to poor po   creatinine trending down   at baseline now 1.5 to 2
supportive care  continue ASA
Cont current med
Per hx  -Neuro f/u.
likely secondary to dehydration secondary to poor po   creatinine trending down   at baseline now ~ 2
Cont current med
supportive care  continue ASA

## 2022-05-19 NOTE — PROGRESS NOTE ADULT - TIME BILLING
Agree with above NP note.  + orthostatics with sx  cont mido  florinef per med   off bb, diuretics   ECHO with ef 25%, mod to sev MR  cont medical tx of cmp in light of pna, flu  device interrogation noted  cont eliquis
agree with above  cv stable  abx for PNA   tx for influenza per med   low dose bb  low dose lasix   cont mido for bp support as needed  ECHO with ef 25%, mod to sev MR  cont medical tx of cmp in light of pna, flu  device interrogation noted  cont eliquis
agree with above  cv stable  low dose bb  low dose lasix   cont mido for bp support as needed  ECHO with ef 25%, mod to sev MR  cont medical tx of cmp in light of pna, flu  device interrogation noted  cont eliquis
Agree with above NP note.  cv stable  still profoundly orthostatic on mido/florinef  northera per neuro   cont to trend orthostatics
Agree with above NP note.  events noted  + orthostatics with sx  hydrate as ordered  cont mido  florinef per med   d/c bb id remains orthostatic and hr ok  ECHO with ef 25%, mod to sev MR  cont medical tx of cmp in light of pna, flu  device interrogation noted  cont eliquis
Agree with above NP note.  cv stable  improving-still orthostatic on mido/florinef  northera per neuro   cont to trend orthostatics
Agree with above NP note.  cv stable  slowly improving  cont mido/florinef  northera per neuro   cont to trend orthostatics
Agree with above NP note.  cv stable  still profoundly orthostatic on mido/florinef  northera per neuro   cont to trend orthostatics
agree with above  cv stable  cont iv abx   ECHO with ef 25%<  mod to sev MR,  Moderate diastolic dysfunction (Stage II). Severe  left ventricular systolic dysfunction.  The apex is akinetic/dyskinetic.  bb on hold given hypotension-- likely resume tomorrow if bp remains stable   Increase mido to 15 mg TID if needed to augment BP   given lasix 20 mg IVP x 1  sys bp has improved    Call EP to interrogate device  c/w hanny   bb on hold for now   cont
agree with above  cv stable  cont iv abx for PNA confirmed on CT  pt also flu+-Tamiflu started  cont to hold BB due to hypotension  ECHO with ef 25%<  mod to sev MR,  Moderate diastolic dysfunction (Stage II). Severe  left ventricular systolic dysfunction.  The apex is akinetic/dyskinetic  will continue conservative mgmt  cont midodrine as needed   Call EP to interrogate device  c/w eliquis
agree with above  cv stable   iv abx for PNA   tx for influenza per med   BB on holdfor hypotension  cont mido for bp support  eventual low dose BB  lasix prn  ECHO with ef 25%, mod to sev MR  cont medical tx of cmp in light of pna, flu  device interrogation noted  cont eliquis

## 2022-05-19 NOTE — PROGRESS NOTE ADULT - PROBLEM SELECTOR PROBLEM 5
Chronic HFrEF (heart failure with reduced ejection fraction)
CAD (coronary artery disease)
Chronic HFrEF (heart failure with reduced ejection fraction)
CAD (coronary artery disease)
Chronic HFrEF (heart failure with reduced ejection fraction)
Chronic HFrEF (heart failure with reduced ejection fraction)
CAD (coronary artery disease)
Chronic HFrEF (heart failure with reduced ejection fraction)
CAD (coronary artery disease)
CAD (coronary artery disease)
Chronic HFrEF (heart failure with reduced ejection fraction)
CAD (coronary artery disease)
CAD (coronary artery disease)
Chronic HFrEF (heart failure with reduced ejection fraction)
CAD (coronary artery disease)
Chronic HFrEF (heart failure with reduced ejection fraction)

## 2022-05-19 NOTE — PROGRESS NOTE ADULT - PROBLEM SELECTOR PLAN 5
chest pain free  cardiology help appreciated
off lasix
TTE with moderate-severe MR, severe LV systolic dysfunction, moderate diastolic dysfunction   -Per cards  -Diuresis held in the setting of hypotension.
TTE with moderate-severe MR, severe LV systolic dysfunction, moderate diastolic dysfunction   -Per cards  -Diuresis held in the setting of hypotension.    5/18: stil orthostatic: defer to primary team    5/19: stable: on droxidropa
off lasix
no evidence of acute HF  continue to monitor  not on diuresis  will monitor closely
TTE with moderate-severe MR, severe LV systolic dysfunction, moderate diastolic dysfunction   -Per cards  -Diuresis held in the setting of hypotension.
off lasix
off lasix
no evidence of acute HF  continue to monitor  will continue low dose furosemide
onl ow dose lasix
chest pain free  cardiology help appreciated
no evidence of acute HF  continue to monitor  not on diuresis  will monitor closely
chest pain free  cardiology help appreciated
no evidence of acute HF  continue to monitor  will continue low dose furosemide
onl ow dose lasix
chest pain free  cardiology help appreciated
no evidence of acute HF  continue to monitor  will start low dose furosemide
off lasix
chest pain free  cardiology help appreciated
no evidence of acute HF  continue to monitor  not on diuresis  will monitor closely
no evidence of acute HF  continue to monitor  not on diuresis  will monitor closely
off lasix
on low dose lasix    5/12: he is orthostatic today :? hold lasix    5/13:lasix on hld: recd iv fluids!    5/15: cont to have orthostatic:
no evidence of acute HF  continue to monitor  not on diuresis  will monitor closely
no evidence of acute HF  continue to monitor  not on diuresis  will monitor closely
TTE with moderate-severe MR, severe LV systolic dysfunction, moderate diastolic dysfunction   -Per cards  -Diuresis held in the setting of hypotension.    5/18: stil orthostatic: defer to primary team
on low dose lasix    5/12: he is orthostatic today :? hold lasix    5/13:lasix on hld: recd iv fluids!
on low dose lasix    5/12: he is orthostatic today :? hold lasix
chest pain free  cardiology help appreciated

## 2022-05-19 NOTE — PROGRESS NOTE ADULT - PROBLEM SELECTOR PROBLEM 7
History of dysphagia
Diabetes
History of dysphagia
Diabetes
Diabetes
History of dysphagia
Diabetes
Diabetes
History of dysphagia
Diabetes

## 2022-05-19 NOTE — PROGRESS NOTE ADULT - PROBLEM SELECTOR PROBLEM 2
Pneumonia, aspiration
JARRED (acute kidney injury)
Pneumonia, aspiration
JARRED (acute kidney injury)
Pneumonia, aspiration
JARRED (acute kidney injury)
Pneumonia, aspiration
JARRED (acute kidney injury)
Pneumonia, aspiration
JARRED (acute kidney injury)
JARRED (acute kidney injury)
Pneumonia, aspiration
JARRED (acute kidney injury)
JARRED (acute kidney injury)
Pneumonia, aspiration
JARRED (acute kidney injury)
JARRED (acute kidney injury)
Pneumonia, aspiration

## 2022-05-19 NOTE — PROGRESS NOTE ADULT - PROBLEM SELECTOR PROBLEM 1
Orthostatic hypertension
Pneumonia, aspiration
Orthostatic hypertension
Pneumonia, aspiration
Orthostatic hypertension
Pneumonia, aspiration
Orthostatic hypertension
Orthostatic hypertension
Pneumonia, aspiration
Pneumonia, aspiration
Orthostatic hypertension
Pneumonia, aspiration

## 2022-05-19 NOTE — PROGRESS NOTE ADULT - PROBLEM SELECTOR PLAN 8
Per primary team.
continue to monitor
per PMD
continue to monitor
continue to monitor
supportive care  continue aspiration precautions
per PMD
Per primary team.
continue to monitor
supportive care  continue aspiration precautions
per PMD
continue to monitor
per PMD
supportive care  continue aspiration precautions
continue to monitor
Per primary team.
per PMD
supportive care  continue aspiration precautions
per PMD
per PMD
supportive care  continue aspiration precautions
supportive care  continue aspiration precautions
Per primary team.
continue to monitor
supportive care  continue aspiration precautions
continue to monitor
supportive care  continue aspiration precautions
continue to monitor
per PMD

## 2022-05-19 NOTE — PROGRESS NOTE ADULT - PROBLEM SELECTOR PLAN 7
Recent MBS in 3/2022 with moderate oral dysphagia, mild-moderate pharyngeal dysphagia  -Diet per speech  -Aspiration precautions  -Oral care.
speech and swallow: seen by the diet as prescribed!
supportive care  continue aspiration precautions   pureed diet  speech and swallow evaluation appreciated
speech and swallow
speech and swallow: seen by the diet as prescribed!
supportive care  continue aspiration precautions
speech and swallow: seen by the diet as prescribed!
supportive care  continue aspiration precautions
Recent MBS in 3/2022 with moderate oral dysphagia, mild-moderate pharyngeal dysphagia  -Diet per speech  -Aspiration precautions  -Oral care.
supportive care  continue aspiration precautions   pureed diet  speech and swallow evaluation appreciated
supportive care  continue aspiration precautions   pureed diet  speech and swallow eval
supportive care  continue aspiration precautions   pureed diet  speech and swallow evaluation appreciated
supportive care  continue aspiration precautions   pureed diet  speech and swallow eval
Recent MBS in 3/2022 with moderate oral dysphagia, mild-moderate pharyngeal dysphagia  -Diet per speech  -Aspiration precautions  -Oral care.
speech and swallow: seen by the diet as prescribed!
speech and swallow: seen by the diet as prescribed!
finger sticks with short acting insulin sliding scale  no oral meds  finger sticks improved off steroids
finger sticks with short acting insulin sliding scale  no oral meds  finger sticks will improve now that solumedrol IV discontinued
supportive care  continue aspiration precautions   pureed diet  speech and swallow evaluation
Recent MBS in 3/2022 with moderate oral dysphagia, mild-moderate pharyngeal dysphagia  -Diet per speech  -Aspiration precautions  -Oral care.
speech and swallow: seen by the diet as prescribed!
finger sticks with short acting insulin sliding scale  no oral meds  finger sticks improved off steroids
finger sticks with short acting insulin sliding scale  no oral meds  finger sticks will improve now that solumedrol IV discontinued
speech and swallow: seen by the diet as prescribed!
supportive care  continue aspiration precautions
speech and swallow: seen by the diet as prescribed!
finger sticks with short acting insulin sliding scale  no oral meds  finger sticks improved off steroids
finger sticks with short acting insulin sliding scale  no oral meds  finger sticks improved off steroids
speech and swallow: seen by the diet as prescribed!
speech and swallow: seen by the diet as prescribed!
speech and swallow
finger sticks with short acting insulin sliding scale  no oral meds  finger sticks improved off steroids
finger sticks with short acting insulin sliding scale  no oral meds  finger sticks improved off steroids

## 2022-05-19 NOTE — PROGRESS NOTE ADULT - ASSESSMENT
Echo 3/31/22: The left ventricle is  enlarged with moderate to severe left ventricular systolic dysfunction,  estimated LVEF of 30-35%. The apical cap, apical septum and apical  lateral walls are akinetic with hypokinesis of the remaining walls. There  is no evidence of left ventricular thrombus The right ventricle is not well-visualized, device wire is noted within  the right heart Sclerotic trileaflet aortic valve with grossly normal opening, without AI.  Mild MR; Trace TR.  Cardiac Cath Lab (09.15.08 @ 13:41) >  Summary:  Hemodynamics: Hemodynamic assessment demonstrates moderate systemic  hypertension and moderately elevated LVEDP.  Recommendations:  Continue current medical therapy.  Echocardiogram with echo contrast agent to evaluate for LV apical thrombus.  Further arrhythmia management per the EP team.  Impressions: Angiography reveals nonobstructive coronary atherosclerosis  with LV dysfunction as described on the prior cath in 2005.    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    # PNA / RVP +  -CT chest noted New groundglass opacities (predominantly left-sided) and left upper  lobe opacities..  No change in the bilateral pleural effusions.  -management per pulm  + RVP/ influenza   -Bcx + gram + cocci in clusters 5/2- repeat NGTD   -sp iv abx;  med fu      #Acute on chronic systolic CHF , sp AICD   -CT chest noted. with bl pleural eff -sp lasix 20 mg IVP x 1 5/5   -ECHO with ef 25%<  mod to sev MR,  Moderate diastolic dysfunction (Stage II). Severe  left ventricular systolic dysfunction.  The apex is akinetic/dyskinetic.  -c/w mido/ florinef /  Droxidopa/Northera to augment bp   -lasix and BB held for symptomatic orthostatic hypotension     #Newly dx Afib   -dx recently  on last hospital admission  -sp device interrogation : Measured data WNL, normal ICD function, Pt is NOT pacemaker dependent; Stored data revealed episodes of AF lasting up to approximately 19 hours on 4/26/22 correlating with patient being in [facility] per pt/family; One episode of brief NSVT noted; No AF noted since 4/26/22; Optivol elevated sugg fluid overload   -c/w eliquis   -BB held as mentioned above    # carotid stenosis s/p right CEA 2020,  -asa , statin     #chronic cva, c/w asa, statin     #Orthostatic Hypotension   -BB/lasix on hold  -florinef 0.2 qd  -mido 30mg  TID  -on Droxidopa/Northera per neuro   -med fu   -compression stockings/abd binder  +orthostatic hypotension but improving    #candelaria on CKD   -renal fu

## 2022-05-19 NOTE — PROGRESS NOTE ADULT - PROBLEM SELECTOR PLAN 6
finger sticks with short acting insulin sliding scale  no oral meds
finger sticks with short acting insulin sliding scale  no oral meds
monitor and control
monitor and control
C/w Midodrine, Florinef  -Cards f/u  -Neuro f/u.
no evidence of acute HF  continue to monitor  will continue low dose furosemide
no evidence of acute HF  continue to monitor  will continue low dose furosemide
finger sticks with short acting insulin sliding scale  no oral meds
no evidence of acute HF  continue to monitor  will continue to monitor off furosemide
monitor and control
finger sticks with short acting insulin sliding scale  no oral meds  finger sticks will improve now that solumedrol IV discontinued
C/w Midodrine, Mayraf  -Idris f/u.
monitor and control
no evidence of acute HF  continue to monitor  will continue low dose furosemide
monitor and control
C/w Midodrine, Florinef  -Cards f/u  -Neuro f/u.
no evidence of acute HF  continue to monitor  will continue to monitor off furosemide
monitor and control
no evidence of acute HF  continue to monitor  will continue low dose furosemide
C/w Midodrine, Florinef  -Cards f/u  -Neuro f/u.
finger sticks with short acting insulin sliding scale  no oral meds  uncontrolled DM Type 2 with hyperglycemia secondary to solumedrol IV
finger sticks with short acting insulin sliding scale  no oral meds  finger sticks will improve now that solumedrol IV discontinued
monitor and control
finger sticks with short acting insulin sliding scale  no oral meds  uncontrolled DM Type 2 with hyperglycemia secondary to solumedrol IV
monitor and control
no evidence of acute HF  continue to monitor  will continue to monitor off furosemide
monitor and control
monitor and control
finger sticks with short acting insulin sliding scale  no oral meds  uncontrolled DM Type 2 with hyperglycemia secondary to solumedrol IV
no evidence of acute HF  continue to monitor  will continue to monitor off furosemide
finger sticks with short acting insulin sliding scale  no oral meds
monitor and control

## 2022-05-19 NOTE — PROGRESS NOTE ADULT - PROBLEM SELECTOR PLAN 1
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    05/04: now found to have influenza too: started Tamiflu: on antibiotics: little wheezy today : sm x 1 :30 mg    05/05: wheezing is much better: will repeat sm x 1 today too: cont tamiflu and antibiotics    50/06: seems to be doing ok : no sob; has little wheezing today too:  give sm 30 mg x 1 today too: dw acp: HASPNEUMONIA TO; D3/5 OF ZOSYN    05/07: repeat last day of sm today ; cont antibiotics; today is d4 ; he is on room air at this time: cont BD
somewhat improved but still symptomatic  likely secondary to autonomic neuropathy from multifactorial causes including renal disease, diabetes mellitus   s/o hydration overnight  now discontinued  continue florinef qd  off Flomax
improved overnight   neuro consultation appreciated  continue Northera increased to 200 TID  continue abdominal binder and stockings  fall precautions
CT chest with b/l GGO L>R, RODRIGUE opacities,  ? 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 Procalcitonin improved.   -Keep sats >90% with supplemental O2 PRN (currently RA-2LNC)  -Still endorsing congested cough. C/w Mucinex 1200 mg PO BID x5 days  -CXR this AM grossly clear, f/u official report.
5/13: he finished his prednisone:  no wheezing:  on room air;  still orthostatic;  lasix on hold;   reced iv fluids:    5/15:  he finished his prednisone:  no wheezing:  on room air;  still orthostatic; today   reced iv fluids: but still orthostatic
sepsis resolved   completed antibiotics
finished antibiotics: chest x-ray with small effusion:   add oxymetazoline:  and already on lasix:   add steroids for 3 more days   Cont BD;  already on full AC!~
finished antibiotics: chest x-ray with small effusion:   add oxymetazoline:  and already on lasix:   add steroids for 3 more days   Cont BD;  already on full AC!~    05/11; doing better:  take off oxygen: sao2 is pretty good:  cont few days more of steroids:
improved overnight   neuro consultation appreciated  no appreciable difference in orthostatic drop in BP  discontinue as not effective  continue abdominal binder and stockings  continue fall precautions
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    05/04: now found to have influenza too: started Tamiflu: on antibiotics: little wheezy today : sm x 1 :30 mg    05/05: wheezing is much better: will repeat sm x 1 today too: cont tamiflu and antibiotics    50/06: seems to be doing ok : no sob; has little wheezing today too:  give sm 30 mg x 1 today too: dw acp: HASPNEUMONIA TO; D3/5 OF ZOSYN    05/07: repeat last day of sm today ; cont antibiotics; today is d4 ; he is on room air at this time: cont BD    05/08: seems to be doing ok ; no SOB: no cough : no phlegm: not much of wheezing: no fever; no need for steroids today : d5 of antibiotics today
severe with symptomatic dizziness   likely secondary to autonomic neuropathy from multifactorial causes including renal disease, diabetes mellitus   discussed with renal  rehydrate overnight   add florinef qd  discontinue Flomax
CT chest with b/l GGO L>R, RODRIGUE opacities,  ? 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 Procalcitonin improved.   -Keep sats >90% with supplemental O2 PRN (currently RA-2LNC)  -Still endorsing congested cough. C/w Mucinex 1200 mg PO BID x5 days  -CXR this AM grossly clear, f/u official report.    5/18: doing pretty good:  on room air:  for dc today :   completed antibiotics:    5/19:    his chest radiogaph reported ? ptx on rt side: would repeat chst xray today : helooks comfortable:
sepsis resolved   completed antibiotics   completed Tamiflu  ID and pulmonary help appreciated
sepsis resolved   completed antibiotics   completed Tamiflu  ID and pulmonary help appreciated
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
with associated sepsis withvernight hypotension   pneumonia confirmed with CT  continue antibiotics for now  ID consultation appreciated   pureed diet   aspiration precautions
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    05/04: now found to have influenza too: started Tamiflu: on antibiotics: little wheezy today : sm x 1 :30 mg    05/05: wheezing is much better: will repeat sm x 1 today too: cont tamiflu and antibiotics
sepsis resolved   completed antibiotics   finish Tamiflu  ID and pulmonary help appreciated
05/11; doing better:  take off oxygen: sao2 is pretty good:  cont few days more of steroids:    5/12:pulm wise stable: no SOB: on room air at this time:    5/113: he finished his prednisone:  no wheezing:  on room air;  still orthostatic;  lasix on hold;   reced iv fluids:
continue abdominal binder and stockings  continue fall precautions
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    05/04: now found to have influenza too: started Tamiflu: on antibiotics: little wheezy today : sm x 1 :30 mg    05/05: wheezing is much better: will repeat sm x 1 today too: cont tamiflu and antibiotics    50/06: seems to be doing ok : no sob; has little wheezing today too:  give sm 30 mg x 1 today too: dw acp: HASPNEUMONIA TO; D3/5 OF ZOSYN
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    05/04: now found to have influenza too: started Tamiflu: on antibiotics: little wheezy today : sm x 1 :30 mg    05/05: wheezing is much better: will repeat sm x 1 today too: cont tamiflu and antibiotics    50/06: seems to be doing ok : no sob; has little wheezing today too:  give sm 30 mg x 1 today too: dw acp: HAS PNEUMONIA TO; D3/5 OF ZOSYN    05/07: repeat last day of sm today ; cont antibiotics; today is d4 ; he is on room air at this time: cont BD    05/08: seems to be doing ok ; no SOB: no cough : no phlegm: not much of wheezing: no fever; no need for steroids today : d5 of antibiotics today    05/09: no need for solumedrol:  cont bd:   finished zosyn;  chest xray is clear!  blood cultures are contaminant; staph epidermis
CT chest with b/l GGO L>R, RODRIGUE opacities,  ? 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 Procalcitonin improved.   -Keep sats >90% with supplemental O2 PRN (currently RA-2LNC)  -Still endorsing congested cough. C/w Mucinex 1200 mg PO BID x5 days  -CXR this AM grossly clear, f/u official report.    5/18: doing pretty good:  on room air:  for dc today :   completed antibiotics:
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    05/04: now found to have influenza too: started Tamiflu: on antibiotics: little wheezy today : sm x 1 :30 mg
still orthostatic with SBP < 50 with severe symptoms   neuro consultation appreciated  started on Northera   continue abdominal binder and stockings  continue to monitor   fall precautions
still profoundly orthostatic  likely any measures will not work fully   will try abdominal binder  stockings  continue to monitor   fall precautions
CT chest with b/l GGO L>R, RODRIGUE opacities,  ? 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 Start Mucinex 1200 mg PO BID x5 days. CXR in AM.
pneumonia confirmed with CT  sepsis resolved   completed antibiotics   continue Tamiflu  ID and pulmonary help appreciated
improved with no symptoms today though still orthostatic   continue abdominal binder and stockings  continue to monitor   fall precautions
pneumonia confirmed with CT  sepsis resolving   continue antibiotics for now total 5 day scourse  RVP positive for influenza   start Tamiflu  pureed diet   aspiration precautions  wheezing likely secondary to influenza   discussed with pulmonary  solumedrol IV 30 mg x 1
finished antibiotics: chest x-ray with small effusion:   add oxymetazoline:  and already on lasix:   add steroids for 3 more days   Cont BD;  already on full AC!~    05/11; doing better:  take off oxygen: sao2 is pretty good:  cont few days more of steroids:    5/12:pulm wise stable: no SOB: on room air at this time:
pneumonia confirmed with CT  sepsis resolving   continue antibiotics for now total 5 day course last dose today  RVP positive for influenza AS WELL AS non-COVID coronavirus  continue Tamiflu
pneumonia confirmed with CT  sepsis resolving   continue antibiotics for now total 5 day course  RVP positive for influenza AS WELL AS non-COVID coronavirus  continue Tamiflu  aspiration precautions  wheezing likely secondary to influenza and coronavirus   discussed with pulmonary  solumedrol IV 30 mg x 1 additional dose

## 2022-05-19 NOTE — PROGRESS NOTE ADULT - PROBLEM SELECTOR PLAN 2
still more then 2: defer to primary t eam
still more then 2: defer to primary t eam    ; creat seems to be improvin: stable, < 2
still more then 2: defer to primary t eam    ; creat seems to be improvin: stable, < 2    : creat 2  05/10: creat still high : would defer to [facility] team  : per pmd  ; back to baseline  : seems stable
still more then 2: defer to primary t eam    05/07; creat seems to be improving:
Per renal.
Per renal.
likely secondary to dehydration secondary to poor po   creatinine trending down   at baseline today
sepsis resolved   completed antibiotics
Per renal.\  5/18: at hits baseline:    5/19 stable:
likely secondary to dehydration secondary to poor po   creatinine trending down slowly  renal follow up   continue to follow recommendations
sepsis resolved   completed antibiotics
likely secondary to dehydration secondary to poor po   creatinine trending down   at baseline now ~ 2
sepsis resolved   completed antibiotics
likely secondary to dehydration secondary to poor po   creatinine trending down   at baseline now ~ 2
likely secondary to dehydration secondary to poor po   renal follow up   continue to follow recommendations
still more then 2: defer to primary t eam
likely secondary to dehydration secondary to poor po   creatinine trending down   at baseline now ~ 2
sepsis resolved   completed antibiotics
still more then 2: defer to primary t eam    ; creat seems to be improvin: stable, < 2    : creat 2
likely secondary to dehydration secondary to poor po   creatinine trending down   at baseline now
still more then 2: defer to primary t eam    ; creat seems to be improvin: stable, < 2    : creat 2  05/10: creat still high : would defer to [facility]
sepsis resolved   completed antibiotics
still more then 2: defer to primary t eam
still more then 2: defer to primary t eam    ; creat seems to be improvin: stable, < 2    : creat 2  05/10: creat still high : would defer to [facility] team  : per pmd  ; back to baseline  : seems stable  5/15: doing ok: no  sob:
likely secondary to dehydration secondary to poor po   creatinine trending down
sepsis resolved   completed antibiotics
likely secondary to dehydration secondary to poor po   creatinine trending down   at baseline now
Per renal.\  5/18: at hits baseline:
still more then 2: defer to primary t eam
still more then 2: defer to primary t eam    ; creat seems to be improvin: stable, < 2    : creat 2  05/10: creat still high : would defer to [facility] team  : per pmd  ; back to baseline
still more then 2: defer to primary t eam    ; creat seems to be improvin: stable, < 2    : creat 2  05/10: creat still high : would defer to [facility] team  : per pmd
sepsis resolved   completed antibiotics
sepsis resolved   completed antibiotics

## 2022-05-19 NOTE — PROGRESS NOTE ADULT - PROVIDER SPECIALTY LIST ADULT
Internal Medicine
Internal Medicine
Nephrology
Neurology
Cardiology
Nephrology
Cardiology
Infectious Disease
Nephrology
Neurology
Pulmonology
Cardiology
Infectious Disease
Neurology
Pulmonology
Internal Medicine
Pulmonology
Internal Medicine
Internal Medicine
Pulmonology
Internal Medicine
Pulmonology
Internal Medicine
Pulmonology
Internal Medicine
Pulmonology
Internal Medicine
Pulmonology
Pulmonology
Internal Medicine

## 2022-05-19 NOTE — PROGRESS NOTE ADULT - REASON FOR ADMISSION
fever and cough. diarrhea and generalized weakness

## 2022-05-19 NOTE — CHART NOTE - NSCHARTNOTEFT_GEN_A_CORE
Chest xray  on 5/17/22 showed small right pneumothorax - repeat on 5/19/22 shows no change - pt is asymptomatic - no resp distress or hypoxia - Ok to be discharged per pulmonary Dr. Skinner who discussed with Dr. Larson  Pt accepted to home hospice and cleared for discharge today. Medication reconciliation reviewed, revised, and resolved with Dr. Larson who had medically cleared patient for discharge with follow-up as advised. Please refer to discharge note for detailed hospital course. Patient is currently stable for discharge to home with home hospice at this time.    Contact # [phone]

## 2022-05-19 NOTE — PROGRESS NOTE ADULT - PROBLEM SELECTOR PROBLEM 8
Anemia of chronic disease
History of dysphagia
History of dysphagia
Anemia of chronic disease
History of dysphagia
History of dysphagia
Anemia of chronic disease
History of dysphagia
Anemia of chronic disease
History of dysphagia

## 2022-05-19 NOTE — DISCHARGE NOTE NURSING/CASE MANAGEMENT/SOCIAL WORK - NSDCPEFALRISK_GEN_ALL_CORE
For information on Fall & Injury Prevention, visit: [email or web address] OR  [email or web address] OR  https://www.cdc.gov/steadi/patient.html

## 2022-05-19 NOTE — CHART NOTE - NSCHARTNOTEFT_GEN_A_CORE
Called  By  RN  that  pt  had  7  bts  WCTs   Pt  seen an  exam  at  the  bedside,  pt  is  asymptomatic  at  this  time.   Electrolytes'  are  within normal  range    Will  continue   to  monitor  on  tele.      Follow  up  with  primary  team  in the  am.

## 2022-05-19 NOTE — PROGRESS NOTE ADULT - PROBLEM SELECTOR PLAN 4
stable: on eliquis: watch plts: seems to be stable    05/06; NO BLEEDING    05/07: stable    05/08: stale;
stable: on eliquis: watch plts: seems to be stable    05/06; NO BLEEDING    05/07: stable    05/08: stale;  5/140: echo noted: has mod diastolic dysfunction and systolic too  5/11: per cards
stable: on eliquis: watch plts: seems to be stable    05/06; NO BLEEDING    05/07: stable    05/08: stale;  5/140: echo noted: has mod diastolic dysfunction and systolic too  5/11: per cards  5/12: lasix on hold secondary to orthostasis:
supportive care  continue ASA
chest pain free  cardiology help appreciated
chest pain free  cardiology help appreciated
stable: on eliquis: watch plts: seems to be stable    05/06; NO BLEEDING    05/07: stable
Per cards.
Per cards.
supportive care  continue ASA
Per cards.
chest pain free  cardiology help appreciated
chest pain free  cardiology help appreciated
supportive care  continue ASA
stable: on eliquis: watch plts: seems to be stable    05/06; NO BLEEDING    05/07: stable    05/08: stale;
chest pain free  cardiology help appreciated
supportive care  continue ASA
stable: on eliquis: watch plts: seems to be stable
stable: on eliquis: watch plts: seems to be stable    05/06; NO BLEEDING    05/07: stable    05/08: stale;  5/140: echo noted: has mod diastolic dysfunction and systolic too  5/11: per cards  5/12: lasix on hold secondary to orthostasis:  5/15: still orthostatic:
supportive care  continue ASA
chest pain free  cardiology help appreciated
stable: on eliquis: watch plts
stable: on eliquis: watch plts: seems to be stable    05/06; NO BLEEDING    05/07: stable    05/08: stale;  5/140: echon oted: has mod diastolic dysfunction and systolic too
stable: on eliquis: watch plts: seems to be stable    05/06; NO BLEEDING
supportive care  continue ASA
chest pain free  trop 90 likely secondary to CKD with JARRED  cardiology help appreciated
Per cards.
chest pain free  cardiology help appreciated
stable: on eliquis: watch plts: seems to be stable
supportive care  continue ASA
chest pain free  cardiology help appreciated
stable: on eliquis: watch plts: seems to be stable    05/06; NO BLEEDING    05/07: stable    05/08: stale;  5/140: echo noted: has mod diastolic dysfunction and systolic too  5/11: per cards
supportive care  continue ASA

## 2022-05-19 NOTE — CHART NOTE - NSCHARTNOTESELECT_GEN_ALL_CORE
Event Note
Hypotension/Event Note
Nutrition Services
discharge/Event Note
7 bts  WCT/Event Note
orthostatic/Event Note

## 2022-05-19 NOTE — PROGRESS NOTE ADULT - ASSESSMENT
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 2) chronic R Frontal infarct from DEWAYNE now  s/p R CEA  - c/w asa 81 for secondary stroek prevention.  should be on lipitor 40 with LDL goal < 70 if no contraindication  - getting florineff  and midodrine now 30 TID for orthostasis,  at this point would attempt Droxidopa/Northera 100 TID and titrate up as tolerated increased droxidopa to 200mg TID on 5/17 some improvement.  spoke with daughter.  considering hospice and northera not covered.  need to figure out if planning for hospice or not and if northera covered.  it seems to be helping but if not covered may need to stop titrating up.   - trend orthostatics  still +   - telemetry  - PT/OT/SS/SLP, OOBC  - check FS, glucose control <180  - GI/DVT ppx  - Thank you for allowing me to participate in the care of this patient. Call with questions.   - spoke with team.  would talk to pharmacy to see if norther/droxidopa covered   - spoke with daughter   - d/c plan hospice today  Redd Santos MD  Vascular Neurology  Office: [phone]

## 2022-05-19 NOTE — PROGRESS NOTE ADULT - PROBLEM SELECTOR PROBLEM 6
Diabetes
Chronic HFrEF (heart failure with reduced ejection fraction)
Diabetes
Chronic HFrEF (heart failure with reduced ejection fraction)
Diabetes
Orthostatic hypotension
Diabetes
Orthostatic hypotension
Diabetes
Orthostatic hypotension
Diabetes
Chronic HFrEF (heart failure with reduced ejection fraction)
Diabetes
Orthostatic hypotension
Chronic HFrEF (heart failure with reduced ejection fraction)
Diabetes
Chronic HFrEF (heart failure with reduced ejection fraction)
Chronic HFrEF (heart failure with reduced ejection fraction)

## 2022-05-19 NOTE — PROGRESS NOTE ADULT - PROBLEM SELECTOR PROBLEM 9
Anemia of chronic disease

## 2022-06-07 NOTE — DISCHARGE NOTE PROVIDER - NSDCCONDITION_GEN_ALL_CORE
Consider seeing Dr. Dai Chamberlain for PCP    Check your insurance regarding [employer]    Covid booster #2 due, okay to wait for fall  Pneumovax 23 is due  
Stable

## 2022-07-25 NOTE — PATIENT PROFILE ADULT. - PRO INTERPRETER NEED 2
7/25/2022    Patient: Juanito Cohen   YOB: 1946   Date of Visit: 7/25/2022     Gail Baker MD  [id number] [address]  Via Fax: [phone]    Dear Gail Baker MD,      Thank you for referring Ms. Lesly Logan to [facility] for evaluation. My notes for this consultation are attached. If you have questions, please do not hesitate to call me. I look forward to following your patient along with you.       Sincerely,    Zeyad Lynch MD
English

## 2022-08-08 ENCOUNTER — APPOINTMENT (OUTPATIENT)
Dept: OTOLARYNGOLOGY | Facility: CLINIC | Age: 77
End: 2022-08-08

## 2022-09-13 NOTE — ED PROVIDER NOTE - CROS ED ALLERGIC IMMUN ALL NEG
HISTORY OF PRESENT ILLNESS:  Joe Wong is a 53 year old male who comes to the clinic today for Diabetes (3 week follow up, discuss dx for diabetes), Hyperlipidemia, Convey Results (Labs completed 9/9/22), MEDICATION ISSUE (Patient would like to discuss discontinuing the statin), and Referral (Would like a referral to ENT for tinnitus )  pt reports he has had muscle cramps since being on the statins. This started 7 years ago. The cramps got better and he was changed to crestor. Pt states that he has cramping. He states that he has to stretch to avoid the cramping. Pt drinks a lot of water a day. 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 He drinks 5 to 10 glasses of water a day and at least 2 Gatorades a day    Current Outpatient Medications   Medication Sig   • Coenzyme Q10 (CoQ-10) 100 MG Cap Take 1 capsule by mouth daily.   • Multiple Vitamins-Minerals (ZINC PO)    • B Complex Vitamins (B COMPLEX VITAMIN PO)    • VITAMIN K PO    • QUERCETIN PO    • VITAMIN D PO      No current facility-administered medications for this visit.        Allergies as of 09/13/2022   • (Not on File)        Past Medical History:   Diagnosis Date   • Hyperlipidemia    • Vitamin D deficiency        History reviewed. No pertinent surgical history.    REVIEW OF SYSTEMS:    All other systems reviewed and negative.      PHYSICAL EXAMINATION:  Physical Examination:     Visit Vitals  /72 (BP Location: LUE - Left upper extremity, Patient Position: Sitting, Cuff Size: Regular)   Pulse 76   Resp 16   Ht 5' 10\" (1.778 m)   Wt 90.7 kg (199 lb 14.4 oz)   BMI 28.68 kg/m²     General:  Well developed, well nourished.  Skin:  Warm and dry without rash.    Head:  Normocephalic-atraumatic.   Neck:  Trachea is midline.     Eyes:  Normal conjunctivae.  EOMI     EENT: No JVD. NO carotid bruits.  Cardiovascular:  Symmetrical pulses with normal peripheral perfusion. Heart has a regular rate and rhythm with no gallop murmur or rub    Respiratory:  Normal respiratory effort. Lungs are clear to auscultation   Gastrointestinal:  Non-distended.    Musculoskeletal:  No deformity or edema.  Back:  Normal alignment.  No tenderness.   Neurologic:  Oriented x3.  No focal deficits.      LAB RESULTS:  All pertinent laboratory results were reviewed.    ASSESSMENT:  1. Myalgia    2. Well controlled type 2 diabetes mellitus (CMS/HCC)    3. Tinnitus, unspecified laterality    4. Low HDL (under 40)        PLAN:  Problem List Items Addressed This Visit    None     Visit Diagnoses     Myalgia    -  Primary    Relevant Orders    CREATINE KINASE    C REACTIVE PROTEIN    URINALYSIS & REFLEX MICROSCOPY WITH CULTURE IF INDICATED    THYROID STIMULATING HORMONE    VITAMIN D -25 HYDROXY    Well controlled type 2 diabetes mellitus (CMS/HCC)        Tinnitus, unspecified laterality        Relevant Orders    SERVICE TO AUDIOLOGY    SERVICE TO ENT    Low HDL (under 40)          With report of ringing of his ears will have to be seen by ENT audiology evaluation.  On patient myalgias which is a new report to me states he has had this for more than 5 years.  He reports a history of intolerance to statins with his previous primary care physician greater than 7 years ago.  Patient given education materials.  Follow up with primary care provider if no improvement or symptoms worsening. The patient indicated understanding of the diagnosis and agreed with the plan of care.  Advised of his heart disease/vascular risk status  Orders Placed This Encounter   • Creatine Kinase   • C Reactive Protein   • Urinalysis & Reflex Microscopy With Culture If Indicated   • Thyroid Stimulating Hormone   • Vitamin D -25 Hydroxy   • SERVICE TO AUDIOLOGY   • SERVICE TO ENT   • Coenzyme Q10 (CoQ-10) 100 MG Cap      Hold statin for now. Add co q 10. Advised co q 10 is not FDA regulated  Aleisha Beach MD   negative...

## 2022-10-05 NOTE — ED PROVIDER NOTE - RE-EVALUATION DATE/TIME:
56-year-old female with PMH DM, presents for evaluation of right foot pain.  Patient states last night she stepped on a broken picture frame that her daughter accidentally dropped and had a small piece of glass in foot which she removed.  Patient states she still has some pain to the area, point tenderness.  Denies any fevers or chills, ambulating as usual.  No paresthesias or weakness.    VITAL SIGNS: noted  CONSTITUTIONAL: Well-developed; well-nourished; in no acute distress  HEAD: Normocephalic; atraumatic  EYES: PERRL, EOM intact; conjunctiva and sclera clear  NECK: Supple; non tender.    CARD: S1, S2 normal; no murmurs, gallops, or rubs. Regular rate and rhythm  RESP: CTAB/L, no wheezes, rales or rhonchi  EXT: Normal ROM. No calf tenderness or edema. right foot with point tenderness at midfoot-heel, no swelling, laceration, punctate area erythema, no palpable FB, Distal pulses intact  NEURO: Alert, oriented. Grossly unremarkable. No focal deficits  SKIN: Skin exam is warm and dry, no acute rash 02-May-2022 13:00

## 2022-11-01 ENCOUNTER — APPOINTMENT (OUTPATIENT)
Dept: OTOLARYNGOLOGY | Facility: CLINIC | Age: 77
End: 2022-11-01

## 2022-11-01 VITALS
SYSTOLIC BLOOD PRESSURE: 110 MMHG | HEIGHT: 69 IN | HEART RATE: 71 BPM | DIASTOLIC BLOOD PRESSURE: 70 MMHG | WEIGHT: 150 LBS | BODY MASS INDEX: 22.22 KG/M2

## 2022-11-01 DIAGNOSIS — C09.9 MALIGNANT NEOPLASM OF TONSIL, UNSPECIFIED: ICD-10-CM

## 2022-11-01 DIAGNOSIS — H91.8X9 OTHER SPECIFIED HEARING LOSS, UNSPECIFIED EAR: ICD-10-CM

## 2022-11-01 DIAGNOSIS — C01 MALIGNANT NEOPLASM OF BASE OF TONGUE: ICD-10-CM

## 2022-11-01 DIAGNOSIS — J34.2 DEVIATED NASAL SEPTUM: ICD-10-CM

## 2022-11-01 DIAGNOSIS — H61.22 IMPACTED CERUMEN, LEFT EAR: ICD-10-CM

## 2022-11-01 PROCEDURE — 99213 OFFICE O/P EST LOW 20 MIN: CPT | Mod: 25

## 2022-11-01 PROCEDURE — 92557 COMPREHENSIVE HEARING TEST: CPT

## 2022-11-01 PROCEDURE — 92567 TYMPANOMETRY: CPT

## 2022-11-01 PROCEDURE — G0268 REMOVAL OF IMPACTED WAX MD: CPT

## 2022-11-01 RX ORDER — ALBUTEROL 90 MCG
AEROSOL (GRAM) INHALATION
Refills: 0 | Status: ACTIVE | COMMUNITY

## 2022-11-01 RX ORDER — DIVALPROEX SODIUM 500 MG/1
500 TABLET, DELAYED RELEASE ORAL
Qty: 180 | Refills: 0 | Status: ACTIVE | COMMUNITY
Start: 2022-05-11

## 2022-11-01 RX ORDER — BLOOD SUGAR DIAGNOSTIC
STRIP MISCELLANEOUS
Qty: 100 | Refills: 0 | Status: ACTIVE | COMMUNITY
Start: 2022-05-18

## 2022-11-01 RX ORDER — APIXABAN 5 MG/1
TABLET, FILM COATED ORAL
Refills: 0 | Status: ACTIVE | COMMUNITY

## 2022-11-01 RX ORDER — BLOOD-GLUCOSE METER
W/DEVICE KIT MISCELLANEOUS
Qty: 1 | Refills: 0 | Status: ACTIVE | COMMUNITY
Start: 2022-05-18

## 2022-11-01 NOTE — ASSESSMENT
[FreeTextEntry1] : Reviewed and reconciled medications, allergies, PMHx, PSHx, SocHx, FMHx \par \par The patient presents with h/o of right tonsil cancer in 2012- was radiated, s/p left tonsillectomy 5/2011, s/p partial tongue resection on left 2/25/19, and s/p excision biopsy left base of tongue lesion followed by Coblation of the entire lesion on 9/5/19. Patient presents today for a follow up. Patient has progressively lost his hearing to the point where u need to yell in his face. He is on hospice now. Wife states he was on thyroid medication but she took him off and just recently put him back on.\par \par Audio June 27, 2014:\par -bilateral mild HF SNHL 90% discrim at 50 dB\par \par Physical Exam:\par -Right Ear: cerumen removed via curettage \par -Left Ear: cerumen impaction removed via curettage \par -tuning fork lateralizes to the left ear\par -deviated septum, severe to the right\par -dry secretions. \par -No lower teeth. \par -Post surgical excision \par -Scar right neck. \par -Neck is really firm. \par -No obvious masses or growths \par \par Audio: \par -right ear: 56% at 90/70 dB\par -left ear: 64% at 80 dB\par AD: Type C/As mod SNHL, 250 Hz, mod-severe to severe/profound mixed hearing loss, 500-8000 Hz\par AS; Type A moderate to severe SNHL, 250-8000 Hz.\par \par Plan: Audio - results interpreted by Dr. Lynn and reviewed with the patient. Needs hearing aids. Can try pocket talker.

## 2022-11-01 NOTE — PHYSICAL EXAM
[Johnson Test Lateralizes To Left] : tone lateralization to the left [Midline] : trachea located in midline position [Normal] : no masses and lesions seen, face is symmetric [de-identified] : Scar right neck. Neck is really firm. No obvious masses or growths  [FreeTextEntry8] : cerumen removed via curettage  [FreeTextEntry9] : cerumen impaction removed via curettage  [FreeTextEntry1] : -deviated septum, severe to the right [de-identified] : dry secretions. No lower teeth. Post surgical excision  [FreeTextEntry2] : sensations intact. sinuses nontender to percussion

## 2022-11-01 NOTE — DATA REVIEWED
[de-identified] : AD: Type C/As mod SNHL, 250 Hz, mod-severe to severe/profound mixed hearing loss, 500-8000 Hz\par AS; Type A moderate to severe SNHL, 250-8000 Hz.\par REC: ENT f/u, re-eval per MD, [facility]INCESS [facility]As with clearance\par

## 2022-11-01 NOTE — HISTORY OF PRESENT ILLNESS
[de-identified] : The patient presents with h/o of right tonsil cancer in 2012- was radiated, s/p left tonsillectomy 5/2011, s/p partial tongue resection on left 2/25/19, and s/p excision biopsy left base of tongue lesion followed by Coblation of the entire lesion on 9/5/19. Patient presents today for a follow up. Patient has progressively lost his hearing to the point where u need to yell in his face. He is on hospice now.  Wife states he was on thyroid medication but she took him off and just recently put him back on.

## 2022-11-24 NOTE — DISCHARGE NOTE ADULT - MEDICATION SUMMARY - MEDICATIONS TO CHANGE
90 I will SWITCH the dose or number of times a day I take the medications listed below when I get home from the hospital:  None

## 2022-12-10 NOTE — PROGRESS NOTE ADULT - SUBJECTIVE AND OBJECTIVE BOX
CARDIOLOGY FOLLOW UP - Dr. Krueger  DATE OF SERVICE: 5/17/22     CC no cp or sob      REVIEW OF SYSTEMS:  CONSTITUTIONAL: No fever, weight loss, or fatigue  RESPIRATORY: No cough, wheezing, chills or hemoptysis; No Shortness of Breath  CARDIOVASCULAR: No chest pain, palpitations, passing out, dizziness, or leg swelling  GASTROINTESTINAL: No abdominal or epigastric pain. No nausea, vomiting, or hematemesis; No diarrhea or constipation. No melena or hematochezia.      PHYSICAL EXAM:  T(C): 37 (05-17-22 @ 10:33), Max: 37.6 (05-17-22 @ 04:35)  HR: 75 (05-17-22 @ 10:33) (66 - 79)  BP: 138/71 (05-17-22 @ 10:33) (138/71 - 160/73)  RR: 18 (05-17-22 @ 10:33) (18 - 18)  SpO2: 96% (05-17-22 @ 10:33) (93% - 96%)  Wt(kg): --  I&O's Summary    16 May 2022 07:01  -  17 May 2022 07:00  --------------------------------------------------------  IN: 1120 mL / OUT: 1300 mL / NET: -180 mL        Appearance: Normal	  Cardiovascular: Normal S1 S2,RRR  Respiratory:  diminished   Gastrointestinal:  Soft, Non-tender, + BS	  Extremities: Normal range of motion, No clubbing, cyanosis or edema      Home Medications:  albuterol 90 mcg/inh inhalation aerosol: 2 puff(s) inhaled every 6 hours, As needed, Shortness of Breath and/or Wheezing (16 May 2022 12:11)  aspirin 81 mg oral delayed release tablet: 1 tab(s) orally once a day (16 May 2022 12:11)  atorvastatin 80 mg oral tablet: 1 tab(s) orally once a day (16 May 2022 12:11)  calcitriol 0.25 mcg oral capsule: 1 cap(s) orally once a day (16 May 2022 12:11)  Coreg 6.25 mg oral tablet: 1 tab(s) orally 2 times a day (16 May 2022 12:11)  cyanocobalamin 1000 mcg oral tablet: 1 tab(s) orally once a day (16 May 2022 12:11)  melatonin 3 mg oral tablet: 1 tab(s) orally once a day (at bedtime) (16 May 2022 12:11)  saliva substitutes oral solution: 1 spray(s) orally 3 times a day, As Needed (02 May 2022 14:18)      MEDICATIONS  (STANDING):  albuterol/ipratropium for Nebulization 3 milliLiter(s) Nebulizer every 6 hours  apixaban 2.5 milliGRAM(s) Oral every 12 hours  aspirin enteric coated 81 milliGRAM(s) Oral daily  Biotene Dry Mouth Oral Rinse 5 milliLiter(s) Swish and Spit three times a day  calcitriol   Capsule 0.25 MICROGram(s) Oral daily  cyanocobalamin 1000 MICROGram(s) Oral daily  dextrose 5%. 1000 milliLiter(s) (100 mL/Hr) IV Continuous <Continuous>  dextrose 5%. 1000 milliLiter(s) (50 mL/Hr) IV Continuous <Continuous>  dextrose 5%. 1000 milliLiter(s) (100 mL/Hr) IV Continuous <Continuous>  dextrose 50% Injectable 25 Gram(s) IV Push once  dextrose 50% Injectable 12.5 Gram(s) IV Push once  dextrose 50% Injectable 25 Gram(s) IV Push once  diVALproex  milliGRAM(s) Oral two times a day  epoetin abby-epbx (RETACRIT) Injectable 16153 Unit(s) SubCutaneous every 7 days  escitalopram 5 milliGRAM(s) Oral daily  ferrous    sulfate 325 milliGRAM(s) Oral daily  finasteride 5 milliGRAM(s) Oral daily  fludroCORTISONE 0.2 milliGRAM(s) Oral <User Schedule>  fluticasone propionate 50 MICROgram(s)/spray Nasal Spray 2 Spray(s) Both Nostrils <User Schedule>  glucagon  Injectable 1 milliGRAM(s) IntraMuscular once  glucagon  Injectable 1 milliGRAM(s) IntraMuscular once  guaiFENesin ER 1200 milliGRAM(s) Oral every 12 hours  insulin glargine Injectable (LANTUS) 5 Unit(s) SubCutaneous at bedtime  insulin lispro (ADMELOG) corrective regimen sliding scale   SubCutaneous Before meals and at bedtime  levothyroxine 75 MICROGram(s) Oral daily  melatonin 3 milliGRAM(s) Oral at bedtime  midodrine. 30 milliGRAM(s) Oral three times a day  sodium chloride 0.9% for Nebulization 3 milliLiter(s) Nebulizer four times a day      TELEMETRY: nsr 	    ECG:  	  RADIOLOGY:   DIAGNOSTIC TESTING:  [ ] Echocardiogram:  [ ]  Catheterization:  [ ] Stress Test:    OTHER: 	    LABS:	 	                            8.2    9.49  )-----------( 304      ( 17 May 2022 06:56 )             26.2     05-17    139  |  98  |  29<H>  ----------------------------<  92  3.4<L>   |  28  |  1.61<H>    Ca    8.7      17 May 2022 06:55              
CARDIOLOGY FOLLOW UP - Dr. Krueger  DATE OF SERVICE: 5/19/22     CC no cp or sob   events noted 7 beats wct on tele       REVIEW OF SYSTEMS:  CONSTITUTIONAL: No fever, weight loss, or fatigue  RESPIRATORY: No cough, wheezing, chills or hemoptysis; No Shortness of Breath  CARDIOVASCULAR: No chest pain, palpitations, passing out, dizziness, or leg swelling  GASTROINTESTINAL: No abdominal or epigastric pain. No nausea, vomiting, or hematemesis; No diarrhea or constipation. No melena or hematochezia.    PHYSICAL EXAM:  T(C): 36.7 (05-19-22 @ 04:44), Max: 37.3 (05-18-22 @ 13:59)  HR: 69 (05-19-22 @ 04:44) (69 - 82)  BP: 164/79 (05-19-22 @ 04:44) (146/79 - 180/80)  RR: 18 (05-19-22 @ 04:44) (18 - 18)  SpO2: 95% (05-19-22 @ 04:44) (93% - 96%)  Wt(kg): --  I&O's Summary    18 May 2022 07:01  -  19 May 2022 07:00  --------------------------------------------------------  IN: 480 mL / OUT: 575 mL / NET: -95 mL        Appearance: Normal	  Cardiovascular: Normal S1 S2,RRR  Respiratory diminished  Gastrointestinal:  Soft, Non-tender, + BS	  Extremities: Normal range of motion, No clubbing, cyanosis or edema      Home Medications:  albuterol 90 mcg/inh inhalation aerosol: 2 puff(s) inhaled every 6 hours, As needed, Shortness of Breath and/or Wheezing (16 May 2022 12:11)  aspirin 81 mg oral delayed release tablet: 1 tab(s) orally once a day (16 May 2022 12:11)  atorvastatin 80 mg oral tablet: 1 tab(s) orally once a day (16 May 2022 12:11)  calcitriol 0.25 mcg oral capsule: 1 cap(s) orally once a day (16 May 2022 12:11)  Coreg 6.25 mg oral tablet: 1 tab(s) orally 2 times a day (16 May 2022 12:11)  cyanocobalamin 1000 mcg oral tablet: 1 tab(s) orally once a day (16 May 2022 12:11)  melatonin 3 mg oral tablet: 1 tab(s) orally once a day (at bedtime) (16 May 2022 12:11)  saliva substitutes oral solution: 1 spray(s) orally 3 times a day, As Needed (02 May 2022 14:18)      MEDICATIONS  (STANDING):  albuterol/ipratropium for Nebulization 3 milliLiter(s) Nebulizer every 6 hours  apixaban 2.5 milliGRAM(s) Oral every 12 hours  aspirin enteric coated 81 milliGRAM(s) Oral daily  Biotene Dry Mouth Oral Rinse 5 milliLiter(s) Swish and Spit three times a day  calcitriol   Capsule 0.25 MICROGram(s) Oral daily  cyanocobalamin 1000 MICROGram(s) Oral daily  dextrose 5%. 1000 milliLiter(s) (100 mL/Hr) IV Continuous <Continuous>  dextrose 5%. 1000 milliLiter(s) (50 mL/Hr) IV Continuous <Continuous>  dextrose 5%. 1000 milliLiter(s) (100 mL/Hr) IV Continuous <Continuous>  dextrose 50% Injectable 25 Gram(s) IV Push once  dextrose 50% Injectable 12.5 Gram(s) IV Push once  dextrose 50% Injectable 25 Gram(s) IV Push once  diVALproex  milliGRAM(s) Oral two times a day  droxidopa 200 milliGRAM(s) Oral three times a day  epoetin abby-epbx (RETACRIT) Injectable 21783 Unit(s) SubCutaneous every 7 days  escitalopram 5 milliGRAM(s) Oral daily  ferrous    sulfate 325 milliGRAM(s) Oral daily  finasteride 5 milliGRAM(s) Oral daily  fludroCORTISONE 0.2 milliGRAM(s) Oral <User Schedule>  fluticasone propionate 50 MICROgram(s)/spray Nasal Spray 2 Spray(s) Both Nostrils <User Schedule>  glucagon  Injectable 1 milliGRAM(s) IntraMuscular once  glucagon  Injectable 1 milliGRAM(s) IntraMuscular once  guaiFENesin ER 1200 milliGRAM(s) Oral every 12 hours  insulin glargine Injectable (LANTUS) 5 Unit(s) SubCutaneous at bedtime  insulin lispro (ADMELOG) corrective regimen sliding scale   SubCutaneous Before meals and at bedtime  levothyroxine 100 MICROGram(s) Oral daily  melatonin 3 milliGRAM(s) Oral at bedtime  midodrine. 30 milliGRAM(s) Oral three times a day  sodium chloride 0.9% for Nebulization 3 milliLiter(s) Nebulizer four times a day      TELEMETRY: nsr	    ECG:  	  RADIOLOGY:   DIAGNOSTIC TESTING:  [ ] Echocardiogram:  [ ]  Catheterization:  [ ] Stress Test:    OTHER: 	    LABS:	 	        05-19    139  |  98  |  23  ----------------------------<  88  3.5   |  27  |  1.48<H>    Ca    8.5      19 May 2022 07:14              
CARDIOLOGY FOLLOW UP - Dr. Krueger  DATE OF SERVICE: 5/5/22     CC no cp   sob improving       REVIEW OF SYSTEMS:  CONSTITUTIONAL: No fever, weight loss, or fatigue  RESPIRATORY: No cough, wheezing, chills or hemoptysis; No Shortness of Breath  CARDIOVASCULAR: No chest pain, palpitations, passing out, dizziness, or leg swelling  GASTROINTESTINAL: No abdominal or epigastric pain. No nausea, vomiting, or hematemesis; No diarrhea or constipation. No melena or hematochezia.  VASCULAR: No edema     PHYSICAL EXAM:  T(C): 36.3 (05-05-22 @ 11:02), Max: 37.9 (05-04-22 @ 11:40)  HR: 58 (05-05-22 @ 11:02) (58 - 82)  BP: 93/59 (05-05-22 @ 11:02) (93/59 - 148/77)  RR: 18 (05-05-22 @ 11:02) (16 - 18)  SpO2: 97% (05-05-22 @ 11:02) (95% - 98%)  Wt(kg): --  I&O's Summary    04 May 2022 07:01  -  05 May 2022 07:00  --------------------------------------------------------  IN: 480 mL / OUT: 550 mL / NET: -70 mL        Appearance: Normal	  Cardiovascular: Normal S1 S2,RRR,  Respiratory: coarse   Gastrointestinal:  Soft, Non-tender, + BS	  Extremities: Normal range of motion, No clubbing, cyanosis or edema      Home Medications:  albuterol 90 mcg/inh inhalation aerosol: 2 puff(s) inhaled every 6 hours, As needed, Shortness of Breath and/or Wheezing (02 May 2022 14:19)  aspirin 81 mg oral delayed release tablet: 1 tab(s) orally once a day (02 May 2022 14:19)  atorvastatin 80 mg oral tablet: 1 tab(s) orally once a day (02 May 2022 14:19)  calcitriol 0.25 mcg oral capsule: 1 cap(s) orally once a day (02 May 2022 14:19)  Coreg 6.25 mg oral tablet: 1 tab(s) orally 2 times a day (02 May 2022 14:19)  cyanocobalamin 1000 mcg oral tablet: 1 tab(s) orally once a day (02 May 2022 14:19)  melatonin 3 mg oral tablet: 1 tab(s) orally once a day (at bedtime) (02 May 2022 14:19)  saliva substitutes oral solution: 1 spray(s) orally 3 times a day, As Needed (02 May 2022 14:18)      MEDICATIONS  (STANDING):  albuterol/ipratropium for Nebulization 3 milliLiter(s) Nebulizer every 6 hours  albuterol/ipratropium for Nebulization. 3 milliLiter(s) Nebulizer once  apixaban 2.5 milliGRAM(s) Oral every 12 hours  aspirin enteric coated 81 milliGRAM(s) Oral daily  Biotene Dry Mouth Oral Rinse 5 milliLiter(s) Swish and Spit three times a day  calcitriol   Capsule 0.25 MICROGram(s) Oral daily  cyanocobalamin 1000 MICROGram(s) Oral daily  dextrose 5%. 1000 milliLiter(s) (100 mL/Hr) IV Continuous <Continuous>  dextrose 5%. 1000 milliLiter(s) (50 mL/Hr) IV Continuous <Continuous>  dextrose 50% Injectable 25 Gram(s) IV Push once  dextrose 50% Injectable 12.5 Gram(s) IV Push once  dextrose 50% Injectable 25 Gram(s) IV Push once  diVALproex  milliGRAM(s) Oral two times a day  epoetin abby-epbx (RETACRIT) Injectable 98135 Unit(s) SubCutaneous every 7 days  escitalopram 5 milliGRAM(s) Oral daily  ferrous    sulfate 325 milliGRAM(s) Oral daily  finasteride 5 milliGRAM(s) Oral daily  fluticasone propionate 50 MICROgram(s)/spray Nasal Spray 2 Spray(s) Both Nostrils <User Schedule>  glucagon  Injectable 1 milliGRAM(s) IntraMuscular once  insulin lispro (ADMELOG) corrective regimen sliding scale   SubCutaneous three times a day before meals  levothyroxine 75 MICROGram(s) Oral daily  melatonin 3 milliGRAM(s) Oral at bedtime  methylPREDNISolone sodium succinate Injectable 3 milliGRAM(s) IV Push once  midodrine. 10 milliGRAM(s) Oral three times a day  oseltamivir 30 milliGRAM(s) Oral daily  piperacillin/tazobactam IVPB.. 3.375 Gram(s) IV Intermittent every 8 hours  tamsulosin 0.8 milliGRAM(s) Oral at bedtime      TELEMETRY: a paced  	    ECG:  	  RADIOLOGY:   DIAGNOSTIC TESTING:  [ ] Echocardiogram:  [ ]  Catheterization:  [ ] Stress Test:    OTHER: 	    LABS:	 	    Troponin T, High Sensitivity Result: 90 ng/L [0 - 51] (05-02 @ 12:06)                          9.9    4.73  )-----------( 98       ( 05 May 2022 06:24 )             32.1     05-05    135  |  99  |  34<H>  ----------------------------<  192<H>  4.5   |  21<L>  |  2.17<H>    Ca    8.6      05 May 2022 06:36    TPro  x   /  Alb  x   /  TBili  0.3  /  DBili  x   /  AST  x   /  ALT  x   /  AlkPhos  x   05-04    PT/INR - ( 04 May 2022 06:25 )   PT: 16.5 sec;   INR: 1.42 ratio                 
CARDIOLOGY FOLLOW UP - Dr. Krueger  DATE OF SERVICE: 5/6/22     CC no cp or sob       REVIEW OF SYSTEMS:  CONSTITUTIONAL: No fever, weight loss, or fatigue  RESPIRATORY: No cough, wheezing, chills or hemoptysis; No Shortness of Breath  CARDIOVASCULAR: No chest pain, palpitations, passing out, dizziness, or leg swelling  GASTROINTESTINAL: No abdominal or epigastric pain. No nausea, vomiting, or hematemesis; No diarrhea or constipation. No melena or hematochezia.  VASCULAR: No edema     PHYSICAL EXAM:  T(C): 36.7 (05-06-22 @ 05:27), Max: 36.8 (05-05-22 @ 21:23)  HR: 75 (05-06-22 @ 05:27) (58 - 76)  BP: 168/87 (05-06-22 @ 05:27) (93/59 - 168/87)  RR: 18 (05-06-22 @ 05:27) (18 - 18)  SpO2: 97% (05-06-22 @ 05:27) (94% - 97%)  Wt(kg): --  I&O's Summary    05 May 2022 07:01  -  06 May 2022 07:00  --------------------------------------------------------  IN: 1480 mL / OUT: 1400 mL / NET: 80 mL        Appearance: Normal	  Cardiovascular: Normal S1 S2,RRR, No JVD, No murmurs  Respiratory: coarse  Gastrointestinal:  Soft, Non-tender, + BS	  Extremities: Normal range of motion, No clubbing, cyanosis or edema      Home Medications:  albuterol 90 mcg/inh inhalation aerosol: 2 puff(s) inhaled every 6 hours, As needed, Shortness of Breath and/or Wheezing (02 May 2022 14:19)  aspirin 81 mg oral delayed release tablet: 1 tab(s) orally once a day (02 May 2022 14:19)  atorvastatin 80 mg oral tablet: 1 tab(s) orally once a day (02 May 2022 14:19)  calcitriol 0.25 mcg oral capsule: 1 cap(s) orally once a day (02 May 2022 14:19)  Coreg 6.25 mg oral tablet: 1 tab(s) orally 2 times a day (02 May 2022 14:19)  cyanocobalamin 1000 mcg oral tablet: 1 tab(s) orally once a day (02 May 2022 14:19)  melatonin 3 mg oral tablet: 1 tab(s) orally once a day (at bedtime) (02 May 2022 14:19)  saliva substitutes oral solution: 1 spray(s) orally 3 times a day, As Needed (02 May 2022 14:18)      MEDICATIONS  (STANDING):  albuterol/ipratropium for Nebulization 3 milliLiter(s) Nebulizer every 6 hours  albuterol/ipratropium for Nebulization. 3 milliLiter(s) Nebulizer once  apixaban 2.5 milliGRAM(s) Oral every 12 hours  aspirin enteric coated 81 milliGRAM(s) Oral daily  Biotene Dry Mouth Oral Rinse 5 milliLiter(s) Swish and Spit three times a day  calcitriol   Capsule 0.25 MICROGram(s) Oral daily  cyanocobalamin 1000 MICROGram(s) Oral daily  dextrose 5%. 1000 milliLiter(s) (50 mL/Hr) IV Continuous <Continuous>  dextrose 5%. 1000 milliLiter(s) (100 mL/Hr) IV Continuous <Continuous>  dextrose 50% Injectable 25 Gram(s) IV Push once  dextrose 50% Injectable 12.5 Gram(s) IV Push once  dextrose 50% Injectable 25 Gram(s) IV Push once  diVALproex  milliGRAM(s) Oral two times a day  epoetin abby-epbx (RETACRIT) Injectable 97960 Unit(s) SubCutaneous every 7 days  escitalopram 5 milliGRAM(s) Oral daily  ferrous    sulfate 325 milliGRAM(s) Oral daily  finasteride 5 milliGRAM(s) Oral daily  fluticasone propionate 50 MICROgram(s)/spray Nasal Spray 2 Spray(s) Both Nostrils <User Schedule>  glucagon  Injectable 1 milliGRAM(s) IntraMuscular once  insulin lispro (ADMELOG) corrective regimen sliding scale   SubCutaneous three times a day before meals  levothyroxine 75 MICROGram(s) Oral daily  melatonin 3 milliGRAM(s) Oral at bedtime  midodrine. 10 milliGRAM(s) Oral three times a day  oseltamivir 30 milliGRAM(s) Oral daily  piperacillin/tazobactam IVPB.. 3.375 Gram(s) IV Intermittent every 8 hours  tamsulosin 0.8 milliGRAM(s) Oral at bedtime      TELEMETRY: nsr 	    ECG:  	  RADIOLOGY:   DIAGNOSTIC TESTING:  [ ] Echocardiogram:  [ ]  Catheterization:  [ ] Stress Test:    OTHER: 	    LABS:	 	    Troponin T, High Sensitivity Result: 90 ng/L [0 - 51] (05-02 @ 12:06)                          9.7    8.71  )-----------( 107      ( 06 May 2022 07:08 )             29.9     05-06    136  |  97  |  38<H>  ----------------------------<  206<H>  4.3   |  23  |  1.93<H>    Ca    8.7      06 May 2022 07:05              
CARDIOLOGY FOLLOW UP - Dr. Krueger  DATE OF SERVICE: 5/9/22     CC no cp or sob   mild cough       REVIEW OF SYSTEMS:  CONSTITUTIONAL: No fever, weight loss, or fatigue  RESPIRATORY: No cough, wheezing, chills or hemoptysis; No Shortness of Breath  CARDIOVASCULAR: No chest pain, palpitations, passing out, dizziness, or leg swelling  GASTROINTESTINAL: No abdominal or epigastric pain. No nausea, vomiting, or hematemesis; No diarrhea or constipation. No melena or hematochezia.      PHYSICAL EXAM:  T(C): 37.3 (05-09-22 @ 11:01), Max: 37.3 (05-09-22 @ 11:01)  HR: 77 (05-09-22 @ 11:01) (56 - 80)  BP: 89/54 (05-09-22 @ 11:01) (89/54 - 168/81)  RR: 17 (05-09-22 @ 11:01) (17 - 18)  SpO2: 94% (05-09-22 @ 11:01) (90% - 97%)  Wt(kg): --  I&O's Summary    08 May 2022 07:01  -  09 May 2022 07:00  --------------------------------------------------------  IN: 200 mL / OUT: 350 mL / NET: -150 mL    09 May 2022 07:01  -  09 May 2022 11:11  --------------------------------------------------------  IN: 420 mL / OUT: 0 mL / NET: 420 mL        Appearance: Normal	  Cardiovascular: Normal S1 S2,RRR, No JVD, No murmurs  Respiratory:  rhonchi   Gastrointestinal:  Soft, Non-tender, + BS	  Extremities: Normal range of motion, No clubbing, cyanosis or edema      Home Medications:  albuterol 90 mcg/inh inhalation aerosol: 2 puff(s) inhaled every 6 hours, As needed, Shortness of Breath and/or Wheezing (02 May 2022 14:19)  aspirin 81 mg oral delayed release tablet: 1 tab(s) orally once a day (02 May 2022 14:19)  atorvastatin 80 mg oral tablet: 1 tab(s) orally once a day (02 May 2022 14:19)  calcitriol 0.25 mcg oral capsule: 1 cap(s) orally once a day (02 May 2022 14:19)  Coreg 6.25 mg oral tablet: 1 tab(s) orally 2 times a day (02 May 2022 14:19)  cyanocobalamin 1000 mcg oral tablet: 1 tab(s) orally once a day (02 May 2022 14:19)  melatonin 3 mg oral tablet: 1 tab(s) orally once a day (at bedtime) (02 May 2022 14:19)  saliva substitutes oral solution: 1 spray(s) orally 3 times a day, As Needed (02 May 2022 14:18)      MEDICATIONS  (STANDING):  albuterol/ipratropium for Nebulization 3 milliLiter(s) Nebulizer every 6 hours  apixaban 2.5 milliGRAM(s) Oral every 12 hours  aspirin enteric coated 81 milliGRAM(s) Oral daily  BACItracin   Ointment 1 Application(s) Topical two times a day  Biotene Dry Mouth Oral Rinse 5 milliLiter(s) Swish and Spit three times a day  calcitriol   Capsule 0.25 MICROGram(s) Oral daily  cyanocobalamin 1000 MICROGram(s) Oral daily  dextrose 5%. 1000 milliLiter(s) (50 mL/Hr) IV Continuous <Continuous>  dextrose 5%. 1000 milliLiter(s) (100 mL/Hr) IV Continuous <Continuous>  dextrose 50% Injectable 25 Gram(s) IV Push once  dextrose 50% Injectable 12.5 Gram(s) IV Push once  dextrose 50% Injectable 25 Gram(s) IV Push once  diVALproex  milliGRAM(s) Oral two times a day  epoetin abby-epbx (RETACRIT) Injectable 81830 Unit(s) SubCutaneous every 7 days  escitalopram 5 milliGRAM(s) Oral daily  ferrous    sulfate 325 milliGRAM(s) Oral daily  finasteride 5 milliGRAM(s) Oral daily  fluticasone propionate 50 MICROgram(s)/spray Nasal Spray 2 Spray(s) Both Nostrils <User Schedule>  furosemide    Tablet 20 milliGRAM(s) Oral daily  glucagon  Injectable 1 milliGRAM(s) IntraMuscular once  insulin lispro (ADMELOG) corrective regimen sliding scale   SubCutaneous three times a day before meals  levothyroxine 75 MICROGram(s) Oral daily  melatonin 3 milliGRAM(s) Oral at bedtime  metoprolol succinate ER 12.5 milliGRAM(s) Oral daily  midodrine. 10 milliGRAM(s) Oral three times a day  sodium chloride 0.9% for Nebulization 3 milliLiter(s) Nebulizer four times a day  tamsulosin 0.8 milliGRAM(s) Oral at bedtime      TELEMETRY: nsr 	    ECG:  	  RADIOLOGY:   DIAGNOSTIC TESTING:  [ ] Echocardiogram:  [ ]  Catheterization:  [ ] Stress Test:    OTHER: 	    LABS:	 	    Troponin T, High Sensitivity Result: 90 ng/L [0 - 51] (05-02 @ 12:06)                          9.5    13.98 )-----------( 147      ( 09 May 2022 07:19 )             29.4     05-09    137  |  98  |  42<H>  ----------------------------<  220<H>  4.0   |  24  |  2.00<H>    Ca    9.0      09 May 2022 07:12  Mg     2.0     05-09              
CARDIOLOGY FOLLOW UP - Dr. Krueger  Date of Service: 5/7/22  CC: no events    Review of Systems:  Constitutional: No fever, weight loss, or fatigue  Respiratory: No cough, wheezing, or hemoptysis, no shortness of breath  Cardiovascular: No chest pain, palpitations, passing out, dizziness, or leg swelling  Gastrointestinal: No abd or epigastric pain. No nausea, vomiting, or hematemesis; no diarrhea or consiptaiton, no melena or hematochezia  Vascular: No edema     TELEMETRY:    PHYSICAL EXAM:  T(C): 36.4 (05-07-22 @ 11:21), Max: 37 (05-06-22 @ 21:52)  HR: 59 (05-07-22 @ 11:21) (59 - 64)  BP: 132/72 (05-07-22 @ 11:21) (132/72 - 169/87)  RR: 18 (05-07-22 @ 11:21) (18 - 18)  SpO2: 94% (05-07-22 @ 11:21) (94% - 97%)  Wt(kg): --  I&O's Summary    06 May 2022 07:01  -  07 May 2022 07:00  --------------------------------------------------------  IN: 420 mL / OUT: 300 mL / NET: 120 mL    07 May 2022 07:01  -  07 May 2022 15:11  --------------------------------------------------------  IN: 480 mL / OUT: 450 mL / NET: 30 mL        Appearance: Normal	  Cardiovascular: Normal S1 S2,RRR, No JVD, No murmurs  Respiratory: Lungs clear to auscultation	  Gastrointestinal:  Soft, Non-tender, + BS	  Extremities: Normal range of motion, No clubbing, cyanosis or edema  Vascular: Peripheral pulses palpable 2+ bilaterally       Home Medications:  albuterol 90 mcg/inh inhalation aerosol: 2 puff(s) inhaled every 6 hours, As needed, Shortness of Breath and/or Wheezing (02 May 2022 14:19)  aspirin 81 mg oral delayed release tablet: 1 tab(s) orally once a day (02 May 2022 14:19)  atorvastatin 80 mg oral tablet: 1 tab(s) orally once a day (02 May 2022 14:19)  calcitriol 0.25 mcg oral capsule: 1 cap(s) orally once a day (02 May 2022 14:19)  Coreg 6.25 mg oral tablet: 1 tab(s) orally 2 times a day (02 May 2022 14:19)  cyanocobalamin 1000 mcg oral tablet: 1 tab(s) orally once a day (02 May 2022 14:19)  melatonin 3 mg oral tablet: 1 tab(s) orally once a day (at bedtime) (02 May 2022 14:19)  saliva substitutes oral solution: 1 spray(s) orally 3 times a day, As Needed (02 May 2022 14:18)        MEDICATIONS  (STANDING):  albuterol/ipratropium for Nebulization 3 milliLiter(s) Nebulizer every 6 hours  apixaban 2.5 milliGRAM(s) Oral every 12 hours  aspirin enteric coated 81 milliGRAM(s) Oral daily  Biotene Dry Mouth Oral Rinse 5 milliLiter(s) Swish and Spit three times a day  calcitriol   Capsule 0.25 MICROGram(s) Oral daily  cyanocobalamin 1000 MICROGram(s) Oral daily  dextrose 5%. 1000 milliLiter(s) (50 mL/Hr) IV Continuous <Continuous>  dextrose 5%. 1000 milliLiter(s) (100 mL/Hr) IV Continuous <Continuous>  dextrose 50% Injectable 25 Gram(s) IV Push once  dextrose 50% Injectable 12.5 Gram(s) IV Push once  dextrose 50% Injectable 25 Gram(s) IV Push once  diVALproex  milliGRAM(s) Oral two times a day  epoetin abby-epbx (RETACRIT) Injectable 42230 Unit(s) SubCutaneous every 7 days  escitalopram 5 milliGRAM(s) Oral daily  ferrous    sulfate 325 milliGRAM(s) Oral daily  finasteride 5 milliGRAM(s) Oral daily  fluticasone propionate 50 MICROgram(s)/spray Nasal Spray 2 Spray(s) Both Nostrils <User Schedule>  furosemide    Tablet 20 milliGRAM(s) Oral daily  glucagon  Injectable 1 milliGRAM(s) IntraMuscular once  insulin lispro (ADMELOG) corrective regimen sliding scale   SubCutaneous three times a day before meals  levothyroxine 75 MICROGram(s) Oral daily  melatonin 3 milliGRAM(s) Oral at bedtime  methylPREDNISolone sodium succinate Injectable 30 milliGRAM(s) IV Push once  metoprolol succinate ER 12.5 milliGRAM(s) Oral daily  midodrine. 10 milliGRAM(s) Oral three times a day  oseltamivir 30 milliGRAM(s) Oral daily  tamsulosin 0.8 milliGRAM(s) Oral at bedtime        EKG:  RADIOLOGY:  DIAGNOSTIC TESTING:  [ ] Echocardiogram:  [ ] Catherterization:  [ ] Stress Test:  OTHER:     LABS:	 	                          9.3    9.63  )-----------( 131      ( 07 May 2022 07:27 )             28.6     05-07    135  |  96  |  37<H>  ----------------------------<  237<H>  4.1   |  24  |  1.92<H>    Ca    8.7      07 May 2022 07:25            CARDIAC MARKERS:        
Date of Service: 05-15-22 @ 11:17    Patient is a 76y old  Male who presents with a chief complaint of fever and cough. diarrhea and generalized weakness (14 May 2022 15:30)      Any change in ROS:  doing OK: no SOB: off oxygen:       MEDICATIONS  (STANDING):  albuterol/ipratropium for Nebulization 3 milliLiter(s) Nebulizer every 6 hours  apixaban 2.5 milliGRAM(s) Oral every 12 hours  aspirin enteric coated 81 milliGRAM(s) Oral daily  Biotene Dry Mouth Oral Rinse 5 milliLiter(s) Swish and Spit three times a day  calcitriol   Capsule 0.25 MICROGram(s) Oral daily  cyanocobalamin 1000 MICROGram(s) Oral daily  dextrose 5%. 1000 milliLiter(s) (50 mL/Hr) IV Continuous <Continuous>  dextrose 5%. 1000 milliLiter(s) (100 mL/Hr) IV Continuous <Continuous>  dextrose 5%. 1000 milliLiter(s) (100 mL/Hr) IV Continuous <Continuous>  dextrose 50% Injectable 25 Gram(s) IV Push once  dextrose 50% Injectable 12.5 Gram(s) IV Push once  dextrose 50% Injectable 25 Gram(s) IV Push once  diVALproex  milliGRAM(s) Oral two times a day  epoetin abby-epbx (RETACRIT) Injectable 19591 Unit(s) SubCutaneous every 7 days  escitalopram 5 milliGRAM(s) Oral daily  ferrous    sulfate 325 milliGRAM(s) Oral daily  finasteride 5 milliGRAM(s) Oral daily  fludroCORTISONE 0.1 milliGRAM(s) Oral <User Schedule>  fluticasone propionate 50 MICROgram(s)/spray Nasal Spray 2 Spray(s) Both Nostrils <User Schedule>  glucagon  Injectable 1 milliGRAM(s) IntraMuscular once  glucagon  Injectable 1 milliGRAM(s) IntraMuscular once  insulin glargine Injectable (LANTUS) 5 Unit(s) SubCutaneous at bedtime  insulin lispro (ADMELOG) corrective regimen sliding scale   SubCutaneous Before meals and at bedtime  levothyroxine 75 MICROGram(s) Oral daily  melatonin 3 milliGRAM(s) Oral at bedtime  midodrine. 20 milliGRAM(s) Oral three times a day  potassium chloride    Tablet ER 20 milliEquivalent(s) Oral once  sodium chloride 0.9% for Nebulization 3 milliLiter(s) Nebulizer four times a day    MEDICATIONS  (PRN):  ALBUTerol    90 MICROgram(s) HFA Inhaler 2 Puff(s) Inhalation every 6 hours PRN Shortness of Breath and/or Wheezing  dextrose Oral Gel 15 Gram(s) Oral once PRN Blood Glucose LESS THAN 70 milliGRAM(s)/deciliter    Vital Signs Last 24 Hrs  T(C): 36.4 (15 May 2022 05:18), Max: 36.8 (14 May 2022 20:36)  T(F): 97.6 (15 May 2022 05:18), Max: 98.2 (14 May 2022 20:36)  HR: 74 (15 May 2022 05:18) (74 - 80)  BP: 150/75 (15 May 2022 05:18) (106/67 - 150/75)  BP(mean): --  RR: 18 (15 May 2022 05:18) (18 - 18)  SpO2: 94% (15 May 2022 05:18) (94% - 95%)    I&O's Summary    14 May 2022 07:01  -  15 May 2022 07:00  --------------------------------------------------------  IN: 1700 mL / OUT: 1450 mL / NET: 250 mL          Physical Exam:   GENERAL: NAD, well-groomed, well-developed  HEENT: LUCA/   Atraumatic, Normocephalic  ENMT: No tonsillar erythema, exudates, or enlargement; Moist mucous membranes, Good dentition, No lesions  NECK: Supple, No JVD, Normal thyroid  CHEST/LUNG: Clear to auscultaion  CVS: Regular rate and rhythm; No murmurs, rubs, or gallops  GI: : Soft, Nontender, Nondistended; Bowel sounds present  NERVOUS SYSTEM:  Alert & Oriented X3  EXTREMITIES:  -edema  LYMPH: No lymphadenopathy noted  SKIN: No rashes or lesions  ENDOCRINOLOGY: No Thyromegaly  PSYCH: Appropriate    Labs:                              8.5    11.01 )-----------( 262      ( 14 May 2022 07:32 )             27.2                         8.5    11.08 )-----------( 232      ( 13 May 2022 06:22 )             26.3     05-15    135  |  99  |  40<H>  ----------------------------<  141<H>  3.4<L>   |  25  |  1.58<H>  05-14    136  |  99  |  38<H>  ----------------------------<  169<H>  3.9   |  26  |  1.63<H>  05-13    138  |  98  |  38<H>  ----------------------------<  242<H>  3.9   |  25  |  1.70<H>  05-12    134<L>  |  97  |  43<H>  ----------------------------<  306<H>  4.0   |  24  |  1.66<H>    Ca    8.9      15 May 2022 07:12  Ca    8.7      14 May 2022 07:26      CAPILLARY BLOOD GLUCOSE      POCT Blood Glucose.: 154 mg/dL (15 May 2022 07:54)  POCT Blood Glucose.: 231 mg/dL (14 May 2022 21:33)  POCT Blood Glucose.: 301 mg/dL (14 May 2022 16:40)  POCT Blood Glucose.: 272 mg/dL (14 May 2022 11:58)    rad< from: Xray Chest 1 View- PORTABLE-Routine (Xray Chest 1 View- PORTABLE-Routine .) (05.10.22 @ 08:57) >    ACC: [id number] EXAM:  XR CHEST PORTABLE ROUTINE 1V                          PROCEDURE DATE:  05/10/2022          INTERPRETATION:  EXAMINATION: XR CHEST    CLINICAL INDICATION: sob    TECHNIQUE: Single frontal, portable view of the chest was obtained.    COMPARISON: Chest x-ray 5/9/2022.    FINDINGS:  Left chest wall AICD.  The heart is normal in size.  Patchy bibasilar opacities.  Pleural plaques again identified.  Trace left pleural effusion. No pneumothorax.    IMPRESSION:  No significant interval change.    --- End of Report ---          HEATHER CLAROS M.D., RADIOLOGY RESIDENT  This document has been electronically signed.  YONAS OLIVEIRA MD; Attending Radiologist  This document has been electronically signed. May 10 2022  4:11PM    < end of copied text >  < from: CT Chest No Cont (05.02.22 @ 13:41) >    AICD in place.    Upper Abdomen: The adrenal gland thickening is unchanged. Right kidney is   atrophic.    Bones And Soft Tissues: The bones are unremarkable.  The soft tissues are   unremarkable.      IMPRESSION:    1.  New groundglass opacities (predominantly left-sided) and left upper   lobe opacities.  2.  No change in the bilateral pleural effusions.  3.  No change in the calcified and noncalcified pleural plaques can be a   marker of prior asbestos exposure.    --- End of Report ---            BERNIE COHEN MD; Attending Radiologist  This document has been electronically signed. May  2 2022  2:51PM    < end of copied text >                RECENT CULTURES:        RESPIRATORY CULTURES:          Studies  Chest X-RAY  CT SCAN Chest   Venous Dopplers: LE:   CT Abdomen  Others              
Follow Up: PNA    Interval History/ROS: Afebrile. More energetic and less cough per wife. He feels "ok".     Allergies  No Known Allergies        ANTIMICROBIALS:  piperacillin/tazobactam IVPB.. 3.375 every 8 hours      OTHER MEDS:  ALBUTerol    90 MICROgram(s) HFA Inhaler 2 Puff(s) Inhalation every 6 hours PRN  albuterol/ipratropium for Nebulization 3 milliLiter(s) Nebulizer every 6 hours  apixaban 2.5 milliGRAM(s) Oral every 12 hours  aspirin enteric coated 81 milliGRAM(s) Oral daily  Biotene Dry Mouth Oral Rinse 5 milliLiter(s) Swish and Spit three times a day  calcitriol   Capsule 0.25 MICROGram(s) Oral daily  cyanocobalamin 1000 MICROGram(s) Oral daily  dextrose 5%. 1000 milliLiter(s) IV Continuous <Continuous>  dextrose 5%. 1000 milliLiter(s) IV Continuous <Continuous>  dextrose 50% Injectable 25 Gram(s) IV Push once  dextrose 50% Injectable 12.5 Gram(s) IV Push once  dextrose 50% Injectable 25 Gram(s) IV Push once  dextrose Oral Gel 15 Gram(s) Oral once PRN  diVALproex  milliGRAM(s) Oral two times a day  escitalopram 5 milliGRAM(s) Oral daily  ferrous    sulfate 325 milliGRAM(s) Oral daily  finasteride 5 milliGRAM(s) Oral daily  furosemide   Injectable 20 milliGRAM(s) IV Push once  glucagon  Injectable 1 milliGRAM(s) IntraMuscular once  insulin lispro (ADMELOG) corrective regimen sliding scale   SubCutaneous three times a day before meals  levothyroxine 75 MICROGram(s) Oral daily  melatonin 3 milliGRAM(s) Oral at bedtime  midodrine. 10 milliGRAM(s) Oral three times a day  tamsulosin 0.8 milliGRAM(s) Oral at bedtime      Vital Signs Last 24 Hrs  T(C): 37.3 (03 May 2022 11:45), Max: 37.3 (03 May 2022 11:45)  T(F): 99.1 (03 May 2022 11:45), Max: 99.1 (03 May 2022 11:45)  HR: 61 (03 May 2022 11:45) (58 - 61)  BP: 110/66 (03 May 2022 11:45) (81/47 - 110/66)  BP(mean): 73 (02 May 2022 20:53) (73 - 73)  RR: 18 (03 May 2022 11:45) (18 - 22)  SpO2: 96% (03 May 2022 11:45) (94% - 100%)    Physical Exam:  General: non toxic  Cardio: regular rate   Respiratory: nonlabored on nasal cannula, grossly clear   abd: nondistended, soft nontender   Musculoskeletal: no edema  vascular: no phlebitis   Skin: no rash                          8.2    8.52  )-----------( 87       ( 03 May 2022 09:08 )             25.9           136  |  103  |  39<H>  ----------------------------<  87  4.1   |  22  |  2.38<H>    Ca    8.0<L>      03 May 2022 06:58    TPro  6.4  /  Alb  3.1<L>  /  TBili  0.4  /  DBili  x   /  AST  32  /  ALT  16  /  AlkPhos  59        Urinalysis Basic - ( 03 May 2022 14:56 )    Color: Yellow / Appearance: Slightly Turbid / S.024 / pH: x  Gluc: x / Ketone: Negative  / Bili: Negative / Urobili: Negative   Blood: x / Protein: 100 / Nitrite: Positive   Leuk Esterase: Large / RBC: 2 /hpf /  /HPF   Sq Epi: x / Non Sq Epi: 2 /hpf / Bacteria: Many        MICROBIOLOGY:  Culture - Urine (collected 22 @ 00:52)  Source: Clean Catch Clean Catch (Midstream)  Final Report (22 @ 20:45):    No growth    Culture - Blood (collected 22 @ 00:38)  Source: .Blood Blood-Peripheral  Final Report (22 @ 01:00):    No Growth Final    Culture - Blood (collected 22 @ 00:38)  Source: .Blood Blood-Peripheral  Final Report (22 @ 01:00):    No Growth Final    RADIOLOGY:  Images below reviewed personally  CT Chest No Cont (22 @ 13:41)   1.  New groundglass opacities (predominantly left-sided) and left upper   lobe opacities.  2.  No change in the bilateral pleural effusions.  3.  No change in the calcified and noncalcified pleural plaques can be a   marker of prior asbestos exposure.  
No distress    Vital Signs Last 24 Hrs  T(C): 36.6 (05-19-22 @ 11:42), Max: 36.9 (05-19-22 @ 02:30)  T(F): 97.9 (05-19-22 @ 11:42), Max: 98.5 (05-19-22 @ 02:30)  HR: 72 (05-19-22 @ 11:42) (69 - 75)  BP: 145/79 (05-19-22 @ 11:42) (145/79 - 180/80)  RR: 18 (05-19-22 @ 11:42) (18 - 18)  SpO2: 95% (05-19-22 @ 11:42) (93% - 96%)    I&O's Detail    18 May 2022 07:01  -  19 May 2022 07:00  --------------------------------------------------------  IN:    Oral Fluid: 480 mL  Total IN: 480 mL    OUT:    Voided (mL): 575 mL  Total OUT: 575 mL    Total NET: -95 mL    s1s2  b/l air entry  soft, ND  tr edema                                                                      19 May 2022 07:14    139    |  98     |  23     ----------------------------<  88     3.5     |  27     |  1.48     Ca    8.5        19 May 2022 07:14    ALBUTerol    90 MICROgram(s) HFA Inhaler 2 Puff(s) Inhalation every 6 hours PRN  albuterol/ipratropium for Nebulization 3 milliLiter(s) Nebulizer every 6 hours  apixaban 2.5 milliGRAM(s) Oral every 12 hours  aspirin enteric coated 81 milliGRAM(s) Oral daily  atorvastatin 80 milliGRAM(s) Oral at bedtime  Biotene Dry Mouth Oral Rinse 5 milliLiter(s) Swish and Spit three times a day  calcitriol   Capsule 0.25 MICROGram(s) Oral daily  cyanocobalamin 1000 MICROGram(s) Oral daily  dextrose 5%. 1000 milliLiter(s) IV Continuous <Continuous>  dextrose 5%. 1000 milliLiter(s) IV Continuous <Continuous>  dextrose 5%. 1000 milliLiter(s) IV Continuous <Continuous>  dextrose 50% Injectable 25 Gram(s) IV Push once  dextrose 50% Injectable 12.5 Gram(s) IV Push once  dextrose 50% Injectable 25 Gram(s) IV Push once  dextrose Oral Gel 15 Gram(s) Oral once PRN  diVALproex  milliGRAM(s) Oral two times a day  epoetin abby-epbx (RETACRIT) Injectable 72283 Unit(s) SubCutaneous every 7 days  escitalopram 5 milliGRAM(s) Oral daily  ferrous    sulfate 325 milliGRAM(s) Oral daily  finasteride 5 milliGRAM(s) Oral daily  fludroCORTISONE 0.2 milliGRAM(s) Oral <User Schedule>  fluticasone propionate 50 MICROgram(s)/spray Nasal Spray 2 Spray(s) Both Nostrils <User Schedule>  glucagon  Injectable 1 milliGRAM(s) IntraMuscular once  glucagon  Injectable 1 milliGRAM(s) IntraMuscular once  guaiFENesin ER 1200 milliGRAM(s) Oral every 12 hours  insulin glargine Injectable (LANTUS) 5 Unit(s) SubCutaneous at bedtime  insulin lispro (ADMELOG) corrective regimen sliding scale   SubCutaneous Before meals and at bedtime  levothyroxine 100 MICROGram(s) Oral daily  melatonin 3 milliGRAM(s) Oral at bedtime  midodrine. 30 milliGRAM(s) Oral three times a day  sodium chloride 0.9% for Nebulization 3 milliLiter(s) Nebulizer four times a day    A/P:    CM, EF 30-35%  Adm w/Flu A, PNA  S/p Hemodynamic/hypotensive/septic JARRED/CKD 3   Resolved  Orthostatic hypotension  On Florinef, Midodrine  Avoid nephrotoxins   Plan for home hospice  Supportive care    [phone]
Patient is a 76y old  Male who presents with a chief complaint of fever and cough. diarrhea and generalized weakness (06 May 2022 11:50)      DATE OF SERVICE: 05-06-22 @ 16:46    SUBJECTIVE / OVERNIGHT EVENTS: overnight events noted    ROS:  Resp: No cough no sputum production  CVS: No chest pain no palpitations no orthopnea  GI: no N/V/D  per RN        MEDICATIONS  (STANDING):  albuterol/ipratropium for Nebulization 3 milliLiter(s) Nebulizer every 6 hours  albuterol/ipratropium for Nebulization. 3 milliLiter(s) Nebulizer once  apixaban 2.5 milliGRAM(s) Oral every 12 hours  aspirin enteric coated 81 milliGRAM(s) Oral daily  Biotene Dry Mouth Oral Rinse 5 milliLiter(s) Swish and Spit three times a day  calcitriol   Capsule 0.25 MICROGram(s) Oral daily  cyanocobalamin 1000 MICROGram(s) Oral daily  dextrose 5%. 1000 milliLiter(s) (100 mL/Hr) IV Continuous <Continuous>  dextrose 5%. 1000 milliLiter(s) (50 mL/Hr) IV Continuous <Continuous>  dextrose 50% Injectable 25 Gram(s) IV Push once  dextrose 50% Injectable 12.5 Gram(s) IV Push once  dextrose 50% Injectable 25 Gram(s) IV Push once  diVALproex  milliGRAM(s) Oral two times a day  epoetin abby-epbx (RETACRIT) Injectable 95079 Unit(s) SubCutaneous every 7 days  escitalopram 5 milliGRAM(s) Oral daily  ferrous    sulfate 325 milliGRAM(s) Oral daily  finasteride 5 milliGRAM(s) Oral daily  fluticasone propionate 50 MICROgram(s)/spray Nasal Spray 2 Spray(s) Both Nostrils <User Schedule>  furosemide    Tablet 20 milliGRAM(s) Oral daily  glucagon  Injectable 1 milliGRAM(s) IntraMuscular once  insulin lispro (ADMELOG) corrective regimen sliding scale   SubCutaneous three times a day before meals  levothyroxine 75 MICROGram(s) Oral daily  melatonin 3 milliGRAM(s) Oral at bedtime  metoprolol succinate ER 12.5 milliGRAM(s) Oral daily  midodrine. 10 milliGRAM(s) Oral three times a day  oseltamivir 30 milliGRAM(s) Oral daily  piperacillin/tazobactam IVPB.. 3.375 Gram(s) IV Intermittent every 8 hours  tamsulosin 0.8 milliGRAM(s) Oral at bedtime    MEDICATIONS  (PRN):  ALBUTerol    90 MICROgram(s) HFA Inhaler 2 Puff(s) Inhalation every 6 hours PRN Shortness of Breath and/or Wheezing  dextrose Oral Gel 15 Gram(s) Oral once PRN Blood Glucose LESS THAN 70 milliGRAM(s)/deciliter        CAPILLARY BLOOD GLUCOSE      POCT Blood Glucose.: 231 mg/dL (06 May 2022 12:11)  POCT Blood Glucose.: 224 mg/dL (06 May 2022 08:07)  POCT Blood Glucose.: 274 mg/dL (05 May 2022 22:12)    I&O's Summary    05 May 2022 07:01  -  06 May 2022 07:00  --------------------------------------------------------  IN: 1480 mL / OUT: 1400 mL / NET: 80 mL        Vital Signs Last 24 Hrs  T(C): 36.4 (06 May 2022 11:57), Max: 36.8 (05 May 2022 21:23)  T(F): 97.6 (06 May 2022 11:57), Max: 98.2 (05 May 2022 21:23)  HR: 57 (06 May 2022 11:57) (57 - 76)  BP: 115/76 (06 May 2022 11:57) (115/76 - 168/87)  BP(mean): --  RR: 18 (06 May 2022 11:57) (18 - 18)  SpO2: 97% (06 May 2022 11:57) (94% - 97%)    PHYSICAL EXAM:   HEENT: LUCA EOMI  Neck: Supple, no JVD  Lungs: bilateral scattered wheeze  CVS: S1 S2 systolic murmur +   Abdomen: no tenderness, BS present  Neuro: Alert no change  Ext: no edema  Msk: no joint swelling     LABS:                        9.7    8.71  )-----------( 107      ( 06 May 2022 07:08 )             29.9     05-06    136  |  97  |  38<H>  ----------------------------<  206<H>  4.3   |  23  |  1.93<H>    Ca    8.7      06 May 2022 07:05                  All consultant(s) notes reviewed and care discussed with other providers        Contact Number, Dr Larson [phone]
Patient is a 76y old  Male who presents with a chief complaint of fever and cough. diarrhea and generalized weakness (19 May 2022 12:52)      DATE OF SERVICE: 05-19-22 @ 13:10    SUBJECTIVE / OVERNIGHT EVENTS: overnight events noted    ROS:  Resp: No cough no sputum production  CVS: No chest pain no palpitations no orthopnea  GI: no N/V/D  : no dysuria, no hematuria          MEDICATIONS  (STANDING):  albuterol/ipratropium for Nebulization 3 milliLiter(s) Nebulizer every 6 hours  apixaban 2.5 milliGRAM(s) Oral every 12 hours  aspirin enteric coated 81 milliGRAM(s) Oral daily  atorvastatin 80 milliGRAM(s) Oral at bedtime  Biotene Dry Mouth Oral Rinse 5 milliLiter(s) Swish and Spit three times a day  calcitriol   Capsule 0.25 MICROGram(s) Oral daily  cyanocobalamin 1000 MICROGram(s) Oral daily  dextrose 5%. 1000 milliLiter(s) (50 mL/Hr) IV Continuous <Continuous>  dextrose 5%. 1000 milliLiter(s) (100 mL/Hr) IV Continuous <Continuous>  dextrose 5%. 1000 milliLiter(s) (100 mL/Hr) IV Continuous <Continuous>  dextrose 50% Injectable 25 Gram(s) IV Push once  dextrose 50% Injectable 12.5 Gram(s) IV Push once  dextrose 50% Injectable 25 Gram(s) IV Push once  diVALproex  milliGRAM(s) Oral two times a day  droxidopa 200 milliGRAM(s) Oral three times a day  epoetin abby-epbx (RETACRIT) Injectable 80908 Unit(s) SubCutaneous every 7 days  escitalopram 5 milliGRAM(s) Oral daily  ferrous    sulfate 325 milliGRAM(s) Oral daily  finasteride 5 milliGRAM(s) Oral daily  fludroCORTISONE 0.2 milliGRAM(s) Oral <User Schedule>  fluticasone propionate 50 MICROgram(s)/spray Nasal Spray 2 Spray(s) Both Nostrils <User Schedule>  glucagon  Injectable 1 milliGRAM(s) IntraMuscular once  glucagon  Injectable 1 milliGRAM(s) IntraMuscular once  guaiFENesin ER 1200 milliGRAM(s) Oral every 12 hours  insulin glargine Injectable (LANTUS) 5 Unit(s) SubCutaneous at bedtime  insulin lispro (ADMELOG) corrective regimen sliding scale   SubCutaneous Before meals and at bedtime  levothyroxine 100 MICROGram(s) Oral daily  melatonin 3 milliGRAM(s) Oral at bedtime  midodrine. 30 milliGRAM(s) Oral three times a day  sodium chloride 0.9% for Nebulization 3 milliLiter(s) Nebulizer four times a day    MEDICATIONS  (PRN):  ALBUTerol    90 MICROgram(s) HFA Inhaler 2 Puff(s) Inhalation every 6 hours PRN Shortness of Breath and/or Wheezing  dextrose Oral Gel 15 Gram(s) Oral once PRN Blood Glucose LESS THAN 70 milliGRAM(s)/deciliter        CAPILLARY BLOOD GLUCOSE      POCT Blood Glucose.: 115 mg/dL (19 May 2022 11:57)  POCT Blood Glucose.: 97 mg/dL (19 May 2022 07:57)  POCT Blood Glucose.: 148 mg/dL (19 May 2022 01:20)  POCT Blood Glucose.: 142 mg/dL (18 May 2022 20:50)  POCT Blood Glucose.: 135 mg/dL (18 May 2022 16:25)    I&O's Summary    18 May 2022 07:01  -  19 May 2022 07:00  --------------------------------------------------------  IN: 480 mL / OUT: 575 mL / NET: -95 mL        Vital Signs Last 24 Hrs  T(C): 36.6 (19 May 2022 11:42), Max: 37.3 (18 May 2022 13:59)  T(F): 97.9 (19 May 2022 11:42), Max: 99.1 (18 May 2022 13:59)  HR: 72 (19 May 2022 11:42) (69 - 82)  BP: 145/79 (19 May 2022 11:42) (145/79 - 180/80)  BP(mean): --  RR: 18 (19 May 2022 11:42) (18 - 18)  SpO2: 95% (19 May 2022 11:42) (93% - 96%)      PHYSICAL EXAM:   HEENT: LUCA EOMI  Neck: Supple, no JVD  Lungs: clear  CVS: S1 S2 systolic murmur +   Abdomen: no tenderness, BS present  Neuro: Alert no change  Ext: no edema    LABS:    05-19    139  |  98  |  23  ----------------------------<  88  3.5   |  27  |  1.48<H>    Ca    8.5      19 May 2022 07:14                  All consultant(s) notes reviewed and care discussed with other providers        Contact Number, Dr Larson [phone]
Resting    Vital Signs Last 24 Hrs  T(C): 36.3 (05-05-22 @ 11:02), Max: 37.1 (05-05-22 @ 04:24)  T(F): 97.4 (05-05-22 @ 11:02), Max: 98.7 (05-05-22 @ 04:24)  HR: 58 (05-05-22 @ 11:02) (58 - 64)  BP: 93/59 (05-05-22 @ 11:02) (93/59 - 148/77)  RR: 18 (05-05-22 @ 11:02) (18 - 18)  SpO2: 97% (05-05-22 @ 11:02) (95% - 97%)    s1s2  b/l air entry  soft, ND  sm edema                        9.9    4.73  )-----------( 98       ( 05 May 2022 06:24 )             32.1     05 May 2022 06:36    135    |  99     |  34     ----------------------------<  192    4.5     |  21     |  2.17     Ca    8.6        05 May 2022 06:36    TPro  x      /  Alb  x      /  TBili  0.3    /  DBili  x      /  AST  x      /  ALT  x      /  AlkPhos  x      04 May 2022 06:25    ALBUTerol    90 MICROgram(s) HFA Inhaler 2 Puff(s) Inhalation every 6 hours PRN  albuterol/ipratropium for Nebulization 3 milliLiter(s) Nebulizer every 6 hours  albuterol/ipratropium for Nebulization. 3 milliLiter(s) Nebulizer once  apixaban 2.5 milliGRAM(s) Oral every 12 hours  aspirin enteric coated 81 milliGRAM(s) Oral daily  Biotene Dry Mouth Oral Rinse 5 milliLiter(s) Swish and Spit three times a day  calcitriol   Capsule 0.25 MICROGram(s) Oral daily  cyanocobalamin 1000 MICROGram(s) Oral daily  dextrose 5%. 1000 milliLiter(s) IV Continuous <Continuous>  dextrose 5%. 1000 milliLiter(s) IV Continuous <Continuous>  dextrose 50% Injectable 25 Gram(s) IV Push once  dextrose 50% Injectable 12.5 Gram(s) IV Push once  dextrose 50% Injectable 25 Gram(s) IV Push once  dextrose Oral Gel 15 Gram(s) Oral once PRN  diVALproex  milliGRAM(s) Oral two times a day  epoetin abby-epbx (RETACRIT) Injectable 34284 Unit(s) SubCutaneous every 7 days  escitalopram 5 milliGRAM(s) Oral daily  ferrous    sulfate 325 milliGRAM(s) Oral daily  finasteride 5 milliGRAM(s) Oral daily  fluticasone propionate 50 MICROgram(s)/spray Nasal Spray 2 Spray(s) Both Nostrils <User Schedule>  glucagon  Injectable 1 milliGRAM(s) IntraMuscular once  insulin lispro (ADMELOG) corrective regimen sliding scale   SubCutaneous three times a day before meals  levothyroxine 75 MICROGram(s) Oral daily  melatonin 3 milliGRAM(s) Oral at bedtime  midodrine. 10 milliGRAM(s) Oral three times a day  oseltamivir 30 milliGRAM(s) Oral daily  piperacillin/tazobactam IVPB.. 3.375 Gram(s) IV Intermittent every 8 hours  tamsulosin 0.8 milliGRAM(s) Oral at bedtime    A/P:    CM, EF 30-35%  Concern for CHF, asp PNA  Bacteremia, + FLuA  Abx, w/up per ID  Hemodynamic/hypotensive/septic JARRED/CKD 3 (Cr 2.1 - 4/27/22)  Cr has improved and is presently near baseline  No objection to diuretics as needed  Avoid nephrotoxins   F/u [facility]    [phone]
Resting    Vital Signs Last 24 Hrs  T(C): 36.4 (05-06-22 @ 11:57), Max: 36.8 (05-05-22 @ 21:23)  T(F): 97.6 (05-06-22 @ 11:57), Max: 98.2 (05-05-22 @ 21:23)  HR: 57 (05-06-22 @ 11:57) (57 - 76)  BP: 115/76 (05-06-22 @ 11:57) (115/76 - 168/87)  RR: 18 (05-06-22 @ 11:57) (18 - 18)  SpO2: 97% (05-06-22 @ 11:57) (94% - 97%)    s1s2  b/l air entry  soft, ND  sm edema                                 9.7    8.71  )-----------( 107      ( 06 May 2022 07:08 )             29.9     06 May 2022 07:05    136    |  97     |  38     ----------------------------<  206    4.3     |  23     |  1.93     Ca    8.7        06 May 2022 07:05    Culture - Blood (collected 03 May 2022 19:05)  Source: .Blood Blood  Preliminary Report (04 May 2022 23:01):    No growth to date.    ALBUTerol    90 MICROgram(s) HFA Inhaler 2 Puff(s) Inhalation every 6 hours PRN  albuterol/ipratropium for Nebulization 3 milliLiter(s) Nebulizer every 6 hours  albuterol/ipratropium for Nebulization. 3 milliLiter(s) Nebulizer once  apixaban 2.5 milliGRAM(s) Oral every 12 hours  aspirin enteric coated 81 milliGRAM(s) Oral daily  Biotene Dry Mouth Oral Rinse 5 milliLiter(s) Swish and Spit three times a day  calcitriol   Capsule 0.25 MICROGram(s) Oral daily  cyanocobalamin 1000 MICROGram(s) Oral daily  dextrose 5%. 1000 milliLiter(s) IV Continuous <Continuous>  dextrose 5%. 1000 milliLiter(s) IV Continuous <Continuous>  dextrose 50% Injectable 25 Gram(s) IV Push once  dextrose 50% Injectable 12.5 Gram(s) IV Push once  dextrose 50% Injectable 25 Gram(s) IV Push once  dextrose Oral Gel 15 Gram(s) Oral once PRN  diVALproex  milliGRAM(s) Oral two times a day  epoetin abby-epbx (RETACRIT) Injectable 08395 Unit(s) SubCutaneous every 7 days  escitalopram 5 milliGRAM(s) Oral daily  ferrous    sulfate 325 milliGRAM(s) Oral daily  finasteride 5 milliGRAM(s) Oral daily  fluticasone propionate 50 MICROgram(s)/spray Nasal Spray 2 Spray(s) Both Nostrils <User Schedule>  furosemide    Tablet 20 milliGRAM(s) Oral daily  glucagon  Injectable 1 milliGRAM(s) IntraMuscular once  insulin lispro (ADMELOG) corrective regimen sliding scale   SubCutaneous three times a day before meals  levothyroxine 75 MICROGram(s) Oral daily  melatonin 3 milliGRAM(s) Oral at bedtime  metoprolol succinate ER 12.5 milliGRAM(s) Oral daily  midodrine. 10 milliGRAM(s) Oral three times a day  oseltamivir 30 milliGRAM(s) Oral daily  piperacillin/tazobactam IVPB.. 3.375 Gram(s) IV Intermittent every 8 hours  tamsulosin 0.8 milliGRAM(s) Oral at bedtime    A/P:    CM, EF 30-35%  Adm w/+ FLuA, PNA  Abx per ID  Hemodynamic/hypotensive/septic JARRED/CKD 3 on adm (Cr 2.1 - 4/27/22)  Cr has improved and is presently near baseline  No objection to Lasix  Avoid nephrotoxins   F/u [facility]    [phone]
Resting, on NC O2    Vital Signs Last 24 Hrs  T(C): 37.3 (05-09-22 @ 11:01), Max: 37.3 (05-09-22 @ 11:01)  T(F): 99.2 (05-09-22 @ 11:01), Max: 99.2 (05-09-22 @ 11:01)  HR: 71 (05-09-22 @ 11:30) (67 - 80)  BP: 94/58 (05-09-22 @ 11:30) (89/54 - 168/81)  RR: 17 (05-09-22 @ 11:30) (17 - 18)  SpO2: 94% (05-09-22 @ 11:30) (90% - 97%)    s1s2  b/l air entry  soft, ND  sm edema                                         9.5    13.98 )-----------( 147      ( 09 May 2022 07:19 )             29.4     09 May 2022 07:12    137    |  98     |  42     ----------------------------<  220    4.0     |  24     |  2.00     Ca    9.0        09 May 2022 07:12  Mg     2.0       09 May 2022 06:08    ALBUTerol    90 MICROgram(s) HFA Inhaler 2 Puff(s) Inhalation every 6 hours PRN  albuterol/ipratropium for Nebulization 3 milliLiter(s) Nebulizer every 6 hours  apixaban 2.5 milliGRAM(s) Oral every 12 hours  aspirin enteric coated 81 milliGRAM(s) Oral daily  BACItracin   Ointment 1 Application(s) Topical two times a day  Biotene Dry Mouth Oral Rinse 5 milliLiter(s) Swish and Spit three times a day  calcitriol   Capsule 0.25 MICROGram(s) Oral daily  cyanocobalamin 1000 MICROGram(s) Oral daily  dextrose 5%. 1000 milliLiter(s) IV Continuous <Continuous>  dextrose 5%. 1000 milliLiter(s) IV Continuous <Continuous>  dextrose 50% Injectable 25 Gram(s) IV Push once  dextrose 50% Injectable 12.5 Gram(s) IV Push once  dextrose 50% Injectable 25 Gram(s) IV Push once  dextrose Oral Gel 15 Gram(s) Oral once PRN  diVALproex  milliGRAM(s) Oral two times a day  epoetin abby-epbx (RETACRIT) Injectable 29199 Unit(s) SubCutaneous every 7 days  escitalopram 5 milliGRAM(s) Oral daily  ferrous    sulfate 325 milliGRAM(s) Oral daily  finasteride 5 milliGRAM(s) Oral daily  fluticasone propionate 50 MICROgram(s)/spray Nasal Spray 2 Spray(s) Both Nostrils <User Schedule>  furosemide    Tablet 20 milliGRAM(s) Oral daily  glucagon  Injectable 1 milliGRAM(s) IntraMuscular once  insulin lispro (ADMELOG) corrective regimen sliding scale   SubCutaneous three times a day before meals  levothyroxine 75 MICROGram(s) Oral daily  melatonin 3 milliGRAM(s) Oral at bedtime  metoprolol succinate ER 12.5 milliGRAM(s) Oral daily  midodrine. 10 milliGRAM(s) Oral three times a day  sodium chloride 0.9% for Nebulization 3 milliLiter(s) Nebulizer four times a day  tamsulosin 0.8 milliGRAM(s) Oral at bedtime    A/P:    CM, EF 30-35%  Adm w/+ FLuA, PNA  Abx per ID  Hemodynamic/hypotensive/septic JARRED/CKD 3 on adm (Cr 2.1 - 4/27/22)  Cr has improved and is presently near baseline  Continue Lasix  Avoid hypotension  Avoid nephrotoxins   F/u [facility]    [phone]
CARDIOLOGY FOLLOW UP - Dr. Krueger  DATE OF SERVICE: 5/18/22      no acute events       REVIEW OF SYSTEMS:  CONSTITUTIONAL: No fever, weight loss, or fatigue  RESPIRATORY: No cough, wheezing, chills or hemoptysis; No Shortness of Breath  CARDIOVASCULAR: No chest pain, palpitations, passing out, dizziness, or leg swelling  GASTROINTESTINAL: No abdominal or epigastric pain. No nausea, vomiting, or hematemesis; No diarrhea or constipation. No melena or hematochezia.      PHYSICAL EXAM:  T(C): 36.8 (05-18-22 @ 04:37), Max: 37.1 (05-17-22 @ 20:30)  HR: 77 (05-18-22 @ 04:37) (75 - 80)  BP: 167/79 (05-18-22 @ 04:37) (138/71 - 167/82)  RR: 18 (05-18-22 @ 04:37) (18 - 18)  SpO2: 93% (05-18-22 @ 04:37) (93% - 96%)  Wt(kg): --  I&O's Summary    17 May 2022 07:01  -  18 May 2022 07:00  --------------------------------------------------------  IN: 840 mL / OUT: 600 mL / NET: 240 mL        Appearance: Normal	  Cardiovascular: Normal S1 S2,RRR, No JVD, No murmurs  Respiratory: Lungs clear to auscultation	  Gastrointestinal:  Soft, Non-tender, + BS	  Extremities: Normal range of motion, No clubbing, cyanosis or edema      Home Medications:  albuterol 90 mcg/inh inhalation aerosol: 2 puff(s) inhaled every 6 hours, As needed, Shortness of Breath and/or Wheezing (16 May 2022 12:11)  aspirin 81 mg oral delayed release tablet: 1 tab(s) orally once a day (16 May 2022 12:11)  atorvastatin 80 mg oral tablet: 1 tab(s) orally once a day (16 May 2022 12:11)  calcitriol 0.25 mcg oral capsule: 1 cap(s) orally once a day (16 May 2022 12:11)  Coreg 6.25 mg oral tablet: 1 tab(s) orally 2 times a day (16 May 2022 12:11)  cyanocobalamin 1000 mcg oral tablet: 1 tab(s) orally once a day (16 May 2022 12:11)  melatonin 3 mg oral tablet: 1 tab(s) orally once a day (at bedtime) (16 May 2022 12:11)  saliva substitutes oral solution: 1 spray(s) orally 3 times a day, As Needed (02 May 2022 14:18)      MEDICATIONS  (STANDING):  albuterol/ipratropium for Nebulization 3 milliLiter(s) Nebulizer every 6 hours  apixaban 2.5 milliGRAM(s) Oral every 12 hours  aspirin enteric coated 81 milliGRAM(s) Oral daily  Biotene Dry Mouth Oral Rinse 5 milliLiter(s) Swish and Spit three times a day  calcitriol   Capsule 0.25 MICROGram(s) Oral daily  cyanocobalamin 1000 MICROGram(s) Oral daily  dextrose 5%. 1000 milliLiter(s) (50 mL/Hr) IV Continuous <Continuous>  dextrose 5%. 1000 milliLiter(s) (100 mL/Hr) IV Continuous <Continuous>  dextrose 5%. 1000 milliLiter(s) (100 mL/Hr) IV Continuous <Continuous>  dextrose 50% Injectable 25 Gram(s) IV Push once  dextrose 50% Injectable 12.5 Gram(s) IV Push once  dextrose 50% Injectable 25 Gram(s) IV Push once  diVALproex  milliGRAM(s) Oral two times a day  droxidopa 200 milliGRAM(s) Oral three times a day  epoetin abby-epbx (RETACRIT) Injectable 30953 Unit(s) SubCutaneous every 7 days  escitalopram 5 milliGRAM(s) Oral daily  ferrous    sulfate 325 milliGRAM(s) Oral daily  finasteride 5 milliGRAM(s) Oral daily  fludroCORTISONE 0.2 milliGRAM(s) Oral <User Schedule>  fluticasone propionate 50 MICROgram(s)/spray Nasal Spray 2 Spray(s) Both Nostrils <User Schedule>  glucagon  Injectable 1 milliGRAM(s) IntraMuscular once  glucagon  Injectable 1 milliGRAM(s) IntraMuscular once  guaiFENesin ER 1200 milliGRAM(s) Oral every 12 hours  insulin glargine Injectable (LANTUS) 5 Unit(s) SubCutaneous at bedtime  insulin lispro (ADMELOG) corrective regimen sliding scale   SubCutaneous Before meals and at bedtime  levothyroxine 100 MICROGram(s) Oral daily  melatonin 3 milliGRAM(s) Oral at bedtime  midodrine. 30 milliGRAM(s) Oral three times a day  potassium chloride    Tablet ER 40 milliEquivalent(s) Oral once  sodium chloride 0.9% for Nebulization 3 milliLiter(s) Nebulizer four times a day      TELEMETRY: nsr 	    ECG:  	  RADIOLOGY:   DIAGNOSTIC TESTING:  [ ] Echocardiogram:  [ ]  Catheterization:  [ ] Stress Test:    OTHER: 	    LABS:	 	                            8.2    9.49  )-----------( 304      ( 17 May 2022 06:56 )             26.2     05-18    137  |  99  |  25<H>  ----------------------------<  96  3.5   |  27  |  1.56<H>    Ca    8.5      18 May 2022 06:31              
NEPHROLOGY PROGRESS NOTE    CHIEF COMPLAINT:  JARRED/CKD    HPI:  Renal function is stable.  Hasn't gotten out of bed.    EXAM:  T(F): 98.8 (05-13-22 @ 04:21)  HR: 81 (05-13-22 @ 05:41)  BP: 148/78 (05-13-22 @ 05:41)  RR: 18 (05-13-22 @ 04:21)  SpO2: 95% (05-13-22 @ 04:21)    Conversant, in no apparent distress  Normal respiratory effort, lungs clear bilaterally  Heart RRR with no murmur, no peripheral edema         LABS                             8.5    11.08 )-----------( 232      ( 13 May 2022 06:22 )             26.3          05-13    138  |  98  |  38<H>  ----------------------------<  242<H>  3.9   |  25  |  1.70<H>    Ca    9.0      13 May 2022 06:22             A/P  CM, EF 30-35%  Adm w/FLuA, PNA  S/p Hemodynamic/hypotensive/septic JARRED/CKD 3   Cr has improved and is presently at baseline  Orthostatic hypotension  Started on Florinef, Midodrine with good supine BP  Patient will try to mobilize with assist  Avoid nephrotoxins        
Neurology Progress Note    S: Patient seen and examined. No new events overnight. patient denied CP, SOB, HA or pain. doing okay. states he hasn't gotten up yet to to see if dizzy     Medication:  ALBUTerol    90 MICROgram(s) HFA Inhaler 2 Puff(s) Inhalation every 6 hours PRN  albuterol/ipratropium for Nebulization 3 milliLiter(s) Nebulizer every 6 hours  apixaban 2.5 milliGRAM(s) Oral every 12 hours  aspirin enteric coated 81 milliGRAM(s) Oral daily  Biotene Dry Mouth Oral Rinse 5 milliLiter(s) Swish and Spit three times a day  calcitriol   Capsule 0.25 MICROGram(s) Oral daily  cyanocobalamin 1000 MICROGram(s) Oral daily  dextrose 5%. 1000 milliLiter(s) IV Continuous <Continuous>  dextrose 5%. 1000 milliLiter(s) IV Continuous <Continuous>  dextrose 5%. 1000 milliLiter(s) IV Continuous <Continuous>  dextrose 50% Injectable 25 Gram(s) IV Push once  dextrose 50% Injectable 12.5 Gram(s) IV Push once  dextrose 50% Injectable 25 Gram(s) IV Push once  dextrose Oral Gel 15 Gram(s) Oral once PRN  diVALproex  milliGRAM(s) Oral two times a day  droxidopa 100 milliGRAM(s) Oral three times a day  epoetin abby-epbx (RETACRIT) Injectable 19042 Unit(s) SubCutaneous every 7 days  escitalopram 5 milliGRAM(s) Oral daily  ferrous    sulfate 325 milliGRAM(s) Oral daily  finasteride 5 milliGRAM(s) Oral daily  fludroCORTISONE 0.2 milliGRAM(s) Oral <User Schedule>  fluticasone propionate 50 MICROgram(s)/spray Nasal Spray 2 Spray(s) Both Nostrils <User Schedule>  glucagon  Injectable 1 milliGRAM(s) IntraMuscular once  glucagon  Injectable 1 milliGRAM(s) IntraMuscular once  guaiFENesin ER 1200 milliGRAM(s) Oral every 12 hours  insulin glargine Injectable (LANTUS) 5 Unit(s) SubCutaneous at bedtime  insulin lispro (ADMELOG) corrective regimen sliding scale   SubCutaneous Before meals and at bedtime  levothyroxine 75 MICROGram(s) Oral daily  melatonin 3 milliGRAM(s) Oral at bedtime  midodrine. 30 milliGRAM(s) Oral three times a day  sodium chloride 0.9% for Nebulization 3 milliLiter(s) Nebulizer four times a day      Vitals:  Vital Signs Last 24 Hrs  T(C): 37 (17 May 2022 10:33), Max: 37.6 (17 May 2022 04:35)  T(F): 98.6 (17 May 2022 10:33), Max: 99.6 (17 May 2022 04:35)  HR: 75 (17 May 2022 10:33) (66 - 79)  BP: 138/71 (17 May 2022 10:33) (138/71 - 160/73)  BP(mean): --  RR: 18 (17 May 2022 10:33) (18 - 18)  SpO2: 96% (17 May 2022 10:33) (93% - 96%)    Orthostatic VS    05-17-22 @ 10:33  Lying BP: 138/71 HR: 75   Sitting BP: 88/55 HR: 85  Standing BP: --/-- HR: --  Site: upper left arm   Mode: electronic    05-16-22 @ 10:00  Lying BP: 134/77 HR: 83   Sitting BP: 105/66 HR: 89  Standing BP: 48/-- HR: 92  Site: upper right arm   Mode: electronic    05-15-22 @ 11:09  Lying BP: 95/62 HR: 77   Sitting BP: 72/35 HR: 81  Standing BP: --/-- HR: --  Site: upper left arm   Mode: electronic      Orthostatic VS    05-15-22 @ 11:09  Lying BP: 95/62 HR: 77   Sitting BP: 72/35 HR: 81  Standing BP: --/-- HR: --  Site: upper left arm   Mode: electronic      General Exam:   General Appearance: Appropriately dressed and in no acute distress       Head: Normocephalic, atraumatic and no dysmorphic features  Ear, Nose, and Throat: Moist mucous membranes  CVS: S1S2+  Resp: No SOB, no wheeze or rhonchi  GI: soft NT/ND  Extremities: No edema or cyanosis  Skin: No bruises or rashes     Neurological Exam:  Mental Status: Awake, alert and oriented x 2.  Able to follow simple and complex verbal commands. Able to name and repeat. fluent speech. No obvious aphasia +dysarthria noted.   Cranial Nerves: PERRL, EOMI, VFFC, sensation V1-V3 intact,  no obvious facial asymmetry, equal elevation of palate, scm/trap 5/5, tongue is midline on protrusion. no obvious papilledema on fundoscopic exam. hearing is grossly intact.   Motor: Normal bulk, tone and strength throughout. Fine finger movements were intact and symmetric. no tremors or drift noted.    Sensation: Intact to light touch and pinprick throughout. no right/left confusion. no extinction to tactile on DSS.    Reflexes: 1+ throughout at biceps, brachioradialis, triceps, patellars and ankles bilaterally and equal. No clonus. R toe and L toe were both downgoing.  Coordination: No dysmetria on FNF    Gait:  deferred         I personally reviewed the below data/images/labs:      CBC Full  -  ( 17 May 2022 06:56 )  WBC Count : 9.49 K/uL  RBC Count : 3.37 M/uL  Hemoglobin : 8.2 g/dL  Hematocrit : 26.2 %  Platelet Count - Automated : 304 K/uL  Mean Cell Volume : 77.7 fl  Mean Cell Hemoglobin : 24.3 pg  Mean Cell Hemoglobin Concentration : 31.3 gm/dL  Auto Neutrophil # : x  Auto Lymphocyte # : x  Auto Monocyte # : x  Auto Eosinophil # : x  Auto Basophil # : x  Auto Neutrophil % : x  Auto Lymphocyte % : x  Auto Monocyte % : x  Auto Eosinophil % : x  Auto Basophil % : x    05-17    139  |  98  |  29<H>  ----------------------------<  92  3.4<L>   |  28  |  1.61<H>    Ca    8.7      17 May 2022 06:55            
Resting    Vital Signs Last 24 Hrs  T(C): 37.6 (22 @ 14:00), Max: 37.9 (22 @ 11:06)  T(F): 99.6 (22 @ 14:00), Max: 100.2 (22 @ 11:06)  HR: 82 (22 @ 14:00) (71 - 82)  BP: 140/71 (22 @ 14:00) (104/53 - 140/71)  RR: 16 (22 @ 14:00) (16 - 18)  SpO2: 98% (22 @ 14:00) (91% - 98%)    s1s2  b/l air entry  soft, ND  no edema                        8.2    6.33  )-----------( 96       ( 04 May 2022 06:26 )             25.7     04 May 2022 06:25    136    |  100    |  34     ----------------------------<  103    4.2     |  21     |  2.35     Ca    7.7        04 May 2022 06:25    TPro  x      /  Alb  x      /  TBili  0.3    /  DBili  x      /  AST  x      /  ALT  x      /  AlkPhos  x      04 May 2022 06:25    Urinalysis Basic - ( 03 May 2022 14:56 )    Color: Yellow / Appearance: Slightly Turbid / S.024 / pH: x  Gluc: x / Ketone: Negative  / Bili: Negative / Urobili: Negative   Blood: x / Protein: 100 / Nitrite: Positive   Leuk Esterase: Large / RBC: 2 /hpf /  /HPF   Sq Epi: x / Non Sq Epi: 2 /hpf / Bacteria: Many    GI PCR Panel, Stool (collected 03 May 2022 14:45)  Source: .Stool Feces  Final Report (04 May 2022 07:14):    Plesiomonas shigelloides    DETECTED by PCR    *******Please Note:*******    GI panel PCR evaluates for:    Campylobacter, Plesiomonas shigelloides, Salmonella,    Vibrio, Yersinia enterocolitica, Enteroaggregative    Escherichia coli (EAEC), Enteropathogenic E.coli (EPEC),    Enterotoxigenic E. coli (ETEC) lt/st, Shiga-like    toxin-producing E. coli (STEC) stx1/stx2,    Shigella/ Enteroinvasive E. coli (EIEC), Cryptosporidium,    Cyclospora cayetanensis, Entamoeba histolytica,    Giardia lamblia, Adenovirus F 40/41, Astrovirus,    Norovirus GI/GII, Rotavirus A, Sapovirus    Culture - Blood (collected 02 May 2022 17:23)  Source: .Blood Blood-Peripheral  Gram Stain (03 May 2022 16:57):    Growth in aerobic bottle: Gram Positive Cocci in Clusters  Final Report (04 May 2022 18:33):    Growth in aerobic bottle: Staphylococcus epidermidis    Coag Negative Staphylococcus    Single set isolate, possible contaminant. Contact    Microbiology if susceptibility testing clinically    indicated.    ***Blood Panel PCR results on this specimen are available    approximately 3 hours after the Gram stain result.***    Gram stain, PCR, and/or culture results may not always    correspond due to difference in methodologies.    ************************************************************    This PCR assay was performed by multiplex PCR. This    Assay tests for 66 bacterial and resistance gene targets.    Please refer to the [facility] test directory    at [email or web address] for details.  Organism: Blood Culture PCR (04 May 2022 18:33)  Organism: Blood Culture PCR (04 May 2022 18:33)    Culture - Blood (collected 02 May 2022 16:23)  Source: .Blood Blood-Peripheral  Preliminary Report (03 May 2022 17:01):    No growth to date.    acetaminophen     Tablet .. 650 milliGRAM(s) Oral once PRN  ALBUTerol    90 MICROgram(s) HFA Inhaler 2 Puff(s) Inhalation every 6 hours PRN  albuterol/ipratropium for Nebulization 3 milliLiter(s) Nebulizer every 6 hours  albuterol/ipratropium for Nebulization. 3 milliLiter(s) Nebulizer once  apixaban 2.5 milliGRAM(s) Oral every 12 hours  aspirin enteric coated 81 milliGRAM(s) Oral daily  Biotene Dry Mouth Oral Rinse 5 milliLiter(s) Swish and Spit three times a day  calcitriol   Capsule 0.25 MICROGram(s) Oral daily  cyanocobalamin 1000 MICROGram(s) Oral daily  dextrose 5%. 1000 milliLiter(s) IV Continuous <Continuous>  dextrose 5%. 1000 milliLiter(s) IV Continuous <Continuous>  dextrose 50% Injectable 25 Gram(s) IV Push once  dextrose 50% Injectable 12.5 Gram(s) IV Push once  dextrose 50% Injectable 25 Gram(s) IV Push once  dextrose Oral Gel 15 Gram(s) Oral once PRN  diVALproex  milliGRAM(s) Oral two times a day  escitalopram 5 milliGRAM(s) Oral daily  ferrous    sulfate 325 milliGRAM(s) Oral daily  finasteride 5 milliGRAM(s) Oral daily  fluticasone propionate 50 MICROgram(s)/spray Nasal Spray 2 Spray(s) Both Nostrils <User Schedule>  glucagon  Injectable 1 milliGRAM(s) IntraMuscular once  insulin lispro (ADMELOG) corrective regimen sliding scale   SubCutaneous three times a day before meals  levothyroxine 75 MICROGram(s) Oral daily  melatonin 3 milliGRAM(s) Oral at bedtime  midodrine. 10 milliGRAM(s) Oral three times a day  oseltamivir 30 milliGRAM(s) Oral daily  piperacillin/tazobactam IVPB.. 3.375 Gram(s) IV Intermittent every 8 hours  tamsulosin 0.8 milliGRAM(s) Oral at bedtime    A/P:    CM, EF 30-35%  Concern for CHF, asp PNA  Bacteremia, + FLuA  Abx, w/up per ID  Hemodynamic/hypotensive/septic JARRED/CKD 3 (Cr 2.1 - 22)  Unable to give IVF given concern for CHF  Cr is improving slowly   Avoid nephrotoxins   F/u CBC, BMP, UO    [phone]
Resting, awake, alert, orthostatic hypotension, symptomatic    Vital Signs Last 24 Hrs  T(C): 37.3 (05-18-22 @ 13:59), Max: 37.3 (05-18-22 @ 13:59)  T(F): 99.1 (05-18-22 @ 13:59), Max: 99.1 (05-18-22 @ 13:59)  HR: 82 (05-18-22 @ 13:59) (77 - 82)  BP: 146/79 (05-18-22 @ 13:59) (146/79 - 167/82)  RR: 18 (05-18-22 @ 13:59) (18 - 18)  SpO2: 95% (05-18-22 @ 13:59) (93% - 96%)    s1s2  b/l air entry  soft, ND  tr edema                                                                                 8.2    9.49  )-----------( 304      ( 17 May 2022 06:56 )             26.2     18 May 2022 06:31    137    |  99     |  25     ----------------------------<  96     3.5     |  27     |  1.56     Ca    8.5        18 May 2022 06:31    ALBUTerol    90 MICROgram(s) HFA Inhaler 2 Puff(s) Inhalation every 6 hours PRN  albuterol/ipratropium for Nebulization 3 milliLiter(s) Nebulizer every 6 hours  apixaban 2.5 milliGRAM(s) Oral every 12 hours  aspirin enteric coated 81 milliGRAM(s) Oral daily  Biotene Dry Mouth Oral Rinse 5 milliLiter(s) Swish and Spit three times a day  calcitriol   Capsule 0.25 MICROGram(s) Oral daily  cyanocobalamin 1000 MICROGram(s) Oral daily  dextrose 5%. 1000 milliLiter(s) IV Continuous <Continuous>  dextrose 5%. 1000 milliLiter(s) IV Continuous <Continuous>  dextrose 5%. 1000 milliLiter(s) IV Continuous <Continuous>  dextrose 50% Injectable 25 Gram(s) IV Push once  dextrose 50% Injectable 12.5 Gram(s) IV Push once  dextrose 50% Injectable 25 Gram(s) IV Push once  dextrose Oral Gel 15 Gram(s) Oral once PRN  diVALproex  milliGRAM(s) Oral two times a day  droxidopa 200 milliGRAM(s) Oral three times a day  epoetin abby-epbx (RETACRIT) Injectable 64512 Unit(s) SubCutaneous every 7 days  escitalopram 5 milliGRAM(s) Oral daily  ferrous    sulfate 325 milliGRAM(s) Oral daily  finasteride 5 milliGRAM(s) Oral daily  fludroCORTISONE 0.2 milliGRAM(s) Oral <User Schedule>  fluticasone propionate 50 MICROgram(s)/spray Nasal Spray 2 Spray(s) Both Nostrils <User Schedule>  glucagon  Injectable 1 milliGRAM(s) IntraMuscular once  glucagon  Injectable 1 milliGRAM(s) IntraMuscular once  guaiFENesin ER 1200 milliGRAM(s) Oral every 12 hours  insulin glargine Injectable (LANTUS) 5 Unit(s) SubCutaneous at bedtime  insulin lispro (ADMELOG) corrective regimen sliding scale   SubCutaneous Before meals and at bedtime  levothyroxine 100 MICROGram(s) Oral daily  melatonin 3 milliGRAM(s) Oral at bedtime  midodrine. 30 milliGRAM(s) Oral three times a day  sodium chloride 0.9% for Nebulization 3 milliLiter(s) Nebulizer four times a day    A/P:    CM, EF 30-35%  Adm w/FLuA, PNA  S/p Hemodynamic/hypotensive/septic JARRED/CKD 3   Resolved  Orthostatic hypotension  On Florinef, Midodrine  Avoid nephrotoxins   F/u BMP, BP  D/w family at bedside    [phone]
Resting, on NC O2    Vital Signs Last 24 Hrs  T(C): 37.6 (05-10-22 @ 10:55), Max: 37.6 (05-10-22 @ 10:55)  T(F): 99.7 (05-10-22 @ 10:55), Max: 99.7 (05-10-22 @ 10:55)  HR: 81 (05-10-22 @ 16:49) (70 - 87)  BP: 119/70 (05-10-22 @ 16:49) (90/50 - 125/68)  RR: 17 (05-10-22 @ 10:55) (17 - 18)  SpO2: 92% (05-10-22 @ 16:39) (88% - 95%)    s1s2  b/l air entry  soft, ND  sm edema                                                  9.0    11.35 )-----------( 125      ( 10 May 2022 06:44 )             27.9     10 May 2022 06:44    138    |  100    |  45     ----------------------------<  129    4.0     |  26     |  2.06     Ca    9.0        10 May 2022 06:44  Mg     2.0       09 May 2022 06:08    TPro  5.5    /  Alb  2.5    /  TBili  0.5    /  DBili  x      /  AST  13     /  ALT  9      /  AlkPhos  56     10 May 2022 06:44    LIVER FUNCTIONS - ( 10 May 2022 06:44 )  Alb: 2.5 g/dL / Pro: 5.5 g/dL / ALK PHOS: 56 U/L / ALT: 9 U/L / AST: 13 U/L / GGT: x           ALBUTerol    90 MICROgram(s) HFA Inhaler 2 Puff(s) Inhalation every 6 hours PRN  albuterol/ipratropium for Nebulization 3 milliLiter(s) Nebulizer every 6 hours  apixaban 2.5 milliGRAM(s) Oral every 12 hours  aspirin enteric coated 81 milliGRAM(s) Oral daily  BACItracin   Ointment 1 Application(s) Topical two times a day  Biotene Dry Mouth Oral Rinse 5 milliLiter(s) Swish and Spit three times a day  calcitriol   Capsule 0.25 MICROGram(s) Oral daily  cyanocobalamin 1000 MICROGram(s) Oral daily  dextrose 5%. 1000 milliLiter(s) IV Continuous <Continuous>  dextrose 5%. 1000 milliLiter(s) IV Continuous <Continuous>  dextrose 50% Injectable 25 Gram(s) IV Push once  dextrose 50% Injectable 12.5 Gram(s) IV Push once  dextrose 50% Injectable 25 Gram(s) IV Push once  dextrose Oral Gel 15 Gram(s) Oral once PRN  diVALproex  milliGRAM(s) Oral two times a day  epoetin abby-epbx (RETACRIT) Injectable 52654 Unit(s) SubCutaneous every 7 days  escitalopram 5 milliGRAM(s) Oral daily  ferrous    sulfate 325 milliGRAM(s) Oral daily  finasteride 5 milliGRAM(s) Oral daily  fluticasone propionate 50 MICROgram(s)/spray Nasal Spray 2 Spray(s) Both Nostrils <User Schedule>  furosemide    Tablet 20 milliGRAM(s) Oral daily  glucagon  Injectable 1 milliGRAM(s) IntraMuscular once  insulin lispro (ADMELOG) corrective regimen sliding scale   SubCutaneous three times a day before meals  levothyroxine 75 MICROGram(s) Oral daily  melatonin 3 milliGRAM(s) Oral at bedtime  metoprolol succinate ER 12.5 milliGRAM(s) Oral at bedtime  midodrine. 10 milliGRAM(s) Oral three times a day  oxymetazoline 0.05% Nasal Spray 1 Spray(s) Both Nostrils every 12 hours  predniSONE   Tablet 20 milliGRAM(s) Oral every 12 hours  sodium chloride 0.9% for Nebulization 3 milliLiter(s) Nebulizer four times a day  tamsulosin 0.8 milliGRAM(s) Oral at bedtime    A/P:    CM, EF 30-35%  Adm w/FLuA, PNA  S/p Hemodynamic/hypotensive/septic JARRED/CKD 3 (Cr 2.1 - 4/27/22)  Cr has improved and is presently near baseline  Continue Lasix  Avoid hypotension  Avoid nephrotoxins   F/u [facility]    [phone]
Awake, alert, only complaint is loose BMs    Vital Signs Last 24 Hrs  T(C): 37.3 (05-03-22 @ 11:45), Max: 37.3 (05-03-22 @ 11:45)  T(F): 99.1 (05-03-22 @ 11:45), Max: 99.1 (05-03-22 @ 11:45)  HR: 61 (05-03-22 @ 11:45) (58 - 67)  BP: 110/66 (05-03-22 @ 11:45) (81/47 - 110/66)  BP(mean): 73 (05-02-22 @ 20:53) (73 - 73)  RR: 18 (05-03-22 @ 11:45) (18 - 22)  SpO2: 96% (05-03-22 @ 11:45) (89% - 100%)    s1s2  b/l air entry  soft, ND  no edema                        8.2    8.52  )-----------( 87       ( 03 May 2022 09:08 )             25.9     03 May 2022 06:58    136    |  103    |  39     ----------------------------<  87     4.1     |  22     |  2.38     Ca    8.0        03 May 2022 06:58    TPro  6.4    /  Alb  3.1    /  TBili  0.4    /  DBili  x      /  AST  32     /  ALT  16     /  AlkPhos  59     02 May 2022 12:06    LIVER FUNCTIONS - ( 02 May 2022 12:06 )  Alb: 3.1 g/dL / Pro: 6.4 g/dL / ALK PHOS: 59 U/L / ALT: 16 U/L / AST: 32 U/L / GGT: x           PT/INR - ( 02 May 2022 12:06 )   PT: 19.4 sec;   INR: 1.68 ratio      ALBUTerol    90 MICROgram(s) HFA Inhaler 2 Puff(s) Inhalation every 6 hours PRN  albuterol/ipratropium for Nebulization 3 milliLiter(s) Nebulizer every 6 hours  apixaban 2.5 milliGRAM(s) Oral every 12 hours  aspirin enteric coated 81 milliGRAM(s) Oral daily  Biotene Dry Mouth Oral Rinse 5 milliLiter(s) Swish and Spit three times a day  calcitriol   Capsule 0.25 MICROGram(s) Oral daily  cyanocobalamin 1000 MICROGram(s) Oral daily  dextrose 5%. 1000 milliLiter(s) IV Continuous <Continuous>  dextrose 5%. 1000 milliLiter(s) IV Continuous <Continuous>  dextrose 50% Injectable 25 Gram(s) IV Push once  dextrose 50% Injectable 12.5 Gram(s) IV Push once  dextrose 50% Injectable 25 Gram(s) IV Push once  dextrose Oral Gel 15 Gram(s) Oral once PRN  diVALproex  milliGRAM(s) Oral two times a day  escitalopram 5 milliGRAM(s) Oral daily  ferrous    sulfate 325 milliGRAM(s) Oral daily  finasteride 5 milliGRAM(s) Oral daily  glucagon  Injectable 1 milliGRAM(s) IntraMuscular once  insulin lispro (ADMELOG) corrective regimen sliding scale   SubCutaneous three times a day before meals  levothyroxine 75 MICROGram(s) Oral daily  melatonin 3 milliGRAM(s) Oral at bedtime  midodrine. 10 milliGRAM(s) Oral three times a day  piperacillin/tazobactam IVPB.. 3.375 Gram(s) IV Intermittent every 8 hours  tamsulosin 0.8 milliGRAM(s) Oral at bedtime    Home Medications:  albuterol 90 mcg/inh inhalation aerosol: 2 puff(s) inhaled every 6 hours, As needed, Shortness of Breath and/or Wheezing (02 May 2022 14:19)  aspirin 81 mg oral delayed release tablet: 1 tab(s) orally once a day (02 May 2022 14:19)  atorvastatin 80 mg oral tablet: 1 tab(s) orally once a day (02 May 2022 14:19)  calcitriol 0.25 mcg oral capsule: 1 cap(s) orally once a day (02 May 2022 14:19)  Coreg 6.25 mg oral tablet: 1 tab(s) orally 2 times a day (02 May 2022 14:19)  cyanocobalamin 1000 mcg oral tablet: 1 tab(s) orally once a day (02 May 2022 14:19)  melatonin 3 mg oral tablet: 1 tab(s) orally once a day (at bedtime) (02 May 2022 14:19)  saliva substitutes oral solution: 1 spray(s) orally 3 times a day, As Needed (02 May 2022 14:18)    A/P:    CM, EF 30-35%  Concern for CHF, asp PNA  On Abx per ID  Borderline BP, on midodrine  Hemodynamic/hypotensive JARRED/CKD 3 (Cr 2.1 - 4/27/22)  Unable to give IVF given concern for CHF  Cr is better today  Avoid BP lowering meds  F/u BMP, UO, cultures   D/w wife at bedside    [phone]
CARDIOLOGY FOLLOW UP - Dr. Krueger  DATE OF SERVICE: 5/10/22    CC no cp or sob       REVIEW OF SYSTEMS:  CONSTITUTIONAL: No fever, weight loss, or fatigue  RESPIRATORY: No cough, wheezing, chills or hemoptysis; No Shortness of Breath  CARDIOVASCULAR: No chest pain, palpitations, passing out, dizziness, or leg swelling  GASTROINTESTINAL: No abdominal or epigastric pain. No nausea, vomiting, or hematemesis; No diarrhea or constipation. No melena or hematochezia.  VASCULAR: No edema     PHYSICAL EXAM:  T(C): 37 (05-10-22 @ 04:56), Max: 37.3 (05-09-22 @ 11:01)  HR: 80 (05-10-22 @ 04:56) (70 - 87)  BP: 125/68 (05-10-22 @ 04:56) (89/54 - 125/68)  RR: 18 (05-10-22 @ 04:56) (17 - 18)  SpO2: 92% (05-10-22 @ 08:15) (88% - 95%)  Wt(kg): --  I&O's Summary    09 May 2022 07:01  -  10 May 2022 07:00  --------------------------------------------------------  IN: 720 mL / OUT: 700 mL / NET: 20 mL        Appearance: Normal	  Cardiovascular: Normal S1 S2,RR  Respiratory:  rhonchi   Gastrointestinal:  Soft, Non-tender, + BS	  Extremities: Normal range of motion, No clubbing, cyanosis or edema      Home Medications:  albuterol 90 mcg/inh inhalation aerosol: 2 puff(s) inhaled every 6 hours, As needed, Shortness of Breath and/or Wheezing (02 May 2022 14:19)  aspirin 81 mg oral delayed release tablet: 1 tab(s) orally once a day (02 May 2022 14:19)  atorvastatin 80 mg oral tablet: 1 tab(s) orally once a day (02 May 2022 14:19)  calcitriol 0.25 mcg oral capsule: 1 cap(s) orally once a day (02 May 2022 14:19)  Coreg 6.25 mg oral tablet: 1 tab(s) orally 2 times a day (02 May 2022 14:19)  cyanocobalamin 1000 mcg oral tablet: 1 tab(s) orally once a day (02 May 2022 14:19)  melatonin 3 mg oral tablet: 1 tab(s) orally once a day (at bedtime) (02 May 2022 14:19)  saliva substitutes oral solution: 1 spray(s) orally 3 times a day, As Needed (02 May 2022 14:18)      MEDICATIONS  (STANDING):  albuterol/ipratropium for Nebulization 3 milliLiter(s) Nebulizer every 6 hours  apixaban 2.5 milliGRAM(s) Oral every 12 hours  aspirin enteric coated 81 milliGRAM(s) Oral daily  BACItracin   Ointment 1 Application(s) Topical two times a day  Biotene Dry Mouth Oral Rinse 5 milliLiter(s) Swish and Spit three times a day  calcitriol   Capsule 0.25 MICROGram(s) Oral daily  cyanocobalamin 1000 MICROGram(s) Oral daily  dextrose 5%. 1000 milliLiter(s) (50 mL/Hr) IV Continuous <Continuous>  dextrose 5%. 1000 milliLiter(s) (100 mL/Hr) IV Continuous <Continuous>  dextrose 50% Injectable 25 Gram(s) IV Push once  dextrose 50% Injectable 12.5 Gram(s) IV Push once  dextrose 50% Injectable 25 Gram(s) IV Push once  diVALproex  milliGRAM(s) Oral two times a day  epoetin abby-epbx (RETACRIT) Injectable 21122 Unit(s) SubCutaneous every 7 days  escitalopram 5 milliGRAM(s) Oral daily  ferrous    sulfate 325 milliGRAM(s) Oral daily  finasteride 5 milliGRAM(s) Oral daily  fluticasone propionate 50 MICROgram(s)/spray Nasal Spray 2 Spray(s) Both Nostrils <User Schedule>  furosemide    Tablet 20 milliGRAM(s) Oral daily  glucagon  Injectable 1 milliGRAM(s) IntraMuscular once  insulin lispro (ADMELOG) corrective regimen sliding scale   SubCutaneous three times a day before meals  levothyroxine 75 MICROGram(s) Oral daily  melatonin 3 milliGRAM(s) Oral at bedtime  metoprolol succinate ER 12.5 milliGRAM(s) Oral at bedtime  midodrine. 10 milliGRAM(s) Oral three times a day  sodium chloride 0.9% for Nebulization 3 milliLiter(s) Nebulizer four times a day  tamsulosin 0.8 milliGRAM(s) Oral at bedtime      TELEMETRY: nsr	    ECG:  	  RADIOLOGY:   DIAGNOSTIC TESTING:  [ ] Echocardiogram:  [ ]  Catheterization:  [ ] Stress Test:    OTHER: 	    LABS:	 	                            9.0    11.35 )-----------( 125      ( 10 May 2022 06:44 )             27.9     05-10    138  |  100  |  45<H>  ----------------------------<  129<H>  4.0   |  26  |  2.06<H>    Ca    9.0      10 May 2022 06:44  Mg     2.0     05-09    TPro  5.5<L>  /  Alb  2.5<L>  /  TBili  0.5  /  DBili  x   /  AST  13  /  ALT  9<L>  /  AlkPhos  56  05-10            
CARDIOLOGY FOLLOW UP - Dr. Krueger  DATE OF SERVICE: 5/11/22     CC no cp or sob       REVIEW OF SYSTEMS:  CONSTITUTIONAL: No fever, weight loss, or fatigue  RESPIRATORY: No cough, wheezing, chills or hemoptysis; No Shortness of Breath  CARDIOVASCULAR: No chest pain, palpitations, passing out, dizziness, or leg swelling  GASTROINTESTINAL: No abdominal or epigastric pain. No nausea, vomiting, or hematemesis; No diarrhea or constipation. No melena or hematochezia.  =    PHYSICAL EXAM:  T(C): 36.2 (05-11-22 @ 04:07), Max: 37.6 (05-10-22 @ 10:55)  HR: 88 (05-10-22 @ 20:28) (81 - 88)  BP: 128/74 (05-11-22 @ 04:07) (96/59 - 128/74)  RR: 18 (05-11-22 @ 04:07) (17 - 18)  SpO2: 98% (05-11-22 @ 04:07) (92% - 98%)  Wt(kg): --  I&O's Summary    10 May 2022 07:01  -  11 May 2022 07:00  --------------------------------------------------------  IN: 780 mL / OUT: 1350 mL / NET: -570 mL        Appearance: Normal	  Cardiovascular: Normal S1 S2,RR  Respiratory:  rhonchi   Gastrointestinal:  Soft, Non-tender, + BS	  Extremities: Normal range of motion, No clubbing, cyanosis or edema      Home Medications:  albuterol 90 mcg/inh inhalation aerosol: 2 puff(s) inhaled every 6 hours, As needed, Shortness of Breath and/or Wheezing (02 May 2022 14:19)  aspirin 81 mg oral delayed release tablet: 1 tab(s) orally once a day (02 May 2022 14:19)  atorvastatin 80 mg oral tablet: 1 tab(s) orally once a day (02 May 2022 14:19)  calcitriol 0.25 mcg oral capsule: 1 cap(s) orally once a day (02 May 2022 14:19)  Coreg 6.25 mg oral tablet: 1 tab(s) orally 2 times a day (02 May 2022 14:19)  cyanocobalamin 1000 mcg oral tablet: 1 tab(s) orally once a day (02 May 2022 14:19)  melatonin 3 mg oral tablet: 1 tab(s) orally once a day (at bedtime) (02 May 2022 14:19)  saliva substitutes oral solution: 1 spray(s) orally 3 times a day, As Needed (02 May 2022 14:18)      MEDICATIONS  (STANDING):  albuterol/ipratropium for Nebulization 3 milliLiter(s) Nebulizer every 6 hours  apixaban 2.5 milliGRAM(s) Oral every 12 hours  aspirin enteric coated 81 milliGRAM(s) Oral daily  BACItracin   Ointment 1 Application(s) Topical two times a day  Biotene Dry Mouth Oral Rinse 5 milliLiter(s) Swish and Spit three times a day  calcitriol   Capsule 0.25 MICROGram(s) Oral daily  cyanocobalamin 1000 MICROGram(s) Oral daily  dextrose 5%. 1000 milliLiter(s) (50 mL/Hr) IV Continuous <Continuous>  dextrose 5%. 1000 milliLiter(s) (100 mL/Hr) IV Continuous <Continuous>  dextrose 50% Injectable 25 Gram(s) IV Push once  dextrose 50% Injectable 12.5 Gram(s) IV Push once  dextrose 50% Injectable 25 Gram(s) IV Push once  diVALproex  milliGRAM(s) Oral two times a day  epoetin abby-epbx (RETACRIT) Injectable 37125 Unit(s) SubCutaneous every 7 days  escitalopram 5 milliGRAM(s) Oral daily  ferrous    sulfate 325 milliGRAM(s) Oral daily  finasteride 5 milliGRAM(s) Oral daily  fluticasone propionate 50 MICROgram(s)/spray Nasal Spray 2 Spray(s) Both Nostrils <User Schedule>  furosemide    Tablet 20 milliGRAM(s) Oral daily  glucagon  Injectable 1 milliGRAM(s) IntraMuscular once  insulin lispro (ADMELOG) corrective regimen sliding scale   SubCutaneous three times a day before meals  levothyroxine 75 MICROGram(s) Oral daily  melatonin 3 milliGRAM(s) Oral at bedtime  metoprolol succinate ER 12.5 milliGRAM(s) Oral at bedtime  midodrine. 10 milliGRAM(s) Oral three times a day  oxymetazoline 0.05% Nasal Spray 1 Spray(s) Both Nostrils every 12 hours  predniSONE   Tablet 20 milliGRAM(s) Oral every 12 hours  sodium chloride 0.9% for Nebulization 3 milliLiter(s) Nebulizer four times a day  tamsulosin 0.8 milliGRAM(s) Oral at bedtime      TELEMETRY: nsr	    ECG:  	  RADIOLOGY:  < from: Xray Chest 1 View- PORTABLE-Routine (Xray Chest 1 View- PORTABLE-Routine .) (05.10.22 @ 08:57) >    FINDINGS:  Left chest wall AICD.  The heart is normal in size.  Patchy bibasilar opacities.  Pleural plaques again identified.  Trace left pleural effusion. No pneumothorax.    IMPRESSION:  No significant interval change.      < end of copied text >    DIAGNOSTIC TESTING:  [ ] Echocardiogram:  [ ]  Catheterization:  [ ] Stress Test:    OTHER: 	    LABS:	 	                            9.0    11.35 )-----------( 125      ( 10 May 2022 06:44 )             27.9     05-11    135  |  98  |  37<H>  ----------------------------<  236<H>  4.5   |  25  |  1.78<H>    Ca    9.0      11 May 2022 06:57    TPro  5.5<L>  /  Alb  2.5<L>  /  TBili  0.5  /  DBili  x   /  AST  13  /  ALT  9<L>  /  AlkPhos  56  05-10            
CARDIOLOGY FOLLOW UP - Dr. Krueger  DATE OF SERVICE: 5/13/22     CC no cp or sob      REVIEW OF SYSTEMS:  CONSTITUTIONAL: No fever, weight loss, or fatigue  RESPIRATORY: No cough, wheezing, chills or hemoptysis; No Shortness of Breath  CARDIOVASCULAR: No chest pain, palpitations, passing out, dizziness, or leg swelling  GASTROINTESTINAL: No abdominal or epigastric pain. No nausea, vomiting, or hematemesis; No diarrhea or constipation. No melena or hematochezia.      PHYSICAL EXAM:  T(C): 37.1 (05-13-22 @ 04:21), Max: 37.1 (05-13-22 @ 04:21)  HR: 81 (05-13-22 @ 05:41) (81 - 94)  BP: 148/78 (05-13-22 @ 05:41) (111/69 - 161/75)  RR: 18 (05-13-22 @ 04:21) (18 - 18)  SpO2: 95% (05-13-22 @ 04:21) (92% - 95%)  Wt(kg): --  I&O's Summary    12 May 2022 07:01  -  13 May 2022 07:00  --------------------------------------------------------  IN: 625 mL / OUT: 1700 mL / NET: -1075 mL        Appearance: Normal	  Cardiovascular: Normal S1 S2,RRR, No JVD, No murmurs  Respiratory: Lungs clear to auscultation	  Gastrointestinal:  Soft, Non-tender, + BS	  Extremities: Normal range of motion, No clubbing, cyanosis or edema      Home Medications:  albuterol 90 mcg/inh inhalation aerosol: 2 puff(s) inhaled every 6 hours, As needed, Shortness of Breath and/or Wheezing (02 May 2022 14:19)  aspirin 81 mg oral delayed release tablet: 1 tab(s) orally once a day (02 May 2022 14:19)  atorvastatin 80 mg oral tablet: 1 tab(s) orally once a day (02 May 2022 14:19)  calcitriol 0.25 mcg oral capsule: 1 cap(s) orally once a day (02 May 2022 14:19)  Coreg 6.25 mg oral tablet: 1 tab(s) orally 2 times a day (02 May 2022 14:19)  cyanocobalamin 1000 mcg oral tablet: 1 tab(s) orally once a day (02 May 2022 14:19)  melatonin 3 mg oral tablet: 1 tab(s) orally once a day (at bedtime) (02 May 2022 14:19)  saliva substitutes oral solution: 1 spray(s) orally 3 times a day, As Needed (02 May 2022 14:18)      MEDICATIONS  (STANDING):  albuterol/ipratropium for Nebulization 3 milliLiter(s) Nebulizer every 6 hours  apixaban 2.5 milliGRAM(s) Oral every 12 hours  aspirin enteric coated 81 milliGRAM(s) Oral daily  Biotene Dry Mouth Oral Rinse 5 milliLiter(s) Swish and Spit three times a day  calcitriol   Capsule 0.25 MICROGram(s) Oral daily  cyanocobalamin 1000 MICROGram(s) Oral daily  dextrose 5%. 1000 milliLiter(s) (50 mL/Hr) IV Continuous <Continuous>  dextrose 5%. 1000 milliLiter(s) (100 mL/Hr) IV Continuous <Continuous>  dextrose 50% Injectable 25 Gram(s) IV Push once  dextrose 50% Injectable 12.5 Gram(s) IV Push once  dextrose 50% Injectable 25 Gram(s) IV Push once  diVALproex  milliGRAM(s) Oral two times a day  epoetin abby-epbx (RETACRIT) Injectable 27329 Unit(s) SubCutaneous every 7 days  escitalopram 5 milliGRAM(s) Oral daily  ferrous    sulfate 325 milliGRAM(s) Oral daily  finasteride 5 milliGRAM(s) Oral daily  fludroCORTISONE 0.1 milliGRAM(s) Oral <User Schedule>  fluticasone propionate 50 MICROgram(s)/spray Nasal Spray 2 Spray(s) Both Nostrils <User Schedule>  glucagon  Injectable 1 milliGRAM(s) IntraMuscular once  insulin lispro (ADMELOG) corrective regimen sliding scale   SubCutaneous Before meals and at bedtime  levothyroxine 75 MICROGram(s) Oral daily  melatonin 3 milliGRAM(s) Oral at bedtime  midodrine. 20 milliGRAM(s) Oral three times a day  oxymetazoline 0.05% Nasal Spray 1 Spray(s) Both Nostrils every 12 hours  sodium chloride 0.9% for Nebulization 3 milliLiter(s) Nebulizer four times a day  sodium chloride 0.9%. 1000 milliLiter(s) (50 mL/Hr) IV Continuous <Continuous>      TELEMETRY:afib hr 80-90	    ECG:  	  RADIOLOGY:   DIAGNOSTIC TESTING:  [ ] Echocardiogram:  [ ]  Catheterization:  [ ] Stress Test:    OTHER: 	    LABS:	 	                            8.5    11.08 )-----------( 232      ( 13 May 2022 06:22 )             26.3     05-13    138  |  98  |  38<H>  ----------------------------<  242<H>  3.9   |  25  |  1.70<H>    Ca    9.0      13 May 2022 06:22              
CARDIOLOGY FOLLOW UP - Dr. Krueger  Date of Service: 5/8/22  CC: no events    Review of Systems:  Constitutional: No fever, weight loss, or fatigue  Respiratory: No cough, wheezing, or hemoptysis, no shortness of breath  Cardiovascular: No chest pain, palpitations, passing out, dizziness, or leg swelling  Gastrointestinal: No abd or epigastric pain. No nausea, vomiting, or hematemesis; no diarrhea or consiptaiton, no melena or hematochezia  Vascular: No edema     TELEMETRY:    PHYSICAL EXAM:  T(C): 36.4 (05-08-22 @ 05:09), Max: 36.4 (05-07-22 @ 11:21)  HR: 60 (05-08-22 @ 05:09) (59 - 67)  BP: 156/77 (05-08-22 @ 05:09) (109/64 - 156/77)  RR: 18 (05-08-22 @ 05:09) (18 - 18)  SpO2: 95% (05-08-22 @ 05:09) (94% - 95%)  Wt(kg): --  I&O's Summary    07 May 2022 07:01  -  08 May 2022 07:00  --------------------------------------------------------  IN: 600 mL / OUT: 1050 mL / NET: -450 mL        Appearance: Normal	  Cardiovascular: Normal S1 S2,RRR, No JVD, No murmurs  Respiratory: Lungs clear to auscultation	  Gastrointestinal:  Soft, Non-tender, + BS	  Extremities: Normal range of motion, No clubbing, cyanosis or edema  Vascular: Peripheral pulses palpable 2+ bilaterally       Home Medications:  albuterol 90 mcg/inh inhalation aerosol: 2 puff(s) inhaled every 6 hours, As needed, Shortness of Breath and/or Wheezing (02 May 2022 14:19)  aspirin 81 mg oral delayed release tablet: 1 tab(s) orally once a day (02 May 2022 14:19)  atorvastatin 80 mg oral tablet: 1 tab(s) orally once a day (02 May 2022 14:19)  calcitriol 0.25 mcg oral capsule: 1 cap(s) orally once a day (02 May 2022 14:19)  Coreg 6.25 mg oral tablet: 1 tab(s) orally 2 times a day (02 May 2022 14:19)  cyanocobalamin 1000 mcg oral tablet: 1 tab(s) orally once a day (02 May 2022 14:19)  melatonin 3 mg oral tablet: 1 tab(s) orally once a day (at bedtime) (02 May 2022 14:19)  saliva substitutes oral solution: 1 spray(s) orally 3 times a day, As Needed (02 May 2022 14:18)        MEDICATIONS  (STANDING):  albuterol/ipratropium for Nebulization 3 milliLiter(s) Nebulizer every 6 hours  apixaban 2.5 milliGRAM(s) Oral every 12 hours  aspirin enteric coated 81 milliGRAM(s) Oral daily  BACItracin   Ointment 1 Application(s) Topical two times a day  Biotene Dry Mouth Oral Rinse 5 milliLiter(s) Swish and Spit three times a day  calcitriol   Capsule 0.25 MICROGram(s) Oral daily  cyanocobalamin 1000 MICROGram(s) Oral daily  dextrose 5%. 1000 milliLiter(s) (100 mL/Hr) IV Continuous <Continuous>  dextrose 5%. 1000 milliLiter(s) (50 mL/Hr) IV Continuous <Continuous>  dextrose 50% Injectable 25 Gram(s) IV Push once  dextrose 50% Injectable 12.5 Gram(s) IV Push once  dextrose 50% Injectable 25 Gram(s) IV Push once  diVALproex  milliGRAM(s) Oral two times a day  epoetin abby-epbx (RETACRIT) Injectable 34121 Unit(s) SubCutaneous every 7 days  escitalopram 5 milliGRAM(s) Oral daily  ferrous    sulfate 325 milliGRAM(s) Oral daily  finasteride 5 milliGRAM(s) Oral daily  fluticasone propionate 50 MICROgram(s)/spray Nasal Spray 2 Spray(s) Both Nostrils <User Schedule>  furosemide    Tablet 20 milliGRAM(s) Oral daily  glucagon  Injectable 1 milliGRAM(s) IntraMuscular once  insulin lispro (ADMELOG) corrective regimen sliding scale   SubCutaneous three times a day before meals  levothyroxine 75 MICROGram(s) Oral daily  melatonin 3 milliGRAM(s) Oral at bedtime  metoprolol succinate ER 12.5 milliGRAM(s) Oral daily  midodrine. 10 milliGRAM(s) Oral three times a day  oseltamivir 30 milliGRAM(s) Oral daily  tamsulosin 0.8 milliGRAM(s) Oral at bedtime        EKG:  RADIOLOGY:  DIAGNOSTIC TESTING:  [ ] Echocardiogram:  [ ] Catherterization:  [ ] Stress Test:  OTHER:     LABS:	 	                          9.3    9.63  )-----------( 131      ( 07 May 2022 07:27 )             28.6     05-07    135  |  96  |  37<H>  ----------------------------<  237<H>  4.1   |  24  |  1.92<H>    Ca    8.7      07 May 2022 07:25            CARDIAC MARKERS:        
CARDIOLOGY FOLLOW UP NOTE - DR. MAZA    Patient Name: ANTONIO PONCE  Date of Service: 05-15-22 @ 11:38    no new complaints    Subjective:    cv: denies chest pain, dyspnea, palpitations, dizziness  pulmonary: denies cough  GI: denies abdominal pain, nausea, vomiting  vascular/legs: no edema   skin: no rash  ROS: otherwise negative   overnight events:      PHYSICAL EXAM:  T(C): 36.7 (05-15-22 @ 11:09), Max: 36.8 (22 @ 20:36)  HR: 77 (05-15-22 @ 11:09) (74 - 80)  BP: 95/62 (05-15-22 @ 11:09) (95/62 - 150/75)  RR: 18 (05-15-22 @ 11:09) (18 - 18)  SpO2: 93% (05-15-22 @ 11:09) (93% - 95%)  Wt(kg): --  I&O's Summary    14 May 2022 07:01  -  15 May 2022 07:00  --------------------------------------------------------  IN: 1700 mL / OUT: 1450 mL / NET: 250 mL      Daily     Daily Weight in k.8 (15 May 2022 07:56)    Appearance: Normal	  Cardiovascular: Normal S1 S2,RRR, No JVD, No murmurs  Respiratory: Lungs clear to auscultation	  Gastrointestinal:  Soft, Non-tender, + BS	  Extremities: Normal range of motion, No clubbing, cyanosis or edema      Home Medications:  albuterol 90 mcg/inh inhalation aerosol: 2 puff(s) inhaled every 6 hours, As needed, Shortness of Breath and/or Wheezing (02 May 2022 14:19)  aspirin 81 mg oral delayed release tablet: 1 tab(s) orally once a day (02 May 2022 14:19)  atorvastatin 80 mg oral tablet: 1 tab(s) orally once a day (02 May 2022 14:19)  calcitriol 0.25 mcg oral capsule: 1 cap(s) orally once a day (02 May 2022 14:19)  Coreg 6.25 mg oral tablet: 1 tab(s) orally 2 times a day (02 May 2022 14:19)  cyanocobalamin 1000 mcg oral tablet: 1 tab(s) orally once a day (02 May 2022 14:19)  melatonin 3 mg oral tablet: 1 tab(s) orally once a day (at bedtime) (02 May 2022 14:19)  saliva substitutes oral solution: 1 spray(s) orally 3 times a day, As Needed (02 May 2022 14:18)      MEDICATIONS  (STANDING):  albuterol/ipratropium for Nebulization 3 milliLiter(s) Nebulizer every 6 hours  apixaban 2.5 milliGRAM(s) Oral every 12 hours  aspirin enteric coated 81 milliGRAM(s) Oral daily  Biotene Dry Mouth Oral Rinse 5 milliLiter(s) Swish and Spit three times a day  calcitriol   Capsule 0.25 MICROGram(s) Oral daily  cyanocobalamin 1000 MICROGram(s) Oral daily  dextrose 5%. 1000 milliLiter(s) (100 mL/Hr) IV Continuous <Continuous>  dextrose 5%. 1000 milliLiter(s) (50 mL/Hr) IV Continuous <Continuous>  dextrose 5%. 1000 milliLiter(s) (100 mL/Hr) IV Continuous <Continuous>  dextrose 50% Injectable 25 Gram(s) IV Push once  dextrose 50% Injectable 12.5 Gram(s) IV Push once  dextrose 50% Injectable 25 Gram(s) IV Push once  diVALproex  milliGRAM(s) Oral two times a day  epoetin abby-epbx (RETACRIT) Injectable [id number] Unit(s) SubCutaneous every 7 days  escitalopram 5 milliGRAM(s) Oral daily  ferrous    sulfate 325 milliGRAM(s) Oral daily  finasteride 5 milliGRAM(s) Oral daily  fludroCORTISONE 0.1 milliGRAM(s) Oral <User Schedule>  fluticasone propionate 50 MICROgram(s)/spray Nasal Spray 2 Spray(s) Both Nostrils <User Schedule>  glucagon  Injectable 1 milliGRAM(s) IntraMuscular once  glucagon  Injectable 1 milliGRAM(s) IntraMuscular once  insulin glargine Injectable (LANTUS) 5 Unit(s) SubCutaneous at bedtime  insulin lispro (ADMELOG) corrective regimen sliding scale   SubCutaneous Before meals and at bedtime  levothyroxine 75 MICROGram(s) Oral daily  melatonin 3 milliGRAM(s) Oral at bedtime  midodrine. 20 milliGRAM(s) Oral three times a day  potassium chloride    Tablet ER 20 milliEquivalent(s) Oral once  sodium chloride 0.9% for Nebulization 3 milliLiter(s) Nebulizer four times a day      TELEMETRY: 	    ECG:  	  RADIOLOGY:   DIAGNOSTIC TESTING:  [ ] Echocardiogram:  [ ] Catheterization:  [ ] Stress Test:    OTHER: 	    LABS:	 	    CARDIAC MARKERS:                                      8.5    11.01 )-----------( 262      ( 14 May 2022 07:32 )             27.2     05-15    135  |  99  |  40<H>  ----------------------------<  141<H>  3.4<L>   |  25  |  1.58<H>    Ca    8.9      15 May 2022 07:12      proBNP:     Lipid Profile:   HgA1c:     Creatinine, Serum: 1.58 mg/dL (05-15-22 @ 07:12)  Creatinine, Serum: 1.63 mg/dL (22 @ 07:26)  Creatinine, Serum: 1.70 mg/dL (22 @ 06:22)  Creatinine, Serum: 1.66 mg/dL (22 @ 13:20)            
CARDIOLOGY FOLLOW UP NOTE - DR. MAZA    Patient Name: ANTONIO PONCE  Date of Service: 22    Patient seen and examined  no new complaints      Subjective:    cv: denies chest pain, dyspnea, palpitations, dizziness  pulmonary: denies cough  GI: denies abdominal pain, nausea, vomiting  vascular/legs: no edema   skin: no rash  ROS: otherwise negative   overnight events:      PHYSICAL EXAM:  T(C): 36.6 (22 @ 11:00), Max: 36.7 (22 @ 20:38)  HR: 79 (22 @ 11:00) (77 - 86)  BP: 118/72 (22 @ 11:00) (118/72 - 151/77)  RR: 17 (22 @ 11:00) (17 - 18)  SpO2: 93% (22 @ 11:00) (91% - 93%)  Wt(kg): --  I&O's Summary    13 May 2022 07:  -  14 May 2022 07:00  --------------------------------------------------------  IN: 1530 mL / OUT: 1500 mL / NET: 30 mL    14 May 2022 07:01  -  14 May 2022 12:03  --------------------------------------------------------  IN: 480 mL / OUT: 400 mL / NET: 80 mL      Daily     Daily Weight in k.6 (14 May 2022 07:53)    Appearance: Normal	  Cardiovascular: Normal S1 S2,RRR, No JVD, No murmurs  Respiratory: Lungs clear to auscultation	  Gastrointestinal:  Soft, Non-tender, + BS	  Extremities: Normal range of motion, No clubbing, cyanosis or edema      Home Medications:  albuterol 90 mcg/inh inhalation aerosol: 2 puff(s) inhaled every 6 hours, As needed, Shortness of Breath and/or Wheezing (02 May 2022 14:19)  aspirin 81 mg oral delayed release tablet: 1 tab(s) orally once a day (02 May 2022 14:19)  atorvastatin 80 mg oral tablet: 1 tab(s) orally once a day (02 May 2022 14:19)  calcitriol 0.25 mcg oral capsule: 1 cap(s) orally once a day (02 May 2022 14:19)  Coreg 6.25 mg oral tablet: 1 tab(s) orally 2 times a day (02 May 2022 14:19)  cyanocobalamin 1000 mcg oral tablet: 1 tab(s) orally once a day (02 May 2022 14:19)  melatonin 3 mg oral tablet: 1 tab(s) orally once a day (at bedtime) (02 May 2022 14:19)  saliva substitutes oral solution: 1 spray(s) orally 3 times a day, As Needed (02 May 2022 14:18)      MEDICATIONS  (STANDING):  albuterol/ipratropium for Nebulization 3 milliLiter(s) Nebulizer every 6 hours  apixaban 2.5 milliGRAM(s) Oral every 12 hours  aspirin enteric coated 81 milliGRAM(s) Oral daily  Biotene Dry Mouth Oral Rinse 5 milliLiter(s) Swish and Spit three times a day  calcitriol   Capsule 0.25 MICROGram(s) Oral daily  cyanocobalamin 1000 MICROGram(s) Oral daily  dextrose 5%. 1000 milliLiter(s) (50 mL/Hr) IV Continuous <Continuous>  dextrose 5%. 1000 milliLiter(s) (100 mL/Hr) IV Continuous <Continuous>  dextrose 5%. 1000 milliLiter(s) (100 mL/Hr) IV Continuous <Continuous>  dextrose 50% Injectable 25 Gram(s) IV Push once  dextrose 50% Injectable 12.5 Gram(s) IV Push once  dextrose 50% Injectable 25 Gram(s) IV Push once  diVALproex  milliGRAM(s) Oral two times a day  epoetin abby-epbx (RETACRIT) Injectable 18292 Unit(s) SubCutaneous every 7 days  escitalopram 5 milliGRAM(s) Oral daily  ferrous    sulfate 325 milliGRAM(s) Oral daily  finasteride 5 milliGRAM(s) Oral daily  fludroCORTISONE 0.1 milliGRAM(s) Oral <User Schedule>  fluticasone propionate 50 MICROgram(s)/spray Nasal Spray 2 Spray(s) Both Nostrils <User Schedule>  glucagon  Injectable 1 milliGRAM(s) IntraMuscular once  glucagon  Injectable 1 milliGRAM(s) IntraMuscular once  insulin glargine Injectable (LANTUS) 5 Unit(s) SubCutaneous at bedtime  insulin lispro (ADMELOG) corrective regimen sliding scale   SubCutaneous Before meals and at bedtime  levothyroxine 75 MICROGram(s) Oral daily  melatonin 3 milliGRAM(s) Oral at bedtime  midodrine. 20 milliGRAM(s) Oral three times a day  sodium chloride 0.9% for Nebulization 3 milliLiter(s) Nebulizer four times a day      TELEMETRY: 	    ECG:  	  RADIOLOGY:   DIAGNOSTIC TESTING:  [ ] Echocardiogram:  [ ] Catheterization:  [ ] Stress Test:    OTHER: 	    LABS:	 	    CARDIAC MARKERS:                                      8.5    11.01 )-----------( 262      ( 14 May 2022 07:32 )             27.2         136  |  99  |  38<H>  ----------------------------<  169<H>  3.9   |  26  |  1.63<H>    Ca    8.7      14 May 2022 07:26      proBNP:     Lipid Profile:   HgA1c:     Creatinine, Serum: 1.63 mg/dL (22 @ 07:26)  Creatinine, Serum: 1.70 mg/dL (22 @ 06:22)  Creatinine, Serum: 1.66 mg/dL (22 @ 13:20)            
NEPHROLOGY PROGRESS NOTE    CHIEF COMPLAINT:  JARRED/CKD    HPI:  Renal function stable.  Felt dizzy standing up yesterday.    ROS:  has a wet cough    EXAM:  T(F): 98.6 (05-17-22 @ 10:33)  HR: 75 (05-17-22 @ 10:33)  BP: 138/71 (05-17-22 @ 10:33)  RR: 18 (05-17-22 @ 10:33)  SpO2: 96% (05-17-22 @ 10:33)    Conversant, in no apparent distress  Normal respiratory effort, lungs clear bilaterally  Heart RRR with no murmur, no peripheral edema         LABS                             8.2    9.49  )-----------( 304      ( 17 May 2022 06:56 )             26.2          05-17    139  |  98  |  29<H>  ----------------------------<  92  3.4<L>   |  28  |  1.61<H>    Ca    8.7      17 May 2022 06:55           ASSESSMENT/PLAN  CM, EF 30-35%  Adm w/FLuA, PNA  S/p Hemodynamic/hypotensive/septic JARRED/CKD 3   Cr has improved and is presently near baseline  Orthostatic hypotension  On Florinef, Midodrine and titrating Northera  Would like to avoid IVF given CM  Avoid nephrotoxins   F/u BMP, BP    
Resting, on [place] O2    Vital Signs Last 24 Hrs  T(C): 36.2 (05-11-22 @ 04:07), Max: 37.1 (05-10-22 @ 20:28)  T(F): 97.2 (05-11-22 @ 04:07), Max: 98.8 (05-10-22 @ 20:28)  HR: 83 (05-11-22 @ 16:11) (74 - 88)  BP: 81/47 (05-11-22 @ 16:11) (81/47 - 137/75)  RR: 18 (05-11-22 @ 16:11) (18 - 18)  SpO2: 91% (05-11-22 @ 16:11) (91% - 98%)    s1s2  b/l air entry  soft, ND  tr edema                                                        9.0    11.35 )-----------( 125      ( 10 May 2022 06:44 )             27.9     11 May 2022 06:57    135    |  98     |  37     ----------------------------<  236    4.5     |  25     |  1.78     Ca    9.0        11 May 2022 06:57    TPro  5.5    /  Alb  2.5    /  TBili  0.5    /  DBili  x      /  AST  13     /  ALT  9      /  AlkPhos  56     10 May 2022 06:44    LIVER FUNCTIONS - ( 10 May 2022 06:44 )  Alb: 2.5 g/dL / Pro: 5.5 g/dL / ALK PHOS: 56 U/L / ALT: 9 U/L / AST: 13 U/L / GGT: x           ALBUTerol    90 MICROgram(s) HFA Inhaler 2 Puff(s) Inhalation every 6 hours PRN  albuterol/ipratropium for Nebulization 3 milliLiter(s) Nebulizer every 6 hours  apixaban 2.5 milliGRAM(s) Oral every 12 hours  aspirin enteric coated 81 milliGRAM(s) Oral daily  BACItracin   Ointment 1 Application(s) Topical two times a day  Biotene Dry Mouth Oral Rinse 5 milliLiter(s) Swish and Spit three times a day  calcitriol   Capsule 0.25 MICROGram(s) Oral daily  cyanocobalamin 1000 MICROGram(s) Oral daily  dextrose 5%. 1000 milliLiter(s) IV Continuous <Continuous>  dextrose 5%. 1000 milliLiter(s) IV Continuous <Continuous>  dextrose 50% Injectable 25 Gram(s) IV Push once  dextrose 50% Injectable 12.5 Gram(s) IV Push once  dextrose 50% Injectable 25 Gram(s) IV Push once  dextrose Oral Gel 15 Gram(s) Oral once PRN  diVALproex  milliGRAM(s) Oral two times a day  epoetin abby-epbx (RETACRIT) Injectable 41105 Unit(s) SubCutaneous every 7 days  escitalopram 5 milliGRAM(s) Oral daily  ferrous    sulfate 325 milliGRAM(s) Oral daily  finasteride 5 milliGRAM(s) Oral daily  fluticasone propionate 50 MICROgram(s)/spray Nasal Spray 2 Spray(s) Both Nostrils <User Schedule>  furosemide    Tablet 20 milliGRAM(s) Oral daily  glucagon  Injectable 1 milliGRAM(s) IntraMuscular once  insulin lispro (ADMELOG) corrective regimen sliding scale   SubCutaneous three times a day before meals  levothyroxine 75 MICROGram(s) Oral daily  melatonin 3 milliGRAM(s) Oral at bedtime  metoprolol succinate ER 12.5 milliGRAM(s) Oral at bedtime  midodrine. 20 milliGRAM(s) Oral three times a day  oxymetazoline 0.05% Nasal Spray 1 Spray(s) Both Nostrils every 12 hours  predniSONE   Tablet 20 milliGRAM(s) Oral every 12 hours  sodium chloride 0.9% for Nebulization 3 milliLiter(s) Nebulizer four times a day  sodium chloride 0.9%. 500 milliLiter(s) IV Continuous <Continuous>  tamsulosin 0.8 milliGRAM(s) Oral at bedtime    A/P:    CM, EF 30-35%  Adm w/FLuA, PNA  S/p Hemodynamic/hypotensive/septic JARRED/CKD 3   Cr has improved and is presently at baseline  Continue Lasix as tolerates  Avoid hypotension  Avoid nephrotoxins   F/u [facility]    [phone]
Resting, on [place] O2, events noted, orthostatic    Vital Signs Last 24 Hrs  T(C): 36.4 (05-12-22 @ 04:15), Max: 36.7 (05-11-22 @ 21:02)  T(F): 97.5 (05-12-22 @ 04:15), Max: 98 (05-11-22 @ 21:02)  HR: 94 (05-12-22 @ 17:00) (84 - 94)  BP: 111/69 (05-12-22 @ 17:00) (80/40 - 154/76)  RR: 18 (05-12-22 @ 04:15) (18 - 18)  SpO2: 94% (05-12-22 @ 04:15) (94% - 94%)    s1s2  b/l air entry  soft, ND  tr edema                                             12 May 2022 13:20    134    |  97     |  43     ----------------------------<  306    4.0     |  24     |  1.66     Ca    9.0        12 May 2022 13:20    ALBUTerol    90 MICROgram(s) HFA Inhaler 2 Puff(s) Inhalation every 6 hours PRN  albuterol/ipratropium for Nebulization 3 milliLiter(s) Nebulizer every 6 hours  apixaban 2.5 milliGRAM(s) Oral every 12 hours  aspirin enteric coated 81 milliGRAM(s) Oral daily  Biotene Dry Mouth Oral Rinse 5 milliLiter(s) Swish and Spit three times a day  calcitriol   Capsule 0.25 MICROGram(s) Oral daily  cyanocobalamin 1000 MICROGram(s) Oral daily  dextrose 5%. 1000 milliLiter(s) IV Continuous <Continuous>  dextrose 5%. 1000 milliLiter(s) IV Continuous <Continuous>  dextrose 50% Injectable 25 Gram(s) IV Push once  dextrose 50% Injectable 12.5 Gram(s) IV Push once  dextrose 50% Injectable 25 Gram(s) IV Push once  dextrose Oral Gel 15 Gram(s) Oral once PRN  diVALproex  milliGRAM(s) Oral two times a day  epoetin abby-epbx (RETACRIT) Injectable 86578 Unit(s) SubCutaneous every 7 days  escitalopram 5 milliGRAM(s) Oral daily  ferrous    sulfate 325 milliGRAM(s) Oral daily  finasteride 5 milliGRAM(s) Oral daily  fludroCORTISONE 0.1 milliGRAM(s) Oral <User Schedule>  fluticasone propionate 50 MICROgram(s)/spray Nasal Spray 2 Spray(s) Both Nostrils <User Schedule>  glucagon  Injectable 1 milliGRAM(s) IntraMuscular once  insulin lispro (ADMELOG) corrective regimen sliding scale   SubCutaneous three times a day before meals  levothyroxine 75 MICROGram(s) Oral daily  melatonin 3 milliGRAM(s) Oral at bedtime  midodrine. 20 milliGRAM(s) Oral three times a day  oxymetazoline 0.05% Nasal Spray 1 Spray(s) Both Nostrils every 12 hours  predniSONE   Tablet 20 milliGRAM(s) Oral every 12 hours  sodium chloride 0.9% for Nebulization 3 milliLiter(s) Nebulizer four times a day  sodium chloride 0.9%. 1000 milliLiter(s) IV Continuous <Continuous>    A/P:    CM, EF 30-35%  Adm w/FLuA, PNA  S/p Hemodynamic/hypotensive/septic JARRED/CKD 3   Cr has improved and is presently at baseline  Orthostatic hypotension  Started on Florinef, Midodrine  Would like to avoid IVF given CM  Avoid nephrotoxins   F/u BMP, BP    [phone]
Resting, on [place] O2, events noted, orthostatic    Vital Signs Last 24 Hrs  T(C): 36.6 (05-14-22 @ 11:00), Max: 36.7 (05-13-22 @ 20:38)  T(F): 97.9 (05-14-22 @ 11:00), Max: 98.1 (05-13-22 @ 20:38)  HR: 79 (05-14-22 @ 11:00) (77 - 86)  BP: 118/72 (05-14-22 @ 11:00) (118/72 - 151/77)  RR: 17 (05-14-22 @ 11:00) (17 - 18)  SpO2: 93% (05-14-22 @ 11:00) (91% - 93%)    s1s2  b/l air entry  soft, ND  tr edema                                                                 8.5    11.01 )-----------( 262      ( 14 May 2022 07:32 )             27.2     14 May 2022 07:26    136    |  99     |  38     ----------------------------<  169    3.9     |  26     |  1.63     Ca    8.7        14 May 2022 07:26    ALBUTerol    90 MICROgram(s) HFA Inhaler 2 Puff(s) Inhalation every 6 hours PRN  albuterol/ipratropium for Nebulization 3 milliLiter(s) Nebulizer every 6 hours  apixaban 2.5 milliGRAM(s) Oral every 12 hours  aspirin enteric coated 81 milliGRAM(s) Oral daily  Biotene Dry Mouth Oral Rinse 5 milliLiter(s) Swish and Spit three times a day  calcitriol   Capsule 0.25 MICROGram(s) Oral daily  cyanocobalamin 1000 MICROGram(s) Oral daily  dextrose 5%. 1000 milliLiter(s) IV Continuous <Continuous>  dextrose 5%. 1000 milliLiter(s) IV Continuous <Continuous>  dextrose 5%. 1000 milliLiter(s) IV Continuous <Continuous>  dextrose 50% Injectable 25 Gram(s) IV Push once  dextrose 50% Injectable 12.5 Gram(s) IV Push once  dextrose 50% Injectable 25 Gram(s) IV Push once  dextrose Oral Gel 15 Gram(s) Oral once PRN  diVALproex  milliGRAM(s) Oral two times a day  epoetin abby-epbx (RETACRIT) Injectable 20704 Unit(s) SubCutaneous every 7 days  escitalopram 5 milliGRAM(s) Oral daily  ferrous    sulfate 325 milliGRAM(s) Oral daily  finasteride 5 milliGRAM(s) Oral daily  fludroCORTISONE 0.1 milliGRAM(s) Oral <User Schedule>  fluticasone propionate 50 MICROgram(s)/spray Nasal Spray 2 Spray(s) Both Nostrils <User Schedule>  glucagon  Injectable 1 milliGRAM(s) IntraMuscular once  glucagon  Injectable 1 milliGRAM(s) IntraMuscular once  insulin glargine Injectable (LANTUS) 5 Unit(s) SubCutaneous at bedtime  insulin lispro (ADMELOG) corrective regimen sliding scale   SubCutaneous Before meals and at bedtime  levothyroxine 75 MICROGram(s) Oral daily  melatonin 3 milliGRAM(s) Oral at bedtime  midodrine. 20 milliGRAM(s) Oral three times a day  sodium chloride 0.9% for Nebulization 3 milliLiter(s) Nebulizer four times a day    A/P:    CM, EF 30-35%  Adm w/FLuA, PNA  S/p Hemodynamic/hypotensive/septic JARRED/CKD 3   Cr has improved and is presently at baseline  S/p Orthostatic hypotension  Started on Florinef, Midodrine  Would like to avoid IVF given CM  Avoid nephrotoxins   F/u BMP, BP  If BP is stable, consider d/c Florinef    [phone]
Resting, orthostatic    Vital Signs Last 24 Hrs  T(C): 36.5 (05-16-22 @ 10:54), Max: 36.5 (05-16-22 @ 10:54)  T(F): 97.7 (05-16-22 @ 10:54), Max: 97.7 (05-16-22 @ 10:54)  HR: 71 (05-16-22 @ 10:54) (68 - 80)  BP: 85/53 (05-16-22 @ 10:54) (85/53 - 153/80)  RR: 18 (05-16-22 @ 10:54) (18 - 18)  SpO2: 95% (05-16-22 @ 10:54) (93% - 95%)    s1s2  b/l air entry  soft, ND  tr edema                                                                         8.8    9.82  )-----------( 263      ( 16 May 2022 05:45 )             26.9     16 May 2022 05:45    137    |  99     |  38     ----------------------------<  102    3.5     |  27     |  1.61     Ca    8.8        16 May 2022 05:45    ALBUTerol    90 MICROgram(s) HFA Inhaler 2 Puff(s) Inhalation every 6 hours PRN  albuterol/ipratropium for Nebulization 3 milliLiter(s) Nebulizer every 6 hours  apixaban 2.5 milliGRAM(s) Oral every 12 hours  aspirin enteric coated 81 milliGRAM(s) Oral daily  Biotene Dry Mouth Oral Rinse 5 milliLiter(s) Swish and Spit three times a day  calcitriol   Capsule 0.25 MICROGram(s) Oral daily  cyanocobalamin 1000 MICROGram(s) Oral daily  dextrose 5%. 1000 milliLiter(s) IV Continuous <Continuous>  dextrose 5%. 1000 milliLiter(s) IV Continuous <Continuous>  dextrose 5%. 1000 milliLiter(s) IV Continuous <Continuous>  dextrose 50% Injectable 25 Gram(s) IV Push once  dextrose 50% Injectable 12.5 Gram(s) IV Push once  dextrose 50% Injectable 25 Gram(s) IV Push once  dextrose Oral Gel 15 Gram(s) Oral once PRN  diVALproex  milliGRAM(s) Oral two times a day  droxidopa 100 milliGRAM(s) Oral three times a day  epoetin abby-epbx (RETACRIT) Injectable 74993 Unit(s) SubCutaneous every 7 days  escitalopram 5 milliGRAM(s) Oral daily  ferrous    sulfate 325 milliGRAM(s) Oral daily  finasteride 5 milliGRAM(s) Oral daily  fludroCORTISONE 0.2 milliGRAM(s) Oral <User Schedule>  fluticasone propionate 50 MICROgram(s)/spray Nasal Spray 2 Spray(s) Both Nostrils <User Schedule>  glucagon  Injectable 1 milliGRAM(s) IntraMuscular once  glucagon  Injectable 1 milliGRAM(s) IntraMuscular once  guaiFENesin ER 1200 milliGRAM(s) Oral every 12 hours  insulin glargine Injectable (LANTUS) 5 Unit(s) SubCutaneous at bedtime  insulin lispro (ADMELOG) corrective regimen sliding scale   SubCutaneous Before meals and at bedtime  levothyroxine 75 MICROGram(s) Oral daily  melatonin 3 milliGRAM(s) Oral at bedtime  midodrine. 30 milliGRAM(s) Oral three times a day  sodium chloride 0.9% for Nebulization 3 milliLiter(s) Nebulizer four times a day    A/P:    CM, EF 30-35%  Adm w/FLuA, PNA  S/p Hemodynamic/hypotensive/septic JARRED/CKD 3   Cr has improved and is presently near baseline  Orthostatic hypotension  On Florinef, Midodrine  Would like to avoid IVF given CM  Avoid nephrotoxins   F/u BMP, BP    [phone]
CARDIOLOGY FOLLOW UP - Dr. Krueger  DATE OF SERVICE: 5/12/22     CC no cp or sob      REVIEW OF SYSTEMS:  CONSTITUTIONAL: No fever, weight loss, or fatigue  RESPIRATORY: No cough, wheezing, chills or hemoptysis; No Shortness of Breath  CARDIOVASCULAR: No chest pain, palpitations, passing out, dizziness, or leg swelling  GASTROINTESTINAL: No abdominal or epigastric pain. No nausea, vomiting, or hematemesis; No diarrhea or constipation. No melena or hematochezia.  VASCULAR: No edema     PHYSICAL EXAM:  T(C): 36.4 (05-12-22 @ 04:15), Max: 36.7 (05-11-22 @ 21:02)  HR: 84 (05-12-22 @ 04:15) (74 - 88)  BP: 154/76 (05-12-22 @ 04:15) (80/40 - 154/76)  RR: 18 (05-12-22 @ 04:15) (18 - 18)  SpO2: 94% (05-12-22 @ 04:15) (91% - 96%)  Wt(kg): --  I&O's Summary    11 May 2022 07:01  -  12 May 2022 07:00  --------------------------------------------------------  IN: 0 mL / OUT: 1750 mL / NET: -1750 mL        Appearance: Normal	  Cardiovascular: Normal S1 S2,RRR, No JVD, No murmurs  Respiratory:  rhonchi  Gastrointestinal:  Soft, Non-tender, + BS	  Extremities: Normal range of motion, No clubbing, cyanosis or edema      Home Medications:  albuterol 90 mcg/inh inhalation aerosol: 2 puff(s) inhaled every 6 hours, As needed, Shortness of Breath and/or Wheezing (02 May 2022 14:19)  aspirin 81 mg oral delayed release tablet: 1 tab(s) orally once a day (02 May 2022 14:19)  atorvastatin 80 mg oral tablet: 1 tab(s) orally once a day (02 May 2022 14:19)  calcitriol 0.25 mcg oral capsule: 1 cap(s) orally once a day (02 May 2022 14:19)  Coreg 6.25 mg oral tablet: 1 tab(s) orally 2 times a day (02 May 2022 14:19)  cyanocobalamin 1000 mcg oral tablet: 1 tab(s) orally once a day (02 May 2022 14:19)  melatonin 3 mg oral tablet: 1 tab(s) orally once a day (at bedtime) (02 May 2022 14:19)  saliva substitutes oral solution: 1 spray(s) orally 3 times a day, As Needed (02 May 2022 14:18)      MEDICATIONS  (STANDING):  albuterol/ipratropium for Nebulization 3 milliLiter(s) Nebulizer every 6 hours  apixaban 2.5 milliGRAM(s) Oral every 12 hours  aspirin enteric coated 81 milliGRAM(s) Oral daily  Biotene Dry Mouth Oral Rinse 5 milliLiter(s) Swish and Spit three times a day  calcitriol   Capsule 0.25 MICROGram(s) Oral daily  cyanocobalamin 1000 MICROGram(s) Oral daily  dextrose 5%. 1000 milliLiter(s) (50 mL/Hr) IV Continuous <Continuous>  dextrose 5%. 1000 milliLiter(s) (100 mL/Hr) IV Continuous <Continuous>  dextrose 50% Injectable 25 Gram(s) IV Push once  dextrose 50% Injectable 12.5 Gram(s) IV Push once  dextrose 50% Injectable 25 Gram(s) IV Push once  diVALproex  milliGRAM(s) Oral two times a day  epoetin abby-epbx (RETACRIT) Injectable 46008 Unit(s) SubCutaneous every 7 days  escitalopram 5 milliGRAM(s) Oral daily  ferrous    sulfate 325 milliGRAM(s) Oral daily  finasteride 5 milliGRAM(s) Oral daily  fluticasone propionate 50 MICROgram(s)/spray Nasal Spray 2 Spray(s) Both Nostrils <User Schedule>  furosemide    Tablet 20 milliGRAM(s) Oral daily  glucagon  Injectable 1 milliGRAM(s) IntraMuscular once  insulin lispro (ADMELOG) corrective regimen sliding scale   SubCutaneous three times a day before meals  levothyroxine 75 MICROGram(s) Oral daily  melatonin 3 milliGRAM(s) Oral at bedtime  metoprolol succinate ER 12.5 milliGRAM(s) Oral at bedtime  midodrine. 20 milliGRAM(s) Oral three times a day  oxymetazoline 0.05% Nasal Spray 1 Spray(s) Both Nostrils every 12 hours  predniSONE   Tablet 20 milliGRAM(s) Oral every 12 hours  sodium chloride 0.9% for Nebulization 3 milliLiter(s) Nebulizer four times a day  sodium chloride 0.9%. 500 milliLiter(s) (500 mL/Hr) IV Continuous <Continuous>  tamsulosin 0.8 milliGRAM(s) Oral at bedtime      TELEMETRY:nsr   pafib 	    ECG:  	  RADIOLOGY:   DIAGNOSTIC TESTING:  [ ] Echocardiogram:  [ ]  Catheterization:  [ ] Stress Test:    OTHER: 	    LABS:	 	        05-11    135  |  98  |  37<H>  ----------------------------<  236<H>  4.5   |  25  |  1.78<H>    Ca    9.0      11 May 2022 06:57              
CARDIOLOGY FOLLOW UP - Dr. Krueger  DATE OF SERVICE: 5/16/22     CC no cp or sob       REVIEW OF SYSTEMS:  CONSTITUTIONAL: No fever, weight loss, or fatigue  RESPIRATORY: No cough, wheezing, chills or hemoptysis; No Shortness of Breath  CARDIOVASCULAR: No chest pain, palpitations, passing out, dizziness, or leg swelling  GASTROINTESTINAL: No abdominal or epigastric pain. No nausea, vomiting, or hematemesis; No diarrhea or constipation. No melena or hematochezia.  VASCULAR: No edema     PHYSICAL EXAM:  T(C): 36.5 (05-16-22 @ 10:54), Max: 36.5 (05-16-22 @ 10:54)  HR: 71 (05-16-22 @ 10:54) (68 - 80)  BP: 85/53 (05-16-22 @ 10:54) (85/53 - 153/80)  RR: 18 (05-16-22 @ 10:54) (18 - 18)  SpO2: 95% (05-16-22 @ 10:54) (93% - 95%)  Wt(kg): --  I&O's Summary    15 May 2022 07:01  -  16 May 2022 07:00  --------------------------------------------------------  IN: 1080 mL / OUT: 1500 mL / NET: -420 mL    16 May 2022 07:01  -  16 May 2022 11:14  --------------------------------------------------------  IN: 620 mL / OUT: 500 mL / NET: 120 mL        Appearance: Normal	  Cardiovascular: Normal S1 S2,RRR, No JVD, No murmurs  Respiratory: Lungs clear to auscultation	  Gastrointestinal:  Soft, Non-tender, + BS	  Extremities: Normal range of motion, No clubbing, cyanosis or edema      Home Medications:  albuterol 90 mcg/inh inhalation aerosol: 2 puff(s) inhaled every 6 hours, As needed, Shortness of Breath and/or Wheezing (02 May 2022 14:19)  aspirin 81 mg oral delayed release tablet: 1 tab(s) orally once a day (02 May 2022 14:19)  atorvastatin 80 mg oral tablet: 1 tab(s) orally once a day (02 May 2022 14:19)  calcitriol 0.25 mcg oral capsule: 1 cap(s) orally once a day (02 May 2022 14:19)  Coreg 6.25 mg oral tablet: 1 tab(s) orally 2 times a day (02 May 2022 14:19)  cyanocobalamin 1000 mcg oral tablet: 1 tab(s) orally once a day (02 May 2022 14:19)  melatonin 3 mg oral tablet: 1 tab(s) orally once a day (at bedtime) (02 May 2022 14:19)  saliva substitutes oral solution: 1 spray(s) orally 3 times a day, As Needed (02 May 2022 14:18)      MEDICATIONS  (STANDING):  albuterol/ipratropium for Nebulization 3 milliLiter(s) Nebulizer every 6 hours  apixaban 2.5 milliGRAM(s) Oral every 12 hours  aspirin enteric coated 81 milliGRAM(s) Oral daily  Biotene Dry Mouth Oral Rinse 5 milliLiter(s) Swish and Spit three times a day  calcitriol   Capsule 0.25 MICROGram(s) Oral daily  cyanocobalamin 1000 MICROGram(s) Oral daily  dextrose 5%. 1000 milliLiter(s) (100 mL/Hr) IV Continuous <Continuous>  dextrose 5%. 1000 milliLiter(s) (50 mL/Hr) IV Continuous <Continuous>  dextrose 5%. 1000 milliLiter(s) (100 mL/Hr) IV Continuous <Continuous>  dextrose 50% Injectable 25 Gram(s) IV Push once  dextrose 50% Injectable 12.5 Gram(s) IV Push once  dextrose 50% Injectable 25 Gram(s) IV Push once  diVALproex  milliGRAM(s) Oral two times a day  epoetin abby-epbx (RETACRIT) Injectable 11967 Unit(s) SubCutaneous every 7 days  escitalopram 5 milliGRAM(s) Oral daily  ferrous    sulfate 325 milliGRAM(s) Oral daily  finasteride 5 milliGRAM(s) Oral daily  fludroCORTISONE 0.1 milliGRAM(s) Oral <User Schedule>  fluticasone propionate 50 MICROgram(s)/spray Nasal Spray 2 Spray(s) Both Nostrils <User Schedule>  glucagon  Injectable 1 milliGRAM(s) IntraMuscular once  glucagon  Injectable 1 milliGRAM(s) IntraMuscular once  insulin glargine Injectable (LANTUS) 5 Unit(s) SubCutaneous at bedtime  insulin lispro (ADMELOG) corrective regimen sliding scale   SubCutaneous Before meals and at bedtime  levothyroxine 75 MICROGram(s) Oral daily  melatonin 3 milliGRAM(s) Oral at bedtime  midodrine. 20 milliGRAM(s) Oral three times a day  sodium chloride 0.9% for Nebulization 3 milliLiter(s) Nebulizer four times a day      TELEMETRY: nsr	    ECG:  	  RADIOLOGY:   DIAGNOSTIC TESTING:  [ ] Echocardiogram:  [ ]  Catheterization:  [ ] Stress Test:    OTHER: 	    LABS:	 	                            8.8    9.82  )-----------( 263      ( 16 May 2022 05:45 )             26.9     05-16    137  |  99  |  38<H>  ----------------------------<  102<H>  3.5   |  27  |  1.61<H>    Ca    8.8      16 May 2022 05:45              
CARDIOLOGY FOLLOW UP - Dr. Krueger  DATE OF SERVICE: 5/3/22     CC no cp or sob   feels better        REVIEW OF SYSTEMS:  CONSTITUTIONAL: No fever, weight loss, or fatigue  RESPIRATORY: No cough, wheezing, chills or hemoptysis; No Shortness of Breath  CARDIOVASCULAR: No chest pain, palpitations, passing out, dizziness, or leg swelling  GASTROINTESTINAL: No abdominal or epigastric pain. No nausea, vomiting, or hematemesis; No diarrhea or constipation. No melena or hematochezia.  VASCULAR: No edema     PHYSICAL EXAM:  T(C): 37.3 (05-03-22 @ 11:45), Max: 37.3 (05-03-22 @ 11:45)  HR: 61 (05-03-22 @ 11:45) (58 - 67)  BP: 110/66 (05-03-22 @ 11:45) (81/47 - 110/66)  RR: 18 (05-03-22 @ 11:45) (18 - 22)  SpO2: 96% (05-03-22 @ 11:45) (89% - 100%)  Wt(kg): --  I&O's Summary    03 May 2022 07:01  -  03 May 2022 13:37  --------------------------------------------------------  IN: 180 mL / OUT: 300 mL / NET: -120 mL        Appearance: Normal	  Cardiovascular: Normal S1 S2,RRR  Respiratory:  rhonchi   Gastrointestinal:  Soft, Non-tender, + BS	  Extremities: Normal range of motion, No clubbing, cyanosis or edema      Home Medications:  albuterol 90 mcg/inh inhalation aerosol: 2 puff(s) inhaled every 6 hours, As needed, Shortness of Breath and/or Wheezing (02 May 2022 14:19)  aspirin 81 mg oral delayed release tablet: 1 tab(s) orally once a day (02 May 2022 14:19)  atorvastatin 80 mg oral tablet: 1 tab(s) orally once a day (02 May 2022 14:19)  calcitriol 0.25 mcg oral capsule: 1 cap(s) orally once a day (02 May 2022 14:19)  Coreg 6.25 mg oral tablet: 1 tab(s) orally 2 times a day (02 May 2022 14:19)  cyanocobalamin 1000 mcg oral tablet: 1 tab(s) orally once a day (02 May 2022 14:19)  melatonin 3 mg oral tablet: 1 tab(s) orally once a day (at bedtime) (02 May 2022 14:19)  saliva substitutes oral solution: 1 spray(s) orally 3 times a day, As Needed (02 May 2022 14:18)      MEDICATIONS  (STANDING):  albuterol/ipratropium for Nebulization 3 milliLiter(s) Nebulizer every 6 hours  apixaban 2.5 milliGRAM(s) Oral every 12 hours  aspirin enteric coated 81 milliGRAM(s) Oral daily  Biotene Dry Mouth Oral Rinse 5 milliLiter(s) Swish and Spit three times a day  calcitriol   Capsule 0.25 MICROGram(s) Oral daily  cyanocobalamin 1000 MICROGram(s) Oral daily  dextrose 5%. 1000 milliLiter(s) (50 mL/Hr) IV Continuous <Continuous>  dextrose 5%. 1000 milliLiter(s) (100 mL/Hr) IV Continuous <Continuous>  dextrose 50% Injectable 25 Gram(s) IV Push once  dextrose 50% Injectable 12.5 Gram(s) IV Push once  dextrose 50% Injectable 25 Gram(s) IV Push once  diVALproex  milliGRAM(s) Oral two times a day  escitalopram 5 milliGRAM(s) Oral daily  ferrous    sulfate 325 milliGRAM(s) Oral daily  finasteride 5 milliGRAM(s) Oral daily  glucagon  Injectable 1 milliGRAM(s) IntraMuscular once  insulin lispro (ADMELOG) corrective regimen sliding scale   SubCutaneous three times a day before meals  levothyroxine 75 MICROGram(s) Oral daily  melatonin 3 milliGRAM(s) Oral at bedtime  midodrine. 10 milliGRAM(s) Oral three times a day  piperacillin/tazobactam IVPB.. 3.375 Gram(s) IV Intermittent every 8 hours  tamsulosin 0.8 milliGRAM(s) Oral at bedtime      TELEMETRYnsr : 	    ECG:  	  RADIOLOGY:   DIAGNOSTIC TESTING:  [ ] Echocardiogram: < from: TTE with Doppler (w/Cont) (05.03.22 @ 11:25) >  EF (Visual Estimate): 25 %  Doppler Peak Velocity (m/sec): AoV=1.0  ------------------------------------------------------------------------  Observations:  Mitral Valve: Mitral annular calcification.   Tethered  mitral valve leaflets with normal opening. Moderate-severe  mitral regurgitation.  Aortic Valve/Aorta: Aortic valve not well visualized;  appears calcified. Peak transaortic valve gradient equals 4  mm Hg, mean transaortic valve gradient equals 3 mm Hg,  aortic valve velocity time integral equals 22 cm. Peak left  ventricular outflow tract gradient equals 2 mm Hg, mean  gradient is equal to 1 mm Hg, LVOT velocity time integral  equals 13 cm.  Aortic Root: 3.4 cm.  Left Atrium: Severely dilated left atrium.  LA volume index  = 50 cc/m2.  Left Ventricle: Severe  left ventricular systolic  dysfunction.  The apex is akinetic/dyskinetic.  Endocardial visualization enhanced with intravenous  injection of Ultrasonic Enhancing Agent (Lumason).  Swirling pattern of Lumason within the LV apex consistent  with low flow in this region. Normal left ventricular  internal dimensions and wall thicknesses. Moderate  diastolic dysfunction (Stage II).  Right Heart: Normal right atrium. The right ventricle is  not well visualized; grossly normal right ventricular  systolic function.   A device wire is noted in the right  heart. Normal tricuspid valve. Mild-moderate tricuspid  regurgitation. Normal pulmonic valve.  Pericardium/Pleura: Normal pericardium with no pericardial  effusion.  Hemodynamic: Estimated right atrial pressure is 8 mm Hg.  Estimated right ventricular systolic pressure equals 42 mm  Hg, assuming right atrial pressure equals 8 mm Hg,  consistent with mild pulmonary hypertension.  ------------------------------------------------------------------------  Conclusions:  1. Mitral annular calcification.   Tethered mitral valve  leaflets with normal opening. Moderate-severe mitral  regurgitation.  2. Severely dilated left atrium.  LA volume index = 50  cc/m2.  3. Severe  left ventricular systolic dysfunction.  The apex  is akinetic/dyskinetic.   Endocardial visualization  enhanced with intravenous injection of Ultrasonic Enhancing  Agent (Lumason).  Swirling pattern of Lumason within the LV  apex consistent with low flow in this region.  4. Moderate diastolic dysfunction (Stage II).  5. Estimated pulmonary artery systolic pressure equals 42  mm Hg, assuming right atrial pressure equals 8 mm Hg,  consistent with mild pulmonary pressures.  *** No recent echocardiogram for comparison.  ------------------------------------------------------------------------  Confirmed on  5/3/2022 -13:34:35 by VINI Singer  ------------------------------------------------------------------------    < end of copied text >    [ ]  Catheterization:  [ ] Stress Test:    OTHER: 	    LABS:	 	    Troponin T, High Sensitivity Result: 90 ng/L [0 - 51] (05-02 @ 12:06)  CKMB Units: 2.0 ng/mL [0.0 - 3.6] (04-27 @ 08:52)  Creatine Kinase, Serum: 63 U/L [26 - 308] (04-27 @ 08:51)  Creatine Kinase, Serum: 69 U/L [26 - 308] (04-26 @ 20:01)  CKMB Units: 2.2 ng/mL [0.0 - 3.6] (04-26 @ 20:01)                          8.2    8.52  )-----------( 87       ( 03 May 2022 09:08 )             25.9     05-03    136  |  103  |  39<H>  ----------------------------<  87  4.1   |  22  |  2.38<H>    Ca    8.0<L>      03 May 2022 06:58    TPro  6.4  /  Alb  3.1<L>  /  TBili  0.4  /  DBili  x   /  AST  32  /  ALT  16  /  AlkPhos  59  05-02    PT/INR - ( 02 May 2022 12:06 )   PT: 19.4 sec;   INR: 1.68 ratio         PTT - ( 02 May 2022 12:06 )  PTT:40.7 sec        
CARDIOLOGY FOLLOW UP - Dr. Krueger  DATE OF SERVICE: 5/4/22     CC no cp or sob         REVIEW OF SYSTEMS:  CONSTITUTIONAL: No fever, weight loss, or fatigue  RESPIRATORY: No cough, wheezing, chills or hemoptysis; No Shortness of Breath  CARDIOVASCULAR: No chest pain, palpitations, passing out, dizziness, or leg swelling  GASTROINTESTINAL: No abdominal or epigastric pain. No nausea, vomiting, or hematemesis; No diarrhea or constipation. No melena or hematochezia.      PHYSICAL EXAM:  T(C): 37.9 (05-04-22 @ 11:06), Max: 38.5 (05-04-22 @ 07:50)  HR: 82 (05-04-22 @ 07:50) (61 - 82)  BP: 107/64 (05-04-22 @ 07:50) (104/53 - 130/67)  RR: 18 (05-04-22 @ 07:50) (18 - 18)  SpO2: 90% (05-04-22 @ 07:50) (90% - 97%)  Wt(kg): --  I&O's Summary    03 May 2022 07:01  -  04 May 2022 07:00  --------------------------------------------------------  IN: 680 mL / OUT: 2075 mL / NET: -1395 mL    04 May 2022 07:01  -  04 May 2022 11:24  --------------------------------------------------------  IN: 180 mL / OUT: 200 mL / NET: -20 mL        Appearance: Normal	  Cardiovascular: Normal S1 S2,RR  Respiratory: rhonchi   Gastrointestinal:  Soft, Non-tender, + BS	  Extremities: Normal range of motion, No clubbing, cyanosis or edema      Home Medications:  albuterol 90 mcg/inh inhalation aerosol: 2 puff(s) inhaled every 6 hours, As needed, Shortness of Breath and/or Wheezing (02 May 2022 14:19)  aspirin 81 mg oral delayed release tablet: 1 tab(s) orally once a day (02 May 2022 14:19)  atorvastatin 80 mg oral tablet: 1 tab(s) orally once a day (02 May 2022 14:19)  calcitriol 0.25 mcg oral capsule: 1 cap(s) orally once a day (02 May 2022 14:19)  Coreg 6.25 mg oral tablet: 1 tab(s) orally 2 times a day (02 May 2022 14:19)  cyanocobalamin 1000 mcg oral tablet: 1 tab(s) orally once a day (02 May 2022 14:19)  melatonin 3 mg oral tablet: 1 tab(s) orally once a day (at bedtime) (02 May 2022 14:19)  saliva substitutes oral solution: 1 spray(s) orally 3 times a day, As Needed (02 May 2022 14:18)      MEDICATIONS  (STANDING):  albuterol/ipratropium for Nebulization 3 milliLiter(s) Nebulizer every 6 hours  apixaban 2.5 milliGRAM(s) Oral every 12 hours  aspirin enteric coated 81 milliGRAM(s) Oral daily  Biotene Dry Mouth Oral Rinse 5 milliLiter(s) Swish and Spit three times a day  calcitriol   Capsule 0.25 MICROGram(s) Oral daily  cyanocobalamin 1000 MICROGram(s) Oral daily  dextrose 5%. 1000 milliLiter(s) (50 mL/Hr) IV Continuous <Continuous>  dextrose 5%. 1000 milliLiter(s) (100 mL/Hr) IV Continuous <Continuous>  dextrose 50% Injectable 25 Gram(s) IV Push once  dextrose 50% Injectable 12.5 Gram(s) IV Push once  dextrose 50% Injectable 25 Gram(s) IV Push once  diVALproex  milliGRAM(s) Oral two times a day  escitalopram 5 milliGRAM(s) Oral daily  ferrous    sulfate 325 milliGRAM(s) Oral daily  finasteride 5 milliGRAM(s) Oral daily  fluticasone propionate 50 MICROgram(s)/spray Nasal Spray 2 Spray(s) Both Nostrils <User Schedule>  glucagon  Injectable 1 milliGRAM(s) IntraMuscular once  insulin lispro (ADMELOG) corrective regimen sliding scale   SubCutaneous three times a day before meals  levothyroxine 75 MICROGram(s) Oral daily  melatonin 3 milliGRAM(s) Oral at bedtime  midodrine. 10 milliGRAM(s) Oral three times a day  oseltamivir 30 milliGRAM(s) Oral daily  piperacillin/tazobactam IVPB.. 3.375 Gram(s) IV Intermittent every 8 hours  tamsulosin 0.8 milliGRAM(s) Oral at bedtime      TELEMETRY: nsr	    ECG:  	  RADIOLOGY:   < from: CT Chest No Cont (05.02.22 @ 13:41) >    IMPRESSION:    1.  New groundglass opacities (predominantly left-sided) and left upper   lobe opacities.  2.  No change in the bilateral pleural effusions.  3.  No change in the calcified and noncalcified pleural plaques can be a   marker of prior asbestos exposure.    --- End of Report ---          < end of copied text >    DIAGNOSTIC TESTING:  [ ] Echocardiogram:  [ ]  Catheterization:  [ ] Stress Test:    OTHER: 	    LABS:	 	    Troponin T, High Sensitivity Result: 90 ng/L [0 - 51] (05-02 @ 12:06)                          8.2    6.33  )-----------( 96       ( 04 May 2022 06:26 )             25.7     05-04    136  |  100  |  34<H>  ----------------------------<  103<H>  4.2   |  21<L>  |  2.35<H>    Ca    7.7<L>      04 May 2022 06:25    TPro  x   /  Alb  x   /  TBili  0.3  /  DBili  x   /  AST  x   /  ALT  x   /  AlkPhos  x   05-04    PT/INR - ( 04 May 2022 06:25 )   PT: 16.5 sec;   INR: 1.42 ratio         PTT - ( 02 May 2022 12:06 )  PTT:40.7 sec        
Follow Up: Cough, Fever     Interval History/ROS: Afebrile, no pain. His nose is congested and the nasal cannula is drying him out. Some cough, no worse, not really bothering him. Breathing comfortably. No diarrhea. Daughter at bedside.     Allergies  No Known Allergies        ANTIMICROBIALS:  oseltamivir 30 daily  piperacillin/tazobactam IVPB.. 3.375 every 8 hours      OTHER MEDS:  ALBUTerol    90 MICROgram(s) HFA Inhaler 2 Puff(s) Inhalation every 6 hours PRN  albuterol/ipratropium for Nebulization 3 milliLiter(s) Nebulizer every 6 hours  albuterol/ipratropium for Nebulization. 3 milliLiter(s) Nebulizer once  apixaban 2.5 milliGRAM(s) Oral every 12 hours  aspirin enteric coated 81 milliGRAM(s) Oral daily  Biotene Dry Mouth Oral Rinse 5 milliLiter(s) Swish and Spit three times a day  calcitriol   Capsule 0.25 MICROGram(s) Oral daily  cyanocobalamin 1000 MICROGram(s) Oral daily  dextrose 5%. 1000 milliLiter(s) IV Continuous <Continuous>  dextrose 5%. 1000 milliLiter(s) IV Continuous <Continuous>  dextrose 50% Injectable 25 Gram(s) IV Push once  dextrose 50% Injectable 12.5 Gram(s) IV Push once  dextrose 50% Injectable 25 Gram(s) IV Push once  dextrose Oral Gel 15 Gram(s) Oral once PRN  diVALproex  milliGRAM(s) Oral two times a day  escitalopram 5 milliGRAM(s) Oral daily  ferrous    sulfate 325 milliGRAM(s) Oral daily  finasteride 5 milliGRAM(s) Oral daily  fluticasone propionate 50 MICROgram(s)/spray Nasal Spray 2 Spray(s) Both Nostrils <User Schedule>  glucagon  Injectable 1 milliGRAM(s) IntraMuscular once  insulin lispro (ADMELOG) corrective regimen sliding scale   SubCutaneous three times a day before meals  levothyroxine 75 MICROGram(s) Oral daily  melatonin 3 milliGRAM(s) Oral at bedtime  midodrine. 10 milliGRAM(s) Oral three times a day  tamsulosin 0.8 milliGRAM(s) Oral at bedtime      Vital Signs Last 24 Hrs  T(C): 37.6 (04 May 2022 14:00), Max: 37.9 (04 May 2022 11:06)  T(F): 99.6 (04 May 2022 14:00), Max: 100.2 (04 May 2022 11:06)  HR: 82 (04 May 2022 14:00) (71 - 82)  BP: 140/71 (04 May 2022 14:00) (104/53 - 140/71)  BP(mean): --  RR: 16 (04 May 2022 14:00) (16 - 18)  SpO2: 98% (04 May 2022 14:00) (91% - 98%)    Physical Exam:  General: non toxic  Cardio: regular rate   Respiratory: nonlabored on nasal cannula, grossly clear   abd: nondistended, soft, nontender   vascular: no phlebitis   Skin: no rash                          8.2    6.33  )-----------( 96       ( 04 May 2022 06:26 )             25.7           136  |  100  |  34<H>  ----------------------------<  103<H>  4.2   |  21<L>  |  2.35<H>    Ca    7.7<L>      04 May 2022 06:25    TPro  x   /  Alb  x   /  TBili  0.3  /  DBili  x   /  AST  x   /  ALT  x   /  AlkPhos  x   -      Urinalysis Basic - ( 03 May 2022 14:56 )    Color: Yellow / Appearance: Slightly Turbid / S.024 / pH: x  Gluc: x / Ketone: Negative  / Bili: Negative / Urobili: Negative   Blood: x / Protein: 100 / Nitrite: Positive   Leuk Esterase: Large / RBC: 2 /hpf /  /HPF   Sq Epi: x / Non Sq Epi: 2 /hpf / Bacteria: Many        MICROBIOLOGY:  GI PCR Panel, Stool (collected 22 @ 14:45)  Source: .Stool Feces  Final Report (22 @ 07:14):    Plesiomonas shigelloides    DETECTED by PCR    *******Please Note:*******    GI panel PCR evaluates for:    Campylobacter, Plesiomonas shigelloides, Salmonella,    Vibrio, Yersinia enterocolitica, Enteroaggregative    Escherichia coli (EAEC), Enteropathogenic E.coli (EPEC),    Enterotoxigenic E. coli (ETEC) lt/st, Shiga-like    toxin-producing E. coli (STEC) stx1/stx2,    Shigella/ Enteroinvasive E. coli (EIEC), Cryptosporidium,    Cyclospora cayetanensis, Entamoeba histolytica,    Giardia lamblia, Adenovirus F 40/41, Astrovirus,    Norovirus GI/GII, Rotavirus A, Sapovirus    Culture - Blood (collected 22 @ 17:23)  Source: .Blood Blood-Peripheral  Gram Stain (22 @ 16:57):    Growth in aerobic bottle: Gram Positive Cocci in Clusters  Preliminary Report (22 @ 16:57):    Growth in aerobic bottle: Gram Positive Cocci in Clusters    ***Blood Panel PCR results on this specimen are available    approximately 3 hours after the Gram stain result.***    Gram stain, PCR, and/or culture results may not always    correspond due to difference in methodologies.    ************************************************************    This PCR assay was performed by multiplex PCR. This    Assay tests for 66 bacterial and resistance gene targets.    Please refer to the [facility] test directory    at [email or web address] for details.  Organism: Blood Culture PCR (22 @ 19:24)  Organism: Blood Culture PCR (22 @ 19:24)      -  Staphylococcus epidermidis: Detec      Method Type: PCR    Culture - Blood (collected 22 @ 16:23)  Source: .Blood Blood-Peripheral  Preliminary Report (22 @ 17:01):    No growth to date.    Rapid RVP Result: Detected ( @ 19:34)    RADIOLOGY:  Images below reviewed personally  CT Chest No Cont (22 @ 13:41)   1.  New groundglass opacities (predominantly left-sided) and left upper   lobe opacities.  2.  No change in the bilateral pleural effusions.  3.  No change in the calcified and noncalcified pleural plaques can be a   marker of prior asbestos exposure.
Neurology Progress Note    S: Patient seen and examined. No new events overnight. patient denied CP, SOB, HA or pain. doing okay. states he hasn't gotten up yet to to see if dizzy     Medication:    MEDICATIONS  (STANDING):  albuterol/ipratropium for Nebulization 3 milliLiter(s) Nebulizer every 6 hours  apixaban 2.5 milliGRAM(s) Oral every 12 hours  aspirin enteric coated 81 milliGRAM(s) Oral daily  Biotene Dry Mouth Oral Rinse 5 milliLiter(s) Swish and Spit three times a day  calcitriol   Capsule 0.25 MICROGram(s) Oral daily  cyanocobalamin 1000 MICROGram(s) Oral daily  dextrose 5%. 1000 milliLiter(s) (50 mL/Hr) IV Continuous <Continuous>  dextrose 5%. 1000 milliLiter(s) (100 mL/Hr) IV Continuous <Continuous>  dextrose 5%. 1000 milliLiter(s) (100 mL/Hr) IV Continuous <Continuous>  dextrose 50% Injectable 25 Gram(s) IV Push once  dextrose 50% Injectable 12.5 Gram(s) IV Push once  dextrose 50% Injectable 25 Gram(s) IV Push once  diVALproex  milliGRAM(s) Oral two times a day  droxidopa 200 milliGRAM(s) Oral three times a day  epoetin abby-epbx (RETACRIT) Injectable 88454 Unit(s) SubCutaneous every 7 days  escitalopram 5 milliGRAM(s) Oral daily  ferrous    sulfate 325 milliGRAM(s) Oral daily  finasteride 5 milliGRAM(s) Oral daily  fludroCORTISONE 0.2 milliGRAM(s) Oral <User Schedule>  fluticasone propionate 50 MICROgram(s)/spray Nasal Spray 2 Spray(s) Both Nostrils <User Schedule>  glucagon  Injectable 1 milliGRAM(s) IntraMuscular once  glucagon  Injectable 1 milliGRAM(s) IntraMuscular once  guaiFENesin ER 1200 milliGRAM(s) Oral every 12 hours  insulin glargine Injectable (LANTUS) 5 Unit(s) SubCutaneous at bedtime  insulin lispro (ADMELOG) corrective regimen sliding scale   SubCutaneous Before meals and at bedtime  levothyroxine 100 MICROGram(s) Oral daily  melatonin 3 milliGRAM(s) Oral at bedtime  midodrine. 30 milliGRAM(s) Oral three times a day  potassium chloride    Tablet ER 40 milliEquivalent(s) Oral once  sodium chloride 0.9% for Nebulization 3 milliLiter(s) Nebulizer four times a day    MEDICATIONS  (PRN):  ALBUTerol    90 MICROgram(s) HFA Inhaler 2 Puff(s) Inhalation every 6 hours PRN Shortness of Breath and/or Wheezing  dextrose Oral Gel 15 Gram(s) Oral once PRN Blood Glucose LESS THAN 70 milliGRAM(s)/deciliter      Vitals:    Vital Signs Last 24 Hrs  T(C): 36.8 (05-18-22 @ 04:37), Max: 37.1 (05-17-22 @ 20:30)  T(F): 98.2 (05-18-22 @ 04:37), Max: 98.8 (05-17-22 @ 20:30)  HR: 77 (05-18-22 @ 04:37) (77 - 80)  BP: 167/79 (05-18-22 @ 04:37) (155/77 - 167/82)  BP(mean): --  RR: 18 (05-18-22 @ 04:37) (18 - 18)  SpO2: 93% (05-18-22 @ 04:37) (93% - 96%)    Orthostatic VS  05-18-22 @ 09:47  Lying BP: 104/62 HR: 75  Sitting BP: 88/56 HR: 84  Standing BP: 78/51 HR: 85  Site: upper left arm  Mode: electronic  Orthostatic VS  05-17-22 @ 10:33  Lying BP: 138/71 HR: 75  Sitting BP: 88/55 HR: 85  Standing BP: --/-- HR: --  Site: upper left arm  Mode: electronic      Orthostatic VS    05-17-22 @ 10:33  Lying BP: 138/71 HR: 75   Sitting BP: 88/55 HR: 85  Standing BP: --/-- HR: --  Site: upper left arm   Mode: electronic    05-16-22 @ 10:00  Lying BP: 134/77 HR: 83   Sitting BP: 105/66 HR: 89  Standing BP: 48/-- HR: 92  Site: upper right arm   Mode: electronic    05-15-22 @ 11:09  Lying BP: 95/62 HR: 77   Sitting BP: 72/35 HR: 81  Standing BP: --/-- HR: --  Site: upper left arm   Mode: electronic      Orthostatic VS    05-15-22 @ 11:09  Lying BP: 95/62 HR: 77   Sitting BP: 72/35 HR: 81  Standing BP: --/-- HR: --  Site: upper left arm   Mode: electronic      General Exam:   General Appearance: Appropriately dressed and in no acute distress       Head: Normocephalic, atraumatic and no dysmorphic features  Ear, Nose, and Throat: Moist mucous membranes  CVS: S1S2+  Resp: No SOB, no wheeze or rhonchi  GI: soft NT/ND  Extremities: No edema or cyanosis  Skin: No bruises or rashes     Neurological Exam:  Mental Status: Awake, alert and oriented x 2.  Able to follow simple and complex verbal commands. Able to name and repeat. fluent speech. No obvious aphasia +dysarthria noted.   Cranial Nerves: PERRL, EOMI, VFFC, sensation V1-V3 intact,  no obvious facial asymmetry, equal elevation of palate, scm/trap 5/5, tongue is midline on protrusion. no obvious papilledema on fundoscopic exam. hearing is grossly intact.   Motor: Normal bulk, tone and strength throughout. Fine finger movements were intact and symmetric. no tremors or drift noted.    Sensation: Intact to light touch and pinprick throughout. no right/left confusion. no extinction to tactile on DSS.    Reflexes: 1+ throughout at biceps, brachioradialis, triceps, patellars and ankles bilaterally and equal. No clonus. R toe and L toe were both downgoing.  Coordination: No dysmetria on FNF    Gait:  deferred         I personally reviewed the below data/images/labs:      CBC Full  -  ( 17 May 2022 06:56 )  WBC Count : 9.49 K/uL  RBC Count : 3.37 M/uL  Hemoglobin : 8.2 g/dL  Hematocrit : 26.2 %  Platelet Count - Automated : 304 K/uL  Mean Cell Volume : 77.7 fl  Mean Cell Hemoglobin : 24.3 pg  Mean Cell Hemoglobin Concentration : 31.3 gm/dL  Auto Neutrophil # : x  Auto Lymphocyte # : x  Auto Monocyte # : x  Auto Eosinophil # : x  Auto Basophil # : x  Auto Neutrophil % : x  Auto Lymphocyte % : x  Auto Monocyte % : x  Auto Eosinophil % : x  Auto Basophil % : x        05-18    137  |  99  |  25<H>  ----------------------------<  96  3.5   |  27  |  1.56<H>    Ca    8.5      18 May 2022 06:31    
Neurology Progress Note    S: Patient seen and examined. No new events overnight. patient denied CP, SOB, HA or pain. doing okay. dc plan hospice today   Medication:    MEDICATIONS  (STANDING):  albuterol/ipratropium for Nebulization 3 milliLiter(s) Nebulizer every 6 hours  apixaban 2.5 milliGRAM(s) Oral every 12 hours  aspirin enteric coated 81 milliGRAM(s) Oral daily  Biotene Dry Mouth Oral Rinse 5 milliLiter(s) Swish and Spit three times a day  calcitriol   Capsule 0.25 MICROGram(s) Oral daily  cyanocobalamin 1000 MICROGram(s) Oral daily  dextrose 5%. 1000 milliLiter(s) (50 mL/Hr) IV Continuous <Continuous>  dextrose 5%. 1000 milliLiter(s) (100 mL/Hr) IV Continuous <Continuous>  dextrose 5%. 1000 milliLiter(s) (100 mL/Hr) IV Continuous <Continuous>  dextrose 50% Injectable 25 Gram(s) IV Push once  dextrose 50% Injectable 12.5 Gram(s) IV Push once  dextrose 50% Injectable 25 Gram(s) IV Push once  diVALproex  milliGRAM(s) Oral two times a day  droxidopa 200 milliGRAM(s) Oral three times a day  epoetin baby-epbx (RETACRIT) Injectable 59903 Unit(s) SubCutaneous every 7 days  escitalopram 5 milliGRAM(s) Oral daily  ferrous    sulfate 325 milliGRAM(s) Oral daily  finasteride 5 milliGRAM(s) Oral daily  fludroCORTISONE 0.2 milliGRAM(s) Oral <User Schedule>  fluticasone propionate 50 MICROgram(s)/spray Nasal Spray 2 Spray(s) Both Nostrils <User Schedule>  glucagon  Injectable 1 milliGRAM(s) IntraMuscular once  glucagon  Injectable 1 milliGRAM(s) IntraMuscular once  guaiFENesin ER 1200 milliGRAM(s) Oral every 12 hours  insulin glargine Injectable (LANTUS) 5 Unit(s) SubCutaneous at bedtime  insulin lispro (ADMELOG) corrective regimen sliding scale   SubCutaneous Before meals and at bedtime  levothyroxine 100 MICROGram(s) Oral daily  melatonin 3 milliGRAM(s) Oral at bedtime  midodrine. 30 milliGRAM(s) Oral three times a day  potassium chloride    Tablet ER 40 milliEquivalent(s) Oral once  sodium chloride 0.9% for Nebulization 3 milliLiter(s) Nebulizer four times a day    MEDICATIONS  (PRN):  ALBUTerol    90 MICROgram(s) HFA Inhaler 2 Puff(s) Inhalation every 6 hours PRN Shortness of Breath and/or Wheezing  dextrose Oral Gel 15 Gram(s) Oral once PRN Blood Glucose LESS THAN 70 milliGRAM(s)/deciliter      Vitals:      Vital Signs Last 24 Hrs  T(C): 36.6 (05-19-22 @ 11:42), Max: 36.9 (05-19-22 @ 02:30)  T(F): 97.9 (05-19-22 @ 11:42), Max: 98.5 (05-19-22 @ 02:30)  HR: 72 (05-19-22 @ 11:42) (69 - 75)  BP: 145/79 (05-19-22 @ 11:42) (145/79 - 180/80)  BP(mean): --  RR: 18 (05-19-22 @ 11:42) (18 - 18)  SpO2: 95% (05-19-22 @ 11:42) (93% - 96%)    Orthostatic VS  05-18-22 @ 09:47  Lying BP: 104/62 HR: 75  Sitting BP: 88/56 HR: 84  Standing BP: 78/51 HR: 85  Site: upper left arm  Mode: electronic      Orthostatic VS  05-18-22 @ 09:47  Lying BP: 104/62 HR: 75  Sitting BP: 88/56 HR: 84  Standing BP: 78/51 HR: 85  Site: upper left arm  Mode: electronic  Orthostatic VS  05-17-22 @ 10:33  Lying BP: 138/71 HR: 75  Sitting BP: 88/55 HR: 85  Standing BP: --/-- HR: --  Site: upper left arm  Mode: electronic      Orthostatic VS    05-17-22 @ 10:33  Lying BP: 138/71 HR: 75   Sitting BP: 88/55 HR: 85  Standing BP: --/-- HR: --  Site: upper left arm   Mode: electronic    05-16-22 @ 10:00  Lying BP: 134/77 HR: 83   Sitting BP: 105/66 HR: 89  Standing BP: 48/-- HR: 92  Site: upper right arm   Mode: electronic    05-15-22 @ 11:09  Lying BP: 95/62 HR: 77   Sitting BP: 72/35 HR: 81  Standing BP: --/-- HR: --  Site: upper left arm   Mode: electronic      Orthostatic VS    05-15-22 @ 11:09  Lying BP: 95/62 HR: 77   Sitting BP: 72/35 HR: 81  Standing BP: --/-- HR: --  Site: upper left arm   Mode: electronic      General Exam:   General Appearance: Appropriately dressed and in no acute distress       Head: Normocephalic, atraumatic and no dysmorphic features  Ear, Nose, and Throat: Moist mucous membranes  05-19    139  |  98  |  23  ----------------------------<  88  3.5   |  27  |  1.48<H>    Ca    8.5      19 May 2022 07:14    CVS: S1S2+  Resp: No SOB, no wheeze or rhonchi  GI: soft NT/ND  Extremities: No edema or cyanosis  Skin: No bruises or rashes     Neurological Exam:  Mental Status: Awake, alert and oriented x 2.  Able to follow simple and complex verbal commands. Able to name and repeat. fluent speech. No obvious aphasia +dysarthria noted.   Cranial Nerves: PERRL, EOMI, VFFC, sensation V1-V3 intact,  no obvious facial asymmetry, equal elevation of palate, scm/trap 5/5, tongue is midline on protrusion. no obvious papilledema on fundoscopic exam. hearing is grossly intact.   Motor: Normal bulk, tone and strength throughout. Fine finger movements were intact and symmetric. no tremors or drift noted.    Sensation: Intact to light touch and pinprick throughout. no right/left confusion. no extinction to tactile on DSS.    Reflexes: 1+ throughout at biceps, brachioradialis, triceps, patellars and ankles bilaterally and equal. No clonus. R toe and L toe were both downgoing.  Coordination: No dysmetria on FNF    Gait:  deferred         I personally reviewed the below data/images/labs:      no new labs     
Date of Service: 05-10-22 @ 10:48    Patient is a 76y old  Male who presents with a chief complaint of fever and cough. diarrhea and generalized weakness (10 May 2022 10:41)      Any change in ROS: today's events noted:  desaturated: for chest xray today : o2 sao2 improved following chest pt:   says his nose is blocked      MEDICATIONS  (STANDING):  albuterol/ipratropium for Nebulization 3 milliLiter(s) Nebulizer every 6 hours  apixaban 2.5 milliGRAM(s) Oral every 12 hours  aspirin enteric coated 81 milliGRAM(s) Oral daily  BACItracin   Ointment 1 Application(s) Topical two times a day  Biotene Dry Mouth Oral Rinse 5 milliLiter(s) Swish and Spit three times a day  calcitriol   Capsule 0.25 MICROGram(s) Oral daily  cyanocobalamin 1000 MICROGram(s) Oral daily  dextrose 5%. 1000 milliLiter(s) (50 mL/Hr) IV Continuous <Continuous>  dextrose 5%. 1000 milliLiter(s) (100 mL/Hr) IV Continuous <Continuous>  dextrose 50% Injectable 25 Gram(s) IV Push once  dextrose 50% Injectable 12.5 Gram(s) IV Push once  dextrose 50% Injectable 25 Gram(s) IV Push once  diVALproex  milliGRAM(s) Oral two times a day  epoetin abby-epbx (RETACRIT) Injectable 47895 Unit(s) SubCutaneous every 7 days  escitalopram 5 milliGRAM(s) Oral daily  ferrous    sulfate 325 milliGRAM(s) Oral daily  finasteride 5 milliGRAM(s) Oral daily  fluticasone propionate 50 MICROgram(s)/spray Nasal Spray 2 Spray(s) Both Nostrils <User Schedule>  furosemide    Tablet 20 milliGRAM(s) Oral daily  glucagon  Injectable 1 milliGRAM(s) IntraMuscular once  insulin lispro (ADMELOG) corrective regimen sliding scale   SubCutaneous three times a day before meals  levothyroxine 75 MICROGram(s) Oral daily  melatonin 3 milliGRAM(s) Oral at bedtime  metoprolol succinate ER 12.5 milliGRAM(s) Oral at bedtime  midodrine. 10 milliGRAM(s) Oral three times a day  sodium chloride 0.9% for Nebulization 3 milliLiter(s) Nebulizer four times a day  tamsulosin 0.8 milliGRAM(s) Oral at bedtime    MEDICATIONS  (PRN):  ALBUTerol    90 MICROgram(s) HFA Inhaler 2 Puff(s) Inhalation every 6 hours PRN Shortness of Breath and/or Wheezing  dextrose Oral Gel 15 Gram(s) Oral once PRN Blood Glucose LESS THAN 70 milliGRAM(s)/deciliter    Vital Signs Last 24 Hrs  T(C): 37 (10 May 2022 04:56), Max: 37.3 (09 May 2022 11:01)  T(F): 98.6 (10 May 2022 04:56), Max: 99.2 (09 May 2022 11:01)  HR: 80 (10 May 2022 04:56) (70 - 87)  BP: 125/68 (10 May 2022 04:56) (89/54 - 125/68)  BP(mean): --  RR: 18 (10 May 2022 04:56) (17 - 18)  SpO2: 92% (10 May 2022 08:15) (88% - 95%)    I&O's Summary    09 May 2022 07:01  -  10 May 2022 07:00  --------------------------------------------------------  IN: 720 mL / OUT: 700 mL / NET: 20 mL          Physical Exam:   GENERAL: NAD, well-groomed, well-developed  HEENT: LUCA/   Atraumatic, Normocephalic  ENMT: No tonsillar erythema, exudates, or enlargement; Moist mucous membranes, Good dentition, No lesions  NECK: Supple, No JVD, Normal thyroid  CHEST/LUNG: Clear to auscultaion, ; No rales, rhonchi, wheezing, or rubs  CVS: Regular rate and rhythm; No murmurs, rubs, or gallops  GI: : Soft, Nontender, Nondistended; Bowel sounds present  NERVOUS SYSTEM:  Alert & Oriented X3  EXTREMITIES:  2+ Peripheral Pulses, No clubbing, cyanosis, or edema  LYMPH: No lymphadenopathy noted  SKIN: No rashes or lesions  ENDOCRINOLOGY: No Thyromegaly  PSYCH: Appropriate    Labs:                              9.0    11.35 )-----------( 125      ( 10 May 2022 06:44 )             27.9                         9.5    13.98 )-----------( 147      ( 09 May 2022 07:19 )             29.4                         10.2   10.29 )-----------( 157      ( 09 May 2022 06:13 )             33.6                         9.3    9.63  )-----------( 131      ( 07 May 2022 07:27 )             28.6     05-10    138  |  100  |  45<H>  ----------------------------<  129<H>  4.0   |  26  |  2.06<H>  05-09    137  |  98  |  42<H>  ----------------------------<  220<H>  4.0   |  24  |  2.00<H>  05-09    137  |  94<L>  |  38<H>  ----------------------------<  152<H>  4.7   |  23  |  1.81<H>  05-07    135  |  96  |  37<H>  ----------------------------<  237<H>  4.1   |  24  |  1.92<H>    Ca    9.0      10 May 2022 06:44  Ca    9.0      09 May 2022 07:12  Ca    9.1      09 May 2022 06:08  Mg     2.0     05-09    TPro  5.5<L>  /  Alb  2.5<L>  /  TBili  0.5  /  DBili  x   /  AST  13  /  ALT  9<L>  /  AlkPhos  56  05-10    CAPILLARY BLOOD GLUCOSE      POCT Blood Glucose.: 145 mg/dL (10 May 2022 07:54)  POCT Blood Glucose.: 239 mg/dL (09 May 2022 22:00)  POCT Blood Glucose.: 248 mg/dL (09 May 2022 16:44)  POCT Blood Glucose.: 216 mg/dL (09 May 2022 12:00)      LIVER FUNCTIONS - ( 10 May 2022 06:44 )  Alb: 2.5 g/dL / Pro: 5.5 g/dL / ALK PHOS: 56 U/L / ALT: 9 U/L / AST: 13 U/L / GGT: x                     RECENT CULTURES:  05-03 @ 19:05 .Blood Blood                No Growth Final    05-03 @ 14:56 Clean Catch Clean Catch (Midstream)   BARNEY      Klebsiella pneumoniae  Klebsiella pneumoniae     >100,000 CFU/ml Klebsiella pneumoniae    05-03 @ 14:45 .Stool Feces       rad< from: Xray Chest 1 View- PORTABLE-Urgent (Xray Chest 1 View- PORTABLE-Urgent .) (05.09.22 @ 10:35) >  CLINICAL INDICATION: Hypoxia    TECHNIQUE: Single frontal, portable view of the chest was obtained.    COMPARISON: Chest CT dated 5/2/2022    FINDINGS:  Left chest wall ICD.  The heart is normal in size.  Part of the left lung is obscured by overlying hardware. Patchy opacities   are present in bilateral lung bases.  Trace left pleural effusion.  No pneumothorax.    IMPRESSION:  Patchy opacities in bilateral lung bases, likely atelectasis.    --- End of Report ---          HEATHER CLAROS M.D., RADIOLOGY RESIDENT  This document has been electronically signed.  YONAS OLIVEIRA MD; Attending Radiologist  This document has been electronically signed. May  9 2022  3:26PM    < end of copied text >  < from: CT Chest No Cont (05.02.22 @ 13:41) >  The 1.1 x 0.8 cm rounded opacity along the right minor fissure (series 2   image 70) unchanged. The bilateral lower lobe passive atelectasis is   unchanged.    Mediastinum: The thyroid gland is unremarkable. The 17 x 30 mm subcarinal   lymph node isunchanged. The additional paratracheal, prevascular, AP   window lymph nodes are unchanged. The esophagus is unremarkable.    Left atrium is likely enlarged. The remainder the heart is normal in   size. No pericardial effusion. The calcifications along the   interventricular septum and apex of the left ventricle are unchanged. The   aorta is normal in caliber. Atheromatous disease of the aorta.    AICD in place.    Upper Abdomen: The adrenal gland thickening is unchanged. Right kidney is   atrophic.    Bones And Soft Tissues: The bones are unremarkable.  The soft tissues are   unremarkable.      IMPRESSION:    1.  New groundglass opacities (predominantly left-sided) and left upper   lobe opacities.  2.  No change in the bilateral pleural effusions.  3.  No change in the calcified and noncalcified pleural plaques can be a   marker of prior asbestos exposure.    --- End of Report ---    < end of copied text >           Plesiomonas shigelloides  DETECTED by PCR  *******Please Note:*******  GI panel PCR evaluates for:  Campylobacter, Plesiomonas shigelloides, Salmonella,  Vibrio, Yersinia enterocolitica, Enteroaggregative  Escherichia coli (EAEC), Enteropathogenic E.coli (EPEC),  Enterotoxigenic E. coli (ETEC) lt/st, Shiga-like  toxin-producing E. coli (STEC) stx1/stx2,  Shigella/ Enteroinvasive E. coli (EIEC), Cryptosporidium,  Cyclospora cayetanensis, Entamoeba histolytica,  Giardia lamblia, Adenovirus F 40/41, Astrovirus,  Norovirus GI/GII, Rotavirus A, Sapovirus          RESPIRATORY CULTURES:          Studies  Chest X-RAY  CT SCAN Chest   Venous Dopplers: LE:   CT Abdomen  Others    < from: TTE with Doppler (w/Cont) (05.03.22 @ 11:25) >  Conclusions:  1. Mitral annular calcification.   Tethered mitral valve  leaflets with normal opening. Moderate-severe mitral  regurgitation.  2. Severely dilated left atrium.  LA volume index = 50  cc/m2.  3. Severe  left ventricular systolic dysfunction.  The apex  is akinetic/dyskinetic.   Endocardial visualization  enhanced with intravenous injection of Ultrasonic Enhancing  Agent (Lumason).  Swirling pattern of Lumason within the LV  apex consistent with low flow in this region.  4. Moderate diastolic dysfunction (Stage II).  5. Estimated pulmonary artery systolic pressure equals 42  mm Hg, assuming right atrial pressure equals 8 mm Hg,  consistent with mild pulmonary pressures.  *** No recent echocardiogram for comparison.  ------------------------------------------------------------------------  Confirmed on  5/3/2022 -13:34:35 by VINI Singer  ------------------------------------------------------------------------    < end of copied text >            
Date of Service: 22 @ 11:03    Patient is a 76y old  Male who presents with a chief complaint of fever and cough. diarrhea and generalized weakness (04 May 2022 19:25)      Any change in ROS:  doingok: no sob:     MEDICATIONS  (STANDING):  albuterol/ipratropium for Nebulization 3 milliLiter(s) Nebulizer every 6 hours  albuterol/ipratropium for Nebulization. 3 milliLiter(s) Nebulizer once  apixaban 2.5 milliGRAM(s) Oral every 12 hours  aspirin enteric coated 81 milliGRAM(s) Oral daily  Biotene Dry Mouth Oral Rinse 5 milliLiter(s) Swish and Spit three times a day  calcitriol   Capsule 0.25 MICROGram(s) Oral daily  cyanocobalamin 1000 MICROGram(s) Oral daily  dextrose 5%. 1000 milliLiter(s) (100 mL/Hr) IV Continuous <Continuous>  dextrose 5%. 1000 milliLiter(s) (50 mL/Hr) IV Continuous <Continuous>  dextrose 50% Injectable 25 Gram(s) IV Push once  dextrose 50% Injectable 12.5 Gram(s) IV Push once  dextrose 50% Injectable 25 Gram(s) IV Push once  diVALproex  milliGRAM(s) Oral two times a day  epoetin abby-epbx (RETACRIT) Injectable 46747 Unit(s) SubCutaneous every 7 days  escitalopram 5 milliGRAM(s) Oral daily  ferrous    sulfate 325 milliGRAM(s) Oral daily  finasteride 5 milliGRAM(s) Oral daily  fluticasone propionate 50 MICROgram(s)/spray Nasal Spray 2 Spray(s) Both Nostrils <User Schedule>  glucagon  Injectable 1 milliGRAM(s) IntraMuscular once  insulin lispro (ADMELOG) corrective regimen sliding scale   SubCutaneous three times a day before meals  levothyroxine 75 MICROGram(s) Oral daily  melatonin 3 milliGRAM(s) Oral at bedtime  methylPREDNISolone sodium succinate Injectable 3 milliGRAM(s) IV Push once  midodrine. 10 milliGRAM(s) Oral three times a day  oseltamivir 30 milliGRAM(s) Oral daily  piperacillin/tazobactam IVPB.. 3.375 Gram(s) IV Intermittent every 8 hours  tamsulosin 0.8 milliGRAM(s) Oral at bedtime    MEDICATIONS  (PRN):  ALBUTerol    90 MICROgram(s) HFA Inhaler 2 Puff(s) Inhalation every 6 hours PRN Shortness of Breath and/or Wheezing  dextrose Oral Gel 15 Gram(s) Oral once PRN Blood Glucose LESS THAN 70 milliGRAM(s)/deciliter    Vital Signs Last 24 Hrs  T(C): 37.1 (05 May 2022 04:24), Max: 37.9 (04 May 2022 11:06)  T(F): 98.7 (05 May 2022 04:24), Max: 100.2 (04 May 2022 11:06)  HR: 59 (05 May 2022 04:24) (59 - 82)  BP: 135/76 (05 May 2022 04:24) (106/66 - 148/77)  BP(mean): --  RR: 18 (05 May 2022 04:24) (16 - 18)  SpO2: 96% (05 May 2022 04:24) (95% - 98%)    I&O's Summary    04 May 2022 07:01  -  05 May 2022 07:00  --------------------------------------------------------  IN: 480 mL / OUT: 550 mL / NET: -70 mL          Physical Exam:   GENERAL: NAD, well-groomed, well-developed  HEENT: LUCA/   Atraumatic, Normocephalic  ENMT: No tonsillar erythema, exudates, or enlargement; Moist mucous membranes, Good dentition, No lesions  NECK: Supple, No JVD, Normal thyroid  CHEST/LUNG: mild wheezing   rate and rhythm; No murmurs, rubs, or gallops  GI: : Soft, Nontender, Nondistended; Bowel sounds present  NERVOUS SYSTEM:  Alert & Oriented X3  EXTREMITIES:  2+ Peripheral Pulses, No clubbing, cyanosis, or edema  LYMPH: No lymphadenopathy noted  SKIN: No rashes or lesions  ENDOCRINOLOGY: No Thyromegaly  PSYCH: Appropriate    Labs:                              9.9    4.73  )-----------( 98       ( 05 May 2022 06:24 )             32.1                         8.2    6.33  )-----------( 96       ( 04 May 2022 06:26 )             25.7                         8.2    8.52  )-----------( 87       ( 03 May 2022 09:08 )             25.9                         7.6    6.96  )-----------( 82       ( 03 May 2022 07:00 )             24.0                         9.1    13.16 )-----------( 111      ( 02 May 2022 12:06 )             29.2         135  |  99  |  34<H>  ----------------------------<  192<H>  4.5   |  21<L>  |  2.17<H>      136  |  100  |  34<H>  ----------------------------<  103<H>  4.2   |  21<L>  |  2.35<H>      136  |  103  |  39<H>  ----------------------------<  87  4.1   |  22  |  2.38<H>      135  |  101  |  40<H>  ----------------------------<  138<H>  4.7   |  20<L>  |  2.66<H>    Ca    8.6      05 May 2022 06:36  Ca    7.7<L>      04 May 2022 06:25    TPro  x   /  Alb  x   /  TBili  0.3  /  DBili  x   /  AST  x   /  ALT  x   /  AlkPhos  x     TPro  6.4  /  Alb  3.1<L>  /  TBili  0.4  /  DBili  x   /  AST  32  /  ALT  16  /  AlkPhos  59      CAPILLARY BLOOD GLUCOSE      POCT Blood Glucose.: 221 mg/dL (05 May 2022 08:10)  POCT Blood Glucose.: 222 mg/dL (04 May 2022 21:01)  POCT Blood Glucose.: 149 mg/dL (04 May 2022 16:16)  POCT Blood Glucose.: 125 mg/dL (04 May 2022 13:01)        PT/INR - ( 04 May 2022 06:25 )   PT: 16.5 sec;   INR: 1.42 ratio           Urinalysis Basic - ( 03 May 2022 14:56 )    Color: Yellow / Appearance: Slightly Turbid / S.024 / pH: x  Gluc: x / Ketone: Negative  / Bili: Negative / Urobili: Negative   Blood: x / Protein: 100 / Nitrite: Positive   Leuk Esterase: Large / RBC: 2 /hpf /  /HPF   Sq Epi: x / Non Sq Epi: 2 /hpf / Bacteria: Many      Serum Pro-Brain Natriuretic Peptide: 80250 pg/mL ( @ 16:25)  Lactate, Blood: 1.1 mmol/L ( @ 14:57)        RECENT CULTURES:   @ 19:05 .Blood Blood                No growth to date.     @ 14:56 Clean Catch Clean Catch (Midstream)         rad< from: CT Chest No Cont (22 @ 13:41) >  interventricular septum and apex of the left ventricle are unchanged. The   aorta is normal in caliber. Atheromatous disease of the aorta.    AICD in place.    Upper Abdomen: The adrenal gland thickening is unchanged. Right kidney is   atrophic.    Bones And Soft Tissues: The bones are unremarkable.  The soft tissues are   unremarkable.      IMPRESSION:    1.  New groundglass opacities (predominantly left-sided) and left upper   lobe opacities.  2.  No change in the bilateral pleural effusions.  3.  No change in the calcified and noncalcified pleural plaques can be a   marker of prior asbestos exposure.    --- End of Report ---    < end of copied text >         >100,000 CFU/ml Gram Negative Rods     @ 14:45 .Stool Feces                Plesiomonas shigelloides  DETECTED by PCR  *******Please Note:*******  GI panel PCR evaluates for:  Campylobacter, Plesiomonas shigelloides, Salmonella,  Vibrio, Yersinia enterocolitica, Enteroaggregative  Escherichia coli (EAEC), Enteropathogenic E.coli (EPEC),  Enterotoxigenic E. coli (ETEC) lt/st, Shiga-like  toxin-producing E. coli (STEC) stx1/stx2,  Shigella/ Enteroinvasive E. coli (EIEC), Cryptosporidium,  Cyclospora cayetanensis, Entamoeba histolytica,  Giardia lamblia, Adenovirus F 40/41, Astrovirus,  Norovirus GI/GII, Rotavirus A, Sapovirus     @ 17:23 .Blood Blood-Peripheral   PCR    Growth in aerobic bottle: Gram Positive Cocci in Clusters    Blood Culture PCR  Blood Culture PCR     Growth in aerobic bottle: Staphylococcus epidermidis  Coag Negative Staphylococcus  Single set isolate, possible contaminant. Contact  Microbiology if susceptibility testing clinically  indicated.  ***Blood Panel PCR results on this specimen are available  approximately 3 hours after the Gram stain result.***  Gram stain, PCR, and/or culture results may not always  correspond due to difference in methodologies.  ************************************************************  This PCR assay was performed by multiplex PCR. This  Assay tests for 66 bacterial and resistance gene targets.  Please refer to the [facility] test directory  at [email or web address] for details.     @ 16:23 .Blood Blood-Peripheral                No growth to date.          RESPIRATORY CULTURES:          Studies  Chest X-RAY  CT SCAN Chest   Venous Dopplers: LE:   CT Abdomen  Others                      < from: CT Chest No Cont (22 @ 13:41) >  COMPARISON: CT chest 3/31/2022. Radiograph chest 2020.    FINDINGS:    Tubes/Lines: None.    Lungs, airways and pleura:The bilateral pleural effusions are unchanged   and are of simple fluid attenuation. Bilateral calcified noncalcified   pleural plaques can occur in the clinical setting of prior asbestos   exposure.    There are bilateral, predominantly left-sided groundglass opacities, new   since 3/31/2022.    In addition, there are branching opacities in the lingula and left upper   lobe, new since 3/31/2022.    The patchy consolidation and opacity shown in the right upper lobe on   3/31/2022 have resolved.    The 1.1 x 0.8 cm rounded opacity along the right minor fissure (series 2   image 70) unchanged. The bilateral lower lobe passive atelectasis is   unchanged.    Mediastinum: The thyroid gland is unremarkable. The 17 x 30 mm subcarinal   lymph node isunchanged. The additional paratracheal, prevascular, AP   window lymph nodes are unchanged. The esophagus is unremarkable.    Left atrium is likely enlarged. The remainder the heart is normal in   size. No pericardial effusion. The calcifications along the   interventricular septum and apex of the left ventricle are unchanged. The   aorta is normal in caliber. Atheromatous disease of the aorta.    AICD in place.    Upper Abdomen: The adrenal gland thickening is unchanged. Right kidney is   atrophic.    Bones And Soft Tissues: The bones are unremarkable.  The soft tissues are   unremarkable.      IMPRESSION:    1.  New groundglass opacities (predominantly left-sided) and left upper   lobe opacities.  2.  No change in the bilateral pleural effusions.  3.  No change in the calcified and noncalcified pleural plaques can be a   marker of prior asbestos exposure.    --- End of Report ---            BERNIE COHEN MD; Attending Radiologist  This document has been electronically signed. May  2 2022  2:51PM    < end of copied text >  
Date of Service: 05-03-22 @ 11:25    Patient is a 76y old  Male who presents with a chief complaint of fever and cough. diarrhea and generalized weakness (03 May 2022 02:24)      Any change in ROS: he looks much better to me:  he is alert and awake and responding very well to questions today : not in any ersp distress;       MEDICATIONS  (STANDING):  albuterol/ipratropium for Nebulization 3 milliLiter(s) Nebulizer every 6 hours  apixaban 2.5 milliGRAM(s) Oral every 12 hours  aspirin enteric coated 81 milliGRAM(s) Oral daily  Biotene Dry Mouth Oral Rinse 5 milliLiter(s) Swish and Spit three times a day  calcitriol   Capsule 0.25 MICROGram(s) Oral daily  cyanocobalamin 1000 MICROGram(s) Oral daily  dextrose 5%. 1000 milliLiter(s) (50 mL/Hr) IV Continuous <Continuous>  dextrose 5%. 1000 milliLiter(s) (100 mL/Hr) IV Continuous <Continuous>  dextrose 50% Injectable 25 Gram(s) IV Push once  dextrose 50% Injectable 12.5 Gram(s) IV Push once  dextrose 50% Injectable 25 Gram(s) IV Push once  diVALproex  milliGRAM(s) Oral two times a day  escitalopram 5 milliGRAM(s) Oral daily  ferrous    sulfate 325 milliGRAM(s) Oral daily  finasteride 5 milliGRAM(s) Oral daily  glucagon  Injectable 1 milliGRAM(s) IntraMuscular once  insulin lispro (ADMELOG) corrective regimen sliding scale   SubCutaneous three times a day before meals  levothyroxine 75 MICROGram(s) Oral daily  melatonin 3 milliGRAM(s) Oral at bedtime  midodrine. 10 milliGRAM(s) Oral three times a day  piperacillin/tazobactam IVPB.. 3.375 Gram(s) IV Intermittent every 8 hours  tamsulosin 0.8 milliGRAM(s) Oral at bedtime    MEDICATIONS  (PRN):  ALBUTerol    90 MICROgram(s) HFA Inhaler 2 Puff(s) Inhalation every 6 hours PRN Shortness of Breath and/or Wheezing  dextrose Oral Gel 15 Gram(s) Oral once PRN Blood Glucose LESS THAN 70 milliGRAM(s)/deciliter    Vital Signs Last 24 Hrs  T(C): 36.7 (03 May 2022 04:12), Max: 37.1 (02 May 2022 11:38)  T(F): 98 (03 May 2022 04:12), Max: 98.8 (02 May 2022 11:38)  HR: 61 (03 May 2022 04:12) (58 - 86)  BP: 99/62 (03 May 2022 04:12) (81/47 - 102/61)  BP(mean): 73 (02 May 2022 20:53) (73 - 73)  RR: 18 (03 May 2022 04:12) (16 - 22)  SpO2: 97% (03 May 2022 04:12) (89% - 100%)    I&O's Summary        Physical Exam:   GENERAL: NAD, well-groomed, well-developed  HEENT: LUCA/   Atraumatic, Normocephalic  ENMT: No tonsillar erythema, exudates, or enlargement; Moist mucous membranes, Good dentition, No lesions  NECK: Supple, No JVD, Normal thyroid  CHEST/LUNG: much more clearer today   CVS: Regular rate and rhythm; No murmurs, rubs, or gallops  GI: : Soft, Nontender, Nondistended; Bowel sounds present  NERVOUS SYSTEM:  Alert & Oriented X3  EXTREMITIES: - edema  LYMPH: No lymphadenopathy noted  SKIN: No rashes or lesions  ENDOCRINOLOGY: No Thyromegaly  PSYCH: Appropriate    Labs:                              8.2    8.52  )-----------( 87       ( 03 May 2022 09:08 )             25.9                         7.6    6.96  )-----------( 82       ( 03 May 2022 07:00 )             24.0                         9.1    13.16 )-----------( 111      ( 02 May 2022 12:06 )             29.2     05-03    136  |  103  |  39<H>  ----------------------------<  87  4.1   |  22  |  2.38<H>  05-02    135  |  101  |  40<H>  ----------------------------<  138<H>  4.7   |  20<L>  |  2.66<H>    Ca    8.0<L>      03 May 2022 06:58  Ca    8.8      02 May 2022 12:06    TPro  6.4  /  Alb  3.1<L>  /  TBili  0.4  /  DBili  x   /  AST  32  /  ALT  16  /  AlkPhos  59  05-02    CAPILLARY BLOOD GLUCOSE      POCT Blood Glucose.: 88 mg/dL (03 May 2022 08:48)  POCT Blood Glucose.: 120 mg/dL (02 May 2022 18:41)  POCT Blood Glucose.: 143 mg/dL (02 May 2022 11:50)      LIVER FUNCTIONS - ( 02 May 2022 12:06 )  Alb: 3.1 g/dL / Pro: 6.4 g/dL / ALK PHOS: 59 U/L / ALT: 16 U/L / AST: 32 U/L / GGT: x           PT/INR - ( 02 May 2022 12:06 )   PT: 19.4 sec;   INR: 1.68 ratio         PTT - ( 02 May 2022 12:06 )  PTT:40.7 sec    Serum Pro-Brain Natriuretic Peptide: 80683 pg/mL (05-02 @ 16:25)  Lactate, Blood: 1.1 mmol/L (05-02 @ 14:57)        RECENT CULTURES:  04-27 @ 00:52 Clean Catch Clean Catch (Midstream)            rad< from: CT Chest No Cont (05.02.22 @ 13:41) >  1.  New groundglass opacities (predominantly left-sided) and left upper   lobe opacities.  2.  No change in the bilateral pleural effusions.  3.  No change in the calcified and noncalcified pleural plaques can be a   marker of prior asbestos exposure.    --- End of Report ---            BERNIE COHEN MD; Attending Radiologist  This document has been electronically signed. May  2 2022  2:51PM    < end of copied text >      No growth    04-27 @ 00:38 .Blood Blood-Peripheral                No Growth Final          RESPIRATORY CULTURES:          Studies  Chest X-RAY  CT SCAN Chest   Venous Dopplers: LE:   CT Abdomen  Others              
Date of Service: 05-11-22 @ 15:15    Patient is a 76y old  Male who presents with a chief complaint of fever and cough. diarrhea and generalized weakness (11 May 2022 13:32)      Any change in ROS:  doing ok: on 2 L oxygen    MEDICATIONS  (STANDING):  albuterol/ipratropium for Nebulization 3 milliLiter(s) Nebulizer every 6 hours  apixaban 2.5 milliGRAM(s) Oral every 12 hours  aspirin enteric coated 81 milliGRAM(s) Oral daily  BACItracin   Ointment 1 Application(s) Topical two times a day  Biotene Dry Mouth Oral Rinse 5 milliLiter(s) Swish and Spit three times a day  calcitriol   Capsule 0.25 MICROGram(s) Oral daily  cyanocobalamin 1000 MICROGram(s) Oral daily  dextrose 5%. 1000 milliLiter(s) (100 mL/Hr) IV Continuous <Continuous>  dextrose 5%. 1000 milliLiter(s) (50 mL/Hr) IV Continuous <Continuous>  dextrose 50% Injectable 25 Gram(s) IV Push once  dextrose 50% Injectable 12.5 Gram(s) IV Push once  dextrose 50% Injectable 25 Gram(s) IV Push once  diVALproex  milliGRAM(s) Oral two times a day  epoetin abby-epbx (RETACRIT) Injectable 38747 Unit(s) SubCutaneous every 7 days  escitalopram 5 milliGRAM(s) Oral daily  ferrous    sulfate 325 milliGRAM(s) Oral daily  finasteride 5 milliGRAM(s) Oral daily  fluticasone propionate 50 MICROgram(s)/spray Nasal Spray 2 Spray(s) Both Nostrils <User Schedule>  furosemide    Tablet 20 milliGRAM(s) Oral daily  glucagon  Injectable 1 milliGRAM(s) IntraMuscular once  insulin lispro (ADMELOG) corrective regimen sliding scale   SubCutaneous three times a day before meals  levothyroxine 75 MICROGram(s) Oral daily  melatonin 3 milliGRAM(s) Oral at bedtime  metoprolol succinate ER 12.5 milliGRAM(s) Oral at bedtime  midodrine. 20 milliGRAM(s) Oral three times a day  oxymetazoline 0.05% Nasal Spray 1 Spray(s) Both Nostrils every 12 hours  predniSONE   Tablet 20 milliGRAM(s) Oral every 12 hours  sodium chloride 0.9% for Nebulization 3 milliLiter(s) Nebulizer four times a day  sodium chloride 0.9%. 500 milliLiter(s) (500 mL/Hr) IV Continuous <Continuous>  tamsulosin 0.8 milliGRAM(s) Oral at bedtime    MEDICATIONS  (PRN):  ALBUTerol    90 MICROgram(s) HFA Inhaler 2 Puff(s) Inhalation every 6 hours PRN Shortness of Breath and/or Wheezing  dextrose Oral Gel 15 Gram(s) Oral once PRN Blood Glucose LESS THAN 70 milliGRAM(s)/deciliter    Vital Signs Last 24 Hrs  T(C): 36.2 (11 May 2022 04:07), Max: 37.1 (10 May 2022 20:28)  T(F): 97.2 (11 May 2022 04:07), Max: 98.8 (10 May 2022 20:28)  HR: 74 (11 May 2022 10:58) (74 - 88)  BP: 137/75 (11 May 2022 10:58) (119/70 - 137/75)  BP(mean): --  RR: 18 (11 May 2022 10:49) (18 - 18)  SpO2: 96% (11 May 2022 10:49) (92% - 98%)    I&O's Summary    10 May 2022 07:01  -  11 May 2022 07:00  --------------------------------------------------------  IN: 780 mL / OUT: 1350 mL / NET: -570 mL    11 May 2022 07:01  -  11 May 2022 15:15  --------------------------------------------------------  IN: 0 mL / OUT: 700 mL / NET: -700 mL          Physical Exam:   GENERAL: NAD, well-groomed, well-developed  HEENT: LUCA/   Atraumatic, Normocephalic  ENMT: No tonsillar erythema, exudates, or enlargement; Moist mucous membranes, Good dentition, No lesions  NECK: Supple, No JVD, Normal thyroid  CHEST/LUNG: no wheezing  CVS: Regular rate and rhythm; No murmurs, rubs, or gallops  GI: : Soft, Nontender, Nondistended; Bowel sounds present  NERVOUS SYSTEM:  Alert & Oriented X3  EXTREMITIES- edema  LYMPH: No lymphadenopathy noted  SKIN: No rashes or lesions  ENDOCRINOLOGY: No Thyromegaly  PSYCH: Appropriate    Labs:                              9.0    11.35 )-----------( 125      ( 10 May 2022 06:44 )             27.9                         9.5    13.98 )-----------( 147      ( 09 May 2022 07:19 )             29.4                         10.2   10.29 )-----------( 157      ( 09 May 2022 06:13 )             33.6     05-11    135  |  98  |  37<H>  ----------------------------<  236<H>  4.5   |  25  |  1.78<H>  05-10    138  |  100  |  45<H>  ----------------------------<  129<H>  4.0   |  26  |  2.06<H>  05-09    137  |  98  |  42<H>  ----------------------------<  220<H>  4.0   |  24  |  2.00<H>  05-09    137  |  94<L>  |  38<H>  ----------------------------<  152<H>  4.7   |  23  |  1.81<H>    Ca    9.0      11 May 2022 06:57  Ca    9.0      10 May 2022 06:44    TPro  5.5<L>  /  Alb  2.5<L>  /  TBili  0.5  /  DBili  x   /  AST  13  /  ALT  9<L>  /  AlkPhos  56  05-10    CAPILLARY BLOOD GLUCOSE      POCT Blood Glucose.: 293 mg/dL (11 May 2022 12:00)  POCT Blood Glucose.: 282 mg/dL (11 May 2022 08:00)  POCT Blood Glucose.: 227 mg/dL (10 May 2022 21:53)  POCT Blood Glucose.: 214 mg/dL (10 May 2022 16:40)      LIVER FUNCTIONS - ( 10 May 2022 06:44 )  Alb: 2.5 g/dL / Pro: 5.5 g/dL / ALK PHOS: 56 U/L / ALT: 9 U/L / AST: 13 U/L / GGT: x           rad< from: Xray Chest 1 View- PORTABLE-Routine (Xray Chest 1 View- PORTABLE-Routine .) (05.10.22 @ 08:57) >    ACC: [id number] EXAM:  XR CHEST PORTABLE ROUTINE 1V                          PROCEDURE DATE:  05/10/2022          INTERPRETATION:  EXAMINATION: XR CHEST    CLINICAL INDICATION: sob    TECHNIQUE: Single frontal, portable view of the chest was obtained.    COMPARISON: Chest x-ray 5/9/2022.    FINDINGS:  Left chest wall AICD.  The heart is normal in size.  Patchy bibasilar opacities.  Pleural plaques again identified.  Trace left pleural effusion. No pneumothorax.    IMPRESSION:  No significant interval change.    --- End of Report ---          HEATHER CLAROS M.D., RADIOLOGY RESIDENT  This document has been electronically signed.  YONAS OLIVEIRA MD; Attending Radiologist  This document has been electronically signed. May 10 2022  4:11PM    < end of copied text >            RECENT CULTURES:        RESPIRATORY CULTURES:          Studies  Chest X-RAY  CT SCAN Chest   Venous Dopplers: LE:   CT Abdomen  Others              
Patient is a 76y old  Male who presents with a chief complaint of fever and cough. diarrhea and generalized weakness (03 May 2022 11:25)      DATE OF SERVICE: 05-03-22 @ 13:23    SUBJECTIVE / OVERNIGHT EVENTS: overnight events noted  hypotensive overnight seen by MICU now improved     ROS:  Resp: No cough no sputum production  CVS: No chest pain no palpitations no orthopnea  GI: no N/V/D  : no dysuria, no hematuria  Neuro: no weakness no paresthesias  Heme: No petechiae no easy bruising  "I feel fine'       MEDICATIONS  (STANDING):  albuterol/ipratropium for Nebulization 3 milliLiter(s) Nebulizer every 6 hours  apixaban 2.5 milliGRAM(s) Oral every 12 hours  aspirin enteric coated 81 milliGRAM(s) Oral daily  Biotene Dry Mouth Oral Rinse 5 milliLiter(s) Swish and Spit three times a day  calcitriol   Capsule 0.25 MICROGram(s) Oral daily  cyanocobalamin 1000 MICROGram(s) Oral daily  dextrose 5%. 1000 milliLiter(s) (50 mL/Hr) IV Continuous <Continuous>  dextrose 5%. 1000 milliLiter(s) (100 mL/Hr) IV Continuous <Continuous>  dextrose 50% Injectable 25 Gram(s) IV Push once  dextrose 50% Injectable 12.5 Gram(s) IV Push once  dextrose 50% Injectable 25 Gram(s) IV Push once  diVALproex  milliGRAM(s) Oral two times a day  escitalopram 5 milliGRAM(s) Oral daily  ferrous    sulfate 325 milliGRAM(s) Oral daily  finasteride 5 milliGRAM(s) Oral daily  glucagon  Injectable 1 milliGRAM(s) IntraMuscular once  insulin lispro (ADMELOG) corrective regimen sliding scale   SubCutaneous three times a day before meals  levothyroxine 75 MICROGram(s) Oral daily  melatonin 3 milliGRAM(s) Oral at bedtime  midodrine. 10 milliGRAM(s) Oral three times a day  piperacillin/tazobactam IVPB.. 3.375 Gram(s) IV Intermittent every 8 hours  tamsulosin 0.8 milliGRAM(s) Oral at bedtime    MEDICATIONS  (PRN):  ALBUTerol    90 MICROgram(s) HFA Inhaler 2 Puff(s) Inhalation every 6 hours PRN Shortness of Breath and/or Wheezing  dextrose Oral Gel 15 Gram(s) Oral once PRN Blood Glucose LESS THAN 70 milliGRAM(s)/deciliter        CAPILLARY BLOOD GLUCOSE      POCT Blood Glucose.: 88 mg/dL (03 May 2022 08:48)  POCT Blood Glucose.: 120 mg/dL (02 May 2022 18:41)    I&O's Summary    03 May 2022 07:01  -  03 May 2022 13:23  --------------------------------------------------------  IN: 180 mL / OUT: 300 mL / NET: -120 mL        Vital Signs Last 24 Hrs  T(C): 37.3 (03 May 2022 11:45), Max: 37.3 (03 May 2022 11:45)  T(F): 99.1 (03 May 2022 11:45), Max: 99.1 (03 May 2022 11:45)  HR: 61 (03 May 2022 11:45) (58 - 67)  BP: 110/66 (03 May 2022 11:45) (81/47 - 110/66)  BP(mean): 73 (02 May 2022 20:53) (73 - 73)  RR: 18 (03 May 2022 11:45) (18 - 22)  SpO2: 96% (03 May 2022 11:45) (89% - 100%)    PHYSICAL EXAM:   HEENT: LUCA EOMI  Neck: Supple, no JVD  Lungs: clear  CVS: S1 S2 systolic murmur +   Abdomen: no tenderness, BS present  Neuro: AO x 2 -3  mild left UE weakness  much more alert today   Skin: warm, dry  Ext: no edema  Msk: no joint swelling or deformities  Back: no CVA tenderness,    LABS:                        8.2    8.52  )-----------( 87       ( 03 May 2022 09:08 )             25.9     05-03    136  |  103  |  39<H>  ----------------------------<  87  4.1   |  22  |  2.38<H>    Ca    8.0<L>      03 May 2022 06:58    TPro  6.4  /  Alb  3.1<L>  /  TBili  0.4  /  DBili  x   /  AST  32  /  ALT  16  /  AlkPhos  59  05-02    PT/INR - ( 02 May 2022 12:06 )   PT: 19.4 sec;   INR: 1.68 ratio         PTT - ( 02 May 2022 12:06 )  PTT:40.7 sec            All consultant(s) notes reviewed and care discussed with other providers        Contact Number, Dr Larson [phone]
Date of Service: 05-19-22 @ 12:52    Patient is a 76y old  Male who presents with a chief complaint of fever and cough. diarrhea and generalized weakness (19 May 2022 09:46)      Any change in ROS:    looks comfortable:  no sob:  off oxygen at this time   MEDICATIONS  (STANDING):  albuterol/ipratropium for Nebulization 3 milliLiter(s) Nebulizer every 6 hours  apixaban 2.5 milliGRAM(s) Oral every 12 hours  aspirin enteric coated 81 milliGRAM(s) Oral daily  atorvastatin 80 milliGRAM(s) Oral at bedtime  Biotene Dry Mouth Oral Rinse 5 milliLiter(s) Swish and Spit three times a day  calcitriol   Capsule 0.25 MICROGram(s) Oral daily  cyanocobalamin 1000 MICROGram(s) Oral daily  dextrose 5%. 1000 milliLiter(s) (100 mL/Hr) IV Continuous <Continuous>  dextrose 5%. 1000 milliLiter(s) (50 mL/Hr) IV Continuous <Continuous>  dextrose 5%. 1000 milliLiter(s) (100 mL/Hr) IV Continuous <Continuous>  dextrose 50% Injectable 25 Gram(s) IV Push once  dextrose 50% Injectable 12.5 Gram(s) IV Push once  dextrose 50% Injectable 25 Gram(s) IV Push once  diVALproex  milliGRAM(s) Oral two times a day  droxidopa 200 milliGRAM(s) Oral three times a day  epoetin abby-epbx (RETACRIT) Injectable 23697 Unit(s) SubCutaneous every 7 days  escitalopram 5 milliGRAM(s) Oral daily  ferrous    sulfate 325 milliGRAM(s) Oral daily  finasteride 5 milliGRAM(s) Oral daily  fludroCORTISONE 0.2 milliGRAM(s) Oral <User Schedule>  fluticasone propionate 50 MICROgram(s)/spray Nasal Spray 2 Spray(s) Both Nostrils <User Schedule>  glucagon  Injectable 1 milliGRAM(s) IntraMuscular once  glucagon  Injectable 1 milliGRAM(s) IntraMuscular once  guaiFENesin ER 1200 milliGRAM(s) Oral every 12 hours  insulin glargine Injectable (LANTUS) 5 Unit(s) SubCutaneous at bedtime  insulin lispro (ADMELOG) corrective regimen sliding scale   SubCutaneous Before meals and at bedtime  levothyroxine 100 MICROGram(s) Oral daily  melatonin 3 milliGRAM(s) Oral at bedtime  midodrine. 30 milliGRAM(s) Oral three times a day  sodium chloride 0.9% for Nebulization 3 milliLiter(s) Nebulizer four times a day    MEDICATIONS  (PRN):  ALBUTerol    90 MICROgram(s) HFA Inhaler 2 Puff(s) Inhalation every 6 hours PRN Shortness of Breath and/or Wheezing  dextrose Oral Gel 15 Gram(s) Oral once PRN Blood Glucose LESS THAN 70 milliGRAM(s)/deciliter    Vital Signs Last 24 Hrs  T(C): 36.6 (19 May 2022 11:42), Max: 37.3 (18 May 2022 13:59)  T(F): 97.9 (19 May 2022 11:42), Max: 99.1 (18 May 2022 13:59)  HR: 72 (19 May 2022 11:42) (69 - 82)  BP: 145/79 (19 May 2022 11:42) (145/79 - 180/80)  BP(mean): --  RR: 18 (19 May 2022 11:42) (18 - 18)  SpO2: 95% (19 May 2022 11:42) (93% - 96%)    I&O's Summary    18 May 2022 07:01  -  19 May 2022 07:00  --------------------------------------------------------  IN: 480 mL / OUT: 575 mL / NET: -95 mL          Physical Exam:   GENERAL: NAD, well-groomed, well-developed  HEENT: LUCA/   Atraumatic, Normocephalic  ENMT: No tonsillar erythema, exudates, or enlargement; Moist mucous membranes, Good dentition, No lesions  NECK: Supple, No JVD, Normal thyroid  CHEST/LUNG: Clear to auscultaion  CVS: Regular rate and rhythm; No murmurs, rubs, or gallops  GI: : Soft, Nontender, Nondistended; Bowel sounds present  NERVOUS SYSTEM:  Alert & Oriented X3  EXTREMITIES: - edema  LYMPH: No lymphadenopathy noted  SKIN: No rashes or lesions  ENDOCRINOLOGY: No Thyromegaly  PSYCH: Appropriate    Labs:                              8.2    9.49  )-----------( 304      ( 17 May 2022 06:56 )             26.2                         8.8    9.82  )-----------( 263      ( 16 May 2022 05:45 )             26.9     05-19    139  |  98  |  23  ----------------------------<  88  3.5   |  27  |  1.48<H>  05-18    137  |  99  |  25<H>  ----------------------------<  96  3.5   |  27  |  1.56<H>  05-17    139  |  98  |  29<H>  ----------------------------<  92  3.4<L>   |  28  |  1.61<H>  05-16    137  |  99  |  38<H>  ----------------------------<  102<H>  3.5   |  27  |  1.61<H>    Ca    8.5      19 May 2022 07:14  Ca    8.5      18 May 2022 06:31      CAPILLARY BLOOD GLUCOSE      POCT Blood Glucose.: 115 mg/dL (19 May 2022 11:57)  POCT Blood Glucose.: 97 mg/dL (19 May 2022 07:57)  POCT Blood Glucose.: 148 mg/dL (19 May 2022 01:20)  POCT Blood Glucose.: 142 mg/dL (18 May 2022 20:50)  POCT Blood Glucose.: 135 mg/dL (18 May 2022 16:25)      < from: Xray Chest 1 View- PORTABLE-Routine (Xray Chest 1 View- PORTABLE-Routine in AM.) (05.17.22 @ 09:51) >    No pleural effusion.    There may be a small right pneumothorax.    IMPRESSION:  Clear lungs.  Question small right pneumothorax. Recommend follow-up upright imaging.    --- End of Report---    < end of copied text >        Procalcitonin, Serum: 0.12 ng/mL (05-17 @ 06:55)        RECENT CULTURES:        RESPIRATORY CULTURES:          Studies  Chest X-RAY  CT SCAN Chest   Venous Dopplers: LE:   CT Abdomen  Others              
Date of Service: 22 @ 14:24    Patient is a 76y old  Male who presents with a chief complaint of fever and cough. diarrhea and generalized weakness (04 May 2022 12:22)      Any change in ROS: Doing ok: no SOB; wheezing today :    MEDICATIONS  (STANDING):  albuterol/ipratropium for Nebulization 3 milliLiter(s) Nebulizer every 6 hours  albuterol/ipratropium for Nebulization. 3 milliLiter(s) Nebulizer once  apixaban 2.5 milliGRAM(s) Oral every 12 hours  aspirin enteric coated 81 milliGRAM(s) Oral daily  Biotene Dry Mouth Oral Rinse 5 milliLiter(s) Swish and Spit three times a day  calcitriol   Capsule 0.25 MICROGram(s) Oral daily  cyanocobalamin 1000 MICROGram(s) Oral daily  dextrose 5%. 1000 milliLiter(s) (50 mL/Hr) IV Continuous <Continuous>  dextrose 5%. 1000 milliLiter(s) (100 mL/Hr) IV Continuous <Continuous>  dextrose 50% Injectable 25 Gram(s) IV Push once  dextrose 50% Injectable 12.5 Gram(s) IV Push once  dextrose 50% Injectable 25 Gram(s) IV Push once  diVALproex  milliGRAM(s) Oral two times a day  escitalopram 5 milliGRAM(s) Oral daily  ferrous    sulfate 325 milliGRAM(s) Oral daily  finasteride 5 milliGRAM(s) Oral daily  fluticasone propionate 50 MICROgram(s)/spray Nasal Spray 2 Spray(s) Both Nostrils <User Schedule>  glucagon  Injectable 1 milliGRAM(s) IntraMuscular once  insulin lispro (ADMELOG) corrective regimen sliding scale   SubCutaneous three times a day before meals  levothyroxine 75 MICROGram(s) Oral daily  melatonin 3 milliGRAM(s) Oral at bedtime  methylPREDNISolone sodium succinate Injectable 30 milliGRAM(s) IV Push once  midodrine. 10 milliGRAM(s) Oral three times a day  oseltamivir 30 milliGRAM(s) Oral daily  piperacillin/tazobactam IVPB.. 3.375 Gram(s) IV Intermittent every 8 hours  tamsulosin 0.8 milliGRAM(s) Oral at bedtime    MEDICATIONS  (PRN):  ALBUTerol    90 MICROgram(s) HFA Inhaler 2 Puff(s) Inhalation every 6 hours PRN Shortness of Breath and/or Wheezing  dextrose Oral Gel 15 Gram(s) Oral once PRN Blood Glucose LESS THAN 70 milliGRAM(s)/deciliter    Vital Signs Last 24 Hrs  T(C): 37.6 (04 May 2022 14:00), Max: 37.9 (04 May 2022 11:06)  T(F): 99.6 (04 May 2022 14:00), Max: 100.2 (04 May 2022 11:06)  HR: 82 (04 May 2022 14:) (71 - 82)  BP: 140/71 (04 May 2022 14:) (104/53 - 140/71)  BP(mean): --  RR: 16 (04 May 2022 14:00) (16 - 18)  SpO2: 98% (04 May 2022 14:) (91% - 98%)    I&O's Summary    03 May 2022 07:01  -  04 May 2022 07:00  --------------------------------------------------------  IN: 680 mL / OUT: 2075 mL / NET: -1395 mL    04 May 2022 07:01  -  04 May 2022 14:24  --------------------------------------------------------  IN: 180 mL / OUT: 200 mL / NET: -20 mL          Physical Exam:   GENERAL: NAD, well-groomed, well-developed  HEENT: LUCA/   Atraumatic, Normocephalic  ENMT: No tonsillar erythema, exudates, or enlargement; Moist mucous membranes, Good dentition, No lesions  NECK: Supple, No JVD, Normal thyroid  CHEST/LUNG: Ccoarse breath sounds  CVS: Regular rate and rhythm; No murmurs, rubs, or gallops  GI: : Soft, Nontender, Nondistended; Bowel sounds present  NERVOUS SYSTEM:  Alert & Oriented X3  EXTREMITIES:  2+ Peripheral Pulses, No clubbing, cyanosis, or edema  LYMPH: No lymphadenopathy noted  SKIN: No rashes or lesions  ENDOCRINOLOGY: No Thyromegaly  PSYCH: Appropriate    Labs:                              8.2    6.33  )-----------( 96       ( 04 May 2022 06:26 )             25.7                         8.2    8.52  )-----------( 87       ( 03 May 2022 09:08 )             25.9                         7.6    6.96  )-----------( 82       ( 03 May 2022 07:00 )             24.0                         9.1    13.16 )-----------( 111      ( 02 May 2022 12:06 )             29.2     0504    136  |  100  |  34<H>  ----------------------------<  103<H>  4.2   |  21<L>  |  2.35<H>      136  |  103  |  39<H>  ----------------------------<  87  4.1   |  22  |  2.38<H>  05    135  |  101  |  40<H>  ----------------------------<  138<H>  4.7   |  20<L>  |  2.66<H>    Ca    7.7<L>      04 May 2022 06:25  Ca    8.0<L>      03 May 2022 06:58    TPro  x   /  Alb  x   /  TBili  0.3  /  DBili  x   /  AST  x   /  ALT  x   /  AlkPhos  x     TPro  6.4  /  Alb  3.1<L>  /  TBili  0.4  /  DBili  x   /  AST  32  /  ALT  16  /  AlkPhos  59  05    CAPILLARY BLOOD GLUCOSE      POCT Blood Glucose.: 125 mg/dL (04 May 2022 13:01)  POCT Blood Glucose.: 109 mg/dL (04 May 2022 08:40)  POCT Blood Glucose.: 127 mg/dL (03 May 2022 21:32)  POCT Blood Glucose.: 107 mg/dL (03 May 2022 17:38)        PT/INR - ( 04 May 2022 06:25 )   PT: 16.5 sec;   INR: 1.42 ratio           Urinalysis Basic - ( 03 May 2022 14:56 )    Color: Yellow / Appearance: Slightly Turbid / S.024 / pH: x  Gluc: x / Ketone: Negative  / Bili: Negative / Urobili: Negative   Blood: x / Protein: 100 / Nitrite: Positive   Leuk Esterase: Large / RBC: 2 /hpf /  /HPF   Sq Epi: x / Non Sq Epi: 2 /hpf / Bacteria: Many      Serum Pro-Brain Natriuretic Peptide: 83849 pg/mL ( @ 16:25)  Lactate, Blood: 1.1 mmol/L ( @ 14:57)        RECENT CULTURES:   @ 14:45 .Stool Feces         rad< from: CT Chest No Cont (22 @ 13:41) >  interventricular septum and apex of the left ventricle are unchanged. The   aorta is normal in caliber. Atheromatous disease of the aorta.    AICD in place.    Upper Abdomen: The adrenal gland thickening is unchanged. Right kidney is   atrophic.    Bones And Soft Tissues: The bones are unremarkable.  The soft tissues are   unremarkable.      IMPRESSION:    1.  New groundglass opacities (predominantly left-sided) and left upper   lobe opacities.  2.  No change in the bilateral pleural effusions.  3.  No change in the calcified and noncalcified pleural plaques can be a   marker of prior asbestos exposure.    --- End of Report ---      < end of copied text >         Plesiomonas shigelloides  DETECTED by PCR  *******Please Note:*******  GI panel PCR evaluates for:  Campylobacter, Plesiomonas shigelloides, Salmonella,  Vibrio, Yersinia enterocolitica, Enteroaggregative  Escherichia coli (EAEC), Enteropathogenic E.coli (EPEC),  Enterotoxigenic E. coli (ETEC) lt/st, Shiga-like  toxin-producing E. coli (STEC) stx1/stx2,  Shigella/ Enteroinvasive E. coli (EIEC), Cryptosporidium,  Cyclospora cayetanensis, Entamoeba histolytica,  Giardia lamblia, Adenovirus F 40/41, Astrovirus,  Norovirus GI/GII, Rotavirus A, Sapovirus     @ 17:23 .Blood Blood-Peripheral   PCR    Growth in aerobic bottle: Gram Positive Cocci in Clusters    Blood Culture PCR  Blood Culture PCR     Growth in aerobic bottle: Gram Positive Cocci in Clusters  ***Blood Panel PCR results on this specimen are available  approximately 3 hours after the Gram stain result.***  Gram stain, PCR, and/or culture results may not always  correspond due to difference in methodologies.  ************************************************************  This PCR assay was performed by multiplex PCR. This  Assay tests for 66 bacterial and resistance gene targets.  Please refer to the [facility] test directory  at [email or web address] for details.     @ 16:23 .Blood Blood-Peripheral                No growth to date.          RESPIRATORY CULTURES:          Studies  Chest X-RAY  CT SCAN Chest   Venous Dopplers: LE:   CT Abdomen  Others              
No
Patient is a 76y old  Male who presents with a chief complaint of fever and cough. diarrhea and generalized weakness (05 May 2022 11:31)      DATE OF SERVICE: 22 @ 14:34    SUBJECTIVE / OVERNIGHT EVENTS: overnight events noted  "I feel fine today'    ROS:  Resp: No cough no sputum production  CVS: No chest pain no palpitations no orthopnea  GI: no N/V/D  : no dysuria, no hematuria  per RN        MEDICATIONS  (STANDING):  albuterol/ipratropium for Nebulization 3 milliLiter(s) Nebulizer every 6 hours  albuterol/ipratropium for Nebulization. 3 milliLiter(s) Nebulizer once  apixaban 2.5 milliGRAM(s) Oral every 12 hours  aspirin enteric coated 81 milliGRAM(s) Oral daily  Biotene Dry Mouth Oral Rinse 5 milliLiter(s) Swish and Spit three times a day  calcitriol   Capsule 0.25 MICROGram(s) Oral daily  cyanocobalamin 1000 MICROGram(s) Oral daily  dextrose 5%. 1000 milliLiter(s) (50 mL/Hr) IV Continuous <Continuous>  dextrose 5%. 1000 milliLiter(s) (100 mL/Hr) IV Continuous <Continuous>  dextrose 50% Injectable 25 Gram(s) IV Push once  dextrose 50% Injectable 12.5 Gram(s) IV Push once  dextrose 50% Injectable 25 Gram(s) IV Push once  diVALproex  milliGRAM(s) Oral two times a day  epoetin abby-epbx (RETACRIT) Injectable 98040 Unit(s) SubCutaneous every 7 days  escitalopram 5 milliGRAM(s) Oral daily  ferrous    sulfate 325 milliGRAM(s) Oral daily  finasteride 5 milliGRAM(s) Oral daily  fluticasone propionate 50 MICROgram(s)/spray Nasal Spray 2 Spray(s) Both Nostrils <User Schedule>  glucagon  Injectable 1 milliGRAM(s) IntraMuscular once  insulin lispro (ADMELOG) corrective regimen sliding scale   SubCutaneous three times a day before meals  levothyroxine 75 MICROGram(s) Oral daily  melatonin 3 milliGRAM(s) Oral at bedtime  midodrine. 10 milliGRAM(s) Oral three times a day  oseltamivir 30 milliGRAM(s) Oral daily  piperacillin/tazobactam IVPB.. 3.375 Gram(s) IV Intermittent every 8 hours  tamsulosin 0.8 milliGRAM(s) Oral at bedtime    MEDICATIONS  (PRN):  ALBUTerol    90 MICROgram(s) HFA Inhaler 2 Puff(s) Inhalation every 6 hours PRN Shortness of Breath and/or Wheezing  dextrose Oral Gel 15 Gram(s) Oral once PRN Blood Glucose LESS THAN 70 milliGRAM(s)/deciliter        CAPILLARY BLOOD GLUCOSE      POCT Blood Glucose.: 198 mg/dL (05 May 2022 11:49)  POCT Blood Glucose.: 221 mg/dL (05 May 2022 08:10)  POCT Blood Glucose.: 222 mg/dL (04 May 2022 21:01)  POCT Blood Glucose.: 149 mg/dL (04 May 2022 16:16)    I&O's Summary    04 May 2022 07:01  -  05 May 2022 07:00  --------------------------------------------------------  IN: 480 mL / OUT: 550 mL / NET: -70 mL    05 May 2022 07:01  -  05 May 2022 14:34  --------------------------------------------------------  IN: 780 mL / OUT: 0 mL / NET: 780 mL        Vital Signs Last 24 Hrs  T(C): 36.3 (05 May 2022 11:02), Max: 37.1 (05 May 2022 04:24)  T(F): 97.4 (05 May 2022 11:02), Max: 98.7 (05 May 2022 04:24)  HR: 58 (05 May 2022 11:02) (58 - 64)  BP: 93/59 (05 May 2022 11:02) (93/59 - 148/77)  BP(mean): --  RR: 18 (05 May 2022 11:02) (18 - 18)  SpO2: 97% (05 May 2022 11:02) (95% - 97%)    PHYSICAL EXAM:   HEENT: LUCA EOMI  Neck: Supple, no JVD  Lungs: bilateral scattered wheeze  CVS: S1 S2 systolic murmur +   Abdomen: no tenderness, BS present  Neuro: AO x 2 -3  mild left UE weakness  Ext: no edema  Msk: no joint swelling     LABS:                        9.9    4.73  )-----------( 98       ( 05 May 2022 06:24 )             32.1         135  |  99  |  34<H>  ----------------------------<  192<H>  4.5   |  21<L>  |  2.17<H>    Ca    8.6      05 May 2022 06:36    TPro  x   /  Alb  x   /  TBili  0.3  /  DBili  x   /  AST  x   /  ALT  x   /  AlkPhos  x   -    PT/INR - ( 04 May 2022 06:25 )   PT: 16.5 sec;   INR: 1.42 ratio               Urinalysis Basic - ( 03 May 2022 14:56 )    Color: Yellow / Appearance: Slightly Turbid / S.024 / pH: x  Gluc: x / Ketone: Negative  / Bili: Negative / Urobili: Negative   Blood: x / Protein: 100 / Nitrite: Positive   Leuk Esterase: Large / RBC: 2 /hpf /  /HPF   Sq Epi: x / Non Sq Epi: 2 /hpf / Bacteria: Many          All consultant(s) notes reviewed and care discussed with other providers        Contact Number, Dr Larson [phone]
Patient is a 76y old  Male who presents with a chief complaint of fever and cough. diarrhea and generalized weakness (09 May 2022 11:10)      DATE OF SERVICE: 05-09-22 @ 14:50    SUBJECTIVE / OVERNIGHT EVENTS: overnight events noted    ROS:  Resp: No cough no sputum production  CVS: No chest pain no palpitations no orthopnea  GI: no N/V/D  : no dysuria, no hematuria          MEDICATIONS  (STANDING):  albuterol/ipratropium for Nebulization 3 milliLiter(s) Nebulizer every 6 hours  apixaban 2.5 milliGRAM(s) Oral every 12 hours  aspirin enteric coated 81 milliGRAM(s) Oral daily  BACItracin   Ointment 1 Application(s) Topical two times a day  Biotene Dry Mouth Oral Rinse 5 milliLiter(s) Swish and Spit three times a day  calcitriol   Capsule 0.25 MICROGram(s) Oral daily  cyanocobalamin 1000 MICROGram(s) Oral daily  dextrose 5%. 1000 milliLiter(s) (50 mL/Hr) IV Continuous <Continuous>  dextrose 5%. 1000 milliLiter(s) (100 mL/Hr) IV Continuous <Continuous>  dextrose 50% Injectable 25 Gram(s) IV Push once  dextrose 50% Injectable 12.5 Gram(s) IV Push once  dextrose 50% Injectable 25 Gram(s) IV Push once  diVALproex  milliGRAM(s) Oral two times a day  epoetin abby-epbx (RETACRIT) Injectable 71458 Unit(s) SubCutaneous every 7 days  escitalopram 5 milliGRAM(s) Oral daily  ferrous    sulfate 325 milliGRAM(s) Oral daily  finasteride 5 milliGRAM(s) Oral daily  fluticasone propionate 50 MICROgram(s)/spray Nasal Spray 2 Spray(s) Both Nostrils <User Schedule>  furosemide    Tablet 20 milliGRAM(s) Oral daily  glucagon  Injectable 1 milliGRAM(s) IntraMuscular once  insulin lispro (ADMELOG) corrective regimen sliding scale   SubCutaneous three times a day before meals  levothyroxine 75 MICROGram(s) Oral daily  melatonin 3 milliGRAM(s) Oral at bedtime  metoprolol succinate ER 12.5 milliGRAM(s) Oral daily  midodrine. 10 milliGRAM(s) Oral three times a day  sodium chloride 0.9% for Nebulization 3 milliLiter(s) Nebulizer four times a day  tamsulosin 0.8 milliGRAM(s) Oral at bedtime    MEDICATIONS  (PRN):  ALBUTerol    90 MICROgram(s) HFA Inhaler 2 Puff(s) Inhalation every 6 hours PRN Shortness of Breath and/or Wheezing  dextrose Oral Gel 15 Gram(s) Oral once PRN Blood Glucose LESS THAN 70 milliGRAM(s)/deciliter        CAPILLARY BLOOD GLUCOSE      POCT Blood Glucose.: 216 mg/dL (09 May 2022 12:00)  POCT Blood Glucose.: 195 mg/dL (09 May 2022 07:51)  POCT Blood Glucose.: 256 mg/dL (08 May 2022 21:57)  POCT Blood Glucose.: 222 mg/dL (08 May 2022 16:45)    I&O's Summary    08 May 2022 07:01  -  09 May 2022 07:00  --------------------------------------------------------  IN: 200 mL / OUT: 350 mL / NET: -150 mL    09 May 2022 07:01  -  09 May 2022 14:50  --------------------------------------------------------  IN: 720 mL / OUT: 300 mL / NET: 420 mL        Vital Signs Last 24 Hrs  T(C): 37.3 (09 May 2022 11:01), Max: 37.3 (09 May 2022 11:01)  T(F): 99.2 (09 May 2022 11:01), Max: 99.2 (09 May 2022 11:01)  HR: 71 (09 May 2022 11:30) (67 - 80)  BP: 94/58 (09 May 2022 11:30) (89/54 - 168/81)  BP(mean): --  RR: 17 (09 May 2022 11:30) (17 - 18)  SpO2: 94% (09 May 2022 11:30) (90% - 97%)    PHYSICAL EXAM:   HEENT: LUCA EOMI  Neck: Supple, no JVD  Lungs: improved wheeze  CVS: S1 S2 systolic murmur +   Abdomen: no tenderness, BS present  Neuro: Alert no change  Ext: no edema      LABS:                        9.5    13.98 )-----------( 147      ( 09 May 2022 07:19 )             29.4     05-09    137  |  98  |  42<H>  ----------------------------<  220<H>  4.0   |  24  |  2.00<H>    Ca    9.0      09 May 2022 07:12  Mg     2.0     05-09                  All consultant(s) notes reviewed and care discussed with other providers        Contact Number, Dr Larson [phone]
Patient is a 76y old  Male who presents with a chief complaint of fever and cough. diarrhea and generalized weakness (10 May 2022 10:47)      DATE OF SERVICE: 05-10-22 @ 16:11    SUBJECTIVE / OVERNIGHT EVENTS: overnight events noted    ROS:  Resp: No cough no sputum production  CVS: No chest pain no palpitations no orthopnea  GI: no N/V/D  : no dysuria, no hematuria  Neuro: no weakness no paresthesias        MEDICATIONS  (STANDING):  albuterol/ipratropium for Nebulization 3 milliLiter(s) Nebulizer every 6 hours  apixaban 2.5 milliGRAM(s) Oral every 12 hours  aspirin enteric coated 81 milliGRAM(s) Oral daily  BACItracin   Ointment 1 Application(s) Topical two times a day  Biotene Dry Mouth Oral Rinse 5 milliLiter(s) Swish and Spit three times a day  calcitriol   Capsule 0.25 MICROGram(s) Oral daily  cyanocobalamin 1000 MICROGram(s) Oral daily  dextrose 5%. 1000 milliLiter(s) (50 mL/Hr) IV Continuous <Continuous>  dextrose 5%. 1000 milliLiter(s) (100 mL/Hr) IV Continuous <Continuous>  dextrose 50% Injectable 25 Gram(s) IV Push once  dextrose 50% Injectable 12.5 Gram(s) IV Push once  dextrose 50% Injectable 25 Gram(s) IV Push once  diVALproex  milliGRAM(s) Oral two times a day  epoetin abby-epbx (RETACRIT) Injectable 17599 Unit(s) SubCutaneous every 7 days  escitalopram 5 milliGRAM(s) Oral daily  ferrous    sulfate 325 milliGRAM(s) Oral daily  finasteride 5 milliGRAM(s) Oral daily  fluticasone propionate 50 MICROgram(s)/spray Nasal Spray 2 Spray(s) Both Nostrils <User Schedule>  furosemide    Tablet 20 milliGRAM(s) Oral daily  glucagon  Injectable 1 milliGRAM(s) IntraMuscular once  insulin lispro (ADMELOG) corrective regimen sliding scale   SubCutaneous three times a day before meals  levothyroxine 75 MICROGram(s) Oral daily  melatonin 3 milliGRAM(s) Oral at bedtime  metoprolol succinate ER 12.5 milliGRAM(s) Oral at bedtime  midodrine. 10 milliGRAM(s) Oral three times a day  oxymetazoline 0.05% Nasal Spray 1 Spray(s) Both Nostrils every 12 hours  predniSONE   Tablet 20 milliGRAM(s) Oral every 12 hours  sodium chloride 0.9% for Nebulization 3 milliLiter(s) Nebulizer four times a day  tamsulosin 0.8 milliGRAM(s) Oral at bedtime    MEDICATIONS  (PRN):  ALBUTerol    90 MICROgram(s) HFA Inhaler 2 Puff(s) Inhalation every 6 hours PRN Shortness of Breath and/or Wheezing  dextrose Oral Gel 15 Gram(s) Oral once PRN Blood Glucose LESS THAN 70 milliGRAM(s)/deciliter        CAPILLARY BLOOD GLUCOSE      POCT Blood Glucose.: 214 mg/dL (10 May 2022 11:56)  POCT Blood Glucose.: 145 mg/dL (10 May 2022 07:54)  POCT Blood Glucose.: 239 mg/dL (09 May 2022 22:00)  POCT Blood Glucose.: 248 mg/dL (09 May 2022 16:44)    I&O's Summary    09 May 2022 07:01  -  10 May 2022 07:00  --------------------------------------------------------  IN: 720 mL / OUT: 700 mL / NET: 20 mL    10 May 2022 07:01  -  10 May 2022 16:11  --------------------------------------------------------  IN: 780 mL / OUT: 700 mL / NET: 80 mL        Vital Signs Last 24 Hrs  T(C): 37.6 (10 May 2022 10:55), Max: 37.6 (10 May 2022 10:55)  T(F): 99.7 (10 May 2022 10:55), Max: 99.7 (10 May 2022 10:55)  HR: 81 (10 May 2022 10:55) (70 - 87)  BP: 96/59 (10 May 2022 10:55) (90/50 - 125/68)  BP(mean): --  RR: 17 (10 May 2022 10:55) (17 - 18)  SpO2: 94% (10 May 2022 10:55) (88% - 95%)      PHYSICAL EXAM:   HEENT: LUCA EOMI  Neck: Supple, no JVD  Lungs: improved wheeze  CVS: S1 S2 systolic murmur +   Abdomen: no tenderness, BS present  Neuro: Alert no change  Ext: no edema    LABS:                        9.0    11.35 )-----------( 125      ( 10 May 2022 06:44 )             27.9     05-10    138  |  100  |  45<H>  ----------------------------<  129<H>  4.0   |  26  |  2.06<H>    Ca    9.0      10 May 2022 06:44  Mg     2.0     05-09    TPro  5.5<L>  /  Alb  2.5<L>  /  TBili  0.5  /  DBili  x   /  AST  13  /  ALT  9<L>  /  AlkPhos  56  05-10                All consultant(s) notes reviewed and care discussed with other providers        Contact Number, Dr Larson [phone]
Patient is a 76y old  Male who presents with a chief complaint of fever and cough. diarrhea and generalized weakness (13 May 2022 14:33)      DATE OF SERVICE: 05-14-22 @ 11:53    SUBJECTIVE / OVERNIGHT EVENTS: overnight events noted    ROS:  Resp: No cough no sputum production  CVS: No chest pain no palpitations no orthopnea  GI: no N/V/D          MEDICATIONS  (STANDING):  albuterol/ipratropium for Nebulization 3 milliLiter(s) Nebulizer every 6 hours  apixaban 2.5 milliGRAM(s) Oral every 12 hours  aspirin enteric coated 81 milliGRAM(s) Oral daily  Biotene Dry Mouth Oral Rinse 5 milliLiter(s) Swish and Spit three times a day  calcitriol   Capsule 0.25 MICROGram(s) Oral daily  cyanocobalamin 1000 MICROGram(s) Oral daily  dextrose 5%. 1000 milliLiter(s) (100 mL/Hr) IV Continuous <Continuous>  dextrose 5%. 1000 milliLiter(s) (50 mL/Hr) IV Continuous <Continuous>  dextrose 5%. 1000 milliLiter(s) (100 mL/Hr) IV Continuous <Continuous>  dextrose 50% Injectable 25 Gram(s) IV Push once  dextrose 50% Injectable 12.5 Gram(s) IV Push once  dextrose 50% Injectable 25 Gram(s) IV Push once  diVALproex  milliGRAM(s) Oral two times a day  epoetin abby-epbx (RETACRIT) Injectable 17259 Unit(s) SubCutaneous every 7 days  escitalopram 5 milliGRAM(s) Oral daily  ferrous    sulfate 325 milliGRAM(s) Oral daily  finasteride 5 milliGRAM(s) Oral daily  fludroCORTISONE 0.1 milliGRAM(s) Oral <User Schedule>  fluticasone propionate 50 MICROgram(s)/spray Nasal Spray 2 Spray(s) Both Nostrils <User Schedule>  glucagon  Injectable 1 milliGRAM(s) IntraMuscular once  glucagon  Injectable 1 milliGRAM(s) IntraMuscular once  insulin glargine Injectable (LANTUS) 5 Unit(s) SubCutaneous at bedtime  insulin lispro (ADMELOG) corrective regimen sliding scale   SubCutaneous Before meals and at bedtime  levothyroxine 75 MICROGram(s) Oral daily  melatonin 3 milliGRAM(s) Oral at bedtime  midodrine. 20 milliGRAM(s) Oral three times a day  sodium chloride 0.9% for Nebulization 3 milliLiter(s) Nebulizer four times a day    MEDICATIONS  (PRN):  ALBUTerol    90 MICROgram(s) HFA Inhaler 2 Puff(s) Inhalation every 6 hours PRN Shortness of Breath and/or Wheezing  dextrose Oral Gel 15 Gram(s) Oral once PRN Blood Glucose LESS THAN 70 milliGRAM(s)/deciliter        CAPILLARY BLOOD GLUCOSE      POCT Blood Glucose.: 169 mg/dL (14 May 2022 07:55)  POCT Blood Glucose.: 220 mg/dL (13 May 2022 21:33)  POCT Blood Glucose.: 290 mg/dL (13 May 2022 16:44)  POCT Blood Glucose.: 275 mg/dL (13 May 2022 12:14)    I&O's Summary    13 May 2022 07:01  -  14 May 2022 07:00  --------------------------------------------------------  IN: 1530 mL / OUT: 1500 mL / NET: 30 mL    14 May 2022 07:01  -  14 May 2022 11:53  --------------------------------------------------------  IN: 480 mL / OUT: 400 mL / NET: 80 mL        Vital Signs Last 24 Hrs  T(C): 36.6 (14 May 2022 11:00), Max: 36.7 (13 May 2022 20:38)  T(F): 97.9 (14 May 2022 11:00), Max: 98.1 (13 May 2022 20:38)  HR: 79 (14 May 2022 11:00) (77 - 86)  BP: 118/72 (14 May 2022 11:00) (118/72 - 151/77)  BP(mean): --  RR: 17 (14 May 2022 11:00) (17 - 18)  SpO2: 93% (14 May 2022 11:00) (91% - 93%)    PHYSICAL EXAM:   HEENT: LUCA EOMI  Neck: Supple, no JVD  Lungs: clear  CVS: S1 S2 systolic murmur +   Abdomen: no tenderness, BS present  Neuro: Alert no change  Ext: no edema    LABS:                        8.5    11.01 )-----------( 262      ( 14 May 2022 07:32 )             27.2     05-14    136  |  99  |  38<H>  ----------------------------<  169<H>  3.9   |  26  |  1.63<H>    Ca    8.7      14 May 2022 07:26                  All consultant(s) notes reviewed and care discussed with other providers        Contact Number, Dr Larson [phone]
Date of Service: 05-07-22 @ 11:34    Patient is a 76y old  Male who presents with a chief complaint of fever and cough. diarrhea and generalized weakness (06 May 2022 16:54)      Any change in ROS: he is on room air:   no SOB: no cough : no phlegm ;   wheezingminimal      MEDICATIONS  (STANDING):  albuterol/ipratropium for Nebulization 3 milliLiter(s) Nebulizer every 6 hours  apixaban 2.5 milliGRAM(s) Oral every 12 hours  aspirin enteric coated 81 milliGRAM(s) Oral daily  Biotene Dry Mouth Oral Rinse 5 milliLiter(s) Swish and Spit three times a day  calcitriol   Capsule 0.25 MICROGram(s) Oral daily  cyanocobalamin 1000 MICROGram(s) Oral daily  dextrose 5%. 1000 milliLiter(s) (50 mL/Hr) IV Continuous <Continuous>  dextrose 5%. 1000 milliLiter(s) (100 mL/Hr) IV Continuous <Continuous>  dextrose 50% Injectable 25 Gram(s) IV Push once  dextrose 50% Injectable 12.5 Gram(s) IV Push once  dextrose 50% Injectable 25 Gram(s) IV Push once  diVALproex  milliGRAM(s) Oral two times a day  epoetin abby-epbx (RETACRIT) Injectable 94011 Unit(s) SubCutaneous every 7 days  escitalopram 5 milliGRAM(s) Oral daily  ferrous    sulfate 325 milliGRAM(s) Oral daily  finasteride 5 milliGRAM(s) Oral daily  fluticasone propionate 50 MICROgram(s)/spray Nasal Spray 2 Spray(s) Both Nostrils <User Schedule>  furosemide    Tablet 20 milliGRAM(s) Oral daily  glucagon  Injectable 1 milliGRAM(s) IntraMuscular once  insulin lispro (ADMELOG) corrective regimen sliding scale   SubCutaneous three times a day before meals  levothyroxine 75 MICROGram(s) Oral daily  melatonin 3 milliGRAM(s) Oral at bedtime  metoprolol succinate ER 12.5 milliGRAM(s) Oral daily  midodrine. 10 milliGRAM(s) Oral three times a day  oseltamivir 30 milliGRAM(s) Oral daily  tamsulosin 0.8 milliGRAM(s) Oral at bedtime    MEDICATIONS  (PRN):  ALBUTerol    90 MICROgram(s) HFA Inhaler 2 Puff(s) Inhalation every 6 hours PRN Shortness of Breath and/or Wheezing  dextrose Oral Gel 15 Gram(s) Oral once PRN Blood Glucose LESS THAN 70 milliGRAM(s)/deciliter    Vital Signs Last 24 Hrs  T(C): 36.6 (07 May 2022 04:08), Max: 37 (06 May 2022 21:52)  T(F): 97.8 (07 May 2022 04:08), Max: 98.6 (06 May 2022 21:52)  HR: 62 (07 May 2022 04:08) (57 - 64)  BP: 142/82 (07 May 2022 04:08) (115/76 - 169/87)  BP(mean): --  RR: 18 (07 May 2022 04:08) (18 - 18)  SpO2: 97% (07 May 2022 04:08) (94% - 97%)    I&O's Summary    06 May 2022 07:01  -  07 May 2022 07:00  --------------------------------------------------------  IN: 420 mL / OUT: 300 mL / NET: 120 mL          Physical Exam:   GENERAL: NAD, well-groomed, well-developed  HEENT: LUCA/   Atraumatic, Normocephalic  ENMT: No tonsillar erythema, exudates, or enlargement; Moist mucous membranes, Good dentition, No lesions  NECK: Supple, No JVD, Normal thyroid  CHEST/LUNG: Mild coarse rhonchi+  CVS: Regular rate and rhythm; No murmurs, rubs, or gallops  GI: : Soft, Nontender, Nondistended; Bowel sounds present  NERVOUS SYSTEM:  Alert & Oriented X3  EXTREMITIES:  - edema  LYMPH: No lymphadenopathy noted  SKIN: No rashes or lesions  ENDOCRINOLOGY: No Thyromegaly  PSYCH: Appropriate    Labs:                              9.3    9.63  )-----------( 131      ( 07 May 2022 07:27 )             28.6                         9.7    8.71  )-----------( 107      ( 06 May 2022 07:08 )             29.9                         9.9    4.73  )-----------( 98       ( 05 May 2022 06:24 )             32.1                         8.2    6.33  )-----------( 96       ( 04 May 2022 06:26 )             25.7     05-07    135  |  96  |  37<H>  ----------------------------<  237<H>  4.1   |  24  |  1.92<H>  05-06    136  |  97  |  38<H>  ----------------------------<  206<H>  4.3   |  23  |  1.93<H>  05-05    135  |  99  |  34<H>  ----------------------------<  192<H>  4.5   |  21<L>  |  2.17<H>  05-04    136  |  100  |  34<H>  ----------------------------<  103<H>  4.2   |  21<L>  |  2.35<H>    Ca    8.7      07 May 2022 07:25  Ca    8.7      06 May 2022 07:05    TPro  x   /  Alb  x   /  TBili  0.3  /  DBili  x   /  AST  x   /  ALT  x   /  AlkPhos  x   05-04    CAPILLARY BLOOD GLUCOSE      POCT Blood Glucose.: 282 mg/dL (07 May 2022 07:50)  POCT Blood Glucose.: 358 mg/dL (06 May 2022 21:48)  POCT Blood Glucose.: 280 mg/dL (06 May 2022 17:17)  POCT Blood Glucose.: 231 mg/dL (06 May 2022 12:11)            < from: CT Chest No Cont (05.02.22 @ 13:41) >  IMPRESSION:    1.  New groundglass opacities (predominantly left-sided) and left upper   lobe opacities.  2.  No change in the bilateral pleural effusions.  3.  No change in the calcified and noncalcified pleural plaques can be a   marker of prior asbestos exposure.    --- End of Report ---    < end of copied text >        RECENT CULTURES:  05-03 @ 19:05 .Blood Blood                No growth to date.    05-03 @ 14:56 Clean Catch Clean Catch (Midstream)   BARNEY      Klebsiella pneumoniae  Klebsiella pneumoniae     >100,000 CFU/ml Klebsiella pneumoniae    05-03 @ 14:45 .Stool Feces                Plesiomonas shigelloides  DETECTED by PCR  *******Please Note:*******  GI panel PCR evaluates for:  Campylobacter, Plesiomonas shigelloides, Salmonella,  Vibrio, Yersinia enterocolitica, Enteroaggregative  Escherichia coli (EAEC), Enteropathogenic E.coli (EPEC),  Enterotoxigenic E. coli (ETEC) lt/st, Shiga-like  toxin-producing E. coli (STEC) stx1/stx2,  Shigella/ Enteroinvasive E. coli (EIEC), Cryptosporidium,  Cyclospora cayetanensis, Entamoeba histolytica,  Giardia lamblia, Adenovirus F 40/41, Astrovirus,  Norovirus GI/GII, Rotavirus A, Sapovirus    05-02 @ 17:23 .Blood Blood-Peripheral   PCR    Growth in aerobic bottle: Gram Positive Cocci in Clusters    Blood Culture PCR  Blood Culture PCR     Growth in aerobic bottle: Staphylococcus epidermidis  Coag Negative Staphylococcus  Single set isolate, possible contaminant. Contact  Microbiology if susceptibility testing clinically  indicated.  ***Blood Panel PCR results on this specimen are available  approximately 3 hours after the Gram stain result.***  Gram stain, PCR, and/or culture results may not always  correspond due to difference in methodologies.  ************************************************************  This PCR assay was performed by multiplex PCR. This  Assay tests for 66 bacterial and resistance gene targets.  Please refer to the [facility] test directory  at [email or web address] for details.    05-02 @ 16:23 .Blood Blood-Peripheral                No growth to date.          RESPIRATORY CULTURES:          Studies  Chest X-RAY  CT SCAN Chest   Venous Dopplers: LE:   CT Abdomen  Others              
Date of Service: 05-13-22 @ 13:12    Patient is a 76y old  Male who presents with a chief complaint of fever and cough. diarrhea and generalized weakness (13 May 2022 11:22)      Any change in ROS:  doing OK; no SOB: on bianka kathy:     MEDICATIONS  (STANDING):  albuterol/ipratropium for Nebulization 3 milliLiter(s) Nebulizer every 6 hours  apixaban 2.5 milliGRAM(s) Oral every 12 hours  aspirin enteric coated 81 milliGRAM(s) Oral daily  Biotene Dry Mouth Oral Rinse 5 milliLiter(s) Swish and Spit three times a day  calcitriol   Capsule 0.25 MICROGram(s) Oral daily  cyanocobalamin 1000 MICROGram(s) Oral daily  dextrose 5%. 1000 milliLiter(s) (100 mL/Hr) IV Continuous <Continuous>  dextrose 5%. 1000 milliLiter(s) (50 mL/Hr) IV Continuous <Continuous>  dextrose 5%. 1000 milliLiter(s) (100 mL/Hr) IV Continuous <Continuous>  dextrose 50% Injectable 25 Gram(s) IV Push once  dextrose 50% Injectable 12.5 Gram(s) IV Push once  dextrose 50% Injectable 25 Gram(s) IV Push once  diVALproex  milliGRAM(s) Oral two times a day  epoetin abby-epbx (RETACRIT) Injectable 25154 Unit(s) SubCutaneous every 7 days  escitalopram 5 milliGRAM(s) Oral daily  ferrous    sulfate 325 milliGRAM(s) Oral daily  finasteride 5 milliGRAM(s) Oral daily  fludroCORTISONE 0.1 milliGRAM(s) Oral <User Schedule>  fluticasone propionate 50 MICROgram(s)/spray Nasal Spray 2 Spray(s) Both Nostrils <User Schedule>  glucagon  Injectable 1 milliGRAM(s) IntraMuscular once  glucagon  Injectable 1 milliGRAM(s) IntraMuscular once  insulin glargine Injectable (LANTUS) 5 Unit(s) SubCutaneous at bedtime  insulin lispro (ADMELOG) corrective regimen sliding scale   SubCutaneous Before meals and at bedtime  levothyroxine 75 MICROGram(s) Oral daily  melatonin 3 milliGRAM(s) Oral at bedtime  midodrine. 20 milliGRAM(s) Oral three times a day  oxymetazoline 0.05% Nasal Spray 1 Spray(s) Both Nostrils every 12 hours  sodium chloride 0.9% for Nebulization 3 milliLiter(s) Nebulizer four times a day  sodium chloride 0.9%. 1000 milliLiter(s) (50 mL/Hr) IV Continuous <Continuous>    MEDICATIONS  (PRN):  ALBUTerol    90 MICROgram(s) HFA Inhaler 2 Puff(s) Inhalation every 6 hours PRN Shortness of Breath and/or Wheezing  dextrose Oral Gel 15 Gram(s) Oral once PRN Blood Glucose LESS THAN 70 milliGRAM(s)/deciliter    Vital Signs Last 24 Hrs  T(C): 37.1 (13 May 2022 04:21), Max: 37.1 (13 May 2022 04:21)  T(F): 98.8 (13 May 2022 04:21), Max: 98.8 (13 May 2022 04:21)  HR: 81 (13 May 2022 05:41) (81 - 94)  BP: 148/78 (13 May 2022 05:41) (111/69 - 161/75)  BP(mean): --  RR: 18 (13 May 2022 04:21) (18 - 18)  SpO2: 95% (13 May 2022 04:21) (92% - 95%)    I&O's Summary    12 May 2022 07:01  -  13 May 2022 07:00  --------------------------------------------------------  IN: 625 mL / OUT: 1700 mL / NET: -1075 mL          Physical Exam:   GENERAL: NAD, well-groomed, well-developed  HEENT: LUCA/   Atraumatic, Normocephalic  ENMT: No tonsillar erythema, exudates, or enlargement; Moist mucous membranes, Good dentition, No lesions  NECK: Supple, No JVD, Normal thyroid  CHEST/LUNG:mild coarse rhonchi'   CVS: Regular rate and rhythm; No murmurs, rubs, or gallops  GI: : Soft, Nontender, Nondistended; Bowel sounds present  NERVOUS SYSTEM:  Alert & Oriented X3  EXTREMITIES:  - edema  LYMPH: No lymphadenopathy noted  SKIN: No rashes or lesions  ENDOCRINOLOGY: No Thyromegaly  PSYCH: Appropriate    Labs:                              8.5    11.08 )-----------( 232      ( 13 May 2022 06:22 )             26.3                         9.0    11.35 )-----------( 125      ( 10 May 2022 06:44 )             27.9     05-13    138  |  98  |  38<H>  ----------------------------<  242<H>  3.9   |  25  |  1.70<H>  05-12    134<L>  |  97  |  43<H>  ----------------------------<  306<H>  4.0   |  24  |  1.66<H>  05-11    135  |  98  |  37<H>  ----------------------------<  236<H>  4.5   |  25  |  1.78<H>  05-10    138  |  100  |  45<H>  ----------------------------<  129<H>  4.0   |  26  |  2.06<H>    Ca    9.0      13 May 2022 06:22  Ca    9.0      12 May 2022 13:20    TPro  5.5<L>  /  Alb  2.5<L>  /  TBili  0.5  /  DBili  x   /  AST  13  /  ALT  9<L>  /  AlkPhos  56  05-10    CAPILLARY BLOOD GLUCOSE      POCT Blood Glucose.: 272 mg/dL (13 May 2022 08:19)  POCT Blood Glucose.: 349 mg/dL (12 May 2022 23:20)  POCT Blood Glucose.: 351 mg/dL (12 May 2022 21:14)  POCT Blood Glucose.: 391 mg/dL (12 May 2022 16:48)            rad< from: Xray Chest 1 View- PORTABLE-Routine (Xray Chest 1 View- PORTABLE-Routine .) (05.10.22 @ 08:57) >    ACC: [id number] EXAM:  XR CHEST PORTABLE ROUTINE 1V                          PROCEDURE DATE:  05/10/2022          INTERPRETATION:  EXAMINATION: XR CHEST    CLINICAL INDICATION: sob    TECHNIQUE: Single frontal, portable view of the chest was obtained.    COMPARISON: Chest x-ray 5/9/2022.    FINDINGS:  Left chest wall AICD.  The heart is normal in size.  Patchy bibasilar opacities.  Pleural plaques again identified.  Trace left pleural effusion. No pneumothorax.    IMPRESSION:  No significant interval change.    --- End of Report ---          HEATHER CLAROS M.D., RADIOLOGY RESIDENT  This document has been electronically signed.  YONAS OLIVEIRA MD; Attending Radiologist  This document has been electronically signed. May 10 2022  4:11PM    < end of copied text >        RECENT CULTURES:        RESPIRATORY CULTURES:          Studies  Chest X-RAY  CT SCAN Chest   Venous Dopplers: LE:   CT Abdomen  Others              
Date of Service: 05-17-22 @ 10:45    Patient is a 76y old  Male who presents with a chief complaint of fever and cough. diarrhea and generalized weakness (16 May 2022 14:36)    Any change in ROS:   O2 sats 94% on 2LNC  Still with intermittent congested cough  Denies CP, SOB    MEDICATIONS  (STANDING):  albuterol/ipratropium for Nebulization 3 milliLiter(s) Nebulizer every 6 hours  apixaban 2.5 milliGRAM(s) Oral every 12 hours  aspirin enteric coated 81 milliGRAM(s) Oral daily  Biotene Dry Mouth Oral Rinse 5 milliLiter(s) Swish and Spit three times a day  calcitriol   Capsule 0.25 MICROGram(s) Oral daily  cyanocobalamin 1000 MICROGram(s) Oral daily  dextrose 5%. 1000 milliLiter(s) (50 mL/Hr) IV Continuous <Continuous>  dextrose 5%. 1000 milliLiter(s) (100 mL/Hr) IV Continuous <Continuous>  dextrose 5%. 1000 milliLiter(s) (100 mL/Hr) IV Continuous <Continuous>  dextrose 50% Injectable 25 Gram(s) IV Push once  dextrose 50% Injectable 12.5 Gram(s) IV Push once  dextrose 50% Injectable 25 Gram(s) IV Push once  diVALproex  milliGRAM(s) Oral two times a day  droxidopa 100 milliGRAM(s) Oral three times a day  epoetin abby-epbx (RETACRIT) Injectable 76237 Unit(s) SubCutaneous every 7 days  escitalopram 5 milliGRAM(s) Oral daily  ferrous    sulfate 325 milliGRAM(s) Oral daily  finasteride 5 milliGRAM(s) Oral daily  fludroCORTISONE 0.2 milliGRAM(s) Oral <User Schedule>  fluticasone propionate 50 MICROgram(s)/spray Nasal Spray 2 Spray(s) Both Nostrils <User Schedule>  glucagon  Injectable 1 milliGRAM(s) IntraMuscular once  glucagon  Injectable 1 milliGRAM(s) IntraMuscular once  guaiFENesin ER 1200 milliGRAM(s) Oral every 12 hours  insulin glargine Injectable (LANTUS) 5 Unit(s) SubCutaneous at bedtime  insulin lispro (ADMELOG) corrective regimen sliding scale   SubCutaneous Before meals and at bedtime  levothyroxine 75 MICROGram(s) Oral daily  melatonin 3 milliGRAM(s) Oral at bedtime  midodrine. 30 milliGRAM(s) Oral three times a day  sodium chloride 0.9% for Nebulization 3 milliLiter(s) Nebulizer four times a day    MEDICATIONS  (PRN):  ALBUTerol    90 MICROgram(s) HFA Inhaler 2 Puff(s) Inhalation every 6 hours PRN Shortness of Breath and/or Wheezing  dextrose Oral Gel 15 Gram(s) Oral once PRN Blood Glucose LESS THAN 70 milliGRAM(s)/deciliter    Vital Signs Last 24 Hrs  T(C): 37 (17 May 2022 10:33), Max: 37.6 (17 May 2022 04:35)  T(F): 98.6 (17 May 2022 10:33), Max: 99.6 (17 May 2022 04:35)  HR: 75 (17 May 2022 10:33) (66 - 79)  BP: 138/71 (17 May 2022 10:33) (85/53 - 160/73)  BP(mean): --  RR: 18 (17 May 2022 10:33) (18 - 18)  SpO2: 96% (17 May 2022 10:33) (93% - 96%)    I&O's Summary    16 May 2022 07:01  -  17 May 2022 07:00  --------------------------------------------------------  IN: 1120 mL / OUT: 1300 mL / NET: -180 mL    Physical Exam:   GENERAL: NAD  HEENT: LUCA  ENMT: No tonsillar erythema, exudates, or enlargement  NECK: Supple, No JVD  CHEST/LUNG: Scattered rhonchi R>L  CVS: Regular rate and rhythm  GI: : Soft, Nontender, Nondistended  NERVOUS SYSTEM:  Alert & Oriented X2  EXTREMITIES:  2+ Peripheral Pulses, No clubbing, cyanosis, or edema  SKIN: No rashes or lesions  PSYCH: Appropriate    Labs:                        8.2    9.49  )-----------( 304      ( 17 May 2022 06:56 )             26.2                         8.8    9.82  )-----------( 263      ( 16 May 2022 05:45 )             26.9                         8.5    11.01 )-----------( 262      ( 14 May 2022 07:32 )             27.2     05-17    139  |  98  |  29<H>  ----------------------------<  92  3.4<L>   |  28  |  1.61<H>  05-16    137  |  99  |  38<H>  ----------------------------<  102<H>  3.5   |  27  |  1.61<H>  05-15    135  |  99  |  40<H>  ----------------------------<  141<H>  3.4<L>   |  25  |  1.58<H>  05-14    136  |  99  |  38<H>  ----------------------------<  169<H>  3.9   |  26  |  1.63<H>    Ca    8.7      17 May 2022 06:55  Ca    8.8      16 May 2022 05:45      CAPILLARY BLOOD GLUCOSE  POCT Blood Glucose.: 103 mg/dL (17 May 2022 07:53)  POCT Blood Glucose.: 186 mg/dL (16 May 2022 21:17)  POCT Blood Glucose.: 141 mg/dL (16 May 2022 16:50)  POCT Blood Glucose.: 170 mg/dL (16 May 2022 11:44)    Procalcitonin, Serum: 0.12 ng/mL (05-17 @ 06:55)    Studies  Chest X-RAY  5/17 grossly clear f/u official report    CT SCAN Chest   ct< from: CT Chest No Cont (05.02.22 @ 13:41) >    COMPARISON: CT chest 3/31/2022. Radiograph chest 5/20/2020.    FINDINGS:    Tubes/Lines: None.    Lungs, airways and pleura:The bilateral pleural effusions are unchanged   and are of simple fluid attenuation. Bilateral calcified noncalcified   pleural plaques can occur in the clinical setting of prior asbestos   exposure.    There are bilateral, predominantly left-sided groundglass opacities, new   since 3/31/2022.    In addition, there are branching opacities in the lingula and left upper   lobe, new since 3/31/2022.    The patchy consolidation and opacity shown in the right upper lobe on   3/31/2022 have resolved.    The 1.1 x 0.8 cm rounded opacity along the right minor fissure (series 2   image 70) unchanged. The bilateral lower lobe passive atelectasis is   unchanged.    Mediastinum: The thyroid gland is unremarkable. The 17 x 30 mm subcarinal   lymph node isunchanged. The additional paratracheal, prevascular, AP   window lymph nodes are unchanged. The esophagus is unremarkable.    Left atrium is likely enlarged. The remainder the heart is normal in   size. No pericardial effusion. The calcifications along the   interventricular septum and apex of the left ventricle are unchanged. The   aorta is normal in caliber. Atheromatous disease of the aorta.    AICD in place.    Upper Abdomen: The adrenal gland thickening is unchanged. Right kidney is   atrophic.    Bones And Soft Tissues: The bones are unremarkable.  The soft tissues are   unremarkable.      IMPRESSION:    1.  New groundglass opacities (predominantly left-sided) and left upper   lobe opacities.  2.  No change in the bilateral pleural effusions.  3.  No change in the calcified and noncalcified pleural plaques can be a   marker of prior asbestos exposure.      < end of copied text >                  
Patient is a 76y old  Male who presents with a chief complaint of fever and cough. diarrhea and generalized weakness (17 May 2022 11:05)      DATE OF SERVICE: 05-17-22 @ 11:54    SUBJECTIVE / OVERNIGHT EVENTS: overnight events noted    ROS:  Resp: No cough no sputum production  CVS: No chest pain no palpitations no orthopnea  GI: no N/V/D  : no dysuria, no hematuria  Neuro: no weakness no paresthesias  "I feel much better"         MEDICATIONS  (STANDING):  albuterol/ipratropium for Nebulization 3 milliLiter(s) Nebulizer every 6 hours  apixaban 2.5 milliGRAM(s) Oral every 12 hours  aspirin enteric coated 81 milliGRAM(s) Oral daily  Biotene Dry Mouth Oral Rinse 5 milliLiter(s) Swish and Spit three times a day  calcitriol   Capsule 0.25 MICROGram(s) Oral daily  cyanocobalamin 1000 MICROGram(s) Oral daily  dextrose 5%. 1000 milliLiter(s) (50 mL/Hr) IV Continuous <Continuous>  dextrose 5%. 1000 milliLiter(s) (100 mL/Hr) IV Continuous <Continuous>  dextrose 5%. 1000 milliLiter(s) (100 mL/Hr) IV Continuous <Continuous>  dextrose 50% Injectable 25 Gram(s) IV Push once  dextrose 50% Injectable 12.5 Gram(s) IV Push once  dextrose 50% Injectable 25 Gram(s) IV Push once  diVALproex  milliGRAM(s) Oral two times a day  droxidopa 200 milliGRAM(s) Oral three times a day  epoetin abby-epbx (RETACRIT) Injectable 30152 Unit(s) SubCutaneous every 7 days  escitalopram 5 milliGRAM(s) Oral daily  ferrous    sulfate 325 milliGRAM(s) Oral daily  finasteride 5 milliGRAM(s) Oral daily  fludroCORTISONE 0.2 milliGRAM(s) Oral <User Schedule>  fluticasone propionate 50 MICROgram(s)/spray Nasal Spray 2 Spray(s) Both Nostrils <User Schedule>  glucagon  Injectable 1 milliGRAM(s) IntraMuscular once  glucagon  Injectable 1 milliGRAM(s) IntraMuscular once  guaiFENesin ER 1200 milliGRAM(s) Oral every 12 hours  insulin glargine Injectable (LANTUS) 5 Unit(s) SubCutaneous at bedtime  insulin lispro (ADMELOG) corrective regimen sliding scale   SubCutaneous Before meals and at bedtime  levothyroxine 75 MICROGram(s) Oral daily  melatonin 3 milliGRAM(s) Oral at bedtime  midodrine. 30 milliGRAM(s) Oral three times a day  sodium chloride 0.9% for Nebulization 3 milliLiter(s) Nebulizer four times a day    MEDICATIONS  (PRN):  ALBUTerol    90 MICROgram(s) HFA Inhaler 2 Puff(s) Inhalation every 6 hours PRN Shortness of Breath and/or Wheezing  dextrose Oral Gel 15 Gram(s) Oral once PRN Blood Glucose LESS THAN 70 milliGRAM(s)/deciliter        CAPILLARY BLOOD GLUCOSE      POCT Blood Glucose.: 103 mg/dL (17 May 2022 07:53)  POCT Blood Glucose.: 186 mg/dL (16 May 2022 21:17)  POCT Blood Glucose.: 141 mg/dL (16 May 2022 16:50)    I&O's Summary    16 May 2022 07:01  -  17 May 2022 07:00  --------------------------------------------------------  IN: 1120 mL / OUT: 1300 mL / NET: -180 mL        Vital Signs Last 24 Hrs  T(C): 37 (17 May 2022 10:33), Max: 37.6 (17 May 2022 04:35)  T(F): 98.6 (17 May 2022 10:33), Max: 99.6 (17 May 2022 04:35)  HR: 75 (17 May 2022 10:33) (66 - 79)  BP: 138/71 (17 May 2022 10:33) (138/71 - 160/73)  BP(mean): --  RR: 18 (17 May 2022 10:33) (18 - 18)  SpO2: 96% (17 May 2022 10:33) (93% - 96%)    PHYSICAL EXAM:   HEENT: LUCA EOMI  Neck: Supple, no JVD  Lungs: clear  CVS: S1 S2 systolic murmur +   Abdomen: no tenderness, BS present  Neuro: Alert no change  Ext: no edema    LABS:                        8.2    9.49  )-----------( 304      ( 17 May 2022 06:56 )             26.2     05-17    139  |  98  |  29<H>  ----------------------------<  92  3.4<L>   |  28  |  1.61<H>    Ca    8.7      17 May 2022 06:55                  All consultant(s) notes reviewed and care discussed with other providers        Contact Number, Dr Larson [phone]
Date of Service: 05-18-22 @ 13:29    Patient is a 76y old  Male who presents with a chief complaint of fever and cough. diarrhea and generalized weakness (18 May 2022 11:16)      Any change in ROS:  doing ok: no SOB:   93% on room air:   alert and awake    MEDICATIONS  (STANDING):  albuterol/ipratropium for Nebulization 3 milliLiter(s) Nebulizer every 6 hours  apixaban 2.5 milliGRAM(s) Oral every 12 hours  aspirin enteric coated 81 milliGRAM(s) Oral daily  Biotene Dry Mouth Oral Rinse 5 milliLiter(s) Swish and Spit three times a day  calcitriol   Capsule 0.25 MICROGram(s) Oral daily  cyanocobalamin 1000 MICROGram(s) Oral daily  dextrose 5%. 1000 milliLiter(s) (100 mL/Hr) IV Continuous <Continuous>  dextrose 5%. 1000 milliLiter(s) (100 mL/Hr) IV Continuous <Continuous>  dextrose 5%. 1000 milliLiter(s) (50 mL/Hr) IV Continuous <Continuous>  dextrose 50% Injectable 25 Gram(s) IV Push once  dextrose 50% Injectable 12.5 Gram(s) IV Push once  dextrose 50% Injectable 25 Gram(s) IV Push once  diVALproex  milliGRAM(s) Oral two times a day  droxidopa 200 milliGRAM(s) Oral three times a day  epoetin abby-epbx (RETACRIT) Injectable 75900 Unit(s) SubCutaneous every 7 days  escitalopram 5 milliGRAM(s) Oral daily  ferrous    sulfate 325 milliGRAM(s) Oral daily  finasteride 5 milliGRAM(s) Oral daily  fludroCORTISONE 0.2 milliGRAM(s) Oral <User Schedule>  fluticasone propionate 50 MICROgram(s)/spray Nasal Spray 2 Spray(s) Both Nostrils <User Schedule>  glucagon  Injectable 1 milliGRAM(s) IntraMuscular once  glucagon  Injectable 1 milliGRAM(s) IntraMuscular once  guaiFENesin ER 1200 milliGRAM(s) Oral every 12 hours  insulin glargine Injectable (LANTUS) 5 Unit(s) SubCutaneous at bedtime  insulin lispro (ADMELOG) corrective regimen sliding scale   SubCutaneous Before meals and at bedtime  levothyroxine 100 MICROGram(s) Oral daily  melatonin 3 milliGRAM(s) Oral at bedtime  midodrine. 30 milliGRAM(s) Oral three times a day  sodium chloride 0.9% for Nebulization 3 milliLiter(s) Nebulizer four times a day    MEDICATIONS  (PRN):  ALBUTerol    90 MICROgram(s) HFA Inhaler 2 Puff(s) Inhalation every 6 hours PRN Shortness of Breath and/or Wheezing  dextrose Oral Gel 15 Gram(s) Oral once PRN Blood Glucose LESS THAN 70 milliGRAM(s)/deciliter    Vital Signs Last 24 Hrs  T(C): 36.8 (18 May 2022 04:37), Max: 37.1 (17 May 2022 20:30)  T(F): 98.2 (18 May 2022 04:37), Max: 98.8 (17 May 2022 20:30)  HR: 77 (18 May 2022 04:37) (77 - 80)  BP: 167/79 (18 May 2022 04:37) (155/77 - 167/82)  BP(mean): --  RR: 18 (18 May 2022 04:37) (18 - 18)  SpO2: 93% (18 May 2022 04:37) (93% - 96%)    I&O's Summary    17 May 2022 07:01  -  18 May 2022 07:00  --------------------------------------------------------  IN: 840 mL / OUT: 600 mL / NET: 240 mL          Physical Exam:   GENERAL: NAD, well-groomed, well-developed  HEENT: LUCA/   Atraumatic, Normocephalic  ENMT: No tonsillar erythema, exudates, or enlargement; Moist mucous membranes, Good dentition, No lesions  NECK: Supple, No JVD, Normal thyroid  CHEST/LUNG: Clear to auscultaion  CVS: Regular rate and rhythm; No murmurs, rubs, or gallops  GI: : Soft, Nontender, Nondistended; Bowel sounds present  NERVOUS SYSTEM:  Alert & Oriented X3  EXTREMITIES: - edema  LYMPH: No lymphadenopathy noted  SKIN: No rashes or lesions  ENDOCRINOLOGY: No Thyromegaly  PSYCH: Appropriate    Labs:                              8.2    9.49  )-----------( 304      ( 17 May 2022 06:56 )             26.2                         8.8    9.82  )-----------( 263      ( 16 May 2022 05:45 )             26.9     05-18    137  |  99  |  25<H>  ----------------------------<  96  3.5   |  27  |  1.56<H>  05-17    139  |  98  |  29<H>  ----------------------------<  92  3.4<L>   |  28  |  1.61<H>  05-16    137  |  99  |  38<H>  ----------------------------<  102<H>  3.5   |  27  |  1.61<H>  05-15    135  |  99  |  40<H>  ----------------------------<  141<H>  3.4<L>   |  25  |  1.58<H>    Ca    8.5      18 May 2022 06:31  Ca    8.7      17 May 2022 06:55      CAPILLARY BLOOD GLUCOSE      POCT Blood Glucose.: 148 mg/dL (18 May 2022 12:05)  POCT Blood Glucose.: 106 mg/dL (18 May 2022 08:09)  POCT Blood Glucose.: 142 mg/dL (17 May 2022 22:02)  POCT Blood Glucose.: 157 mg/dL (17 May 2022 16:38)            Procalcitonin, Serum: 0.12 ng/mL (05-17 @ 06:55)        RECENT CULTURES:      rad< from: Xray Chest 1 View- PORTABLE-Routine (Xray Chest 1 View- PORTABLE-Routine in AM.) (05.17.22 @ 09:51) >    PROCEDURE DATE:  05/17/2022          INTERPRETATION:  CHEST X-RAY: AP PORTABLE    INDICATION: Cough.    COMPARISON: Chest x-ray 05/10/2022.    FINDINGS:  Left chest wall pacemaker/ICD, unchanged.    Normal heart size.    Clear lungs.    No pleural effusion.    There may be a small right pneumothorax.    IMPRESSION:  Clear lungs.  Question small right pneumothorax. Recommend follow-up upright imaging.    --- End of Report---          YOVANA FLORENTINO MD; Resident Radiologist  This document has been electronically signed.  YONAS OLIVEIRA MD; Attending Radiologist  This document has been electronically signed. May 17 2022  5:37PM    < end of copied text >    RESPIRATORY CULTURES:          Studies  Chest X-RAY  CT SCAN Chest   Venous Dopplers: LE:   CT Abdomen  Others              
Patient is a 76y old  Male who presents with a chief complaint of fever and cough. diarrhea and generalized weakness (18 May 2022 09:51)      DATE OF SERVICE: 05-18-22 @ 11:16    SUBJECTIVE / OVERNIGHT EVENTS: overnight events noted    ROS:  Resp: No cough no sputum production  CVS: No chest pain no palpitations no orthopnea  GI: no N/V/D  : no dysuria, no hematuria          MEDICATIONS  (STANDING):  albuterol/ipratropium for Nebulization 3 milliLiter(s) Nebulizer every 6 hours  apixaban 2.5 milliGRAM(s) Oral every 12 hours  aspirin enteric coated 81 milliGRAM(s) Oral daily  Biotene Dry Mouth Oral Rinse 5 milliLiter(s) Swish and Spit three times a day  calcitriol   Capsule 0.25 MICROGram(s) Oral daily  cyanocobalamin 1000 MICROGram(s) Oral daily  dextrose 5%. 1000 milliLiter(s) (50 mL/Hr) IV Continuous <Continuous>  dextrose 5%. 1000 milliLiter(s) (100 mL/Hr) IV Continuous <Continuous>  dextrose 5%. 1000 milliLiter(s) (100 mL/Hr) IV Continuous <Continuous>  dextrose 50% Injectable 25 Gram(s) IV Push once  dextrose 50% Injectable 12.5 Gram(s) IV Push once  dextrose 50% Injectable 25 Gram(s) IV Push once  diVALproex  milliGRAM(s) Oral two times a day  droxidopa 200 milliGRAM(s) Oral three times a day  epoetin abby-epbx (RETACRIT) Injectable 18123 Unit(s) SubCutaneous every 7 days  escitalopram 5 milliGRAM(s) Oral daily  ferrous    sulfate 325 milliGRAM(s) Oral daily  finasteride 5 milliGRAM(s) Oral daily  fludroCORTISONE 0.2 milliGRAM(s) Oral <User Schedule>  fluticasone propionate 50 MICROgram(s)/spray Nasal Spray 2 Spray(s) Both Nostrils <User Schedule>  glucagon  Injectable 1 milliGRAM(s) IntraMuscular once  glucagon  Injectable 1 milliGRAM(s) IntraMuscular once  guaiFENesin ER 1200 milliGRAM(s) Oral every 12 hours  insulin glargine Injectable (LANTUS) 5 Unit(s) SubCutaneous at bedtime  insulin lispro (ADMELOG) corrective regimen sliding scale   SubCutaneous Before meals and at bedtime  levothyroxine 100 MICROGram(s) Oral daily  melatonin 3 milliGRAM(s) Oral at bedtime  midodrine. 30 milliGRAM(s) Oral three times a day  potassium chloride    Tablet ER 40 milliEquivalent(s) Oral once  sodium chloride 0.9% for Nebulization 3 milliLiter(s) Nebulizer four times a day    MEDICATIONS  (PRN):  ALBUTerol    90 MICROgram(s) HFA Inhaler 2 Puff(s) Inhalation every 6 hours PRN Shortness of Breath and/or Wheezing  dextrose Oral Gel 15 Gram(s) Oral once PRN Blood Glucose LESS THAN 70 milliGRAM(s)/deciliter        CAPILLARY BLOOD GLUCOSE      POCT Blood Glucose.: 106 mg/dL (18 May 2022 08:09)  POCT Blood Glucose.: 142 mg/dL (17 May 2022 22:02)  POCT Blood Glucose.: 157 mg/dL (17 May 2022 16:38)  POCT Blood Glucose.: 135 mg/dL (17 May 2022 11:58)    I&O's Summary    17 May 2022 07:01  -  18 May 2022 07:00  --------------------------------------------------------  IN: 840 mL / OUT: 600 mL / NET: 240 mL        Vital Signs Last 24 Hrs  T(C): 36.8 (18 May 2022 04:37), Max: 37.1 (17 May 2022 20:30)  T(F): 98.2 (18 May 2022 04:37), Max: 98.8 (17 May 2022 20:30)  HR: 77 (18 May 2022 04:37) (77 - 80)  BP: 167/79 (18 May 2022 04:37) (155/77 - 167/82)  BP(mean): --  RR: 18 (18 May 2022 04:37) (18 - 18)  SpO2: 93% (18 May 2022 04:37) (93% - 96%)    PHYSICAL EXAM:   HEENT: LUCA EOMI  Neck: Supple, no JVD  Lungs: clear  CVS: S1 S2 systolic murmur +   Abdomen: no tenderness, BS present  Neuro: Alert no change  Ext: no edema    LABS:                        8.2    9.49  )-----------( 304      ( 17 May 2022 06:56 )             26.2     05-18    137  |  99  |  25<H>  ----------------------------<  96  3.5   |  27  |  1.56<H>    Ca    8.5      18 May 2022 06:31                  All consultant(s) notes reviewed and care discussed with other providers        Contact Number, Dr Larson [phone]
Patient is a 76y old  Male who presents with a chief complaint of fever and cough. diarrhea and generalized weakness (11 May 2022 10:26)      DATE OF SERVICE: 05-11-22 @ 13:32    SUBJECTIVE / OVERNIGHT EVENTS: overnight events noted    ROS:  Resp: No cough no sputum production  CVS: No chest pain no palpitations no orthopnea  GI: no N/V/D  per RN      MEDICATIONS  (STANDING):  albuterol/ipratropium for Nebulization 3 milliLiter(s) Nebulizer every 6 hours  apixaban 2.5 milliGRAM(s) Oral every 12 hours  aspirin enteric coated 81 milliGRAM(s) Oral daily  BACItracin   Ointment 1 Application(s) Topical two times a day  Biotene Dry Mouth Oral Rinse 5 milliLiter(s) Swish and Spit three times a day  calcitriol   Capsule 0.25 MICROGram(s) Oral daily  cyanocobalamin 1000 MICROGram(s) Oral daily  dextrose 5%. 1000 milliLiter(s) (100 mL/Hr) IV Continuous <Continuous>  dextrose 5%. 1000 milliLiter(s) (50 mL/Hr) IV Continuous <Continuous>  dextrose 50% Injectable 25 Gram(s) IV Push once  dextrose 50% Injectable 12.5 Gram(s) IV Push once  dextrose 50% Injectable 25 Gram(s) IV Push once  diVALproex  milliGRAM(s) Oral two times a day  epoetin abby-epbx (RETACRIT) Injectable 83948 Unit(s) SubCutaneous every 7 days  escitalopram 5 milliGRAM(s) Oral daily  ferrous    sulfate 325 milliGRAM(s) Oral daily  finasteride 5 milliGRAM(s) Oral daily  fluticasone propionate 50 MICROgram(s)/spray Nasal Spray 2 Spray(s) Both Nostrils <User Schedule>  furosemide    Tablet 20 milliGRAM(s) Oral daily  glucagon  Injectable 1 milliGRAM(s) IntraMuscular once  insulin lispro (ADMELOG) corrective regimen sliding scale   SubCutaneous three times a day before meals  levothyroxine 75 MICROGram(s) Oral daily  melatonin 3 milliGRAM(s) Oral at bedtime  metoprolol succinate ER 12.5 milliGRAM(s) Oral at bedtime  midodrine. 10 milliGRAM(s) Oral three times a day  oxymetazoline 0.05% Nasal Spray 1 Spray(s) Both Nostrils every 12 hours  predniSONE   Tablet 20 milliGRAM(s) Oral every 12 hours  sodium chloride 0.9% for Nebulization 3 milliLiter(s) Nebulizer four times a day  sodium chloride 0.9%. 500 milliLiter(s) (500 mL/Hr) IV Continuous <Continuous>  tamsulosin 0.8 milliGRAM(s) Oral at bedtime    MEDICATIONS  (PRN):  ALBUTerol    90 MICROgram(s) HFA Inhaler 2 Puff(s) Inhalation every 6 hours PRN Shortness of Breath and/or Wheezing  dextrose Oral Gel 15 Gram(s) Oral once PRN Blood Glucose LESS THAN 70 milliGRAM(s)/deciliter        CAPILLARY BLOOD GLUCOSE      POCT Blood Glucose.: 293 mg/dL (11 May 2022 12:00)  POCT Blood Glucose.: 282 mg/dL (11 May 2022 08:00)  POCT Blood Glucose.: 227 mg/dL (10 May 2022 21:53)  POCT Blood Glucose.: 214 mg/dL (10 May 2022 16:40)    I&O's Summary    10 May 2022 07:01  -  11 May 2022 07:00  --------------------------------------------------------  IN: 780 mL / OUT: 1350 mL / NET: -570 mL    11 May 2022 07:01  -  11 May 2022 13:32  --------------------------------------------------------  IN: 0 mL / OUT: 700 mL / NET: -700 mL        Vital Signs Last 24 Hrs  T(C): 36.2 (11 May 2022 04:07), Max: 37.1 (10 May 2022 20:28)  T(F): 97.2 (11 May 2022 04:07), Max: 98.8 (10 May 2022 20:28)  HR: 74 (11 May 2022 10:58) (74 - 88)  BP: 137/75 (11 May 2022 10:58) (119/70 - 137/75)  BP(mean): --  RR: 18 (11 May 2022 10:49) (18 - 18)  SpO2: 96% (11 May 2022 10:49) (92% - 98%)    PHYSICAL EXAM:   HEENT: LUCA EOMI  Neck: Supple, no JVD  Lungs: improved wheeze  CVS: S1 S2 systolic murmur +   Abdomen: no tenderness, BS present  Neuro: Alert no change  Ext: no edema    LABS:                        9.0    11.35 )-----------( 125      ( 10 May 2022 06:44 )             27.9     05-11    135  |  98  |  37<H>  ----------------------------<  236<H>  4.5   |  25  |  1.78<H>    Ca    9.0      11 May 2022 06:57    TPro  5.5<L>  /  Alb  2.5<L>  /  TBili  0.5  /  DBili  x   /  AST  13  /  ALT  9<L>  /  AlkPhos  56  05-10                All consultant(s) notes reviewed and care discussed with other providers        Contact Number, Dr Larson [phone]
Date of Service: 05-12-22 @ 12:06    Patient is a 76y old  Male who presents with a chief complaint of fever and cough. diarrhea and generalized weakness (12 May 2022 09:49)      Any change in ROS: seems OK: orthostatic  off oxygen :no sob:      MEDICATIONS  (STANDING):  albuterol/ipratropium for Nebulization 3 milliLiter(s) Nebulizer every 6 hours  apixaban 2.5 milliGRAM(s) Oral every 12 hours  aspirin enteric coated 81 milliGRAM(s) Oral daily  Biotene Dry Mouth Oral Rinse 5 milliLiter(s) Swish and Spit three times a day  calcitriol   Capsule 0.25 MICROGram(s) Oral daily  cyanocobalamin 1000 MICROGram(s) Oral daily  dextrose 5%. 1000 milliLiter(s) (100 mL/Hr) IV Continuous <Continuous>  dextrose 5%. 1000 milliLiter(s) (50 mL/Hr) IV Continuous <Continuous>  dextrose 50% Injectable 25 Gram(s) IV Push once  dextrose 50% Injectable 12.5 Gram(s) IV Push once  dextrose 50% Injectable 25 Gram(s) IV Push once  diVALproex  milliGRAM(s) Oral two times a day  epoetin abby-epbx (RETACRIT) Injectable 83872 Unit(s) SubCutaneous every 7 days  escitalopram 5 milliGRAM(s) Oral daily  ferrous    sulfate 325 milliGRAM(s) Oral daily  finasteride 5 milliGRAM(s) Oral daily  fluticasone propionate 50 MICROgram(s)/spray Nasal Spray 2 Spray(s) Both Nostrils <User Schedule>  furosemide    Tablet 20 milliGRAM(s) Oral daily  glucagon  Injectable 1 milliGRAM(s) IntraMuscular once  insulin lispro (ADMELOG) corrective regimen sliding scale   SubCutaneous three times a day before meals  levothyroxine 75 MICROGram(s) Oral daily  melatonin 3 milliGRAM(s) Oral at bedtime  metoprolol succinate ER 12.5 milliGRAM(s) Oral at bedtime  midodrine. 20 milliGRAM(s) Oral three times a day  oxymetazoline 0.05% Nasal Spray 1 Spray(s) Both Nostrils every 12 hours  predniSONE   Tablet 20 milliGRAM(s) Oral every 12 hours  sodium chloride 0.9% for Nebulization 3 milliLiter(s) Nebulizer four times a day  sodium chloride 0.9%. 500 milliLiter(s) (500 mL/Hr) IV Continuous <Continuous>  tamsulosin 0.8 milliGRAM(s) Oral at bedtime    MEDICATIONS  (PRN):  ALBUTerol    90 MICROgram(s) HFA Inhaler 2 Puff(s) Inhalation every 6 hours PRN Shortness of Breath and/or Wheezing  dextrose Oral Gel 15 Gram(s) Oral once PRN Blood Glucose LESS THAN 70 milliGRAM(s)/deciliter    Vital Signs Last 24 Hrs  T(C): 36.4 (12 May 2022 04:15), Max: 36.7 (11 May 2022 21:02)  T(F): 97.5 (12 May 2022 04:15), Max: 98 (11 May 2022 21:02)  HR: 84 (12 May 2022 11:45) (80 - 88)  BP: 135/77 (12 May 2022 11:45) (80/40 - 154/76)  BP(mean): --  RR: 18 (12 May 2022 04:15) (18 - 18)  SpO2: 94% (12 May 2022 04:15) (91% - 94%)    I&O's Summary    11 May 2022 07:01  -  12 May 2022 07:00  --------------------------------------------------------  IN: 0 mL / OUT: 1750 mL / NET: -1750 mL          Physical Exam:   GENERAL: NAD, well-groomed, well-developed  HEENT: LUCA/   Atraumatic, Normocephalic  ENMT: No tonsillar erythema, exudates, or enlargement; Moist mucous membranes, Good dentition, No lesions  NECK: Supple, No JVD, Normal thyroid  CHEST/LUNG: Clear to auscultaion  CVS: Regular rate and rhythm; No murmurs, rubs, or gallops  GI: : Soft, Nontender, Nondistended; Bowel sounds present  NERVOUS SYSTEM:  Alert & Oriented X3  EXTREMITIES: - edema  LYMPH: No lymphadenopathy noted  SKIN: No rashes or lesions  ENDOCRINOLOGY: No Thyromegaly  PSYCH: Appropriate    Labs:                              9.0    11.35 )-----------( 125      ( 10 May 2022 06:44 )             27.9                         9.5    13.98 )-----------( 147      ( 09 May 2022 07:19 )             29.4                         10.2   10.29 )-----------( 157      ( 09 May 2022 06:13 )             33.6     05-11    135  |  98  |  37<H>  ----------------------------<  236<H>  4.5   |  25  |  1.78<H>  05-10    138  |  100  |  45<H>  ----------------------------<  129<H>  4.0   |  26  |  2.06<H>  05-09    137  |  98  |  42<H>  ----------------------------<  220<H>  4.0   |  24  |  2.00<H>  05-09    137  |  94<L>  |  38<H>  ----------------------------<  152<H>  4.7   |  23  |  1.81<H>    Ca    9.0      11 May 2022 06:57    TPro  5.5<L>  /  Alb  2.5<L>  /  TBili  0.5  /  DBili  x   /  AST  13  /  ALT  9<L>  /  AlkPhos  56  05-10    CAPILLARY BLOOD GLUCOSE      POCT Blood Glucose.: 310 mg/dL (12 May 2022 11:58)  POCT Blood Glucose.: 274 mg/dL (12 May 2022 11:57)  POCT Blood Glucose.: 409 mg/dL (12 May 2022 11:56)  POCT Blood Glucose.: 318 mg/dL (12 May 2022 07:47)  POCT Blood Glucose.: 352 mg/dL (11 May 2022 21:10)  POCT Blood Glucose.: 376 mg/dL (11 May 2022 16:50)      rad< from: Xray Chest 1 View- PORTABLE-Routine (Xray Chest 1 View- PORTABLE-Routine .) (05.10.22 @ 08:57) >    ACC: [id number] EXAM:  XR CHEST PORTABLE ROUTINE 1V                          PROCEDURE DATE:  05/10/2022          INTERPRETATION:  EXAMINATION: XR CHEST    CLINICAL INDICATION: sob    TECHNIQUE: Single frontal, portable view of the chest was obtained.    COMPARISON: Chest x-ray 5/9/2022.    FINDINGS:  Left chest wall AICD.  The heart is normal in size.  Patchy bibasilar opacities.  Pleural plaques again identified.  Trace left pleural effusion. No pneumothorax.    IMPRESSION:  No significant interval change.    --- End of Report ---          HEATHER CLAROS M.D., RADIOLOGY RESIDENT  This document has been electronically signed.  YONAS OLIVEIRA MD; Attending Radiologist  This document has been electronically signed. May 10 2022  4:11PM    < end of copied text >              RECENT CULTURES:        RESPIRATORY CULTURES:          Studies  Chest X-RAY  CT SCAN Chest   Venous Dopplers: LE:   CT Abdomen  Others              
Patient is a 76y old  Male who presents with a chief complaint of fever and cough. diarrhea and generalized weakness (08 May 2022 12:01)      DATE OF SERVICE: 05-08-22 @ 14:59    SUBJECTIVE / OVERNIGHT EVENTS: overnight events noted    ROS:  Resp: No cough no sputum production  CVS: No chest pain no palpitations no orthopnea  GI: no N/V/D  : no dysuria, no hematuria  per RN        MEDICATIONS  (STANDING):  albuterol/ipratropium for Nebulization 3 milliLiter(s) Nebulizer every 6 hours  apixaban 2.5 milliGRAM(s) Oral every 12 hours  aspirin enteric coated 81 milliGRAM(s) Oral daily  BACItracin   Ointment 1 Application(s) Topical two times a day  Biotene Dry Mouth Oral Rinse 5 milliLiter(s) Swish and Spit three times a day  calcitriol   Capsule 0.25 MICROGram(s) Oral daily  cyanocobalamin 1000 MICROGram(s) Oral daily  dextrose 5%. 1000 milliLiter(s) (50 mL/Hr) IV Continuous <Continuous>  dextrose 5%. 1000 milliLiter(s) (100 mL/Hr) IV Continuous <Continuous>  dextrose 50% Injectable 25 Gram(s) IV Push once  dextrose 50% Injectable 12.5 Gram(s) IV Push once  dextrose 50% Injectable 25 Gram(s) IV Push once  diVALproex  milliGRAM(s) Oral two times a day  epoetin abby-epbx (RETACRIT) Injectable 95865 Unit(s) SubCutaneous every 7 days  escitalopram 5 milliGRAM(s) Oral daily  ferrous    sulfate 325 milliGRAM(s) Oral daily  finasteride 5 milliGRAM(s) Oral daily  fluticasone propionate 50 MICROgram(s)/spray Nasal Spray 2 Spray(s) Both Nostrils <User Schedule>  furosemide    Tablet 20 milliGRAM(s) Oral daily  glucagon  Injectable 1 milliGRAM(s) IntraMuscular once  insulin lispro (ADMELOG) corrective regimen sliding scale   SubCutaneous three times a day before meals  levothyroxine 75 MICROGram(s) Oral daily  melatonin 3 milliGRAM(s) Oral at bedtime  metoprolol succinate ER 12.5 milliGRAM(s) Oral daily  midodrine. 10 milliGRAM(s) Oral three times a day  tamsulosin 0.8 milliGRAM(s) Oral at bedtime    MEDICATIONS  (PRN):  ALBUTerol    90 MICROgram(s) HFA Inhaler 2 Puff(s) Inhalation every 6 hours PRN Shortness of Breath and/or Wheezing  dextrose Oral Gel 15 Gram(s) Oral once PRN Blood Glucose LESS THAN 70 milliGRAM(s)/deciliter        CAPILLARY BLOOD GLUCOSE      POCT Blood Glucose.: 202 mg/dL (08 May 2022 11:40)  POCT Blood Glucose.: 230 mg/dL (08 May 2022 07:37)  POCT Blood Glucose.: 254 mg/dL (07 May 2022 21:18)  POCT Blood Glucose.: 192 mg/dL (07 May 2022 17:09)    I&O's Summary    07 May 2022 07:01  -  08 May 2022 07:00  --------------------------------------------------------  IN: 600 mL / OUT: 1050 mL / NET: -450 mL    08 May 2022 07:01  -  08 May 2022 14:59  --------------------------------------------------------  IN: 200 mL / OUT: 100 mL / NET: 100 mL        Vital Signs Last 24 Hrs  T(C): 36.9 (08 May 2022 12:00), Max: 36.9 (08 May 2022 12:00)  T(F): 98.5 (08 May 2022 12:00), Max: 98.5 (08 May 2022 12:00)  HR: 56 (08 May 2022 12:00) (56 - 67)  BP: 106/65 (08 May 2022 12:00) (106/65 - 156/77)  BP(mean): --   RR: 18 (08 May 2022 12:00) (18 - 18)  SpO2: 90% (08 May 2022 12:00) (90% - 95%)    PHYSICAL EXAM:   HEENT: LUCA EOMI  Neck: Supple, no JVD  Lungs: improved wheeze  CVS: S1 S2 systolic murmur +   Abdomen: no tenderness, BS present  Neuro: Alert no change  Ext: no edema  Msk: no joint swelling    LABS:                        9.3    9.63  )-----------( 131      ( 07 May 2022 07:27 )             28.6     05-07    135  |  96  |  37<H>  ----------------------------<  237<H>  4.1   |  24  |  1.92<H>    Ca    8.7      07 May 2022 07:25                  All consultant(s) notes reviewed and care discussed with other providers        Contact Number, Dr Larson [phone]
Date of Service: 05-08-22 @ 12:01    Patient is a 76y old  Male who presents with a chief complaint of fever and cough. diarrhea and generalized weakness (08 May 2022 08:57)      Any change in ROS:  doing pretty good:  on room air:  no sob; not much of wheezing today      MEDICATIONS  (STANDING):  albuterol/ipratropium for Nebulization 3 milliLiter(s) Nebulizer every 6 hours  apixaban 2.5 milliGRAM(s) Oral every 12 hours  aspirin enteric coated 81 milliGRAM(s) Oral daily  BACItracin   Ointment 1 Application(s) Topical two times a day  Biotene Dry Mouth Oral Rinse 5 milliLiter(s) Swish and Spit three times a day  calcitriol   Capsule 0.25 MICROGram(s) Oral daily  cyanocobalamin 1000 MICROGram(s) Oral daily  dextrose 5%. 1000 milliLiter(s) (50 mL/Hr) IV Continuous <Continuous>  dextrose 5%. 1000 milliLiter(s) (100 mL/Hr) IV Continuous <Continuous>  dextrose 50% Injectable 25 Gram(s) IV Push once  dextrose 50% Injectable 12.5 Gram(s) IV Push once  dextrose 50% Injectable 25 Gram(s) IV Push once  diVALproex  milliGRAM(s) Oral two times a day  epoetin abby-epbx (RETACRIT) Injectable 35118 Unit(s) SubCutaneous every 7 days  escitalopram 5 milliGRAM(s) Oral daily  ferrous    sulfate 325 milliGRAM(s) Oral daily  finasteride 5 milliGRAM(s) Oral daily  fluticasone propionate 50 MICROgram(s)/spray Nasal Spray 2 Spray(s) Both Nostrils <User Schedule>  furosemide    Tablet 20 milliGRAM(s) Oral daily  glucagon  Injectable 1 milliGRAM(s) IntraMuscular once  insulin lispro (ADMELOG) corrective regimen sliding scale   SubCutaneous three times a day before meals  levothyroxine 75 MICROGram(s) Oral daily  melatonin 3 milliGRAM(s) Oral at bedtime  metoprolol succinate ER 12.5 milliGRAM(s) Oral daily  midodrine. 10 milliGRAM(s) Oral three times a day  tamsulosin 0.8 milliGRAM(s) Oral at bedtime    MEDICATIONS  (PRN):  ALBUTerol    90 MICROgram(s) HFA Inhaler 2 Puff(s) Inhalation every 6 hours PRN Shortness of Breath and/or Wheezing  dextrose Oral Gel 15 Gram(s) Oral once PRN Blood Glucose LESS THAN 70 milliGRAM(s)/deciliter    Vital Signs Last 24 Hrs  T(C): 36.4 (08 May 2022 05:09), Max: 36.4 (07 May 2022 20:44)  T(F): 97.5 (08 May 2022 05:09), Max: 97.5 (07 May 2022 20:44)  HR: 60 (08 May 2022 05:09) (60 - 67)  BP: 156/77 (08 May 2022 05:09) (109/64 - 156/77)  BP(mean): --  RR: 18 (08 May 2022 05:09) (18 - 18)  SpO2: 95% (08 May 2022 05:09) (95% - 95%)    I&O's Summary    07 May 2022 07:01  -  08 May 2022 07:00  --------------------------------------------------------  IN: 600 mL / OUT: 1050 mL / NET: -450 mL          Physical Exam:   GENERAL: NAD, well-groomed, well-developed  HEENT: LUCA/   Atraumatic, Normocephalic  ENMT: No tonsillar erythema, exudates, or enlargement; Moist mucous membranes, Good dentition, No lesions  NECK: Supple, No JVD, Normal thyroid  CHEST/LUNG: mild coarse rhonchi+  CVS: Regular rate and rhythm; No murmurs, rubs, or gallops  GI: : Soft, Nontender, Nondistended; Bowel sounds present  NERVOUS SYSTEM:  Alert & Oriented X3  EXTREMITIES:- edema  LYMPH: No lymphadenopathy noted  SKIN: No rashes or lesions  ENDOCRINOLOGY: No Thyromegaly  PSYCH: Appropriate    Labs:                              9.3    9.63  )-----------( 131      ( 07 May 2022 07:27 )             28.6                         9.7    8.71  )-----------( 107      ( 06 May 2022 07:08 )             29.9                         9.9    4.73  )-----------( 98       ( 05 May 2022 06:24 )             32.1     05-07    135  |  96  |  37<H>  ----------------------------<  237<H>  4.1   |  24  |  1.92<H>  05-06    136  |  97  |  38<H>  ----------------------------<  206<H>  4.3   |  23  |  1.93<H>  05-05    135  |  99  |  34<H>  ----------------------------<  192<H>  4.5   |  21<L>  |  2.17<H>    Ca    8.7      07 May 2022 07:25      CAPILLARY BLOOD GLUCOSE      POCT Blood Glucose.: 202 mg/dL (08 May 2022 11:40)  POCT Blood Glucose.: 230 mg/dL (08 May 2022 07:37)  POCT Blood Glucose.: 254 mg/dL (07 May 2022 21:18)  POCT Blood Glucose.: 192 mg/dL (07 May 2022 17:09)  POCT Blood Glucose.: 214 mg/dL (07 May 2022 12:12)      rad< from: CT Chest No Cont (05.02.22 @ 13:41) >    Bones And Soft Tissues: The bones are unremarkable.  The soft tissues are   unremarkable.      IMPRESSION:    1.  New groundglass opacities (predominantly left-sided) and left upper   lobe opacities.  2.  No change in the bilateral pleural effusions.  3.  No change in the calcified and noncalcified pleural plaques can be a   marker of prior asbestos exposure.    --- End of Report ---    < end of copied text >              RECENT CULTURES:  05-03 @ 19:05 .Blood Blood                No growth to date.    05-03 @ 14:56 Clean Catch Clean Catch (Midstream)   BARNEY      Klebsiella pneumoniae  Klebsiella pneumoniae     >100,000 CFU/ml Klebsiella pneumoniae    05-03 @ 14:45 .Stool Feces                Plesiomonas shigelloides  DETECTED by PCR  *******Please Note:*******  GI panel PCR evaluates for:  Campylobacter, Plesiomonas shigelloides, Salmonella,  Vibrio, Yersinia enterocolitica, Enteroaggregative  Escherichia coli (EAEC), Enteropathogenic E.coli (EPEC),  Enterotoxigenic E. coli (ETEC) lt/st, Shiga-like  toxin-producing E. coli (STEC) stx1/stx2,  Shigella/ Enteroinvasive E. coli (EIEC), Cryptosporidium,  Cyclospora cayetanensis, Entamoeba histolytica,  Giardia lamblia, Adenovirus F 40/41, Astrovirus,  Norovirus GI/GII, Rotavirus A, Sapovirus    05-02 @ 17:23 .Blood Blood-Peripheral   PCR    Growth in aerobic bottle: Gram Positive Cocci in Clusters    Blood Culture PCR  Blood Culture PCR     Growth in aerobic bottle: Staphylococcus epidermidis  Coag Negative Staphylococcus  Single set isolate, possible contaminant. Contact  Microbiology if susceptibility testing clinically  indicated.  ***Blood Panel PCR results on this specimen are available  approximately 3 hours after the Gram stain result.***  Gram stain, PCR, and/or culture results may not always  correspond due to difference in methodologies.  ************************************************************  This PCR assay was performed by multiplex PCR. This  Assay tests for 66 bacterial and resistance gene targets.  Please refer to the [facility] test directory  at [email or web address] for details.    05-02 @ 16:23 .Blood Blood-Peripheral                No Growth Final          RESPIRATORY CULTURES:          Studies  Chest X-RAY  CT SCAN Chest   Venous Dopplers: LE:   CT Abdomen  Others              
Patient is a 76y old  Male who presents with a chief complaint of fever and cough. diarrhea and generalized weakness (04 May 2022 11:23)      DATE OF SERVICE: 22 @ 12:22    SUBJECTIVE / OVERNIGHT EVENTS: overnight events noted    ROS:  Resp: No cough no sputum production  CVS: No chest pain no palpitations no orthopnea  GI: no N/V/D  : no dysuria, no hematuria  per RN        MEDICATIONS  (STANDING):  albuterol/ipratropium for Nebulization 3 milliLiter(s) Nebulizer every 6 hours  apixaban 2.5 milliGRAM(s) Oral every 12 hours  aspirin enteric coated 81 milliGRAM(s) Oral daily  Biotene Dry Mouth Oral Rinse 5 milliLiter(s) Swish and Spit three times a day  calcitriol   Capsule 0.25 MICROGram(s) Oral daily  cyanocobalamin 1000 MICROGram(s) Oral daily  dextrose 5%. 1000 milliLiter(s) (50 mL/Hr) IV Continuous <Continuous>  dextrose 5%. 1000 milliLiter(s) (100 mL/Hr) IV Continuous <Continuous>  dextrose 50% Injectable 25 Gram(s) IV Push once  dextrose 50% Injectable 12.5 Gram(s) IV Push once  dextrose 50% Injectable 25 Gram(s) IV Push once  diVALproex  milliGRAM(s) Oral two times a day  escitalopram 5 milliGRAM(s) Oral daily  ferrous    sulfate 325 milliGRAM(s) Oral daily  finasteride 5 milliGRAM(s) Oral daily  fluticasone propionate 50 MICROgram(s)/spray Nasal Spray 2 Spray(s) Both Nostrils <User Schedule>  glucagon  Injectable 1 milliGRAM(s) IntraMuscular once  insulin lispro (ADMELOG) corrective regimen sliding scale   SubCutaneous three times a day before meals  levothyroxine 75 MICROGram(s) Oral daily  melatonin 3 milliGRAM(s) Oral at bedtime  midodrine. 10 milliGRAM(s) Oral three times a day  oseltamivir 30 milliGRAM(s) Oral daily  piperacillin/tazobactam IVPB.. 3.375 Gram(s) IV Intermittent every 8 hours  tamsulosin 0.8 milliGRAM(s) Oral at bedtime    MEDICATIONS  (PRN):  ALBUTerol    90 MICROgram(s) HFA Inhaler 2 Puff(s) Inhalation every 6 hours PRN Shortness of Breath and/or Wheezing  dextrose Oral Gel 15 Gram(s) Oral once PRN Blood Glucose LESS THAN 70 milliGRAM(s)/deciliter        CAPILLARY BLOOD GLUCOSE      POCT Blood Glucose.: 109 mg/dL (04 May 2022 08:40)  POCT Blood Glucose.: 127 mg/dL (03 May 2022 21:32)  POCT Blood Glucose.: 107 mg/dL (03 May 2022 17:38)  POCT Blood Glucose.: 126 mg/dL (03 May 2022 13:26)    I&O's Summary    03 May 2022 07:01  -  04 May 2022 07:00  --------------------------------------------------------  IN: 680 mL / OUT: 2075 mL / NET: -1395 mL    04 May 2022 07:01  -  04 May 2022 12:22  --------------------------------------------------------  IN: 180 mL / OUT: 200 mL / NET: -20 mL        Vital Signs Last 24 Hrs  T(C): 37.9 (04 May 2022 11:40), Max: 37.9 (04 May 2022 11:06)  T(F): 100.2 (04 May 2022 11:40), Max: 100.2 (04 May 2022 11:06)  HR: 76 (04 May 2022 11:40) (71 - 82)  BP: 106/66 (04 May 2022 11:40) (104/53 - 130/67)  BP(mean): --  RR: 18 (04 May 2022 11:40) (18 - 18)  SpO2: 95% (04 May 2022 11:40) (91% - 97%)    PHYSICAL EXAM:   HEENT: LUCA EOMI  Neck: Supple, no JVD  Lungs: bilateral scattered wheeze  CVS: S1 S2 systolic murmur +   Abdomen: no tenderness, BS present  Neuro: AO x 2 -3  mild left UE weakness  Ext: no edema  Msk: no joint swelling     LABS:                        8.2    6.33  )-----------( 96       ( 04 May 2022 06:26 )             25.7         136  |  100  |  34<H>  ----------------------------<  103<H>  4.2   |  21<L>  |  2.35<H>    Ca    7.7<L>      04 May 2022 06:25    TPro  x   /  Alb  x   /  TBili  0.3  /  DBili  x   /  AST  x   /  ALT  x   /  AlkPhos  x       PT/INR - ( 04 May 2022 06:25 )   PT: 16.5 sec;   INR: 1.42 ratio               Urinalysis Basic - ( 03 May 2022 14:56 )    Color: Yellow / Appearance: Slightly Turbid / S.024 / pH: x  Gluc: x / Ketone: Negative  / Bili: Negative / Urobili: Negative   Blood: x / Protein: 100 / Nitrite: Positive   Leuk Esterase: Large / RBC: 2 /hpf /  /HPF   Sq Epi: x / Non Sq Epi: 2 /hpf / Bacteria: Many          All consultant(s) notes reviewed and care discussed with other providers        Contact Number, Dr Larson [phone]
Patient is a 76y old  Male who presents with a chief complaint of fever and cough. diarrhea and generalized weakness (12 May 2022 12:06)      DATE OF SERVICE: 05-12-22 @ 14:32    SUBJECTIVE / OVERNIGHT EVENTS: overnight events noted    ROS:  Resp: No cough no sputum production  CVS: No chest pain no palpitations no orthopnea  GI: no N/V/D  : no dysuria, no hematuria  alert and conversational          MEDICATIONS  (STANDING):  albuterol/ipratropium for Nebulization 3 milliLiter(s) Nebulizer every 6 hours  apixaban 2.5 milliGRAM(s) Oral every 12 hours  aspirin enteric coated 81 milliGRAM(s) Oral daily  Biotene Dry Mouth Oral Rinse 5 milliLiter(s) Swish and Spit three times a day  calcitriol   Capsule 0.25 MICROGram(s) Oral daily  cyanocobalamin 1000 MICROGram(s) Oral daily  dextrose 5%. 1000 milliLiter(s) (50 mL/Hr) IV Continuous <Continuous>  dextrose 5%. 1000 milliLiter(s) (100 mL/Hr) IV Continuous <Continuous>  dextrose 50% Injectable 25 Gram(s) IV Push once  dextrose 50% Injectable 12.5 Gram(s) IV Push once  dextrose 50% Injectable 25 Gram(s) IV Push once  diVALproex  milliGRAM(s) Oral two times a day  epoetin abby-epbx (RETACRIT) Injectable 39942 Unit(s) SubCutaneous every 7 days  escitalopram 5 milliGRAM(s) Oral daily  ferrous    sulfate 325 milliGRAM(s) Oral daily  finasteride 5 milliGRAM(s) Oral daily  fludroCORTISONE 0.1 milliGRAM(s) Oral <User Schedule>  fluticasone propionate 50 MICROgram(s)/spray Nasal Spray 2 Spray(s) Both Nostrils <User Schedule>  glucagon  Injectable 1 milliGRAM(s) IntraMuscular once  insulin lispro (ADMELOG) corrective regimen sliding scale   SubCutaneous three times a day before meals  levothyroxine 75 MICROGram(s) Oral daily  melatonin 3 milliGRAM(s) Oral at bedtime  midodrine. 20 milliGRAM(s) Oral three times a day  oxymetazoline 0.05% Nasal Spray 1 Spray(s) Both Nostrils every 12 hours  predniSONE   Tablet 20 milliGRAM(s) Oral every 12 hours  sodium chloride 0.9% for Nebulization 3 milliLiter(s) Nebulizer four times a day  sodium chloride 0.9%. 1000 milliLiter(s) (50 mL/Hr) IV Continuous <Continuous>    MEDICATIONS  (PRN):  ALBUTerol    90 MICROgram(s) HFA Inhaler 2 Puff(s) Inhalation every 6 hours PRN Shortness of Breath and/or Wheezing  dextrose Oral Gel 15 Gram(s) Oral once PRN Blood Glucose LESS THAN 70 milliGRAM(s)/deciliter        CAPILLARY BLOOD GLUCOSE      POCT Blood Glucose.: 310 mg/dL (12 May 2022 11:58)  POCT Blood Glucose.: 274 mg/dL (12 May 2022 11:57)  POCT Blood Glucose.: 409 mg/dL (12 May 2022 11:56)  POCT Blood Glucose.: 318 mg/dL (12 May 2022 07:47)  POCT Blood Glucose.: 352 mg/dL (11 May 2022 21:10)  POCT Blood Glucose.: 376 mg/dL (11 May 2022 16:50)    I&O's Summary    11 May 2022 07:01  -  12 May 2022 07:00  --------------------------------------------------------  IN: 0 mL / OUT: 1750 mL / NET: -1750 mL        Vital Signs Last 24 Hrs  T(C): 36.4 (12 May 2022 04:15), Max: 36.7 (11 May 2022 21:02)  T(F): 97.5 (12 May 2022 04:15), Max: 98 (11 May 2022 21:02)  HR: 84 (12 May 2022 11:45) (80 - 88)  BP: 135/77 (12 May 2022 11:45) (80/40 - 154/76)  BP(mean): --  RR: 18 (12 May 2022 04:15) (18 - 18)  SpO2: 94% (12 May 2022 04:15) (91% - 94%)    PHYSICAL EXAM:   HEENT: LUCA EOMI  Neck: Supple, no JVD  Lungs: improved wheeze  CVS: S1 S2 systolic murmur +   Abdomen: no tenderness, BS present  Neuro: Alert no change  Ext: no edema    LABS:    05-12    134<L>  |  97  |  43<H>  ----------------------------<  306<H>  4.0   |  24  |  1.66<H>    Ca    9.0      12 May 2022 13:20                  All consultant(s) notes reviewed and care discussed with other providers        Contact Number, Dr Larson [phone]
Patient is a 76y old  Male who presents with a chief complaint of fever and cough. diarrhea and generalized weakness (13 May 2022 13:12)      DATE OF SERVICE: 05-13-22 @ 14:35    SUBJECTIVE / OVERNIGHT EVENTS: overnight events noted    ROS:  Resp: No cough no sputum production  CVS: No chest pain no palpitations no orthopnea  GI: no N/V/D  : no dysuria, no hematuria          MEDICATIONS  (STANDING):  albuterol/ipratropium for Nebulization 3 milliLiter(s) Nebulizer every 6 hours  apixaban 2.5 milliGRAM(s) Oral every 12 hours  aspirin enteric coated 81 milliGRAM(s) Oral daily  Biotene Dry Mouth Oral Rinse 5 milliLiter(s) Swish and Spit three times a day  calcitriol   Capsule 0.25 MICROGram(s) Oral daily  cyanocobalamin 1000 MICROGram(s) Oral daily  dextrose 5%. 1000 milliLiter(s) (50 mL/Hr) IV Continuous <Continuous>  dextrose 5%. 1000 milliLiter(s) (100 mL/Hr) IV Continuous <Continuous>  dextrose 5%. 1000 milliLiter(s) (100 mL/Hr) IV Continuous <Continuous>  dextrose 50% Injectable 25 Gram(s) IV Push once  dextrose 50% Injectable 12.5 Gram(s) IV Push once  dextrose 50% Injectable 25 Gram(s) IV Push once  diVALproex  milliGRAM(s) Oral two times a day  epoetin abby-epbx (RETACRIT) Injectable 10447 Unit(s) SubCutaneous every 7 days  escitalopram 5 milliGRAM(s) Oral daily  ferrous    sulfate 325 milliGRAM(s) Oral daily  finasteride 5 milliGRAM(s) Oral daily  fludroCORTISONE 0.1 milliGRAM(s) Oral <User Schedule>  fluticasone propionate 50 MICROgram(s)/spray Nasal Spray 2 Spray(s) Both Nostrils <User Schedule>  glucagon  Injectable 1 milliGRAM(s) IntraMuscular once  glucagon  Injectable 1 milliGRAM(s) IntraMuscular once  insulin glargine Injectable (LANTUS) 5 Unit(s) SubCutaneous at bedtime  insulin lispro (ADMELOG) corrective regimen sliding scale   SubCutaneous Before meals and at bedtime  levothyroxine 75 MICROGram(s) Oral daily  melatonin 3 milliGRAM(s) Oral at bedtime  midodrine. 20 milliGRAM(s) Oral three times a day  oxymetazoline 0.05% Nasal Spray 1 Spray(s) Both Nostrils every 12 hours  sodium chloride 0.9% for Nebulization 3 milliLiter(s) Nebulizer four times a day  sodium chloride 0.9%. 1000 milliLiter(s) (50 mL/Hr) IV Continuous <Continuous>    MEDICATIONS  (PRN):  ALBUTerol    90 MICROgram(s) HFA Inhaler 2 Puff(s) Inhalation every 6 hours PRN Shortness of Breath and/or Wheezing  dextrose Oral Gel 15 Gram(s) Oral once PRN Blood Glucose LESS THAN 70 milliGRAM(s)/deciliter        CAPILLARY BLOOD GLUCOSE      POCT Blood Glucose.: 275 mg/dL (13 May 2022 12:14)  POCT Blood Glucose.: 272 mg/dL (13 May 2022 08:19)  POCT Blood Glucose.: 349 mg/dL (12 May 2022 23:20)  POCT Blood Glucose.: 351 mg/dL (12 May 2022 21:14)  POCT Blood Glucose.: 391 mg/dL (12 May 2022 16:48)    I&O's Summary    12 May 2022 07:01  -  13 May 2022 07:00  --------------------------------------------------------  IN: 625 mL / OUT: 1700 mL / NET: -1075 mL    13 May 2022 07:01  -  13 May 2022 14:35  --------------------------------------------------------  IN: 900 mL / OUT: 700 mL / NET: 200 mL        Vital Signs Last 24 Hrs  T(C): 36.9 (13 May 2022 11:13), Max: 37.1 (13 May 2022 04:21)  T(F): 98.4 (13 May 2022 11:13), Max: 98.8 (13 May 2022 04:21)  HR: 86 (13 May 2022 11:13) (81 - 94)  BP: 151/80 (13 May 2022 11:13) (111/69 - 161/75)  BP(mean): --  RR: 18 (13 May 2022 11:13) (18 - 18)  SpO2: 95% (13 May 2022 11:13) (92% - 95%)    PHYSICAL EXAM:   HEENT: LUCA EOMI  Neck: Supple, no JVD  Lungs: improved wheeze  CVS: S1 S2 systolic murmur +   Abdomen: no tenderness, BS present  Neuro: Alert no change  Ext: no edema    LABS:                        8.5    11.08 )-----------( 232      ( 13 May 2022 06:22 )             26.3     05-13    138  |  98  |  38<H>  ----------------------------<  242<H>  3.9   |  25  |  1.70<H>    Ca    9.0      13 May 2022 06:22                  All consultant(s) notes reviewed and care discussed with other providers        Contact Number, Dr Larson [phone]
Date of Service: 05-06-22 @ 11:50    Patient is a 76y old  Male who presents with a chief complaint of fever and cough. diarrhea and generalized weakness (06 May 2022 11:00)      Any change in ROS: mild wheezing:     MEDICATIONS  (STANDING):  albuterol/ipratropium for Nebulization 3 milliLiter(s) Nebulizer every 6 hours  albuterol/ipratropium for Nebulization. 3 milliLiter(s) Nebulizer once  apixaban 2.5 milliGRAM(s) Oral every 12 hours  aspirin enteric coated 81 milliGRAM(s) Oral daily  Biotene Dry Mouth Oral Rinse 5 milliLiter(s) Swish and Spit three times a day  calcitriol   Capsule 0.25 MICROGram(s) Oral daily  cyanocobalamin 1000 MICROGram(s) Oral daily  dextrose 5%. 1000 milliLiter(s) (50 mL/Hr) IV Continuous <Continuous>  dextrose 5%. 1000 milliLiter(s) (100 mL/Hr) IV Continuous <Continuous>  dextrose 50% Injectable 25 Gram(s) IV Push once  dextrose 50% Injectable 12.5 Gram(s) IV Push once  dextrose 50% Injectable 25 Gram(s) IV Push once  diVALproex  milliGRAM(s) Oral two times a day  epoetin abby-epbx (RETACRIT) Injectable 55993 Unit(s) SubCutaneous every 7 days  escitalopram 5 milliGRAM(s) Oral daily  ferrous    sulfate 325 milliGRAM(s) Oral daily  finasteride 5 milliGRAM(s) Oral daily  fluticasone propionate 50 MICROgram(s)/spray Nasal Spray 2 Spray(s) Both Nostrils <User Schedule>  furosemide    Tablet 20 milliGRAM(s) Oral daily  glucagon  Injectable 1 milliGRAM(s) IntraMuscular once  insulin lispro (ADMELOG) corrective regimen sliding scale   SubCutaneous three times a day before meals  levothyroxine 75 MICROGram(s) Oral daily  melatonin 3 milliGRAM(s) Oral at bedtime  metoprolol succinate ER 12.5 milliGRAM(s) Oral daily  midodrine. 10 milliGRAM(s) Oral three times a day  oseltamivir 30 milliGRAM(s) Oral daily  piperacillin/tazobactam IVPB.. 3.375 Gram(s) IV Intermittent every 8 hours  tamsulosin 0.8 milliGRAM(s) Oral at bedtime    MEDICATIONS  (PRN):  ALBUTerol    90 MICROgram(s) HFA Inhaler 2 Puff(s) Inhalation every 6 hours PRN Shortness of Breath and/or Wheezing  dextrose Oral Gel 15 Gram(s) Oral once PRN Blood Glucose LESS THAN 70 milliGRAM(s)/deciliter    Vital Signs Last 24 Hrs  T(C): 36.7 (06 May 2022 05:27), Max: 36.8 (05 May 2022 21:23)  T(F): 98 (06 May 2022 05:27), Max: 98.2 (05 May 2022 21:23)  HR: 75 (06 May 2022 05:27) (75 - 76)  BP: 168/87 (06 May 2022 05:27) (157/84 - 168/87)  BP(mean): --  RR: 18 (06 May 2022 05:27) (18 - 18)  SpO2: 97% (06 May 2022 05:27) (94% - 97%)    I&O's Summary    05 May 2022 07:01  -  06 May 2022 07:00  --------------------------------------------------------  IN: 1480 mL / OUT: 1400 mL / NET: 80 mL          Physical Exam:   GENERAL: NAD, well-groomed, well-developed  HEENT: LUCA/   Atraumatic, Normocephalic  ENMT: No tonsillar erythema, exudates, or enlargement; Moist mucous membranes, Good dentition, No lesions  NECK: Supple, No JVD, Normal thyroid  CHEST/LUNG: mild wheezing  CVS: Regular rate and rhythm; No murmurs, rubs, or gallops  GI: : Soft, Nontender, Nondistended; Bowel sounds present  NERVOUS SYSTEM:  Alert & Oriented X3tric  EXTREMITIES:  -edema  LYMPH: No lymphadenopathy noted  SKIN: No rashes or lesions  ENDOCRINOLOGY: No Thyromegaly  PSYCH: Appropriate    Labs:                              9.7    8.71  )-----------( 107      ( 06 May 2022 07:08 )             29.9                         9.9    4.73  )-----------( 98       ( 05 May 2022 06:24 )             32.1                         8.2    6.33  )-----------( 96       ( 04 May 2022 06:26 )             25.7                         8.2    8.52  )-----------( 87       ( 03 May 2022 09:08 )             25.9                         7.6    6.96  )-----------( 82       ( 03 May 2022 07:00 )             24.0                         9.1    13.16 )-----------( 111      ( 02 May 2022 12:06 )             29.2     05-06    136  |  97  |  38<H>  ----------------------------<  206<H>  4.3   |  23  |  1.93<H>  05-05    135  |  99  |  34<H>  ----------------------------<  192<H>  4.5   |  21<L>  |  2.17<H>  05-04    136  |  100  |  34<H>  ----------------------------<  103<H>  4.2   |  21<L>  |  2.35<H>  05-03    136  |  103  |  39<H>  ----------------------------<  87  4.1   |  22  |  2.38<H>  05-02    135  |  101  |  40<H>  ----------------------------<  138<H>  4.7   |  20<L>  |  2.66<H>    Ca    8.7      06 May 2022 07:05  Ca    8.6      05 May 2022 06:36    TPro  x   /  Alb  x   /  TBili  0.3  /  DBili  x   /  AST  x   /  ALT  x   /  AlkPhos  x   05-04  TPro  6.4  /  Alb  3.1<L>  /  TBili  0.4  /  DBili  x   /  AST  32  /  ALT  16  /  AlkPhos  59  05-02    CAPILLARY BLOOD GLUCOSE      POCT Blood Glucose.: 224 mg/dL (06 May 2022 08:07)  POCT Blood Glucose.: 274 mg/dL (05 May 2022 22:12)  POCT Blood Glucose.: 169 mg/dL (05 May 2022 16:32)            Lactate, Blood: 1.1 mmol/L (05-02 @ 14:57)        RECENT CULTURES:  05-03 @ 19:05 .Blood Blood         rad< from: CT Chest No Cont (05.02.22 @ 13:41) >  atrophic.    Bones And Soft Tissues: The bones are unremarkable.  The soft tissues are   unremarkable.      IMPRESSION:    1.  New groundglass opacities (predominantly left-sided) and left upper   lobe opacities.  2.  No change in the bilateral pleural effusions.  3.  No change in the calcified and noncalcified pleural plaques can be a   marker of prior asbestos exposure.    --- End of Report ---      < end of copied text >         No growth to date.    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 coli (ETEC) lt/st, Shiga-like  toxin-producing E. coli (STEC) stx1/stx2,  Shigella/ Enteroinvasive E. coli (EIEC), Cryptosporidium,  Cyclospora cayetanensis, Entamoeba histolytica,  Giardia lamblia, Adenovirus F 40/41, Astrovirus,  Norovirus GI/GII, Rotavirus A, Sapovirus    05-02 @ 17:23 .Blood Blood-Peripheral   PCR    Growth in aerobic bottle: Gram Positive Cocci in Clusters    Blood Culture PCR  Blood Culture PCR     Growth in aerobic bottle: Staphylococcus epidermidis  Coag Negative Staphylococcus  Single set isolate, possible contaminant. Contact  Microbiology if susceptibility testing clinically  indicated.  ***Blood Panel PCR results on this specimen are available  approximately 3 hours after the Gram stain result.***  Gram stain, PCR, and/or culture results may not always  correspond due to difference in methodologies.  ************************************************************  This PCR assay was performed by multiplex PCR. This  Assay tests for 66 bacterial and resistance gene targets.  Please refer to the [facility] test directory  at [email or web address] for details.    05-02 @ 16:23 .Blood Blood-Peripheral                No growth to date.          RESPIRATORY CULTURES:          Studies  Chest X-RAY  CT SCAN Chest   Venous Dopplers: LE:   CT Abdomen  Others              
Date of Service: 05-16-22 @ 12:28    Patient is a 76y old  Male who presents with a chief complaint of fever and cough. diarrhea and generalized weakness (16 May 2022 11:32)    Any change in ROS:   O2 sats 96% on RA  Denies CP, SOB    MEDICATIONS  (STANDING):  albuterol/ipratropium for Nebulization 3 milliLiter(s) Nebulizer every 6 hours  apixaban 2.5 milliGRAM(s) Oral every 12 hours  aspirin enteric coated 81 milliGRAM(s) Oral daily  Biotene Dry Mouth Oral Rinse 5 milliLiter(s) Swish and Spit three times a day  calcitriol   Capsule 0.25 MICROGram(s) Oral daily  cyanocobalamin 1000 MICROGram(s) Oral daily  dextrose 5%. 1000 milliLiter(s) (50 mL/Hr) IV Continuous <Continuous>  dextrose 5%. 1000 milliLiter(s) (100 mL/Hr) IV Continuous <Continuous>  dextrose 5%. 1000 milliLiter(s) (100 mL/Hr) IV Continuous <Continuous>  dextrose 50% Injectable 25 Gram(s) IV Push once  dextrose 50% Injectable 12.5 Gram(s) IV Push once  dextrose 50% Injectable 25 Gram(s) IV Push once  diVALproex  milliGRAM(s) Oral two times a day  droxidopa 100 milliGRAM(s) Oral three times a day  epoetin abby-epbx (RETACRIT) Injectable 07115 Unit(s) SubCutaneous every 7 days  escitalopram 5 milliGRAM(s) Oral daily  ferrous    sulfate 325 milliGRAM(s) Oral daily  finasteride 5 milliGRAM(s) Oral daily  fludroCORTISONE 0.1 milliGRAM(s) Oral <User Schedule>  fluticasone propionate 50 MICROgram(s)/spray Nasal Spray 2 Spray(s) Both Nostrils <User Schedule>  glucagon  Injectable 1 milliGRAM(s) IntraMuscular once  glucagon  Injectable 1 milliGRAM(s) IntraMuscular once  insulin glargine Injectable (LANTUS) 5 Unit(s) SubCutaneous at bedtime  insulin lispro (ADMELOG) corrective regimen sliding scale   SubCutaneous Before meals and at bedtime  levothyroxine 75 MICROGram(s) Oral daily  melatonin 3 milliGRAM(s) Oral at bedtime  midodrine. 20 milliGRAM(s) Oral three times a day  sodium chloride 0.9% for Nebulization 3 milliLiter(s) Nebulizer four times a day    MEDICATIONS  (PRN):  ALBUTerol    90 MICROgram(s) HFA Inhaler 2 Puff(s) Inhalation every 6 hours PRN Shortness of Breath and/or Wheezing  dextrose Oral Gel 15 Gram(s) Oral once PRN Blood Glucose LESS THAN 70 milliGRAM(s)/deciliter    Vital Signs Last 24 Hrs  T(C): 36.5 (16 May 2022 10:54), Max: 36.5 (16 May 2022 10:54)  T(F): 97.7 (16 May 2022 10:54), Max: 97.7 (16 May 2022 10:54)  HR: 71 (16 May 2022 10:54) (68 - 80)  BP: 85/53 (16 May 2022 10:54) (85/53 - 153/80)  BP(mean): --  RR: 18 (16 May 2022 10:54) (18 - 18)  SpO2: 95% (16 May 2022 10:54) (93% - 95%)    I&O's Summary    15 May 2022 07:01  -  16 May 2022 07:00  --------------------------------------------------------  IN: 1080 mL / OUT: 1500 mL / NET: -420 mL    16 May 2022 07:01  -  16 May 2022 12:28  --------------------------------------------------------  IN: 620 mL / OUT: 500 mL / NET: 120 mL    Physical Exam:   GENERAL: NAD  HEENT: LUCA  ENMT: No tonsillar erythema, exudates, or enlargement  NECK: Supple, No JVD  CHEST/LUNG: Clear to auscultaion, ; No rales, rhonchi, wheezing, or rubs  CVS: Regular rate and rhythm; No murmurs, rubs, or gallops  GI: : Soft, Nontender, Nondistended; Bowel sounds present  NERVOUS SYSTEM:  Alert & Oriented X3, Good concentration; Motor Strength 5/5 B/L upper and lower extremities; DTRs 2+ intact and symmetric  EXTREMITIES:  2+ Peripheral Pulses, No clubbing, cyanosis, or edema  LYMPH: No lymphadenopathy noted  SKIN: No rashes or lesions  ENDOCRINOLOGY: No Thyromegaly  PSYCH: Appropriate    Labs:                              8.8    9.82  )-----------( 263      ( 16 May 2022 05:45 )             26.9                         8.5    11.01 )-----------( 262      ( 14 May 2022 07:32 )             27.2                         8.5    11.08 )-----------( 232      ( 13 May 2022 06:22 )             26.3     05-16    137  |  99  |  38<H>  ----------------------------<  102<H>  3.5   |  27  |  1.61<H>  05-15    135  |  99  |  40<H>  ----------------------------<  141<H>  3.4<L>   |  25  |  1.58<H>  05-14    136  |  99  |  38<H>  ----------------------------<  169<H>  3.9   |  26  |  1.63<H>  05-13    138  |  98  |  38<H>  ----------------------------<  242<H>  3.9   |  25  |  1.70<H>  05-12    134<L>  |  97  |  43<H>  ----------------------------<  306<H>  4.0   |  24  |  1.66<H>    Ca    8.8      16 May 2022 05:45  Ca    8.9      15 May 2022 07:12      CAPILLARY BLOOD GLUCOSE      POCT Blood Glucose.: 170 mg/dL (16 May 2022 11:44)  POCT Blood Glucose.: 110 mg/dL (16 May 2022 07:45)  POCT Blood Glucose.: 187 mg/dL (15 May 2022 20:52)  POCT Blood Glucose.: 148 mg/dL (15 May 2022 16:54)    Studies  Chest X-RAY  < from: Xray Chest 1 View- PORTABLE-Routine (Xray Chest 1 View- PORTABLE-Routine .) (05.10.22 @ 08:57) >  INTERPRETATION:  EXAMINATION: XR CHEST    CLINICAL INDICATION: sob    TECHNIQUE: Single frontal, portable view of the chest was obtained.    COMPARISON: Chest x-ray 5/9/2022.    FINDINGS:  Left chest wall AICD.  The heart is normal in size.  Patchy bibasilar opacities.  Pleural plaques again identified.  Trace left pleural effusion. No pneumothorax.    IMPRESSION:  No significant interval change.    < end of copied text >    CT SCAN Chest  < from: CT Chest No Cont (05.02.22 @ 13:41) >  FINDINGS:    Tubes/Lines: None.    Lungs, airways and pleura:The bilateral pleural effusions are unchanged   and are of simple fluid attenuation. Bilateral calcified noncalcified   pleural plaques can occur in the clinical setting of prior asbestos   exposure.    There are bilateral, predominantly left-sided groundglass opacities, new   since 3/31/2022.    In addition, there are branching opacities in the lingula and left upper   lobe, new since 3/31/2022.    The patchy consolidation and opacity shown in the right upper lobe on   3/31/2022 have resolved.    The 1.1 x 0.8 cm rounded opacity along the right minor fissure (series 2   image 70) unchanged. The bilateral lower lobe passive atelectasis is   unchanged.    Mediastinum: The thyroid gland is unremarkable. The 17 x 30 mm subcarinal   lymph node isunchanged. The additional paratracheal, prevascular, AP   window lymph nodes are unchanged. The esophagus is unremarkable.    Left atrium is likely enlarged. The remainder the heart is normal in   size. No pericardial effusion. The calcifications along the   interventricular septum and apex of the left ventricle are unchanged. The   aorta is normal in caliber. Atheromatous disease of the aorta.    AICD in place.    Upper Abdomen: The adrenal gland thickening is unchanged. Right kidney is   atrophic.    Bones And Soft Tissues: The bones are unremarkable.  The soft tissues are   unremarkable.      IMPRESSION:    1.  New groundglass opacities (predominantly left-sided) and left upper   lobe opacities.  2.  No change in the bilateral pleural effusions.  3.  No change in the calcified and noncalcified pleural plaques can be a   marker of prior asbestos exposure.      < end of copied text >    < from: TTE with Doppler (w/Cont) (05.03.22 @ 11:25) >  Dimensions:    Normal Values:  LA:     5.3    2.0 - 4.0 cm  Ao:     3.4    2.0 - 3.8 cm  SEPTUM: 1.2    0.6 - 1.2 cm  PWT:    1.0    0.6 - 1.1 cm  LVIDd:  5.5    3.0 - 5.6 cm  LVIDs:  4.9    1.8 - 4.0 cm  Derived variables:  LVMI: 131 g/m2  RWT: 0.36  Fractional short: 11 %  EF (Visual Estimate): 25 %  Doppler Peak Velocity (m/sec): AoV=1.0  ------------------------------------------------------------------------  Observations:  Mitral Valve: Mitral annular calcification.   Tethered  mitral valve leaflets with normal opening. Moderate-severe  mitral regurgitation.  Aortic Valve/Aorta: Aortic valve not well visualized;  appears calcified. Peak transaortic valve gradient equals 4  mm Hg, mean transaortic valve gradient equals 3 mm Hg,  aortic valve velocity time integral equals 22 cm. Peak left  ventricular outflow tract gradient equals 2 mm Hg, mean  gradient is equal to 1 mm Hg, LVOT velocity time integral  equals 13 cm.  Aortic Root: 3.4 cm.  Left Atrium: Severely dilated left atrium.  LA volume index  = 50 cc/m2.  Left Ventricle: Severe  left ventricular systolic  dysfunction.  The apex is akinetic/dyskinetic.  Endocardial visualization enhanced with intravenous  injection of Ultrasonic Enhancing Agent (Lumason).  Swirling pattern of Lumason within the LV apex consistent  with low flow in this region. Normal left ventricular  internal dimensions and wall thicknesses. Moderate  diastolic dysfunction (Stage II).  Right Heart: Normal right atrium. The right ventricle is  not well visualized; grossly normal right ventricular  systolic function.   A device wire is noted in the right  heart. Normal tricuspid valve. Mild-moderate tricuspid  regurgitation. Normal pulmonic valve.  Pericardium/Pleura: Normal pericardium with no pericardial  effusion.  Hemodynamic: Estimated right atrial pressure is 8 mm Hg.  Estimated right ventricular systolic pressure equals 42 mm  Hg, assuming right atrial pressure equals 8 mm Hg,  consistent with mild pulmonary hypertension.  ------------------------------------------------------------------------  Conclusions:  1. Mitral annular calcification.   Tethered mitral valve  leaflets with normal opening. Moderate-severe mitral  regurgitation.  2. Severely dilated left atrium.  LA volume index = 50  cc/m2.  3. Severe  left ventricular systolic dysfunction.  The apex  is akinetic/dyskinetic.   Endocardial visualization  enhanced with intravenous injection of Ultrasonic Enhancing  Agent (Lumason).  Swirling pattern of Lumason within the LV  apex consistent with low flow in this region.  4. Moderate diastolic dysfunction (Stage II).  5. Estimated pulmonary artery systolic pressure equals 42  mm Hg, assuming right atrial pressure equals 8 mm Hg,  consistent with mild pulmonary pressures.  *** No recent echocardiogram for comparison.    < end of copied text >            
Patient is a 76y old  Male who presents with a chief complaint of fever and cough. diarrhea and generalized weakness (07 May 2022 15:11)      DATE OF SERVICE: 05-07-22 @ 15:17    SUBJECTIVE / OVERNIGHT EVENTS: overnight events noted    ROS:  Resp: No cough no sputum production  CVS: No chest pain no palpitations no orthopnea  GI: no N/V/D          MEDICATIONS  (STANDING):  albuterol/ipratropium for Nebulization 3 milliLiter(s) Nebulizer every 6 hours  apixaban 2.5 milliGRAM(s) Oral every 12 hours  aspirin enteric coated 81 milliGRAM(s) Oral daily  Biotene Dry Mouth Oral Rinse 5 milliLiter(s) Swish and Spit three times a day  calcitriol   Capsule 0.25 MICROGram(s) Oral daily  cyanocobalamin 1000 MICROGram(s) Oral daily  dextrose 5%. 1000 milliLiter(s) (50 mL/Hr) IV Continuous <Continuous>  dextrose 5%. 1000 milliLiter(s) (100 mL/Hr) IV Continuous <Continuous>  dextrose 50% Injectable 25 Gram(s) IV Push once  dextrose 50% Injectable 12.5 Gram(s) IV Push once  dextrose 50% Injectable 25 Gram(s) IV Push once  diVALproex  milliGRAM(s) Oral two times a day  epoetin abby-epbx (RETACRIT) Injectable 76578 Unit(s) SubCutaneous every 7 days  escitalopram 5 milliGRAM(s) Oral daily  ferrous    sulfate 325 milliGRAM(s) Oral daily  finasteride 5 milliGRAM(s) Oral daily  fluticasone propionate 50 MICROgram(s)/spray Nasal Spray 2 Spray(s) Both Nostrils <User Schedule>  furosemide    Tablet 20 milliGRAM(s) Oral daily  glucagon  Injectable 1 milliGRAM(s) IntraMuscular once  insulin lispro (ADMELOG) corrective regimen sliding scale   SubCutaneous three times a day before meals  levothyroxine 75 MICROGram(s) Oral daily  melatonin 3 milliGRAM(s) Oral at bedtime  methylPREDNISolone sodium succinate Injectable 30 milliGRAM(s) IV Push once  metoprolol succinate ER 12.5 milliGRAM(s) Oral daily  midodrine. 10 milliGRAM(s) Oral three times a day  oseltamivir 30 milliGRAM(s) Oral daily  tamsulosin 0.8 milliGRAM(s) Oral at bedtime    MEDICATIONS  (PRN):  ALBUTerol    90 MICROgram(s) HFA Inhaler 2 Puff(s) Inhalation every 6 hours PRN Shortness of Breath and/or Wheezing  dextrose Oral Gel 15 Gram(s) Oral once PRN Blood Glucose LESS THAN 70 milliGRAM(s)/deciliter        CAPILLARY BLOOD GLUCOSE      POCT Blood Glucose.: 214 mg/dL (07 May 2022 12:12)  POCT Blood Glucose.: 282 mg/dL (07 May 2022 07:50)  POCT Blood Glucose.: 358 mg/dL (06 May 2022 21:48)  POCT Blood Glucose.: 280 mg/dL (06 May 2022 17:17)    I&O's Summary    06 May 2022 07:01  -  07 May 2022 07:00  --------------------------------------------------------  IN: 420 mL / OUT: 300 mL / NET: 120 mL    07 May 2022 07:01  -  07 May 2022 15:17  --------------------------------------------------------  IN: 480 mL / OUT: 450 mL / NET: 30 mL        Vital Signs Last 24 Hrs  T(C): 36.4 (07 May 2022 11:21), Max: 37 (06 May 2022 21:52)  T(F): 97.6 (07 May 2022 11:21), Max: 98.6 (06 May 2022 21:52)  HR: 59 (07 May 2022 11:21) (59 - 64)  BP: 132/72 (07 May 2022 11:21) (132/72 - 169/87)  BP(mean): --  RR: 18 (07 May 2022 11:21) (18 - 18)  SpO2: 94% (07 May 2022 11:21) (94% - 97%)      PHYSICAL EXAM:   HEENT: LUCA EOMI  Neck: Supple, no JVD  Lungs: bilateral scattered wheeze  CVS: S1 S2 systolic murmur +   Abdomen: no tenderness, BS present  Neuro: Alert no change  Ext: no edema  Msk: no joint swelling     LABS:                        9.3    9.63  )-----------( 131      ( 07 May 2022 07:27 )             28.6     05-07    135  |  96  |  37<H>  ----------------------------<  237<H>  4.1   |  24  |  1.92<H>    Ca    8.7      07 May 2022 07:25                  All consultant(s) notes reviewed and care discussed with other providers        Contact Number, Dr Larson [phone]
Date of Service: 05-09-22 @ 10:56    Patient is a 76y old  Male who presents with a chief complaint of fever and cough. diarrhea and generalized weakness (08 May 2022 14:59)      Any change in ROS:  doingok: on 2 L of oxygen today   overngiht he desaturated; refused Duoneb and had coughing with phelgm:       MEDICATIONS  (STANDING):  albuterol/ipratropium for Nebulization 3 milliLiter(s) Nebulizer every 6 hours  apixaban 2.5 milliGRAM(s) Oral every 12 hours  aspirin enteric coated 81 milliGRAM(s) Oral daily  BACItracin   Ointment 1 Application(s) Topical two times a day  Biotene Dry Mouth Oral Rinse 5 milliLiter(s) Swish and Spit three times a day  calcitriol   Capsule 0.25 MICROGram(s) Oral daily  cyanocobalamin 1000 MICROGram(s) Oral daily  dextrose 5%. 1000 milliLiter(s) (50 mL/Hr) IV Continuous <Continuous>  dextrose 5%. 1000 milliLiter(s) (100 mL/Hr) IV Continuous <Continuous>  dextrose 50% Injectable 25 Gram(s) IV Push once  dextrose 50% Injectable 12.5 Gram(s) IV Push once  dextrose 50% Injectable 25 Gram(s) IV Push once  diVALproex  milliGRAM(s) Oral two times a day  epoetin abby-epbx (RETACRIT) Injectable 70153 Unit(s) SubCutaneous every 7 days  escitalopram 5 milliGRAM(s) Oral daily  ferrous    sulfate 325 milliGRAM(s) Oral daily  finasteride 5 milliGRAM(s) Oral daily  fluticasone propionate 50 MICROgram(s)/spray Nasal Spray 2 Spray(s) Both Nostrils <User Schedule>  furosemide    Tablet 20 milliGRAM(s) Oral daily  glucagon  Injectable 1 milliGRAM(s) IntraMuscular once  insulin lispro (ADMELOG) corrective regimen sliding scale   SubCutaneous three times a day before meals  levothyroxine 75 MICROGram(s) Oral daily  melatonin 3 milliGRAM(s) Oral at bedtime  metoprolol succinate ER 12.5 milliGRAM(s) Oral daily  midodrine. 10 milliGRAM(s) Oral three times a day  sodium chloride 0.9% for Nebulization 3 milliLiter(s) Nebulizer four times a day  tamsulosin 0.8 milliGRAM(s) Oral at bedtime    MEDICATIONS  (PRN):  ALBUTerol    90 MICROgram(s) HFA Inhaler 2 Puff(s) Inhalation every 6 hours PRN Shortness of Breath and/or Wheezing  dextrose Oral Gel 15 Gram(s) Oral once PRN Blood Glucose LESS THAN 70 milliGRAM(s)/deciliter    Vital Signs Last 24 Hrs  T(C): 37.1 (09 May 2022 04:33), Max: 37.2 (08 May 2022 21:34)  T(F): 98.8 (09 May 2022 04:33), Max: 99 (08 May 2022 21:34)  HR: 80 (09 May 2022 04:33) (56 - 80)  BP: 146/71 (09 May 2022 04:33) (106/65 - 168/81)  BP(mean): --  RR: 18 (09 May 2022 04:33) (18 - 18)  SpO2: 97% (09 May 2022 04:33) (90% - 97%)    I&O's Summary    08 May 2022 07:01  -  09 May 2022 07:00  --------------------------------------------------------  IN: 200 mL / OUT: 350 mL / NET: -150 mL    09 May 2022 07:01  -  09 May 2022 10:56  --------------------------------------------------------  IN: 420 mL / OUT: 0 mL / NET: 420 mL          Physical Exam:   GENERAL: NAD, well-groomed, well-developed  HEENT: LUCA/   Atraumatic, Normocephalic  ENMT: No tonsillar erythema, exudates, or enlargement; Moist mucous membranes, Good dentition, No lesions  NECK: Supple, No JVD, Normal thyroid  CHEST/LUNG: no wheezing  CVS: Regular rate and rhythm; No murmurs, rubs, or gallops  GI: : Soft, Nontender, Nondistended; Bowel sounds present  NERVOUS SYSTEM:  Alert & Oriented X3  EXTREMITIES:  2+ Peripheral Pulses, No clubbing, cyanosis, or edema  LYMPH: No lymphadenopathy noted  SKIN: No rashes or lesions  ENDOCRINOLOGY: No Thyromegaly  PSYCH: Appropriate    Labs:                              9.5    13.98 )-----------( 147      ( 09 May 2022 07:19 )             29.4                         10.2   10.29 )-----------( 157      ( 09 May 2022 06:13 )             33.6                         9.3    9.63  )-----------( 131      ( 07 May 2022 07:27 )             28.6                         9.7    8.71  )-----------( 107      ( 06 May 2022 07:08 )             29.9     05-09    137  |  98  |  42<H>  ----------------------------<  220<H>  4.0   |  24  |  2.00<H>  05-09    137  |  94<L>  |  38<H>  ----------------------------<  152<H>  4.7   |  23  |  1.81<H>  05-07    135  |  96  |  37<H>  ----------------------------<  237<H>  4.1   |  24  |  1.92<H>  05-06    136  |  97  |  38<H>  ----------------------------<  206<H>  4.3   |  23  |  1.93<H>    Ca    9.0      09 May 2022 07:12  Ca    9.1      09 May 2022 06:08  Mg     2.0     05-09      CAPILLARY BLOOD GLUCOSE      POCT Blood Glucose.: 195 mg/dL (09 May 2022 07:51)  POCT Blood Glucose.: 256 mg/dL (08 May 2022 21:57)  POCT Blood Glucose.: 222 mg/dL (08 May 2022 16:45)  POCT Blood Glucose.: 202 mg/dL (08 May 2022 11:40)      < from: CT Chest No Cont (05.02.22 @ 13:41) >  aorta is normal in caliber. Atheromatous disease of the aorta.    AICD in place.    Upper Abdomen: The adrenal gland thickening is unchanged. Right kidney is   atrophic.    Bones And Soft Tissues: The bones are unremarkable.  The soft tissues are   unremarkable.      IMPRESSION:    1.  New groundglass opacities (predominantly left-sided) and left upper   lobe opacities.  2.  No change in the bilateral pleural effusions.  3.  No change in the calcified and noncalcified pleural plaques can be a   marker of prior asbestos exposure.    --- End of Report ---            BERNIE COHEN MD; Attending Radiologist  This document has been electronically signed. May  2 2022  2:51PM    < end of copied text >              RECENT CULTURES:  05-03 @ 19:05 .Blood Blood                No Growth Final    05-03 @ 14:56 Clean Catch Clean Catch (Midstream)   BARNEY      Klebsiella pneumoniae  Klebsiella pneumoniae     >100,000 CFU/ml Klebsiella pneumoniae    05-03 @ 14:45 .Stool Feces                Plesiomonas shigelloides  DETECTED by PCR  *******Please Note:*******  GI panel PCR evaluates for:  Campylobacter, Plesiomonas shigelloides, Salmonella,  Vibrio, Yersinia enterocolitica, Enteroaggregative  Escherichia coli (EAEC), Enteropathogenic E.coli (EPEC),  Enterotoxigenic E. coli (ETEC) lt/st, Shiga-like  toxin-producing E. coli (STEC) stx1/stx2,  Shigella/ Enteroinvasive E. coli (EIEC), Cryptosporidium,  Cyclospora cayetanensis, Entamoeba histolytica,  Giardia lamblia, Adenovirus F 40/41, Astrovirus,  Norovirus GI/GII, Rotavirus A, Sapovirus    05-02 @ 17:23 .Blood Blood-Peripheral   PCR    Growth in aerobic bottle: Gram Positive Cocci in Clusters    Blood Culture PCR  Blood Culture PCR     Growth in aerobic bottle: Staphylococcus epidermidis  Coag Negative Staphylococcus  Single set isolate, possible contaminant. Contact  Microbiology if susceptibility testing clinically  indicated.  ***Blood Panel PCR results on this specimen are available  approximately 3 hours after the Gram stain result.***  Gram stain, PCR, and/or culture results may not always  correspond due to difference in methodologies.  ************************************************************  This PCR assay was performed by multiplex PCR. This  Assay tests for 66 bacterial and resistance gene targets.  Please refer to the [facility] test directory  at [email or web address] for details.    05-02 @ 16:23 .Blood Blood-Peripheral                No Growth Final          RESPIRATORY CULTURES:          Studies  Chest X-RAY  CT SCAN Chest   Venous Dopplers: LE:   CT Abdomen  Others              
Patient is a 76y old  Male who presents with a chief complaint of fever and cough. diarrhea and generalized weakness (16 May 2022 12:27)      DATE OF SERVICE: 05-16-22 @ 13:45    SUBJECTIVE / OVERNIGHT EVENTS: overnight events noted    ROS:  Resp: No cough no sputum production  CVS: No chest pain no palpitations no orthopnea  GI: no N/V/D          MEDICATIONS  (STANDING):  albuterol/ipratropium for Nebulization 3 milliLiter(s) Nebulizer every 6 hours  apixaban 2.5 milliGRAM(s) Oral every 12 hours  aspirin enteric coated 81 milliGRAM(s) Oral daily  Biotene Dry Mouth Oral Rinse 5 milliLiter(s) Swish and Spit three times a day  calcitriol   Capsule 0.25 MICROGram(s) Oral daily  cyanocobalamin 1000 MICROGram(s) Oral daily  dextrose 5%. 1000 milliLiter(s) (50 mL/Hr) IV Continuous <Continuous>  dextrose 5%. 1000 milliLiter(s) (100 mL/Hr) IV Continuous <Continuous>  dextrose 5%. 1000 milliLiter(s) (100 mL/Hr) IV Continuous <Continuous>  dextrose 50% Injectable 25 Gram(s) IV Push once  dextrose 50% Injectable 12.5 Gram(s) IV Push once  dextrose 50% Injectable 25 Gram(s) IV Push once  diVALproex  milliGRAM(s) Oral two times a day  droxidopa 100 milliGRAM(s) Oral three times a day  epoetin abby-epbx (RETACRIT) Injectable 16979 Unit(s) SubCutaneous every 7 days  escitalopram 5 milliGRAM(s) Oral daily  ferrous    sulfate 325 milliGRAM(s) Oral daily  finasteride 5 milliGRAM(s) Oral daily  fludroCORTISONE 0.2 milliGRAM(s) Oral <User Schedule>  fluticasone propionate 50 MICROgram(s)/spray Nasal Spray 2 Spray(s) Both Nostrils <User Schedule>  glucagon  Injectable 1 milliGRAM(s) IntraMuscular once  glucagon  Injectable 1 milliGRAM(s) IntraMuscular once  guaiFENesin ER 1200 milliGRAM(s) Oral every 12 hours  insulin glargine Injectable (LANTUS) 5 Unit(s) SubCutaneous at bedtime  insulin lispro (ADMELOG) corrective regimen sliding scale   SubCutaneous Before meals and at bedtime  levothyroxine 75 MICROGram(s) Oral daily  melatonin 3 milliGRAM(s) Oral at bedtime  midodrine. 30 milliGRAM(s) Oral three times a day  sodium chloride 0.9% for Nebulization 3 milliLiter(s) Nebulizer four times a day    MEDICATIONS  (PRN):  ALBUTerol    90 MICROgram(s) HFA Inhaler 2 Puff(s) Inhalation every 6 hours PRN Shortness of Breath and/or Wheezing  dextrose Oral Gel 15 Gram(s) Oral once PRN Blood Glucose LESS THAN 70 milliGRAM(s)/deciliter        CAPILLARY BLOOD GLUCOSE      POCT Blood Glucose.: 170 mg/dL (16 May 2022 11:44)  POCT Blood Glucose.: 110 mg/dL (16 May 2022 07:45)  POCT Blood Glucose.: 187 mg/dL (15 May 2022 20:52)  POCT Blood Glucose.: 148 mg/dL (15 May 2022 16:54)    I&O's Summary    15 May 2022 07:01  -  16 May 2022 07:00  --------------------------------------------------------  IN: 1080 mL / OUT: 1500 mL / NET: -420 mL    16 May 2022 07:01  -  16 May 2022 13:45  --------------------------------------------------------  IN: 1120 mL / OUT: 900 mL / NET: 220 mL        Vital Signs Last 24 Hrs  T(C): 36.5 (16 May 2022 10:54), Max: 36.5 (16 May 2022 10:54)  T(F): 97.7 (16 May 2022 10:54), Max: 97.7 (16 May 2022 10:54)  HR: 71 (16 May 2022 10:54) (68 - 80)  BP: 85/53 (16 May 2022 10:54) (85/53 - 153/80)  BP(mean): --  RR: 18 (16 May 2022 10:54) (18 - 18)  SpO2: 95% (16 May 2022 10:54) (93% - 95%)      PHYSICAL EXAM:   HEENT: LUCA EOMI  Neck: Supple, no JVD  Lungs: clear  CVS: S1 S2 systolic murmur +   Abdomen: no tenderness, BS present  Neuro: Alert no change  Ext: no edema    LABS:                        8.8    9.82  )-----------( 263      ( 16 May 2022 05:45 )             26.9     05-16    137  |  99  |  38<H>  ----------------------------<  102<H>  3.5   |  27  |  1.61<H>    Ca    8.8      16 May 2022 05:45                  All consultant(s) notes reviewed and care discussed with other providers        Contact Number, Dr Larson [phone]
Patient is a 76y old  Male who presents with a chief complaint of fever and cough. diarrhea and generalized weakness (15 May 2022 11:38)      DATE OF SERVICE: 05-15-22 @ 13:09    SUBJECTIVE / OVERNIGHT EVENTS: overnight events noted    ROS:  Resp: No cough no sputum production  CVS: No chest pain no palpitations no orthopnea  GI: no N/V/D            MEDICATIONS  (STANDING):  albuterol/ipratropium for Nebulization 3 milliLiter(s) Nebulizer every 6 hours  apixaban 2.5 milliGRAM(s) Oral every 12 hours  aspirin enteric coated 81 milliGRAM(s) Oral daily  Biotene Dry Mouth Oral Rinse 5 milliLiter(s) Swish and Spit three times a day  calcitriol   Capsule 0.25 MICROGram(s) Oral daily  cyanocobalamin 1000 MICROGram(s) Oral daily  dextrose 5%. 1000 milliLiter(s) (50 mL/Hr) IV Continuous <Continuous>  dextrose 5%. 1000 milliLiter(s) (100 mL/Hr) IV Continuous <Continuous>  dextrose 5%. 1000 milliLiter(s) (100 mL/Hr) IV Continuous <Continuous>  dextrose 50% Injectable 25 Gram(s) IV Push once  dextrose 50% Injectable 12.5 Gram(s) IV Push once  dextrose 50% Injectable 25 Gram(s) IV Push once  diVALproex  milliGRAM(s) Oral two times a day  epoetin abby-epbx (RETACRIT) Injectable 16029 Unit(s) SubCutaneous every 7 days  escitalopram 5 milliGRAM(s) Oral daily  ferrous    sulfate 325 milliGRAM(s) Oral daily  finasteride 5 milliGRAM(s) Oral daily  fludroCORTISONE 0.1 milliGRAM(s) Oral <User Schedule>  fluticasone propionate 50 MICROgram(s)/spray Nasal Spray 2 Spray(s) Both Nostrils <User Schedule>  glucagon  Injectable 1 milliGRAM(s) IntraMuscular once  glucagon  Injectable 1 milliGRAM(s) IntraMuscular once  insulin glargine Injectable (LANTUS) 5 Unit(s) SubCutaneous at bedtime  insulin lispro (ADMELOG) corrective regimen sliding scale   SubCutaneous Before meals and at bedtime  levothyroxine 75 MICROGram(s) Oral daily  melatonin 3 milliGRAM(s) Oral at bedtime  midodrine. 20 milliGRAM(s) Oral three times a day  potassium chloride    Tablet ER 20 milliEquivalent(s) Oral once  sodium chloride 0.9% for Nebulization 3 milliLiter(s) Nebulizer four times a day    MEDICATIONS  (PRN):  ALBUTerol    90 MICROgram(s) HFA Inhaler 2 Puff(s) Inhalation every 6 hours PRN Shortness of Breath and/or Wheezing  dextrose Oral Gel 15 Gram(s) Oral once PRN Blood Glucose LESS THAN 70 milliGRAM(s)/deciliter        CAPILLARY BLOOD GLUCOSE      POCT Blood Glucose.: 207 mg/dL (15 May 2022 11:44)  POCT Blood Glucose.: 154 mg/dL (15 May 2022 07:54)  POCT Blood Glucose.: 231 mg/dL (14 May 2022 21:33)  POCT Blood Glucose.: 301 mg/dL (14 May 2022 16:40)    I&O's Summary    14 May 2022 07:01  -  15 May 2022 07:00  --------------------------------------------------------  IN: 1700 mL / OUT: 1450 mL / NET: 250 mL        Vital Signs Last 24 Hrs  T(C): 36.7 (15 May 2022 11:09), Max: 36.8 (14 May 2022 20:36)  T(F): 98 (15 May 2022 11:09), Max: 98.2 (14 May 2022 20:36)  HR: 77 (15 May 2022 11:09) (74 - 80)  BP: 95/62 (15 May 2022 11:09) (95/62 - 150/75)  BP(mean): --  RR: 18 (15 May 2022 11:09) (18 - 18)  SpO2: 93% (15 May 2022 11:09) (93% - 95%)      PHYSICAL EXAM:   HEENT: LUCA EOMI  Neck: Supple, no JVD  Lungs: clear  CVS: S1 S2 systolic murmur +   Abdomen: no tenderness, BS present  Neuro: Alert no change  Ext: no edema    LABS:                        8.5    11.01 )-----------( 262      ( 14 May 2022 07:32 )             27.2     05-15    135  |  99  |  40<H>  ----------------------------<  141<H>  3.4<L>   |  25  |  1.58<H>    Ca    8.9      15 May 2022 07:12                  All consultant(s) notes reviewed and care discussed with other providers        Contact Number, Dr Larson [phone]

## 2023-01-30 NOTE — PROGRESS NOTE ADULT - SUBJECTIVE AND OBJECTIVE BOX
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 As per patient, he was watching a movie when he felt very short of breath suddenly and dropped to the floor, denies LOC and denies any trauma to his head, was on the floor for ~2 hours. Patient was unable to rise from the floor by himself due to his left arm weakness. When first brought to the ED, patient was hypoxic and hypertensive o2 saturation in EMS was >80%. While in the ED, patient had episode of siezure-like activity in his b/l upper extremities which was controlled w/ ativan. Patient was seen and examined at bedside, BiPAP still in place, patient was still mildly lethargic from ativan and could not speak well with bipap mask on. Patient able to open eyes and nod/shake head to questions. Patient denied any headache, vision changes, cp, abd pain, msk pain. Patient still short of breath.      In ED:   Vitals: HR 80, 176/82 -> 115/60, RR 20, 96% on BiPAP/CPAP  Labs significant for: WBC 20.24, H/H 10.8/24.1, D-Dimer 2924, Troponin 1011.9 > 5252.0, CKMB: 13.1, CPK%: 4%, BUN 33, Creatinine 2.3 (baseline unknown) eGFR 29, serum pro BNP 4963, creatine kinase 328, POCT glucose: 353  Imaging:   CXR: diffuse b/l scattered infiltrates  CT brain stroke: No acute hemorrhage, infarct, or mass effect   EKG: sinus tachy at ST- depressions in V5 V6   Received Lasix 40mg IVP x1, Aspirin 600mg x1, Hydralazine 10mg x1, Ativan 2mg IVP x1, Lispro 6u in the ED    (30 Mar 2022 08:48)      INTERVAL HPI:  3/31/2022: Patient overnight had clots in urine with slight pinkish appearance of urine, stat h/h showed that patients hgb dropped to 9.2 from 10.1. Heparin drip was continued as benefits outweighed risks. AM hgb showed recovery to 9.6. Patient also had 6 beats of vtach overnight, was asymptomatic. Patient seen and examined at bedside, denied any lightheadedness/dizziness, CP/palpitations, abd pain, dysuria, MSK pain, any sensory deficits. Patient continues to have LUE weakness. OBN IV Heparin drip, + CVA on CT Head  2022: Pt seen and examined at bedside. Hb stable this morning. Pt w no complaints this morning. Denies fever, chills, chest pain, palpitations, shortness of breath, palpitations, lightheadedness, dizziness, headache, n/v/d/c.       OVERNIGHT EVENTS: None    Home Medications:  benazepril 10 mg oral tablet: 1 tab(s) orally once a day (30 Mar 2022 10:22)  calcitriol 0.25 mcg oral capsule: 1 cap(s) orally once a day (30 Mar 2022 10:22)  carvedilol 12.5 mg oral tablet: 1 tab(s) orally 2 times a day      *Last filled , spoke to MATTHEW Nguyen at Dr. Garcia&#x27;s office, she states patient should still be taking med as per MD notes from *  (30 Mar 2022 10:22)  hydrALAZINE 10 mg oral tablet: 0.5 tab(s) orally 3 times a day (30 Mar 2022 10:22)  isosorbide mononitrate 30 mg oral tablet, extended release: 1 tab(s) orally once a day (in the morning) (30 Mar 2022 10:22)  levothyroxine 75 mcg (0.075 mg) oral tablet: 1 tab(s) orally once a day (30 Mar 2022 10:22)  midodrine 10 mg oral tablet: 1 tab(s) orally 3 times a day (30 Mar 2022 10:22)  Plavix 75 mg oral tablet: 1 tab(s) orally once a day (30 Mar 2022 10:22)  pravastatin 40 mg oral tablet: 1 tab(s) orally once a day (30 Mar 2022 10:22)      MEDICATIONS  (STANDING):  atorvastatin 10 milliGRAM(s) Oral at bedtime  calcitriol   Capsule 0.25 MICROGram(s) Oral daily  cyanocobalamin 1000 MICROGram(s) Oral daily  dextrose 5%. 1000 milliLiter(s) (50 mL/Hr) IV Continuous <Continuous>  dextrose 5%. 1000 milliLiter(s) (100 mL/Hr) IV Continuous <Continuous>  dextrose 50% Injectable 25 Gram(s) IV Push once  dextrose 50% Injectable 12.5 Gram(s) IV Push once  dextrose 50% Injectable 25 Gram(s) IV Push once  dipyridamole 200 mG/aspirin 25 mG 1 Capsule(s) Oral two times a day  ferrous    sulfate 325 milliGRAM(s) Oral daily  glucagon  Injectable 1 milliGRAM(s) IntraMuscular once  heparin  Infusion.  Unit(s)/Hr (8 mL/Hr) IV Continuous <Continuous>  influenza  Vaccine (HIGH DOSE) 0.7 milliLiter(s) IntraMuscular once  insulin lispro (ADMELOG) corrective regimen sliding scale   SubCutaneous three times a day before meals  insulin lispro (ADMELOG) corrective regimen sliding scale   SubCutaneous at bedtime  iron sucrose Injectable 100 milliGRAM(s) IV Push every 24 hours  levothyroxine 75 MICROGram(s) Oral daily  polyethylene glycol 3350 17 Gram(s) Oral daily  potassium chloride   Powder 40 milliEquivalent(s) Oral once  senna 2 Tablet(s) Oral at bedtime  valproate sodium IVPB 500 milliGRAM(s) IV Intermittent every 12 hours    MEDICATIONS  (PRN):  ALBUTerol    90 MICROgram(s) HFA Inhaler 2 Puff(s) Inhalation every 6 hours PRN Shortness of Breath and/or Wheezing  dextrose Oral Gel 15 Gram(s) Oral once PRN Blood Glucose LESS THAN 70 milliGRAM(s)/deciliter  heparin   Injectable 4100 Unit(s) IV Push every 6 hours PRN For aPTT less than 40  ondansetron Injectable 4 milliGRAM(s) IV Push every 8 hours PRN Nausea and/or Vomiting      No Known Allergies      Social History:  Lives at home w/ wife   ADL independent   Tobacco former smoker, quit 40 years ago, 20 pack year history  Alcohol denies  Drugs denies  COVID Pf[employer] x3   Occupation retired- former  (30 Mar 2022 08:48)      REVIEW OF SYSTEMS:  CONSTITUTIONAL: No fever, No chills, No fatigue, No myalgia, No Body ache, No Weakness  EYES: No eye pain,  No visual disturbances, No discharge, No Redness  ENMT: No ear pain, No nose bleed, No vertigo; No sinus pain, No throat pain, No Congestion  NECK: No pain, No stiffness  RESPIRATORY: No cough, No wheezing, No hemoptysis, No shortness of breath  CARDIOVASCULAR: No chest pain, No palpitations  GASTROINTESTINAL: No abdominal pain, No epigastric pain. No nausea, No vomiting, No diarrhea, No constipation; [  ] BM  GENITOURINARY: No dysuria, No frequency, No urgency, No incontinence  NEUROLOGICAL: No headaches, No dizziness, No numbness, No tingling, No tremors, [ x ] LUE weakness   EXTREMITIES: No Swelling, No Pain, No Edema  SKIN: [ x ] No itching, burning, rashes, or lesions   MUSCULOSKELETAL: No joint pain, No joint swelling; No muscle pain, No back pain, No extremity pain  PSYCHIATRIC: No depression, No anxiety, No mood swings, No difficulty sleeping at night  PAIN SCALE: [ x ] None  [  ] Other-  ROS Unable to obtain due to: [  ] Dementia  [  ] Lethargy  [  ] Sedated  [  ] Non verbal  REST OF REVIEW OF SYSTEMS: [ x ] Normal     Vital Signs Last 24 Hrs  T(C): 36.7 (2022 05:18), Max: 37.1 (31 Mar 2022 19:54)  T(F): 98 (2022 05:18), Max: 98.7 (31 Mar 2022 19:54)  HR: 78 (2022 05:18) (71 - 86)  BP: 127/71 (2022 05:18) (106/- - 129/76)  BP(mean): --  RR: 19 (2022 05:18) (17 - 20)  SpO2: 96% (2022 05:18) (91% - 96%)    CAPILLARY BLOOD GLUCOSE      POCT Blood Glucose.: 186 mg/dL (2022 09:01)  POCT Blood Glucose.: 159 mg/dL (2022 07:54)  POCT Blood Glucose.: 150 mg/dL (31 Mar 2022 21:41)  POCT Blood Glucose.: 179 mg/dL (31 Mar 2022 17:05)  POCT Blood Glucose.: 153 mg/dL (31 Mar 2022 12:26)      I&O's Summary    31 Mar 2022 07:01  -  2022 07:00  --------------------------------------------------------  IN: 442.5 mL / OUT: 1100 mL / NET: -657.5 mL      PHYSICAL EXAM:  GENERAL:  [ x ] NAD, [ x ] Well appearing, [  ] Agitated, [  ] Drowsy, [  ] Lethargy, [  ] Confused   HEAD:  [ x ] Normal, [  ] Other  EYES:  [ x ] EOMI, [ x ] PERRLA, [ x ] Conjunctiva and sclera clear normal, [  ] Other, [ x ] Pallor, [  ] Discharge  ENMT:  [ x ] Normal, [ x ] Moist mucous membranes, [  ] Good dentition, [ x ] No thrush  NECK:  [ x ] Supple, [ x ] No JVD, [x  ] Normal thyroid, [  ] Lymphadenopathy, [  ] Other  CHEST/LUNG:  [ x ] Clear to auscultation bilaterally, [ x ] Breath Sounds equal B/L, [ x ] Poor effort, [ x ] No rales, [ x ] No rhonchi, [ x ] No wheezing  HEART:  [ x ] Regular rate and rhythm, [  ] Tachycardia, [  ] Bradycardia, [  ] Irregular, [ x ] No murmurs, No rubs, No gallops, [  ] PPM in place (Mfr:  )  ABDOMEN:  [ x ] Soft, [ x ] Nontender, [ x ] Nondistended, [ x ] No mass, [ x ] Bowel sounds present, [  ] Obese  NERVOUS SYSTEM:  [ x ] Alert & Oriented x3, [  ] Nonfocal, [  ] Confusion, [  ] Encephalopathic, [  ] Sedated, [  ] Unable to assess, [  ] Dementia, [ x ] Other- LUE weakness compared to RUE, left tongue deviation   EXTREMITIES:  [ x ] 2+ Peripheral Pulses, No clubbing, No cyanosis,  [  ] Edema B/L lower EXT, [  ] PVD stasis skin changes B/L lower EXT, [  ] Wound  LYMPH:  No lymphadenopathy noted  SKIN:  [ x ] No rashes or lesions, [  ] Pressure ulcers, [  ] Ecchymosis, [  ] Skin tears, [  ] Other    DIET:     LABS:                        9.4    9.54  )-----------( 190      ( 2022 06:36 )             29.1     2022 06:37    142    |  106    |  36     ----------------------------<  147    3.6     |  24     |  2.10     Ca    8.8        2022 06:37  Phos  3.3       2022 06:37  Mg     2.3       2022 06:37    TPro  6.0    /  Alb  2.7    /  TBili  0.9    /  DBili  x      /  AST  41     /  ALT  42     /  AlkPhos  77     2022 06:37    PTT - ( 2022 06:36 )  PTT:53.1 sec  Urinalysis Basic - ( 30 Mar 2022 20:41 )    Color: Brown / Appearance: Slightly Turbid / S.010 / pH: x  Gluc: x / Ketone: Trace  / Bili: Negative / Urobili: Negative   Blood: x / Protein: 100 / Nitrite: Positive   Leuk Esterase: Moderate / RBC: >50 /HPF / WBC 3-5   Sq Epi: x / Non Sq Epi: Occasional / Bacteria: Few      Culture Results:   No growth ( @ 01:17)  Culture Results:   No growth to date. ( @ 15:01)  Culture Results:   No growth to date. ( @ 15:01)    culture blood  -- Clean Catch Clean Catch (Midstream)  @ 01:17    culture urine  --   @ 01:17  culture blood  -- .Blood Blood-Peripheral  @ 15:01    culture urine  --   @ 15:01    CARDIAC MARKERS ( 31 Mar 2022 16:33 )  x     / x     / 202 U/L / x     / 3.1 ng/mL  CARDIAC MARKERS ( 30 Mar 2022 21:07 )  x     / x     / 342 U/L / x     / 12.8 ng/mL  CARDIAC MARKERS ( 30 Mar 2022 14:34 )  x     / x     / 398 U/L / x     / 18.4 ng/mL  CARDIAC MARKERS ( 30 Mar 2022 08:13 )  x     / x     / x     / x     / 13.1 ng/mL  CARDIAC MARKERS ( 30 Mar 2022 07:53 )  x     / x     / 328 U/L / x     / x            Culture - Urine (collected 31 Mar 2022 01:17)  Source: Clean Catch Clean Catch (Midstream)  Final Report (31 Mar 2022 21:27):    No growth    Culture - Blood (collected 30 Mar 2022 15:01)  Source: .Blood Blood-Peripheral  Preliminary Report (31 Mar 2022 16:01):    No growth to date.    Culture - Blood (collected 30 Mar 2022 15:01)  Source: .Blood Blood-Peripheral  Preliminary Report (31 Mar 2022 16:01):    No growth to date.       Anemia Panel:  Iron Total, Serum: 15 ug/dL (22 @ 10:51)  Iron - Total Binding Capacity.: 318 ug/dL (22 @ 10:51)  Ferritin, Serum: 53 ng/mL (22 @ 10:42)  Vitamin B12, Serum: 476 pg/mL (22 @ 10:42)  Folate, Serum: 10.7 ng/mL (22 @ 10:42)  Ferritin, Serum: 44 ng/mL (22 @ 05:21)  Iron - Total Binding Capacity.: 329 ug/dL (22 @ 05:21)  Iron Total, Serum: 11 ug/dL (22 @ 05:21)      Thyroid Panel:  T4, Serum: 6.2 ug/dL (22 @ 10:42)  Thyroid Stimulating Hormone, Serum: 2.99 uIU/mL (22 @ 07:24)  T4, Serum: 5.9 ug/dL (22 @ 05:21)  Thyroid Stimulating Hormone, Serum: 2.81 uIU/mL (22 @ 14:34)            Serum Pro-Brain Natriuretic Peptide: 9256 pg/mL (22 @ 07:24)  Serum Pro-Brain Natriuretic Peptide: 4963 pg/mL (22 @ 06:12)      RADIOLOGY & ADDITIONAL TESTS:      HEALTH ISSUES - PROBLEM Dx:  Acute pulmonary edema    Leukocytosis    Elevated troponin    Acute kidney injury superimposed on CKD    HTN (hypertension)    CAD (coronary artery disease)    Hypothyroidism    Need for prophylactic measure    Chronic HFrEF (heart failure with reduced ejection fraction)    Anemia    Elevated creatine kinase    Diabetes mellitus    CVA (cerebrovascular accident)    Tonsillar cancer          Consultant(s) Notes Reviewed:  [ x ] YES     Care Discussed with [ x ] Consultants, [ x ] Patient, [  ] Family, [  ] HCP, [  ] , [  ] Social Service, [ x ] RN, [  ] Physical Therapy, [  ] Palliative Care Team  DVT PPX: [  ] Lovenox, [  ] SC Heparin, [  ] Coumadin, [  ] Xarelto, [  ] Eliquis, [  ] Pradaxa, [ x ] IV Heparin drip, [  ] SCD, [  ] Ambulation, [  ] Contraindicated 2/2 GI Bleed, [  ] Contraindicated 2/2  Bleed, [  ] Contraindicated 2/2 Brain Bleed  Advanced Directive: [ x ] None, [  ] DNR/DNI Patient is a 76y old  Male who presents with a chief complaint of SOB (2022 09:40)      HPI:  77 yo male w/ PMHx of HTN, HLD, HFrEF (EF 30% ), 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 As per patient, he was watching a movie when he felt very short of breath suddenly and dropped to the floor, denies LOC and denies any trauma to his head, was on the floor for ~2 hours. Patient was unable to rise from the floor by himself due to his left arm weakness. When first brought to the ED, patient was hypoxic and hypertensive o2 saturation in EMS was >80%. While in the ED, patient had episode of siezure-like activity in his b/l upper extremities which was controlled w/ ativan. Patient was seen and examined at bedside, BiPAP still in place, patient was still mildly lethargic from ativan and could not speak well with bipap mask on. Patient able to open eyes and nod/shake head to questions. Patient denied any headache, vision changes, cp, abd pain, msk pain. Patient still short of breath.      In ED:   Vitals: HR 80, 176/82 -> 115/60, RR 20, 96% on BiPAP/CPAP  Labs significant for: WBC 20.24, H/H 10.8/24.1, D-Dimer 2924, Troponin 1011.9 > 5252.0, CKMB: 13.1, CPK%: 4%, BUN 33, Creatinine 2.3 (baseline unknown) eGFR 29, serum pro BNP 4963, creatine kinase 328, POCT glucose: 353  Imaging:   CXR: diffuse b/l scattered infiltrates  CT brain stroke: No acute hemorrhage, infarct, or mass effect   EKG: sinus tachy at ST- depressions in V5 V6   Received Lasix 40mg IVP x1, Aspirin 600mg x1, Hydralazine 10mg x1, Ativan 2mg IVP x1, Lispro 6u in the ED    (30 Mar 2022 08:48)      INTERVAL HPI:  3/31/2022: Patient overnight had clots in urine with slight pinkish appearance of urine, stat h/h showed that patients hgb dropped to 9.2 from 10.1. Heparin drip was continued as benefits outweighed risks. AM hgb showed recovery to 9.6. Patient also had 6 beats of vtach overnight, was asymptomatic. Patient seen and examined at bedside, denied any lightheadedness/dizziness, CP/palpitations, abd pain, dysuria, MSK pain, any sensory deficits. Patient continues to have LUE weakness. OBN IV Heparin drip, + CVA on CT Head  2022: Pt seen and examined at bedside. Hb stable this morning. Pt w no complaints this morning. Denies fever, chills, chest pain, palpitations, shortness of breath, palpitations, lightheadedness, dizziness, headache, n/v/d/c. TOV , d/c [facility] cath       OVERNIGHT EVENTS: None    Home Medications:  benazepril 10 mg oral tablet: 1 tab(s) orally once a day (30 Mar 2022 10:22)  calcitriol 0.25 mcg oral capsule: 1 cap(s) orally once a day (30 Mar 2022 10:22)  carvedilol 12.5 mg oral tablet: 1 tab(s) orally 2 times a day      *Last filled , spoke to MATTHEW Nguyen at Dr. Garcia&#x27;s office, she states patient should still be taking med as per MD notes from *  (30 Mar 2022 10:22)  hydrALAZINE 10 mg oral tablet: 0.5 tab(s) orally 3 times a day (30 Mar 2022 10:22)  isosorbide mononitrate 30 mg oral tablet, extended release: 1 tab(s) orally once a day (in the morning) (30 Mar 2022 10:22)  levothyroxine 75 mcg (0.075 mg) oral tablet: 1 tab(s) orally once a day (30 Mar 2022 10:22)  midodrine 10 mg oral tablet: 1 tab(s) orally 3 times a day (30 Mar 2022 10:22)  Plavix 75 mg oral tablet: 1 tab(s) orally once a day (30 Mar 2022 10:22)  pravastatin 40 mg oral tablet: 1 tab(s) orally once a day (30 Mar 2022 10:22)      MEDICATIONS  (STANDING):  atorvastatin 10 milliGRAM(s) Oral at bedtime  calcitriol   Capsule 0.25 MICROGram(s) Oral daily  cyanocobalamin 1000 MICROGram(s) Oral daily  dextrose 5%. 1000 milliLiter(s) (50 mL/Hr) IV Continuous <Continuous>  dextrose 5%. 1000 milliLiter(s) (100 mL/Hr) IV Continuous <Continuous>  dextrose 50% Injectable 25 Gram(s) IV Push once  dextrose 50% Injectable 12.5 Gram(s) IV Push once  dextrose 50% Injectable 25 Gram(s) IV Push once  dipyridamole 200 mG/aspirin 25 mG 1 Capsule(s) Oral two times a day  ferrous    sulfate 325 milliGRAM(s) Oral daily  glucagon  Injectable 1 milliGRAM(s) IntraMuscular once  heparin  Infusion.  Unit(s)/Hr (8 mL/Hr) IV Continuous <Continuous>  influenza  Vaccine (HIGH DOSE) 0.7 milliLiter(s) IntraMuscular once  insulin lispro (ADMELOG) corrective regimen sliding scale   SubCutaneous three times a day before meals  insulin lispro (ADMELOG) corrective regimen sliding scale   SubCutaneous at bedtime  iron sucrose Injectable 100 milliGRAM(s) IV Push every 24 hours  levothyroxine 75 MICROGram(s) Oral daily  polyethylene glycol 3350 17 Gram(s) Oral daily  potassium chloride   Powder 40 milliEquivalent(s) Oral once  senna 2 Tablet(s) Oral at bedtime  valproate sodium IVPB 500 milliGRAM(s) IV Intermittent every 12 hours    MEDICATIONS  (PRN):  ALBUTerol    90 MICROgram(s) HFA Inhaler 2 Puff(s) Inhalation every 6 hours PRN Shortness of Breath and/or Wheezing  dextrose Oral Gel 15 Gram(s) Oral once PRN Blood Glucose LESS THAN 70 milliGRAM(s)/deciliter  heparin   Injectable 4100 Unit(s) IV Push every 6 hours PRN For aPTT less than 40  ondansetron Injectable 4 milliGRAM(s) IV Push every 8 hours PRN Nausea and/or Vomiting      No Known Allergies      Social History:  Lives at home w/ wife   ADL independent   Tobacco former smoker, quit 40 years ago, 20 pack year history  Alcohol denies  Drugs denies  COVID [employer] x3   Occupation retired- former  (30 Mar 2022 08:48)      REVIEW OF SYSTEMS: esther ok  CONSTITUTIONAL: No fever, No chills, No fatigue, No myalgia, No Body ache, No Weakness  EYES: No eye pain,  No visual disturbances, No discharge, No Redness  ENMT: No ear pain, No nose bleed, No vertigo; No sinus pain, No throat pain, No Congestion  NECK: No pain, No stiffness  RESPIRATORY: No cough, No wheezing, No hemoptysis, No shortness of breath  CARDIOVASCULAR: No chest pain, No palpitations  GASTROINTESTINAL: No abdominal pain, No epigastric pain. No nausea, No vomiting, No diarrhea,+constipation; [  ] BM  GENITOURINARY: No dysuria, No frequency, No urgency, No incontinence  NEUROLOGICAL: No headaches, No dizziness, No numbness, No tingling, No tremors, [ x ] LUE weakness   EXTREMITIES: No Swelling, No Pain, No Edema  SKIN: [ x ] No itching, burning, rashes, or lesions   MUSCULOSKELETAL: No joint pain, No joint swelling; No muscle pain, No back pain, No extremity pain  PSYCHIATRIC: No depression, No anxiety, No mood swings, No difficulty sleeping at night  PAIN SCALE: [ x ] None  [  ] Other-  ROS Unable to obtain due to: [  ] Dementia  [  ] Lethargy  [  ] Sedated  [  ] Non verbal  REST OF REVIEW OF SYSTEMS: [ x ] Normal     Vital Signs Last 24 Hrs  T(C): 36.7 (2022 05:18), Max: 37.1 (31 Mar 2022 19:54)  T(F): 98 (2022 05:18), Max: 98.7 (31 Mar 2022 19:54)  HR: 78 (2022 05:18) (71 - 86)  BP: 127/71 (2022 05:18) (106/- - 129/76)  BP(mean): --  RR: 19 (2022 05:18) (17 - 20)  SpO2: 96% (2022 05:18) (91% - 96%)    CAPILLARY BLOOD GLUCOSE      POCT Blood Glucose.: 186 mg/dL (2022 09:01)  POCT Blood Glucose.: 159 mg/dL (2022 07:54)  POCT Blood Glucose.: 150 mg/dL (31 Mar 2022 21:41)  POCT Blood Glucose.: 179 mg/dL (31 Mar 2022 17:05)  POCT Blood Glucose.: 153 mg/dL (31 Mar 2022 12:26)      I&O's Summary    31 Mar 2022 07:01  -  2022 07:00  --------------------------------------------------------  IN: 442.5 mL / OUT: 1100 mL / NET: -657.5 mL      PHYSICAL EXAM:  GENERAL:  [ x ] NAD, [ x ] Well appearing, [  ] Agitated, [  ] Drowsy, [  ] Lethargy, [  ] Confused   HEAD:  [ x ] Normal, [  ] Other  EYES:  [ x ] EOMI, [ x ] PERRLA, [ x ] Conjunctiva and sclera clear normal, [  ] Other, [ x ] Pallor, [  ] Discharge  ENMT:  [ x ] Normal, [ x ] Moist mucous membranes, [  ] Good dentition, [ x ] No thrush  NECK:  [ x ] Supple, [ x ] No JVD, [x  ] Normal thyroid, [  ] Lymphadenopathy, [  ] Other  CHEST/LUNG:  [ x ] Clear to auscultation bilaterally, [ x ] Breath Sounds equal B/L, [ x ] Poor effort, [ x ] No rales, [ x ] No rhonchi, [ x ] No wheezing  HEART:  [ x ] Regular rate and rhythm, [  ] Tachycardia, [  ] Bradycardia, [  ] Irregular, [ x ] No murmurs, No rubs, No gallops, [  ] PPM in place (Mfr:  )  ABDOMEN:  [ x ] Soft, [ x ] Nontender, [ x ] Nondistended, [ x ] No mass, [ x ] Bowel sounds present, [  ] Obese  NERVOUS SYSTEM:  [ x ] Alert & Oriented x3, [  ] Nonfocal, [  ] Confusion, [  ] Encephalopathic, [  ] Sedated, [  ] Unable to assess, [  ] Dementia, [ x ] Other- LUE weakness compared to RUE, left tongue deviation   EXTREMITIES:  [ x ] 2+ Peripheral Pulses, No clubbing, No cyanosis,  [  ] Edema B/L lower EXT, [  ] PVD stasis skin changes B/L lower EXT, [  ] Wound  LYMPH:  No lymphadenopathy noted  SKIN:  [ x ] No rashes or lesions, [  ] Pressure ulcers, [  ] Ecchymosis, [  ] Skin tears, [  ] Other    DIET: pureed diet, Moderately thickened    LABS:                        9.4    9.54  )-----------( 190      ( 2022 06:36 )             29.1     2022 06:37    142    |  106    |  36     ----------------------------<  147    3.6     |  24     |  2.10     Ca    8.8        2022 06:37  Phos  3.3       2022 06:37  Mg     2.3       2022 06:37    TPro  6.0    /  Alb  2.7    /  TBili  0.9    /  DBili  x      /  AST  41     /  ALT  42     /  AlkPhos  77     2022 06:37    PTT - ( 2022 06:36 )  PTT:53.1 sec  Urinalysis Basic - ( 30 Mar 2022 20:41 )    Color: Brown / Appearance: Slightly Turbid / S.010 / pH: x  Gluc: x / Ketone: Trace  / Bili: Negative / Urobili: Negative   Blood: x / Protein: 100 / Nitrite: Positive   Leuk Esterase: Moderate / RBC: >50 /HPF / WBC 3-5   Sq Epi: x / Non Sq Epi: Occasional / Bacteria: Few      Culture Results:   No growth ( @ 01:17)  Culture Results:   No growth to date. ( @ 15:01)  Culture Results:   No growth to date. ( @ 15:01)    culture blood  -- Clean Catch Clean Catch (Midstream)  @ 01:17    culture urine  --   @ 01:17  culture blood  -- .Blood Blood-Peripheral  @ 15:01    culture urine  --   @ 15:01    CARDIAC MARKERS ( 31 Mar 2022 16:33 )  x     / x     / 202 U/L / x     / 3.1 ng/mL  CARDIAC MARKERS ( 30 Mar 2022 21:07 )  x     / x     / 342 U/L / x     / 12.8 ng/mL  CARDIAC MARKERS ( 30 Mar 2022 14:34 )  x     / x     / 398 U/L / x     / 18.4 ng/mL  CARDIAC MARKERS ( 30 Mar 2022 08:13 )  x     / x     / x     / x     / 13.1 ng/mL  CARDIAC MARKERS ( 30 Mar 2022 07:53 )  x     / x     / 328 U/L / x     / x            Culture - Urine (collected 31 Mar 2022 01:17)  Source: Clean Catch Clean Catch (Midstream)  Final Report (31 Mar 2022 21:27):    No growth    Culture - Blood (collected 30 Mar 2022 15:01)  Source: .Blood Blood-Peripheral  Preliminary Report (31 Mar 2022 16:01):    No growth to date.    Culture - Blood (collected 30 Mar 2022 15:01)  Source: .Blood Blood-Peripheral  Preliminary Report (31 Mar 2022 16:01):    No growth to date.       Anemia Panel:  Iron Total, Serum: 15 ug/dL (22 @ 10:51)  Iron - Total Binding Capacity.: 318 ug/dL (22 @ 10:51)  Ferritin, Serum: 53 ng/mL (22 @ 10:42)  Vitamin B12, Serum: 476 pg/mL (22 @ 10:42)  Folate, Serum: 10.7 ng/mL (22 @ 10:42)  Ferritin, Serum: 44 ng/mL (22 @ 05:21)  Iron - Total Binding Capacity.: 329 ug/dL (22 @ 05:21)  Iron Total, Serum: 11 ug/dL (22 @ 05:21)      Thyroid Panel:  T4, Serum: 6.2 ug/dL (22 @ 10:42)  Thyroid Stimulating Hormone, Serum: 2.99 uIU/mL (22 @ 07:24)  T4, Serum: 5.9 ug/dL (22 @ 05:21)  Thyroid Stimulating Hormone, Serum: 2.81 uIU/mL (22 @ 14:34)      Serum Pro-Brain Natriuretic Peptide: 9256 pg/mL (22 @ 07:24)  Serum Pro-Brain Natriuretic Peptide: 4963 pg/mL (22 @ 06:12)      RADIOLOGY & ADDITIONAL TESTS:        HEALTH ISSUES - PROBLEM Dx:  Acute pulmonary edema    Leukocytosis    Elevated troponin    Acute kidney injury superimposed on CKD    HTN (hypertension)    CAD (coronary artery disease)    Hypothyroidism    Need for prophylactic measure    Chronic HFrEF (heart failure with reduced ejection fraction)    Anemia    Elevated creatine kinase    Diabetes mellitus    CVA (cerebrovascular accident)    Tonsillar cancer      Consultant(s) Notes Reviewed:  [ x ] YES     Care Discussed with [ x ] Consultants, [ x ] Patient, [ x ] Family,- dtr  [  ] HCP, [  ] , [ x ] Social Service, [ x ] RN, [  ] Physical Therapy, [  ] Palliative Care Team  DVT PPX: [  ] Lovenox, [  ] SC Heparin, [  ] Coumadin, [  ] Xarelto, [  ] Eliquis, [  ] Pradaxa, [ x ] IV Heparin drip, [  ] SCD, [  ] Ambulation, [  ] Contraindicated 2/2 GI Bleed, [  ] Contraindicated 2/2  Bleed, [  ] Contraindicated 2/2 Brain Bleed  Advanced Directive: [ x ] None, [  ] DNR/DNI

## 2023-02-26 NOTE — ED ADULT TRIAGE NOTE - NS ED TRIAGE AVPU SCALE
Alert-The patient is alert, awake and responds to voice. The patient is oriented to time, place, and person. The triage nurse is able to obtain subjective information. Piter HIGH

## 2023-03-15 NOTE — CONSULT LETTER
Admitted to pacu at this time. VSS on monitor. Report givenby CRNA.    [Dear  ___] : Dear  [unfilled], [Courtesy Letter:] : I had the pleasure of seeing your patient, [unfilled], in my office today. [Please see my note below.] : Please see my note below. [Consult Closing:] : Thank you very much for allowing me to participate in the care of this patient.  If you have any questions, please do not hesitate to contact me. [Sincerely,] : Sincerely, [FreeTextEntry1] : José Miguel Lynn MD FACS

## 2023-03-28 NOTE — ED ADULT TRIAGE NOTE - BRAND OF COVID-19 VACCINATION
Protopic Pregnancy And Lactation Text: This medication is Pregnancy Category C. It is unknown if this medication is excreted in breast milk when applied topically. Pfizer dose 1, 2, and 3

## 2023-07-12 NOTE — H&P ADULT - PROBLEM SELECTOR PLAN 5
[General Appearance - Alert] : alert [General Appearance - In No Acute Distress] : in no acute distress [Sclera] : the sclera and conjunctiva were normal [Outer Ear] : the ears and nose were normal in appearance [Neck Appearance] : the appearance of the neck was normal [Respiration, Rhythm And Depth] : normal respiratory rhythm and effort [Auscultation Breath Sounds / Voice Sounds] : lungs were clear to auscultation bilaterally [Heart Rate And Rhythm] : heart rate was normal and rhythm regular [Heart Sounds] : normal S1 and S2 [Murmurs] : no murmurs [Heart Sounds Pericardial Friction Rub] : no pericardial rub [Bowel Sounds] : normal bowel sounds [Abdomen Soft] : soft [Abdomen Tenderness] : non-tender [No CVA Tenderness] : no ~M costovertebral angle tenderness [Involuntary Movements] : no involuntary movements were seen [] : no rash [No Focal Deficits] : no focal deficits [Oriented To Time, Place, And Person] : oriented to person, place, and time [Affect] : the affect was normal [Mood] : the mood was normal [General Appearance - Well Nourished] : well nourished [___ +] : bilateral [unfilled]+ pretibial pitting edema [FreeTextEntry1] : Seen by wound care for ulcers of right and left legs. Improving. no evidence of acute HF  continue to monitor  not on diuresis  will monitor closely

## 2023-09-04 NOTE — PROGRESS NOTE ADULT - SUBJECTIVE AND OBJECTIVE BOX
[facility] -- Rianna Horn, Selma Terrell, Vlad Velarde Savella, [place]  Office # [phone]    Follow Up:  Hypertensive Emergency, CVA, NSTEMI    Subjective/Observations: Awake and alert, comfortable on RA.  No new neuro symptoms.  Denies any form of respiratory or cardiac discomfort    REVIEW OF SYSTEMS: All other review of systems is negative unless indicated above  PAST MEDICAL & SURGICAL HISTORY:  MI (myocardial infarction)  1992    HTN (hypertension)    HLD (hyperlipidemia)    Throat cancer  2011, treated with chemo, RT    Ventricular arrhythmia  s/p AICD    Diabetes  Type2, not on any meds since weight loss after throat Ca    Renal insufficiency  s/p chemo    CAD (coronary artery disease)    Inguinal hernia, left    H/O prior ablation treatment  VT, 2009    Cardiac defibrillator in place  - 2009, battery change 2017    S/P left inguinal hernia repair  2018 at [place]    MEDICATIONS  (STANDING):  atorvastatin 10 milliGRAM(s) Oral at bedtime  calcitriol   Capsule 0.25 MICROGram(s) Oral daily  cyanocobalamin 1000 MICROGram(s) Oral daily  dextrose 5%. 1000 milliLiter(s) (50 mL/Hr) IV Continuous <Continuous>  dextrose 5%. 1000 milliLiter(s) (100 mL/Hr) IV Continuous <Continuous>  dextrose 50% Injectable 25 Gram(s) IV Push once  dextrose 50% Injectable 12.5 Gram(s) IV Push once  dextrose 50% Injectable 25 Gram(s) IV Push once  dipyridamole 200 mG/aspirin 25 mG 1 Capsule(s) Oral two times a day  ferrous    sulfate 325 milliGRAM(s) Oral daily  glucagon  Injectable 1 milliGRAM(s) IntraMuscular once  heparin  Infusion.  Unit(s)/Hr (8 mL/Hr) IV Continuous <Continuous>  influenza  Vaccine (HIGH DOSE) 0.7 milliLiter(s) IntraMuscular once  insulin lispro (ADMELOG) corrective regimen sliding scale   SubCutaneous three times a day before meals  insulin lispro (ADMELOG) corrective regimen sliding scale   SubCutaneous at bedtime  iron sucrose Injectable 100 milliGRAM(s) IV Push every 24 hours  levothyroxine 75 MICROGram(s) Oral daily  polyethylene glycol 3350 17 Gram(s) Oral daily  senna 2 Tablet(s) Oral at bedtime  valproate sodium IVPB 500 milliGRAM(s) IV Intermittent every 12 hours    MEDICATIONS  (PRN):  ALBUTerol    90 MICROgram(s) HFA Inhaler 2 Puff(s) Inhalation every 6 hours PRN Shortness of Breath and/or Wheezing  dextrose Oral Gel 15 Gram(s) Oral once PRN Blood Glucose LESS THAN 70 milliGRAM(s)/deciliter  heparin   Injectable 4100 Unit(s) IV Push every 6 hours PRN For aPTT less than 40  ondansetron Injectable 4 milliGRAM(s) IV Push every 8 hours PRN Nausea and/or Vomiting    Allergies    No Known Allergies    Intolerances    Vital Signs Last 24 Hrs  T(C): 36.7 (01 Apr 2022 05:18), Max: 37.1 (31 Mar 2022 19:54)  T(F): 98 (01 Apr 2022 05:18), Max: 98.7 (31 Mar 2022 19:54)  HR: 78 (01 Apr 2022 05:18) (78 - 86)  BP: 127/71 (01 Apr 2022 05:18) (106/- - 129/76)  BP(mean): --  RR: 19 (01 Apr 2022 05:18) (19 - 20)  SpO2: 96% (01 Apr 2022 05:18) (95% - 96%)  I&O's Summary    31 Mar 2022 07:01  -  01 Apr 2022 07:00  --------------------------------------------------------  IN: 442.5 mL / OUT: 1100 mL / NET: -657.5 mL    01 Apr 2022 07:01  -  01 Apr 2022 12:17  --------------------------------------------------------  IN: 262 mL / OUT: 200 mL / NET: 62 mL     PHYSICAL EXAM:  TELE: Afib  Constitutional: NAD, awake and alert, well-developed  HEENT: Moist Mucous Membranes, Anicteric  Pulmonary: Non-labored, breath sounds are clear bilaterally, No wheezing, rales or rhonchi  Cardiovascular: IRRR, S1 and S2, +murmurs, no rubs, gallops or clicks  Gastrointestinal: Bowel Sounds present, soft, nontender.   Lymph: No peripheral edema. No lymphadenopathy.  Skin: No visible rashes or ulcers.  Psych:  Mood & affect: Flat  LABS: All Labs Reviewed:                        9.4    9.54  )-----------( 190      ( 01 Apr 2022 06:36 )             29.1                         9.6    11.68 )-----------( 188      ( 31 Mar 2022 07:24 )             30.4                         9.2    12.49 )-----------( 175      ( 31 Mar 2022 00:01 )             28.3     01 Apr 2022 06:37    142    |  106    |  36     ----------------------------<  147    3.6     |  24     |  2.10   31 Mar 2022 07:24    142    |  107    |  35     ----------------------------<  150    3.5     |  25     |  2.30   30 Mar 2022 06:12    139    |  108    |  33     ----------------------------<  375    4.8     |  24     |  2.30     Ca    8.8        01 Apr 2022 06:37  Ca    8.9        31 Mar 2022 07:24  Ca    8.5        30 Mar 2022 06:12  Phos  3.3       01 Apr 2022 06:37  Phos  2.8       31 Mar 2022 07:24  Mg     2.3       01 Apr 2022 06:37  Mg     2.2       31 Mar 2022 07:24    TPro  6.0    /  Alb  2.7    /  TBili  0.9    /  DBili  x      /  AST  41     /  ALT  42     /  AlkPhos  77     01 Apr 2022 06:37  TPro  6.4    /  Alb  2.9    /  TBili  1.3    /  DBili  x      /  AST  51     /  ALT  38     /  AlkPhos  83     31 Mar 2022 07:24  TPro  7.0    /  Alb  3.2    /  TBili  1.1    /  DBili  x      /  AST  53     /  ALT  48     /  AlkPhos  104    30 Mar 2022 06:12    PTT - ( 01 Apr 2022 06:36 )  PTT:53.1 sec  CARDIAC MARKERS ( 31 Mar 2022 16:33 )  x     / x     / 202 U/L / x     / 3.1 ng/mL  CARDIAC MARKERS ( 30 Mar 2022 21:07 )  x     / x     / 342 U/L / x     / 12.8 ng/mL  CARDIAC MARKERS ( 30 Mar 2022 14:34 )  x     / x     / 398 U/L / x     / 18.4 ng/mL    TTE oending       ACC: [id number] EXAM:  CT CHEST                          *** ADDENDUM***    Findings discussed with Dr. Rich Patten at 3/31/2022 2:35 PM with   readback.    --- End of Report ---    *** END OF ADDENDUM***    PROCEDURE DATE:  03/31/2022      INTERPRETATION:  CLINICAL INFORMATION: 76 years  Male with opacifications   on cxr r/o PNA.    COMPARISON: None.    CONTRAST/COMPLICATIONS:  IV Contrast: IV contrast documented in associated exam  Oral Contrast: NONE  Complications: None reported attime of study completion    PROCEDURE:  CT of the Chest was performed.  Sagittal and coronal reformats were performed.    FINDINGS:    LUNGS AND AIRWAYS: Patent central airways.  Mild centrilobular emphysema.   Nodular posterior right upper lobe infiltrate. Smooth intralobular septal   thickening.  PLEURA: Small to moderate bilateral pleural effusions. Bilateral   calcified pleural plaques.  MEDIASTINUM AND PETE: No lymphadenopathy.  VESSELS: Aortic atherosclerosis without aneurysm. Coronary   atherosclerosis.  HEART: Heart size is normal. Pacemaker/defibrillator leads in the heart.   No pericardial effusion.  CHEST WALL AND LOWER NECK: Pacemaker pulse generator in the left chest   wall.  VISUALIZED UPPER ABDOMEN: Within normal limits.  BONES: Degenerative changes.    IMPRESSION:  Right upper lobe pneumonia superimposed on mild pulmonary edema.   Bilateral pleural effusions.    Pacemaker/defibrillator.    Other chronic findings as above.    --- End of Report ---    ***Please see the addendum at the top of this report. It may contain   additional important information or changes.****    LASHAE HAYES MD; Attending Radiologist  This document has been electronically signed. Mar 31 2022  9:21AM  Addend:LASHAE HAYES MD; Attending Radiologist  This addendum was electronically signed on: Mar 31 2022  2:35PM.  \    ACC: [id number] EXAM:  CT BRAIN                          PROCEDURE DATE:  03/31/2022          INTERPRETATION:  Noncontrast CT of the brain.    CLINICAL INDICATION:  Left-sided weakness    TECHNIQUE : Axial CT scanning of the brain was obtained from the skull   base to the vertex without the administration of intravenous contrast.   Sagittal and coronal reformats were provided.    COMPARISON: CT brain 3/30/2022    FINDINGS:    Interval demarcation of cytotoxic edema in the region of the right   perirolandic cortex. No CT evidence for hemorrhagic transformation.    No hydrocephalus, midline shift, or effacement of basal cisterns.    Mild right maxillary sinus shows thickening. Remaining visualized   paranasal sinuses and mastoid air cells are clear.    IMPRESSION:    Interval demarcation of the right perirolandic cortex infarct.  No CT evidence for hemorrhagic transformation.    Dr. Moreno discussed these findings with Dr. Mnauel on 3/31/2022 9:19 AM   with read back.    --- End of Report---    ANUP MORENO MD; Attending Radiologist  This document has been electronically signed. Mar 31 2022  9:20AM     Ventricular Rate 103 BPM    Atrial Rate 103 BPM    P-R Interval 154 ms    QRS Duration 124 ms    Q-T Interval 382 ms    QTC Calculation(Bazett) 500 ms    P Axis 56 degrees    R Axis -22 degrees    T Axis 105 degrees    Diagnosis Line Sinus tachycardia with premature atrial complexes with aberrant conduction  Possible Left atrial enlargement  Left ventricular hypertrophy with QRS widening ( [place] product )  Anteroseptal infarct (cited on or before 01-AUG-2018)  ST & T wave abnormality, consider lateral ischemia  Abnormal ECG  When compared with ECG of 01-AUG-2018 11:46,  rhythm no longer ap, hr faster, stt abns prob unchanged  Confirmed by Isaac Hahn MD ([id number]) on 3/30/2022 1:17:19 PM   [facility] -- Rianna Horn, Selma Terrell, Vlad Velarde Savella, Goodger  Office # [phone]    Follow Up:  Hypertensive Emergency, CVA, NSTEMI    Subjective/Observations: Awake and alert, comfortable on RA.  No new neuro symptoms.  Denies any form of respiratory or cardiac discomfort    REVIEW OF SYSTEMS: All other review of systems is negative unless indicated above  PAST MEDICAL & SURGICAL HISTORY:  MI (myocardial infarction)  1992    HTN (hypertension)    HLD (hyperlipidemia)    Throat cancer  2011, treated with chemo, RT    Ventricular arrhythmia  s/p AICD    Diabetes  Type2, not on any meds since weight loss after throat Ca    Renal insufficiency  s/p chemo    CAD (coronary artery disease)    Inguinal hernia, left    H/O prior ablation treatment  VT, 2009    Cardiac defibrillator in place  - 2009, battery change 2017    S/P left inguinal hernia repair  2018 at [place]    MEDICATIONS  (STANDING):  atorvastatin 10 milliGRAM(s) Oral at bedtime  calcitriol   Capsule 0.25 MICROGram(s) Oral daily  cyanocobalamin 1000 MICROGram(s) Oral daily  dextrose 5%. 1000 milliLiter(s) (50 mL/Hr) IV Continuous <Continuous>  dextrose 5%. 1000 milliLiter(s) (100 mL/Hr) IV Continuous <Continuous>  dextrose 50% Injectable 25 Gram(s) IV Push once  dextrose 50% Injectable 12.5 Gram(s) IV Push once  dextrose 50% Injectable 25 Gram(s) IV Push once  dipyridamole 200 mG/aspirin 25 mG 1 Capsule(s) Oral two times a day  ferrous    sulfate 325 milliGRAM(s) Oral daily  glucagon  Injectable 1 milliGRAM(s) IntraMuscular once  heparin  Infusion.  Unit(s)/Hr (8 mL/Hr) IV Continuous <Continuous>  influenza  Vaccine (HIGH DOSE) 0.7 milliLiter(s) IntraMuscular once  insulin lispro (ADMELOG) corrective regimen sliding scale   SubCutaneous three times a day before meals  insulin lispro (ADMELOG) corrective regimen sliding scale   SubCutaneous at bedtime  iron sucrose Injectable 100 milliGRAM(s) IV Push every 24 hours  levothyroxine 75 MICROGram(s) Oral daily  polyethylene glycol 3350 17 Gram(s) Oral daily  senna 2 Tablet(s) Oral at bedtime  valproate sodium IVPB 500 milliGRAM(s) IV Intermittent every 12 hours    MEDICATIONS  (PRN):  ALBUTerol    90 MICROgram(s) HFA Inhaler 2 Puff(s) Inhalation every 6 hours PRN Shortness of Breath and/or Wheezing  dextrose Oral Gel 15 Gram(s) Oral once PRN Blood Glucose LESS THAN 70 milliGRAM(s)/deciliter  heparin   Injectable 4100 Unit(s) IV Push every 6 hours PRN For aPTT less than 40  ondansetron Injectable 4 milliGRAM(s) IV Push every 8 hours PRN Nausea and/or Vomiting    Allergies    No Known Allergies    Intolerances    Vital Signs Last 24 Hrs  T(C): 36.7 (01 Apr 2022 05:18), Max: 37.1 (31 Mar 2022 19:54)  T(F): 98 (01 Apr 2022 05:18), Max: 98.7 (31 Mar 2022 19:54)  HR: 78 (01 Apr 2022 05:18) (78 - 86)  BP: 127/71 (01 Apr 2022 05:18) (106/- - 129/76)  BP(mean): --  RR: 19 (01 Apr 2022 05:18) (19 - 20)  SpO2: 96% (01 Apr 2022 05:18) (95% - 96%)  I&O's Summary    31 Mar 2022 07:01  -  01 Apr 2022 07:00  --------------------------------------------------------  IN: 442.5 mL / OUT: 1100 mL / NET: -657.5 mL    01 Apr 2022 07:01  -  01 Apr 2022 12:17  --------------------------------------------------------  IN: 262 mL / OUT: 200 mL / NET: 62 mL     PHYSICAL EXAM:  TELE: NSR with PAC's  Constitutional: NAD, awake and alert, well-developed  HEENT: Moist Mucous Membranes, Anicteric  Pulmonary: Non-labored, breath sounds are clear bilaterally, No wheezing, rales or rhonchi  Cardiovascular: IRRR, S1 and S2, +murmurs, no rubs, gallops or clicks  Gastrointestinal: Bowel Sounds present, soft, nontender.   Lymph: No peripheral edema. No lymphadenopathy.  Skin: No visible rashes or ulcers.  Psych:  Mood & affect: Flat  LABS: All Labs Reviewed:                        9.4    9.54  )-----------( 190      ( 01 Apr 2022 06:36 )             29.1                         9.6    11.68 )-----------( 188      ( 31 Mar 2022 07:24 )             30.4                         9.2    12.49 )-----------( 175      ( 31 Mar 2022 00:01 )             28.3     01 Apr 2022 06:37    142    |  106    |  36     ----------------------------<  147    3.6     |  24     |  2.10   31 Mar 2022 07:24    142    |  107    |  35     ----------------------------<  150    3.5     |  25     |  2.30   30 Mar 2022 06:12    139    |  108    |  33     ----------------------------<  375    4.8     |  24     |  2.30     Ca    8.8        01 Apr 2022 06:37  Ca    8.9        31 Mar 2022 07:24  Ca    8.5        30 Mar 2022 06:12  Phos  3.3       01 Apr 2022 06:37  Phos  2.8       31 Mar 2022 07:24  Mg     2.3       01 Apr 2022 06:37  Mg     2.2       31 Mar 2022 07:24    TPro  6.0    /  Alb  2.7    /  TBili  0.9    /  DBili  x      /  AST  41     /  ALT  42     /  AlkPhos  77     01 Apr 2022 06:37  TPro  6.4    /  Alb  2.9    /  TBili  1.3    /  DBili  x      /  AST  51     /  ALT  38     /  AlkPhos  83     31 Mar 2022 07:24  TPro  7.0    /  Alb  3.2    /  TBili  1.1    /  DBili  x      /  AST  53     /  ALT  48     /  AlkPhos  104    30 Mar 2022 06:12    PTT - ( 01 Apr 2022 06:36 )  PTT:53.1 sec  CARDIAC MARKERS ( 31 Mar 2022 16:33 )  x     / x     / 202 U/L / x     / 3.1 ng/mL  CARDIAC MARKERS ( 30 Mar 2022 21:07 )  x     / x     / 342 U/L / x     / 12.8 ng/mL  CARDIAC MARKERS ( 30 Mar 2022 14:34 )  x     / x     / 398 U/L / x     / 18.4 ng/mL    TTE oending       ACC: [id number] EXAM:  CT CHEST                          *** ADDENDUM***    Findings discussed with Dr. Rich Patten at 3/31/2022 2:35 PM with   readback.    --- End of Report ---    *** END OF ADDENDUM***    PROCEDURE DATE:  03/31/2022      INTERPRETATION:  CLINICAL INFORMATION: 76 years  Male with opacifications   on cxr r/o PNA.    COMPARISON: None.    CONTRAST/COMPLICATIONS:  IV Contrast: IV contrast documented in associated exam  Oral Contrast: NONE  Complications: None reported attime of study completion    PROCEDURE:  CT of the Chest was performed.  Sagittal and coronal reformats were performed.    FINDINGS:    LUNGS AND AIRWAYS: Patent central airways.  Mild centrilobular emphysema.   Nodular posterior right upper lobe infiltrate. Smooth intralobular septal   thickening.  PLEURA: Small to moderate bilateral pleural effusions. Bilateral   calcified pleural plaques.  MEDIASTINUM AND PETE: No lymphadenopathy.  VESSELS: Aortic atherosclerosis without aneurysm. Coronary   atherosclerosis.  HEART: Heart size is normal. Pacemaker/defibrillator leads in the heart.   No pericardial effusion.  CHEST WALL AND LOWER NECK: Pacemaker pulse generator in the left chest   wall.  VISUALIZED UPPER ABDOMEN: Within normal limits.  BONES: Degenerative changes.    IMPRESSION:  Right upper lobe pneumonia superimposed on mild pulmonary edema.   Bilateral pleural effusions.    Pacemaker/defibrillator.    Other chronic findings as above.    --- End of Report ---    ***Please see the addendum at the top of this report. It may contain   additional important information or changes.****    LASHAE HAYES MD; Attending Radiologist  This document has been electronically signed. Mar 31 2022  9:21AM  Addend:LASHAE HAYES MD; Attending Radiologist  This addendum was electronically signed on: Mar 31 2022  2:35PM.  \    ACC: [id number] EXAM:  CT BRAIN                          PROCEDURE DATE:  03/31/2022          INTERPRETATION:  Noncontrast CT of the brain.    CLINICAL INDICATION:  Left-sided weakness    TECHNIQUE : Axial CT scanning of the brain was obtained from the skull   base to the vertex without the administration of intravenous contrast.   Sagittal and coronal reformats were provided.    COMPARISON: CT brain 3/30/2022    FINDINGS:    Interval demarcation of cytotoxic edema in the region of the right   perirolandic cortex. No CT evidence for hemorrhagic transformation.    No hydrocephalus, midline shift, or effacement of basal cisterns.    Mild right maxillary sinus shows thickening. Remaining visualized   paranasal sinuses and mastoid air cells are clear.    IMPRESSION:    Interval demarcation of the right perirolandic cortex infarct.  No CT evidence for hemorrhagic transformation.    Dr. Moreno discussed these findings with Dr. Manuel on 3/31/2022 9:19 AM   with read back.    --- End of Report---    ANUP MORENO MD; Attending Radiologist  This document has been electronically signed. Mar 31 2022  9:20AM     Ventricular Rate 103 BPM    Atrial Rate 103 BPM    P-R Interval 154 ms    QRS Duration 124 ms    Q-T Interval 382 ms    QTC Calculation(Bazett) 500 ms    P Axis 56 degrees    R Axis -22 degrees    T Axis 105 degrees    Diagnosis Line Sinus tachycardia with premature atrial complexes with aberrant conduction  Possible Left atrial enlargement  Left ventricular hypertrophy with QRS widening ( [place] product )  Anteroseptal infarct (cited on or before 01-AUG-2018)  ST & T wave abnormality, consider lateral ischemia  Abnormal ECG  When compared with ECG of 01-AUG-2018 11:46,  rhythm no longer ap, hr faster, stt abns prob unchanged  Confirmed by Isaac Hahn MD ([id number]) on 3/30/2022 1:17:19 PM   99

## 2024-01-01 NOTE — PROGRESS NOTE ADULT - PROBLEM SELECTOR PROBLEM 4
Leukocytosis
Anemia
Leukocytosis
(3) occasionally moist

## 2024-05-20 NOTE — H&P ADULT - PROBLEM/PLAN-9
Pt called asking for her ultrasound resultd , she was told by the rech they should be ready by today    DISPLAY PLAN FREE TEXT

## 2024-08-11 NOTE — ED PROVIDER NOTE - TRANSFER CONSULTATION #1
Progress note  [facility]    Adult Hospitalist      Name: Aiden Black  MRN: [id number]     Acct: [id number]  Room: [address] [place] [address]    Admit Date: 8/9/2024  6:45 PM  PCP: Angelica Cruz MD    Primary Problem  Principal Problem:    COPD exacerbation (HCC)  Active Problems:    Hypoxia  Resolved Problems:    * No resolved hospital problems. *        Assesment/ plan:     Patient is admitted to ICU  Cardiac monitoring  Patient intubated  IV sedation   IV pressor      Acute COPD exacerbation  IV Solu-Medrol  Duo nebs   Respiratory pathogen panel negative  Sputum culture   Empiric IV ceftriaxone and Zithromax  Consult pulmonology    Acute hypoxic and hypercarbic respiratory failure   Secondary to above   Mechanical ventilation sedation as per Critical Care   MRSA PCR negative  Critical care following    Hypotension   Monitor blood pressure   Currently on pressors   IV fluid  Lactate and procalcitonin  Blood culture pending  Sputum culture    Cirrhosis  Patient has had history of esophageal varices  Patient has OG tube.  Monitor for bleeding  PPI twice a day  Monitor LFTs  Check ammonia  Currently on lactulose and Xifaxan   Liver ultrasound  GI consult      Continue to monitor/telemetry/CBC with differential daily/BMP daily  DVT and GI prophylaxis.  Continue medications as below      Scheduled Meds:   arformoterol tartrate  15 mcg Nebulization BID RT    ipratropium 0.5 mg-albuterol 2.5 mg  1 Dose Inhalation 4x Daily RT    sodium chloride flush  5-40 mL IntraVENous 2 times per day    methylPREDNISolone  60 mg IntraVENous Q8H    budesonide  0.5 mg Nebulization BID RT    cefTRIAXone (ROCEPHIN) IV  1,000 mg IntraVENous Q24H    azithromycin  500 mg IntraVENous Q24H    lactulose  20 g Oral TID    mirtazapine  15 mg Oral Nightly    rifAXIMin  550 mg Oral BID    cetirizine  10 mg Oral Daily    pantoprazole (PROTONIX) 40 mg in sodium chloride (PF) 0.9 % 10 mL injection  40 mg IntraVENous Q12H     Continuous  use alcohol.  Drug Use:  reports no history of drug use.    Family History:     History reviewed. No pertinent family history.      Physical Exam:     Vitals:  BP (!) 148/66   Pulse (!) 46   Temp 97.4 °F (36.3 °C) (Axillary)   Resp 16   Ht 1.803 m (5' 11\")   Wt 90.7 kg (200 lb)   SpO2 100%   BMI 27.89 kg/m²   Temp (24hrs), Av.5 °F (36.4 °C), Min:96.9 °F (36.1 °C), Max:97.8 °F (36.6 °C)          General appearance -  intubated  Mental status - unable to assess  Head - normocephalic and atraumatic  Eyes - pupils equal and reactive, extraocular eye movements intact, conjunctiva clear  Ears - hearing appears to be intact  Nose - no drainage noted  Mouth - mucous membranes moist  Neck - supple, no carotid bruits, thyroid not palpable  Chest -  bilateral air  entry  Heart - normal rate, regular rhythm, no murmur  Abdomen - soft, nontender, nondistended, bowel sounds present all four quadrants, no masses, hepatomegaly or splenomegaly  Neurological -  unable to assess  Extremities - peripheral pulses palpable, no pedal edema or calf pain with palpation  Skin - no gross lesions, rashes, or induration noted        Data:     Labs:    Hematology:  Recent Labs     08/10/24  0043 08/10/24  1223 08/11/24  0421   WBC 4.6 9.2 13.7*   RBC 4.48 4.12* 3.93*   HGB 15.7 14.6 14.0   HCT 47.3 42.2 39.7*   .6* 102.4 101.0   MCH 35.0* 35.4* 35.6*   MCHC 33.2 34.6 35.3*   RDW 13.5 13.4 13.9   PLT 72* 101* 91*   MPV 10.4 10.6 10.4   INR  --  1.4  --      Chemistry:  Recent Labs     24  19024  1900 08/10/24  0043 08/10/24  1223 08/10/24  2023 08/11/24  0421 08/11/24  1100     --  142 142  --  144  --    K 4.9  --  5.2 4.0  --  3.5* 3.3*     --  102 105  --  107  --    CO2 31  --  33* 22  --  22  --    GLUCOSE 104*  --  177* 114*  --  137*  --    BUN 16  --  18 23  --  27*  --    CREATININE 1.0  --  1.1 0.9  --  1.0  --    MG  --   --   --  1.7  --  1.7  --    ANIONGAP 9  --  7* 15  --  15  --     LABGLOM 78  --  70 89  --  78  --    CALCIUM 9.4  --  9.6 9.0  --  8.7  --    PHOS  --   --   --  0.9* 2.1* 3.6  --    PROBNP 160  --   --   --   --   --   --    TROPHS 11  --  6 10 15 14 12   MYOGLOBIN  --    < > 25* 47 60 57  --     < > = values in this interval not displayed.     Recent Labs     08/10/24  0020 08/10/24  0043 08/10/24  1223 08/11/24  0421   AST  --  27 25 19   ALT  --  26 20 17   ALKPHOS  --  115 73 62   BILITOT  --  1.5* 2.8* 2.3*   BILIDIR  --   --  0.7*  --    AMMONIA  --   --  67*  --    POCGLU 162*  --   --   --        Lab Results   Component Value Date    INR 1.4 08/10/2024    INR 1.2 (H) 02/22/2024    PROTIME 16.8 (H) 08/10/2024    PROTIME 12.1 (H) 02/22/2024       Lab Results   Component Value Date/Time    SPECIAL LH 10 ML 08/10/2024 12:30 PM     Lab Results   Component Value Date/Time    CULTURE NO GROWTH 1 DAY 08/10/2024 12:30 PM       Lab Results   Component Value Date/Time    POCPH 7.529 08/11/2024 06:01 AM    POCPCO2 27.9 08/11/2024 06:01 AM    POCPO2 142.2 08/11/2024 06:01 AM    POCHCO3 23.3 08/11/2024 06:01 AM    PBEA 1.6 08/11/2024 06:01 AM    MTSN6OLU 99.5 08/11/2024 06:01 AM    FIO2 30.0 08/11/2024 06:01 AM       Radiology:    XR CHEST PORTABLE    Result Date: 8/10/2024  1. Endotracheal tube in satisfactory position. 2. No acute cardiopulmonary process.     CT HEAD WO CONTRAST    Result Date: 8/10/2024  No acute intracranial abnormality.     XR CHEST PORTABLE    Result Date: 8/9/2024  No acute cardiopulmonary disease.         All radiological studies reviewed                Code Status:  Full Code    Electronically signed by TRACIE DUBOIS MD on 8/11/2024 at 2:56 PM     Copy sent to Angelica Jaimes MD    This note was created with the assistance of a speech-recognition program.  Although the intention is to generate a document that actually reflects the content of the visit, no guarantees can be provided that every mistake has been identified and corrected by  will see patient in hospital

## 2024-12-27 NOTE — DIETITIAN INITIAL EVALUATION ADULT - OTHER INFO
76yr Old Male   Dx: CVA  Denies Food Allergy/Intolerance  No Pressure Ulcers (as Per Nursing Flow Sheets)  No Edema Noted (as Per Nursing Flow Sheets)  No Recent Nausea/Vomiting/Diarrhea/Constipation (as Per Patient) 
English

## 2025-03-10 NOTE — DISCHARGE NOTE NURSING/CASE MANAGEMENT/SOCIAL WORK - NSDCPETBCESMAN_GEN_ALL_CORE
[FreeTextEntry1] : i reviewed b/l vle with rvt - b/l gsv reflux, no dvt no svt 
If you are a smoker, it is important for your health to stop smoking. Please be aware that second hand smoke is also harmful.

## 2025-04-25 NOTE — ED PROVIDER NOTE - RATE
Message sent via [facility] regarding lab results. Encouraged to call clinic with questions    Microalbumin improved in comparison to 1 year ago, electrolytes are normal, glucose elevated, liver enzymes are normal, kidney function normal. Diabetes overall improved, A1c went down from 8.1-7.5. Do recommend to continue current diabetic medication, diabetic diet, continue atorvastatin and have blood pressure viral controlled. In addition I am not sure if covered by insurance Jardiance 10 mg p.o. daily. Please send prescription for 90 tablets 1 refill, continue working with Dr. Baptiste as far as diabetic control. If Jardiance not covered to let us know. Thank you    103

## 2025-05-22 NOTE — ED ADULT NURSE NOTE - ABDOMEN
Returned call to patient, left message, call back requested.    Updated Dr. Birmingham who would like the patient to obtain a stress test.   soft/nondistended/nontender

## 2025-06-23 NOTE — PROVIDER CONTACT NOTE (OTHER) - SITUATION
Pt BP hypotensive
Pt o2 saturation is at 88%.
Pt has order for fs ACHS. No sliding scale for bedtime fs.
pt converted to AFIb on tele for 17secs
134

## 2025-07-30 NOTE — DISCHARGE NOTE NURSING/CASE MANAGEMENT/SOCIAL WORK - NSDCPETBCESMAN_GEN_ALL_CORE
Up to date on health maintenance. Due for annual screening labs.    If you are a smoker, it is important for your health to stop smoking. Please be aware that second hand smoke is also harmful.